# Patient Record
Sex: FEMALE | NOT HISPANIC OR LATINO | Employment: UNEMPLOYED | URBAN - METROPOLITAN AREA
[De-identification: names, ages, dates, MRNs, and addresses within clinical notes are randomized per-mention and may not be internally consistent; named-entity substitution may affect disease eponyms.]

---

## 2017-02-21 ENCOUNTER — OFFICE VISIT (OUTPATIENT)
Dept: ENDOCRINOLOGY | Facility: CLINIC | Age: 22
End: 2017-02-21
Payer: COMMERCIAL

## 2017-02-21 VITALS
WEIGHT: 293 LBS | BODY MASS INDEX: 57.52 KG/M2 | HEIGHT: 60 IN | DIASTOLIC BLOOD PRESSURE: 83 MMHG | SYSTOLIC BLOOD PRESSURE: 133 MMHG | HEART RATE: 89 BPM

## 2017-02-21 DIAGNOSIS — R73.03 PREDIABETES: ICD-10-CM

## 2017-02-21 DIAGNOSIS — E25.9 CAH 21-OH (CONGENITAL ADRENAL HYPERPLASIA), LATE ONSET (H): ICD-10-CM

## 2017-02-21 DIAGNOSIS — E55.9 VITAMIN D DEFICIENCY: ICD-10-CM

## 2017-02-21 DIAGNOSIS — E88.819 INSULIN RESISTANCE: Primary | ICD-10-CM

## 2017-02-21 DIAGNOSIS — E66.01 MORBID OBESITY DUE TO EXCESS CALORIES (H): ICD-10-CM

## 2017-02-21 PROCEDURE — 99214 OFFICE O/P EST MOD 30 MIN: CPT | Performed by: INTERNAL MEDICINE

## 2017-02-21 NOTE — MR AVS SNAPSHOT
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 It has been a pleasure servicing you today. Please contact us at [phone] with any questions. If you need to speak to an Endocrinologist and it is after 5:00 pm or on a weekend, please call the On-Call Endocrinologist at [phone].          Follow-ups after your visit        Your next 10 appointments already scheduled     Mar 10, 2017  7:45 AM CST   LAB with NL LAB [facility] ([facility])    [address] [facility] [address] [phone]           Patient must bring picture ID.  Patient should be prepared to give a urine specimen  Please do not eat 10-12 hours before your appointment if you are coming in fasting for labs on lipids, cholesterol, or glucose (sugar).  Pregnant women should follow their Care Team instructions. Water with medications is okay. Do not drink coffee or other fluids.   If you have concerns about taking  your medications, please ask at office or if scheduling via [employer], send a message by clicking on Secure Messaging, Message Your Care Team.            May 23, 2017 11:00 AM CDT   Return Visit with Alejandra Cronin MD   [facility] ([facility])    [id number] [place] [id number] [phone]              Future tests that were ordered for you today     Open Future Orders        Priority Expected  Expires Ordered    TSH Routine 2/23/2017 2/21/2018 2/21/2017    T4 free Routine 2/23/2017 2/21/2018 2/21/2017    17 OH progesterone Routine 2/23/2017 2/21/2018 2/21/2017    Androstenedione Routine 2/23/2017 2/21/2018 2/21/2017    Cortisol Routine 2/23/2017 2/21/2018 2/21/2017    Dehydroepiandrosterone Routine 2/23/2017 2/21/2018 2/21/2017    DHEA sulfate Routine 2/23/2017 2/21/2018 2/21/2017    Estradiol Routine 2/23/2017 2/21/2018 2/21/2017    Follicle stimulating hormone Routine 2/23/2017 2/21/2018 2/21/2017    Lipid Profile Routine 2/23/2017 2/21/2018 2/21/2017    Lutropin Routine 2/23/2017 2/21/2018 2/21/2017    Testosterone Free and Total Routine 2/23/2017 2/21/2018 2/21/2017    Basic metabolic panel Routine 2/23/2017 2/21/2018 2/21/2017    Hemoglobin A1c Routine 2/23/2017 2/21/2018 2/21/2017    Hematocrit Routine 2/23/2017 2/21/2018 2/21/2017    Adrenal corticotropin Routine 2/23/2017 2/21/2018 2/21/2017    Progesterone Routine 2/23/2017 2/21/2018 2/21/2017    Vitamin D Deficiency Routine 2/23/2017 2/21/2018 2/21/2017            Who to contact     If you have questions or need follow up information about today's clinic visit or your schedule please contact [facility] directly at [phone].  Normal or non-critical lab and imaging results will be communicated to you by [employer], letter or phone within 4 business days after the clinic has received the results. If you do not hear from us within 7 days, please contact the clinic through [employer] or phone. If you have a critical or abnormal lab result, we will notify you by phone as soon as possible.  Submit refill requests through [employer] or call your pharmacy and they will forward the refill request to us. Please allow 3 business days for your refill to be completed.          Additional Information About Your Visit        [employer] Information     MyChart is an electronic gateway that provides easy, online access to your medical records. With  "MyChart, you can request a clinic appointment, read your test results, renew a prescription or communicate with your care team.     To sign up for MyChart visit the website at [email or web address]   You will be asked to enter the access code listed below, as well as some personal information. Please follow the directions to create your username and password.     Your access code is: [email or web address]  Expires: 2017 12:09 PM     Your access code will  in 90 days. If you need help or a new code, please contact your [facility] or call [phone] for assistance.        Care EveryWhere ID     This is your Care EveryWhere ID. This could be used by other organizations to access your [facility] medical records  [id number]        Your Vitals Were     Pulse Height BMI (Body Mass Index)             89 1.53 m (5' 0.25\") 62.51 kg/m2          Blood Pressure from Last 3 Encounters:   17 133/83   14 111/75   10/17/14 126/64    Weight from Last 3 Encounters:   17 (!) 146.4 kg (322 lb 12.1 oz)   14 (!) 143.8 kg (317 lb)   10/17/14 (!) 142 kg (313 lb)               Primary Care Provider    [facility]       No address on file        Thank you!     Thank you for choosing [facility]  for your care. Our goal is always to provide you with excellent care. Hearing back from our patients is one way we can continue to improve our services. Please take a few minutes to complete the written survey that you may receive in the mail after your visit with us. Thank you!             Your Updated Medication List - Protect others around you: Learn how to safely use, store and throw away your medicines at www.disposemymeds.org.          This list is accurate as of: 17 12:09 PM.  Always use your most recent med list.                   Brand Name Dispense Instructions for use    metFORMIN 1000 MG tablet    GLUCOPHAGE    180 tablet    Take " 1 tablet (1,000 mg) by mouth 2 times daily (with meals)       spironolactone 100 MG tablet    ALDACTONE    180 tablet    Take 1 tablet (100 mg) by mouth 2 times daily

## 2017-02-21 NOTE — PATIENT INSTRUCTIONS
Thank you for choosing the [facility], Department of Endocrinology. It has been a pleasure servicing you today. Please contact us at [phone] with any questions. If you need to speak to an Endocrinologist and it is after 5:00 pm or on a weekend, please call the On-Call Endocrinologist at [phone].

## 2017-02-21 NOTE — NURSING NOTE
"Anhbabar Flores's goals for this visit include: Follow up Adrenal Hyperplasia  She requests these members of her care team be copied on today's visit information: NO    PCP: [facility]    Referring Provider:  No referring provider defined for this encounter.    Chief Complaint   Patient presents with     RECHECK     Adrenal Hyperplasia       Initial Ht 1.53 m (5' 0.25\")  Wt (!) 146.4 kg (322 lb 12.1 oz)  BMI 62.51 kg/m2 Estimated body mass index is 62.51 kg/(m^2) as calculated from the following:    Height as of this encounter: 1.53 m (5' 0.25\").    Weight as of this encounter: 146.4 kg (322 lb 12.1 oz).  Medication Reconciliation: complete    Do you need any medication refills at today's visit? YES    "

## 2017-02-21 NOTE — PROGRESS NOTES
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 She was seen by me only once, in November 2014, and she didn't show up for follow-up.    She is accompanied by her mother. Soon after her first appointment here, they moved and they think this is the reason why they didn't answer the phone or receives the letter we mailed.    Anh Flores is a 22 year old female, previously seen by Dr. 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 Over the last few years, the patient had sporadic medical care due to lack of insurance coverage.     Around age 6, while being evaluated for precocious puberty and darkenig of the skin around her neck, she was diagnosed with late onset CAH and started on 0.25 mg dexamethasone daily. Later on, metformin, spironolactone and Micaela were prescribed (she was taking them prior to 2010). Treatment with birth control pills did not induce a withdrawal bleeding. She 1st noticed the appearance of facial hair around age 13. She currently shaves it once or twice a week.    A couple of months following November 2014 appointment, she ran out of all the medications. She didn't seek care due to lack of medical insurance.  As a result, she hasn't been taking any medications or supplements for over 2 years.    Besides persistent headaches, present approximately once a week, she denies any other signs or symptoms. They are diffuse and, sometimes, they last up to one or 2 days. Ketoprofen was recommended by neurology in 2014. The patient prefers not to take it, and she develops dizziness and the headaches do not actually improve. She has to sleep for the headaches to actually go away.    Prior images:   Pelvic US 2007 Limited sonographic evaluation secondary to the patient's body habitus.  CT abd 2007 - no adrenal enlargement   CT abdomen and pelvis 2010 - normal uterus and  "ovaries  An MRI of the brain done on 10/15/14 was unremarkable  Sleep study was done in 2007 or 2008    Past Medical History   Diagnosis Date     CAH (congenital adrenal hyperplasia) 2/9/2010     Followed by Dr. Brewer; next follow up 1.2011.  Metformin increased to 850 mg twice a day.      Vitamin D deficiency 2/9/2010   Morbid obesity  Acanthosis nigricans  Hydradenitis suppurativa   Headaches     Past Surgical History   Procedure Laterality Date     Cholecystectomy       She was in 8th grade      Current Medications    Current Outpatient Prescriptions:      spironolactone (ALDACTONE) 100 MG tablet, Take 1 tablet (100 mg) by mouth 2 times daily (Patient not taking: Reported on 2/21/2017), Disp: 180 tablet, Rfl: 3     metFORMIN (GLUCOPHAGE) 1000 MG tablet, Take 1 tablet (1,000 mg) by mouth 2 times daily (with meals) (Patient not taking: Reported on 2/21/2017), Disp: 180 tablet, Rfl: 3    Family History   Problem Relation Age of Onset     Neurological Sister 16     migraines   Maternal uncle - colon cancer.  Both maternal grandparents and her mother have hypertension. There is a family history of obesity and hirsutism (both her sisters). There is a family history of obesity. Her mother and 2 great aunts have type 2 diabetes. Her mother is  American.      Social History  Single. She denies smoking, drinking alcohol or using illicit drugs. Occupation: knitting.     Review of Systems   Weight up 8 lbs in the last 2 years   No fatigue   Jaw pain - at the mandibular joint - B/L, intermittently, lasting for a couple of hours, for the last 3-4 months, anytime of the day, with no reason. She doesn't grind her teeth at night.   Denies nausea or vomiting, lightheadedness  No diarrhea or constipation   No increased thirst or urination   She has boils in her armpits   No tremor; fingers sometimes get numb when she knits   Shoulders, back, elbows and knee pain   She is \"hot all the time\"  Sleeps with a fan at night as " "she breathes better this way  No dry skin, no hair loss   No anxiety or depression   No increased sweating  No enlargement of the hands and feet   12 point review of systems negative unless specified above.               Vital Signs     Previous Weights:    Wt Readings from Last 10 Encounters:   02/21/17 (!) 146.4 kg (322 lb 12.1 oz)   11/03/14 (!) 143.8 kg (317 lb)   10/17/14 (!) 142 kg (313 lb)   10/01/14 (!) 142 kg (313 lb)   09/27/14 (!) 142.9 kg (315 lb)   09/23/14 (!) 144.3 kg (318 lb 3.2 oz)   09/17/14 (!) 142 kg (313 lb)   09/11/14 (!) 142 kg (313 lb)   02/21/11 127.4 kg (280 lb 13.9 oz) (>99 %)*   04/11/11 131.7 kg (290 lb 6 oz) (>99 %)*     * Growth percentiles are based on CDC 2-20 Years data.        Vital signs:   /83  Pulse 89  Ht 1.53 m (5' 0.25\")  Wt (!) 146.4 kg (322 lb 12.1 oz)  BMI 62.51 kg/m2    Physical Exam  General Appearance: morbid obesity, round face, no plethora    Eyes:  conjutivae and extra-ocular motions are normal.                                    pupils round and reactive to light, no lid lag, no stare   Visual fields intact to confrontation    HEENT:   short neck, oropharynx clear and moist, no JVD, no bruits      unable to palpate the thyroid, no palpable nodules; overbite    Cardiovascular:  regular rhythm, no murmurs, distal pulse palpable, no edema  Respiratory:        chest clear, no rales, no rhonchi   Gastrointestinal:  abdomen soft, obese, non-tender, non-distended, normal bowel sounds,    no organomegaly  Musculoskeletal:  normal tone and strength  Psychological:          affect and judgment normal  Skin:  severe, diffuse acanthosis nigricans - mainly at the flexural areas; severe hirsutism; no acne, benign striae   Neurological:  reflexes normal and symmetric, no resting tremor.     Lab Results  I reviewed prior lab results documented in Epic.  Lab Results   Component Value Date    A1C 6.1 (H) 09/17/2014    A1C 5.7 07/09/2010    A1C 5.5 01/15/2010    A1C 5.3 " 10/12/2007    A1C 5.3 03/12/2007     Component      Latest Ref Rng & Units 11/3/2014   Percent Testosterone Free      1.0 - 3.8 % 3.5   Testosterone Total      8 - 60 ng/dL 81 (H)   Testosterone Free      0.1 - 1.5 ng/dL 2.8 (H)   FSH      IU/L 4.7   Lutropin      IU/L 4.1   17-OH Progesterone      ng/dL 185   DHEA Sulfate      35 - 430 ug/dL 264   Androstenedione       1.320   Prolactin      3 - 27 ug/L 4   Estradiol      pg/mL Canceled, Test credited . . .   Cortisol Serum      4 - 22 ug/dL 6.1     Assessment     1. Nonclassical congenital adrenal hyperplasia   I am not sure she benefits from treatment with dexamethasone. F/up 17 OH progesterone, DHEAS, androstenedione and testosterone level.     2. Morbid obesity, associated with severe insulin resistance  Counseled on diet and exercise. We might refer her to weight management clinic.   F/up morning ACTH and cortisol level, lipid panel, BMP.     3. Primary amenorrhea   Might be due to #1 or #2, or a combination of both   Check FSH, LH and estradiol   Consider restarting her on BCP    4. Type 2 diabetes   Prior A1c was in a prediabetic range in the context of treatment with metformin which indicates that she had type 2 diabetes.   Check A1c and H. Restart metfomin at a maximum dose.    5. H/O vitamin D deficiency   Check vitamin D level and consider starting high dose vitamin D treatment indefinitely.     6. Headaches   Might be related to sleep apnea. Following the lab results, will schedule her for an apt with a sleep apnea specialist.     7. Hirsutism   Consider restarting spironolactone. Metformin might help, too.     Recommendations:   Have the following labwork done in the morning, fasting:   Orders Placed This Encounter   Procedures     TSH     T4 free     17 OH progesterone     Androstenedione     Cortisol     Dehydroepiandrosterone     DHEA sulfate     Estradiol     Follicle stimulating hormone     Lipid Profile     Lutropin     Testosterone Free and  Total     Basic metabolic panel     Hemoglobin A1c     Hematocrit     Adrenal corticotropin     Progesterone     Vitamin D Deficiency

## 2017-03-15 ENCOUNTER — ALLIED HEALTH/NURSE VISIT (OUTPATIENT)
Dept: PEDIATRICS | Facility: CLINIC | Age: 22
End: 2017-03-15
Payer: MEDICAID

## 2017-03-15 DIAGNOSIS — Z23 NEED FOR PROPHYLACTIC VACCINATION AND INOCULATION AGAINST INFLUENZA: Primary | ICD-10-CM

## 2017-03-15 DIAGNOSIS — E55.9 VITAMIN D DEFICIENCY: ICD-10-CM

## 2017-03-15 DIAGNOSIS — E25.9 CAH 21-OH (CONGENITAL ADRENAL HYPERPLASIA), LATE ONSET (H): ICD-10-CM

## 2017-03-15 DIAGNOSIS — E88.819 INSULIN RESISTANCE: ICD-10-CM

## 2017-03-15 DIAGNOSIS — E66.01 MORBID OBESITY DUE TO EXCESS CALORIES (H): ICD-10-CM

## 2017-03-15 LAB
ANION GAP SERPL CALCULATED.3IONS-SCNC: 6 MMOL/L (ref 3–14)
BUN SERPL-MCNC: 7 MG/DL (ref 7–30)
CALCIUM SERPL-MCNC: 9.2 MG/DL (ref 8.5–10.1)
CHLORIDE SERPL-SCNC: 105 MMOL/L (ref 94–109)
CHOLEST SERPL-MCNC: 165 MG/DL
CO2 SERPL-SCNC: 28 MMOL/L (ref 20–32)
CORTIS SERPL-MCNC: 8.7 UG/DL (ref 4–22)
CREAT SERPL-MCNC: 0.66 MG/DL (ref 0.52–1.04)
ESTRADIOL SERPL-MCNC: 60 PG/ML
FSH SERPL-ACNC: 3.8 IU/L
GFR SERPL CREATININE-BSD FRML MDRD: ABNORMAL ML/MIN/1.7M2
GLUCOSE SERPL-MCNC: 110 MG/DL (ref 70–99)
HBA1C MFR BLD: 6.4 % (ref 4.3–6)
HCT VFR BLD AUTO: 43.8 % (ref 35–47)
HDLC SERPL-MCNC: 55 MG/DL
LDLC SERPL CALC-MCNC: 83 MG/DL
LH SERPL-ACNC: 4.3 IU/L
NONHDLC SERPL-MCNC: 110 MG/DL
POTASSIUM SERPL-SCNC: 4.2 MMOL/L (ref 3.4–5.3)
PROGEST SERPL-MCNC: 0.4 NG/ML
SODIUM SERPL-SCNC: 139 MMOL/L (ref 133–144)
T4 FREE SERPL-MCNC: 0.95 NG/DL (ref 0.76–1.46)
TRIGL SERPL-MCNC: 133 MG/DL
TSH SERPL DL<=0.05 MIU/L-ACNC: 0.57 MU/L (ref 0.4–4)

## 2017-03-15 PROCEDURE — 99000 SPECIMEN HANDLING OFFICE-LAB: CPT | Performed by: INTERNAL MEDICINE

## 2017-03-15 PROCEDURE — 80061 LIPID PANEL: CPT | Performed by: INTERNAL MEDICINE

## 2017-03-15 PROCEDURE — 99207 ZZC NO CHARGE NURSE ONLY: CPT

## 2017-03-15 PROCEDURE — 82157 ASSAY OF ANDROSTENEDIONE: CPT | Mod: 90 | Performed by: INTERNAL MEDICINE

## 2017-03-15 PROCEDURE — 83036 HEMOGLOBIN GLYCOSYLATED A1C: CPT | Performed by: INTERNAL MEDICINE

## 2017-03-15 PROCEDURE — 84270 ASSAY OF SEX HORMONE GLOBUL: CPT | Performed by: INTERNAL MEDICINE

## 2017-03-15 PROCEDURE — 84443 ASSAY THYROID STIM HORMONE: CPT | Performed by: INTERNAL MEDICINE

## 2017-03-15 PROCEDURE — 84403 ASSAY OF TOTAL TESTOSTERONE: CPT | Performed by: INTERNAL MEDICINE

## 2017-03-15 PROCEDURE — 83498 ASY HYDROXYPROGESTERONE 17-D: CPT | Performed by: INTERNAL MEDICINE

## 2017-03-15 PROCEDURE — 90686 IIV4 VACC NO PRSV 0.5 ML IM: CPT

## 2017-03-15 PROCEDURE — 83001 ASSAY OF GONADOTROPIN (FSH): CPT | Performed by: INTERNAL MEDICINE

## 2017-03-15 PROCEDURE — 84144 ASSAY OF PROGESTERONE: CPT | Performed by: INTERNAL MEDICINE

## 2017-03-15 PROCEDURE — 82626 DEHYDROEPIANDROSTERONE: CPT | Mod: 90 | Performed by: INTERNAL MEDICINE

## 2017-03-15 PROCEDURE — 84439 ASSAY OF FREE THYROXINE: CPT | Performed by: INTERNAL MEDICINE

## 2017-03-15 PROCEDURE — 90471 IMMUNIZATION ADMIN: CPT

## 2017-03-15 PROCEDURE — 82627 DEHYDROEPIANDROSTERONE: CPT | Performed by: INTERNAL MEDICINE

## 2017-03-15 PROCEDURE — 80048 BASIC METABOLIC PNL TOTAL CA: CPT | Performed by: INTERNAL MEDICINE

## 2017-03-15 PROCEDURE — 82533 TOTAL CORTISOL: CPT | Performed by: INTERNAL MEDICINE

## 2017-03-15 PROCEDURE — 82306 VITAMIN D 25 HYDROXY: CPT | Performed by: INTERNAL MEDICINE

## 2017-03-15 PROCEDURE — 36415 COLL VENOUS BLD VENIPUNCTURE: CPT | Performed by: INTERNAL MEDICINE

## 2017-03-15 PROCEDURE — 85014 HEMATOCRIT: CPT | Performed by: INTERNAL MEDICINE

## 2017-03-15 PROCEDURE — 82024 ASSAY OF ACTH: CPT | Performed by: INTERNAL MEDICINE

## 2017-03-15 PROCEDURE — 83002 ASSAY OF GONADOTROPIN (LH): CPT | Performed by: INTERNAL MEDICINE

## 2017-03-15 PROCEDURE — 82670 ASSAY OF TOTAL ESTRADIOL: CPT | Performed by: INTERNAL MEDICINE

## 2017-03-15 NOTE — PROGRESS NOTES
Injectable Influenza Immunization Documentation    1.  Is the person to be vaccinated sick today?  No    2. Does the person to be vaccinated have an allergy to eggs or to a component of the vaccine?  No    3. Has the person to be vaccinated today ever had a serious reaction to influenza vaccine in the past?  No    4. Has the person to be vaccinated ever had Guillain-Elkader syndrome?  No     Form completed by Amaya Cox CMA

## 2017-03-15 NOTE — MR AVS SNAPSHOT
After Visit Summary   3/15/2017    Anh Flores    MRN: [id number]           Patient Information     Date Of Birth          1995        Visit Information        Provider Department      3/15/2017 11:55 AM MG ANCILLARY [facility]        Today's Diagnoses     Need for prophylactic vaccination and inoculation against influenza    -  1       Follow-ups after your visit        Your next 10 appointments already scheduled     Mar 15, 2017 11:55 AM CDT   Nurse Only with MG ANCILLARY   [facility] ([facility])    [address] [place] [address] [phone]            May 23, 2017 11:00 AM CDT   Return Visit with Alejandra Cronin MD   [facility] [place] 2[facility]    [address] [place] [address] [phone]              Who to contact     If you have questions or need follow up information about today's clinic visit or your schedule please contact [facility] directly at [phone].  Normal or non-critical lab and imaging results will be communicated to you by [employer], letter or phone within 4 business days after the clinic has received the results. If you do not hear from us within 7 days, please contact the clinic through [employer] or phone. If you have a critical or abnormal lab result, we will notify you by phone as soon as possible.  Submit refill requests through [employer] or call your pharmacy and they will forward the refill request to us. Please allow 3 business days for your refill to be completed.          Additional Information About Your Visit        [employer] Information     [employer] is an electronic gateway that provides easy, online access to your medical records. With [employer], you can request a clinic appointment, read your test results, renew a prescription or communicate with your care team.     To sign up for [employer] visit the website at  [email or web address]   You will be asked to enter the access code listed below, as well as some personal information. Please follow the directions to create your username and password.     Your access code is: [email or web address]  Expires: 2017  1:09 PM     Your access code will  in 90 days. If you need help or a new code, please contact your [facility] or call [phone] for assistance.        Care EveryWhere ID     This is your Care EveryWhere ID. This could be used by other organizations to access your [facility] medical records  [id number]         Blood Pressure from Last 3 Encounters:   17 133/83   14 111/75   10/17/14 126/64    Weight from Last 3 Encounters:   17 (!) 322 lb 12.1 oz (146.4 kg)   14 (!) 317 lb (143.8 kg)   10/17/14 (!) 313 lb (142 kg)              We Performed the Following     FLU VAC, SPLIT VIRUS IM > 3 YO (QUADRIVALENT) [13400]     Vaccine Administration, Initial [54830]        Primary Care Provider    [facility]       No address on file        Thank you!     Thank you for choosing [facility]  for your care. Our goal is always to provide you with excellent care. Hearing back from our patients is one way we can continue to improve our services. Please take a few minutes to complete the written survey that you may receive in the mail after your visit with us. Thank you!             Your Updated Medication List - Protect others around you: Learn how to safely use, store and throw away your medicines at www.disposemymeds.org.          This list is accurate as of: 3/15/17 11:54 AM.  Always use your most recent med list.                   Brand Name Dispense Instructions for use    metFORMIN 1000 MG tablet    GLUCOPHAGE    180 tablet    Take 1 tablet (1,000 mg) by mouth 2 times daily (with meals)       spironolactone 100 MG tablet    ALDACTONE    180 tablet    Take 1 tablet (100 mg) by  mouth 2 times daily

## 2017-03-16 LAB
ACTH PLAS-MCNC: 23 PG/ML
DEPRECATED CALCIDIOL+CALCIFEROL SERPL-MC: 12 UG/L (ref 20–75)
DHEA-S SERPL-MCNC: 289 UG/DL (ref 35–430)

## 2017-03-17 LAB
SHBG SERPL-SCNC: 8 NMOL/L (ref 30–135)
TESTOST FREE SERPL-MCNC: 1.84 NG/DL (ref 0.08–0.74)
TESTOST SERPL-MCNC: 57 NG/DL (ref 8–60)

## 2017-03-18 LAB
ANDROST SERPL-MCNC: 1.51 NG/ML
DHEA SERPL-MCNC: 7.56

## 2017-03-23 LAB — 17OHP SERPL-MCNC: 545 NG/DL

## 2017-03-24 ENCOUNTER — TELEPHONE (OUTPATIENT)
Dept: ENDOCRINOLOGY | Facility: CLINIC | Age: 22
End: 2017-03-24

## 2017-03-24 RX ORDER — METFORMIN HCL 500 MG
1000 TABLET, EXTENDED RELEASE 24 HR ORAL 2 TIMES DAILY WITH MEALS
Qty: 360 TABLET | Refills: 3 | Status: SHIPPED | OUTPATIENT
Start: 2017-03-24 | End: 2018-03-29

## 2017-03-24 RX ORDER — SPIRONOLACTONE 100 MG/1
100 TABLET, FILM COATED ORAL 2 TIMES DAILY
Qty: 180 TABLET | Refills: 3 | Status: SHIPPED | OUTPATIENT
Start: 2017-03-24 | End: 2018-03-29

## 2017-03-24 RX ORDER — NORGESTIMATE AND ETHINYL ESTRADIOL 0.25-0.035
1 KIT ORAL DAILY
Qty: 84 TABLET | Refills: 3 | Status: SHIPPED | OUTPATIENT
Start: 2017-03-24 | End: 2018-04-10

## 2017-03-24 NOTE — PROGRESS NOTES
Called patient and spoke with her mother. Recommended to start OrthoTricyclen, spironolactone and metformin and start treatment with high dose vitamin D at [address] U weekly. She should reschedule a clinic f/up apt in 6 months. Discussed about scheduling an apt with the dietician - has a hard time driving her to the clinic due to her work schedule. Might be able to schedule an apt in June.

## 2017-03-24 NOTE — TELEPHONE ENCOUNTER
Per Roseanne Message:  Please cancel May apt and schedule a RTC in 6 months.     Left message for patient to return call.    Angela Baugh LPN  Adult Endocrinology  [facility]

## 2018-01-10 ENCOUNTER — OFFICE VISIT (OUTPATIENT)
Dept: ENDOCRINOLOGY | Facility: CLINIC | Age: 23
End: 2018-01-10
Payer: COMMERCIAL

## 2018-01-10 VITALS
SYSTOLIC BLOOD PRESSURE: 123 MMHG | TEMPERATURE: 98.1 F | BODY MASS INDEX: 57.52 KG/M2 | WEIGHT: 293 LBS | HEART RATE: 79 BPM | HEIGHT: 60 IN | DIASTOLIC BLOOD PRESSURE: 82 MMHG | OXYGEN SATURATION: 96 %

## 2018-01-10 DIAGNOSIS — M62.838 MUSCLE SPASM: ICD-10-CM

## 2018-01-10 DIAGNOSIS — E25.9 CAH 21-OH (CONGENITAL ADRENAL HYPERPLASIA), LATE ONSET (H): Primary | ICD-10-CM

## 2018-01-10 PROCEDURE — 99214 OFFICE O/P EST MOD 30 MIN: CPT | Performed by: INTERNAL MEDICINE

## 2018-01-10 NOTE — MR AVS SNAPSHOT
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              Follow-ups after your visit        Follow-up notes from your care team     Return in about 1 year (around 1/10/2019) for Chad Newell.      Your next 10 appointments already scheduled     Buck 15, 2018  7:40 AM CST   LAB with LAB FIRST FLOOR [facility] ([facility])    [id number] [place] [id number]   [phone]           Please do not eat 10-12 hours before your appointment if you are coming in fasting for labs on lipids, cholesterol, or glucose (sugar). This does not apply to pregnant women. Water, hot tea and black coffee (with nothing added) are okay. Do not drink other fluids, diet soda or chew gum.            [place] 15, 2018  1:30 PM CST   New Visit with Lyn Mcgrath   [facility] ([facility]    [id number] [place] [id number]   [phone]            Jan 09, 2019 11:00 AM CST   Return Visit with Alejandra Cronin MD   [facility] ([facility])    [id number] [place] [id number]   [phone]              Future tests that were ordered for you today     Open Future Orders        Priority Expected Expires Ordered    Magnesium Routine 1/17/2018 1/10/2019 1/10/2018    Comprehensive metabolic panel Routine 1/17/2018 1/10/2019 1/10/2018    Hemoglobin A1c Routine 1/17/2018 1/10/2019 1/10/2018    Lipid panel reflex to direct LDL Fasting Routine 1/17/2018 1/10/2019 1/10/2018    TSH with free T4  reflex Routine 2018 1/10/2019 1/10/2018    Albumin Random Urine Quantitative with Creat Ratio Routine 2018 1/10/2019 1/10/2018    17 OH progesterone Routine 2018 1/10/2019 1/10/2018    25 Hydroxyvitamin D2 and D3 Routine 2018 1/10/2019 1/10/2018    DHEA sulfate Routine 2018 1/10/2019 1/10/2018    Dehydroepiandrosterone Routine 2018 1/10/2019 1/10/2018            Who to contact     If you have questions or need follow up information about today's clinic visit or your schedule please contact [facility] [[email or web address]] directly at [phone].  Normal or non-critical lab and imaging results will be communicated to you by Kueskihart, letter or phone within 4 business days after the clinic has received the results. If you do not hear from us within 7 days, please contact the clinic through Kueskihart or phone. If you have a critical or abnormal lab result, we will notify you by phone as soon as possible.  Submit refill requests through Nestio or call your pharmacy and they will forward the refill request to us. Please allow 3 business days for your refill to be completed.          Additional Information About Your Visit        Nestio Information     Nestio is an electronic gateway that provides easy, online access to your medical records. With Nestio, you can request a clinic appointment, read your test results, renew a prescription or communicate with your care team.     To sign up for Nestio visit the website at [email or web address]   You will be asked to enter the access code listed below, as well as some personal information. Please follow the directions to create your username and password.     Your access code is: [email or web address]  Expires: 4/10/2018 12:02 PM     Your access code will  in 90 days. If you need help or a new code, please contact your [facility] or call [phone] for assistance.        Care EveryWhere ID     This is your Care  "EveryWhere ID. This could be used by other organizations to access your [place] medical records  [id number]        Your Vitals Were     Pulse Temperature Height Pulse Oximetry BMI (Body Mass Index)       79 98.1  F (36.7  C) 1.53 m (5' 0.25\") 96% 63.37 kg/m2        Blood Pressure from Last 3 Encounters:   01/10/18 123/82   02/21/17 133/83   11/03/14 111/75    Weight from Last 3 Encounters:   01/10/18 (!) 148.4 kg (327 lb 2.6 oz)   02/21/17 (!) 146.4 kg (322 lb 12.1 oz)   11/03/14 (!) 143.8 kg (317 lb)               Primary Care Provider Office Phone # Fax #    [facility] [phone]       [address]  [facility] [address]        Equal Access to Services     GENEVIEVE KIMBROUGH : Hadii josué hayo Sosudheer, waaxda luqadaha, qaybta kaalmada adeegyada, jey bedoya . So [facility] [phone].    ATENCIÓN: Si john no, tiene a onofre disposición servicios gratuitos de asistencia lingüística. Llame al [phone].    We comply with applicable federal civil rights laws and [place] laws. We do not discriminate on the basis of race, color, national origin, age, disability, sex, sexual orientation, or gender identity.            Thank you!     Thank you for choosing [facility]  for your care. Our goal is always to provide you with excellent care. Hearing back from our patients is one way we can continue to improve our services. Please take a few minutes to complete the written survey that you may receive in the mail after your visit with us. Thank you!             Your Updated Medication List - Protect others around you: Learn how to safely use, store and throw away your medicines at www.disposemymeds.org.          This list is accurate as of: 1/10/18 12:02 PM.  Always use your most recent med list.                   Brand Name Dispense Instructions for use Diagnosis    cholecalciferol 69110 UNITS capsule    VITAMIN D3    12 capsule    " Take 1 capsule (50,000 Units) by mouth once a week    Vitamin D deficiency       metFORMIN 500 MG 24 hr tablet    GLUCOPHAGE-XR    360 tablet    Take 2 tablets (1,000 mg) by mouth 2 times daily (with meals)    CAH 21-OH (congenital adrenal hyperplasia), late onset (H), Insulin resistance, Prediabetes       norgestimate-ethinyl estradiol 0.25-35 MG-MCG per tablet    ORTHO-CYCLEN, SPRINTEC    84 tablet    Take 1 tablet by mouth daily    CAH 21-OH (congenital adrenal hyperplasia), late onset (H)       spironolactone 100 MG tablet    ALDACTONE    180 tablet    Take 1 tablet (100 mg) by mouth 2 times daily    CAH 21-OH (congenital adrenal hyperplasia), late onset (H)

## 2018-01-10 NOTE — PROGRESS NOTES
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 She was previously seen by me in November 2014 and February 2017.  She is accompanied by her mother.    She is accompanied by her mother. Soon after her first appointment here, they moved and they think this is the reason why they didn't answer the phone or received the letter we mailed.    Anh Flores is a 23 year old female, previously seen by Dr. 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   Around age 6, while being evaluated for precocious puberty and darkening of the skin around her neck, she was diagnosed with late onset CAH and started on 0.25 mg dexamethasone daily. Later on, metformin, spironolactone and Micaela were prescribed (she was taking them prior to 2010). Treatment with birth control pills did not induce a withdrawal bleeding. She 1st noticed the appearance of facial hair around age 13. Over the last few years, the patient had sporadic medical care.     A couple of months following November 2014 appointment, she ran out of all the medications. She didn't seek care due to lack of medical insurance.  As a result, she did not take any medications or supplements for over 2 years.  She has been off dexamethasone since 2014.  In February 2017,  she was restarted on metformin, spironolactone and birth control pills.  She reports being compliant in taking the medications as instructed.  Metformin causes some epigastric pain but she can tolerate it, if she takes it with yogurt.    Prior images:   Pelvic US 2007 Limited sonographic evaluation secondary to the patient's body habitus.  CT abd 2007 - no adrenal enlargement   CT abdomen and pelvis 2010 - normal uterus and ovaries  An MRI of the brain done on 10/15/14 was unremarkable  Sleep study was done in 2007 or 2008    She has not noticed a change in the facial hair since  dexamethasone was discontinued.  For the last 6 months or so, she has developed muscle twitching around her eyes.  The twitching is persistent throughout the day and it starts after she takes her morning medications.  Today, she did not take her medications prior to her clinic appointment and she has not been having them.  The headaches have been less frequent, now present twice a month.    At her last visit here, instructed her to establish care with the dietitian.  She has not been able to do this as she depends on her mother to drive her to the clinic and her mother has to go to work during daytime.  Wintertime, she spends most of the days inside.  She tries to eat less processed foods but she admits that this is hard to do since she lives with small kids in the house, and they are quite picky.  As per mother, her daily schedule is very irregular, and she frequently goes to sleep around 4 AM.  She does not exercise.  Dinner remains the main meal of the day and she admits that she frequently ends up snacking.  She enjoys taking care of her dogs and doing grooming.    She confirms he takes the vitamin D as instructed.  Daily intake of dairy products consist of 2 yogurts, cheese.    Past Medical History:   Diagnosis Date     CAH (congenital adrenal hyperplasia) 2/9/2010    Followed by Dr. Brewer; next follow up 1.2011.  Metformin increased to 850 mg twice a day.      Vitamin D deficiency 2/9/2010   Morbid obesity  Acanthosis nigricans  Hydradenitis suppurativa   Headaches     Past Surgical History:   Procedure Laterality Date     CHOLECYSTECTOMY      She was in 8th grade      Current Medications    Current Outpatient Prescriptions:      metFORMIN (GLUCOPHAGE-XR) 500 MG 24 hr tablet, Take 2 tablets (1,000 mg) by mouth 2 times daily (with meals), Disp: 360 tablet, Rfl: 3     spironolactone (ALDACTONE) 100 MG tablet, Take 1 tablet (100 mg) by mouth 2 times daily, Disp: 180 tablet, Rfl: 3     norgestimate-ethinyl  "estradiol (ORTHO-CYCLEN, SPRINTEC) 0.25-35 MG-MCG per tablet, Take 1 tablet by mouth daily, Disp: 84 tablet, Rfl: 3     cholecalciferol (VITAMIN D3) 18376 UNITS capsule, Take 1 capsule (50,000 Units) by mouth once a week, Disp: 12 capsule, Rfl: 3     [DISCONTINUED] spironolactone (ALDACTONE) 100 MG tablet, Take 1 tablet (100 mg) by mouth 2 times daily (Patient not taking: Reported on 2/21/2017), Disp: 180 tablet, Rfl: 3     [DISCONTINUED] metFORMIN (GLUCOPHAGE) 1000 MG tablet, Take 1 tablet (1,000 mg) by mouth 2 times daily (with meals) (Patient not taking: Reported on 2/21/2017), Disp: 180 tablet, Rfl: 3    Family History   Problem Relation Age of Onset     Neurological Sister 16     migraines   Maternal uncle - colon cancer.  Both maternal grandparents and her mother have hypertension. There is a family history of obesity and hirsutism (both her sisters). There is a family history of obesity. Her mother and 2 great aunts have type 2 diabetes. Her mother is Nepalese American.      Social History  Single. She denies smoking, drinking alcohol or using illicit drugs. Occupation: knitting.     Review of Systems   Weight up 10 lbs in the last 2 years   No fatigue   Denies nausea or vomiting, lightheadedness  No diarrhea or constipation   No increased thirst or urination   She has boils in her armpits   No tremor; fingers sometimes get numb when she knits   Shoulders, back, elbows and knee pain   She is \"hot all the time\"  Sleeps with a fan at night as she breathes better this way; wintertime she has the window open   No dry skin, no hair loss   No anxiety or depression   No increased sweating  12 point review of systems negative unless specified above.               Vital Signs     Previous Weights:    Wt Readings from Last 10 Encounters:   01/10/18 (!) 148.4 kg (327 lb 2.6 oz)   02/21/17 (!) 146.4 kg (322 lb 12.1 oz)   11/03/14 (!) 143.8 kg (317 lb)   10/17/14 (!) 142 kg (313 lb)   10/01/14 (!) 142 kg (313 lb)   09/27/14 " "(!) 142.9 kg (315 lb)   09/23/14 (!) 144.3 kg (318 lb 3.2 oz)   09/17/14 (!) 142 kg (313 lb)   09/11/14 (!) 142 kg (313 lb)   02/21/11 127.4 kg (280 lb 13.9 oz) (>99 %)*     * Growth percentiles are based on [facility] 2-20 Years data.        Vital signs:   /82  Pulse 79  Temp 98.1  F (36.7  C)  Ht 1.53 m (5' 0.25\")  Wt (!) 148.4 kg (327 lb 2.6 oz)  SpO2 96%  BMI 63.37 kg/m2    Physical Exam  General Appearance: morbid obesity, round face, no plethora    Eyes:  conjutivae and extra-ocular motions are normal.                                    pupils round and reactive to light, no lid lag, no stare   Visual fields intact to confrontation    HEENT:   short neck, oropharynx clear and moist, no JVD, no bruits      unable to palpate the thyroid, no palpable nodules; overbite    Cardiovascular:  regular rhythm, no murmurs, distal pulse palpable, no edema  Respiratory:        chest clear, no rales, no rhonchi   Gastrointestinal:  abdomen soft, obese, non-tender, non-distended, normal bowel sounds,    no organomegaly  Musculoskeletal:  normal tone and strength  Psychological:          affect and judgment normal  Skin:  severe, diffuse acanthosis nigricans - mainly at the flexural areas; severe hirsutism; no acne, benign striae   Neurological:  reflexes normal and symmetric, no resting tremor.     Lab Results  I reviewed prior lab results documented in Epic.  Lab Results   Component Value Date    A1C 6.4 (H) 03/15/2017    A1C 6.1 (H) 09/17/2014    A1C 5.7 07/09/2010    A1C 5.5 01/15/2010    A1C 5.3 10/12/2007     Assessment     1. Nonclassical congenital adrenal hyperplasia, associated with primary amenorrhea, treated with dexamethasone until 2014, when dexamethasone was discontinued.  Following the discontinuation of dexamethasone, the 17 hydroxyprogesterone went up, but the DHEAS, dehydroepiandrosterone and testosterone remained stable, in the normal range.  Currently, she is medicated with birth control pills and a " maximum dose of metformin and spironolactone.  Plan:   Check 17 hydroxyprogesterone, DHEAS, DHEA and testosterone.    2. Morbid obesity, associated with severe insulin resistance. Her weight is up 10 pounds in the last 2 years.  Counseled extensively on diet and exercise.  Discussed about the importance of trying to have meals at scheduled times, avoid snacking, improving sleep hygiene, establishing a exercise schedule like walking or doing aerobic exercises for 15 minutes, daily.  I strongly encouraged her to try to establish care with a dietitian and her mother is going to try to work this in her schedule.   Plan:   Dietician apt   F/up A1c, lipid panel, BMP.     3. Type 2 diabetes   Prior A1c was in a prediabetic range in the context of treatment with metformin which indicates that she had type 2 diabetes.   Check A1c.     4. H/O vitamin D deficiency   Check vitamin D level.    5. Periocular muscle spasms   Check Mg, calcium level.     Lab work to be done in the morning, fasting:   Orders Placed This Encounter   Procedures     Comprehensive metabolic panel     Hemoglobin A1c     Lipid panel reflex to direct LDL Fasting     TSH with free T4 reflex     Albumin Random Urine Quantitative with Creat Ratio     17 OH progesterone     25 Hydroxyvitamin D2 and D3     DHEA sulfate     Dehydroepiandrosterone     Magnesium     Testosterone Free and Total       Note: The patient prefers to be informed of her lab results via letter.

## 2018-01-10 NOTE — NURSING NOTE
"Anhbabar Flores's goals for this visit include: Follow up   She requests these members of her care team be copied on today's visit information: NO    PCP: [facility]    Referring Provider:  No referring provider defined for this encounter.    Chief Complaint   Patient presents with     RECHECK       Initial Ht 1.53 m (5' 0.25\")  Wt (!) 148.4 kg (327 lb 2.6 oz)  BMI 63.37 kg/m2 Estimated body mass index is 63.37 kg/(m^2) as calculated from the following:    Height as of this encounter: 1.53 m (5' 0.25\").    Weight as of this encounter: 148.4 kg (327 lb 2.6 oz).  Medication Reconciliation: complete    Do you need any medication refills at today's visit?  NO    "

## 2018-01-10 NOTE — LETTER
1/10/2018         RE: Anh Flores  [id number] [address] [place] [address]  [facility] [address]        Dear Colleague,    Thank you for referring your patient, Anh Flores, to the [facility]. Please see a copy of my visit note below.      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 She was previously seen by me in November 2014 and February 2017.  She is accompanied by her mother.    She is accompanied by her mother. Soon after her first appointment here, they moved and they think this is the reason why they didn't answer the phone or received the letter we mailed.    Anh Flores is a 23 year old female, previously seen by Dr. 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   Around age 6, while being evaluated for precocious puberty and darkening of the skin around her neck, she was diagnosed with late onset CAH and started on 0.25 mg dexamethasone daily. Later on, metformin, spironolactone and Micaela were prescribed (she was taking them prior to 2010). Treatment with birth control pills did not induce a withdrawal bleeding. She 1st noticed the appearance of facial hair around age 13. Over the last few years, the patient had sporadic medical care.     A couple of months following November 2014 appointment, she ran out of all the medications. She didn't seek care due to lack of medical insurance.  As a result, she did not take any medications or supplements for over 2 years.  She has been off dexamethasone since 2014.  In February 2017,  she was restarted on metformin, spironolactone and birth control pills.  She reports being compliant in taking the medications as instructed.  Metformin causes some epigastric pain but she can tolerate it, if she takes it with yogurt.    Prior images:   Pelvic US 2007 Limited sonographic evaluation secondary to the patient's body habitus.  CT abd  2007 - no adrenal enlargement   CT abdomen and pelvis 2010 - normal uterus and ovaries  An MRI of the brain done on 10/15/14 was unremarkable  Sleep study was done in 2007 or 2008    She has not noticed a change in the facial hair since dexamethasone was discontinued.  For the last 6 months or so, she has developed muscle twitching around her eyes.  The twitching is persistent throughout the day and it starts after she takes her morning medications.  Today, she did not take her medications prior to her clinic appointment and she has not been having them.  The headaches have been less frequent, now present twice a month.    At her last visit here, instructed her to establish care with the dietitian.  She has not been able to do this as she depends on her mother to drive her to the clinic and her mother has to go to work during daytime.  Wintertime, she spends most of the days inside.  She tries to eat less processed foods but she admits that this is hard to do since she lives with small kids in the house, and they are quite picky.  As per mother, her daily schedule is very irregular, and she frequently goes to sleep around 4 AM.  She does not exercise.  Dinner remains the main meal of the day and she admits that she frequently ends up snacking.  She enjoys taking care of her dogs and doing grooming.    She confirms he takes the vitamin D as instructed.  Daily intake of dairy products consist of 2 yogurts, cheese.    Past Medical History:   Diagnosis Date     CAH (congenital adrenal hyperplasia) 2/9/2010    Followed by Dr. Brewer; next follow up 1.2011.  Metformin increased to 850 mg twice a day.      Vitamin D deficiency 2/9/2010   Morbid obesity  Acanthosis nigricans  Hydradenitis suppurativa   Headaches     Past Surgical History:   Procedure Laterality Date     CHOLECYSTECTOMY      She was in 8th grade      Current Medications    Current Outpatient Prescriptions:      metFORMIN (GLUCOPHAGE-XR) 500 MG 24 hr  "tablet, Take 2 tablets (1,000 mg) by mouth 2 times daily (with meals), Disp: 360 tablet, Rfl: 3     spironolactone (ALDACTONE) 100 MG tablet, Take 1 tablet (100 mg) by mouth 2 times daily, Disp: 180 tablet, Rfl: 3     norgestimate-ethinyl estradiol (ORTHO-CYCLEN, SPRINTEC) 0.25-35 MG-MCG per tablet, Take 1 tablet by mouth daily, Disp: 84 tablet, Rfl: 3     cholecalciferol (VITAMIN D3) 90674 UNITS capsule, Take 1 capsule (50,000 Units) by mouth once a week, Disp: 12 capsule, Rfl: 3     [DISCONTINUED] spironolactone (ALDACTONE) 100 MG tablet, Take 1 tablet (100 mg) by mouth 2 times daily (Patient not taking: Reported on 2/21/2017), Disp: 180 tablet, Rfl: 3     [DISCONTINUED] metFORMIN (GLUCOPHAGE) 1000 MG tablet, Take 1 tablet (1,000 mg) by mouth 2 times daily (with meals) (Patient not taking: Reported on 2/21/2017), Disp: 180 tablet, Rfl: 3    Family History   Problem Relation Age of Onset     Neurological Sister 16     migraines   Maternal uncle - colon cancer.  Both maternal grandparents and her mother have hypertension. There is a family history of obesity and hirsutism (both her sisters). There is a family history of obesity. Her mother and 2 great aunts have type 2 diabetes. Her mother is  American.      Social History  Single. She denies smoking, drinking alcohol or using illicit drugs. Occupation: knitting.     Review of Systems   Weight up 10 lbs in the last 2 years   No fatigue   Denies nausea or vomiting, lightheadedness  No diarrhea or constipation   No increased thirst or urination   She has boils in her armpits   No tremor; fingers sometimes get numb when she knits   Shoulders, back, elbows and knee pain   She is \"hot all the time\"  Sleeps with a fan at night as she breathes better this way; wintertime she has the window open   No dry skin, no hair loss   No anxiety or depression   No increased sweating  12 point review of systems negative unless specified above.               Vital Signs   " "  Previous Weights:    Wt Readings from Last 10 Encounters:   01/10/18 (!) 148.4 kg (327 lb 2.6 oz)   02/21/17 (!) 146.4 kg (322 lb 12.1 oz)   11/03/14 (!) 143.8 kg (317 lb)   10/17/14 (!) 142 kg (313 lb)   10/01/14 (!) 142 kg (313 lb)   09/27/14 (!) 142.9 kg (315 lb)   09/23/14 (!) 144.3 kg (318 lb 3.2 oz)   09/17/14 (!) 142 kg (313 lb)   09/11/14 (!) 142 kg (313 lb)   02/21/11 127.4 kg (280 lb 13.9 oz) (>99 %)*     * Growth percentiles are based on [facility] 2-20 Years data.        Vital signs:   /82  Pulse 79  Temp 98.1  F (36.7  C)  Ht 1.53 m (5' 0.25\")  Wt (!) 148.4 kg (327 lb 2.6 oz)  SpO2 96%  BMI 63.37 kg/m2    Physical Exam  General Appearance: morbid obesity, round face, no plethora    Eyes:  conjutivae and extra-ocular motions are normal.                                    pupils round and reactive to light, no lid lag, no stare   Visual fields intact to confrontation    HEENT:   short neck, oropharynx clear and moist, no JVD, no bruits      unable to palpate the thyroid, no palpable nodules; overbite    Cardiovascular:  regular rhythm, no murmurs, distal pulse palpable, no edema  Respiratory:        chest clear, no rales, no rhonchi   Gastrointestinal:  abdomen soft, obese, non-tender, non-distended, normal bowel sounds,    no organomegaly  Musculoskeletal:  normal tone and strength  Psychological:          affect and judgment normal  Skin:  severe, diffuse acanthosis nigricans - mainly at the flexural areas; severe hirsutism; no acne, benign striae   Neurological:  reflexes normal and symmetric, no resting tremor.     Lab Results  I reviewed prior lab results documented in Epic.  Lab Results   Component Value Date    A1C 6.4 (H) 03/15/2017    A1C 6.1 (H) 09/17/2014    A1C 5.7 07/09/2010    A1C 5.5 01/15/2010    A1C 5.3 10/12/2007     Assessment     1. Nonclassical congenital adrenal hyperplasia, associated with primary amenorrhea, treated with dexamethasone until 2014, when dexamethasone was " discontinued.  Following the discontinuation of dexamethasone, the 17 hydroxyprogesterone went up, but the DHEAS, dehydroepiandrosterone and testosterone remained stable, in the normal range.  Currently, she is medicated with birth control pills and a maximum dose of metformin and spironolactone.  Plan:   Check 17 hydroxyprogesterone, DHEAS, DHEA and testosterone.    2. Morbid obesity, associated with severe insulin resistance. Her weight is up 10 pounds in the last 2 years.  Counseled extensively on diet and exercise.  Discussed about the importance of trying to have meals at scheduled times, avoid snacking, improving sleep hygiene, establishing a exercise schedule like walking or doing aerobic exercises for 15 minutes, daily.  I strongly encouraged her to try to establish care with a dietitian and her mother is going to try to work this in her schedule.   Plan:   Dietician apt   F/up A1c, lipid panel, BMP.     3. Type 2 diabetes   Prior A1c was in a prediabetic range in the context of treatment with metformin which indicates that she had type 2 diabetes.   Check A1c.     4. H/O vitamin D deficiency   Check vitamin D level.    5. Periocular muscle spasms   Check Mg, calcium level.     Lab work to be done in the morning, fasting:   Orders Placed This Encounter   Procedures     Comprehensive metabolic panel     Hemoglobin A1c     Lipid panel reflex to direct LDL Fasting     TSH with free T4 reflex     Albumin Random Urine Quantitative with Creat Ratio     17 OH progesterone     25 Hydroxyvitamin D2 and D3     DHEA sulfate     Dehydroepiandrosterone     Magnesium     Testosterone Free and Total       Note: The patient prefers to be informed of her lab results via letter.                        Again, thank you for allowing me to participate in the care of your patient.        Sincerely,        Alejandra Cronin MD

## 2018-01-15 ENCOUNTER — OFFICE VISIT (OUTPATIENT)
Dept: NUTRITION | Facility: CLINIC | Age: 23
End: 2018-01-15
Payer: COMMERCIAL

## 2018-01-15 DIAGNOSIS — M62.838 MUSCLE SPASM: ICD-10-CM

## 2018-01-15 DIAGNOSIS — E25.9 CAH 21-OH (CONGENITAL ADRENAL HYPERPLASIA), LATE ONSET (H): ICD-10-CM

## 2018-01-15 DIAGNOSIS — E66.01 MORBID OBESITY (H): Primary | ICD-10-CM

## 2018-01-15 LAB
ALBUMIN SERPL-MCNC: 3.7 G/DL (ref 3.4–5)
ALP SERPL-CCNC: 66 U/L (ref 40–150)
ALT SERPL W P-5'-P-CCNC: 53 U/L (ref 0–50)
ANION GAP SERPL CALCULATED.3IONS-SCNC: 9 MMOL/L (ref 3–14)
AST SERPL W P-5'-P-CCNC: 22 U/L (ref 0–45)
BILIRUB SERPL-MCNC: 0.3 MG/DL (ref 0.2–1.3)
BUN SERPL-MCNC: 11 MG/DL (ref 7–30)
CALCIUM SERPL-MCNC: 9.2 MG/DL (ref 8.5–10.1)
CHLORIDE SERPL-SCNC: 105 MMOL/L (ref 94–109)
CHOLEST SERPL-MCNC: 195 MG/DL
CO2 SERPL-SCNC: 24 MMOL/L (ref 20–32)
CREAT SERPL-MCNC: 0.68 MG/DL (ref 0.52–1.04)
DHEA-S SERPL-MCNC: 253 UG/DL (ref 35–430)
GFR SERPL CREATININE-BSD FRML MDRD: >90 ML/MIN/1.7M2
GLUCOSE SERPL-MCNC: 130 MG/DL (ref 70–99)
HBA1C MFR BLD: 6.9 % (ref 4.3–6)
HDLC SERPL-MCNC: 51 MG/DL
LDLC SERPL CALC-MCNC: 120 MG/DL
MAGNESIUM SERPL-MCNC: 2.1 MG/DL (ref 1.6–2.3)
NONHDLC SERPL-MCNC: 144 MG/DL
POTASSIUM SERPL-SCNC: 4 MMOL/L (ref 3.4–5.3)
PROT SERPL-MCNC: 8 G/DL (ref 6.8–8.8)
SODIUM SERPL-SCNC: 138 MMOL/L (ref 133–144)
TRIGL SERPL-MCNC: 122 MG/DL
TSH SERPL DL<=0.005 MIU/L-ACNC: 3.71 MU/L (ref 0.4–4)

## 2018-01-15 PROCEDURE — 84443 ASSAY THYROID STIM HORMONE: CPT | Performed by: INTERNAL MEDICINE

## 2018-01-15 PROCEDURE — 82627 DEHYDROEPIANDROSTERONE: CPT | Performed by: INTERNAL MEDICINE

## 2018-01-15 PROCEDURE — 84270 ASSAY OF SEX HORMONE GLOBUL: CPT | Performed by: INTERNAL MEDICINE

## 2018-01-15 PROCEDURE — 82626 DEHYDROEPIANDROSTERONE: CPT | Mod: 90 | Performed by: INTERNAL MEDICINE

## 2018-01-15 PROCEDURE — 99207 ZZC NO CHARGE LOS: CPT | Performed by: DIETITIAN, REGISTERED

## 2018-01-15 PROCEDURE — 99000 SPECIMEN HANDLING OFFICE-LAB: CPT | Performed by: INTERNAL MEDICINE

## 2018-01-15 PROCEDURE — 83036 HEMOGLOBIN GLYCOSYLATED A1C: CPT | Performed by: INTERNAL MEDICINE

## 2018-01-15 PROCEDURE — 97802 MEDICAL NUTRITION INDIV IN: CPT | Performed by: DIETITIAN, REGISTERED

## 2018-01-15 PROCEDURE — 83735 ASSAY OF MAGNESIUM: CPT | Performed by: INTERNAL MEDICINE

## 2018-01-15 PROCEDURE — 82306 VITAMIN D 25 HYDROXY: CPT | Performed by: INTERNAL MEDICINE

## 2018-01-15 PROCEDURE — 83498 ASY HYDROXYPROGESTERONE 17-D: CPT | Performed by: INTERNAL MEDICINE

## 2018-01-15 PROCEDURE — 36415 COLL VENOUS BLD VENIPUNCTURE: CPT | Performed by: INTERNAL MEDICINE

## 2018-01-15 PROCEDURE — 80053 COMPREHEN METABOLIC PANEL: CPT | Performed by: INTERNAL MEDICINE

## 2018-01-15 PROCEDURE — 84403 ASSAY OF TOTAL TESTOSTERONE: CPT | Performed by: INTERNAL MEDICINE

## 2018-01-15 PROCEDURE — 80061 LIPID PANEL: CPT | Performed by: INTERNAL MEDICINE

## 2018-01-16 LAB
17OHP SERPL-MCNC: 433 NG/DL
SHBG SERPL-SCNC: 7 NMOL/L (ref 30–135)
TESTOST FREE SERPL-MCNC: 1.32 NG/DL (ref 0.08–0.74)
TESTOST SERPL-MCNC: 40 NG/DL (ref 8–60)

## 2018-01-16 NOTE — PROGRESS NOTES
REPORT OF MEDICAL NUTRITION THERAPY    Patient Name: Anh Flores  MRN: [id number],  Age: 23 year old,  Gender: female    Encounter Date: 1/15/2018,  Encounter Time:60 minute Initial    Provider: Lyn Mcgrath, CADEN, LD, CDE    Referral received to provide Medical Nutrition Therapy for patient for treatment of morbid obesity.    NUTRITION HISTORY:  Anh is here with her mother Evelyn and a child who is her niece.  Initially, she requested her mother speak for her but shortly into the consultation she became more engaged.  At first she indicated she did not really want to have a nutrition visit and was anxious to leave, but eventually she became very interested in the information and plans for improved nutrition.      Anh states that her problems started when she was in elementary school.  In 8th grade she had her gallbladder removed and developed chronic pain for years.  She reports weight gain with prednisone treatment.    They describe their household which has included the patient and one sibling along with her mother and her 5 grandchildren.  Anh's mother has long work days, 12 hours driving a school bus.  Anh is home and responsible for cooking for the family.  She describes the challenges involved including preferences and energy needs of two teenaged boys who like junk food.  She states that when candy and chips are brought into the house she ends up eating this.    Her usual pattern involves mostly skipped meals during the day.  In addition, she has sleep difficulties related to back pain and she is up very late at night.  She has very little physical activity.  She reports she is not interested in going out in the cold, and doesn't really feel safe walking alone.  She cares for dogs and presently has two new puppies too small too walk outside.    She is interested in healthy cooking.  She does not used processed foods.  She shops for groceries with mother who pays for them.. Her mom has type  "2 diabetes.    Anh states she is happy with herself as she is and accepts that she is obese.  She did not express awareness of health risks but was open to discussion today.    Usual intake consists of:  Breakfast:Skips  Snack:  Lunch:skips  Snack:Coke Zero, Diet Snapple. Crystal Light  Dinner: Healthy prepared meal, may soups, or twice a week food away from home like [employer], [employer]  Snack:    DATA:  body mass index is 63.37 kg/(m^2) as calculated from the following:    Height as of this encounter: 1.53 m (5' 0.25\").    Weight as of this encounter: 148.4 kg (327 lb 2.6 oz).  Wt Readings from Last 5 Encounters:   01/10/18 (!) 148.4 kg (327 lb 2.6 oz)   02/21/17 (!) 146.4 kg (322 lb 12.1 oz)   11/03/14 (!) 143.8 kg (317 lb)   10/17/14 (!) 142 kg (313 lb)   10/01/14 (!) 142 kg (313 lb)     BP Readings from Last 5 Encounters:   01/10/18 123/82   02/21/17 133/83   11/03/14 111/75   10/17/14 126/64   10/01/14 128/86         LABS:    Glucose   Date Value Ref Range Status   01/15/2018 130 (H) 70 - 99 mg/dL Final     Comment:     Fasting specimen   ]  Lab Results   Component Value Date    A1C 6.9 01/15/2018    A1C 6.4 03/15/2017    A1C 6.1 09/17/2014    A1C 5.7 07/09/2010    A1C 5.5 01/15/2010     Recent Labs   Lab Test  01/15/18   0801  03/15/17   1121  01/07/11   1216  07/09/10   1325   CHOL  195  165  170  195   HDL  51  55  46*  50   LDL  120*  83  112  123   TRIG  122  133  60  115   CHOLHDLRATIO   --    --   3.7  3.9         MEDICATIONS:  Current Outpatient Prescriptions   Medication     metFORMIN (GLUCOPHAGE-XR) 500 MG 24 hr tablet     spironolactone (ALDACTONE) 100 MG tablet     norgestimate-ethinyl estradiol (ORTHO-CYCLEN, SPRINTEC) 0.25-35 MG-MCG per tablet     cholecalciferol (VITAMIN D3) 57495 UNITS capsule     No current facility-administered medications for this visit.            ASSESSMENT:    Anh has an inconsistent and less than adequate intake pattern due to a lack of knowledge regarding " nutrition recommendations for metabolic syndrome with morbid obesity.  She has good skills with food preparation, and she is motivated to make health changes but has been frustrated by her lack of progress with weight loss.    She is very open to suggestions discussed at our consultation.    INTERVENTION:    Metabolic syndrome of insulin resistance was discussed. Impact of this syndrome on weight gain was reviewed.  Strategy for fighting insulin resistance was discussed.  Importance of adequate intake of health promoting food in a balanced and consistent pattern was emphasized along with the very important role of movement throughout the day in fighting insulin resistance.  Role of nutrition in weight control was discussed.  Caloric intake as well as increasing volume of nutrient dense health promoting food  In addition to calories in food, importance of balance and consistency of meals with adequate intake of health promoting foods was discussed.  Consistent carbohydrate meal planning was reviewed.  Importance of reducing saturated fat by using low fat meat and dairy was discussed.  Carb choices are recommended to be from whole grains and vegetables, whole fruit and dairy.  Balanced plate food pattern was discussed as a model for food inclusion.  Recipes and menus were provided and discussed.  Energy balance was discussed, with focus on balance of foods consumed, amount of food eaten, timing of meals and activity level.  Nutrition to support activity was reviewed.  Importance of increasing activity throughout the day was discussed.  Written materials were provided and questions were answered      PLAN:    Anh will try new recipes.  She will improve balance in meals, for instance, when family has pizza, portion control plus add soup and salad.  She will try to walk around in their home more steps per day.  She will try to avoid skipping meals.  She will continue to make home made soups with lots of  vegetables.          FOLLOW-UP  Patient will keep a food record and have a virtual visit in 6 weeks.       [address] [address]  [phone]  [phone]    Date: 1/16/2018  Time: 1:50 PM

## 2018-01-17 LAB
DEPRECATED CALCIDIOL+CALCIFEROL SERPL-MC: <14 UG/L (ref 20–75)
DHEA SERPL-MCNC: 5.33 NG/ML (ref 1.33–7.78)
VITAMIN D2 SERPL-MCNC: <5 UG/L
VITAMIN D3 SERPL-MCNC: 9 UG/L

## 2018-03-29 DIAGNOSIS — E25.9 CAH 21-OH (CONGENITAL ADRENAL HYPERPLASIA), LATE ONSET (H): ICD-10-CM

## 2018-03-29 DIAGNOSIS — E88.819 INSULIN RESISTANCE: ICD-10-CM

## 2018-03-29 DIAGNOSIS — R73.03 PREDIABETES: ICD-10-CM

## 2018-03-29 RX ORDER — SPIRONOLACTONE 100 MG/1
TABLET, FILM COATED ORAL
Qty: 180 TABLET | Refills: 0 | Status: SHIPPED | OUTPATIENT
Start: 2018-03-29 | End: 2019-06-02

## 2018-03-29 RX ORDER — METFORMIN HCL 500 MG
TABLET, EXTENDED RELEASE 24 HR ORAL
Qty: 360 TABLET | Refills: 0 | Status: SHIPPED | OUTPATIENT
Start: 2018-03-29 | End: 2019-06-02

## 2018-04-10 DIAGNOSIS — E25.9 CAH 21-OH (CONGENITAL ADRENAL HYPERPLASIA), LATE ONSET (H): ICD-10-CM

## 2018-04-10 RX ORDER — NORGESTIMATE AND ETHINYL ESTRADIOL
KIT
Qty: 84 TABLET | Refills: 0 | Status: SHIPPED | OUTPATIENT
Start: 2018-04-10 | End: 2019-06-02

## 2018-04-10 NOTE — TELEPHONE ENCOUNTER
Last Office Visit: 1/10/18  Future Appt: 1/9/19    Medication not on Endocrine Refill Protocol. Will route to Dr. Cronin for review.    Marcella Wells LPN  Adult Endocrinology  [facility]

## 2018-07-28 DIAGNOSIS — E55.9 VITAMIN D DEFICIENCY: ICD-10-CM

## 2018-07-30 RX ORDER — CHOLECALCIFEROL (VITAMIN D3) 1250 MCG
CAPSULE ORAL
Qty: 12 CAPSULE | Refills: 1 | Status: SHIPPED | OUTPATIENT
Start: 2018-07-30 | End: 2020-10-19

## 2019-01-18 ENCOUNTER — OFFICE VISIT (OUTPATIENT)
Dept: PEDIATRICS | Facility: CLINIC | Age: 24
End: 2019-01-18
Payer: COMMERCIAL

## 2019-01-18 VITALS
HEART RATE: 99 BPM | SYSTOLIC BLOOD PRESSURE: 115 MMHG | BODY MASS INDEX: 57.52 KG/M2 | TEMPERATURE: 98.6 F | HEIGHT: 60 IN | WEIGHT: 293 LBS | OXYGEN SATURATION: 98 % | DIASTOLIC BLOOD PRESSURE: 70 MMHG

## 2019-01-18 DIAGNOSIS — L73.2 HIDRADENITIS SUPPURATIVA: Primary | ICD-10-CM

## 2019-01-18 DIAGNOSIS — E11.9 TYPE 2 DIABETES MELLITUS WITHOUT COMPLICATION, WITHOUT LONG-TERM CURRENT USE OF INSULIN (H): ICD-10-CM

## 2019-01-18 DIAGNOSIS — L02.92 RECURRENT BOILS: ICD-10-CM

## 2019-01-18 LAB
ANION GAP SERPL CALCULATED.3IONS-SCNC: 6 MMOL/L (ref 3–14)
BUN SERPL-MCNC: 10 MG/DL (ref 7–30)
CALCIUM SERPL-MCNC: 9.8 MG/DL (ref 8.5–10.1)
CHLORIDE SERPL-SCNC: 103 MMOL/L (ref 94–109)
CO2 SERPL-SCNC: 27 MMOL/L (ref 20–32)
CREAT SERPL-MCNC: 0.65 MG/DL (ref 0.52–1.04)
GFR SERPL CREATININE-BSD FRML MDRD: >90 ML/MIN/{1.73_M2}
GLUCOSE SERPL-MCNC: 99 MG/DL (ref 70–99)
HBA1C MFR BLD: 7.6 % (ref 0–5.6)
POTASSIUM SERPL-SCNC: 4.2 MMOL/L (ref 3.4–5.3)
SODIUM SERPL-SCNC: 136 MMOL/L (ref 133–144)

## 2019-01-18 PROCEDURE — 83036 HEMOGLOBIN GLYCOSYLATED A1C: CPT | Performed by: NURSE PRACTITIONER

## 2019-01-18 PROCEDURE — 36415 COLL VENOUS BLD VENIPUNCTURE: CPT | Performed by: NURSE PRACTITIONER

## 2019-01-18 PROCEDURE — 80048 BASIC METABOLIC PNL TOTAL CA: CPT | Performed by: NURSE PRACTITIONER

## 2019-01-18 PROCEDURE — 99214 OFFICE O/P EST MOD 30 MIN: CPT | Performed by: NURSE PRACTITIONER

## 2019-01-18 RX ORDER — DOXYCYCLINE HYCLATE 100 MG
100 TABLET ORAL 2 TIMES DAILY
Qty: 14 TABLET | Refills: 0 | Status: SHIPPED | OUTPATIENT
Start: 2019-01-18 | End: 2020-10-19

## 2019-01-18 RX ORDER — NAPROXEN 500 MG/1
500 TABLET ORAL 2 TIMES DAILY PRN
Qty: 30 TABLET | Refills: 0 | Status: SHIPPED | OUTPATIENT
Start: 2019-01-18 | End: 2019-01-23 | Stop reason: ALTCHOICE

## 2019-01-18 ASSESSMENT — MIFFLIN-ST. JEOR: SCORE: 2145.56

## 2019-01-18 NOTE — PROGRESS NOTES
SUBJECTIVE:   Anh Flores is a 24 year old female who presents to clinic today for the following health issues:    Concern - left side of arm, arm pit and under right breast   Onset: worst in the last few day    Description:   Golf ball size lump under the right breast with some numbness. Quarter size lumps on the left armpit with drainage and pain. Patient notes getting worst. Stabbing pain under the right breast, hard to bend over and achy feeling for the other two lumps. Hard to sleep at night due to not being able to sleep on the side. Notes some tingling in bilateral hands.    Intensity: moderate, severe    Progression of Symptoms:  worsening    Accompanying Signs & Symptoms:  Drainage, redness, tender    Previous history of similar problem:   yes    Precipitating factors:   Worsened by: touching, laying on the side    Alleviating factors:  Improved by: none    Therapies Tried and outcome: warm compresses, cold compresses, tylenol   Has lump on the left breast which is healing   Has several bumps which is draining   This has been a recurring issue which is worsening   They big and uncomfortable and drain and will get better   Starts tylenol does not really help   Has been seen for this before and was told was related to her obesity but patient is resistant to this being a factor     Problem list and histories reviewed & adjusted, as indicated.  Additional history: as documented    Patient Active Problem List   Diagnosis     CAH (congenital adrenal hyperplasia)     Chronic abdominal pain     Vitamin D deficiency     Left thigh pain     Chronic headaches     Morbid obesity (H)     Past Surgical History:   Procedure Laterality Date     CHOLECYSTECTOMY      She was in 8th grade        Social History     Tobacco Use     Smoking status: Never Smoker     Smokeless tobacco: Never Used   Substance Use Topics     Alcohol use: No     Family History   Problem Relation Age of Onset     Neurologic Disorder Sister 16      "   migraines     Cerebrovascular Disease No family hx of      Alzheimer Disease No family hx of          Current Outpatient Medications   Medication Sig Dispense Refill     DECARA 37257 units capsule TAKE 1 CAPSULE BY MOUTH EVERY WEEK. 12 capsule 1     metFORMIN (GLUCOPHAGE-XR) 500 MG 24 hr tablet TAKE 2 TABLETS(1,000MG) BY MOUTH TWICE DAILY(WITH MEALS). 360 tablet 0     MONONESSA 0.25-35 MG-MCG per tablet TAKE 1 TABLET BY MOUTH DAILY 84 tablet 0     spironolactone (ALDACTONE) 100 MG tablet TAKE 1 TABLET BY MOUTH TWICE DAILY 180 tablet 0     No Known Allergies  Labs reviewed in EPIC    Reviewed and updated as needed this visit by clinical staff  Tobacco  Allergies  Meds  Med Hx  Surg Hx  Fam Hx  Soc Hx      Reviewed and updated as needed this visit by Provider         ROS:  CONSTITUTIONAL:NEGATIVE for fever, chills, change in weight  ENT/MOUTH: NEGATIVE for ear, mouth and throat problems  RESP:NEGATIVE for significant cough or SOB  BREAST: POSITIVE for edema of skin bilateral axilla   CV: NEGATIVE for chest pain/chest pressure  GI: NEGATIVE for nausea, poor appetite, vomiting and weight loss  MUSCULOSKELETAL: NEGATIVE for significant arthralgias or myalgia  ENDOCRINE: POSITIVE  for glucose intolerance and has seen endocrinology and NEGATIVE for polydipsia, polyphagia and polyuria    OBJECTIVE:     /70 (BP Location: Right arm, Patient Position: Sitting, Cuff Size: Adult Large)   Pulse 99   Temp 98.6  F (37  C) (Temporal)   Ht 1.53 m (5' 0.25\")   Wt 148 kg (326 lb 4.8 oz)   SpO2 98%   Breastfeeding? No   BMI 63.20 kg/m    Body mass index is 63.2 kg/m .   Wt Readings from Last 4 Encounters:   01/18/19 148 kg (326 lb 4.8 oz)   01/10/18 (!) 148.4 kg (327 lb 2.6 oz)   02/21/17 (!) 146.4 kg (322 lb 12.1 oz)   11/03/14 (!) 143.8 kg (317 lb)       GENERAL APPEARANCE: alert, active, no distress and Nourishment morbidly obese   NECK: supple   RESP: lungs clear to auscultation - no rales, rhonchi or " wheezes  BREAST: normal without masses, tenderness or nipple discharge  CV: regular rates and rhyth gross deformities noted  SKIN: bilateral axilla and also under right breast are  Swollen and tender cutanious lumps in with more tenderness and inflammation under the right breast   Effected area of skin in red, swollen and tender.  No fluctuance.  No obvious involvement of underlying structures.  NEURO: Normal strength and tone, mentation intact and speech normal  PSYCH: mentation appears normal, patient appearance-- and anxious    Diagnostic Test Results:  Results for orders placed or performed in visit on 01/18/19   Basic metabolic panel   Result Value Ref Range    Sodium 136 133 - 144 mmol/L    Potassium 4.2 3.4 - 5.3 mmol/L    Chloride 103 94 - 109 mmol/L    Carbon Dioxide 27 20 - 32 mmol/L    Anion Gap 6 3 - 14 mmol/L    Glucose 99 70 - 99 mg/dL    Urea Nitrogen 10 7 - 30 mg/dL    Creatinine 0.65 0.52 - 1.04 mg/dL    GFR Estimate >90 >60 mL/min/[1.73_m2]    GFR Estimate If Black >90 >60 mL/min/[1.73_m2]    Calcium 9.8 8.5 - 10.1 mg/dL   Hemoglobin A1c   Result Value Ref Range    Hemoglobin A1C 7.6 (H) 0 - 5.6 %       ASSESSMENT/PLAN:     Anh was seen today for mass.    Diagnoses and all orders for this visit:    Hidradenitis suppurativa  -     DERMATOLOGY REFERRAL    Recurrent boils  -     doxycycline hycate (VIBRA-TABS) 100 MG tablet; Take 1 tablet (100 mg) by mouth 2 times daily  -     Discontinue: naproxen (NAPROSYN) 500 MG tablet; Take 1 tablet (500 mg) by mouth 2 times daily as needed for moderate pain    Type 2 diabetes mellitus without complication, without long-term current use of insulin (H)  -     Basic metabolic panel  -     Hemoglobin A1c  There have been some probable dietary and lifestyle compliance issues here. I have discussed with her the great importance of following the treatment plan exactly as directed in order to achieve a good medical outcome.    PLAN:   Patient needs to follow up in  if no improvement,or sooner if worsening of symptoms or other symptoms develop.  CONSULTATION/REFERRAL to Dermatology  FOLLOW UP WITH SPECIALIST :Endocrinology    See Patient Instructions    NARCISA Castillo CNP  [facility]

## 2019-01-18 NOTE — PATIENT INSTRUCTIONS
PLAN:   1.   Symptomatic therapy suggested:  prescription for NSAID given    2.  Orders Placed This Encounter   Medications     doxycycline hyclate (VIBRA-TABS) 100 MG tablet     Sig: Take 1 tablet (100 mg) by mouth 2 times daily     Dispense:  14 tablet     Refill:  0     naproxen (NAPROSYN) 500 MG tablet     Sig: Take 1 tablet (500 mg) by mouth 2 times daily as needed for moderate pain     Dispense:  30 tablet     Refill:  0     Orders Placed This Encounter   Procedures     Basic metabolic panel     Hemoglobin A1c     DERMATOLOGY REFERRAL     3. Patient needs to follow up in if no improvement,or sooner if worsening of symptoms or other symptoms develop.  CONSULTATION/REFERRAL to Dermatology  FOLLOW UP WITH SPECIALIST :Endocrinology

## 2019-01-18 NOTE — NURSING NOTE
"Chief Complaint   Patient presents with     Mass     lump under the right breast, left armpit ongoing for many years worst in the last few days       Initial /70 (BP Location: Right arm, Patient Position: Sitting, Cuff Size: Adult Large)   Pulse 99   Temp 98.6  F (37  C) (Temporal)   Ht 1.53 m (5' 0.25\")   Wt 148 kg (326 lb 4.8 oz)   SpO2 98%   Breastfeeding? No   BMI 63.20 kg/m   Estimated body mass index is 63.2 kg/m  as calculated from the following:    Height as of this encounter: 1.53 m (5' 0.25\").    Weight as of this encounter: 148 kg (326 lb 4.8 oz).  Medication Reconciliation: complete      CELIO Berumen      "

## 2019-01-21 ENCOUNTER — TELEPHONE (OUTPATIENT)
Dept: PEDIATRICS | Facility: CLINIC | Age: 24
End: 2019-01-21

## 2019-01-21 ENCOUNTER — OFFICE VISIT (OUTPATIENT)
Dept: URGENT CARE | Facility: URGENT CARE | Age: 24
End: 2019-01-21
Payer: COMMERCIAL

## 2019-01-21 VITALS
WEIGHT: 293 LBS | OXYGEN SATURATION: 99 % | HEART RATE: 106 BPM | TEMPERATURE: 103.7 F | BODY MASS INDEX: 63.14 KG/M2 | DIASTOLIC BLOOD PRESSURE: 77 MMHG | SYSTOLIC BLOOD PRESSURE: 139 MMHG

## 2019-01-21 DIAGNOSIS — L02.419 ABSCESS, AXILLA: Primary | ICD-10-CM

## 2019-01-21 NOTE — TELEPHONE ENCOUNTER
Called patient, left message with phone number to call back.      There are two messages for this patient.  One regarding symptoms and this new result note.     Christy Casiano RN, [facility]      Notes recorded by Rolanda Aguillon, NARCISA SILVA on 1/18/2019 at 11:18 PM CST  Please call   -Kidney function is normal (Cr, GFR), Sodium is normal, Potassium is normal, Calcium is normal, Glucose is normal.   A1C confirms diagnosis of diabetes   Would make referral to diabetes ed   Please also check how much metformin she is taking   Please call [phone] to make appointment  if you do not hear from referrals in the next few days.   Keep appointment with endocrinology as planned   Rolanda Aguillon, ETIENNE, APRN CNP

## 2019-01-21 NOTE — TELEPHONE ENCOUNTER
Next 5 appointments (look out 90 days)    Apr 10, 2019 11:00 AM CDT  Return Visit with Alejandra Cronin MD  [facility] [address] [place] [id number] [phone]      Continue the doxycycline as has only been a couple days   Need to make appointment with dermatology as we discussed at her appointment   Stop the Naproxen and can try Tylenol Extra strength OTC instead instead as will be easier on her stomach

## 2019-01-21 NOTE — PROGRESS NOTES
Patient decided to be seen in the ED.  She was not examined by me and should not be charged a visit.    Ld Diaz

## 2019-01-21 NOTE — TELEPHONE ENCOUNTER
Patient is returning clinics call. States her medication that she was prescribed is not working. She states that her stomach is hurting.  Please advise.

## 2019-01-21 NOTE — TELEPHONE ENCOUNTER
[facility] Call Center    Phone Message    May a detailed message be left on voicemail: yes    Reason for Call: Other: patient was in on Friday, January 18th for a rash.  She was prescribed Doxycycline and states that it has been ineffective.  She would like to know if there are other treatments or will she have to be seen by another doctor?  Please advise.     Action Taken: Message routed to:  Primary Care p [phone]

## 2019-01-21 NOTE — TELEPHONE ENCOUNTER
Spoke to patient, gave her the message from Alisha Aguillon NP regarding results.  She verbalized understanding and agreement to plan.    Regarding her rash, she states it has not gotten any better, and may have even worsened.  She states the Naproxyn works for about an hour then her stomach hurts.  Ibuprofen 800 mg does nothing for her pain.       Routing to Alisha Aguillon NP to please advise.    Christy Casiano RN, [facility]

## 2019-01-22 ENCOUNTER — TELEPHONE (OUTPATIENT)
Dept: DERMATOLOGY | Facility: CLINIC | Age: 24
End: 2019-01-22

## 2019-01-22 NOTE — TELEPHONE ENCOUNTER
Routing to Alisha Aguillon NP to review as patient went to [facility] yesterday.    Christy Casiano RN, [facility]

## 2019-01-22 NOTE — TELEPHONE ENCOUNTER
Health Call Center    Phone Message    May a detailed message be left on voicemail: yes    Reason for Call: Other: Evelyn is calling stating her Daugher has an incision below her breast in the gallbladder area needing the dressing removed and packed per Evelyn, ER told her,had infection, her daughter needed to be seen by dermatologist to look at it and repack it in 1 to 2 days per Evelyn. Please call Evelyn LE. Thank you     Action Taken: Message routed to:  [facility] ([facility] Dermatology

## 2019-01-23 ENCOUNTER — OFFICE VISIT (OUTPATIENT)
Dept: PEDIATRICS | Facility: CLINIC | Age: 24
End: 2019-01-23
Payer: COMMERCIAL

## 2019-01-23 VITALS
HEART RATE: 82 BPM | SYSTOLIC BLOOD PRESSURE: 142 MMHG | TEMPERATURE: 98.3 F | BODY MASS INDEX: 55.32 KG/M2 | HEIGHT: 61 IN | WEIGHT: 293 LBS | OXYGEN SATURATION: 97 % | DIASTOLIC BLOOD PRESSURE: 88 MMHG

## 2019-01-23 DIAGNOSIS — L73.2 HIDRADENITIS SUPPURATIVA: ICD-10-CM

## 2019-01-23 DIAGNOSIS — E66.01 MORBID OBESITY WITH BODY MASS INDEX OF 60.0-69.9 IN ADULT (H): ICD-10-CM

## 2019-01-23 DIAGNOSIS — Z51.89 WOUND CHECK, ABSCESS: Primary | ICD-10-CM

## 2019-01-23 DIAGNOSIS — R03.0 ELEVATED BP WITHOUT DIAGNOSIS OF HYPERTENSION: ICD-10-CM

## 2019-01-23 PROCEDURE — 99214 OFFICE O/P EST MOD 30 MIN: CPT | Performed by: FAMILY MEDICINE

## 2019-01-23 RX ORDER — OXYCODONE HYDROCHLORIDE 5 MG/1
TABLET ORAL
Refills: 0 | COMMUNITY
Start: 2019-01-22 | End: 2020-10-31

## 2019-01-23 RX ORDER — IBUPROFEN 600 MG/1
600 TABLET, FILM COATED ORAL EVERY 8 HOURS PRN
Qty: 30 TABLET | Refills: 0 | Status: SHIPPED | OUTPATIENT
Start: 2019-01-23 | End: 2020-10-19

## 2019-01-23 RX ORDER — CEPHALEXIN 500 MG/1
CAPSULE ORAL
Refills: 0 | COMMUNITY
Start: 2019-01-22 | End: 2020-10-19

## 2019-01-23 ASSESSMENT — PAIN SCALES - GENERAL: PAINLEVEL: EXTREME PAIN (8)

## 2019-01-23 ASSESSMENT — MIFFLIN-ST. JEOR: SCORE: 2131.38

## 2019-01-23 NOTE — PROGRESS NOTES
SUBJECTIVE:   Anh Flores is a 24 year old female who presents to clinic today for the following health issues:    ED/UC Followup:    Patient with past medical history significant for extreme morbiD obesity,, type 2 diabetes, chronic headaches is here for follow-up on ER visit.    Patient states she was seen at the [facility] ER 2 days ago for evaluation of fever and a right breast abscess.    Patient has a history of recurrent abscesses underneath her breast for the past 2 years that are usually associated with fevers.  Patient states once they had drained the usually resolve quickly.    Last week, patient developed abscess underneath both breast and was subsequently seen at the [facility] urgent care 3 days before the ER visit where she was put on a 7-day course of doxycycline.    At the time of your visit, patient's left breast abscess was resolved but the one on the right had worsened associated with a feeling of hot and cold, headache and being nauseated from the doxycycline.        Facility:  [facility]  Date of visit: 01/21/2019  Reason for visit: cyst drained under right breast  Current Status: Patient still having drainage, pain and fever. Patient was instructed not to shower. Patient have not been able to get an appointment with dermatology to follow up yet. She is needing a dressing change.              Problem list and histories reviewed & adjusted, as indicated.  Additional history: as documented    Patient Active Problem List   Diagnosis     CAH (congenital adrenal hyperplasia)     Chronic abdominal pain     Vitamin D deficiency     Left thigh pain     Chronic headaches     Morbid obesity (H)     Hidradenitis suppurativa     Morbid obesity with body mass index of 60.0-69.9 in adult (H)     Elevated BP without diagnosis of hypertension     Past Surgical History:   Procedure Laterality Date     CHOLECYSTECTOMY      She was in 8th grade        Social History     Tobacco Use      Smoking status: Never Smoker     Smokeless tobacco: Never Used   Substance Use Topics     Alcohol use: No     Family History   Problem Relation Age of Onset     Neurologic Disorder Sister 16        migraines     Cerebrovascular Disease No family hx of      Alzheimer Disease No family hx of          Current Outpatient Medications   Medication Sig Dispense Refill     DECARA 51725 units capsule TAKE 1 CAPSULE BY MOUTH EVERY WEEK. 12 capsule 1     doxycycline hyclate (VIBRA-TABS) 100 MG tablet Take 1 tablet (100 mg) by mouth 2 times daily 14 tablet 0     ibuprofen (ADVIL/MOTRIN) 600 MG tablet Take 1 tablet (600 mg) by mouth every 8 hours as needed for moderate pain 30 tablet 0     metFORMIN (GLUCOPHAGE-XR) 500 MG 24 hr tablet TAKE 2 TABLETS(1,000MG) BY MOUTH TWICE DAILY(WITH MEALS). 360 tablet 0     MONONESSA 0.25-35 MG-MCG per tablet TAKE 1 TABLET BY MOUTH DAILY 84 tablet 0     spironolactone (ALDACTONE) 100 MG tablet TAKE 1 TABLET BY MOUTH TWICE DAILY 180 tablet 0     cephALEXin (KEFLEX) 500 MG capsule TK 1 C PO QID FOR 10 DAYS  0     oxyCODONE (ROXICODONE) 5 MG tablet TK 1 TO 2 TS PO Q 4 H PRN P  0     No Known Allergies  Recent Labs   Lab Test 01/18/19  1613 01/15/18  0801 03/15/17  1121  09/17/14  1153  02/21/11  1605  01/07/11  1216   A1C 7.6* 6.9* 6.4*  --  6.1*   < >  --   --   --    LDL  --  120* 83  --   --   --   --   --  112   HDL  --  51 55  --   --   --   --   --  46*   TRIG  --  122 133  --   --   --   --   --  60   ALT  --  53*  --   --  71*  --  55*  --  38   CR 0.65 0.68 0.66   < > 0.76  --  0.79   < >  --    GFRESTIMATED >90 >90 >90  Non African American GFR Calc     < > >90  Non  GFR Calc    --  >90   < >  --    GFRESTBLACK >90 >90 >90  African American GFR Calc     < > >90   GFR Calc    --  >90   < >  --    POTASSIUM 4.2 4.0 4.2   < > 3.9  --  4.4   < >  --    TSH  --  3.71 0.57  --   --    < >  --   --   --     < > = values in this interval not displayed.      BP  "Readings from Last 3 Encounters:   01/23/19 142/88   01/21/19 139/77   01/18/19 115/70    Wt Readings from Last 3 Encounters:   01/23/19 146.2 kg (322 lb 4.8 oz)   01/21/19 147.9 kg (326 lb)   01/18/19 148 kg (326 lb 4.8 oz)                  Labs reviewed in EPIC    Reviewed and updated as needed this visit by clinical staff       Reviewed and updated as needed this visit by Provider         ROS:  CONSTITUTIONAL: NEGATIVE for fever, chills, change in weight  INTEGUMENTARY/SKIN: as above  RESP: NEGATIVE for significant cough or SOB  CV: NEGATIVE for chest pain, palpitations or peripheral edema  ENDOCRINE: Hx diabetes  PSYCHIATRIC: NEGATIVE for changes in mood or affect    OBJECTIVE:     /88 (BP Location: Right arm, Patient Position: Sitting, Cuff Size: Adult Large)   Pulse 82   Temp 98.3  F (36.8  C) (Temporal)   Ht 1.537 m (5' 0.5\")   Wt 146.2 kg (322 lb 4.8 oz)   SpO2 97%   BMI 61.91 kg/m    Body mass index is 61.91 kg/m .  GENERAL: healthy, alert, no distress and obese  SKIN: Slightly older dressing was removed  5-6 mm open wound in the right lower quadrant of the right breast with packing      Diagnostic Test Results:  none     ASSESSMENT/PLAN:             1. Wound check, abscess  Reviewed documents and reports from care everywhere  Wound packing was removed  Wound was cleaned with Betadine  Few drops of blood was drained but no more purulent material seen or drained  Small amount of packing done with iodoform gauze, and dressing done  Patient was given dressing materials to use at home if she needs to be changing the dressing  Recommended to stop naproxen, take ibuprofen 600 mg 3 times daily as needed for pain  Keep the wound clean and dry  - ibuprofen (ADVIL/MOTRIN) 600 MG tablet; Take 1 tablet (600 mg) by mouth every 8 hours as needed for moderate pain  Dispense: 30 tablet; Refill: 0    2. Hidradenitis suppurativa  Reviewed the etiology of the skin condition  Emphasized on weight loss  Reviewed " hand hygiene and skin care  Recommended to start using over-the-counter benzoyl peroxide wash for body wash  She has the dermatology appointment on February 19 which she can call to reschedule for a sooner appointment if available    3. Elevated BP without diagnosis of hypertension  BP Readings from Last 6 Encounters:   01/23/19 142/88   01/21/19 139/77   01/18/19 115/70   01/10/18 123/82   02/21/17 133/83   11/03/14 111/75     Reviewed slightly elevated systolic blood pressure likely from her acute condition at this time, monitor for now    4. Morbid obesity with body mass index of 60.0-69.9 in adult (H)  Wt Readings from Last 5 Encounters:   01/23/19 146.2 kg (322 lb 4.8 oz)   01/21/19 147.9 kg (326 lb)   01/18/19 148 kg (326 lb 4.8 oz)   01/10/18 (!) 148.4 kg (327 lb 2.6 oz)   02/21/17 (!) 146.4 kg (322 lb 12.1 oz)     Patient is currently under the care of  in endocrinology for her type 2 diabetes and morbid obesity      Work on weight loss  Regular exercise  Patient was reminded to schedule for her annual physical, pelvic exam and Pap smear with OB/GYN  See Patient Instructions  Chart documentation done in part with Dragon Voice recognition Software. Although reviewed after completion, some word and grammatical error may remain.      Leann Horne MD  [facility]

## 2019-01-24 NOTE — PATIENT INSTRUCTIONS
Stop NAPROSYN  Take motrin as needed for pain  Call to reschedule for dermatology visit sooner than 2/19/19

## 2019-01-28 ENCOUNTER — OFFICE VISIT (OUTPATIENT)
Dept: DERMATOLOGY | Facility: CLINIC | Age: 24
End: 2019-01-28
Payer: COMMERCIAL

## 2019-01-28 DIAGNOSIS — L83 ACANTHOSIS NIGRICANS: ICD-10-CM

## 2019-01-28 DIAGNOSIS — L73.2 HIDRADENITIS SUPPURATIVA: Primary | ICD-10-CM

## 2019-01-28 PROCEDURE — 99202 OFFICE O/P NEW SF 15 MIN: CPT | Performed by: DERMATOLOGY

## 2019-01-28 RX ORDER — CLINDAMYCIN PHOSPHATE 10 UG/ML
LOTION TOPICAL
Qty: 120 ML | Refills: 11 | Status: SHIPPED | OUTPATIENT
Start: 2019-01-28 | End: 2020-10-19

## 2019-01-28 ASSESSMENT — PAIN SCALES - GENERAL: PAINLEVEL: EXTREME PAIN (8)

## 2019-01-28 NOTE — NURSING NOTE
Anh Flores's goals for this visit include:   Chief Complaint   Patient presents with     Lesion     lesions under right breast and bilateral axilla       She requests these members of her care team be copied on today's visit information: NO    PCP: Leann Horne    Referring Provider:  Rolanda Aguillon, NARCISA CNP  [address]  [place] [address]    There were no vitals taken for this visit.    Do you need any medication refills at today's visit? NO    Maddie Canas, [facility]

## 2019-01-28 NOTE — PATIENT INSTRUCTIONS
Start over-the-counter benzoyl peroxide 10% wash as a body wash.  If 10% is too irritating you can use the 5%. ([employer] makes this product. It is available here at the pharmacy or at [employer]). This medication can bleach your towels and clothing.     After, over top in areas affected with boils, apply clindamycin 1 % lotion. Use a lotion over top as needed.     It is found in a purple tube in the acne aisle.

## 2019-01-28 NOTE — LETTER
1/28/2019         RE: Anh Flores  [address] [address] [place] [address]  [place] [address]        Dear Colleague,    Thank you for referring your patient, Anh Flores, to the [facility]. Please see a copy of my visit note below.    [facility] Dermatology Note      Dermatology Problem List:  1. Hidradenitis suppurativa, axillae and inframammary skin  - current tx: BPO wash, clindamycin 1% lotion, poral antibiotics or ILK for flares    Encounter Date: Jan 28, 2019    CC:  Chief Complaint   Patient presents with     Lesion     lesions under right breast and bilateral axilla       History of Present Illness:  Ms. Anh Flores is a 24 year old female who presents as a referral from Alisha Aguillon. She presents with mother today. She has a history of recurrent boils in underarms and under the breasts. She has had them going on and off for the last 2 years. She reports they all hurt and drain eventually. She has only had one that needed incision and drainage (current lesion on the right inframammary skin). She has been recommended BPO wash, but she has not purchased or started this. Patient was on a course of doxycyline for 7 days, and now she is on Keflex (10 day course prescribed). She reports the area got better after I&D but now seems to be worsening in terms of pain. She was also given pain medication. She was switched to ibuprofen by Dr. Horne and reports this only works for an hour or two for her. Mom reports that the pain is short stabbing pain to her. Mom wonders if this is normal. The pain does not travel into the arm or around her rib cage. She uses a bar Olay soap with shea butter. No other concerns addressed today.      Past Medical History:   Patient Active Problem List   Diagnosis     CAH (congenital adrenal hyperplasia)     Chronic abdominal pain     Vitamin D deficiency     Left thigh pain     Chronic headaches     Morbid obesity (H)     Hidradenitis suppurativa     Morbid  obesity with body mass index of 60.0-69.9 in adult (H)     Elevated BP without diagnosis of hypertension     Past Medical History:   Diagnosis Date     CAH (congenital adrenal hyperplasia) 2/9/2010    Followed by Dr. Brewer; next follow up 1.2011.  Metformin increased to 850 mg twice a day.      Vitamin D deficiency 2/9/2010     Past Surgical History:   Procedure Laterality Date     CHOLECYSTECTOMY      She was in 8th grade        Social History:  Patient reports that  has never smoked. she has never used smokeless tobacco. She reports that she does not drink alcohol or use drugs. Patient grooms dogs.     Family History:  Family History   Problem Relation Age of Onset     Neurologic Disorder Sister 16        migraines     Cerebrovascular Disease No family hx of      Alzheimer Disease No family hx of        Medications:  Current Outpatient Medications   Medication Sig Dispense Refill     cephALEXin (KEFLEX) 500 MG capsule TK 1 C PO QID FOR 10 DAYS  0     DECARA 98016 units capsule TAKE 1 CAPSULE BY MOUTH EVERY WEEK. 12 capsule 1     doxycycline hyclate (VIBRA-TABS) 100 MG tablet Take 1 tablet (100 mg) by mouth 2 times daily 14 tablet 0     ibuprofen (ADVIL/MOTRIN) 600 MG tablet Take 1 tablet (600 mg) by mouth every 8 hours as needed for moderate pain 30 tablet 0     metFORMIN (GLUCOPHAGE-XR) 500 MG 24 hr tablet TAKE 2 TABLETS(1,000MG) BY MOUTH TWICE DAILY(WITH MEALS). 360 tablet 0     MONONESSA 0.25-35 MG-MCG per tablet TAKE 1 TABLET BY MOUTH DAILY 84 tablet 0     oxyCODONE (ROXICODONE) 5 MG tablet TK 1 TO 2 TS PO Q 4 H PRN P  0     spironolactone (ALDACTONE) 100 MG tablet TAKE 1 TABLET BY MOUTH TWICE DAILY 180 tablet 0       No Known Allergies    Review of Systems:  -Constitutional: Patient is otherwise feeling well, in usual state of health.   -Skin: As above in HPI. No additional skin concerns.    Physical exam:  Vitals: There were no vitals taken for this visit.  GEN: This is a well developed, well-nourished  female in no acute distress, in a pleasant mood.    SKIN: Waist-up skin, which includes the head/face, neck, both arms, chest, back, abdomen, digits and/or nails was examined.  - Ceron type IV - V skin  - A few scarred sinus tracts subcutaneously in the bilateral axillae. Velvety brown hyperpigmented plaques in the axillae.  - Warm to the touch, edematous plaque on the right intermammary lateral crease. no fluctuation or abscess appreciated. Minimal serosang drainage.   - No other lesions of concern on areas examined.       Impression/Plan:  1. Acanthosis nigricans. Discussed that this is a sign of insulin resistance. No creams or treatments are particularly helpful. Weight reduction and diet management are most helpful.     2. Hidradenitis suppurativa. Moyer stage 2. Discussed what is known about the pathogenesis of this condition. Discussed differences between prevention and treatment options. Discussed the importance of prevention (will start with BPO wash and topicals, may need an oral antibiotic in the future if fails to improve). Discussed that for inflamed areas is steroid injection or oral antibiotics.     Recommended using BPO wash in the shower as a body wash.    Prescribed clindamycin 1% lotion to apply to areas patient typically gets boils daily after showering. Emphasized importance of using after using BPO wash to prevent resistance from developing.    Advised patient to finish her current course of antibiotics (Keflex). Advised to follow up for injections of steroid if there is still inflammation in the area.       Discussed that the pain she is experiencing is likely due to swelling and inflammation in the area. Advised patient to follow up with PCP regarding pain management - I do think NSAIDs are appropriate as they will decrease inflammation.     Patient already on metformin and spironolactone for other medical issues - these can be helpful for HS and should be continued if possible.     CC  NARCISA Castillo CNP on close of this encounter.  Follow-up prn.       Staff Involved:  Scribe/Staff    Scribe Disclosure  I, Carolina Fam, am serving as a scribe to document services personally performed by Dr. Alisha Belcher MD, based on data collection and the provider's statements to me.     Provider Disclosure:   The documentation recorded by the scribe accurately reflects the services I personally performed and the decisions made by me.    Alisha Belcher MD    Department of Dermatology  [facility]: Phone: [phone], Fax:[phone]  [facility]: Phone: [phone], Fax: [phone]                Again, thank you for allowing me to participate in the care of your patient.        Sincerely,        Alisha Belcher MD

## 2019-01-30 DIAGNOSIS — Z51.89 WOUND CHECK, ABSCESS: ICD-10-CM

## 2019-01-30 RX ORDER — IBUPROFEN 600 MG/1
600 TABLET, FILM COATED ORAL EVERY 8 HOURS PRN
Qty: 30 TABLET | Refills: 0 | OUTPATIENT
Start: 2019-01-30

## 2019-01-30 NOTE — TELEPHONE ENCOUNTER
ibuprofen (ADVIL/MOTRIN) 600 MG tablet      Last Written Prescription Date:  1/23/2019  Last Fill Quantity: 30,   # refills: 0  Last Office Visit: 1/23/2019  Future Office visit:    Next 5 appointments (look out 90 days)    Feb 19, 2019 10:15 AM CST  [facility] with Al[facility] [address] [place] [id number] [phone]   Apr 10, 2019 11:00 AM CDT  Return Visit with Alejandra Cronin MD  [facility] [address] [place] [id number]  [phone]

## 2019-01-30 NOTE — TELEPHONE ENCOUNTER
Per chart review, this was filled on 1/23/19. Pharmacy notified.     Emmanuelle Skinner RN   [facility]

## 2019-02-19 ENCOUNTER — OFFICE VISIT (OUTPATIENT)
Dept: DERMATOLOGY | Facility: CLINIC | Age: 24
End: 2019-02-19
Payer: COMMERCIAL

## 2019-02-19 DIAGNOSIS — L73.2 HIDRADENITIS SUPPURATIVA: Primary | ICD-10-CM

## 2019-02-19 PROCEDURE — 11901 INJECT SKIN LESIONS >7: CPT | Performed by: DERMATOLOGY

## 2019-02-19 PROCEDURE — 99213 OFFICE O/P EST LOW 20 MIN: CPT | Mod: 25 | Performed by: DERMATOLOGY

## 2019-02-19 ASSESSMENT — PAIN SCALES - GENERAL: PAINLEVEL: MODERATE PAIN (5)

## 2019-02-19 NOTE — LETTER
2/19/2019         RE: Anh Flores  [id number] [address] [place] [id number]        Dear Colleague,    Thank you for referring your patient, Anh Flores, to the [facility]. Please see a copy of my visit note below.    [facility] Dermatology Note      Dermatology Problem List:  1. Hidradenitis suppurativa, axillae and inframammary skin  - current tx: BPO wash, clindamycin 1% lotion  - s/p ILK 10mg/cc injection 2/19/19 to L axilla    Encounter Date: Feb 19, 2019    CC:  Chief Complaint   Patient presents with     RECHECK     of left axilla. Pt states it has gotten worse and is now draining.        History of Present Illness:  Ms. Anh Flores is a 24 year old female who presents as a follow up for hidradenitis suppurativa. She is with ehr mom again today. She notes that boil on the right side has resolved. No more pain, minimal drainage. A new boil in the  left axilla has burst open and has foul smelling drainage. She notes it has been there for 2 weeks, but only draining for a day or two. She is in less pain today. Mom inquires about refilling ibuprofen rx. She currently uses BPO wash, a lotion moisturizer, and clindamycin 1% lotion. She reports that her skin is dry from the BPO wash. It gets itchy sometimes. She does not like the thickness of cream based moisturizers. No other concerns addressed today.      Past Medical History:   Patient Active Problem List   Diagnosis     CAH (congenital adrenal hyperplasia)     Chronic abdominal pain     Vitamin D deficiency     Left thigh pain     Chronic headaches     Morbid obesity (H)     Hidradenitis suppurativa     Morbid obesity with body mass index of 60.0-69.9 in adult (H)     Elevated BP without diagnosis of hypertension     Past Medical History:   Diagnosis Date     CAH (congenital adrenal hyperplasia) 2/9/2010    Followed by Dr. Brewer; next follow up 1.2011.  Metformin increased to 850 mg twice a day.      Vitamin D  Glycolic Acid %: 7.5% Additional Vacuum Pressure (Won't Render If 0): 22 deficiency 2/9/2010     Past Surgical History:   Procedure Laterality Date     CHOLECYSTECTOMY      She was in 8th grade        Social History:  Patient reports that  has never smoked. she has never used smokeless tobacco. She reports that she does not drink alcohol or use drugs. Patient grooms dogs.     Family History:  Family History   Problem Relation Age of Onset     Neurologic Disorder Sister 16        migraines     Cerebrovascular Disease No family hx of      Alzheimer Disease No family hx of        Medications:  Current Outpatient Medications   Medication Sig Dispense Refill     clindamycin (CLEOCIN T) 1 % external lotion Apply to affected areas daily after showering. 120 mL 11     DECARA 37763 units capsule TAKE 1 CAPSULE BY MOUTH EVERY WEEK. 12 capsule 1     ibuprofen (ADVIL/MOTRIN) 600 MG tablet Take 1 tablet (600 mg) by mouth every 8 hours as needed for moderate pain 30 tablet 0     metFORMIN (GLUCOPHAGE-XR) 500 MG 24 hr tablet TAKE 2 TABLETS(1,000MG) BY MOUTH TWICE DAILY(WITH MEALS). 360 tablet 0     MONONESSA 0.25-35 MG-MCG per tablet TAKE 1 TABLET BY MOUTH DAILY 84 tablet 0     oxyCODONE (ROXICODONE) 5 MG tablet TK 1 TO 2 TS PO Q 4 H PRN P  0     spironolactone (ALDACTONE) 100 MG tablet TAKE 1 TABLET BY MOUTH TWICE DAILY 180 tablet 0     cephALEXin (KEFLEX) 500 MG capsule TK 1 C PO QID FOR 10 DAYS  0     doxycycline hyclate (VIBRA-TABS) 100 MG tablet Take 1 tablet (100 mg) by mouth 2 times daily (Patient not taking: Reported on 2/19/2019) 14 tablet 0       No Known Allergies    Review of Systems:  -Constitutional: Patient is otherwise feeling well, in usual state of health.   -Skin: As above in HPI. No additional skin concerns.    Physical exam:  Vitals: There were no vitals taken for this visit.  GEN: This is a well developed, well-nourished female in no acute distress, in a pleasant mood.    SKIN: Focused examination of the face, upper extremities, hands, and bilateral axilla was performed.  -  Ceron type IV - V skin  - A few scarred sinus tracts subcutaneously in the bilateral axillae. Velvety brown hyperpigmented plaques in the axillae bilaterally.  - tender subcutaneous nodule in the left axilla, open and draining clear fluid that is malodorous with palpation.   - No other lesions of concern on areas examined.       Impression/Plan:    1. Hidradenitis suppurativa. Moyer stage 2. Discussed what is known about the pathogenesis of this condition. Patient is on maintenance therapy, but has only been doing for about two weeks so too early to know if this suppresses her disease. Discussed that for inflamed areas the best treatment is steroid injection or course of oral antibiotics. She has recently been on two courses of oral antibiotics, so prefer ILK at this time. Patient agreeable to this plan. Patient's mom requested refill of prescription strength ibuprofen. There are no studies that show long term benefit of using ibuprofen for inflammation associated with HS, so will defer to Dr. Horne to consult if this is safe to use for short term pain.     Continue using BPO wash in the shower as a body wash.    Continue clindamycin 1% lotion to apply to areas patient typically gets boils daily after showering. Emphasized importance of using after using BPO wash to prevent resistance from developing.    Kenalog intralesional injection procedure note: After verbal consent and discussion of risks including but not limited to atrophy, pain, and bruising, time out was performed, the patient underwent positioning and the area was prepped with isopropyl alcohol, 1 total cc of Kenalog 10 mg/cc was injected into 10 site(s) on the left axilla. The patient tolerated the procedure well and left the Dermatology clinic in good condition.    Patient will follow up with Dr. Horne for consideration of refill of ibuprofen.       Follow-up 3 months for HS, sooner for lesions of concern.       Staff  Indication: anti-aging Consent: Written consent obtained, risks reviewed including but not limited to crusting, scabbing, blistering, scarring, darker or lighter pigmentary change, bruising, and/or incomplete response. Involved:  Scribe/Staff    Scribe Disclosure  I, Carolina Fam, am serving as a scribe to document services personally performed by Dr. Alisha Belcher MD, based on data collection and the provider's statements to me.     Provider Disclosure:   The documentation recorded by the scribe accurately reflects the services I personally performed and the decisions made by me.    Alisha Belcher MD    Department of Dermatology  [facility]: Phone: [phone], Fax:[phone]  [facility] [place]: Phone: [phone], Fax: [phone]                        Again, thank you for allowing me to participate in the care of your patient.        Sincerely,        Alisha Belcher MD     Detail Level: Zone Post-Care Instructions: I reviewed with the patient in detail post-care instructions. Patient should stay away from the sun and wear sun protection until treated areas are fully healed. Vacuum Pressure: 16 Number Of Passes: 1 Additional Vacuum Pressure (Won't Render If 0): 16

## 2019-02-19 NOTE — PROGRESS NOTES
[facility] Dermatology Note      Dermatology Problem List:  1. Hidradenitis suppurativa, axillae and inframammary skin  - current tx: BPO wash, clindamycin 1% lotion  - s/p ILK 10mg/cc injection 2/19/19 to L axilla    Encounter Date: Feb 19, 2019    CC:  Chief Complaint   Patient presents with     RECHECK     of left axilla. Pt states it has gotten worse and is now draining.        History of Present Illness:  Ms. Anh Flores is a 24 year old female who presents as a follow up for hidradenitis suppurativa. She is with ehr mom again today. She notes that boil on the right side has resolved. No more pain, minimal drainage. A new boil in the  left axilla has burst open and has foul smelling drainage. She notes it has been there for 2 weeks, but only draining for a day or two. She is in less pain today. Mom inquires about refilling ibuprofen rx. She currently uses BPO wash, a lotion moisturizer, and clindamycin 1% lotion. She reports that her skin is dry from the BPO wash. It gets itchy sometimes. She does not like the thickness of cream based moisturizers. No other concerns addressed today.      Past Medical History:   Patient Active Problem List   Diagnosis     CAH (congenital adrenal hyperplasia)     Chronic abdominal pain     Vitamin D deficiency     Left thigh pain     Chronic headaches     Morbid obesity (H)     Hidradenitis suppurativa     Morbid obesity with body mass index of 60.0-69.9 in adult (H)     Elevated BP without diagnosis of hypertension     Past Medical History:   Diagnosis Date     CAH (congenital adrenal hyperplasia) 2/9/2010    Followed by Dr. Brewer; next follow up 1.2011.  Metformin increased to 850 mg twice a day.      Vitamin D deficiency 2/9/2010     Past Surgical History:   Procedure Laterality Date     CHOLECYSTECTOMY      She was in 8th grade        Social History:  Patient reports that  has never smoked. she has never used smokeless tobacco. She reports that she  does not drink alcohol or use drugs. Patient grooms dogs.     Family History:  Family History   Problem Relation Age of Onset     Neurologic Disorder Sister 16        migraines     Cerebrovascular Disease No family hx of      Alzheimer Disease No family hx of        Medications:  Current Outpatient Medications   Medication Sig Dispense Refill     clindamycin (CLEOCIN T) 1 % external lotion Apply to affected areas daily after showering. 120 mL 11     DECARA 85942 units capsule TAKE 1 CAPSULE BY MOUTH EVERY WEEK. 12 capsule 1     ibuprofen (ADVIL/MOTRIN) 600 MG tablet Take 1 tablet (600 mg) by mouth every 8 hours as needed for moderate pain 30 tablet 0     metFORMIN (GLUCOPHAGE-XR) 500 MG 24 hr tablet TAKE 2 TABLETS(1,000MG) BY MOUTH TWICE DAILY(WITH MEALS). 360 tablet 0     MONONESSA 0.25-35 MG-MCG per tablet TAKE 1 TABLET BY MOUTH DAILY 84 tablet 0     oxyCODONE (ROXICODONE) 5 MG tablet TK 1 TO 2 TS PO Q 4 H PRN P  0     spironolactone (ALDACTONE) 100 MG tablet TAKE 1 TABLET BY MOUTH TWICE DAILY 180 tablet 0     cephALEXin (KEFLEX) 500 MG capsule TK 1 C PO QID FOR 10 DAYS  0     doxycycline hyclate (VIBRA-TABS) 100 MG tablet Take 1 tablet (100 mg) by mouth 2 times daily (Patient not taking: Reported on 2/19/2019) 14 tablet 0       No Known Allergies    Review of Systems:  -Constitutional: Patient is otherwise feeling well, in usual state of health.   -Skin: As above in HPI. No additional skin concerns.    Physical exam:  Vitals: There were no vitals taken for this visit.  GEN: This is a well developed, well-nourished female in no acute distress, in a pleasant mood.    SKIN: Focused examination of the face, upper extremities, hands, and bilateral axilla was performed.  - Ceron type IV - V skin  - A few scarred sinus tracts subcutaneously in the bilateral axillae. Velvety brown hyperpigmented plaques in the axillae bilaterally.  - tender subcutaneous nodule in the left axilla, open and draining clear fluid that  is malodorous with palpation.   - No other lesions of concern on areas examined.       Impression/Plan:    1. Hidradenitis suppurativa. Moyer stage 2. Discussed what is known about the pathogenesis of this condition. Patient is on maintenance therapy, but has only been doing for about two weeks so too early to know if this suppresses her disease. Discussed that for inflamed areas the best treatment is steroid injection or course of oral antibiotics. She has recently been on two courses of oral antibiotics, so prefer ILK at this time. Patient agreeable to this plan. Patient's mom requested refill of prescription strength ibuprofen. There are no studies that show long term benefit of using ibuprofen for inflammation associated with HS, so will defer to Dr. Horne to consult if this is safe to use for short term pain.     Continue using BPO wash in the shower as a body wash.    Continue clindamycin 1% lotion to apply to areas patient typically gets boils daily after showering. Emphasized importance of using after using BPO wash to prevent resistance from developing.    Kenalog intralesional injection procedure note: After verbal consent and discussion of risks including but not limited to atrophy, pain, and bruising, time out was performed, the patient underwent positioning and the area was prepped with isopropyl alcohol, 1 total cc of Kenalog 10 mg/cc was injected into 10 site(s) on the left axilla. The patient tolerated the procedure well and left the Dermatology clinic in good condition.    Patient will follow up with Dr. Horne for consideration of refill of ibuprofen.       Follow-up 3 months for HS, sooner for lesions of concern.       Staff Involved:  Scribe/Staff    Scribe Disclosure  I, Carolina Fam, am serving as a scribe to document services personally performed by Dr. Alisha Belcher MD, based on data collection and the provider's statements to me.     Provider Disclosure:   The documentation recorded by  the scribe accurately reflects the services I personally performed and the decisions made by me.    Alisha Belcher MD    Department of Dermatology  [facility]: Phone: [phone], Fax:[phone]  [facility]: Phone: [phone], Fax: [phone]

## 2019-02-19 NOTE — NURSING NOTE
Anh Flores's goals for this visit include:   Chief Complaint   Patient presents with     RECHECK     of left axilla. Pt states it has gotten worse and is now draining.      She requests these members of her care team be copied on today's visit information:     PCP: Leann Horne    Referring Provider:  No referring provider defined for this encounter.    There were no vitals taken for this visit.    Do you need any medication refills at today's visit? Gisele Izquierdo LPN

## 2019-02-19 NOTE — NURSING NOTE
Order and medication to be administered verified prior to injection.    Drug Administration Record    Drug Name: Kenalog  Dose: 10mg/mL  Route administered: intra-lesional  NDC #: Kenalog-10 (6157-6752-01)  Amount of waste(mL): 0  Reason for waste: NA    LOT #: [id number]  SITE: Left Axilla  : [employer]  EXPIRATION DATE: Jun/2020    Maddie Canas CMA

## 2019-06-02 DIAGNOSIS — E25.9 CAH 21-OH (CONGENITAL ADRENAL HYPERPLASIA), LATE ONSET (H): ICD-10-CM

## 2019-06-02 DIAGNOSIS — R73.03 PREDIABETES: ICD-10-CM

## 2019-06-02 DIAGNOSIS — E88.819 INSULIN RESISTANCE: ICD-10-CM

## 2019-06-03 RX ORDER — METFORMIN HCL 500 MG
TABLET, EXTENDED RELEASE 24 HR ORAL
Qty: 360 TABLET | Refills: 0 | Status: SHIPPED | OUTPATIENT
Start: 2019-06-03 | End: 2024-01-30

## 2019-06-03 RX ORDER — SPIRONOLACTONE 100 MG/1
TABLET, FILM COATED ORAL
Qty: 180 TABLET | Refills: 0 | Status: SHIPPED | OUTPATIENT
Start: 2019-06-03 | End: 2020-10-19

## 2019-06-03 RX ORDER — NORGESTIMATE AND ETHINYL ESTRADIOL
KIT
Qty: 84 TABLET | Refills: 0 | Status: SHIPPED | OUTPATIENT
Start: 2019-06-03 | End: 2020-10-19

## 2019-08-21 DIAGNOSIS — E55.9 VITAMIN D DEFICIENCY: ICD-10-CM

## 2019-08-21 DIAGNOSIS — E25.9 CAH 21-OH (CONGENITAL ADRENAL HYPERPLASIA), LATE ONSET (H): ICD-10-CM

## 2019-08-22 NOTE — TELEPHONE ENCOUNTER
Last Office Visit 1/10/18. No showed 7/3/19 follow up.    Will forward to clinic staff to contact patient regarding follow up plan.    Marcella Wells LPN  Diabetes Clinic Coordinator   Adult Endocrinology and Pediatric Specialty Clinics  [facility]

## 2019-08-23 RX ORDER — NORGESTIMATE AND ETHINYL ESTRADIOL 0.25-0.035
1 KIT ORAL DAILY
Qty: 84 TABLET | Refills: 0 | Status: CANCELLED | OUTPATIENT
Start: 2019-08-23

## 2019-08-23 NOTE — TELEPHONE ENCOUNTER
Called patient, no answer. Left message for patient to return call. Patient is due for follow up. Last seen 1/10/18, was due for follow up in 1 year and has no showed 2 visits.    Yanely Garner [facility]  Adult Endocrinology  [facility]

## 2019-08-23 NOTE — TELEPHONE ENCOUNTER
Another rx request came in for pts Jason.  Have pended mediation.  Amaya Saha CMA       Faxed refill request received from [facility].   Medication Requested: Mononessa  Directions: Take one tablet by mouth daily  Quantity: 84  Last Office Visit: 2/21/17  Next Appointment Scheduled for: ELTON

## 2019-08-27 NOTE — TELEPHONE ENCOUNTER
Attempted 2nd call to the patient, no answer. Left message for the patient to call the clinic. Called [facility] to also let them know the patient needs to be seen for refills, pharmacy will also try to contact the patient.     Yanely Garner [facility]  Adult Endocrinology  [facility]

## 2019-08-27 NOTE — TELEPHONE ENCOUNTER
Refill denied.    Marcella Wells LPN  Diabetes Clinic Coordinator   Adult Endocrinology and Pediatric Specialty Clinics  [facility]

## 2019-09-28 DIAGNOSIS — E55.9 VITAMIN D DEFICIENCY: ICD-10-CM

## 2019-09-30 RX ORDER — CHOLECALCIFEROL (VITAMIN D3) 1250 MCG
CAPSULE ORAL
Qty: 12 CAPSULE | Refills: 0 | OUTPATIENT
Start: 2019-09-30

## 2019-09-30 NOTE — TELEPHONE ENCOUNTER
Requested Prescriptions      Name from pharmacy: DECARA 24025 IU D3 (BONITA) CAPSULES         Will file in chart as: DECARA 81577 units capsule         Sig: TAKE 1 CAPSULE BY MOUTH EVERY WEEK.    Disp:  12 capsule    Refills:  0    Start: 9/28/2019    Class: E-Prescribe    For: Vitamin D deficiency    Last ordered: 1 year ago by Alejandra Cronin MD Last refill: 6/2/2019    Rx #: [id number]    Vitamin Supplements (Adult) Protocol Failed9/30 7:01 AM  x High dose Vitamin D not ordered   x Recent (12 mo) or future (30 days) visit within the authorizing provider's specialty    Medication is active on med list                       Please refer back to 8/21/19 refill encounter. Patient has not been seen since 1/1/18. Refill denied and patient needs to contact clinic.       Misty Easton, RN  Endocrine Care Coordinator  [facility]

## 2019-10-04 DIAGNOSIS — E25.9 CAH 21-OH (CONGENITAL ADRENAL HYPERPLASIA), LATE ONSET (H): ICD-10-CM

## 2019-10-04 DIAGNOSIS — E88.819 INSULIN RESISTANCE: ICD-10-CM

## 2019-10-04 DIAGNOSIS — R73.03 PREDIABETES: ICD-10-CM

## 2019-10-04 RX ORDER — METFORMIN HCL 500 MG
TABLET, EXTENDED RELEASE 24 HR ORAL
Qty: 360 TABLET | Refills: 0 | Status: CANCELLED | OUTPATIENT
Start: 2019-10-04

## 2019-10-04 NOTE — TELEPHONE ENCOUNTER
Faxed refill request received from [facility].   Medication Requested: Metformin  Directions: Take 2 tablets by mouth twice daily with meals  Quantity: 360  Last Office Visit: 7/3/19  Next Appointment Scheduled for: ELTON

## 2019-10-04 NOTE — TELEPHONE ENCOUNTER
Patient last seen 1/10/18.    Please refer to 8/21/19 Encounter.    Pharmacy notified patient has not returned clinics calls and to contact patient for updated information.    Marcella Wells LPN  Diabetes Clinic Coordinator   Adult Endocrinology and Pediatric Specialty Clinics  [facility]

## 2020-10-19 ENCOUNTER — OFFICE VISIT (OUTPATIENT)
Dept: PEDIATRICS | Facility: CLINIC | Age: 25
End: 2020-10-19
Payer: COMMERCIAL

## 2020-10-19 VITALS
HEART RATE: 87 BPM | DIASTOLIC BLOOD PRESSURE: 87 MMHG | TEMPERATURE: 98 F | SYSTOLIC BLOOD PRESSURE: 137 MMHG | BODY MASS INDEX: 57.52 KG/M2 | OXYGEN SATURATION: 98 % | HEIGHT: 60 IN | WEIGHT: 293 LBS

## 2020-10-19 DIAGNOSIS — G56.01 CARPAL TUNNEL SYNDROME OF RIGHT WRIST: Primary | ICD-10-CM

## 2020-10-19 DIAGNOSIS — Z23 NEED FOR PROPHYLACTIC VACCINATION AND INOCULATION AGAINST INFLUENZA: ICD-10-CM

## 2020-10-19 DIAGNOSIS — G56.21 CUBITAL TUNNEL SYNDROME ON RIGHT: ICD-10-CM

## 2020-10-19 PROBLEM — E11.9 DIABETES MELLITUS, TYPE 2 (H): Status: ACTIVE | Noted: 2020-10-19

## 2020-10-19 PROCEDURE — 90686 IIV4 VACC NO PRSV 0.5 ML IM: CPT | Performed by: FAMILY MEDICINE

## 2020-10-19 PROCEDURE — 90471 IMMUNIZATION ADMIN: CPT | Performed by: FAMILY MEDICINE

## 2020-10-19 PROCEDURE — 99213 OFFICE O/P EST LOW 20 MIN: CPT | Mod: 25 | Performed by: FAMILY MEDICINE

## 2020-10-19 RX ORDER — MELOXICAM 15 MG/1
15 TABLET ORAL DAILY
Qty: 30 TABLET | Refills: 0 | Status: SHIPPED | OUTPATIENT
Start: 2020-10-19 | End: 2020-12-16

## 2020-10-19 ASSESSMENT — MIFFLIN-ST. JEOR: SCORE: 2054.83

## 2020-10-19 NOTE — PROGRESS NOTES
"Subjective     Anh Flores is a 25 year old female who presents to clinic today for the following health issues:    HPI         Musculoskeletal problem/pain    Onset/Duration: 1 month ago intermittent and getting worse.  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      Objective    /87 (BP Location: Right arm, Patient Position: Standing, Cuff Size: Adult Large)   Pulse 87   Temp 98  F (36.7  C) (Temporal)   Ht 1.53 m (5' 0.25\")   Wt 139.4 kg (307 lb 6.4 oz)   SpO2 98%   BMI 59.54 kg/m    Body mass index is 59.54 kg/m .  Physical Exam  Musculoskeletal:      Right wrist: She exhibits tenderness and bony tenderness. She exhibits normal range of motion, no swelling, no effusion, no crepitus, no deformity and no laceration.      positive tinel  GENERAL: healthy, alert and no distress            Assessment & Plan     Carpal tunnel syndrome of right wrist  Dispensed cock up splint  - Miscellaneous Order for DME - ONLY FOR DME  - EMG; Future  - meloxicam (MOBIC) 15 MG tablet; Take 1 tablet (15 mg) by mouth daily  - " Orthopedic & Spine  Referral; Future    Cubital tunnel syndrome on right  - EMG; Future  - meloxicam (MOBIC) 15 MG tablet; Take 1 tablet (15 mg) by mouth daily  - Orthopedic & Spine  Referral; Future    Need for prophylactic vaccination and inoculation against influenza  - INFLUENZA VACCINE IM > 6 MONTHS VALENT IIV4 [40257]  - Vaccine Administration, Initial [62405]       Return if symptoms worsen or fail to improve.    Bita Veronica MD  [facility]

## 2020-10-19 NOTE — PATIENT INSTRUCTIONS
Patient Education     Carpal Tunnel Syndrome    Carpal tunnel syndrome is a painful condition of the wrist and arm. It is caused by pressure on the median nerve. The median nerve is one of the nerves that give feeling and movement to the hand. It passes through a tunnel in the wrist called the carpal tunnel. This tunnel is made up of bones and ligaments. Narrowing of this tunnel or swelling of the tissues inside the tunnel puts pressure on the median nerve. This causes numbness, pins and needles, or electric shooting pains in your hand and forearm. Often the pain is worse at night and may wake you when you are asleep.  Carpal tunnel syndrome may occur during pregnancy and with use of birth control pills. It is more common in workers who must often bend their wrists. It is also common in people who work with power tools that cause strong vibrations.  Home care    Rest the painful wrist. Avoid repeated bending of the wrist back and forth. This puts pressure on the median nerve. Avoid using power tools with strong vibrations.    If you were given a splint, wear it at night while you sleep. You may also wear it during the day for comfort.    Move your fingers and wrists often to prevent stiffness.    Elevate your arms on pillows when you lie down.    Try using the unaffected hand more.    Try not to hold your wrists in a bent, downward position.    Sometimes changes in the work place may ease symptoms. If you type most of the day, it may help to change the position of your keyboard or add a wrist support. Your wrist should be in a neutral position and not bent back when typing.    You may use over-the-counter pain medicine to treat pain and inflammation, unless another medicine was prescribed. Anti-inflammatory pain medicines, such as ibuprofen or naproxen may be more effective than acetaminophen, which treats pain, but not inflammation. If you have chronic liver or kidney disease or ever had a stomach ulcer or  gastrointestinal bleeding, talk with your healthcare provider before using these medicines.    Opioid pain medicine will only give temporary relief and does not treat the problem. If pain continues, you may need a shot of a steroid drug into your wrist.    If the above methods fail, you may need surgery. This will open the carpal tunnel and release the pressure on the trapped nerve.  Follow-up care  Follow up with your healthcare provider, or as advised. If X-rays were taken, you will be notified of any new findings that may affect your care.  When to seek medical advice  Call your healthcare provider right away if any of these occur:    Pain not improving with the above treatment    Fingers or hand become cold, blue, numb, or tingly    Your whole arm becomes swollen or weak  Date Last Reviewed: 5/1/2018 2000-2019 [employer]. [address] [place] [address], [place] [id number]. All rights reserved. This information is not intended as a substitute for professional medical care. Always follow your healthcare professional's instructions.         Patient Education     What is Cubital Tunnel Syndrome?  Cubital tunnel syndrome is a set of symptoms that may occur if the ulnar nerve in your elbow gets pinched. This may happen if you bend or lean on your elbows often.    Your cubital tunnel  The cubital tunnel is a groove in a bone near your elbow. This narrow groove provides a passage for the ulnar nerve, one of the main nerves in your arm. The ulnar nerve can cause  funny bone  pain if your elbow gets bumped. Your cubital tunnel helps protect this nerve as it passes through your elbow and down to your fingers.  Compressing the ulnar nerve  Bending your elbow compresses the ulnar nerve inside the cubital tunnel. The nerve can get inflamed (irritated) after constant bending and pinching or after getting hurt. Over time, this can lead to pain or numbness. The pain is often felt in your ring fingers and little  fingers.     Bending your elbow as you hold a phone can cause problems over time.      What are its symptoms?    Numbness or tingling in the ring fingers and little fingers    Loss of finger or hand strength    Inability to straighten fingers    Sharp, sudden pain when elbow is touched    Shrinking of hand muscles  The road to healing  You can keep cubital tunnel syndrome from flaring up. Keep your arm straight as much as you can, even while sleeping, to prevent pinching of the ulnar nerve. And use phone headsets and elbow pads. If you still have pain, tell your doctor.   Date Last Reviewed: 5/1/2018 2000-2019 [employer]. [address] [place] [address], [place] [address]. All rights reserved. This information is not intended as a substitute for professional medical care. Always follow your healthcare professional's instructions.

## 2020-12-14 DIAGNOSIS — G56.01 CARPAL TUNNEL SYNDROME OF RIGHT WRIST: ICD-10-CM

## 2020-12-14 DIAGNOSIS — G56.21 CUBITAL TUNNEL SYNDROME ON RIGHT: ICD-10-CM

## 2020-12-16 RX ORDER — MELOXICAM 15 MG/1
15 TABLET ORAL DAILY
Qty: 30 TABLET | Refills: 0 | Status: SHIPPED | OUTPATIENT
Start: 2020-12-16 | End: 2024-01-30

## 2021-05-14 ENCOUNTER — TELEPHONE (OUTPATIENT)
Dept: FAMILY MEDICINE | Facility: CLINIC | Age: 26
End: 2021-05-14

## 2021-05-14 NOTE — TELEPHONE ENCOUNTER
Patient Quality Outreach Summary      Summary:    Patient is due/failing the following:   Diabetic Follow-Up Visit and Physical with fasting labs    Type of outreach:    Phone, left message for patient/parent to call back.    Questions for provider review:    None                                                                                                                    Treva Albert CMA       Chart routed to Care Team.

## 2021-05-14 NOTE — LETTER
[facility]  [address], [address]  [place] [address]  Phone: [phone]  Fax: [phone]  June 9, 2021      Anh Flores  [address] [address] [place] [address]  [facility]The [facility] [place] [place] [address]      Dear Anh,    We care about your health and have reviewed your health plan including your medical conditions, medications, and lab results.  Based on this review, it is recommended that you follow up regarding the following health topic(s):  -Diabetes    We recommend you take the following action(s):  -schedule a FOLLOWUP OFFICE APPOINTMENT.  We will perform the following labs:  A1c, BMP (basic metabolic panel) and Microablumin.     Please call us at the [facility] - [phone] (or use [employer]) to address the above recommendations.     Thank you for trusting [facility] and we appreciate the opportunity to serve you.  We look forward to supporting your healthcare needs in the future.    Healthy Regards,    Your Health Care Team  [facility]

## 2021-07-20 ENCOUNTER — TELEPHONE (OUTPATIENT)
Dept: FAMILY MEDICINE | Facility: CLINIC | Age: 26
End: 2021-07-20

## 2021-08-06 NOTE — TELEPHONE ENCOUNTER
Patient Quality Outreach Summary      Summary:    Patient is due/failing the following:   Diabetic Follow-Up Visit and Physical     Type of outreach:    Phone, left message for patient/parent to call back.    Questions for provider review:    None                                                                                                                    Treva Albert CMA       Chart routed to Care Team.

## 2021-09-01 NOTE — TELEPHONE ENCOUNTER
2nd attempt to reach patient. Left message for patient to call clinic. Please assist with scheduling a diabetic follow up.    Treva Albert CMA

## 2022-06-15 NOTE — TELEPHONE ENCOUNTER
Hospitalist Admission History and Physical         NAME:            Shanti Franco    Age:                78 y.o.    :               1935    MRN:              [id number]    PCP: Alley Ochoa MD    Consulting MD:    Treatment Team: Attending Provider: Brenda West DO; Registered Nurse: Hayley Nelson, RN; Registered Nurse: Karla Guerra, RN; Orthopedic Surgeon: Mick Medina MD         Chief Complaint   Patient presents with   Hay Rudolph   HPI:    Patient is a 78 y.o. female who presented to the ED for cc left hip pain after a fall while walking at her PCP office. Denies dizziness or syncope. States she just tripped and fell without hitting head. Hx of breast cancer s/p chemo and mastectomy in , GERD, CAD s/p CABG, HTN, chronic anemia due to renal insufficiency requiring erythropoietin injections. Vitals- stable    Labs- Benign    Left hip x ray with obvious fracture at femoral neck    Past Medical History:   Diagnosis Date    Breast cancer ([address]     left s/p chemo and mastectomy     CAD (coronary artery disease)     GERD (gastroesophageal reflux disease)     Hiatal hernia     High triglycerides     History of heart artery stent     x 2    History of kidney problems     Hypertension             Past Surgical History:   Procedure Laterality Date    CORONARY ANGIOPLASTY WITH STENT PLACEMENT  2005    x 2    CORONARY ARTERY BYPASS GRAFT      HYSTERECTOMY      HYSTERECTOMY      MASTECTOMY Left     ROTATOR CUFF REPAIR Right     VEIN SURGERY              Family History   Problem Relation Age of Onset    Hypertension Mother     Stroke Mother     Coronary Art Dis Father        Family history reviewed and negative except as noted above.          Social History     Social History Narrative    Not on file            Social History     Tobacco Use    Smoking status: Former Smoker     Quit date: 1988     Years since quittin.4    Smokeless tobacco: Never Used Last Office Visit: 1/10/18  Future Appt: 7/3/19    Will forward to Dr. Mccord in the absence of Dr. Cronin for review per Endocrine Refill Protocol.    Marcella Wells LPN  Diabetes Clinic Coordinator   Adult Endocrinology [facility] Pediatric [facility] Clin[facility]  [facility]         Substance Use Topics    Alcohol use: No            Social History     Substance and Sexual Activity   Drug Use Never                 Allergies   Allergen Reactions    Penicillins Hives            Prior to Admission medications    Medication Sig Start Date End Date Taking? Authorizing Provider   CALCIUM PO Take by mouth    Ar Automatic Reconciliation   amLODIPine (NORVASC) 10 MG tablet Take by mouth daily    Ar Automatic Reconciliation   ascorbic acid (VITAMIN C) 500 MG tablet Take by mouth    Ar Automatic Reconciliation   aspirin 81 MG EC tablet Take by mouth daily    Ar Automatic Reconciliation   vitamin D 25 MCG (1000 UT) CAPS Take by mouth daily    Ar Automatic Reconciliation   clonazePAM (KLONOPIN) 1 MG tablet Take 0.5 mg by mouth.     Ar Automatic Reconciliation   esomeprazole (NEXIUM) 40 MG delayed release capsule TAKE 1 CAPSULE BY MOUTH DAILY 2/1/21   Ar Automatic Reconciliation   isosorbide mononitrate (IMDUR) 60 MG extended release tablet Take 60 mg by mouth 11/11/21   Ar Automatic Reconciliation   meclizine (ANTIVERT) 25 MG tablet Take by mouth 3 times daily as needed    Ar Automatic Reconciliation   metoprolol succinate (TOPROL XL) 25 MG extended release tablet Take 25 mg by mouth daily 11/6/20   Ar Automatic Reconciliation   nitroGLYCERIN (NITROSTAT) 0.4 MG SL tablet Place 0.4 mg under the tongue 11/11/21   Ar Automatic Reconciliation   simvastatin (ZOCOR) 40 MG tablet Take 40 mg by mouth 11/6/20   Ar Automatic Reconciliation                      Review of Systems         Constitutional: NAD    Eyes:  no change in visual acuity, no photophobia    Ears, nose, mouth, throat, and face: no  Odynphagia, dysphagia, no thrush or exudate, negative for chronic sinus congestion, recurrent headaches    Respiratory: negative for SOB, hemoptysis or cough    Cardiovascular: negative for CP, palpitations, or PND    Gastrointestinal: negative for abdominal pain, no hematemesis, hematochezia or BRBPR    Genitourinary: no urgency, frequency, or dysuria, no nocturia    Integument/breast: negative for skin rash or skin lesions    Hematologic/lymphatic: negative for known bleeding disorder    Musculoskeletal:left hip pain    Neurological: negative for lightheadedness, syncope or presyncopal events, no seizure or CVA history    Behavioral/Psych: negative for depression or chronic anxiety,    Endocrine: negative for polydyspia, polyuria or intolerance to heat or cold    Allergic/Immunologic: negative for chronic allergic rhinitis, or known connective tissue disorder              Objective:         Patient Vitals for the past 24 hrs:   Temp Pulse Resp BP SpO2   06/15/22 1227 -- -- 16 -- --   06/15/22 1220 -- -- -- 138/68 96 %   06/15/22 1149 -- -- 16 -- --   06/15/22 1108 98.2 °F (36.8 °C) 88 16 (!) 146/66 96 %            No intake/output data recorded. No intake/output data recorded.          Data Review:   Recent Results (from the past 24 hour(s))   EKG 12 Lead    Collection Time: 06/15/22 11:10 AM   Result Value Ref Range    Ventricular Rate 85 BPM    Atrial Rate 85 BPM    P-R Interval 205 ms    QRS Duration 100 ms    Q-T Interval 392 ms    QTc Calculation (Bazett) 467 ms    P Axis -26 degrees    R Axis -25 degrees    T Axis 59 degrees    Diagnosis Sinus rhythm    CBC with Auto Differential    Collection Time: 06/15/22 11:52 AM   Result Value Ref Range    WBC 10.1 4.3 - 11.1 K/uL    RBC 3.78 (L) 4.05 - 5.2 M/uL    Hemoglobin 11.5 (L) 11.7 - 15.4 g/dL    Hematocrit 34.9 (L) 35.8 - 46.3 %    MCV 92.3 79.6 - 97.8 FL    MCH 30.4 26.1 - 32.9 PG    MCHC 33.0 31.4 - 35.0 g/dL    RDW 12.8 11.9 - 14.6 %    Platelets 113 722 - 947 K/uL    MPV 10.1 9.4 - 12.3 FL    nRBC 0.00 0.0 - 0.2 K/uL    Differential Type AUTOMATED      Seg Neutrophils 77 43 - 78 %    Lymphocytes 16 13 - 44 %    Monocytes 6 4.0 - 12.0 %    Eosinophils % 0 (L) 0.5 - 7.8 %    Basophils 0 0.0 - 2.0 %    Immature Granulocytes 1 0.0 - 5.0 %    Segs Absolute 7.8 1.7 - 8.2 K/UL Absolute Lymph # 1.6 0.5 - 4.6 K/UL    Absolute Mono # 0.6 0.1 - 1.3 K/UL    Absolute Eos # 0.0 0.0 - 0.8 K/UL    Basophils Absolute 0.0 0.0 - 0.2 K/UL    Absolute Immature Granulocyte 0.1 0.0 - 0.5 K/UL   Protime-INR    Collection Time: 06/15/22 11:52 AM   Result Value Ref Range    Protime 13.1 12.6 - 14.5 sec    INR 1.0              Physical Exam:         General:    Alert, cooperative, no distress, appears stated age. Eyes:    Conjunctivae/corneas clear. PERRL    Ears:    Normal    Nose:    Nares normal. Septum midline. Mouth/Throat:    Lips, mucosa, and tongue normal. Teeth and gums normal.    Neck:     no JVD. Back:     deferred    Lungs:     Clear to auscultation bilaterally. Heart:    Regular rate and rhythm, S1, S2 normal, no murmur    Abdomen:     Soft, non-tender. Bowel sounds normal. No masses,  No organomegaly. Extremities:    Left leg externally rotated and shorter compared to right leg. Good sensation to left leg with intact pulses. Pulses:    2+ and symmetric all extremities. Skin:    Skin color, texture, turgor normal. No rashes or lesions    Neurologic:    CNII-XII intact. Limited ROM of left leg. Assessment and Plan         Principal Problem:    Closed displaced fracture of left femoral neck (HCC)  Active Problems:    Hypertension    CAD (coronary artery disease)  Resolved Problems:    * No resolved hospital problems. *    Left femoral neck fracture - NPO past midnight. Type and screen. Ortho consult. Due to age and co-morbidities, she is at high risk of operative and postoperative complications. HTN - amlodipine, BB, imdur    HLD - statin    Anxiety - Klonopin nightly    Chronic renal insufficiency - stable    DVT prophylaxis - SCDs to right leg.         Signed By:    Guerita Stephen DO    Kassandra 15, 2022

## 2023-12-08 ENCOUNTER — OFFICE VISIT (OUTPATIENT)
Dept: URGENT CARE | Facility: URGENT CARE | Age: 28
End: 2023-12-08
Payer: COMMERCIAL

## 2023-12-08 VITALS
HEART RATE: 76 BPM | BODY MASS INDEX: 56.34 KG/M2 | DIASTOLIC BLOOD PRESSURE: 92 MMHG | OXYGEN SATURATION: 98 % | SYSTOLIC BLOOD PRESSURE: 151 MMHG | WEIGHT: 290.9 LBS | TEMPERATURE: 97.6 F

## 2023-12-08 DIAGNOSIS — A08.4 VIRAL GASTROENTERITIS: Primary | ICD-10-CM

## 2023-12-08 DIAGNOSIS — E11.9 TYPE 2 DIABETES MELLITUS WITHOUT COMPLICATION, WITHOUT LONG-TERM CURRENT USE OF INSULIN (H): ICD-10-CM

## 2023-12-08 PROCEDURE — 99203 OFFICE O/P NEW LOW 30 MIN: CPT | Performed by: PHYSICIAN ASSISTANT

## 2023-12-08 ASSESSMENT — ENCOUNTER SYMPTOMS
SHORTNESS OF BREATH: 0
NAUSEA: 1
ENDOCRINE NEGATIVE: 1
WEAKNESS: 0
MUSCULOSKELETAL NEGATIVE: 1
SORE THROAT: 0
LIGHT-HEADEDNESS: 0
JOINT SWELLING: 0
RHINORRHEA: 0
CARDIOVASCULAR NEGATIVE: 1
CHILLS: 0
EYES NEGATIVE: 1
MYALGIAS: 0
HEADACHES: 0
WOUND: 0
FEVER: 0
ALLERGIC/IMMUNOLOGIC NEGATIVE: 1
DIARRHEA: 1
VOMITING: 1
BACK PAIN: 0
ARTHRALGIAS: 0
COUGH: 0
ABDOMINAL PAIN: 1
RESPIRATORY NEGATIVE: 1
NECK STIFFNESS: 0
NECK PAIN: 0
DIZZINESS: 0
PALPITATIONS: 0

## 2023-12-09 NOTE — PROGRESS NOTES
Chief Complaint:     Chief Complaint   Patient presents with    Abdominal Pain     Stomach pain started Sunday night pain when after eating, pt was sweating and hot never checked temp    Vomiting     Possible flu - vomited Monday morning        No results found for any visits on 12/08/23.    Medical Decision Making:    Vital signs reviewed by Ld Diaz PA-C  BP (!) 151/92   Pulse 76   Temp 97.6  F (36.4  C) (Tympanic)   Wt 132 kg (290 lb 14.4 oz)   SpO2 98%   BMI 56.34 kg/m      Differential Diagnosis:  Abdominal Pain: Viral Gastroenteritis, food poisoning        ASSESSMENT    1. Viral gastroenteritis    2. Type 2 diabetes mellitus without complication, without long-term current use of insulin (H)        PLAN    Patient is in no acute distress.  Abdominal exam was benign.  Temp is 97.6 in clinic today, lung sounds were clear, and O2 sats at 98% on RA.    Rest, Push fluids.  Ibuprofen and or Tylenol for any fever or body aches.  If symptoms worsen, recheck immediately otherwise follow up with your PCP in 1 week if symptoms are not improving.  Worrisome symptoms discussed with instructions to go to the ED.  Patient verbalized understanding and agreed with this plan.    Labs:    No results found for any visits on 12/08/23.     Vital signs reviewed by Ld Diaz PA-C  BP (!) 151/92   Pulse 76   Temp 97.6  F (36.4  C) (Tympanic)   Wt 132 kg (290 lb 14.4 oz)   SpO2 98%   BMI 56.34 kg/m      Current Meds      Current Outpatient Medications:     meloxicam (MOBIC) 15 MG tablet, TAKE 1 TABLET (15 MG) BY MOUTH DAILY, Disp: 30 tablet, Rfl: 0    metFORMIN (GLUCOPHAGE-XR) 500 MG 24 hr tablet, TAKE 2 TABLETS(1,000MG) BY MOUTH TWICE DAILY(WITH MEALS)., Disp: 360 tablet, Rfl: 0      Respiratory History    occasional episodes of bronchitis      SUBJECTIVE    HPI: Anh Flores is an 28 year old female who presents with abdominal cramping, diarrhea, nausea and vomiting.  Symptoms began 5  days ago and has  gradually improving.  Her vomiting, and diarrhea have resolved.  There is no shortness of breath, wheezing, and chest pain.  Patient is eating and drinking well.      Patient denies any recent travel or exposure to known COVID positive tested individual.      ROS:     Review of Systems   Constitutional:  Negative for chills and fever.   HENT:  Negative for congestion, ear pain, rhinorrhea and sore throat.    Eyes: Negative.    Respiratory: Negative.  Negative for cough and shortness of breath.    Cardiovascular: Negative.  Negative for chest pain and palpitations.   Gastrointestinal:  Positive for abdominal pain, diarrhea, nausea and vomiting.   Endocrine: Negative.    Genitourinary: Negative.    Musculoskeletal: Negative.  Negative for arthralgias, back pain, joint swelling, myalgias, neck pain and neck stiffness.   Skin: Negative.  Negative for rash and wound.   Allergic/Immunologic: Negative.  Negative for immunocompromised state.   Neurological:  Negative for dizziness, weakness, light-headedness and headaches.         Family History   Family History   Problem Relation Age of Onset    Neurologic Disorder Sister 16        migraines    Cerebrovascular Disease No family hx of     Alzheimer Disease No family hx of         Problem history  Patient Active Problem List   Diagnosis    CAH (congenital adrenal hyperplasia)    Chronic abdominal pain    Vitamin D deficiency    Left thigh pain    Chronic headaches    Morbid obesity (H)    Hidradenitis suppurativa    Morbid obesity with body mass index of 60.0-69.9 in adult (H)    Elevated BP without diagnosis of hypertension    Diabetes mellitus, type 2 (H)        Allergies  No Known Allergies     Social History  Social History     Socioeconomic History    Marital status: Single     Spouse name: Not on file    Number of children: 0    Years of education: 14+    Highest education level: Not on file   Occupational History     Employer: [employer]UDENT     Comment: Dog grooming    Tobacco Use    Smoking status: Never    Smokeless tobacco: Never   Substance and Sexual Activity    Alcohol use: No    Drug use: No    Sexual activity: Never   Other Topics Concern    Parent/sibling w/ CABG, MI or angioplasty before 65F 55M? Not Asked   Social History Narrative    Not on file     Social Determinants of Health     Financial Resource Strain: Not on file   Food Insecurity: Not on file   Transportation Needs: Not on file   Physical Activity: Not on file   Stress: Not on file   Social Connections: Not on file   Interpersonal Safety: Not on file   Housing Stability: Not on file        OBJECTIVE     Vital signs reviewed by Ld Diaz PA-C  BP (!) 151/92   Pulse 76   Temp 97.6  F (36.4  C) (Tympanic)   Wt 132 kg (290 lb 14.4 oz)   SpO2 98%   BMI 56.34 kg/m       Physical Exam  Vitals and nursing note reviewed.   Constitutional:       General: She is not in acute distress.     Appearance: She is well-developed. She is not ill-appearing, toxic-appearing or diaphoretic.   HENT:      Head: Normocephalic and atraumatic.      Right Ear: Tympanic membrane and external ear normal. No drainage, swelling or tenderness. Tympanic membrane is not perforated, erythematous, retracted or bulging.      Left Ear: Tympanic membrane and external ear normal. No drainage, swelling or tenderness. Tympanic membrane is not perforated, erythematous, retracted or bulging.      Nose: No mucosal edema, congestion or rhinorrhea.      Right Sinus: No maxillary sinus tenderness or frontal sinus tenderness.      Left Sinus: No maxillary sinus tenderness or frontal sinus tenderness.      Mouth/Throat:      Pharynx: No pharyngeal swelling, oropharyngeal exudate, posterior oropharyngeal erythema or uvula swelling.      Tonsils: No tonsillar abscesses.   Eyes:      Pupils: Pupils are equal, round, and reactive to light.   Neck:      Trachea: Trachea normal.   Cardiovascular:      Rate and Rhythm: Normal rate and regular rhythm.       Heart sounds: Normal heart sounds, S1 normal and S2 normal. No murmur heard.     No friction rub. No gallop.   Pulmonary:      Effort: Pulmonary effort is normal. No respiratory distress.      Breath sounds: Normal breath sounds. No decreased breath sounds, wheezing, rhonchi or rales.   Abdominal:      General: Bowel sounds are normal. There is no distension.      Palpations: Abdomen is soft. Abdomen is not rigid. There is no mass.      Tenderness: There is no abdominal tenderness. There is no right CVA tenderness, left CVA tenderness, guarding or rebound. Negative signs include Olsen's sign and McBurney's sign.   Musculoskeletal:      Cervical back: Full passive range of motion without pain, normal range of motion and neck supple.   Lymphadenopathy:      Cervical: No cervical adenopathy.   Skin:     General: Skin is warm and dry.   Neurological:      Mental Status: She is alert and oriented to person, place, and time.      Cranial Nerves: No cranial nerve deficit.      Deep Tendon Reflexes: Reflexes are normal and symmetric.   Psychiatric:         Behavior: Behavior normal. Behavior is cooperative.         Thought Content: Thought content normal.         Judgment: Judgment normal.           Ld Diaz PA-C  12/8/2023, 7:25 PM

## 2024-01-30 ENCOUNTER — ANCILLARY PROCEDURE (OUTPATIENT)
Dept: GENERAL RADIOLOGY | Facility: CLINIC | Age: 29
End: 2024-01-30
Attending: FAMILY MEDICINE
Payer: COMMERCIAL

## 2024-01-30 ENCOUNTER — OFFICE VISIT (OUTPATIENT)
Dept: FAMILY MEDICINE | Facility: CLINIC | Age: 29
End: 2024-01-30
Payer: COMMERCIAL

## 2024-01-30 VITALS
WEIGHT: 293 LBS | HEIGHT: 60 IN | OXYGEN SATURATION: 97 % | SYSTOLIC BLOOD PRESSURE: 122 MMHG | HEART RATE: 71 BPM | TEMPERATURE: 98.1 F | DIASTOLIC BLOOD PRESSURE: 80 MMHG | BODY MASS INDEX: 57.52 KG/M2 | RESPIRATION RATE: 20 BRPM

## 2024-01-30 DIAGNOSIS — E11.65 TYPE 2 DIABETES MELLITUS WITH HYPERGLYCEMIA, WITHOUT LONG-TERM CURRENT USE OF INSULIN (H): Primary | ICD-10-CM

## 2024-01-30 DIAGNOSIS — E78.5 HYPERLIPIDEMIA LDL GOAL <100: ICD-10-CM

## 2024-01-30 DIAGNOSIS — S62.502A CLOSED NONDISPLACED FRACTURE OF PHALANX OF LEFT THUMB, UNSPECIFIED PHALANX, INITIAL ENCOUNTER: ICD-10-CM

## 2024-01-30 DIAGNOSIS — Z11.4 SCREENING FOR HIV (HUMAN IMMUNODEFICIENCY VIRUS): ICD-10-CM

## 2024-01-30 DIAGNOSIS — K52.9 CHRONIC DIARRHEA: ICD-10-CM

## 2024-01-30 DIAGNOSIS — E25.0 CONGENITAL ADRENAL HYPERPLASIA (H): ICD-10-CM

## 2024-01-30 DIAGNOSIS — Z11.59 NEED FOR HEPATITIS C SCREENING TEST: ICD-10-CM

## 2024-01-30 DIAGNOSIS — Z90.49 S/P CHOLECYSTECTOMY: ICD-10-CM

## 2024-01-30 DIAGNOSIS — G56.03 BILATERAL CARPAL TUNNEL SYNDROME: ICD-10-CM

## 2024-01-30 LAB
HBA1C MFR BLD: 8.8 % (ref 0–5.6)
HIV 1+2 AB+HIV1 P24 AG SERPL QL IA: NONREACTIVE

## 2024-01-30 PROCEDURE — 82570 ASSAY OF URINE CREATININE: CPT | Performed by: FAMILY MEDICINE

## 2024-01-30 PROCEDURE — 82043 UR ALBUMIN QUANTITATIVE: CPT | Performed by: FAMILY MEDICINE

## 2024-01-30 PROCEDURE — 90471 IMMUNIZATION ADMIN: CPT | Performed by: FAMILY MEDICINE

## 2024-01-30 PROCEDURE — 83036 HEMOGLOBIN GLYCOSYLATED A1C: CPT | Performed by: FAMILY MEDICINE

## 2024-01-30 PROCEDURE — 99214 OFFICE O/P EST MOD 30 MIN: CPT | Mod: 25 | Performed by: FAMILY MEDICINE

## 2024-01-30 PROCEDURE — 80053 COMPREHEN METABOLIC PANEL: CPT | Performed by: FAMILY MEDICINE

## 2024-01-30 PROCEDURE — 90715 TDAP VACCINE 7 YRS/> IM: CPT | Performed by: FAMILY MEDICINE

## 2024-01-30 PROCEDURE — 84443 ASSAY THYROID STIM HORMONE: CPT | Performed by: FAMILY MEDICINE

## 2024-01-30 PROCEDURE — 80061 LIPID PANEL: CPT | Performed by: FAMILY MEDICINE

## 2024-01-30 PROCEDURE — 73140 X-RAY EXAM OF FINGER(S): CPT | Mod: TC | Performed by: RADIOLOGY

## 2024-01-30 PROCEDURE — 87389 HIV-1 AG W/HIV-1&-2 AB AG IA: CPT | Performed by: FAMILY MEDICINE

## 2024-01-30 PROCEDURE — 86364 TISS TRNSGLTMNASE EA IG CLAS: CPT | Performed by: FAMILY MEDICINE

## 2024-01-30 PROCEDURE — 86803 HEPATITIS C AB TEST: CPT | Performed by: FAMILY MEDICINE

## 2024-01-30 PROCEDURE — 36415 COLL VENOUS BLD VENIPUNCTURE: CPT | Performed by: FAMILY MEDICINE

## 2024-01-30 PROCEDURE — 99207 PR FOOT EXAM NO CHARGE: CPT | Performed by: FAMILY MEDICINE

## 2024-01-30 RX ORDER — CHOLESTYRAMINE LIGHT 4 G/5.7G
4 POWDER, FOR SUSPENSION ORAL
Qty: 270 PACKET | Refills: 0 | Status: SHIPPED | OUTPATIENT
Start: 2024-01-30 | End: 2024-04-30

## 2024-01-30 RX ORDER — ONDANSETRON 4 MG/1
4 TABLET, ORALLY DISINTEGRATING ORAL EVERY 8 HOURS PRN
Qty: 18 TABLET | Refills: 0 | Status: SHIPPED | OUTPATIENT
Start: 2024-01-30 | End: 2024-05-26

## 2024-01-30 RX ORDER — KETOROLAC TROMETHAMINE 30 MG/ML
1 INJECTION, SOLUTION INTRAMUSCULAR; INTRAVENOUS CONTINUOUS
Qty: 1 EACH | Refills: 3 | Status: SHIPPED | OUTPATIENT
Start: 2024-01-30 | End: 2024-04-30

## 2024-01-30 RX ORDER — BLOOD-GLUCOSE SENSOR
1 EACH MISCELLANEOUS DAILY
Qty: 1 EACH | Refills: 4 | Status: SHIPPED | OUTPATIENT
Start: 2024-01-30 | End: 2024-03-19

## 2024-01-30 ASSESSMENT — PAIN SCALES - GENERAL: PAINLEVEL: NO PAIN (0)

## 2024-01-30 NOTE — PROGRESS NOTES
Assessment & Plan     Type 2 diabetes mellitus with hyperglycemia, without long-term current use of insulin (H)  Needs improvement, didn't tolerate Metformin due to GI-side effects.trial of Januvia and Ozempic  if covered by insurance.  - Albumin Random Urine Quantitative with Creat Ratio; Future  - Lipid panel reflex to direct LDL Non-fasting; Future  - HEMOGLOBIN A1C; Future  - Adult Eye  Referral; Future  - FOOT EXAM  - Adult Diabetes Education  Referral; Future  - Adult Endocrinology  Referral; Future  - Comprehensive metabolic panel  - Lipid panel reflex to direct LDL Fasting; Future  - Continuous Blood Gluc  (FREESTYLE AYSE 3 READER) ROSHAN; 1 Application continuous  - Continuous Blood Gluc Sensor (FREESTYLE AYSE 3 SENSOR) MISC; 1 Application daily  - Albumin Random Urine Quantitative with Creat Ratio  - Lipid panel reflex to direct LDL Non-fasting  - HEMOGLOBIN A1C  - sitagliptin (JANUVIA) 25 MG tablet; Take 1 tablet (25 mg) by mouth daily  - Adult Diabetes Education  Referral; Future  - **Hemoglobin A1c FUTURE 3mo; Future  - **Basic metabolic panel FUTURE 2mo; Future  - Basic metabolic panel  (Ca, Cl, CO2, Creat, Gluc, K, Na, BUN); Future    Bilateral carpal tunnel syndrome  - EMG; Future  - Orthopedic  Referral; Future    Chronic diarrhea  - TSH with free T4 reflex; Future  - Colonoscopy Screening  Referral; Future  - Tissue transglutaminase jackelyn IgA and IgG; Future  - TSH with free T4 reflex  - Tissue transglutaminase jackelyn IgA and IgG    Screening for HIV (human immunodeficiency virus)  - HIV Antigen Antibody Combo; Future  - HIV Antigen Antibody Combo    Need for hepatitis C screening test  - Hepatitis C Screen Reflex to HCV RNA Quant and Genotype; Future  - Hepatitis C Screen Reflex to HCV RNA Quant and Genotype    S/P cholecystectomy  - cholestyramine light (PREVALITE) 4 GM powder; Take 1 packet (4 g) by mouth 3 times daily (with meals) for  "90 days    CAH (congenital adrenal hyperplasia)  - ondansetron (ZOFRAN ODT) 4 MG ODT tab; Take 1 tablet (4 mg) by mouth every 8 hours as needed for nausea    Closed nondisplaced fracture of phalanx of left thumb, unspecified phalanx, initial encounter  - XR Finger Left G/E 2 Views; Future    Hyperlipidemia LDL goal <100  - atorvastatin (LIPITOR) 10 MG tablet; Take 1 tablet (10 mg) by mouth daily  - Lipid panel reflex to direct LDL Fasting; Future      MED REC REQUIRED  Post Medication Reconciliation Status: discharge medications reconciled and changed, per note/orders  BMI  Estimated body mass index is 58.28 kg/m  as calculated from the following:    Height as of this encounter: 1.53 m (5' 0.25\").    Weight as of this encounter: 136.5 kg (300 lb 14.4 oz).   Weight management plan: Discussed healthy diet and exercise guidelines      Tanika Kamara is a 29 year old, presenting for the following health issues:  Hospital F/U      1/30/2024    11:08 AM   Additional Questions   Roomed by Estefanía HAMM CMA   Accompanied by self         1/30/2024    11:08 AM   Patient Reported Additional Medications   Patient reports taking the following new medications n/a     HPI       ED/UC Followup:    Facility:  [facility]   Date of visit: 12/14/2023  Reason for visit: Diarrhea   Current Status: improved          New to provider, hasn't seen any provider in years, history of congenital adrenal Hyperplasia hasn't seen endocrinology since 2017/2018, history of uncontrolled type 2 diabetes mellitus  ( hasn't been on her metformin and the patient states she is unaware she was diabetic) presenting with concerns for fatigue and an acute on chronic diarrhea symptoms which started after she had a cholecystectomy and worse since diagnosis of Giardia 12/2023. She denies fever, chills or recent travels. She is a .    -Concerns for bilateral wrist pain, paresthesia, decreased ROM and a left thumb injury after accidental fall " "last year.      Objective    /80   Pulse 71   Temp 98.1  F (36.7  C) (Temporal)   Resp 20   Ht 1.53 m (5' 0.25\")   Wt 136.5 kg (300 lb 14.4 oz)   LMP  (LMP Unknown)   SpO2 97%   BMI 58.28 kg/m    Body mass index is 58.28 kg/m .  Physical Exam   GENERAL: alert and no distress  NECK: no adenopathy, no asymmetry, masses, or scars  RESP: lungs clear to auscultation - no rales, rhonchi or wheezes  CV: regular rate and rhythm, normal S1 S2, no S3 or S4, no murmur, click or rub, no peripheral edema  ABDOMEN: soft, nontender, no hepatosplenomegaly, no masses and bowel sounds normal  MS: no gross musculoskeletal defects noted, no edema  SKIN: Acanthosis Nigricans  Diabetic foot exam: normal DP and PT pulses, no trophic changes or ulcerative lesions, and normal sensory exam  Mallet appearing left thumb, negative Tinel bilateral wrist.    Signed Electronically by: Bita Veronica MD    "

## 2024-01-30 NOTE — PROGRESS NOTES
Prior to immunization administration, verified patients identity using patient s name and date of birth. Please see Immunization Activity for additional information.     Screening Questionnaire for Adult Immunization    Are you sick today?   No   Do you have allergies to medications, food, a vaccine component or latex?   No   Have you ever had a serious reaction after receiving a vaccination?   No   Do you have a long-term health problem with heart, lung, kidney, or metabolic disease (e.g., diabetes), asthma, a blood disorder, no spleen, complement component deficiency, a cochlear implant, or a spinal fluid leak?  Are you on long-term aspirin therapy?   Yes   Do you have cancer, leukemia, HIV/AIDS, or any other immune system problem?   No   Do you have a parent, brother, or sister with an immune system problem?   No   In the past 3 months, have you taken medications that affect  your immune system, such as prednisone, other steroids, or anticancer drugs; drugs for the treatment of rheumatoid arthritis, Crohn s disease, or psoriasis; or have you had radiation treatments?   No   Have you had a seizure, or a brain or other nervous system problem?   No   During the past year, have you received a transfusion of blood or blood    products, or been given immune (gamma) globulin or antiviral drug?   No   For women: Are you pregnant or is there a chance you could become       pregnant during the next month?   No   Have you received any vaccinations in the past 4 weeks?   No     Immunization questionnaire was positive for at least one answer.  Notified [facility] .      Patient instructed to remain in clinic for 15 minutes afterwards, and to report any adverse reactions.     Screening performed by Estefanía Machuca MA on 1/30/2024 at 12:37 PM.

## 2024-01-30 NOTE — PATIENT INSTRUCTIONS
Overview  Diabetes mellitus refers to a group of diseases that affect how the body uses blood sugar (glucose). Glucose is an important source of energy for the cells that make up the muscles and tissues. It's also the brain's main source of fuel.    The main cause of diabetes varies by type. But no matter what type of diabetes you have, it can lead to excess sugar in the blood. Too much sugar in the blood can lead to serious health problems.    Advertisement    [facility] does not endorse companies or products. Advertising revenue supports our not-for-profit mission.    Advertising & Sponsorship  PolicyOpportunitiesAd Choices  Chronic diabetes conditions include type 1 diabetes and type 2 diabetes. Potentially reversible diabetes conditions include prediabetes and gestational diabetes. Prediabetes happens when blood sugar levels are higher than normal. But the blood sugar levels aren't high enough to be called diabetes. And prediabetes can lead to diabetes unless steps are taken to prevent it. Gestational diabetes happens during pregnancy. But it may go away after the baby is born.    Products & Services  A Book: The Essential Diabetes Book  Symptoms  Diabetes symptoms depend on how high your blood sugar is. Some people, especially if they have prediabetes, gestational diabetes or type 2 diabetes, may not have symptoms. In type 1 diabetes, symptoms tend to come on quickly and be more severe.    Some of the symptoms of type 1 diabetes and type 2 diabetes are:    Feeling more thirsty than usual.  Urinating often.  Losing weight without trying.  Presence of ketones in the urine. Ketones are a byproduct of the breakdown of muscle and fat that happens when there's not enough available insulin.  Feeling tired and weak.  Feeling irritable or having other mood changes.  Having blurry vision.  Having slow-healing sores.  Getting a lot of infections, such as gum, skin and vaginal infections.  Type 1 diabetes can start at any  age. But it often starts during childhood or teen years. Type 2 diabetes, the more common type, can develop at any age. Type 2 diabetes is more common in people older than 40. But type 2 diabetes in children is increasing.    When to see a doctor  If you think you or your child may have diabetes. If you notice any possible diabetes symptoms, contact your health care provider. The earlier the condition is diagnosed, the sooner treatment can begin.  If you've already been diagnosed with diabetes. After you receive your diagnosis, you'll need close medical follow-up until your blood sugar levels stabilize.  Request an appointment     From [facility] to your inbox  Sign up for free and stay up to date on research advancements, health tips, current health topics, and expertise on managing health. Click here for an email preview.    Enter your email  EmailLearn more about [facility] s use of data.  Subscribe!  Causes  To understand diabetes, it's important to understand how the body normally uses glucose.    How insulin works  Insulin is a hormone that comes from a gland behind and below the stomach (pancreas).    The pancreas releases insulin into the bloodstream.  The insulin circulates, letting sugar enter the cells.  Insulin lowers the amount of sugar in the bloodstream.  As the blood sugar level drops, so does the secretion of insulin from the pancreas.  The role of glucose  Glucose -- a sugar -- is a source of energy for the cells that make up muscles and other tissues.    Glucose comes from two major sources: food and the liver.  Sugar is absorbed into the bloodstream, where it enters cells with the help of insulin.  The liver stores and makes glucose.  When glucose levels are low, such as when you haven't eaten in a while, the liver breaks down stored glycogen into glucose. This keeps your glucose level within a typical range.  The exact cause of most types of diabetes is unknown. In all cases, sugar builds up  in the bloodstream. This is because the pancreas doesn't produce enough insulin. Both type 1 and type 2 diabetes may be caused by a combination of genetic or environmental factors. It is unclear what those factors may be.    Risk factors  Risk factors for diabetes depend on the type of diabetes. Family history may play a part in all types. Environmental factors and geography can add to the risk of type 1 diabetes.    Sometimes family members of people with type 1 diabetes are tested for the presence of diabetes immune system cells (autoantibodies). If you have these autoantibodies, you have an increased risk of developing type 1 diabetes. But not everyone who has these autoantibodies develops diabetes.    Race or ethnicity also may raise your risk of developing type 2 diabetes. Although it's unclear why, certain people -- including Black, ,  and  American people -- are at higher risk.    Prediabetes, type 2 diabetes and gestational diabetes are more common in people who are overweight or obese.    Complications  Long-term complications of diabetes develop gradually. The longer you have diabetes -- and the less controlled your blood sugar -- the higher the risk of complications. Eventually, diabetes complications may be disabling or even life-threatening. In fact, prediabetes can lead to type 2 diabetes. Possible complications include:    Heart and blood vessel (cardiovascular) disease. Diabetes majorly increases the risk of many heart problems. These can include coronary artery disease with chest pain (angina), heart attack, stroke and narrowing of arteries (atherosclerosis). If you have diabetes, you're more likely to have heart disease or stroke.  Nerve damage from diabetes (diabetic neuropathy). Too much sugar can injure the walls of the tiny blood vessels (capillaries) that nourish the nerves, especially in the legs. This can cause tingling, numbness, burning or pain that usually  begins at the tips of the toes or fingers and gradually spreads upward.    Damage to the nerves related to digestion can cause problems with nausea, vomiting, diarrhea or constipation. For men, it may lead to erectile dysfunction.    Kidney damage from diabetes (diabetic nephropathy). The kidneys hold millions of tiny blood vessel clusters (glomeruli) that filter waste from the blood. Diabetes can damage this delicate filtering system.  Eye damage from diabetes (diabetic retinopathy). Diabetes can damage the blood vessels of the eye. This could lead to blindness.  Foot damage. Nerve damage in the feet or poor blood flow to the feet increases the risk of many foot complications.  Skin and mouth conditions. Diabetes may leave you more prone to skin problems, including bacterial and fungal infections.  Hearing impairment. Hearing problems are more common in people with diabetes.  Alzheimer's disease. Type 2 diabetes may increase the risk of dementia, such as Alzheimer's disease.  Depression related to diabetes. Depression symptoms are common in people with type 1 and type 2 diabetes.  Complications of gestational diabetes  Most women who have gestational diabetes deliver healthy babies. However, untreated or uncontrolled blood sugar levels can cause problems for you and your baby.    Complications in your baby can be caused by gestational diabetes, including:    Excess growth. Extra glucose can cross the placenta. Extra glucose triggers the baby's pancreas to make extra insulin. This can cause your baby to grow too large. It can lead to a difficult birth and sometimes the need for a .  Low blood sugar. Sometimes babies of mothers with gestational diabetes develop low blood sugar (hypoglycemia) shortly after birth. This is because their own insulin production is high.  Type 2 diabetes later in life. Babies of mothers who have gestational diabetes have a higher risk of developing obesity and type 2 diabetes  later in life.  Death. Untreated gestational diabetes can lead to a baby's death either before or shortly after birth.  Complications in the mother also can be caused by gestational diabetes, including:    Preeclampsia. Symptoms of this condition include high blood pressure, too much protein in the urine, and swelling in the legs and feet.  Gestational diabetes. If you had gestational diabetes in one pregnancy, you're more likely to have it again with the next pregnancy.  Prevention  Type 1 diabetes can't be prevented. But the healthy lifestyle choices that help treat prediabetes, type 2 diabetes and gestational diabetes can also help prevent them:    Eat healthy foods. Choose foods lower in fat and calories and higher in fiber. Focus on fruits, vegetables and whole grains. Eat a variety to keep from feeling bored.  Get more physical activity. Try to get about 30 minutes of moderate aerobic activity on most days of the week. Or aim to get at least 150 minutes of moderate aerobic activity a week. For example, take a brisk daily walk. If you can't fit in a long workout, break it up into smaller sessions throughout the day.  Lose excess pounds. If you're overweight, losing even 7% of your body weight can lower the risk of diabetes. For example, if you weigh 200 pounds (90.7 kilograms), losing 14 pounds (6.4 kilograms) can lower the risk of diabetes.    But don't try to lose weight during pregnancy. Talk to your provider about how much weight is healthy for you to gain during pregnancy.    To keep your weight in a healthy range, work on long-term changes to your eating and exercise habits. Remember the benefits of losing weight, such as a healthier heart, more energy and higher self-esteem.    Sometimes drugs are an option. Oral diabetes drugs such as metformin (Glumetza, Fortamet, others) may lower the risk of type 2 diabetes. But healthy lifestyle choices are important. If you have prediabetes, have your blood sugar  checked at least once a year to make sure you haven't developed type 2 diabetes.

## 2024-01-31 LAB
ALBUMIN SERPL BCG-MCNC: 4.6 G/DL (ref 3.5–5.2)
ALP SERPL-CCNC: 67 U/L (ref 40–150)
ALT SERPL W P-5'-P-CCNC: 44 U/L (ref 0–50)
ANION GAP SERPL CALCULATED.3IONS-SCNC: 11 MMOL/L (ref 7–15)
AST SERPL W P-5'-P-CCNC: 23 U/L (ref 0–45)
BILIRUB SERPL-MCNC: 0.2 MG/DL
BUN SERPL-MCNC: 7.8 MG/DL (ref 6–20)
CALCIUM SERPL-MCNC: 9.7 MG/DL (ref 8.6–10)
CHLORIDE SERPL-SCNC: 103 MMOL/L (ref 98–107)
CHOLEST SERPL-MCNC: 222 MG/DL
CREAT SERPL-MCNC: 0.51 MG/DL (ref 0.51–0.95)
CREAT UR-MCNC: 133 MG/DL
DEPRECATED HCO3 PLAS-SCNC: 22 MMOL/L (ref 22–29)
EGFRCR SERPLBLD CKD-EPI 2021: >90 ML/MIN/1.73M2
FASTING STATUS PATIENT QL REPORTED: YES
GLUCOSE SERPL-MCNC: 178 MG/DL (ref 70–99)
HCV AB SERPL QL IA: NONREACTIVE
HDLC SERPL-MCNC: 51 MG/DL
LDLC SERPL CALC-MCNC: 145 MG/DL
MICROALBUMIN UR-MCNC: 24.9 MG/L
MICROALBUMIN/CREAT UR: 18.72 MG/G CR (ref 0–25)
NONHDLC SERPL-MCNC: 171 MG/DL
POTASSIUM SERPL-SCNC: 4.6 MMOL/L (ref 3.4–5.3)
PROT SERPL-MCNC: 8 G/DL (ref 6.4–8.3)
SODIUM SERPL-SCNC: 136 MMOL/L (ref 135–145)
TRIGL SERPL-MCNC: 132 MG/DL
TSH SERPL DL<=0.005 MIU/L-ACNC: 1.33 UIU/ML (ref 0.3–4.2)

## 2024-01-31 RX ORDER — ATORVASTATIN CALCIUM 10 MG/1
10 TABLET, FILM COATED ORAL DAILY
Qty: 90 TABLET | Refills: 0 | Status: SHIPPED | OUTPATIENT
Start: 2024-01-31 | End: 2024-04-30

## 2024-01-31 NOTE — RESULT ENCOUNTER NOTE
Please result letter.   -LDL(bad) cholesterol level is elevated which can increase your heart disease risk.  A diet high in fat and simple carbohydrates, genetics and being overweight can contribute to this. ADVISE: exercising 150 minutes of aerobic exercise per week (30 minutes for 5 days per week or 50 minutes for 3 days per week are options) and eating a low saturated fat/low carbohydrate diet are helpful to improve this. Current guidelines from the American Heart Association support starting a cholesterol lowering medication to lower your heart and stroke disease risk.  I am sending a prescription to your pharmacy: atorvastatin(Lipitor) 10 mg each evening.  You can call your pharmacy to let them know you would like your prescription filled and if you have concerns about this recommendation then please contact me.  -Liver and gallbladder tests (ALT,AST, Alk phos,bilirubin) are normal.  -Kidney function (GFR) is normal.  -Sodium is normal.  -Potassium is normal.  -Calcium is normal.  -Glucose is elevated due to your diabetes.  -A1C test (average blood sugar the last 2-3 months) is above your goal.   ADVISE:you should recheck your A1C in 3 months. Keep appointment with diabetic educator, connect back with endocrinology and start your new medications.   -TSH (thyroid stimulating hormone) level is normal which indicates normal thyroid function.  -Hepatitis C antibody screen test shows no signs of a previous hepatitis C infection.  -HIV test is normal.  -Microalbumin (urine protein) test is normal.  ADVISE: rechecking this annually.  For additional lab test information, labtestsonline.org is an excellent reference.

## 2024-02-01 ENCOUNTER — TELEPHONE (OUTPATIENT)
Dept: FAMILY MEDICINE | Facility: CLINIC | Age: 29
End: 2024-02-01
Payer: COMMERCIAL

## 2024-02-01 DIAGNOSIS — E11.65 TYPE 2 DIABETES MELLITUS WITH HYPERGLYCEMIA, WITHOUT LONG-TERM CURRENT USE OF INSULIN (H): Primary | ICD-10-CM

## 2024-02-01 LAB
TTG IGA SER-ACNC: 0.6 U/ML
TTG IGG SER-ACNC: 0.9 U/ML

## 2024-02-01 RX ORDER — PEN NEEDLE, DIABETIC 32GX 5/32"
NEEDLE, DISPOSABLE MISCELLANEOUS
Qty: 100 EACH | Refills: 3 | Status: SHIPPED | OUTPATIENT
Start: 2024-02-01 | End: 2024-02-12

## 2024-02-01 RX ORDER — LIRAGLUTIDE 6 MG/ML
INJECTION SUBCUTANEOUS
Qty: 3 ML | Refills: 3 | Status: SHIPPED | OUTPATIENT
Start: 2024-02-01 | End: 2024-02-12

## 2024-02-01 RX ORDER — GLUCOSAMINE HCL/CHONDROITIN SU 500-400 MG
CAPSULE ORAL
Qty: 100 EACH | Refills: 3 | Status: SHIPPED | OUTPATIENT
Start: 2024-02-01 | End: 2024-02-12

## 2024-02-01 NOTE — TELEPHONE ENCOUNTER
Retail Pharmacy Prior Authorization Team   Phone: [phone]        PRIOR AUTHORIZATION DENIED    Medication: OZEMPIC (0.25 OR 0.5 MG/DOSE) [facility]  Insurance Company: [employer] - Phone [phone] Fax [phone]  Denial Date: 1/31/2024  Denial Reason(s): TRIAL AND FAILURE FROM THE PREFERRED MEDICATIONS, PATIENT NEEDS TO TRY AT LEAST TWO PREFERRED FROM THE LIST BELOW:            Appeal Information:         Patient Notified: No.Please note: Providers/Clinics are to notify the patients of the denial outcomes and steps going forward.

## 2024-02-12 DIAGNOSIS — E11.65 TYPE 2 DIABETES MELLITUS WITH HYPERGLYCEMIA, WITHOUT LONG-TERM CURRENT USE OF INSULIN (H): ICD-10-CM

## 2024-02-12 NOTE — TELEPHONE ENCOUNTER
"  General Call      Reason for Call:  Pt has a few different needs today.     What are your questions or concerns:      Victoza - Medication has been approved by insurance but was sent along with the pen needles and alcohol swabs to [facility]. Pt states she can't get a ride there in time before they close and would like these moved to [facility]. (All pended)    Freestyle Jm - sensor fell off and pt needs to wait (per insurance) until the next one is due to be put on, they will not give her an extra.     Pt would like a gluose meter, lancets and test strips sent to [facility] for times like this as she does not have one and has no way to check her blood sugar right now.     AVS - Pt states she did not get her \"paperwork\" after her visit with the doctors thoughts and directions. I have when over it via the phone and sent a copy in the mail today for her.     Date of last appointment with provider: 1/30/24    Okay to leave a detailed message?: Yes at Cell number on file:    Telephone Information:   Mobile [phone]       "

## 2024-02-13 RX ORDER — PEN NEEDLE, DIABETIC 32GX 5/32"
NEEDLE, DISPOSABLE MISCELLANEOUS
Qty: 100 EACH | Refills: 3 | Status: SHIPPED | OUTPATIENT
Start: 2024-02-13

## 2024-02-13 RX ORDER — GLUCOSAMINE HCL/CHONDROITIN SU 500-400 MG
CAPSULE ORAL
Qty: 100 EACH | Refills: 3 | Status: SHIPPED | OUTPATIENT
Start: 2024-02-13

## 2024-02-13 RX ORDER — LIRAGLUTIDE 6 MG/ML
INJECTION SUBCUTANEOUS
Qty: 3 ML | Refills: 3 | Status: SHIPPED | OUTPATIENT
Start: 2024-02-13 | End: 2024-04-30

## 2024-02-14 ENCOUNTER — TELEPHONE (OUTPATIENT)
Dept: FAMILY MEDICINE | Facility: CLINIC | Age: 29
End: 2024-02-14
Payer: COMMERCIAL

## 2024-02-14 NOTE — TELEPHONE ENCOUNTER
Patient called to check to see which pharmacy her Victoza was sent too. RN reviewed chart and [facility] received RX 2/13/24 0684    Vane Sharma RN  [facility] - Registered Nurse  Clinic Triage Wei   February 14, 2024

## 2024-02-26 ENCOUNTER — VIRTUAL VISIT (OUTPATIENT)
Dept: FAMILY MEDICINE | Facility: CLINIC | Age: 29
End: 2024-02-26
Payer: COMMERCIAL

## 2024-02-26 DIAGNOSIS — G44.52 NEW PERSISTENT DAILY HEADACHE: Primary | ICD-10-CM

## 2024-02-26 DIAGNOSIS — E11.65 TYPE 2 DIABETES MELLITUS WITH HYPERGLYCEMIA, WITHOUT LONG-TERM CURRENT USE OF INSULIN (H): ICD-10-CM

## 2024-02-26 PROCEDURE — 99442 PR PHYSICIAN TELEPHONE EVALUATION 11-20 MIN: CPT | Mod: 93 | Performed by: PHYSICIAN ASSISTANT

## 2024-02-26 RX ORDER — DIAZEPAM 10 MG
10 TABLET ORAL AT BEDTIME
Qty: 7 TABLET | Refills: 0 | Status: SHIPPED | OUTPATIENT
Start: 2024-02-26 | End: 2024-03-04

## 2024-02-26 RX ORDER — DICLOFENAC SODIUM 75 MG/1
75 TABLET, DELAYED RELEASE ORAL 2 TIMES DAILY
Qty: 14 TABLET | Refills: 0 | Status: SHIPPED | OUTPATIENT
Start: 2024-02-26 | End: 2024-03-11 | Stop reason: SINTOL

## 2024-02-26 ASSESSMENT — ENCOUNTER SYMPTOMS: HEADACHES: 1

## 2024-02-26 NOTE — PROGRESS NOTES
Anh is a 29 year old who is being evaluated via a billable telephone visit.      What phone number would you like to be contacted at? [phone]  How would you like to obtain your AVS? [place]  Phone call duration: 15 minutes   Originating Location (pt. Location): Home    Distant Location (provider location):  On-site    Assessment & Plan       ICD-10-CM    1. New persistent daily headache  G44.52 diclofenac (VOLTAREN) 75 MG EC tablet     diazepam (VALIUM) 10 MG tablet      2. Type 2 diabetes mellitus with hyperglycemia, without long-term current use of insulin (H)  E11.65           1) Will try to break her headache cycle with diclofenac BID x7 days and Valium nightly x7 days. Follow up if symptoms fail to improve or worsen. Could obtain a brain MRI if symptoms don't improve.     2) She feels her headaches spike when her blood sugars spike. I let her know it can take 2-4 weeks for Victoza to become effective and even more time to see blood sugars come down.     Prescription drug management    Return in about 2 weeks (around 3/11/2024), or if symptoms worsen or fail to improve.        Subjective   Anh is a 29 year old, presenting for the following health issues:  Headache        2/26/2024     4:33 PM   Additional Questions   Roomed by Ximena over the phone   Accompanied by damaris         2/26/2024     4:33 PM   Patient Reported Additional Medications   Patient reports taking the following new medications none     History of Present Illness       She eats 2-3 servings of fruits and vegetables daily.She consumes 0 sweetened beverage(s) daily.She exercises with enough effort to increase her heart rate 9 or less minutes per day.  She exercises with enough effort to increase her heart rate 3 or less days per week.   She is taking medications regularly.     Has had a daily headache x1 month, every day  Doesn't feel like she has a sinus infection  Same stress level  No different in muscle tightness  Sleep: that's all  she's been doing  Some dizziness, eye pain, nausea    Using Advil and Tylenol for headache   Hasn't taken anything today - OTC meds not working    Sugars up to 250-300 when dizzy  Drinking 1 sugar free Powerade and 5 bottles of water per day              Objective           Vitals:  No vitals were obtained today due to virtual visit.    Physical Exam   General: Alert and no distress //Respiratory: No audible wheeze, cough, or shortness of breath // Psychiatric:  Appropriate affect, tone, and pace of words            Phone call duration: 15 minutes  Signed Electronically by: Christa Munoz PA-C

## 2024-03-05 ENCOUNTER — ANCILLARY PROCEDURE (OUTPATIENT)
Dept: GENERAL RADIOLOGY | Facility: CLINIC | Age: 29
End: 2024-03-05
Attending: FAMILY MEDICINE
Payer: MEDICAID

## 2024-03-05 ENCOUNTER — OFFICE VISIT (OUTPATIENT)
Dept: ORTHOPEDICS | Facility: CLINIC | Age: 29
End: 2024-03-05
Payer: MEDICAID

## 2024-03-05 ENCOUNTER — ALLIED HEALTH/NURSE VISIT (OUTPATIENT)
Dept: EDUCATION SERVICES | Facility: CLINIC | Age: 29
End: 2024-03-05
Attending: FAMILY MEDICINE
Payer: MEDICAID

## 2024-03-05 DIAGNOSIS — G56.03 BILATERAL CARPAL TUNNEL SYNDROME: Primary | ICD-10-CM

## 2024-03-05 DIAGNOSIS — G89.29: ICD-10-CM

## 2024-03-05 DIAGNOSIS — E11.65 TYPE 2 DIABETES MELLITUS WITH HYPERGLYCEMIA, WITHOUT LONG-TERM CURRENT USE OF INSULIN (H): ICD-10-CM

## 2024-03-05 DIAGNOSIS — M25.539: ICD-10-CM

## 2024-03-05 PROCEDURE — G0108 DIAB MANAGE TRN  PER INDIV: HCPCS

## 2024-03-05 PROCEDURE — 73130 X-RAY EXAM OF HAND: CPT | Mod: LT | Performed by: STUDENT IN AN ORGANIZED HEALTH CARE EDUCATION/TRAINING PROGRAM

## 2024-03-05 PROCEDURE — 99204 OFFICE O/P NEW MOD 45 MIN: CPT | Performed by: FAMILY MEDICINE

## 2024-03-05 RX ORDER — PREDNISONE 20 MG/1
40 TABLET ORAL DAILY
Qty: 10 TABLET | Refills: 0 | Status: SHIPPED | OUTPATIENT
Start: 2024-03-05 | End: 2024-03-11

## 2024-03-05 NOTE — PROGRESS NOTES
Diabetes Self-Management Education & Support  Anh Flores presents today for education related to Type 2 diabetes    Patient is being treated with:  Oral Agents and GLP-1 Agonist  She is accompanied by self    Year of diagnosis: 2019  Referring provider:  Glenys  Living Situation: Home with mom, sister, niece, nephew  Employment: [employer]    PATIENT CONCERNS/REASON FOR REFERRAL   Headaches    ASSESSMENT:    Taking Medication:     Current Diabetes Management per Patient:  Taking diabetes medications? yes:     Diabetes Medication(s)       Dipeptidyl Peptidase-4 (DPP-4) Inhibitors       sitagliptin (JANUVIA) 25 MG tablet Take 1 tablet (25 mg) by mouth daily       Incretin Mimetic Agents       liraglutide (VICTOZA) 18 MG/3ML solution Inject 0.6 mg Subcutaneous daily for 7 days, THEN 1.2 mg daily for 14 days, THEN 1.8 mg daily for 90 days.            Monitoring  Patient glucose self monitoring as follows: continuously using a continuous glucose monitor (CGM)  CGM: Freestyle Mj 3  BG results:          Patient's most recent   Lab Results   Component Value Date    A1C 8.8 01/30/2024    A1C 7.6 01/18/2019      Patient's A1C goal: <7.0    Activity: Walks dogs, almost every day 15-20 minutes    Healthy Eating:   Patient currently eats 1-2 meals 1 snacks per day   Lunch 2:30pm: Soup, leftovers  Dinner: [place], vegetable, rice, homemade chicken noodle/chicken tortilla soup  Snacks: Pretzels, carrots, celery, tortilla chips with salsa  Zero sugar powerade, crystal light throughout day    Problem Solving:    Patient is at risk of hypoglycemia?: Rare, shaky, headache, cold feeling.   Hospitalizations for hyper or hypoglycemia: No    Healthy Coping and Stress Management:   Sources of stress identified by patient: My health  Coping mechanisms identified by patient:  Other (please specify):  Dog shows      EDUCATION and INSTRUCTION PROVIDED AT THIS VISIT:    T2DM seen for comprehensive review at the request of   Glenys. She was officially diagnosed with T2 in 2019 with A1C of 7.6; reports no one ever informed her of diagnosis. Recent A1C=8.8. She was started on Victoza and Januvia. Reports she was previously on Metformin for excessive hair growth but had to discontinue due to diarrhea. She is currently taking Victoza 1.2mg; does endorse nausea and Januvia. Insurance denied Ozempic. Current symptoms include headache and fatigue. S/P Lap Tawnya at age 11; since then is unable to eat most foods without causing diarrhea. Has not seen GI since teenager. 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 Jm TIR 67%, TAR >180 22%, TAR >250 11%, with no lows. Blood sugars were in range up until a week ago. Pattern of late/evening highs likely contributed to food; typically only eats dinner. She will be starting Prednisone for carpal tunnel. Reminded patient numbers will likely run high while on steroid and to contact me if consistently >250. Continue current regimen. Consider changing from Victoza to Mounjaro. Follow up in April. Strongly encouraged to re-establish care with [facility] and/or complete testing ordered by PCP as chronic diarrhea has greatly impacted quality of life.     Topics Reviewed:  Pathophysiology and progression of diagnosis  Target blood sugar goals  Effect that carbohydrates has on blood sugar  Impact steroid, illness, stress has on blood sugar    Patient-stated goal written and given to Anh Flores.  Verbalized and demonstrated understanding of instructions.   See patient instructions  AVS printed and given to patient-via My Chart    PLAN:  *No changes to medications  *Please let me know if nausea worsens after increasing Victoza dose  *Please let me know if blood sugars are consistently >250 while on steroids  *Fasting blood sugar goal   *Please establish care with GI    FOLLOW-UP:    *4/5 with Marcella at 9am    Time spent  with patient at today's visit was 70 minutes.      Marcella Wells RN, Diabetes Educator  Diabetes Education Department  [facility], [place]  Phone: [phone]  [phone]    Any diabetes medication dose changes were made via the CDE Protocol and Collaborative Practice Agreement with [facility].  A copy of this encounter was provided to patient's referring provider.

## 2024-03-05 NOTE — PATIENT INSTRUCTIONS
PLAN:  *No changes to medications  *Please let me know if nausea worsens after increasing Victoza dose  *Please let me know if blood sugars are consistently >250 while on steroids  *Fasting blood sugar goal   *Please establish care with GI    FOLLOW-UP:    *4/5 with Marcella at 9am

## 2024-03-05 NOTE — PROGRESS NOTES
CHIEF COMPLAINT:  Consult (Bilateral hand pain. Pt is a  and has numbness and tingling into hands and fingers. Has an EMG scheduled for next month. Has a history of carpal tunnel. Has had injections in past with no relief. 2 weeks ago fell in middle of night and hit right hand. XR-03/05/2024)       HISTORY OF PRESENT ILLNESS  Ms. Flores is a pleasant 29 year old female who presents to clinic today with numbness and tingling in her hands and fingers.  Anh has had symptoms in her hands for the past number of years, she has tried braces and has had injections in the past.  Unfortunately the injections did not help her much at all.  Her relief has been intermittent at best.  A couple weeks ago she fell in the middle of the night and struck her right hand.        Additional history: as documented    MEDICAL HISTORY  Patient Active Problem List   Diagnosis    CAH (congenital adrenal hyperplasia)    Chronic abdominal pain    Vitamin D deficiency    Left thigh pain    Chronic headaches    Morbid obesity (H)    Hidradenitis suppurativa    Morbid obesity with body mass index of 60.0-69.9 in adult (H)    Elevated BP without diagnosis of hypertension    Diabetes mellitus, type 2 (H)       Current Outpatient Medications   Medication Sig Dispense Refill    alcohol swab prep pads Use to swab area of injection/anthony as directed. 100 each 3    atorvastatin (LIPITOR) 10 MG tablet Take 1 tablet (10 mg) by mouth daily 90 tablet 0    cholestyramine light (PREVALITE) 4 GM powder Take 1 packet (4 g) by mouth 3 times daily (with meals) for 90 days 270 packet 0    Continuous Blood Gluc  (FREESTYLE AYSE 3 READER) ROSHAN 1 Application continuous 1 each 3    Continuous Blood Gluc Sensor (FREESTYLE AYSE 3 SENSOR) MISC 1 Application daily 1 each 4    diclofenac (VOLTAREN) 75 MG EC tablet Take 1 tablet (75 mg) by mouth 2 times daily for 7 days 14 tablet 0    insulin pen needle (ULTICARE MICRO) 32G X 4 MM miscellaneous Use 1  pen needles daily or as directed. 100 each 3    liraglutide (VICTOZA) 18 MG/3ML solution Inject 0.6 mg Subcutaneous daily for 7 days, THEN 1.2 mg daily for 14 days, THEN 1.8 mg daily for 90 days. 3 mL 3    ondansetron (ZOFRAN ODT) 4 MG ODT tab Take 1 tablet (4 mg) by mouth every 8 hours as needed for nausea 18 tablet 0    sitagliptin (JANUVIA) 25 MG tablet Take 1 tablet (25 mg) by mouth daily 90 tablet 0       Allergies   Allergen Reactions    Other Environmental Allergy        Family History   Problem Relation Age of Onset    Neurologic Disorder Sister 16        migraines    Cerebrovascular Disease No family hx of     Alzheimer Disease No family hx of        Additional medical/Social/Surgical histories reviewed in EPIC and updated as appropriate.        PHYSICAL EXAM  General  - normal appearance, in no obvious distress    Musculoskeletal - right and left hand  - inspection: normal joint alignment, no swelling  - palpation: no bony or soft tissue tenderness  - ROM:  90 deg flexion   70 deg extension   25 deg abduction   65 deg adduction  - strength: 4/5  strength bilaterally  - special tests:  (+) Tinel's bilaterally  (+) Phalen bilaterally  Neuro  - some thenar numbness bilaterally, no motor deficit, grossly normal coordination, normal muscle tone  Skin  - no ecchymosis, erythema, warmth, or induration, no obvious rash             ASSESSMENT & PLAN  Ms. Flores is a 29 year old female who presents to clinic today with bilateral hand numbness and tingling.    I ordered and independently reviewed an xray of her wrists, this reveals no obvious acute or chronic issues.    We discussed her symptoms at length, these are consistent with carpal tunnel tunnel although it is difficult to discount a potential cervical radiculopathy.    We discussed the utility of a cervical spine workup, we could consider this if her EMG indicates - she is getting her EMG next month.    It was a pleasure seeing Anh today.    Dre  DO Pal, CHRISTINM  Primary Care Sports Medicine      This note was constructed using Dragon dictation software, please excuse any minor errors in spelling, grammar, or syntax.

## 2024-03-05 NOTE — LETTER
3/5/2024         RE: Anh Flores  [address] [facility] [address]        Dear Colleague,    Thank you for referring your patient, Anh Flores, to the [facility] [place]. Please see a copy of my visit note below.    CHIEF COMPLAINT:  Consult (Bilateral hand pain. Pt is a  and has numbness and tingling into hands and fingers. Has an EMG scheduled for next month. Has a history of carpal tunnel. Has had injections in past with no relief. 2 weeks ago fell in middle of night and hit right hand. XR-03/05/2024)       HISTORY OF PRESENT ILLNESS  Ms. Flores is a pleasant 29 year old female who presents to clinic today with numbness and tingling in her hands and fingers.  Anh has had symptoms in her hands for the past number of years, she has tried braces and has had injections in the past.  Unfortunately the injections did not help her much at all.  Her relief has been intermittent at best.  A couple weeks ago she fell in the middle of the night and struck her right hand.        Additional history: as documented    MEDICAL HISTORY  Patient Active Problem List   Diagnosis     CAH (congenital adrenal hyperplasia)     Chronic abdominal pain     Vitamin D deficiency     Left thigh pain     Chronic headaches     Morbid obesity (H)     Hidradenitis suppurativa     Morbid obesity with body mass index of 60.0-69.9 in adult (H)     Elevated BP without diagnosis of hypertension     Diabetes mellitus, type 2 (H)       Current Outpatient Medications   Medication Sig Dispense Refill     alcohol swab prep pads Use to swab area of injection/anthony as directed. 100 each 3     atorvastatin (LIPITOR) 10 MG tablet Take 1 tablet (10 mg) by mouth daily 90 tablet 0     cholestyramine light (PREVALITE) 4 GM powder Take 1 packet (4 g) by mouth 3 times daily (with meals) for 90 days 270 packet 0     Continuous Blood Gluc  (FREESTYLE AYSE 3 READER) ROSHAN 1 Application continuous 1  each 3     Continuous Blood Gluc Sensor (FREESTYLE AYSE 3 SENSOR) MISC 1 Application daily 1 each 4     diclofenac (VOLTAREN) 75 MG EC tablet Take 1 tablet (75 mg) by mouth 2 times daily for 7 days 14 tablet 0     insulin pen needle (ULTICARE MICRO) 32G X 4 MM miscellaneous Use 1 pen needles daily or as directed. 100 each 3     liraglutide (VICTOZA) 18 MG/3ML solution Inject 0.6 mg Subcutaneous daily for 7 days, THEN 1.2 mg daily for 14 days, THEN 1.8 mg daily for 90 days. 3 mL 3     ondansetron (ZOFRAN ODT) 4 MG ODT tab Take 1 tablet (4 mg) by mouth every 8 hours as needed for nausea 18 tablet 0     sitagliptin (JANUVIA) 25 MG tablet Take 1 tablet (25 mg) by mouth daily 90 tablet 0       Allergies   Allergen Reactions     Other Environmental Allergy        Family History   Problem Relation Age of Onset     Neurologic Disorder Sister 16        migraines     Cerebrovascular Disease No family hx of      Alzheimer Disease No family hx of        Additional medical/Social/Surgical histories reviewed in EPIC and updated as appropriate.        PHYSICAL EXAM  General  - normal appearance, in no obvious distress    Musculoskeletal - right and left hand  - inspection: normal joint alignment, no swelling  - palpation: no bony or soft tissue tenderness  - ROM:  90 deg flexion   70 deg extension   25 deg abduction   65 deg adduction  - strength: 4/5  strength bilaterally  - special tests:  (+) Tinel's bilaterally  (+) Phalen bilaterally  Neuro  - some thenar numbness bilaterally, no motor deficit, grossly normal coordination, normal muscle tone  Skin  - no ecchymosis, erythema, warmth, or induration, no obvious rash             ASSESSMENT & PLAN  Ms. Flores is a 29 year old female who presents to clinic today with bilateral hand numbness and tingling.    I ordered and independently reviewed an xray of her wrists, this reveals no obvious acute or chronic issues.    We discussed her symptoms at length, these are consistent  with carpal tunnel tunnel although it is difficult to discount a potential cervical radiculopathy.    We discussed the utility of a cervical spine workup, we could consider this if her EMG indicates - she is getting her EMG next month.    It was a pleasure seeing Anh today.    Dre Aguillon DO, [facility]  Primary Care Sports Medicine      This note was constructed using Dragon dictation software, please excuse any minor errors in spelling, grammar, or syntax.      Again, thank you for allowing me to participate in the care of your patient.        Sincerely,        Dre Aguillon DO

## 2024-03-07 ENCOUNTER — TELEPHONE (OUTPATIENT)
Dept: FAMILY MEDICINE | Facility: CLINIC | Age: 29
End: 2024-03-07
Payer: MEDICAID

## 2024-03-07 DIAGNOSIS — K52.9 CHRONIC DIARRHEA: Primary | ICD-10-CM

## 2024-03-07 DIAGNOSIS — Z90.49 S/P CHOLECYSTECTOMY: ICD-10-CM

## 2024-03-07 NOTE — TELEPHONE ENCOUNTER
Patient reports she saw Diabetic Educator on Tuesday.     Patient report she was advised by PCP to schedule a colonoscopy/ also received call to scheduled colonoscopy.    During diabetic education visit she was advised to meet with GI. Patient called to schedule with GI but was told she needed a GI consultation referral to be placed.     Patient requesting GI referral be placed and sent to: GI within [place] [phone]    Please advise patient if referral is able to be placed or if unable to place.     DOMINICK RonN, RN  [facility] ~ Registered Nurse  Clinic Triage ~ [place] [facility] [place]  March 7, 2024

## 2024-03-07 NOTE — TELEPHONE ENCOUNTER
Referral placed, the patient would also needs to reconnect with endocrinology. A referral was placed at her appointment in January.   Bita Veronica MD on 3/7/2024 at 1:33 PM

## 2024-03-11 ENCOUNTER — OFFICE VISIT (OUTPATIENT)
Dept: FAMILY MEDICINE | Facility: CLINIC | Age: 29
End: 2024-03-11
Payer: MEDICAID

## 2024-03-11 VITALS
HEART RATE: 72 BPM | WEIGHT: 293 LBS | RESPIRATION RATE: 22 BRPM | HEIGHT: 60 IN | TEMPERATURE: 97.6 F | SYSTOLIC BLOOD PRESSURE: 123 MMHG | BODY MASS INDEX: 57.52 KG/M2 | OXYGEN SATURATION: 97 % | DIASTOLIC BLOOD PRESSURE: 80 MMHG

## 2024-03-11 DIAGNOSIS — R51.9 CHRONIC INTRACTABLE HEADACHE, UNSPECIFIED HEADACHE TYPE: Primary | ICD-10-CM

## 2024-03-11 DIAGNOSIS — E11.65 TYPE 2 DIABETES MELLITUS WITH HYPERGLYCEMIA, WITHOUT LONG-TERM CURRENT USE OF INSULIN (H): ICD-10-CM

## 2024-03-11 DIAGNOSIS — G89.29 CHRONIC INTRACTABLE HEADACHE, UNSPECIFIED HEADACHE TYPE: Primary | ICD-10-CM

## 2024-03-11 DIAGNOSIS — G43.911 INTRACTABLE MIGRAINE WITH STATUS MIGRAINOSUS, UNSPECIFIED MIGRAINE TYPE: ICD-10-CM

## 2024-03-11 PROCEDURE — 99214 OFFICE O/P EST MOD 30 MIN: CPT | Mod: 25 | Performed by: FAMILY MEDICINE

## 2024-03-11 PROCEDURE — 96372 THER/PROPH/DIAG INJ SC/IM: CPT | Performed by: FAMILY MEDICINE

## 2024-03-11 RX ORDER — SUMATRIPTAN 50 MG/1
50 TABLET, FILM COATED ORAL
Qty: 8 TABLET | Refills: 0 | Status: SHIPPED | OUTPATIENT
Start: 2024-03-11 | End: 2024-05-26

## 2024-03-11 RX ORDER — TOPIRAMATE 25 MG/1
25 TABLET, FILM COATED ORAL AT BEDTIME
Qty: 60 TABLET | Refills: 1 | Status: SHIPPED | OUTPATIENT
Start: 2024-03-11 | End: 2024-04-30

## 2024-03-11 RX ORDER — KETOROLAC TROMETHAMINE 30 MG/ML
60 INJECTION, SOLUTION INTRAMUSCULAR; INTRAVENOUS ONCE
Status: COMPLETED | OUTPATIENT
Start: 2024-03-11 | End: 2024-03-11

## 2024-03-11 RX ADMIN — KETOROLAC TROMETHAMINE 60 MG: 30 INJECTION, SOLUTION INTRAMUSCULAR; INTRAVENOUS at 08:48

## 2024-03-11 ASSESSMENT — ENCOUNTER SYMPTOMS: HEADACHES: 1

## 2024-03-11 NOTE — PROGRESS NOTES
Patient was given 60mg Toradol. Prior to medication administration, verified patient's identity using patient s name and date of birth. Please see MAR and medication order for additional information. Patient instructed to report any adverse reaction to staff immediately.    Vial/Syringe: Single dose vial. Was entire vial of medication used? Yes    Breanne Fernández MA on 3/11/2024 at 8:49 AM     44

## 2024-03-11 NOTE — PATIENT INSTRUCTIONS
Shot today for headache symptoms    Begin Topamax at night 1 pill at night for 1 week then 2 pills at night.  Imitrex can be used in future if headache recurs.      Follow up with primary as scheduled in April.      MRI recommended -  You can call [phone] to schedule this at the [facility] building in [place]

## 2024-03-11 NOTE — PROGRESS NOTES
Assessment & Plan     Chronic intractable headache, unspecified headache type  -migraine-like  Intractable migraine with status migrainosus, unspecified migraine type  Due to significant headache she is noting, a Toradol injection was given today in the office.  Hopefully this will break the current cycle of her headache pain.  In addition due to the migraine-like quality of pain, I have recommended that we start a dose of Topamax 25 mg at that time with the increase to 50 mg after 1 week.  Hopefully that will prevent future headaches for her.  Trial sumatriptan is also given to take at the onset of headache.  Due to the intractable nature of her current symptoms, I recommended imaging and an MRI is ordered.  - ketorolac (TORADOL) injection 60 mg  - topiramate (TOPAMAX) 25 MG tablet; Take 1 tablet (25 mg) by mouth at bedtime For 1 week then 2 pills at night.  - MR Brain w/o Contrast; Future  - SUMAtriptan (IMITREX) 50 MG tablet; Take 1 tablet (50 mg) by mouth at onset of headache for migraine May repeat in 2 hours. Max 4 tablets/24 hours.      Type 2 diabetes with hyperglycemia  Reportedly did not tolerate metformin previously.  Is currently taking Victoza and Januvia.  Will be due for the repeat A1c later this month.  Has appointment with diabetic education in 3 weeks.            Patient Instructions   Shot today for headache symptoms    Begin Topamax at night 1 pill at night for 1 week then 2 pills at night.  Imitrex can be used in future if headache recurs.      Follow up with primary as scheduled in April.      MRI recommended -  You can call [phone] to schedule this at the [facility] building in [place]       Tanika Kamara is a 29 year old, presenting for the following health issues:  Headache        3/11/2024     8:10 AM   Additional Questions   Roomed by Aide   Accompanied by Mother     Headache     History of Present Illness       Headaches:   Since the patient's last clinic visit, headaches  "are: no change  The patient is getting headaches:  All day long  She is not able to do normal daily activities when she has a migraine.  The patient is taking the following rescue/relief medications:  No rescue/relief medications   Patient states \"I get only a small amount of relief\" from the rescue/relief medications.   The patient is taking the following medications to prevent migraines:  No medications to prevent migraines  In the past 4 weeks, the patient has gone to an Urgent Care or Emergency Room 0 times times due to headaches.    She eats 2-3 servings of fruits and vegetables daily.She consumes 0 sweetened beverage(s) daily.She exercises with enough effort to increase her heart rate 30 to 60 minutes per day.    She is taking medications regularly.       Headache off and on through December and January.  Got cryptosporidium from dog show around the time the headaches started.  (Bowel function back to normal now for her)   Headache has been present daily since 1/30/24.  Not using OTC Medications.  Left eye pain, +photophobia.  Poor sleep.  Unable to fall asleep at times.      Seen on 2/26/2024 for the headaches and was given voltaren which she took twice a day for a few days but it did not help symptoms and cause side effects of dizzy and nausea.  Because of this she stopped the medication.  Was also given Valium which caused sedation to allowed sleep but no headache relief.  Some carryover until morning with the sedation symptoms.  Current pain 8-9/10            Review of Systems  Constitutional, HEENT, cardiovascular, pulmonary, gi and gu systems are negative, except as otherwise noted.      Objective    /80 (BP Location: Right arm, Patient Position: Sitting, Cuff Size: Adult Large)   Pulse 72   Temp 97.6  F (36.4  C) (Oral)   Resp 22   Ht 1.53 m (5' 0.24\")   Wt 136.2 kg (300 lb 3.2 oz)   LMP  (LMP Unknown)   SpO2 97%   BMI 58.17 kg/m    Body mass index is 58.17 kg/m .  Physical Exam   GENERAL: " alert, no distress, and obese  EYES: Eyes grossly normal to inspection, PERRL and conjunctivae and sclerae normal  HENT: ear canals and TM's normal, nose and mouth without ulcers or lesions  NECK: no adenopathy, no asymmetry, masses, or scars  RESP: lungs clear to auscultation - no rales, rhonchi or wheezes  CV: regular rate and rhythm, normal S1 S2, no S3 or S4, no murmur, click or rub, no peripheral edema  ABDOMEN: soft, nontender, no hepatosplenomegaly, no masses and bowel sounds normal  MS: no gross musculoskeletal defects noted, no edema  NEURO: Normal strength and tone, sensory exam grossly normal, mentation intact, speech normal, cranial nerves 2-12 intact, DTR's normal and symmetric throughout, gait normal including heel/toe/tandem walking, and Romberg normal  BACK: no CVA tenderness, no paralumbar tenderness  PSYCH: mentation appears normal, affect normal/bright          Component      Latest Ref Rng 1/30/2024  12:51 PM   Hemoglobin A1C      0.0 - 5.6 % 8.8 (H)       Legend:  (H) High    Signed Electronically by: Lurdes Gunderson MD          This chart was documented by provider using a voice activated software called Dragon in addition to manual typing. There may be vocabulary errors or other grammatical errors due to this.

## 2024-03-13 ENCOUNTER — OFFICE VISIT (OUTPATIENT)
Dept: NEUROLOGY | Facility: CLINIC | Age: 29
End: 2024-03-13
Attending: FAMILY MEDICINE
Payer: MEDICAID

## 2024-03-13 DIAGNOSIS — G56.03 BILATERAL CARPAL TUNNEL SYNDROME: ICD-10-CM

## 2024-03-13 PROCEDURE — 95910 NRV CNDJ TEST 7-8 STUDIES: CPT | Performed by: PSYCHIATRY & NEUROLOGY

## 2024-03-13 PROCEDURE — 95886 MUSC TEST DONE W/N TEST COMP: CPT | Performed by: PSYCHIATRY & NEUROLOGY

## 2024-03-13 NOTE — PROGRESS NOTES
[facility]  Electrodiagnostic Laboratory                 Department of Neurology                                                                                                         Test Date:  3/13/2024    Patient: Anh Flores : 1995 Physician: Amaya Salcedo MD   Sex: Female AGE: 29 year Ref Phys: Dr. Veronica   ID#: [id number]   Technician: Aracely Lloyd     History and Examination:  Anh Flores is a 29 year old right handed woman who has bilateral hand numbness and pain. She has weakness in her .     Techniques:  Motor conduction studies were done with surface recording electrodes. Sensory conduction studies were performed with surface electrodes, unless indicated otherwise by (n), designating the use of subdermal recording electrodes. Temperature was monitored and recorded throughout the study. Upper extremities were maintained at a temperature of 32 degrees Centigrade or higher.  EMG was done with a concentric needle electrode.     Results:  Motor nerve studies:  Left median motor study reveals normal distal latency, amplitude and borderline conduction velocity  Right median motor study reveals prolonged distal latency, low amplitude at the elbow but normal at the wrist, conduction velocity is normal  Left ulnar motor study is within normal limits  Right ulnar study is within normal limits    Sensory nerve studies:  Antidromic left median sensory study reveals normal amplitude with slowed conduction velocity  Antidromic right median study reveals normal amplitude with slowed conduction velocity  Antidromic ulnar studies bilaterally are within normal limits  Left palmar study reveals a prolonged peak latency with median nerve stimulation compared to ulnar nerve stimulation  Right sided palmar studies also reveal a prolonged peak latency with median nerve stimulation compared to ulnar nerve stimulation      Needle examination:  Bilateral upper extremity  needle examination is within normal limits      Interpretation:    This is an abnormal EMG.  Findings are consistent with bilateral median neuropathies at the wrist.  The left side would be considered mild in severity and the right side would be considered moderate in severity.  Clinically this can correlate with carpal tunnel syndrome.    ___________________________  Amaya Salcedo MD        Nerve Conduction Studies  Motor Sites      Latency Amplitude Neg. Amp Diff Segment Distance Velocity Neg. Dur Neg Area Diff Temperature Comment   Site (ms) Norm (mV) Norm (%)  cm m/s Norm (ms) (%) ( C)    Left Median (APB) Motor   Wrist 4.1  < 4.4 9.8  > 5.0  Wrist-APB 7.8   4.9  31.5    Elbow 8.2 - 9.3  > 5.0 -5 Elbow-Wrist 19.5 48  > 48 5.1 -4 31.7    Right Median (APB) Motor   Wrist 4.5  < 4.4 5.4  > 5.0  Wrist-APB 8.3   6.1  33.4    Elbow 8.6 - 4.5  > 5.0 -17 Elbow-Wrist 20.5 50  > 48 6.3 -15 33.4    Left Ulnar (ADM) Motor   Wrist 3.0  < 3.5 10.3  > 5.0  Wrist-ADM 8   4.6  32.4    Bel Elbow 5.8 - 9.7 - -6 Bel Elbow-Wrist 17.5 63  > 48 4.7 -2 32.4    Abv Elbow 7.3 - 9.7 - 0 Abv Elbow-Bel Elbow 9 60  > 48 4.8 -1 32.5    Right Ulnar (ADM) Motor   Wrist 2.4  < 3.5 9.9  > 5.0  Wrist-ADM 8   5.1  33.3    Bel Elbow 5.6 - 9.3 - -6 Bel Elbow-Wrist 17 53  > 48 5.0 -5 33.2    Abv Elbow 7.1 - 9.2 - -1 Abv Elbow-Bel Elbow 9.6 64  > 48 5.3 0 33.2      Sensory Sites      Onset Lat Peak Lat Amp (O-P) Amp (P-P) Segment Distance Velocity Temperature Comment   Site ms (ms)  V Norm ( V)  cm m/s Norm ( C)    Left Median Sensory   Wrist-Dig II 3.0 3.6 22  > 10 33 Wrist-Dig II 14 47  > 48 33    Right Median Sensory   Wrist-Dig II 3.5 4.2 16  > 10 16 Wrist-Dig II 14 40  > 48 34    Left Median-Ulnar Palmar Sensory        Median   Palm-Wrist 1.98 2.6 16 - 20 Palm-Wrist 8 40 - 32.9         Ulnar   Palm-Wrist 1.08 1.48 8 - 9 Palm-Wrist 8 74 - 33.1    Right Median-Ulnar Palmar Sensory        Median   Palm-Wrist 2.6 3.2 4 - 9 Palm-Wrist 8 31 - 33.7          Ulnar   Palm-Wrist 1.05 1.55 18 - 20 Palm-Wrist 8 76 - 33.8    Left Ulnar Sensory   Wrist-Dig V 1.83 2.4 19  > 8 39 Wrist-Dig V 12.5 68  > 48 32.9    Right Ulnar Sensory   Wrist-Dig V 1.88 2.5 26  > 8 48 Wrist-Dig V 12.5 66  > 48 33.9      Inter-Nerve Comparisons     Nerve 1 Value 1 Nerve 2 Value 2 Parameter Result Normal   Sensory Sites   R Median Palm-Wrist 3.2 ms R Ulnar Palm-Wrist 1.55 ms Peak Lat Diff 1.65 ms <0.40   L Median Palm-Wrist 2.6 ms L Ulnar Palm-Wrist 1.48 ms Peak Lat Diff 1.12 ms <0.40       Electromyography     Side Muscle Ins Act Fibs/PSW Fasc HF Amp Dur Poly Recrt Int Pat   Right Deltoid Nml None Nml 0 Nml Nml 0 Nml Nml   Right Triceps Nml None Nml 0 Nml Nml 0 Nml Nml   Right Biceps Nml None Nml 0 Nml Nml 0 Nml Nml   Right Pronator Teres Nml None Nml 0 Nml Nml 0 Nml Nml   Right FDI Nml None Nml 0 Nml Nml 0 Nml Nml   Right APB Nml None Nml 0 Nml Nml 0 Nml Nml   Left Deltoid Nml None Nml 0 Nml Nml 0 Nml Nml   Left Triceps Nml None Nml 0 Nml Nml 0 Nml Nml   Left Biceps Nml None Nml 0 Nml Nml 0 Nml Nml   Left Pronator Teres Nml None Nml 0 Nml Nml 0 Nml Nml   Left FDI Nml None Nml 0 Nml Nml 0 Nml Nml         NCS Waveforms:    Motor                Sensory

## 2024-03-13 NOTE — LETTER
3/13/2024         RE: Anh Flores  [address] [facility] [address]        Dear Colleague,    Thank you for referring your patient, Anh Flores, to the [facility] NEUROLOGY CLINIC [facility]. Please see a copy of my visit note below.                            [facility]  Electrodiagnostic Laboratory                 Department of Neurology                                                                                                         Test Date:  3/13/2024    Patient: Anh Flores : 1995 Physician: Amaya Salcedo MD   Sex: Female AGE: 29 year Ref Phys: Dr. Veronica   ID#: [id number]   Technician: Aracely Lloyd     History and Examination:  Anh Flores is a 29 year old right handed woman who has bilateral hand numbness and pain. She has weakness in her .     Techniques:  Motor conduction studies were done with surface recording electrodes. Sensory conduction studies were performed with surface electrodes, unless indicated otherwise by (n), designating the use of subdermal recording electrodes. Temperature was monitored and recorded throughout the study. Upper extremities were maintained at a temperature of 32 degrees Centigrade or higher.  EMG was done with a concentric needle electrode.     Results:  Motor nerve studies:  Left median motor study reveals normal distal latency, amplitude and borderline conduction velocity  Right median motor study reveals prolonged distal latency, low amplitude at the elbow but normal at the wrist, conduction velocity is normal  Left ulnar motor study is within normal limits  Right ulnar study is within normal limits    Sensory nerve studies:  Antidromic left median sensory study reveals normal amplitude with slowed conduction velocity  Antidromic right median study reveals normal amplitude with slowed conduction velocity  Antidromic ulnar studies bilaterally are within normal limits  Left palmar study reveals a  prolonged peak latency with median nerve stimulation compared to ulnar nerve stimulation  Right sided palmar studies also reveal a prolonged peak latency with median nerve stimulation compared to ulnar nerve stimulation      Needle examination:  Bilateral upper extremity needle examination is within normal limits      Interpretation:    This is an abnormal EMG.  Findings are consistent with bilateral median neuropathies at the wrist.  The left side would be considered mild in severity and the right side would be considered moderate in severity.  Clinically this can correlate with carpal tunnel syndrome.    ___________________________  Amaya Salcedo MD        Nerve Conduction Studies  Motor Sites      Latency Amplitude Neg. Amp Diff Segment Distance Velocity Neg. Dur Neg Area Diff Temperature Comment   Site (ms) Norm (mV) Norm (%)  cm m/s Norm (ms) (%) ( C)    Left Median (APB) Motor   Wrist 4.1  < 4.4 9.8  > 5.0  Wrist-APB 7.8   4.9  31.5    Elbow 8.2 - 9.3  > 5.0 -5 Elbow-Wrist 19.5 48  > 48 5.1 -4 31.7    Right Median (APB) Motor   Wrist 4.5  < 4.4 5.4  > 5.0  Wrist-APB 8.3   6.1  33.4    Elbow 8.6 - 4.5  > 5.0 -17 Elbow-Wrist 20.5 50  > 48 6.3 -15 33.4    Left Ulnar (ADM) Motor   Wrist 3.0  < 3.5 10.3  > 5.0  Wrist-ADM 8   4.6  32.4    Bel Elbow 5.8 - 9.7 - -6 Bel Elbow-Wrist 17.5 63  > 48 4.7 -2 32.4    Abv Elbow 7.3 - 9.7 - 0 Abv Elbow-Bel Elbow 9 60  > 48 4.8 -1 32.5    Right Ulnar (ADM) Motor   Wrist 2.4  < 3.5 9.9  > 5.0  Wrist-ADM 8   5.1  33.3    Bel Elbow 5.6 - 9.3 - -6 Bel Elbow-Wrist 17 53  > 48 5.0 -5 33.2    Abv Elbow 7.1 - 9.2 - -1 Abv Elbow-Bel Elbow 9.6 64  > 48 5.3 0 33.2      Sensory Sites      Onset Lat Peak Lat Amp (O-P) Amp (P-P) Segment Distance Velocity Temperature Comment   Site ms (ms)  V Norm ( V)  cm m/s Norm ( C)    Left Median Sensory   Wrist-Dig II 3.0 3.6 22  > 10 33 Wrist-Dig II 14 47  > 48 33    Right Median Sensory   Wrist-Dig II 3.5 4.2 16  > 10 16 Wrist-Dig II 14 40  > 48 34     Left Median-Ulnar Palmar Sensory        Median   Palm-Wrist 1.98 2.6 16 - 20 Palm-Wrist 8 40 - 32.9         Ulnar   Palm-Wrist 1.08 1.48 8 - 9 Palm-Wrist 8 74 - 33.1    Right Median-Ulnar Palmar Sensory        Median   Palm-Wrist 2.6 3.2 4 - 9 Palm-Wrist 8 31 - 33.7         Ulnar   Palm-Wrist 1.05 1.55 18 - 20 Palm-Wrist 8 76 - 33.8    Left Ulnar Sensory   Wrist-Dig V 1.83 2.4 19  > 8 39 Wrist-Dig V 12.5 68  > 48 32.9    Right Ulnar Sensory   Wrist-Dig V 1.88 2.5 26  > 8 48 Wrist-Dig V 12.5 66  > 48 33.9      Inter-Nerve Comparisons     Nerve 1 Value 1 Nerve 2 Value 2 Parameter Result Normal   Sensory Sites   R Median Palm-Wrist 3.2 ms R Ulnar Palm-Wrist 1.55 ms Peak Lat Diff 1.65 ms <0.40   L Median Palm-Wrist 2.6 ms L Ulnar Palm-Wrist 1.48 ms Peak Lat Diff 1.12 ms <0.40       Electromyography     Side Muscle Ins Act Fibs/PSW Fasc HF Amp Dur Poly Recrt Int Pat   Right Deltoid Nml None Nml 0 Nml Nml 0 Nml Nml   Right Triceps Nml None Nml 0 Nml Nml 0 Nml Nml   Right Biceps Nml None Nml 0 Nml Nml 0 Nml Nml   Right Pronator Teres Nml None Nml 0 Nml Nml 0 Nml Nml   Right FDI Nml None Nml 0 Nml Nml 0 Nml Nml   Right APB Nml None Nml 0 Nml Nml 0 Nml Nml   Left Deltoid Nml None Nml 0 Nml Nml 0 Nml Nml   Left Triceps Nml None Nml 0 Nml Nml 0 Nml Nml   Left Biceps Nml None Nml 0 Nml Nml 0 Nml Nml   Left Pronator Teres Nml None Nml 0 Nml Nml 0 Nml Nml   Left FDI Nml None Nml 0 Nml Nml 0 Nml Nml         NCS Waveforms:    Motor                Sensory                                Again, thank you for allowing me to participate in the care of your patient.        Sincerely,        Amaya Salcedo MD

## 2024-03-14 ENCOUNTER — TELEPHONE (OUTPATIENT)
Dept: FAMILY MEDICINE | Facility: CLINIC | Age: 29
End: 2024-03-14
Payer: MEDICAID

## 2024-03-14 NOTE — PROGRESS NOTES
See telephone encounter 3/14/2024.    DOMINICK RonN, RN  [facility] ~ Registered Nurse  Clinic Triage ~ [place] [facility] [place]  March 14, 2024

## 2024-03-14 NOTE — PROGRESS NOTES
Please call the patient, findings correlate with Bilateral carpal tunnel syndrome. I placed an ortho referral.  Bita Veronica MD on 3/14/2024 at 8:15 AM

## 2024-03-14 NOTE — TELEPHONE ENCOUNTER
Bita Veronica MD  [facility]  Please call the patient, findings correlate with Bilateral carpal tunnel syndrome. I placed an ortho referral.  Bita Veronica MD on 3/14/2024 at 8:15 AM

## 2024-03-14 NOTE — TELEPHONE ENCOUNTER
"RN Triage    Patient Contact    Attempt # 1    Was call answered?  No.  Left message on voicemail with information to call me back.    Upon patient callback please advise of the below from provider regarding EMG:    \"Please call the patient, findings correlate with Bilateral carpal tunnel syndrome. I placed an ortho referral.  Bita Veronica MD on 3/14/2024 at 8:15 AM\"    ALICIA Ron, RN  [facility] ~ Registered Nurse  Clinic Triage ~ [place] [facility] [place]  March 14, 2024      "

## 2024-03-15 NOTE — TELEPHONE ENCOUNTER
"RN Triage     Patient Contact     Attempt # 2     Was call answered?  No.  Left message on voicemail with information to call me back.     Upon patient callback please advise of the below from provider regarding EMG:     \"Please call the patient, findings correlate with Bilateral carpal tunnel syndrome. I placed an ortho referral.  Bita Veronica MD on 3/14/2024 at 8:15 AM\"     ALICIA Ron, RN  [facility] ~ Registered Nurse  Clinic Triage ~ [place] [facility] [place]  March 15, 2024    "

## 2024-03-15 NOTE — TELEPHONE ENCOUNTER
Patient called back, information given and scheduling number given for orthopedics.    Tegan Taylor XRO/

## 2024-03-16 ENCOUNTER — OFFICE VISIT (OUTPATIENT)
Dept: ORTHOPEDICS | Facility: CLINIC | Age: 29
End: 2024-03-16
Attending: FAMILY MEDICINE
Payer: MEDICAID

## 2024-03-16 DIAGNOSIS — G56.03 BILATERAL CARPAL TUNNEL SYNDROME: Primary | ICD-10-CM

## 2024-03-16 PROCEDURE — 99213 OFFICE O/P EST LOW 20 MIN: CPT | Performed by: FAMILY MEDICINE

## 2024-03-16 RX ORDER — PREDNISONE 20 MG/1
40 TABLET ORAL DAILY
Qty: 14 TABLET | Refills: 0 | Status: SHIPPED | OUTPATIENT
Start: 2024-03-16 | End: 2024-04-30

## 2024-03-16 ASSESSMENT — PAIN SCALES - GENERAL: PAINLEVEL: SEVERE PAIN (6)

## 2024-03-16 NOTE — LETTER
3/16/2024         RE: Anh Flores  [address] [facility] [address]        Dear Colleague,    Thank you for referring your patient, Anh Flores, to the [facility] SPORTS MEDICINE CLINIC [place]. Please see a copy of my visit note below.    CHIEF COMPLAINT:  Consult (Carpal tunnel consult. )     HISTORY OF PRESENT ILLNESS  Ms. Flores is a pleasant 29 year old year old female who presents to clinic today with bilateral carpal tunnel.  Anh explains that she is a  and this activity has become very painful for her. Anytime she is now grooming her pain is 10/10 with numbness and tingling.     Onset: gradual  Location: bilateral wrist  Quality:  sharp, tingling, burning, and numb  Duration: 3 years   Severity: 10/10 at worst  Timing:constant  Modifying factors:  resting and non-use makes it better, movement and use makes it worse  Associated signs & symptoms: burning and pain  Previous similar pain: Yes  Treatments to date: Bracing, injections, medication (only Prednisone has helped)    Additional history: as documented    Review of Systems:  Have you recently had a a fever, chills, weight loss? No  Do you have any vision problems? Yes, migraines cause eye pain  Do you have any chest pain or edema? No  Do you have any shortness of breath or wheezing?  No  Do you have stomach problems? No  Do you have any numbness or focal weakness? Yes, CTS  Do you have diabetes? Yes, Type 2  Do you have problems with bleeding or clotting? No  Do you have an rashes or other skin lesions? No    MEDICAL HISTORY  Patient Active Problem List   Diagnosis     CAH (congenital adrenal hyperplasia)     Chronic abdominal pain     Vitamin D deficiency     Left thigh pain     Chronic headaches     Morbid obesity (H)     Hidradenitis suppurativa     Morbid obesity with body mass index of 60.0-69.9 in adult (H)     Elevated BP without diagnosis of hypertension     Diabetes mellitus, type 2 (H)       Current  Outpatient Medications   Medication Sig Dispense Refill     alcohol swab prep pads Use to swab area of injection/anthony as directed. 100 each 3     atorvastatin (LIPITOR) 10 MG tablet Take 1 tablet (10 mg) by mouth daily 90 tablet 0     cholestyramine light (PREVALITE) 4 GM powder Take 1 packet (4 g) by mouth 3 times daily (with meals) for 90 days 270 packet 0     Continuous Blood Gluc  (FREESTYLE AYSE 3 READER) ROSHAN 1 Application continuous 1 each 3     Continuous Blood Gluc Sensor (FREESTYLE AYSE 3 SENSOR) MISC 1 Application daily 1 each 4     insulin pen needle (ULTICARE MICRO) 32G X 4 MM miscellaneous Use 1 pen needles daily or as directed. 100 each 3     liraglutide (VICTOZA) 18 MG/3ML solution Inject 0.6 mg Subcutaneous daily for 7 days, THEN 1.2 mg daily for 14 days, THEN 1.8 mg daily for 90 days. 3 mL 3     ondansetron (ZOFRAN ODT) 4 MG ODT tab Take 1 tablet (4 mg) by mouth every 8 hours as needed for nausea 18 tablet 0     sitagliptin (JANUVIA) 25 MG tablet Take 1 tablet (25 mg) by mouth daily 90 tablet 0     SUMAtriptan (IMITREX) 50 MG tablet Take 1 tablet (50 mg) by mouth at onset of headache for migraine May repeat in 2 hours. Max 4 tablets/24 hours. 8 tablet 0     topiramate (TOPAMAX) 25 MG tablet Take 1 tablet (25 mg) by mouth at bedtime For 1 week then 2 pills at night. 60 tablet 1       Allergies   Allergen Reactions     Other Environmental Allergy        Family History   Problem Relation Age of Onset     Neurologic Disorder Sister 16        migraines     Cerebrovascular Disease No family hx of      Alzheimer Disease No family hx of        Additional medical/Social/[facility] and updated as appropriate.       PHYSICAL EXAM  LMP  (LMP Unknown)     General  - normal appearance, in no obvious distress  CV  - normal radial pulse  Pulm  - normal respiratory pattern, non-labored  Musculoskeletal - Bilateral wrist  - inspection: No thenar atrophy, normal joint alignment, no  swelling  - palpation: no bony or soft tissue tenderness, no tenderness at the anatomical snuffbox  - ROM:  90 deg flexion   70 deg extension   25 deg abduction   65 deg adduction  - strength: 5/5 A-OK sign, 5/5 Palmar abduction of thumb intact, 5/5 hand intrinsics,   - special tests:  (+) Tinel's  (-) Finkelstein  (+) Phalen  (+) Median nerve compression test  (-) Mueller click test  Neuro  -No sensory disturbance, no motor deficit, grossly normal coordination, normal muscle tone  Skin  - no ecchymosis, erythema, warmth, or induration, no obvious rash  Psych  - interactive, appropriate, normal mood and affect    IMAGING : XR wrist Bilateral 3 views. Final results and radiologist's interpretation, available in the [facility] health record. Images were reviewed with the patient/family members in the office today. My personal interpretation of the performed imaging is no acute osseous abnormality or significant degenerative changes of joint.    LABS:  Hemoglobin A1c 8.8 Eight weeks ago    EMG:   Test Date:  3/13/2024      Patient: Anh Flores : 1995 Physician: Amaya Salcedo MD   Sex: Female AGE: 29 year Ref Phys: Dr. Veronica   ID#: [id number] Technician: Aracely Lloyd      History and Examination:   Anh Flores is a 29 year old right handed woman who has bilateral hand numbness and pain. She has weakness in her .      Techniques:   Motor conduction studies were done with surface recording electrodes. Sensory conduction studies were performed with surface electrodes, unless indicated otherwise by (n), designating the use of subdermal recording electrodes. Temperature was monitored and recorded throughout the study. Upper extremities were maintained at a temperature of 32 degrees Centigrade or higher.  EMG was done with a concentric needle electrode.      Results:   Motor nerve studies:   Left median motor study reveals normal distal latency, amplitude and borderline conduction velocity   Right median  motor study reveals prolonged distal latency, low amplitude at the elbow but normal at the wrist, conduction velocity is normal   Left ulnar motor study is within normal limits   Right ulnar study is within normal limits      Sensory nerve studies:   Antidromic left median sensory study reveals normal amplitude with slowed conduction velocity   Antidromic right median study reveals normal amplitude with slowed conduction velocity   Antidromic ulnar studies bilaterally are within normal limits   Left palmar study reveals a prolonged peak latency with median nerve stimulation compared to ulnar nerve stimulation   Right sided palmar studies also reveal a prolonged peak latency with median nerve stimulation compared to ulnar nerve stimulation         Needle examination:   Bilateral upper extremity needle examination is within normal limits         Interpretation:      This is an abnormal EMG.  Findings are consistent with bilateral median neuropathies at the wrist.  The left side would be considered mild in severity and the right side would be considered moderate in severity.  Clinically this can correlate with carpal tunnel syndrome.      ASSESSMENT & PLAN  Ms. Flores is a 29 year old year old female who presents to clinic today with bilateral wrist pain, paresthesias into hands.    Diagnosis:   Carpal tunnel syndrome of bilateral wrists    Treatment options again were reiterated for her especially intermediate and long-term options.  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    I agree with this assessment I do recommend we have her see one of our hand surgeons.  We discussed that left wrist is only mild in severity however clinically it is quite severe.    I did opt to refill her prednisone, but we discussed its effects on her blood glucose and this will be short-term and not refilled.  If she needs any  additional medication we can consider gabapentin.  I am hoping that she will see her surgeon and move forward with release surgery soon anyhow.    She can continue using her wrist braces especially at night and with grooming.    She should anticipate a call on Monday by Cassy.    It was a pleasure seeing Anh today.    Dre Farmer DO, [facility]  Primary Care Sports Medicine      Again, thank you for allowing me to participate in the care of your patient.        Sincerely,        Dre Farmer DO

## 2024-03-16 NOTE — PROGRESS NOTES
CHIEF COMPLAINT:  Consult (Carpal tunnel consult. )     HISTORY OF PRESENT ILLNESS  Ms. Flores is a pleasant 29 year old year old female who presents to clinic today with bilateral carpal tunnel.  Anh explains that she is a  and this activity has become very painful for her. Anytime she is now grooming her pain is 10/10 with numbness and tingling.     Onset: gradual  Location: bilateral wrist  Quality:  sharp, tingling, burning, and numb  Duration: 3 years   Severity: 10/10 at worst  Timing:constant  Modifying factors:  resting and non-use makes it better, movement and use makes it worse  Associated signs & symptoms: burning and pain  Previous similar pain: Yes  Treatments to date: Bracing, injections, medication (only Prednisone has helped)    Additional history: as documented    Review of Systems:  Have you recently had a a fever, chills, weight loss? No  Do you have any vision problems? Yes, migraines cause eye pain  Do you have any chest pain or edema? No  Do you have any shortness of breath or wheezing?  No  Do you have stomach problems? No  Do you have any numbness or focal weakness? Yes, CTS  Do you have diabetes? Yes, Type 2  Do you have problems with bleeding or clotting? No  Do you have an rashes or other skin lesions? No    MEDICAL HISTORY  Patient Active Problem List   Diagnosis    CAH (congenital adrenal hyperplasia)    Chronic abdominal pain    Vitamin D deficiency    Left thigh pain    Chronic headaches    Morbid obesity (H)    Hidradenitis suppurativa    Morbid obesity with body mass index of 60.0-69.9 in adult (H)    Elevated BP without diagnosis of hypertension    Diabetes mellitus, type 2 (H)       Current Outpatient Medications   Medication Sig Dispense Refill    alcohol swab prep pads Use to swab area of injection/anthony as directed. 100 each 3    atorvastatin (LIPITOR) 10 MG tablet Take 1 tablet (10 mg) by mouth daily 90 tablet 0    cholestyramine light (PREVALITE) 4 GM powder  Take 1 packet (4 g) by mouth 3 times daily (with meals) for 90 days 270 packet 0    Continuous Blood Gluc  (FREESTYLE AYSE 3 READER) ROSHAN 1 Application continuous 1 each 3    Continuous Blood Gluc Sensor (FREESTYLE AYSE 3 SENSOR) MISC 1 Application daily 1 each 4    insulin pen needle (ULTICARE MICRO) 32G X 4 MM miscellaneous Use 1 pen needles daily or as directed. 100 each 3    liraglutide (VICTOZA) 18 MG/3ML solution Inject 0.6 mg Subcutaneous daily for 7 days, THEN 1.2 mg daily for 14 days, THEN 1.8 mg daily for 90 days. 3 mL 3    ondansetron (ZOFRAN ODT) 4 MG ODT tab Take 1 tablet (4 mg) by mouth every 8 hours as needed for nausea 18 tablet 0    sitagliptin (JANUVIA) 25 MG tablet Take 1 tablet (25 mg) by mouth daily 90 tablet 0    SUMAtriptan (IMITREX) 50 MG tablet Take 1 tablet (50 mg) by mouth at onset of headache for migraine May repeat in 2 hours. Max 4 tablets/24 hours. 8 tablet 0    topiramate (TOPAMAX) 25 MG tablet Take 1 tablet (25 mg) by mouth at bedtime For 1 week then 2 pills at night. 60 tablet 1       Allergies   Allergen Reactions    Other Environmental Allergy        Family History   Problem Relation Age of Onset    Neurologic Disorder Sister 16        migraines    Cerebrovascular Disease No family hx of     Alzheimer Disease No family hx of        Additional medical/Social/Surgical histories reviewed [facility] and updated as appropriate.       PHYSICAL EXAM  LMP  (LMP Unknown)     General  - normal appearance, in no obvious distress  CV  - normal radial pulse  Pulm  - normal respiratory pattern, non-labored  Musculoskeletal - Bilateral wrist  - inspection: No thenar atrophy, normal joint alignment, no swelling  - palpation: no bony or soft tissue tenderness, no tenderness at the anatomical snuffbox  - ROM:  90 deg flexion   70 deg extension   25 deg abduction   65 deg adduction  - strength: 5/5 A-OK sign, 5/5 Palmar abduction of thumb intact, 5/5 hand intrinsics,   - special tests:  (+)  Tinel's  (-) Finkelstein  (+) Phalen  (+) Median nerve compression test  (-) Mueller click test  Neuro  -No sensory disturbance, no motor deficit, grossly normal coordination, normal muscle tone  Skin  - no ecchymosis, erythema, warmth, or induration, no obvious rash  Psych  - interactive, appropriate, normal mood and affect    IMAGING : XR wrist Bilateral 3 views. Final results and radiologist's interpretation, available in the [facility] health record. Images were reviewed with the patient/family members in the office today. My personal interpretation of the performed imaging is no acute osseous abnormality or significant degenerative changes of joint.    LABS:  Hemoglobin A1c 8.8 Eight weeks ago    EMG:   Test Date:  3/13/2024      Patient: Anh Flores : 1995 Physician: Amaya Salcedo MD   Sex: Female AGE: 29 year Ref Phys: Dr. Veronica   ID#: [id number] Technician: Aracely Lloyd      History and Examination:   Anh Flores is a 29 year old right handed woman who has bilateral hand numbness and pain. She has weakness in her .      Techniques:   Motor conduction studies were done with surface recording electrodes. Sensory conduction studies were performed with surface electrodes, unless indicated otherwise by (n), designating the use of subdermal recording electrodes. Temperature was monitored and recorded throughout the study. Upper extremities were maintained at a temperature of 32 degrees Centigrade or higher.  EMG was done with a concentric needle electrode.      Results:   Motor nerve studies:   Left median motor study reveals normal distal latency, amplitude and borderline conduction velocity   Right median motor study reveals prolonged distal latency, low amplitude at the elbow but normal at the wrist, conduction velocity is normal   Left ulnar motor study is within normal limits   Right ulnar study is within normal limits      Sensory nerve studies:   Antidromic left median sensory study reveals  normal amplitude with slowed conduction velocity   Antidromic right median study reveals normal amplitude with slowed conduction velocity   Antidromic ulnar studies bilaterally are within normal limits   Left palmar study reveals a prolonged peak latency with median nerve stimulation compared to ulnar nerve stimulation   Right sided palmar studies also reveal a prolonged peak latency with median nerve stimulation compared to ulnar nerve stimulation         Needle examination:   Bilateral upper extremity needle examination is within normal limits         Interpretation:      This is an abnormal EMG.  Findings are consistent with bilateral median neuropathies at the wrist.  The left side would be considered mild in severity and the right side would be considered moderate in severity.  Clinically this can correlate with carpal tunnel syndrome.      ASSESSMENT & PLAN  Ms. Flores is a 29 year old year old female who presents to clinic today with bilateral wrist pain, paresthesias into hands.    Diagnosis:   Carpal tunnel syndrome of bilateral wrists    Treatment options again were reiterated for her especially intermediate and long-term options.  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    I agree with this assessment I do recommend we have her see one of our hand surgeons.  We discussed that left wrist is only mild in severity however clinically it is quite severe.    I did opt to refill her prednisone, but we discussed its effects on her blood glucose and this will be short-term and not refilled.  If she needs any additional medication we can consider gabapentin.  I am hoping that she will see her surgeon and move forward with release surgery soon anyhow.    She can continue using her wrist braces especially at night and with grooming.    She should anticipate a call on Monday by Cassy.    It was a  pleasure seeing Anh today.    Dre Farmer DO, CAQSM  Primary Care Sports Medicine

## 2024-03-17 DIAGNOSIS — E11.65 TYPE 2 DIABETES MELLITUS WITH HYPERGLYCEMIA, WITHOUT LONG-TERM CURRENT USE OF INSULIN (H): ICD-10-CM

## 2024-03-18 ENCOUNTER — TELEPHONE (OUTPATIENT)
Dept: ORTHOPEDICS | Facility: CLINIC | Age: 29
End: 2024-03-18
Payer: MEDICAID

## 2024-03-18 NOTE — TELEPHONE ENCOUNTER
Left Voicemail (1st Attempt) for the patient to call back and schedule the following:    Appointment type: new hand  Provider: dr. leon  Return date: next opening   Specialty phone number: [phone]  Additonal Notes: Schedule surgical consult with [facility]   Orthopedics, Podiatry, Sports Medicine, Ent ,Eye , Audiology, Adult Endocrine & Diabetes, Nutrition & Medication Therapy Management Specialties   [facility]

## 2024-03-19 RX ORDER — BLOOD-GLUCOSE SENSOR
EACH MISCELLANEOUS
Qty: 1 EACH | Refills: 2 | Status: SHIPPED | OUTPATIENT
Start: 2024-03-19 | End: 2024-04-10

## 2024-03-29 ENCOUNTER — OFFICE VISIT (OUTPATIENT)
Dept: FAMILY MEDICINE | Facility: CLINIC | Age: 29
End: 2024-03-29
Payer: MEDICAID

## 2024-03-29 ENCOUNTER — HOSPITAL ENCOUNTER (OUTPATIENT)
Dept: MRI IMAGING | Facility: CLINIC | Age: 29
Discharge: HOME | End: 2024-03-29
Attending: FAMILY MEDICINE | Admitting: FAMILY MEDICINE
Payer: MEDICAID

## 2024-03-29 VITALS
HEART RATE: 88 BPM | OXYGEN SATURATION: 98 % | RESPIRATION RATE: 16 BRPM | DIASTOLIC BLOOD PRESSURE: 86 MMHG | TEMPERATURE: 97.8 F | SYSTOLIC BLOOD PRESSURE: 135 MMHG | BODY MASS INDEX: 57.52 KG/M2 | HEIGHT: 60 IN | WEIGHT: 293 LBS

## 2024-03-29 DIAGNOSIS — E11.65 TYPE 2 DIABETES MELLITUS WITH HYPERGLYCEMIA, WITHOUT LONG-TERM CURRENT USE OF INSULIN (H): ICD-10-CM

## 2024-03-29 DIAGNOSIS — G89.29 CHRONIC INTRACTABLE HEADACHE, UNSPECIFIED HEADACHE TYPE: ICD-10-CM

## 2024-03-29 DIAGNOSIS — R09.89 RUNNY NOSE: ICD-10-CM

## 2024-03-29 DIAGNOSIS — R93.89 ABNORMAL MRI: Primary | ICD-10-CM

## 2024-03-29 DIAGNOSIS — E78.5 HYPERLIPIDEMIA LDL GOAL <100: ICD-10-CM

## 2024-03-29 DIAGNOSIS — R51.9 CHRONIC INTRACTABLE HEADACHE, UNSPECIFIED HEADACHE TYPE: ICD-10-CM

## 2024-03-29 LAB — HBA1C MFR BLD: 8.2 % (ref 0–5.6)

## 2024-03-29 PROCEDURE — 36415 COLL VENOUS BLD VENIPUNCTURE: CPT | Performed by: NURSE PRACTITIONER

## 2024-03-29 PROCEDURE — 82374 ASSAY BLOOD CARBON DIOXIDE: CPT | Performed by: NURSE PRACTITIONER

## 2024-03-29 PROCEDURE — 84132 ASSAY OF SERUM POTASSIUM: CPT | Performed by: NURSE PRACTITIONER

## 2024-03-29 PROCEDURE — 70551 MRI BRAIN STEM W/O DYE: CPT

## 2024-03-29 PROCEDURE — 99214 OFFICE O/P EST MOD 30 MIN: CPT | Performed by: NURSE PRACTITIONER

## 2024-03-29 ASSESSMENT — ENCOUNTER SYMPTOMS: HEADACHES: 1

## 2024-03-29 NOTE — PROGRESS NOTES
Assessment & Plan     Abnormal MRI  *  - amoxicillin-clavulanate (AUGMENTIN) 875-125 MG tablet  Dispense: 14 tablet; Refill: 0  - Adult ENT  Referral    Type 2 diabetes mellitus with hyperglycemia, without long-term current use of insulin (H)  *  - **Hemoglobin A1c FUTURE 3mo  - **Basic metabolic panel FUTURE 2mo    Hyperlipidemia LDL goal <100  *    -I reviewed MRI results of the head with patient today regarding her sinuses.  It appears that potential sinusitis And her symptoms are mostly on the left side.  She does endorse frontal area head pain.  History of sinusitis.  Since this is a chronic etiology in the past few months, and along with MRI results, will try dose of Augmentin.  I also referred her to ENT for further evaluation.   -History of diabetes.  Praised her for eating a low carbohydrate diet.   -She can take naproxen twice a day, always take it with food.  This will help abort headaches as well.  She is noticing side effects with Imitrex such as tiredness, she will try to stop this medication and use it only for severe headaches.   -She can use magnesium gluconate.  And Tylenol as needed.   -She has labs done per endocrinology today.                   Tanika Kamara is a 29 year old, presenting for the following health issues:  Headache (Off and on for 3x month) and Recheck Medication      3/29/2024    12:45 PM   Additional Questions   Roomed by Barrie     Decrease with Topamax at night, but still all day long she will have a headache. She will Wake up, and two hours later has a HA and gets worse if she moves around. Does not have neck pain but can have stiffness off and on. Works as a . . Intermiitent sleep difficulty. Takes prednisone PRN; last dose Wednesday. HA's started December. Now more constant. Get worse with activity. No loss of vision but sometimes will see color changes.   Seeing eye doctor next week.   - does not eat too sugary foods.   - snores at night but  "denied any other symptoms of sleep apnea.   -She does endorse a runny nose.  She does not have sinus pressure, however she has a history of bad sinuses.  Her headache is typically always on the left frontal side.   -When her headache is stronger midday she has to go lay down.     Headache   This is a recurrent problem. The current episode started more than 1 week ago. The problem occurs constantly. The problem has been gradually worsening. The pain is at a severity of 8/10. The pain is mild.   History of Present Illness       Headaches:   Since the patient's last clinic visit, headaches are: no change  The patient is getting headaches:  Everyday they do not go away  ,lessen but do not go away.  She is not able to do normal daily activities when she has a migraine.  The patient is taking the following rescue/relief medications:  Sumatriptan (Imitrex)   Patient states \"The relief is inconsistent\" from the rescue/relief medications.   The patient is taking the following medications to prevent migraines:  Topomax  In the past 4 weeks, the patient has gone to an Urgent Care or Emergency Room 0 times times due to headaches.    She eats 2-3 servings of fruits and vegetables daily.She consumes 0 sweetened beverage(s) daily.She exercises with enough effort to increase her heart rate 30 to 60 minutes per day.  She exercises with enough effort to increase her heart rate 3 or less days per week.   She is taking medications regularly.                 Review of Systems  Constitutional, HEENT, cardiovascular, pulmonary, gi and gu systems are negative, except as otherwise noted.      Objective    LMP  (LMP Unknown)   There is no height or weight on file to calculate BMI.  Physical Exam   GENERAL: alert and no distress  HENT: ear canals and TM's normal, nose and mouth without ulcers or lesions  NECK: no adenopathy, no asymmetry, masses, or scars  MS: no gross musculoskeletal defects noted, no edema  NEURO: Normal strength and tone, " mentation intact and speech normal  PSYCH: mentation appears normal, affect normal/bright    Office Visit on 01/30/2024   Component Date Value Ref Range Status    Sodium 01/30/2024 136  135 - 145 mmol/L Final    Reference intervals for this test were updated on 09/26/2023 to more accurately reflect our healthy population. There may be differences in the flagging of prior results with similar values performed with this method. Interpretation of those prior results can be made in the context of the updated reference intervals.     Potassium 01/30/2024 4.6  3.4 - 5.3 mmol/L Final    Carbon Dioxide (CO2) 01/30/2024 22  22 - 29 mmol/L Final    Anion Gap 01/30/2024 11  7 - 15 mmol/L Final    Urea Nitrogen 01/30/2024 7.8  6.0 - 20.0 mg/dL Final    Creatinine 01/30/2024 0.51  0.51 - 0.95 mg/dL Final    GFR Estimate 01/30/2024 >90  >60 mL/min/1.73m2 Final    Calcium 01/30/2024 9.7  8.6 - 10.0 mg/dL Final    Chloride 01/30/2024 103  98 - 107 mmol/L Final    Glucose 01/30/2024 178 (H)  70 - 99 mg/dL Final    Alkaline Phosphatase 01/30/2024 67  40 - 150 U/L Final    Reference intervals for this test were updated on 11/14/2023 to more accurately reflect our healthy population. There may be differences in the flagging of prior results with similar values performed with this method. Interpretation of those prior results can be made in the context of the updated reference intervals.    AST 01/30/2024 23  0 - 45 U/L Final    Reference intervals for this test were updated on 6/12/2023 to more accurately reflect our healthy population. There may be differences in the flagging of prior results with similar values performed with this method. Interpretation of those prior results can be made in the context of the updated reference intervals.    ALT 01/30/2024 44  0 - 50 U/L Final    Reference intervals for this test were updated on 6/12/2023 to more accurately reflect our healthy population. There may be differences in the flagging of  prior results with similar values performed with this method. Interpretation of those prior results can be made in the context of the updated reference intervals.      Protein Total 01/30/2024 8.0  6.4 - 8.3 g/dL Final    Albumin 01/30/2024 4.6  3.5 - 5.2 g/dL Final    Bilirubin Total 01/30/2024 0.2  <=1.2 mg/dL Final    Creatinine Urine mg/dL 01/30/2024 133.0  mg/dL Final    The reference ranges have not been established in urine creatinine. The results should be integrated into the clinical context for interpretation.    Albumin Urine mg/L 01/30/2024 24.9  mg/L Final    The reference ranges have not been established in urine albumin. The results should be integrated into the clinical context for interpretation.    Albumin Urine mg/g Cr 01/30/2024 18.72  0.00 - 25.00 mg/g Cr Final    Microalbuminuria is defined as an albumin:creatinine ratio of 17 to 299 for males and 25 to 299 for females. A ratio of albumin:creatinine of 300 or higher is indicative of overt proteinuria.  Due to biologic variability, positive results should be confirmed by a second, first-morning random or 24-hour timed urine specimen. If there is discrepancy, a third specimen is recommended. When 2 out of 3 results are in the microalbuminuria range, this is evidence for incipient nephropathy and warrants increased efforts at glucose control, blood pressure control, and institution of therapy with an angiotensin-converting-enzyme (ACE) inhibitor (if the patient can tolerate it).      HIV Antigen Antibody Combo 01/30/2024 Nonreactive  Nonreactive Final    Negative HIV-1/-2 antigen and antibody screening test results usually indicate the absence of HIV-1 and HIV-2 infection. However, such negative results do not rule-out acute HIV infection.  If acute HIV-1 or HIV-2 infection is suspected, detection of HIV-1 or HIV-2 RNA  is recommended.     Hepatitis C Antibody 01/30/2024 Nonreactive  Nonreactive Final    A nonreactive screening test result does  not exclude the possibility of exposure to or infection with HCV. Nonreactive screening test results in individuals with prior exposure to HCV may be due to antibody levels below the limit of detection of this assay or lack of reactivity to the HCV antigens used in this assay. Patients with recent HCV infections (<3 months from time of exposure) may have false-negative HCV antibody results due to the time needed for seroconversion (average of 8 to 9 weeks).    Cholesterol 01/30/2024 222 (H)  <200 mg/dL Final    Triglycerides 01/30/2024 132  <150 mg/dL Final    Direct Measure HDL 01/30/2024 51  >=50 mg/dL Final    LDL Cholesterol Calculated 01/30/2024 145 (H)  <=100 mg/dL Final    Non HDL Cholesterol 01/30/2024 171 (H)  <130 mg/dL Final    Patient Fasting > 8hrs? 01/30/2024 Yes   Final    Hemoglobin A1C 01/30/2024 8.8 (H)  0.0 - 5.6 % Final    Normal <5.7%   Prediabetes 5.7-6.4%    Diabetes 6.5% or higher     Note: Adopted from ADA consensus guidelines.    TSH 01/30/2024 1.33  0.30 - 4.20 uIU/mL Final    Tissue Transglutaminase Antibody I* 01/30/2024 0.6  <7.0 U/mL Final    Negative- The tTG-IgA assay has limited utility for patients with decreased levels of IgA. Screening for celiac disease should include IgA testing to rule out selective IgA deficiency and to guide selection and interpretation of serological testing. tTG-IgG testing may be positive in celiac disease patients with IgA deficiency.    Tissue Transglutaminase Antibody I* 01/30/2024 0.9  <7.0 U/mL Final    Negative     Results for orders placed or performed in visit on 03/29/24 (from the past 24 hour(s))   **Hemoglobin A1c FUTURE 3mo   Result Value Ref Range    Hemoglobin A1C 8.2 (H) 0.0 - 5.6 %    Narrative    Results confirmed by repeat test. DELMIS             Signed Electronically by: NARCISA Khan CNP

## 2024-03-30 LAB
ANION GAP SERPL CALCULATED.3IONS-SCNC: 11 MMOL/L (ref 7–15)
BUN SERPL-MCNC: 9.6 MG/DL (ref 6–20)
CALCIUM SERPL-MCNC: 9.8 MG/DL (ref 8.6–10)
CHLORIDE SERPL-SCNC: 105 MMOL/L (ref 98–107)
CREAT SERPL-MCNC: 0.61 MG/DL (ref 0.51–0.95)
DEPRECATED HCO3 PLAS-SCNC: 23 MMOL/L (ref 22–29)
EGFRCR SERPLBLD CKD-EPI 2021: >90 ML/MIN/1.73M2
GLUCOSE SERPL-MCNC: 208 MG/DL (ref 70–99)
POTASSIUM SERPL-SCNC: 4.3 MMOL/L (ref 3.4–5.3)
SODIUM SERPL-SCNC: 139 MMOL/L (ref 135–145)

## 2024-04-05 ENCOUNTER — LAB (OUTPATIENT)
Dept: LAB | Facility: CLINIC | Age: 29
End: 2024-04-05
Payer: COMMERCIAL

## 2024-04-05 ENCOUNTER — OFFICE VISIT (OUTPATIENT)
Dept: OPHTHALMOLOGY | Facility: CLINIC | Age: 29
End: 2024-04-05
Attending: FAMILY MEDICINE
Payer: COMMERCIAL

## 2024-04-05 ENCOUNTER — ALLIED HEALTH/NURSE VISIT (OUTPATIENT)
Dept: EDUCATION SERVICES | Facility: CLINIC | Age: 29
End: 2024-04-05
Payer: COMMERCIAL

## 2024-04-05 ENCOUNTER — TELEPHONE (OUTPATIENT)
Dept: FAMILY MEDICINE | Facility: CLINIC | Age: 29
End: 2024-04-05

## 2024-04-05 DIAGNOSIS — G43.E01 CHRONIC MIGRAINE WITH AURA AND WITH STATUS MIGRAINOSUS, NOT INTRACTABLE: ICD-10-CM

## 2024-04-05 DIAGNOSIS — E11.9 TYPE 2 DIABETES MELLITUS (H): Primary | ICD-10-CM

## 2024-04-05 DIAGNOSIS — H52.223 REGULAR ASTIGMATISM OF BOTH EYES: ICD-10-CM

## 2024-04-05 DIAGNOSIS — E11.65 TYPE 2 DIABETES MELLITUS WITH HYPERGLYCEMIA, WITHOUT LONG-TERM CURRENT USE OF INSULIN (H): Primary | ICD-10-CM

## 2024-04-05 DIAGNOSIS — E11.9 TYPE 2 DIABETES MELLITUS WITHOUT RETINOPATHY (H): Primary | ICD-10-CM

## 2024-04-05 LAB
ANION GAP SERPL CALCULATED.3IONS-SCNC: 9 MMOL/L (ref 7–15)
BUN SERPL-MCNC: 14.3 MG/DL (ref 6–20)
CALCIUM SERPL-MCNC: 9.4 MG/DL (ref 8.6–10)
CHLORIDE SERPL-SCNC: 108 MMOL/L (ref 98–107)
CREAT SERPL-MCNC: 0.76 MG/DL (ref 0.51–0.95)
DEPRECATED HCO3 PLAS-SCNC: 23 MMOL/L (ref 22–29)
EGFRCR SERPLBLD CKD-EPI 2021: >90 ML/MIN/1.73M2
GLUCOSE SERPL-MCNC: 192 MG/DL (ref 70–99)
HBA1C MFR BLD: 8.4 % (ref 0–5.6)
POTASSIUM SERPL-SCNC: 4.3 MMOL/L (ref 3.4–5.3)
SODIUM SERPL-SCNC: 140 MMOL/L (ref 135–145)

## 2024-04-05 PROCEDURE — 83036 HEMOGLOBIN GLYCOSYLATED A1C: CPT

## 2024-04-05 PROCEDURE — 80048 BASIC METABOLIC PNL TOTAL CA: CPT

## 2024-04-05 PROCEDURE — G0108 DIAB MANAGE TRN  PER INDIV: HCPCS

## 2024-04-05 PROCEDURE — 36415 COLL VENOUS BLD VENIPUNCTURE: CPT

## 2024-04-05 PROCEDURE — 92004 COMPRE OPH EXAM NEW PT 1/>: CPT | Performed by: OPTOMETRIST

## 2024-04-05 PROCEDURE — 92015 DETERMINE REFRACTIVE STATE: CPT | Performed by: OPTOMETRIST

## 2024-04-05 ASSESSMENT — VISUAL ACUITY
OD_PH_SC: 20/30
OS_SC: J2-1
OS_PH_SC: 20/40
OD_SC: 20/40
METHOD: SNELLEN - LINEAR
OS_SC: 20/50
OD_PH_SC+: -2
OD_SC: J2-1
OS_SC+: -1
OD_SC+: +3

## 2024-04-05 ASSESSMENT — SLIT LAMP EXAM - LIDS
COMMENTS: NORMAL
COMMENTS: NORMAL

## 2024-04-05 ASSESSMENT — REFRACTION_MANIFEST
OS_CYLINDER: +1.25
OS_AXIS: 080
OD_CYLINDER: +1.25
OD_AXIS: 090
OD_SPHERE: -1.50
OS_SPHERE: -2.00

## 2024-04-05 ASSESSMENT — CUP TO DISC RATIO
OS_RATIO: 0.45
OD_RATIO: 0.45

## 2024-04-05 ASSESSMENT — CONF VISUAL FIELD
OD_SUPERIOR_NASAL_RESTRICTION: 0
OD_INFERIOR_TEMPORAL_RESTRICTION: 0
OS_NORMAL: 1
OS_SUPERIOR_TEMPORAL_RESTRICTION: 0
OD_INFERIOR_NASAL_RESTRICTION: 0
OS_SUPERIOR_NASAL_RESTRICTION: 0
OD_SUPERIOR_TEMPORAL_RESTRICTION: 0
OS_INFERIOR_TEMPORAL_RESTRICTION: 0
METHOD: COUNTING FINGERS
OD_NORMAL: 1
OS_INFERIOR_NASAL_RESTRICTION: 0

## 2024-04-05 ASSESSMENT — EXTERNAL EXAM - LEFT EYE: OS_EXAM: NORMAL

## 2024-04-05 ASSESSMENT — TONOMETRY
OD_IOP_MMHG: 18
OS_IOP_MMHG: 15
IOP_METHOD: ICARE

## 2024-04-05 ASSESSMENT — EXTERNAL EXAM - RIGHT EYE: OD_EXAM: NORMAL

## 2024-04-05 NOTE — PROGRESS NOTES
Diabetes Self-Management Education & Support  Anh Flores presents today for education related to Type 2 diabetes    Patient is being treated with:  Oral Agents and GLP-1 Agonist  She is accompanied by self    Year of diagnosis: 2019  Referring provider:  Glenys  Living Situation: Home with mom, sister, niece, nephew  Employment: [employer]    PATIENT CONCERNS/REASON FOR REFERRAL   Reaction to Victoza    ASSESSMENT:    Taking Medication:     Current Diabetes Management per Patient:  Taking diabetes medications? yes:     Diabetes Medication(s)       Dipeptidyl Peptidase-4 (DPP-4) Inhibitors       sitagliptin (JANUVIA) 25 MG tablet Take 1 tablet (25 mg) by mouth daily       Incretin Mimetic Agents       liraglutide (VICTOZA) 18 MG/3ML solution Inject 0.6 mg Subcutaneous daily for 7 days, THEN 1.2 mg daily for 14 days, THEN 1.8 mg daily for 90 days.            Monitoring  Patient glucose self monitoring as follows: continuously using a continuous glucose monitor (CGM)  CGM: Freestyle Jm 3  BG results:              Patient's most recent   Lab Results   Component Value Date    A1C 8.8 01/30/2024    A1C 7.6 01/18/2019      Patient's A1C goal: <7.0    Activity: Walks dogs, almost every day 15-20 minutes    Healthy Eating:   Patient currently eats 1-2 meals 1 snacks per day   Lunch 2:30pm: Soup, leftovers  Dinner: [place], vegetable, rice, homemade chicken noodle/chicken tortilla soup, salads  Snacks: Pretzels, carrots, celery, tortilla chips with salsa  Zero sugar powerade, crystal light throughout day    Problem Solving:    Patient is at risk of hypoglycemia?: Rare, shaky, headache, cold feeling.   Hospitalizations for hyper or hypoglycemia: No    Healthy Coping and Stress Management:   Sources of stress identified by patient: My health  Coping mechanisms identified by patient:  Other (please specify):  Dog shows      EDUCATION and INSTRUCTION PROVIDED AT THIS VISIT:    T2DM seen to review glucose after  increasing Victoza to 1.8mg. Unfortunately, after increase she reports large hives, spread throughout abdomen, intense itching that is not relieved with Benadryl. She did skip a few days and then went back to 1.2mg with hives, however not as large and itching not as intense. 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 She has been trying to eat more salad with protein on days knows glucose will be elevated. TIR significantly declined, likely from steroid use, now at  32%. TAR >180 47%, TAR >250 21%, with no lows. Average glucose 208. Glucose this past week have been improving, unclear on pattern since she only eats one meal per day. We will try to transition to Ozempic since having reaction to Victoza. She has follow up with PCP in a few weeks and re-establishing care with Dr. Cronin at the end of the month. Follow up with me end of May.    Patient-stated goal written and given to Anh Flores.  Verbalized and demonstrated understanding of instructions.   See patient instructions  AVS printed and given to patient-via My Chart    PLAN:  *Stop Victoza or reduce dose to 0.6mg until we know if Ozempic is approved    FOLLOW-UP:    * with   * Video Visit at 10am with Marcella    Time spent with patient at today's visit was 30 minutes.      Marcella Wells RN, [facility]  Diabetes Education Department  [facility], [place]  Phone: [phone]  [phone]    Any diabetes medication dose changes were made via the CDE Protocol and Collaborative Practice Agreement with [place] and [facility].  A copy of this encounter was provided to patient's referring provider.

## 2024-04-05 NOTE — TELEPHONE ENCOUNTER
PA Initiation    Medication: OZEMPIC (0.25 OR 0.5 MG/DOSE) 2 MG/3ML SC SOPN  Insurance Company: [email or web address] - Phone [phone] Fax [phone]  Pharm[facility]illing the Rx[id number] [place] [place] [facility] [address]  Filling Pharmacy Phone:    Filling Pharmacy Fax:    Start Date: 4/5/2024

## 2024-04-05 NOTE — PROGRESS NOTES
Assessment/Plan  (E11.9) Type 2 diabetes mellitus without retinopathy (H)  (primary encounter diagnosis)  Plan: Discussed findings with patient. Encouraged continued blood glucose, pressure, and lipid control. Patient should continue following recommendations of primary care provider. Patient should plan on returning to clinic annually for a dilated eye exam but was encouraged to return to clinic sooner with new flashes/floaters or other vision changes.     (G43.E01) Chronic migraine with aura and with status migrainosus, not intractable  Comment: Patient takes Topamax nightly, has also tried Imitrex. Patient is currently being treated by primary care physician, neurology, and has an upcoming appointment with ENT.   Plan: Discussed findings with patient. Vision may be playing a role in her vision, so recommend that she fill glasses Rx to improve clarity. FL-41 tint recommended for light sensitivity.     (H52.223) Regular astigmatism of both eyes  Plan: REFRACTION [[id number]]        Discussed findings with patient. New spectacle prescription dispensed to patient. Patient is welcome to return to clinic with prolonged adaptation difficulties.     Complete documentation of historical and exam elements from today's encounter can  be found in the full encounter summary report (not reduplicated in this progress  note). I personally obtained the chief complaint(s) and history of present illness. I  confirmed and edited as necessary the review of systems, past medical/surgical  history, family history, social history, and examination findings as documented by  others; and I examined the patient myself. I personally reviewed the relevant tests,  images, and reports as documented above. I formulated and edited as necessary the  assessment and plan and discussed the findings and management plan with the  patient and family.    Erick Smith OD

## 2024-04-05 NOTE — PATIENT INSTRUCTIONS
PLAN:  *Stop Victoza or reduce dose to 0.6mg until we know if Ozempic is approved    FOLLOW-UP:    *4/30 with   *5/23 Video Visit at 10am with Marcella

## 2024-04-05 NOTE — NURSING NOTE
"Chief Complaints and History of Present Illnesses   Patient presents with    Diabetic Eye Exam     Chief Complaint(s) and History of Present Illness(es)       Diabetic Eye Exam               Comments    Pt comes to the clinic as a new patient for a diabetic eye exam and to establish care. Her last eye exam was at least 5 years ago. Newly diagnosed with diabetes in January 2024. Pt has worn glasses in the past, but has not worn these now for some time. She states that she is having increased eye discomfort and headaches \"in her eyes\".  She does have frontal headaches as well. She does take medication for the headaches, but these do little to eliminate the discomfort.  MRI last week revealed something related to her sinuses. She was prescribed an oral antibiotic, but the discomfort persists.   Lab Results       Component                Value               Date                       A1C                      8.2                 03/29/2024                 A1C                      8.8                 01/30/2024                 A1C                      7.6                 01/18/2019                 A1C                      6.9                 01/15/2018                 A1C                      6.4                 03/15/2017                 A1C                      6.1                 09/17/2014                 A1C                      5.7                 07/09/2010                             "

## 2024-04-05 NOTE — RESULT ENCOUNTER NOTE
Hi     It was a pleasure meeting you!    Your A1c is elevated, indicated your Diabetes is not under control for your age. Please follow up with your PCP and/or nurse DM educator about these labs and possibly titrating up your medication if possible.     NARCISA Khan CNP

## 2024-04-08 DIAGNOSIS — E11.65 TYPE 2 DIABETES MELLITUS WITH HYPERGLYCEMIA, WITHOUT LONG-TERM CURRENT USE OF INSULIN (H): ICD-10-CM

## 2024-04-08 NOTE — TELEPHONE ENCOUNTER
Prior Authorization Approval    Medication: OZEMPIC (0.25 OR 0.5 MG/DOSE) 2 MG/3ML SC SOPN  Authorization Effective Date: 4/1/2024  Authorization Expiration Date: 4/6/2025  Approved Dose/Quantity: 3  Reference #: [id number]   Insurance Company: [employer] - Phone [phone] Fax [phone]  Expected CoPay: $    CoPay Card Available:      Financial Assistance Needed:    Which [id number] [place] [facility] [address]  Pharmacy Notified: yes  Patient Notified: yes

## 2024-04-08 NOTE — TELEPHONE ENCOUNTER
PA Initiation    Medication: OZEMPIC (0.25 OR 0.5 MG/DOSE) 2 MG/3ML SC SOPN  Insurance Company: [employer] - Phone [phone] Fax [phone]  [facility] the Rx[id number] [place] - [address]  Filling Pharmacy Phone:    Filling Pharmacy Fax:    Start Date: 4/5/2024

## 2024-04-10 ENCOUNTER — TELEPHONE (OUTPATIENT)
Dept: FAMILY MEDICINE | Facility: CLINIC | Age: 29
End: 2024-04-10
Payer: COMMERCIAL

## 2024-04-10 RX ORDER — BLOOD-GLUCOSE SENSOR
EACH MISCELLANEOUS
Qty: 6 EACH | Refills: 0 | Status: SHIPPED | OUTPATIENT
Start: 2024-04-10 | End: 2024-04-30

## 2024-04-10 NOTE — TELEPHONE ENCOUNTER
----- Message from NARCISA Anglin CNP sent at 4/10/2024  8:15 AM CDT -----  Team - please call patient with results.    Hi     It was a pleasure meeting you!     Your A1c is elevated, indicated your Diabetes is not under control for your age. Please follow up with your PCP and/or nurse DM educator about these labs and possibly titrating up your medication if possible.     NARCISA Khan CNP

## 2024-04-10 NOTE — TELEPHONE ENCOUNTER
Attempted to call patient regarding lab results/provider message. LMTCB  Upon patient call back, OKAY to relay results/message per provider.    [facility], April 10, 2024, 10:57 AM

## 2024-04-12 ENCOUNTER — TELEPHONE (OUTPATIENT)
Dept: SCHEDULING | Facility: CLINIC | Age: 29
End: 2024-04-12
Payer: COMMERCIAL

## 2024-04-12 DIAGNOSIS — G89.29 CHRONIC NONINTRACTABLE HEADACHE, UNSPECIFIED HEADACHE TYPE: Primary | ICD-10-CM

## 2024-04-12 DIAGNOSIS — R51.9 CHRONIC NONINTRACTABLE HEADACHE, UNSPECIFIED HEADACHE TYPE: Primary | ICD-10-CM

## 2024-04-12 NOTE — TELEPHONE ENCOUNTER
Patient is seeing Dr Veronica on 4/30; routing to provider to review if ok with doing this before visit    Christa Caruso CMA (AAMA)

## 2024-04-12 NOTE — TELEPHONE ENCOUNTER
Order/Referral Request    Who is requesting: Pt     Orders being requested: Referral for neurology     Reason service is needed/diagnosis: Headaches for 3 months      When are orders needed by: ASAP    Has this been discussed with Provider: No has an appointment scheduled for 4/30, but pt does not want to wait that long for a referral. She has seen 3 other providers in the last 2m and now wants to be seen by neurologists. She said Dr Veronica is her primary she wants to be with.     Does patient have a preference on a Group/Provider/Facility? [facility] [place] location     Does patient have an appointment scheduled?: No    Where to send orders: Fax and also place in chart so pt can see in [place]     Could we send this information to you in [employer] or would you prefer to receive a phone call?:   Patient would prefer a phone call   Okay to leave a detailed message?: Yes at Cell number on file:  and Tipp[employer]   Telephone Information:   Mobile [phone]

## 2024-04-14 ENCOUNTER — HEALTH MAINTENANCE LETTER (OUTPATIENT)
Age: 29
End: 2024-04-14

## 2024-04-19 ENCOUNTER — OFFICE VISIT (OUTPATIENT)
Dept: ORTHOPEDICS | Facility: CLINIC | Age: 29
End: 2024-04-19
Attending: FAMILY MEDICINE
Payer: COMMERCIAL

## 2024-04-19 DIAGNOSIS — G56.03 BILATERAL CARPAL TUNNEL SYNDROME: ICD-10-CM

## 2024-04-19 PROCEDURE — 99204 OFFICE O/P NEW MOD 45 MIN: CPT | Performed by: STUDENT IN AN ORGANIZED HEALTH CARE EDUCATION/TRAINING PROGRAM

## 2024-04-19 RX ORDER — MELOXICAM 7.5 MG/1
7.5 TABLET ORAL DAILY
Qty: 30 TABLET | Refills: 1 | Status: SHIPPED | OUTPATIENT
Start: 2024-04-19 | End: 2024-05-22

## 2024-04-19 NOTE — PROGRESS NOTES
Ortho Hand    HPI: 29F RHD NS  p/w BL fingertip tingling and numbness. Only the bilateral thumbs, index and middle are affected. Progressive over a few years. Worse with taking on new hobbies, like dog showing. She states that her activities are consistent with repetitive brushing for hours at a time. She has splinted at nighttime since 2018. She still wakes up with numbness in the hands nightly. She has had steroid injections with no improvement. She has been prescribed oral steroid that helps but cannot be on oral steroids long-term.     ROS: Negative, see HPI  PMH: HTN, CAH, T2DM  PSH: No surgeries to the hands or wrists  Medications: Lipitor, Liraglutide, Prednisone, Ozempic, Januvia, Imitrex, Topamax. No blood thinners  Allergies: None  SH: Denies smoking or use of tobacco products  FH: No bleeding or clotting issues, or problems with anesthesia    HgA1C 8.4 (2 weeks ago)  SCr 0.61    Examination:  Not distressed  Nonlabored breathing  Bilateral palm Tinel's with positive Durkan's tests  Can make full active composite fists  Bilateral thumb opposition remains intact with no thenar atrophy  Bilateral thumb A1 pulley mild tenderness with active grade 3 triggering, worse on the left    NCS/EMG 3/2024: Bilateral median compressive mononeuropathies  Left median APB motor latency of 4.1 ms, right of 4.5 ms  Left median sensory latency of 3.0 ms, right of 3.5 ms, but decreased velocities  No EMG findings indicating denervation    A/P: 29F RHD p/w BL CTS, thumb triggering    -Discussed the options for management of carpal tunnel syndrome, including observation, wrist cock-up splinting, lumbrical stretch exercises and surgery.  In my opinion, especially since she has higher grade thumb triggering, patient may benefit from surgery.  However, patient is not medically optimized yet.  We need to improve her blood glucose control.  Part of this effort may involve stopping oral steroids.  Prescribed meloxicam as a  possible alternative nonsteroidal anti-inflammatory medication.  Continue nonoperative attempt at treatment with splinting and stretch exercises.  Hemoglobin A1c will be rechecked at 10 weeks, followed by a visit at 3 months.  If the hemoglobin A1c is less than 7.5, then we may decide to schedule surgery.  Patient is agreeable with the plan.  -A total of 45 minutes was devoted to review of chart, direct face-to-face patient counseling and documentation during this encounter, exclusive of any procedure performed.    Vic Roberts MD, PhD

## 2024-04-19 NOTE — LETTER
4/19/2024         RE: Anh Flores  [address] [facility] [address]        Dear Colleague,    Thank you for referring your patient, Anh Flores, to the [facility]. Please see a copy of my visit note below.    Ortho Hand    HPI: 29F RHD NS  p/w BL fingertip tingling and numbness. Only the bilateral thumbs, index and middle are affected. Progressive over a few years. Worse with taking on new hobbies, like dog showing. She states that her activities are consistent with repetitive brushing for hours at a time. She has splinted at nighttime since 2018. She still wakes up with numbness in the hands nightly. She has had steroid injections with no improvement. She has been prescribed oral steroid that helps but cannot be on oral steroids long-term.     ROS: Negative, see HPI  PMH: HTN, CAH, T2DM  PSH: No surgeries to the hands or wrists  Medications: Lipitor, Liraglutide, Prednisone, Ozempic, Januvia, Imitrex, Topamax. No blood thinners  Allergies: None  SH: Denies smoking or use of tobacco products  FH: No bleeding or clotting issues, or problems with anesthesia    HgA1C 8.4 (2 weeks ago)  SCr 0.61    Examination:  Not distressed  Nonlabored breathing  Bilateral palm Tinel's with positive Durkan's tests  Can make full active composite fists  Bilateral thumb opposition remains intact with no thenar atrophy  Bilateral thumb A1 pulley mild tenderness with active grade 3 triggering, worse on the left    NCS/EMG 3/2024: Bilateral median compressive mononeuropathies  Left median APB motor latency of 4.1 ms, right of 4.5 ms  Left median sensory latency of 3.0 ms, right of 3.5 ms, but decreased velocities  No EMG findings indicating denervation    A/P: 29F RHD p/w BL CTS, thumb triggering    -Discussed the options for management of carpal tunnel syndrome, including observation, wrist cock-up splinting, lumbrical stretch exercises and surgery.  In my opinion, especially since she  has higher grade thumb triggering, patient may benefit from surgery.  However, patient is not medically optimized yet.  We need to improve her blood glucose control.  Part of this effort may involve stopping oral steroids.  Prescribed meloxicam as a possible alternative nonsteroidal anti-inflammatory medication.  Continue nonoperative attempt at treatment with splinting and stretch exercises.  Hemoglobin A1c will be rechecked at 10 weeks, followed by a visit at 3 months.  If the hemoglobin A1c is less than 7.5, then we may decide to schedule surgery.  Patient is agreeable with the plan.  -A total of 45 minutes was devoted to review of chart, direct face-to-face patient counseling and documentation during this encounter, exclusive of any procedure performed.    Vic Roberts MD, PhD           Again, thank you for allowing me to participate in the care of your patient.        Sincerely,        Vic Roberts MD

## 2024-04-22 NOTE — PROGRESS NOTES
Outcome for 04/22/24 9:08 AM: Data uploaded on Jmbabar Lopez LPN   Outcome for 04/26/24 8:55 AM: Data obtained via [employer] website  Aracely Gandhi MA    Patient is showing 4/5 MNCM met. A1c not in range   Aracely Gandhi MA           4 = No assist / stand by assistance

## 2024-04-26 NOTE — PATIENT INSTRUCTIONS
New Diabetes Patient Info  Welcome to the Diabetes Optimization Program at the Endocrinology and Diabetes Clinic at [facility]!    Our Diabetes Optimization Program is here to provide you with a team-based, collaborative approach to optimize your diabetes management. The team is made up of Endocrinologists and Physician Assistants here at the Clinic, your Primary Care Physician, Certified Diabetes Educators, Registered Nurses, Medical Assistants, Emergency Medical Technicians and Visit Facilitators.     During your care with us, we promise to:   Work with you and your Primary Care Provider Dr. Veronica to optimize your diabetes management.   Provide you with the tools and support you need to achieve a healthier lifestyle  Help you to control your diabetes by offering new treatments, education, and support to improve your diabetes management  Help you graduate back to your primary care provider for ongoing care once your diabetes is under control      Endocrinology Clinics Phone Number: [phone]    [facility] Address:   [place] Address:     [address] [address]   [address] [address] [place] [address]

## 2024-04-27 DIAGNOSIS — Z90.49 S/P CHOLECYSTECTOMY: ICD-10-CM

## 2024-04-27 DIAGNOSIS — E78.5 HYPERLIPIDEMIA LDL GOAL <100: ICD-10-CM

## 2024-04-30 ENCOUNTER — VIRTUAL VISIT (OUTPATIENT)
Dept: ENDOCRINOLOGY | Facility: CLINIC | Age: 29
End: 2024-04-30
Attending: FAMILY MEDICINE
Payer: COMMERCIAL

## 2024-04-30 ENCOUNTER — OFFICE VISIT (OUTPATIENT)
Dept: FAMILY MEDICINE | Facility: CLINIC | Age: 29
End: 2024-04-30
Payer: COMMERCIAL

## 2024-04-30 VITALS
RESPIRATION RATE: 20 BRPM | DIASTOLIC BLOOD PRESSURE: 70 MMHG | SYSTOLIC BLOOD PRESSURE: 134 MMHG | HEART RATE: 72 BPM | OXYGEN SATURATION: 100 % | TEMPERATURE: 98.3 F | HEIGHT: 60 IN | BODY MASS INDEX: 57.52 KG/M2 | WEIGHT: 293 LBS

## 2024-04-30 DIAGNOSIS — G56.03 BILATERAL CARPAL TUNNEL SYNDROME: Primary | ICD-10-CM

## 2024-04-30 DIAGNOSIS — E25.0 CONGENITAL ADRENAL HYPERPLASIA (H): ICD-10-CM

## 2024-04-30 DIAGNOSIS — E25.9 CAH 21-OH (CONGENITAL ADRENAL HYPERPLASIA), LATE ONSET (H): ICD-10-CM

## 2024-04-30 DIAGNOSIS — E11.65 TYPE 2 DIABETES MELLITUS WITH HYPERGLYCEMIA, WITHOUT LONG-TERM CURRENT USE OF INSULIN (H): ICD-10-CM

## 2024-04-30 DIAGNOSIS — E66.01 MORBID OBESITY (H): ICD-10-CM

## 2024-04-30 DIAGNOSIS — E25.9 CAH 21OH (CONGENITAL ADRENAL HYPERPLASIA DUE TO 21-HYDROXYLASE DEFICIENCY), LATE ONSET (H): Primary | ICD-10-CM

## 2024-04-30 DIAGNOSIS — G47.00 INSOMNIA, UNSPECIFIED TYPE: ICD-10-CM

## 2024-04-30 DIAGNOSIS — G43.911 INTRACTABLE MIGRAINE WITH STATUS MIGRAINOSUS, UNSPECIFIED MIGRAINE TYPE: ICD-10-CM

## 2024-04-30 LAB — HBA1C MFR BLD: 8 % (ref 0–5.6)

## 2024-04-30 PROCEDURE — 84439 ASSAY OF FREE THYROXINE: CPT | Performed by: FAMILY MEDICINE

## 2024-04-30 PROCEDURE — 82533 TOTAL CORTISOL: CPT | Performed by: FAMILY MEDICINE

## 2024-04-30 PROCEDURE — 83036 HEMOGLOBIN GLYCOSYLATED A1C: CPT | Performed by: FAMILY MEDICINE

## 2024-04-30 PROCEDURE — 84270 ASSAY OF SEX HORMONE GLOBUL: CPT | Performed by: FAMILY MEDICINE

## 2024-04-30 PROCEDURE — 84403 ASSAY OF TOTAL TESTOSTERONE: CPT | Performed by: FAMILY MEDICINE

## 2024-04-30 PROCEDURE — 82157 ASSAY OF ANDROSTENEDIONE: CPT | Mod: 90 | Performed by: FAMILY MEDICINE

## 2024-04-30 PROCEDURE — 83001 ASSAY OF GONADOTROPIN (FSH): CPT | Performed by: FAMILY MEDICINE

## 2024-04-30 PROCEDURE — 83002 ASSAY OF GONADOTROPIN (LH): CPT | Performed by: FAMILY MEDICINE

## 2024-04-30 PROCEDURE — 84443 ASSAY THYROID STIM HORMONE: CPT | Performed by: FAMILY MEDICINE

## 2024-04-30 PROCEDURE — 83498 ASY HYDROXYPROGESTERONE 17-D: CPT | Performed by: FAMILY MEDICINE

## 2024-04-30 PROCEDURE — 80048 BASIC METABOLIC PNL TOTAL CA: CPT | Performed by: FAMILY MEDICINE

## 2024-04-30 PROCEDURE — 82024 ASSAY OF ACTH: CPT | Performed by: FAMILY MEDICINE

## 2024-04-30 PROCEDURE — 99000 SPECIMEN HANDLING OFFICE-LAB: CPT | Performed by: FAMILY MEDICINE

## 2024-04-30 PROCEDURE — 99214 OFFICE O/P EST MOD 30 MIN: CPT | Performed by: FAMILY MEDICINE

## 2024-04-30 PROCEDURE — 82627 DEHYDROEPIANDROSTERONE: CPT | Performed by: FAMILY MEDICINE

## 2024-04-30 PROCEDURE — 82670 ASSAY OF TOTAL ESTRADIOL: CPT | Performed by: FAMILY MEDICINE

## 2024-04-30 PROCEDURE — 36415 COLL VENOUS BLD VENIPUNCTURE: CPT | Performed by: FAMILY MEDICINE

## 2024-04-30 PROCEDURE — 99205 OFFICE O/P NEW HI 60 MIN: CPT | Mod: 95 | Performed by: INTERNAL MEDICINE

## 2024-04-30 RX ORDER — CHOLESTYRAMINE LIGHT 4 G/5.7G
4 POWDER, FOR SUSPENSION ORAL 2 TIMES DAILY WITH MEALS
Qty: 180 PACKET | Refills: 1 | Status: SHIPPED | OUTPATIENT
Start: 2024-04-30 | End: 2024-10-27

## 2024-04-30 RX ORDER — GABAPENTIN 300 MG/1
CAPSULE ORAL
Qty: 187 CAPSULE | Refills: 0 | Status: SHIPPED | OUTPATIENT
Start: 2024-04-30 | End: 2024-09-10

## 2024-04-30 RX ORDER — ACYCLOVIR 400 MG/1
1 TABLET ORAL ONCE
Qty: 1 EACH | Refills: 0 | Status: SHIPPED | OUTPATIENT
Start: 2024-04-30 | End: 2024-04-30

## 2024-04-30 RX ORDER — ACYCLOVIR 400 MG/1
1 TABLET ORAL
Qty: 9 EACH | Refills: 5 | Status: SHIPPED | OUTPATIENT
Start: 2024-04-30 | End: 2024-07-30

## 2024-04-30 RX ORDER — ATORVASTATIN CALCIUM 10 MG/1
10 TABLET, FILM COATED ORAL DAILY
Qty: 90 TABLET | Refills: 2 | Status: SHIPPED | OUTPATIENT
Start: 2024-04-30

## 2024-04-30 RX ORDER — PROCHLORPERAZINE 25 MG/1
SUPPOSITORY RECTAL
Qty: 1 EACH | Refills: 1 | Status: SHIPPED | OUTPATIENT
Start: 2024-04-30 | End: 2024-07-30

## 2024-04-30 ASSESSMENT — PAIN SCALES - GENERAL
PAINLEVEL: SEVERE PAIN (7)
PAINLEVEL: EXTREME PAIN (8)

## 2024-04-30 ASSESSMENT — ENCOUNTER SYMPTOMS: HEADACHES: 1

## 2024-04-30 NOTE — LETTER
4/30/2024         RE: Anh Flores  [address] [facility] [address]        Dear Colleague,    Thank you for referring your patient, Anh Flores, to the [facility]. Please see a copy of my visit note below.    Outcome for 04/22/24 9:08 AM: Data uploaded on [employer]  Adriana Lopez LPN   Outcome for 04/26/24 8:55 AM: Data obtained via [employer] website  Aracely Gandhi MA    Patient is showing 4/5 MNCM met. A1c not in range   Aracely Gandhi MA            Sleep medicine   Video-Visit Details    Type of service:  Video Visit    Joined the call at 4/30/2024, 2:18:01 pm.  Left the call at 4/30/2024, 2:48:51 pm.  Originating Location (pt. 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 She was previously seen by me, most recently in 2018.  She is accompanied by her mother.    Anh Flores is a 29 year old female, previously seen by Dr. 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   Around age 6, while being evaluated for precocious puberty and darkening of the skin around her neck, she was diagnosed with late onset CAH and treated with 0.25 mg dexamethasone daily for a short period of time. Treatment with birth control pills did not induce a withdrawal bleeding. She 1st noticed the appearance of facial hair around age 13. Over the years, the patient had sporadic medical care.   Treatment with metformin, spironolactone and birth control pills was restarted in 2017.  Metformin used to cause some epigastric abdominal pain.    She reports being in her regular state of health until December 2023, when she was admitted for an acute infectious process  associated with diarrhea.  Since then, the patient has noticed fatigue and worsening headaches.    Following the hospitalization she reestablish medical care.  She was diagnosed with carpal tunnel syndrome and she was told that her diabetes should be better controlled before considering surgery.  Treatment with Topamax was started for migraines on March 11, and 25 mg daily.  The patient has not noticed any improvement.    Prednisone was prescribed as needed for symptoms related to carpal tunnel syndrome.  The patient has been taking it intermittently, and only at 10 mg daily, as it significantly affects her blood sugar numbers.  She reports feeling significantly better when she takes it.    The facial hair has remained unchanged.  She denies experiencing acne, new stretch gibbons.    Sivan was diagnosed with prediabetes in 2014 and she was formally diagnosed with type 2 diabetes in 2018, when A1c was 6.9.  Most recent A1c was 8.4, on 4/5/2024.  Weight is down 25 lbs since 2018.  The patient attributes the weight loss to being more active with her new job as a .  She was initially started on Victoza in February and then transition to Ozempic as she developed an allergic reaction to Victoza.  The current dose of Ozempic is at 0.25 mg weekly and she has been taking this dose for almost a month.  She was also started on treatment with Januvia and she reports taking it simultaneously with Ozempic.  Denies experiencing gastrointestinal side effects, besides nausea, which has been a longstanding symptom, for years.  She describes the nausea as being constant, not associated with vomiting.  She also describes feeling lightheaded quite often, but mainly with exertion.  The headaches are localized to the left forehead area.  According to the patient, she had an MRI done and it was suspected that headaches may be related to sinusitis.  An ENT appointment is scheduled in July.    Diabetes complications:  Last eye  exam: last eye exam - 3/2024. No DR per patient.   No history of microalbuminuria.  Most recent urine microalbumin negative in January 2024.  Recent GFR above 90.  No history of numbness or tingling sensation in her feet  Most recent lipid panel from 1/30/2024: LDL cholesterol 145, HDL cholesterol 51, triglycerides 132.  On 10 mg atorvastatin daily.    Prior images:   Pelvic US 2007 Limited sonographic evaluation secondary to the patient's body habitus.  CT abd 2007 - no adrenal enlargement   CT abdomen and pelvis 2010 - normal uterus and ovaries  An MRI of the brain done on 10/15/14 was unremarkable  Sleep study was done in 2007 or 2008    ROS:  Systemic symptoms: fatigue present for the last 3-4 months   Eye symptoms: No eye symptoms.  Otolaryngeal symptoms: No otolaryngeal symptoms.  Breast symptoms: persistent dry cough - which she relates to allergies   Cardiovascular symptoms: No cardiovascular symptoms.    Pulmonary symptoms: No pulmonary symptoms.  Gastrointestinal symptoms: no diarrhea or constipation since taking ozempic   Genitourinary symptoms: No genitourinary symptoms.  Endocrine symptoms: cold intolerance noted for the last year    Hematologic symptoms: No hematologic symptoms.  Musculoskeletal symptoms: diffuse muscle pain with minimal exertion; has a harder time lifting heavy objects   Neurological symptoms: numbness and tingling sensation in her hands - she works as a  and has CTS; plans to have surgery  Psychological symptoms: No psychological symptoms.  She has been experiencing insomnia, she only sleeps 4 hours a night.  Skin symptoms: as above     Past Medical History:   Diagnosis Date     CAH (congenital adrenal hyperplasia) 2/9/2010    Followed by Dr. Brewer; next follow up 1.2011.  Metformin increased to 850 mg twice a day.      Diabetes mellitus, type 2 (H) 10/19/2020     Vitamin D deficiency 2/9/2010   Morbid obesity  Acanthosis nigricans  Hydradenitis suppurativa    Headaches   Screening for celiac disease negative in January 2024    Past Surgical History:   Procedure Laterality Date     CHOLECYSTECTOMY      She was in 8th grade      Current Medications    Current Outpatient Medications:      alcohol swab prep pads, Use to swab area of injection/anthony as directed., Disp: 100 each, Rfl: 3     atorvastatin (LIPITOR) 10 MG tablet, TAKE 1 TABLET(10 MG) BY MOUTH DAILY, Disp: 90 tablet, Rfl: 2     cholestyramine light (QUESTRAN) 4 GM packet, Take 1 packet (4 g) by mouth 2 times daily (with meals) for 180 days, Disp: 180 packet, Rfl: 1     Continuous Blood Gluc  (FREESTYLE AYSE 3 READER) ROSHAN, 1 Application continuous, Disp: 1 each, Rfl: 3     Continuous Blood Gluc Sensor (FREESTYLE AYSE 3 SENSOR) MISC, CHANGE EVERY 14 DAYS, Disp: 6 each, Rfl: 0     insulin pen needle (ULTICARE MICRO) 32G X 4 MM miscellaneous, Use 1 pen needles daily or as directed., Disp: 100 each, Rfl: 3     liraglutide (VICTOZA) 18 MG/3ML solution, Inject 0.6 mg Subcutaneous daily for 7 days, THEN 1.2 mg daily for 14 days, THEN 1.8 mg daily for 90 days., Disp: 3 mL, Rfl: 3     meloxicam (MOBIC) 7.5 MG tablet, Take 1 tablet (7.5 mg) by mouth daily, Disp: 30 tablet, Rfl: 1     ondansetron (ZOFRAN ODT) 4 MG ODT tab, Take 1 tablet (4 mg) by mouth every 8 hours as needed for nausea, Disp: 18 tablet, Rfl: 0     predniSONE (DELTASONE) 20 MG tablet, Take 2 tablets (40 mg) by mouth daily, Disp: 14 tablet, Rfl: 0     semaglutide (OZEMPIC) 2 MG/3ML pen, Inject 0.25 mg Subcutaneous every 7 days, Disp: 3 mL, Rfl: 1     sitagliptin (JANUVIA) 25 MG tablet, Take 1 tablet (25 mg) by mouth daily, Disp: 90 tablet, Rfl: 0     SUMAtriptan (IMITREX) 50 MG tablet, Take 1 tablet (50 mg) by mouth at onset of headache for migraine May repeat in 2 hours. Max 4 tablets/24 hours., Disp: 8 tablet, Rfl: 0     topiramate (TOPAMAX) 25 MG tablet, Take 1 tablet (25 mg) by mouth at bedtime For 1 week then 2 pills at night., Disp: 60  tablet, Rfl: 1    Family History   Problem Relation Age of Onset     Neurological Sister 16     migraines   Maternal uncle - colon cancer.  Both maternal grandparents and her mother have hypertension. There is a family history of obesity and hirsutism (both her sisters). There is a family history of obesity. Her mother and 2 great aunts have type 2 diabetes. Her mother is [place].      Social History  She denies smoking, drinking alcohol or using illicit drugs. Occupation: .              Vital Signs     Previous Weights:    Wt Readings from Last 10 Encounters:   03/29/24 135.6 kg (299 lb)   03/11/24 136.2 kg (300 lb 3.2 oz)   01/30/24 136.5 kg (300 lb 14.4 oz)   12/08/23 132 kg (290 lb 14.4 oz)   10/19/20 139.4 kg (307 lb 6.4 oz)   01/23/19 146.2 kg (322 lb 4.8 oz)   01/21/19 147.9 kg (326 lb)   01/18/19 148 kg (326 lb 4.8 oz)   01/10/18 (!) 148.4 kg (327 lb 2.6 oz)   02/21/17 (!) 146.4 kg (322 lb 12.1 oz)        Vital signs:   There were no vitals taken for this visit.    Physical Exam  General Appearance: morbid obesity, round face, no plethora    Eyes: grossly normal to inspection, conjunctivae and sclerae normal, no lid lag or stare   Respiratory: no audible wheeze, cough, or visible cyanosis.  No visible retractions or increased work of breathing.  Able to speak fully in complete sentences.  Neurological: Cranial nerves grossly intact, mentation intact and speech normal; no tremor of the hands   Skin: no lesions on exposed skin   Psychological: mentation appears normal, affect normal, judgement and insight intact, normal speech     Lab Results  I reviewed prior lab results documented in Epic.  Lab Results   Component Value Date    A1C 8.4 (H) 04/05/2024    A1C 8.2 (H) 03/29/2024    A1C 8.8 (H) 01/30/2024    A1C 7.6 (H) 01/18/2019    A1C 6.9 (H) 01/15/2018    A1C 6.4 (H) 03/15/2017    A1C 6.1 (H) 09/17/2014    A1C 5.7 07/09/2010     Assessment     1. Nonclassical congenital adrenal  hyperplasia, associated with primary amenorrhea, treated with dexamethasone until 2014, when dexamethasone was discontinued.  Following the discontinuation of dexamethasone, the 17 hydroxyprogesterone went up, but the DHEAS, dehydroepiandrosterone and testosterone remained stable, in the normal range.  She used to be medicated with metformin, spironolactone and birth control pills.  They were discontinued around 2019.  Recommendations:  Pursue a hormonal panel for nonclassical CAH before resuming treatment with the above medications.    2.  Class III obesity, associated with severe insulin resistance.   She has establish care with a dietitian.    3. Type 2 diabetes, uncontrolled, with no known microvascular disease  Plan:   Discontinue Januvia  Increase the dose of Ozempic to 0.5 mg weekly    4.  Fatigue, headaches, insomnia  Referral for sleep medicine clinic placed.    Lab work to be done in the morning  Orders Placed This Encounter   Procedures     Adrenal corticotropin     DHEA sulfate     Estradiol     Basic metabolic panel     Androstenedione     17 OH progesterone     Cortisol     Basic metabolic panel     TSH     T4 free     Follicle stimulating hormone     Luteinizing Hormone     Adult Sleep Eval & Management Referral     Testosterone Free and Total     65 minutes spent on the date of the encounter doing chart review, history and exam, documentation and further activities as noted above.                       Again, thank you for allowing me to participate in the care of your patient.        Sincerely,        Alejandra Cronin MD

## 2024-04-30 NOTE — PROGRESS NOTES
Assessment & Plan     Bilateral carpal tunnel syndrome  Still symptomatic, established with hand orthopedic, unfortunately prednisone was discontinued due to hypoglycemia.  Meloxicam not so effective with bracing.  She still works as a .  Consideration for possibly surgery down the line once her blood sugar is stabilized.  She is asking for temporary relief of her pain.  Topiramate started for migraines, I am worried this might worsen her neuropathy.  Trial of gabapentin and occupational therapy.  Continue meloxicam and recommendations by Ortho [I appreciate orthopedics input and expertise]  - gabapentin (NEURONTIN) 300 MG capsule; Take 1 capsule (300 mg) by mouth at bedtime for 7 days, THEN 1 capsule (300 mg) 2 times daily for 90 days.  - Occupational Therapy  Referral; Future  - Hemoglobin A1c    Intractable migraine with status migrainosus, unspecified migraine type  Some what improved, has appointment with neurology, trial of gabapentin  - gabapentin (NEURONTIN) 300 MG capsule; Take 1 capsule (300 mg) by mouth at bedtime for 7 days, THEN 1 capsule (300 mg) 2 times daily for 90 days.  - Occupational Therapy  Referral; Future    Type 2 diabetes mellitus with hyperglycemia, without long-term current use of insulin (H)  Established with endocrinology.  She is currently on Ozempic.  Unfortunately she has had issues with her glucose continuous monitor.  We will do a prior authorization for Dexcom and see if this will be helpful.  - Continuous Glucose  (DEXCOM G7 ) ROSHAN; 1 each once for 1 dose Use to read blood sugars as per manufacturers instructions  - Continuous Glucose Sensor (DEXCOM G7 SENSOR) MISC; 1 each every 10 days Change sensor every 10 days  - Continuous Glucose Transmitter (DEXCOM G6 TRANSMITTER) MISC; Change every 3 months.    CAH (congenital adrenal hyperplasia)  - Adrenal corticotropin  - Testosterone Free and Total  - DHEA sulfate  - Estradiol  - Basic  "metabolic panel  - Androstenedione  - 17 OH progesterone  - Cortisol  - TSH  - T4 free  - Follicle stimulating hormone  - Luteinizing Hormone        Tanika Kamara is a 29 year old, presenting for the following health issues:  Headache and Diabetes        4/30/2024     5:09 PM   Additional Questions   Roomed by Christa Caruso CMA   Accompanied by self         4/30/2024     5:09 PM   Patient Reported Additional Medications   Patient reports taking the following new medications none     Headache     History of Present Illness       Diabetes:   She presents for follow up of diabetes.   She is checking home blood glucose with a continuous glucose monitor.   She checks blood glucose before and after meals.  Blood glucose is sometimes over 200 and sometimes under 70. She is aware of hypoglycemia symptoms including dizziness and blurred vision.    She has no concerns regarding her diabetes at this time.   She is not experiencing numbness or burning in feet, excessive thirst, blurry vision, weight changes or redness, sores or blisters on feet.           Headaches:   Since the patient's last clinic visit, headaches are: no change  The patient is getting headaches:  Same have very little improvement  She is able to do normal daily activities when she has a migraine.  The patient is taking the following rescue/relief medications:  Sumatriptan (Imitrex)   Patient states \"The relief is inconsistent\" from the rescue/relief medications.   The patient is taking the following medications to prevent migraines:  Topomax  In the past 4 weeks, the patient has gone to an Urgent Care or Emergency Room 0 times times due to headaches.    She eats 2-3 servings of fruits and vegetables daily.She consumes 0 sweetened beverage(s) daily.She exercises with enough effort to increase her heart rate 20 to 29 minutes per day.  She exercises with enough effort to increase her heart rate 3 or less days per week.   She is taking medications " regularly.           Review of Systems  Constitutional, neuro, ENT, endocrine, pulmonary, cardiac, gastrointestinal, genitourinary, musculoskeletal, integument and psychiatric systems are negative, except as otherwise noted.      Objective    /70   Pulse 72   Temp 98.3  F (36.8  C) (Temporal)   Resp 20   Ht 1.524 m (5')   Wt 137 kg (302 lb)   LMP  (LMP Unknown)   SpO2 100%   Breastfeeding No   BMI 58.98 kg/m    Body mass index is 58.98 kg/m .  Physical Exam   GENERAL: alert and no distress  RESP: lungs clear to auscultation - no rales, rhonchi or wheezes  CV: regular rate and rhythm, normal S1 S2, no S3 or S4, no murmur, click or rub, no peripheral edema  ABDOMEN: soft, nontender, no hepatosplenomegaly, no masses and bowel sounds normal  NEURO: Normal strength and tone, mentation intact and speech normal  PSYCH: mentation appears normal, affect normal/bright            Signed Electronically by: Bita Veronica MD

## 2024-04-30 NOTE — PROGRESS NOTES
Sleep medicine   Video-Visit Details    Type of service:  Video Visit    Joined the call at 4/30/2024, 2:18:01 pm.  Left the call at 4/30/2024, 2:48:51 pm.  Originating Location (pt. Location): Home  Distant Location (provider location): Off-site    Mode of Communication:  Video Conference via [employer]    =========================================================================================    The patient is seen for evaluation of nonclassical congenital adrenal hyperplasia. She was previously seen by me, most recently in 2018.      Anh Flores is a 29 year old female, previously seen by Dr. 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   Around age 6, while being evaluated for precocious puberty and darkening of the skin around her neck, she was diagnosed with late onset CAH and treated with 0.25 mg dexamethasone daily for a short period of time. Treatment with birth control pills did not induce a withdrawal bleeding. She 1st noticed the appearance of facial hair around age 13. Over the years, the patient had sporadic medical care.   Treatment with metformin, spironolactone and birth control pills was restarted in 2017.  Metformin used to cause some epigastric abdominal pain.    She reports being in her regular state of health until December 2023, when she was admitted for an acute infectious process associated with diarrhea.  Since then, the patient has noticed fatigue and worsening headaches.    Following the hospitalization she reestablish medical care.  She was diagnosed with carpal tunnel syndrome and she was told that her diabetes should be better controlled before considering surgery.  Treatment with Topamax was started for migraines on March 11, and 25 mg daily.  The patient has not noticed any improvement.    Prednisone was prescribed as needed for symptoms related to carpal tunnel syndrome.  The patient has been  taking it intermittently, and only at 10 mg daily, as it significantly affects her blood sugar numbers.  She reports feeling significantly better when she takes it.    The facial hair has remained unchanged.  She denies experiencing acne, new stretch gibbons.    Sivan was diagnosed with prediabetes in 2014 and she was formally diagnosed with type 2 diabetes in 2018, when A1c was 6.9.  Most recent A1c was 8.4, on 4/5/2024.  Weight is down 25 lbs since 2018.  The patient attributes the weight loss to being more active with her new job as a .  She was initially started on Victoza in February and then transition to Ozempic as she developed an allergic reaction to Victoza.  The current dose of Ozempic is at 0.25 mg weekly and she has been taking this dose for almost a month.  She was also started on treatment with Januvia and she reports taking it simultaneously with Ozempic.  Denies experiencing gastrointestinal side effects, besides nausea, which has been a longstanding symptom, for years.  She describes the nausea as being constant, not associated with vomiting.  She also describes feeling lightheaded quite often, but mainly with exertion.  The headaches are localized to the left forehead area.  According to the patient, she had an MRI done and it was suspected that headaches may be related to sinusitis.  An ENT appointment is scheduled in July.    Diabetes complications:  Last eye exam: last eye exam - 3/2024. No DR per patient.   No history of microalbuminuria.  Most recent urine microalbumin negative in January 2024.  Recent GFR above 90.  No history of numbness or tingling sensation in her feet  Most recent lipid panel from 1/30/2024: LDL cholesterol 145, HDL cholesterol 51, triglycerides 132.  On 10 mg atorvastatin daily.    Prior images:   Pelvic US 2007 Limited sonographic evaluation secondary to the patient's body habitus.  CT abd 2007 - no adrenal enlargement   CT abdomen and pelvis 2010 - normal  uterus and ovaries  An MRI of the brain done on 10/15/14 was unremarkable  Sleep study was done in 2007 or 2008    ROS:  Systemic symptoms: fatigue present for the last 3-4 months   Eye symptoms: No eye symptoms.  Otolaryngeal symptoms: No otolaryngeal symptoms.  Breast symptoms: persistent dry cough - which she relates to allergies   Cardiovascular symptoms: No cardiovascular symptoms.    Pulmonary symptoms: No pulmonary symptoms.  Gastrointestinal symptoms: no diarrhea or constipation since taking ozempic   Genitourinary symptoms: No genitourinary symptoms.  Endocrine symptoms: cold intolerance noted for the last year    Hematologic symptoms: No hematologic symptoms.  Musculoskeletal symptoms: diffuse muscle pain with minimal exertion; has a harder time lifting heavy objects   Neurological symptoms: numbness and tingling sensation in her hands - she works as a  and has CTS; plans to have surgery  Psychological symptoms: No psychological symptoms.  She has been experiencing insomnia, she only sleeps 4 hours a night.  Skin symptoms: as above     Past Medical History:   Diagnosis Date    CAH (congenital adrenal hyperplasia) 2/9/2010    Followed by Dr. Brewer; next follow up 1.2011.  Metformin increased to 850 mg twice a day.     Diabetes mellitus, type 2 (H) 10/19/2020    Vitamin D deficiency 2/9/2010   Morbid obesity  Acanthosis nigricans  Hydradenitis suppurativa   Headaches   Screening for celiac disease negative in January 2024    Past Surgical History:   Procedure Laterality Date    CHOLECYSTECTOMY      She was in 8th grade      Current Medications    Current Outpatient Medications:     alcohol swab prep pads, Use to swab area of injection/anthony as directed., Disp: 100 each, Rfl: 3    atorvastatin (LIPITOR) 10 MG tablet, TAKE 1 TABLET(10 MG) BY MOUTH DAILY, Disp: 90 tablet, Rfl: 2    cholestyramine light (QUESTRAN) 4 GM packet, Take 1 packet (4 g) by mouth 2 times daily (with meals) for 180 days,  Disp: 180 packet, Rfl: 1    Continuous Blood Gluc  (FREESTYLE AYSE 3 READER) ROSHAN, 1 Application continuous, Disp: 1 each, Rfl: 3    Continuous Blood Gluc Sensor (FREESTYLE AYSE 3 SENSOR) MISC, CHANGE EVERY 14 DAYS, Disp: 6 each, Rfl: 0    insulin pen needle (ULTICARE MICRO) 32G X 4 MM miscellaneous, Use 1 pen needles daily or as directed., Disp: 100 each, Rfl: 3    liraglutide (VICTOZA) 18 MG/3ML solution, Inject 0.6 mg Subcutaneous daily for 7 days, THEN 1.2 mg daily for 14 days, THEN 1.8 mg daily for 90 days., Disp: 3 mL, Rfl: 3    meloxicam (MOBIC) 7.5 MG tablet, Take 1 tablet (7.5 mg) by mouth daily, Disp: 30 tablet, Rfl: 1    ondansetron (ZOFRAN ODT) 4 MG ODT tab, Take 1 tablet (4 mg) by mouth every 8 hours as needed for nausea, Disp: 18 tablet, Rfl: 0    predniSONE (DELTASONE) 20 MG tablet, Take 2 tablets (40 mg) by mouth daily, Disp: 14 tablet, Rfl: 0    semaglutide (OZEMPIC) 2 MG/3ML pen, Inject 0.25 mg Subcutaneous every 7 days, Disp: 3 mL, Rfl: 1    sitagliptin (JANUVIA) 25 MG tablet, Take 1 tablet (25 mg) by mouth daily, Disp: 90 tablet, Rfl: 0    SUMAtriptan (IMITREX) 50 MG tablet, Take 1 tablet (50 mg) by mouth at onset of headache for migraine May repeat in 2 hours. Max 4 tablets/24 hours., Disp: 8 tablet, Rfl: 0    topiramate (TOPAMAX) 25 MG tablet, Take 1 tablet (25 mg) by mouth at bedtime For 1 week then 2 pills at night., Disp: 60 tablet, Rfl: 1    Family History   Problem Relation Age of Onset    Neurological Sister 16     migraines   Maternal uncle - colon cancer.  Both maternal grandparents and her mother have hypertension. There is a family history of obesity and hirsutism (both her sisters). There is a family history of obesity. Her mother and 2 great aunts have type 2 diabetes. Her mother is [place] American.      Social History  She denies smoking, drinking alcohol or using illicit drugs. Occupation: .              Vital Signs     Previous Weights:    Wt Readings from  Last 10 Encounters:   03/29/24 135.6 kg (299 lb)   03/11/24 136.2 kg (300 lb 3.2 oz)   01/30/24 136.5 kg (300 lb 14.4 oz)   12/08/23 132 kg (290 lb 14.4 oz)   10/19/20 139.4 kg (307 lb 6.4 oz)   01/23/19 146.2 kg (322 lb 4.8 oz)   01/21/19 147.9 kg (326 lb)   01/18/19 148 kg (326 lb 4.8 oz)   01/10/18 (!) 148.4 kg (327 lb 2.6 oz)   02/21/17 (!) 146.4 kg (322 lb 12.1 oz)        Vital signs:   There were no vitals taken for this visit.    Physical Exam  General Appearance: morbid obesity, round face, no plethora    Eyes: grossly normal to inspection, conjunctivae and sclerae normal, no lid lag or stare   Respiratory: no audible wheeze, cough, or visible cyanosis.  No visible retractions or increased work of breathing.  Able to speak fully in complete sentences.  Neurological: Cranial nerves grossly intact, mentation intact and speech normal; no tremor of the hands   Skin: no lesions on exposed skin   Psychological: mentation appears normal, affect normal, judgement and insight intact, normal speech     Lab Results  I reviewed prior lab results documented in Epic.  Lab Results   Component Value Date    A1C 8.4 (H) 04/05/2024    A1C 8.2 (H) 03/29/2024    A1C 8.8 (H) 01/30/2024    A1C 7.6 (H) 01/18/2019    A1C 6.9 (H) 01/15/2018    A1C 6.4 (H) 03/15/2017    A1C 6.1 (H) 09/17/2014    A1C 5.7 07/09/2010     Assessment     1. Nonclassical congenital adrenal hyperplasia, associated with primary amenorrhea, treated with dexamethasone until 2014, when dexamethasone was discontinued.  Following the discontinuation of dexamethasone, the 17 hydroxyprogesterone went up, but the DHEAS, dehydroepiandrosterone and testosterone remained stable, in the normal range.  She used to be medicated with metformin, spironolactone and birth control pills.  They were discontinued around 2019.  Recommendations:  Pursue a hormonal panel for nonclassical CAH before resuming treatment with the above medications.    2.  Class III obesity, associated  with severe insulin resistance.   She has establish care with a dietitian.    3. Type 2 diabetes, uncontrolled, with no known microvascular disease  Plan:   Discontinue Januvia  Increase the dose of Ozempic to 0.5 mg weekly    4.  Fatigue, headaches, insomnia  Referral for sleep medicine clinic placed.    Lab work to be done in the morning  Orders Placed This Encounter   Procedures    Adrenal corticotropin    DHEA sulfate    Estradiol    Basic metabolic panel    Androstenedione    17 OH progesterone    Cortisol    Basic metabolic panel    TSH    T4 free    Follicle stimulating hormone    Luteinizing Hormone    Adult Sleep Eval & Management Referral    Testosterone Free and Total     65 minutes spent on the date of the encounter doing chart review, history and exam, documentation and further activities as noted above.

## 2024-05-01 LAB
ANION GAP SERPL CALCULATED.3IONS-SCNC: 12 MMOL/L (ref 7–15)
BUN SERPL-MCNC: 12 MG/DL (ref 6–20)
CALCIUM SERPL-MCNC: 9.8 MG/DL (ref 8.6–10)
CHLORIDE SERPL-SCNC: 106 MMOL/L (ref 98–107)
CORTIS SERPL-MCNC: 7.3 UG/DL
CREAT SERPL-MCNC: 0.62 MG/DL (ref 0.51–0.95)
DEPRECATED HCO3 PLAS-SCNC: 21 MMOL/L (ref 22–29)
EGFRCR SERPLBLD CKD-EPI 2021: >90 ML/MIN/1.73M2
ESTRADIOL SERPL-MCNC: 39 PG/ML
FSH SERPL IRP2-ACNC: 6.6 MIU/ML
GLUCOSE SERPL-MCNC: 106 MG/DL (ref 70–99)
LH SERPL-ACNC: 10.8 MIU/ML
POTASSIUM SERPL-SCNC: 4.5 MMOL/L (ref 3.4–5.3)
SHBG SERPL-SCNC: 15 NMOL/L (ref 30–135)
SODIUM SERPL-SCNC: 139 MMOL/L (ref 135–145)
T4 FREE SERPL-MCNC: 1.09 NG/DL (ref 0.9–1.7)
TSH SERPL DL<=0.005 MIU/L-ACNC: 0.93 UIU/ML (ref 0.3–4.2)

## 2024-05-02 ENCOUNTER — TELEPHONE (OUTPATIENT)
Dept: FAMILY MEDICINE | Facility: CLINIC | Age: 29
End: 2024-05-02
Payer: COMMERCIAL

## 2024-05-02 LAB
ACTH PLAS-MCNC: 19 PG/ML
DHEA-S SERPL-MCNC: 183 UG/DL (ref 35–430)

## 2024-05-02 NOTE — TELEPHONE ENCOUNTER
RN TRIAGE CALL:    Patient Contact    Attempt # 1    Was call answered?  No.  Left message on voicemail with information to call me back.    DOMINICK PatelN, RN

## 2024-05-02 NOTE — TELEPHONE ENCOUNTER
[facility] Call Center    Phone Message    May a detailed message be left on voicemail: yes     Reason for Call:  PER PATIENT: I HAD A LAB DONE BACK ON 04/05/2024 PER PCP IM WAITING TO HEAR BACK IF I NEED TO TAKE METFORMIN AND HOW MUCH FOR DOSAGE? I HAVE NOT HEARD BACK YET . PLEASE CALL [phone] OK LVM OK ANYTIME.    Action Taken: Other: [facility] PRIMARY CARE CL[facility] POOL    Travel Screening: Not Applicable

## 2024-05-02 NOTE — TELEPHONE ENCOUNTER
4/5 lab note from other provider says and refers pack to PCP. Was this discussed at 4/30 visit, if so the patient is unclear.                NARCISA Anglin CNP  4/5/2024  9:47 AM CDT       Hi     It was a pleasure meeting you!     Your A1c is elevated, indicated your Diabetes is not under control for your age. Please follow up with your PCP and/or nurse DM educator about these labs and possibly titrating up your medication if possible.     NARCISA Khan CNP

## 2024-05-02 NOTE — TELEPHONE ENCOUNTER
We discussed, endocrinology has the patient on a higher dose of ozempic.  Thank you.  Bita Veronica MD on 5/2/2024 at 1:21 PM

## 2024-05-03 LAB
TESTOST FREE SERPL-MCNC: 1.49 NG/DL
TESTOST SERPL-MCNC: 56 NG/DL (ref 8–60)

## 2024-05-03 NOTE — TELEPHONE ENCOUNTER
RN TRIAGE CALL:    Patient Contact    Attempt # 2    Was call answered?  No.  Left message on voicemail with information to call me back.    DOIMNICK PatelN, RN

## 2024-05-05 LAB — ANDROST SERPL-MCNC: 1.57 NG/ML

## 2024-05-05 RX ORDER — NORGESTIMATE AND ETHINYL ESTRADIOL 0.25-0.035
1 KIT ORAL DAILY
Qty: 84 TABLET | Refills: 3 | Status: SHIPPED | OUTPATIENT
Start: 2024-05-05

## 2024-05-05 RX ORDER — METFORMIN HCL 500 MG
2000 TABLET, EXTENDED RELEASE 24 HR ORAL
Qty: 360 TABLET | Refills: 3 | Status: SHIPPED | OUTPATIENT
Start: 2024-05-05 | End: 2024-08-21

## 2024-05-06 ENCOUNTER — MYC MEDICAL ADVICE (OUTPATIENT)
Dept: FAMILY MEDICINE | Facility: OTHER | Age: 29
End: 2024-05-06
Payer: COMMERCIAL

## 2024-05-06 ENCOUNTER — TELEPHONE (OUTPATIENT)
Dept: OBGYN | Facility: CLINIC | Age: 29
End: 2024-05-06
Payer: COMMERCIAL

## 2024-05-06 NOTE — TELEPHONE ENCOUNTER
RN Triage    Patient Contact    Attempt # 3    RN did attempt to reach patient. No answer. Message left for patient to call the clinic back and ask to speak to a triage nurse. RN also sent [employer] message to patient. Closing encounter due to multiple attempts to reach patient.     Mary Wilburn RN on 5/6/2024 at 9:17 AM

## 2024-05-06 NOTE — RESULT ENCOUNTER NOTE
Fausto Kamara!     The lab results showed a mildly elevated testosterone. Otherwise, they were unremarkable.   Below are my recommendations:   Start metformin at a dose of 500 mg daily with dinner. If you tolerate it well in terms of gastrointestinal side effects, you can increase the dose by 500 mg (one tablet) every 1-2 weeks, up to a maximum dose of 2000 mg (4 tablets) daily taken with dinner.  Begin treatment with ortho tri-cyclen (birth control pill).  Schedule an appointment with gynecology as it's been some time since you've received treatment for the lack of menstrual periods. Without treatment, there's a risk of thickening of the uterine lining, which could potentially increase the risk of uterine cancer.  Keep your scheduled f/up apt with me in August

## 2024-05-06 NOTE — CONFIDENTIAL NOTE
BEBOM informing patient that we want to schedule her with Dr. Gutierrez, but do not have any openings with her at time of writing.   Placed patient on Dr. Gutierrez's waitlist, and will call back in June when Dr. Gutierrez's September schedule is open.

## 2024-05-09 LAB — 17OHP SERPL-MCNC: 278 NG/DL

## 2024-05-17 DIAGNOSIS — G56.03 BILATERAL CARPAL TUNNEL SYNDROME: ICD-10-CM

## 2024-05-20 ENCOUNTER — TELEPHONE (OUTPATIENT)
Dept: ENDOCRINOLOGY | Facility: CLINIC | Age: 29
End: 2024-05-20

## 2024-05-20 NOTE — TELEPHONE ENCOUNTER
Fausto Kamara!     The lab results showed a mildly elevated testosterone. Otherwise, they were unremarkable.  Below are my recommendations:  Start metformin at a dose of 500 mg daily with dinner. If you tolerate it well in terms of gastrointestinal side effects, you can increase the dose by 500 mg (one tablet) every 1-2 weeks, up to a maximum dose of 2000 mg (4 tablets) daily taken with dinner.  Begin treatment with ortho tri-cyclen (birth control pill).  Schedule an appointment with gynecology as it's been some time since you've received treatment for the lack of menstrual periods. Without treatment, there's a risk of thickening of the uterine lining, which could potentially increase the risk of uterine cancer.  Keep your scheduled f/up apt with me in August    Called the patient, no answer. Left a message for the patient to call back.    Yanely Garner, BRAXTON  Adult Endocrinology  [facility], [place]

## 2024-05-21 NOTE — TELEPHONE ENCOUNTER
Per 4/20/24 results message patient viewed recommendations from Dr. Cronin.     Seen by patient Anh Flores on 5/13/2024 10:47 AM        Misty Easton, RN  Endocrine Care Coordinator  [facility]

## 2024-05-21 NOTE — TELEPHONE ENCOUNTER
Sent MyChart to Pt to see if she has noticed a difference in taking this medication.    Ruben Phillips RNCC

## 2024-05-22 RX ORDER — MELOXICAM 7.5 MG/1
7.5 TABLET ORAL DAILY
Qty: 30 TABLET | Refills: 2 | Status: SHIPPED | OUTPATIENT
Start: 2024-05-22 | End: 2024-07-30

## 2024-05-25 ENCOUNTER — OFFICE VISIT (OUTPATIENT)
Dept: URGENT CARE | Facility: URGENT CARE | Age: 29
End: 2024-05-25
Payer: COMMERCIAL

## 2024-05-25 VITALS
HEART RATE: 85 BPM | WEIGHT: 287.5 LBS | TEMPERATURE: 97.8 F | RESPIRATION RATE: 16 BRPM | DIASTOLIC BLOOD PRESSURE: 86 MMHG | SYSTOLIC BLOOD PRESSURE: 140 MMHG | BODY MASS INDEX: 56.15 KG/M2 | OXYGEN SATURATION: 95 %

## 2024-05-25 DIAGNOSIS — E11.65 TYPE 2 DIABETES MELLITUS WITH HYPERGLYCEMIA, UNSPECIFIED WHETHER LONG TERM INSULIN USE (H): ICD-10-CM

## 2024-05-25 DIAGNOSIS — J01.90 ACUTE SINUSITIS WITH SYMPTOMS > 10 DAYS: Primary | ICD-10-CM

## 2024-05-25 PROCEDURE — 99214 OFFICE O/P EST MOD 30 MIN: CPT | Performed by: PHYSICIAN ASSISTANT

## 2024-05-25 RX ORDER — AMOXICILLIN 400 MG/5ML
12 POWDER, FOR SUSPENSION ORAL 2 TIMES DAILY
Qty: 200 ML | Refills: 0 | Status: SHIPPED | OUTPATIENT
Start: 2024-05-25 | End: 2024-06-04

## 2024-05-25 RX ORDER — BENZONATATE 100 MG/1
100 CAPSULE ORAL 3 TIMES DAILY PRN
Qty: 30 CAPSULE | Refills: 0 | Status: SHIPPED | OUTPATIENT
Start: 2024-05-25 | End: 2024-06-04

## 2024-05-25 ASSESSMENT — PAIN SCALES - GENERAL: PAINLEVEL: SEVERE PAIN (7)

## 2024-05-25 NOTE — PROGRESS NOTES
Chief Complaint   Patient presents with    Cold Symptoms     Sore throat, loss of voice, nasal congestion, cough, headache and bilateral ear pain beginning one week ago. Patient was seen in ED on Monday and dx with URI. Patient had a negative Strep test.     Pharyngitis    Otalgia             ASSESSMENT:     ICD-10-CM    1. Acute sinusitis with symptoms > 10 days  J01.90 amoxicillin (AMOXIL) 400 MG/5ML suspension     benzonatate (TESSALON) 100 MG capsule      2. Type 2 diabetes mellitus with hyperglycemia, unspecified whether long term insulin use (H)  E11.65 amoxicillin (AMOXIL) 400 MG/5ML suspension     benzonatate (TESSALON) 100 MG capsule            PLAN: Acute sinusitis.  Requests liquid antibiotic.  Amoxicillin.  Tessalon Perles.  I have discussed clinical findings with patient.  Side effects of medications discussed.  Symptomatic care is discussed.  I have discussed the possibility of  worsening symptoms and indication to RTC or go to the ER if they occur.  All questions are answered, patient indicates understanding of these issues and is in agreement with plan.   Patient care instructions are discussed/given at the end of visit.   Lots of rest and fluids.  Vaseline in nose.    Tegan Villalba PA-C      SUBJECTIVE:  29-year-old type II diabetic presents for nasal congestion, cough and sore throat now present for 10 days.  Yesterday when she blew her nose did notice some blood tinge on the Kleenex.  Seen in the ER 5 days ago, negative strep test.  Diagnosed with viral illness.  Now complains of sinus pressure and bilateral ear pain.  Symptoms started out with eye symptoms, given eyedrops.  Believes she may have some seasonal allergies but has never officially been diagnosed.  Using diabetic Robitussin without relief.  Cough is worse at night.  No wheezing.    Allergies   Allergen Reactions    Other Environmental Allergy     Victoza [Liraglutide] Headache, Hives and Itching       Past Medical History:    Diagnosis Date    CAH (congenital adrenal hyperplasia) 2/9/2010    Followed by Dr. Brewer; next follow up 1.2011.  Metformin increased to 850 mg twice a day.     Diabetes mellitus, type 2 (H) 10/19/2020    Vitamin D deficiency 2/9/2010       Current Outpatient Medications   Medication Sig Dispense Refill    alcohol swab prep pads Use to swab area of injection/anthony as directed. 100 each 3    atorvastatin (LIPITOR) 10 MG tablet TAKE 1 TABLET(10 MG) BY MOUTH DAILY 90 tablet 2    cholestyramine light (QUESTRAN) 4 GM packet Take 1 packet (4 g) by mouth 2 times daily (with meals) for 180 days 180 packet 1    Continuous Glucose Sensor (DEXCOM G7 SENSOR) MISC 1 each every 10 days Change sensor every 10 days 9 each 5    Continuous Glucose Transmitter (DEXCOM G6 TRANSMITTER) MISC Change every 3 months. 1 each 1    gabapentin (NEURONTIN) 300 MG capsule Take 1 capsule (300 mg) by mouth at bedtime for 7 days, THEN 1 capsule (300 mg) 2 times daily for 90 days. 187 capsule 0    insulin pen needle (ULTICARE MICRO) 32G X 4 MM miscellaneous Use 1 pen needles daily or as directed. 100 each 3    meloxicam (MOBIC) 7.5 MG tablet TAKE 1 TABLET(7.5 MG) BY MOUTH DAILY 30 tablet 2    metFORMIN (GLUCOPHAGE XR) 500 MG 24 hr tablet Take 4 tablets (2,000 mg) by mouth daily (with dinner) 360 tablet 3    norgestimate-ethinyl estradiol (ORTHO-CYCLEN) 0.25-35 MG-MCG tablet Take 1 tablet by mouth daily 84 tablet 3    ondansetron (ZOFRAN ODT) 4 MG ODT tab Take 1 tablet (4 mg) by mouth every 8 hours as needed for nausea 18 tablet 0    semaglutide (OZEMPIC) 2 MG/3ML pen Inject 0.5 mg Subcutaneous every 7 days 9 mL 3    SUMAtriptan (IMITREX) 50 MG tablet Take 1 tablet (50 mg) by mouth at onset of headache for migraine May repeat in 2 hours. Max 4 tablets/24 hours. 8 tablet 0     No current facility-administered medications for this visit.       Social History     Tobacco Use    Smoking status: Never     Passive exposure: Never    Smokeless  tobacco: Never   Substance Use Topics    Alcohol use: No       ROS:  CONSTITUTIONAL: Negative for fatigue or fever.  EYES: Negative for eye problems.  ENT: As above.  RESP: As above.  CV: Negative for chest pains.  GI: Negative for vomiting.  MUSCULOSKELETAL:  Negative for significant muscle or joint pains.  NEUROLOGIC: Negative for headaches.  SKIN: Negative for rash.  PSYCH: Normal mentation for age.    OBJECTIVE:  BP (!) 140/86 (BP Location: Right arm, Patient Position: Sitting, Cuff Size: Adult Large)   Pulse 85   Temp 97.8  F (36.6  C) (Tympanic)   Resp 16   Wt 130.4 kg (287 lb 8 oz)   LMP  (LMP Unknown)   SpO2 95%   BMI 56.15 kg/m    GENERAL APPEARANCE: Healthy, alert and no distress.  EYES:Conjunctiva/sclera clear.  EARS: No cerumen.   Ear canals w/o erythema.  TM's intact w/o erythema.   Moderate bilateral maxillary sinus tenderness.  Nasal turbinates are erythematous and inflamed, friable.  THROAT: Mild  erythema w/o tonsillar enlargement . No exudates.  NECK: Supple, nontender, no lymphadenopathy.  RESP: Lungs clear to auscultation - no rales, rhonchi or wheezes  CV: Regular rate and rhythm, normal S1 S2, no murmur noted.  NEURO: Awake, alert    SKIN: No rashes      Tegan Villalba PA-C

## 2024-05-26 ENCOUNTER — MYC REFILL (OUTPATIENT)
Dept: FAMILY MEDICINE | Facility: CLINIC | Age: 29
End: 2024-05-26
Payer: COMMERCIAL

## 2024-05-26 DIAGNOSIS — G43.911 INTRACTABLE MIGRAINE WITH STATUS MIGRAINOSUS, UNSPECIFIED MIGRAINE TYPE: ICD-10-CM

## 2024-05-26 DIAGNOSIS — E25.0 CONGENITAL ADRENAL HYPERPLASIA (H): ICD-10-CM

## 2024-05-28 RX ORDER — SUMATRIPTAN 50 MG/1
50 TABLET, FILM COATED ORAL
Qty: 8 TABLET | Refills: 0 | Status: SHIPPED | OUTPATIENT
Start: 2024-05-28 | End: 2024-06-04

## 2024-05-28 RX ORDER — ONDANSETRON 4 MG/1
4 TABLET, ORALLY DISINTEGRATING ORAL EVERY 8 HOURS PRN
Qty: 18 TABLET | Refills: 0 | Status: SHIPPED | OUTPATIENT
Start: 2024-05-28 | End: 2024-07-16

## 2024-05-30 ENCOUNTER — TELEPHONE (OUTPATIENT)
Dept: FAMILY MEDICINE | Facility: CLINIC | Age: 29
End: 2024-05-30
Payer: COMMERCIAL

## 2024-05-30 DIAGNOSIS — E11.65 TYPE 2 DIABETES MELLITUS WITH HYPERGLYCEMIA, WITHOUT LONG-TERM CURRENT USE OF INSULIN (H): ICD-10-CM

## 2024-05-30 RX ORDER — BLOOD-GLUCOSE SENSOR
1 EACH MISCELLANEOUS
Qty: 1 EACH | Refills: 2 | Status: SHIPPED | OUTPATIENT
Start: 2024-05-30 | End: 2024-05-31

## 2024-05-30 NOTE — TELEPHONE ENCOUNTER
Patient's continuous monitor is not working.    She needs another way to check her blood glucose.    She is leaving town tomorrow afternoon.    Please advise on number of times she should check her blood sugar as the pharmacy will not refill her continuous monitor.    Vandana Harden RN on 5/30/2024 at 4:12 PM

## 2024-05-31 RX ORDER — BLOOD-GLUCOSE SENSOR
1 EACH MISCELLANEOUS
Qty: 2 EACH | Refills: 2 | Status: SHIPPED | OUTPATIENT
Start: 2024-05-31 | End: 2024-09-03

## 2024-05-31 RX ORDER — LANCETS
EACH MISCELLANEOUS
Qty: 100 EACH | Refills: 6 | Status: SHIPPED | OUTPATIENT
Start: 2024-05-31

## 2024-05-31 RX ORDER — BLOOD-GLUCOSE SENSOR
1 EACH MISCELLANEOUS
Qty: 2 EACH | Refills: 2 | Status: SHIPPED | OUTPATIENT
Start: 2024-05-31 | End: 2024-05-31

## 2024-05-31 NOTE — TELEPHONE ENCOUNTER
Please feel free to kindly let patient's know I am seeing patients at the clinic but will be sure to honor ASAP request as able but I do have 72 hours.  Bita Veronica MD on 5/31/2024 at 6:06 PM

## 2024-05-31 NOTE — TELEPHONE ENCOUNTER
Patient calling again wondering if PCP signed orders. Patient is also requesting for provider to change the Freestyle Jm 3 sensors to a quantity of 2 instead of 1. RN edited order and is pending. Patient is trying to leave today to go out of town and would like this filled sooner rather than later.

## 2024-06-04 ENCOUNTER — MYC REFILL (OUTPATIENT)
Dept: FAMILY MEDICINE | Facility: CLINIC | Age: 29
End: 2024-06-04
Payer: COMMERCIAL

## 2024-06-04 DIAGNOSIS — G56.03 BILATERAL CARPAL TUNNEL SYNDROME: ICD-10-CM

## 2024-06-04 DIAGNOSIS — G43.911 INTRACTABLE MIGRAINE WITH STATUS MIGRAINOSUS, UNSPECIFIED MIGRAINE TYPE: ICD-10-CM

## 2024-06-05 RX ORDER — SUMATRIPTAN 50 MG/1
50 TABLET, FILM COATED ORAL
Qty: 18 TABLET | Refills: 1 | Status: SHIPPED | OUTPATIENT
Start: 2024-06-05 | End: 2024-06-29

## 2024-06-05 RX ORDER — GABAPENTIN 300 MG/1
CAPSULE ORAL
Qty: 187 CAPSULE | Refills: 0 | OUTPATIENT
Start: 2024-06-05 | End: 2024-09-09

## 2024-06-11 ENCOUNTER — THERAPY VISIT (OUTPATIENT)
Dept: OCCUPATIONAL THERAPY | Facility: CLINIC | Age: 29
End: 2024-06-11
Attending: FAMILY MEDICINE
Payer: COMMERCIAL

## 2024-06-11 ENCOUNTER — TRANSFERRED RECORDS (OUTPATIENT)
Dept: HEALTH INFORMATION MANAGEMENT | Facility: CLINIC | Age: 29
End: 2024-06-11

## 2024-06-11 DIAGNOSIS — G43.911 INTRACTABLE MIGRAINE WITH STATUS MIGRAINOSUS, UNSPECIFIED MIGRAINE TYPE: ICD-10-CM

## 2024-06-11 DIAGNOSIS — G56.03 BILATERAL CARPAL TUNNEL SYNDROME: ICD-10-CM

## 2024-06-11 PROCEDURE — 97112 NEUROMUSCULAR REEDUCATION: CPT | Mod: GO

## 2024-06-11 PROCEDURE — 97530 THERAPEUTIC ACTIVITIES: CPT | Mod: GO

## 2024-06-11 PROCEDURE — 97165 OT EVAL LOW COMPLEX 30 MIN: CPT | Mod: GO

## 2024-06-11 PROCEDURE — 97110 THERAPEUTIC EXERCISES: CPT | Mod: GO

## 2024-06-11 NOTE — PROGRESS NOTES
"OCCUPATIONAL THERAPY EVALUATION  Type of Visit: Evaluation    See electronic medical record for Abuse and Falls Screening details.    Subjective      Presenting condition or subjective complaint: carpal tunnel  Date of onset: 04/30/24 (MD Order Date)    Past Medical History:   Diagnosis Date    CAH (congenital adrenal hyperplasia) 2/9/2010    Followed by Dr. Brewer; next follow up 1.2011.  Metformin increased to 850 mg twice a day.     Diabetes mellitus, type 2 (H) 10/19/2020    Vitamin D deficiency 2/9/2010   Dates & types of surgery: gallbladder removal -2006    Prior diagnostic imaging/testing results: EMG     Prior therapy history for the same diagnosis, illness or injury: No      Prior Level of Function  Transfers: Independent  Ambulation: Independent  ADL: Independent  IADL:  Independent    Living Environment  Social support: With family members   Type of home: House   Stairs to enter the home: Yes       Ramp: No   Stairs inside the home: No       Help at home: None  Equipment owned:   None listed    Employment: Not Applicable  Pet 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  Hobbies/Interests:  None listed    Patient goals for therapy: effectively use my hands    Pain assessment: Pain present  See objective evaluation for additional pain details     Objective   ADDITIONAL HISTORY:  Right hand dominant  Patient reports symptoms of pain, stiffness/loss of motion, weakness/loss of strength, numbness, and tingling   Transportation: relies on family for transportation  Currently working in normal job without restrictions    Functional Outcome Measure:   Upper Extremity Functional Index Score:  SCORE:   Column Totals: /80: 48   (A lower score indicates greater disability.)    PAIN:  Pain Level at Rest: 0/10  Pain Level with Use: 10/10  Pain Location: B thumbs, Ifs, Lfs, Rfs - right is worse than left  Pain Quality: Numb and Tingling  Pain Frequency: constant  Pain is Worst: daytime or nighttime  Pain is Exacerbated By: \"overuse\" " with pet grooming tasks, mornings are worse  Pain is Relieved By: NSAIDs  Pain Progression: Worsened    POSTURE: Forward Neck Posture and Rounded Forward Shoulders     SENSATION: Decreased Median Nerve distribution per pt report     ROM:   Wrist ROM  Left AROM Right AROM    Extension 62 70   Flexion 65 68   Radial Deviation (RD) 14 17   Ulnar Deviation (UD) 34 38   Supination 82 75   Pronation 90 91     SPECIAL TESTS:   CTS Special Tests  Pain Report Left Right   Median Nerve Compression at Pronator + @ 15 secs + @ 7 secs   Carpal Compression Test-Durkan Test (30 sec) + @ 5 secs + @ 7 secs   [place] Test for Lumbrical Incursion (fist x30 sec) + @ 16 secs + @ 7 secs   Tinel's at Carpal Tunnel - -   Phalen's Sign + @ 20 secs + @ 5 secs     NEURAL TENSION TESTING: MNT: Median Neurodynamic Test (based on DS Monty's ULNT)   6/11/2024   0-5 Scale L: 1/5 R: 1/5    Position:   0/5: Arm across abdomen in coronal plane  1/5: Depress shoulder, ER to neutral ABD shoulder to 45 degrees  2/5: ER shoulder to end range, keep elbow at 90 degrees  3/5: Extend elbow to 0 degrees  4/5: Fully supinate forearm  5/5: Extend wrist, fingers and thumb  Notes:  (+) indicates beyond grade level but less than correction to next level  (-) indicates over correction to level  S1 onset/change of patient's symptoms  S2 definite stop point based on patient's discomfort level    STRENGTH:     Measured in pounds 6/11/2024 6/11/2024    Left Right   Trial 1 42 30     Lateral Pinch  Measured in pounds 6/11/2024 6/11/2024    Left Right   Trial 1 13 10     3 Point Pinch  Measured in pounds 6/11/2024 6/11/2024    Left Right   Trial 1 11 11     Assessment & Plan   CLINICAL IMPRESSIONS  Medical Diagnosis: B CTS    Treatment Diagnosis: B CTS    Impression/Assessment: Pt is a 29 year old female presenting to Occupational Therapy due to B CTS.  The following significant findings have been identified: Impaired ROM, Impaired sensation, Impaired strength, and Pain.   These identified deficits interfere with their ability to perform self care tasks, work tasks, recreational activities, household chores,  yard work, and meal planning and preparation as compared to previous level of function.   Patient's limitations or Problem List includes: Pain, Decreased ROM/motion, Increased edema, Weakness, Sensory disturbance, Decreased , Decreased pinch, Tightness in musculature, and Adherence in connective tissue of the bilateral wrist which interferes with the patient's ability to perform Self Care Tasks (hygiene/toileting), Work Tasks, Sleep Patterns, Recreational Activities, and Household Chores as compared to previous level of function.    Clinical Decision Making (Complexity):  Assessment of Occupational Performance: 5 or more Performance Deficits  Occupational Performance Limitations: hygiene and grooming, health management and maintenance, home establishment and management, meal preparation and cleanup, shopping, sleep, volunteer activities, and leisure activities  Clinical Decision Making (Complexity): Low complexity    PLAN OF CARE  Treatment Interventions:  Modalities:  US and Paraffin  Therapeutic Exercise:  AROM, AAROM, PROM, Tendon Gliding, Blocking, Reverse Blocking, Place and Hold, Contract Relax, Extensor Tracking, Isotonics, Isometrics, and Stabilization  Neuromuscular re-education:  Nerve Gliding and Kinesiotaping  Manual Techniques:  Friction massage, Myofascial release, and Manual edema mobilization  Orthotic Fabrication:  Static and Forearm based  Self Care:  Self Care Tasks, Ergonomic Considerations, and Work Tasks    Long Term Goals   OT Goal 1  Goal Identifier: Sleeping  Goal Description: Pt will report they can sleep through the night with no symptoms 7/7 nights during the week.  Goal Progress: 0/7 nights with no symptoms  Target Date: 07/23/24      Frequency of Treatment: 1x weekly  Duration of Treatment: 6 weeks     Education Assessment: Learner/Method:  Patient;Demonstration;Pictures/Video  Education Comments: PTRX via printout     Risks and benefits of evaluation/treatment have been explained.   Patient/Family/caregiver agrees with Plan of Care.     Evaluation Time:    OT Eval, Low Complexity Minutes (59900): 30  Signing Clinician: KATIANA Castro [facility]                                                                                   OUTPATIENT OCCUPATIONAL THERAPY      PLAN OF TREATMENT FOR OUTPATIENT REHABILITATION   Patient's Last Name, First Name, Anh Lutz YOB: 1995   Provider's Name   [facility]   Medical Record No.  [id number]     Onset Date: 04/30/24 (MD Order Date) Start of Care Date: 06/11/24     Medical Diagnosis:  B CTS      OT Treatment Diagnosis:  B CTS Plan of Treatment  Frequency/Duration:1x weekly/6 weeks    Certification date from 06/11/24   To 07/23/24        See note for plan of treatment details and functional goals     Christy Rodriguez OT                         I CERTIFY THE NEED FOR THESE SERVICES FURNISHED UNDER        THIS PLAN OF TREATMENT AND WHILE UNDER MY CARE     (Physician attestation of this document indicates review and certification of the therapy plan).              Referring Provider:  Bita Veronica    Initial Assessment  See Epic Evaluation- 06/11/24

## 2024-06-18 ENCOUNTER — TELEPHONE (OUTPATIENT)
Dept: OBGYN | Facility: CLINIC | Age: 29
End: 2024-06-18
Payer: COMMERCIAL

## 2024-06-26 ENCOUNTER — THERAPY VISIT (OUTPATIENT)
Dept: OCCUPATIONAL THERAPY | Facility: CLINIC | Age: 29
End: 2024-06-26
Payer: COMMERCIAL

## 2024-06-26 DIAGNOSIS — G56.03 BILATERAL CARPAL TUNNEL SYNDROME: Primary | ICD-10-CM

## 2024-06-26 PROCEDURE — 97112 NEUROMUSCULAR REEDUCATION: CPT | Mod: GO

## 2024-06-29 ENCOUNTER — OFFICE VISIT (OUTPATIENT)
Dept: URGENT CARE | Facility: URGENT CARE | Age: 29
End: 2024-06-29
Payer: COMMERCIAL

## 2024-06-29 VITALS
SYSTOLIC BLOOD PRESSURE: 132 MMHG | TEMPERATURE: 97.9 F | HEART RATE: 68 BPM | RESPIRATION RATE: 20 BRPM | OXYGEN SATURATION: 97 % | DIASTOLIC BLOOD PRESSURE: 78 MMHG | WEIGHT: 279.4 LBS | BODY MASS INDEX: 54.57 KG/M2

## 2024-06-29 DIAGNOSIS — G89.29 CHRONIC INTRACTABLE HEADACHE, UNSPECIFIED HEADACHE TYPE: Primary | ICD-10-CM

## 2024-06-29 DIAGNOSIS — G43.911 INTRACTABLE MIGRAINE WITH STATUS MIGRAINOSUS, UNSPECIFIED MIGRAINE TYPE: ICD-10-CM

## 2024-06-29 DIAGNOSIS — R51.9 CHRONIC INTRACTABLE HEADACHE, UNSPECIFIED HEADACHE TYPE: Primary | ICD-10-CM

## 2024-06-29 PROCEDURE — 99213 OFFICE O/P EST LOW 20 MIN: CPT | Mod: 25 | Performed by: PHYSICIAN ASSISTANT

## 2024-06-29 PROCEDURE — 96372 THER/PROPH/DIAG INJ SC/IM: CPT | Performed by: PHYSICIAN ASSISTANT

## 2024-06-29 RX ORDER — KETOROLAC TROMETHAMINE 30 MG/ML
60 INJECTION, SOLUTION INTRAMUSCULAR; INTRAVENOUS ONCE
Status: COMPLETED | OUTPATIENT
Start: 2024-06-29 | End: 2024-06-29

## 2024-06-29 RX ORDER — SUMATRIPTAN 50 MG/1
50 TABLET, FILM COATED ORAL
Qty: 18 TABLET | Refills: 1 | Status: SHIPPED | OUTPATIENT
Start: 2024-06-29 | End: 2024-09-10

## 2024-06-29 RX ORDER — RIZATRIPTAN BENZOATE 10 MG/1
TABLET ORAL
COMMUNITY

## 2024-06-29 RX ORDER — TOPIRAMATE 25 MG/1
TABLET, FILM COATED ORAL
COMMUNITY
Start: 2024-06-11

## 2024-06-29 RX ADMIN — KETOROLAC TROMETHAMINE 60 MG: 30 INJECTION, SOLUTION INTRAMUSCULAR; INTRAVENOUS at 10:55

## 2024-06-29 ASSESSMENT — ENCOUNTER SYMPTOMS
TREMORS: 0
VOMITING: 0
NUMBNESS: 0
NAUSEA: 0
PALPITATIONS: 0
CHILLS: 0
DIZZINESS: 1
COUGH: 0
LIGHT-HEADEDNESS: 1
SINUS PRESSURE: 0
SORE THROAT: 0
CARDIOVASCULAR NEGATIVE: 1
WHEEZING: 0
SEIZURES: 0
SPEECH DIFFICULTY: 0
HEADACHES: 1
FACIAL ASYMMETRY: 0
SHORTNESS OF BREATH: 0
FEVER: 0
RHINORRHEA: 0
WEAKNESS: 0
SINUS PAIN: 0
FATIGUE: 0

## 2024-06-29 ASSESSMENT — PAIN SCALES - GENERAL: PAINLEVEL: WORST PAIN (10)

## 2024-06-29 NOTE — PROGRESS NOTES
Clinic Administered Medication Documentation        Patient was given Toradol. Prior to medication administration, verified patient's identity using patient s name and date of birth. Please see MAR and medication order for additional information. Patient instructed to remain in clinic for 15 minutes and report any adverse reaction to staff immediately.    Vial/Syringe: Single dose vial. Was entire vial of medication used? Yes    Olena Sawyer RN  [facility]

## 2024-06-29 NOTE — PROGRESS NOTES
Tanika Kamara is a 29 year old, presenting for the following health issues:  Migraine (X1 WK; )    HPI   Headache  Onset/Duration: >1week.  Was recently at a dog show last week where the weather was hot and humid which may have triggered her HA.  Was previously on imitrex which seemed to help better than the maxalt.  Description  Location: frontal  Character: throbbing pain  Frequency:  daily  Duration:  hours  Wake with headaches: no  Able to do daily activities when headache present: YES  Intensity:  moderate  Progression of Symptoms: intermittent  Accompanying signs and symptoms:  Stiff neck: no  Neck or upper back pain: no  Sinus or URI symptoms: no  Fever: no  Nausea or vomiting: Yes  Dizziness: Yes  Numbness/tingling: no  Weakness: no  Visual changes: none  History  Head trauma: no  Family history of migraines: no  Daily pain medication use: Yes  Previous tests for headaches: Yes  Neurologist evaluation: Yes  Precipitating or Alleviating factors (light/sound/sleep/caffeine): lights, sounds, color  Therapies tried and outcome: maxalt, topiramate with minimal relief                 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 100 each 3    atorvastatin (LIPITOR) 10 MG tablet TAKE 1 TABLET(10 MG) BY MOUTH DAILY 90 tablet 2    blood glucose (NO BRAND SPECIFIED) test strip Use to test blood sugar three times daily or as directed. Preferred blood glucose meter OR supplies to  accompany: Blood Glucose Monitor Brands: per insurance. 100 strip 6    blood glucose monitoring (NO BRAND SPECIFIED) meter device kit Use to test blood sugar three times daily or as directed. Preferred blood glucose meter OR supplies to accompany: Blood Glucose Monitor Brands: per insurance. 1 kit 0    cholestyramine light (QUESTRAN) 4 GM packet Take 1 packet (4 g) by mouth 2 times daily (with meals) for 180 days 180 packet 1    Continuous Glucose Sensor (DEXCOM G7 SENSOR) MISC 1 each every 10 days Change sensor every 10 days 9 each 5    Continuous Glucose Sensor (FREESTYLE AYSE 3 SENSOR) MISC Externally apply 1 Application topically every 14 days 2 each 2    Continuous Glucose Transmitter (DEXCOM G6 TRANSMITTER) MISC Change every 3 months. 1 each 1    gabapentin (NEURONTIN) 300 MG capsule Take 1 capsule (300 mg) by mouth at bedtime for 7 days, THEN 1 capsule (300 mg) 2 times daily for 90 days. 187 capsule 0    insulin pen needle (ULTICARE MICRO) 32G X 4 MM miscellaneous Use 1 pen needles daily or as directed. 100 each 3    meloxicam (MOBIC) 7.5 MG tablet TAKE 1 TABLET(7.5 MG) BY MOUTH DAILY 30 tablet 2    metFORMIN (GLUCOPHAGE XR) 500 MG 24 hr tablet Take 4 tablets (2,000 mg) by mouth daily (with dinner) 360 tablet 3    norgestimate-ethinyl estradiol (ORTHO-CYCLEN) 0.25-35 MG-MCG tablet Take 1 tablet by mouth daily 84 tablet 3    ondansetron (ZOFRAN ODT) 4 MG ODT tab Take 1 tablet (4 mg) by mouth every 8 hours as needed for nausea 18 tablet 0    rizatriptan (MAXALT) 10 MG tablet TAKE ONE TABLET BY MOUTH AS DIRECTED. MAY REPEAT AFTER 2 HOURS. MAX DOSE 30MG/24 HOURS      semaglutide (OZEMPIC) 2 MG/3ML pen Inject 0.5 mg Subcutaneous every 7 days 9 mL 3    thin (NO BRAND SPECIFIED) lancets Use with lanceting device. To accompany: Blood Glucose Monitor Brands: per insurance. 100 each 6    topiramate (TOPAMAX) 25 MG tablet TAKE TWO TABLETS BY MOUTH EVERY NIGHT AT BEDTIME X 7 DAYS, THEN ONE IN THE AM AND TWO EVERY NIGHT  AT BEDTIME X 7 DYAS, THEN TWO TWICE DAILY      SUMAtriptan (IMITREX) 50 MG tablet Take 1 tablet (50 mg) by mouth at onset of headache for migraine May repeat in 2 hours. Max 4 tablets/24 hours. (Patient not taking: Reported on 6/29/2024) 18 tablet 1     No current facility-administered medications for this visit.        Allergies   Allergen Reactions    Other Environmental Allergy     Victoza [Liraglutide] Headache, Hives and Itching       Review of Systems   Constitutional:  Negative for chills, fatigue and fever.   HENT:  Negative for congestion, rhinorrhea, sinus pressure, sinus pain and sore throat.    Eyes:  Negative for visual disturbance.   Respiratory:  Negative for cough, shortness of breath and wheezing.    Cardiovascular: Negative.  Negative for chest pain, palpitations and leg swelling.   Gastrointestinal:  Negative for nausea and vomiting.   Neurological:  Positive for dizziness, light-headedness and headaches. Negative for tremors, seizures, syncope, facial asymmetry, speech difficulty, weakness and numbness.   All other systems reviewed and are negative.          Objective    /78   Pulse 68   Temp 97.9  F (36.6  C) (Tympanic)   Resp 20   Wt 126.7 kg (279 lb 6.4 oz)   LMP  (LMP Unknown)   SpO2 97%   BMI 54.57 kg/m    Body mass index is 54.57 kg/m .  Physical Exam  Vitals and nursing note reviewed.   Constitutional:       General: She is not in acute distress.     Appearance: Normal appearance. She is well-developed. She is obese. She is not ill-appearing.   HENT:      Head: Normocephalic and atraumatic.      Ears:      Comments: TMs are intact without any erythema or bulging bilaterally.  Airway is patent.     Nose: Nose normal.      Mouth/Throat:      Lips: Pink.      Mouth: Mucous membranes are moist.      Pharynx: Oropharynx is clear. Uvula midline. No pharyngeal swelling, oropharyngeal exudate or posterior oropharyngeal erythema.      Tonsils: No tonsillar exudate.   Eyes:       General: No scleral icterus.     Extraocular Movements: Extraocular movements intact.      Conjunctiva/sclera: Conjunctivae normal.      Pupils: Pupils are equal, round, and reactive to light.   Neck:      Thyroid: No thyromegaly.      Meningeal: Brudzinski's sign and Kernig's sign absent.   Cardiovascular:      Rate and Rhythm: Normal rate and regular rhythm.      Pulses: Normal pulses.      Heart sounds: Normal heart sounds, S1 normal and S2 normal. No murmur heard.     No friction rub. No gallop.   Pulmonary:      Effort: Pulmonary effort is normal. No accessory muscle usage, respiratory distress or retractions.      Breath sounds: Normal breath sounds and air entry. No decreased breath sounds, wheezing, rhonchi or rales.   Musculoskeletal:      Cervical back: Normal range of motion and neck supple.   Lymphadenopathy:      Cervical: No cervical adenopathy.   Skin:     General: Skin is warm and dry.      Capillary Refill: Capillary refill takes less than 2 seconds.      Findings: No rash.   Neurological:      General: No focal deficit present.      Mental Status: She is alert and oriented to person, place, and time.      GCS: GCS eye subscore is 4. GCS verbal subscore is 5. GCS motor subscore is 6.      Cranial Nerves: Cranial nerves 2-12 are intact. No cranial nerve deficit, dysarthria or facial asymmetry.      Sensory: Sensation is intact. No sensory deficit.      Motor: Motor function is intact.      Coordination: Coordination is intact.      Gait: Gait is intact. Gait normal.      Deep Tendon Reflexes: Reflexes are normal and symmetric.   Psychiatric:         Mood and Affect: Mood normal.         Behavior: Behavior normal.         Thought Content: Thought content normal.         Judgment: Judgment normal.              Assessment/Plan:  Chronic intractable headache, unspecified headache type: No focal neurologic deficits on exam.  This seems stable compared to her previous HAs. Toradol injection was given in  clinic and will refill her imitrex as this seems to work better than the maxalt. Avoid triggers.  Keep HA diary.  Avoid caffeine, get regular sleep, adequate nutrition, stress reduction.  To the ER if she develops worsening HAs, visual changes, one sided weakness, slurred speech or fevers.  Recheck in clinic with her neurologist if symptoms worsen or if symptoms do not improve.    -     ketorolac (TORADOL) injection 60 mg    Intractable migraine with status migrainosus, unspecified migraine type  -     SUMAtriptan (IMITREX) 50 MG tablet; Take 1 tablet (50 mg) by mouth at onset of headache for migraine May repeat in 2 hours. Max 4 tablets/24 hours.        Nguyen See DEVON [place]

## 2024-07-02 ENCOUNTER — TRANSFERRED RECORDS (OUTPATIENT)
Dept: HEALTH INFORMATION MANAGEMENT | Facility: CLINIC | Age: 29
End: 2024-07-02
Payer: COMMERCIAL

## 2024-07-05 ENCOUNTER — OFFICE VISIT (OUTPATIENT)
Dept: SLEEP MEDICINE | Facility: CLINIC | Age: 29
End: 2024-07-05
Attending: INTERNAL MEDICINE
Payer: COMMERCIAL

## 2024-07-05 VITALS
BODY MASS INDEX: 53.99 KG/M2 | DIASTOLIC BLOOD PRESSURE: 84 MMHG | HEIGHT: 60 IN | WEIGHT: 275 LBS | HEART RATE: 74 BPM | SYSTOLIC BLOOD PRESSURE: 120 MMHG | OXYGEN SATURATION: 97 %

## 2024-07-05 DIAGNOSIS — G47.33 OBSTRUCTIVE SLEEP APNEA: Primary | ICD-10-CM

## 2024-07-05 DIAGNOSIS — G47.00 INSOMNIA, UNSPECIFIED TYPE: ICD-10-CM

## 2024-07-05 PROCEDURE — 99203 OFFICE O/P NEW LOW 30 MIN: CPT | Performed by: STUDENT IN AN ORGANIZED HEALTH CARE EDUCATION/TRAINING PROGRAM

## 2024-07-05 ASSESSMENT — SLEEP AND FATIGUE QUESTIONNAIRES
HOW LIKELY ARE YOU TO NOD OFF OR FALL ASLEEP WHILE SITTING QUIETLY AFTER LUNCH WITHOUT ALCOHOL: MODERATE CHANCE OF DOZING
HOW LIKELY ARE YOU TO NOD OFF OR FALL ASLEEP IN A CAR, WHILE STOPPED FOR A FEW MINUTES IN TRAFFIC: SLIGHT CHANCE OF DOZING
HOW LIKELY ARE YOU TO NOD OFF OR FALL ASLEEP WHILE SITTING AND TALKING TO SOMEONE: SLIGHT CHANCE OF DOZING
HOW LIKELY ARE YOU TO NOD OFF OR FALL ASLEEP WHILE LYING DOWN TO REST IN THE AFTERNOON WHEN CIRCUMSTANCES PERMIT: HIGH CHANCE OF DOZING
HOW LIKELY ARE YOU TO NOD OFF OR FALL ASLEEP WHEN YOU ARE A PASSENGER IN A CAR FOR AN HOUR WITHOUT A BREAK: HIGH CHANCE OF DOZING
HOW LIKELY ARE YOU TO NOD OFF OR FALL ASLEEP WHILE WATCHING TV: MODERATE CHANCE OF DOZING
HOW LIKELY ARE YOU TO NOD OFF OR FALL ASLEEP WHILE SITTING INACTIVE IN A PUBLIC PLACE: WOULD NEVER DOZE
HOW LIKELY ARE YOU TO NOD OFF OR FALL ASLEEP WHILE SITTING AND READING: MODERATE CHANCE OF DOZING

## 2024-07-05 NOTE — PROGRESS NOTES
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    Anh Flores main reason for visit: My doctor wanted me to come , she feels i have poor sleep,  Patient states problem(s) started: Poor sleep 8-10 years +  Anh Flores's goals for this visit: Decent sleep    She has had a previous sleep study about 17 years ago. Important findings: Results unavailable.    CPAP: No    Assessment and Plan:     Problem List Items Addressed This Visit       Obstructive sleep apnea - Primary     STOP BANG score is 4/8. Patient likely has sleep apnea based on clinical history of snoring, EDS(ESS 14/24), BMI 53.71, Neck Circumference 46, insomnia(BARBIE 16/28), nocturia, GERD, morning headache, morning dry mouth, unrefreshing sleep, and sleep inertia.   Obstructive sleep apnea reviewed. Complications of Untreated Sleep Apnea Reviewed.  HST/ Polysomnography reviewed. Information provided regarding treatment options for TOMMY.  Recommend in-lab sleep study to assess for sleep disordered breathing due to high pretest probability for TOMMY and increased clinical concern for hypoventilation due to BMI            Relevant Orders    Comprehensive Sleep Study    Insomnia, unspecified type     Insomnia is likely multifactorial and possibly due to psychophysiological, circadian rhythm sleep wake disorder, paradoxical, anxiety/depression, undiagnosed TOMMY, inadequate sleep hygiene, and/or lack of appropriate stimulus control  Will begin with evaluation for  sleep disordered breathing  Patient has circadian sleep delay and would likely benefit from advancing sleep/wake cycle               SLEEP-WAKE SCHEDULE:   Work/School Days: Patient goes to school/work: Yes   Usually gets into bed at 10:30PM  Takes patient about 1:30 - 3AM sometimes longer to fall asleep  Has trouble falling asleep Almost everynight nights per week  Wakes up in the middle of the night Once asleep never times.  Wakes up due to Pain;External stimuli (bed partner, pets, noise, etc);Use the bathroom  She has trouble falling back asleep   times a week.   It usually takes   to get back to sleep  Patient is usually up at 7:00-9:30AM  Uses alarm: No  Sleep Inertia: Yes    Weekends/Non-work Days/All Other Days:  Usually gets into bed at 10:30PM   Takes patient about It varies 1-4 hours to fall asleep  Patient is usually up at 7:00-9:30AM  Uses alarm: No    Sleep Need  Patient gets  4-6 hours sleep on average   Patient thinks She needs about 8 - 9 hours sleep    Anh Flores prefers to sleep in this position(s): Back;Side;Stomach;Head Elevated   Patient states they do the following activities in bed: Read;Use phone, computer, or tablet    Naps  Patient takes a purposeful nap Never  - I have however been having migranes and often take medication during the day that makes me drowsy or dizzy,and in those instances I will take a nap / go to sleep. times a week and naps are usually 1 hour to several hours ... sometimes I feel better sometimes i do not to answer the question below. in duration  She feels better after a nap: No  She dozes off unintentionally 1-3 maybe days per week  Patient has had a driving accident or near-miss due to sleepiness/drowsiness: No      SLEEP DISRUPTIONS:  Breathing/Snoring  Patient snores:No  Other people complain about Her snoring: Yes  Patient has been told She stops breathing in Her sleep:No  She has issues with the following: Morning headaches;Morning mouth dryness;Stuffy nose  when you wake up;Heartburn or reflux at night    Movement:  Patient gets pain, discomfort, with an urge to move:  Yes  It happens when She is resting:  Yes  It happens more at night:  Yes  Patient has been told Anh Flores kicks Her legs at night:  No     Behaviors in Sleep:  Anh Flores has experienced the following behaviors while sleeping:    She has experienced sudden muscle weakness during the day:      Is there anything else you would like your sleep provider to know:        CAFFEINE AND OTHER SUBSTANCES:  Patient consumes caffeinated beverages per day:  None  Last caffeine use is usually:    List of any prescribed or over the counter stimulants that patient takes:    List of any prescribed or over the counter sleep medication patient takes:    List of previous sleep medications that patient has tried:    Patient drinks alcohol to help them sleep: No  Patient drinks alcohol near bedtime: No    Family History:  Patient has a family member been diagnosed with a sleep disorder: No            SCALES:  EPWORTH SLEEPINESS SCALE       7/5/2024     8:42 AM    Brashear Sleepiness Scale ( LEANDRO Martinez  [id number]<br>ESS - [place]/English - Final version - 21 Nov 07 - [facility] [place].)   Sitting and reading Moderate chance of dozing   Watching TV Moderate chance of dozing   Sitting, inactive in a public place (e.g. a theatre or a meeting) Would never doze   As a passenger in a car for an hour without a break High chance of dozing   Lying down to rest in the afternoon when circumstances permit High chance of dozing   Sitting and talking to someone Slight chance of dozing   Sitting quietly after a lunch without alcohol Moderate chance of dozing   In a car, while stopped for a few minutes in traffic Slight chance of dozing   Brashear Score (MC) 14   Brashear Score (Sleep) 14       INSOMNIA SEVERITY INDEX (BARBIE)        7/5/2024     8:11 AM   Insomnia Severity Index (BARBIE)   Difficulty falling asleep 3   Difficulty  "staying asleep 2   Problems waking up too early 2   How SATISFIED/DISSATISFIED are you with your CURRENT sleep pattern? 2   How NOTICEABLE to others do you think your sleep problem is in terms of impairing the quality of your life? 2   How WORRIED/DISTRESSED are you about your current sleep problem? 2   To what extent do you consider your sleep problem to INTERFERE with your daily functioning (e.g. daytime fatigue, mood, ability to function at work/daily chores, concentration, memory, mood, etc.) CURRENTLY? 3   BARBIE Total Score 16    Guidelines for Scoring/Interpretation:  Total score categories:  0-7 = No clinically significant insomnia   8-14 = Subthreshold insomnia   15-21 = Clinical insomnia (moderate severity)  22-28 = Clinical insomnia (severe)  Used via courtesy of www.Opbeatth.va.gov with permission from Gonsalo Smith PhD., [facility]      STOP BANG   TOMMY Medium Risk             Total Score: 3    TOMMY: Often tired    TOMMY: BMI over 35 kg/m2    TOMMY: Neck Circum >16 in          GAD7       No data to display                  CAGE-AID       No data to display              CAGE-AID reprinted with permission from the [place] Medical Journal, ASHER Pacheco and ANTONIO Pedraza, \"Conjoint screening questionnaires for alcohol and drug abuse\" [place] Medical Journal 94: 135-140, 1995.      PATIENT HEALTH QUESTIONNAIRE-9 (PHQ - 9)       No data to display              Developed by Fortino Lyle, Brenda Singh, Drew Gomez and colleagues, with an educational sandy from [employer] No permission required to reproduce, translate, display or distribute.      Allergies:    Allergies   Allergen Reactions    Other Environmental Allergy     Victoza [Liraglutide] Headache, Hives and Itching       Medications:    Current Outpatient Medications   Medication Sig Dispense Refill    alcohol swab prep pads Use to swab area of injection/anthony as directed. 100 each 3    atorvastatin (LIPITOR) 10 MG tablet TAKE 1 " TABLET(10 MG) BY MOUTH DAILY 90 tablet 2    blood glucose (NO BRAND SPECIFIED) test strip Use to test blood sugar three times daily or as directed. Preferred blood glucose meter OR supplies to accompany: Blood Glucose Monitor Brands: per insurance. 100 strip 6    blood glucose monitoring (NO BRAND SPECIFIED) meter device kit Use to test blood sugar three times daily or as directed. Preferred blood glucose meter OR supplies to accompany: Blood Glucose Monitor Brands: per insurance. 1 kit 0    cholestyramine light (QUESTRAN) 4 GM packet Take 1 packet (4 g) by mouth 2 times daily (with meals) for 180 days 180 packet 1    Continuous Glucose Sensor (DEXCOM G7 SENSOR) MISC 1 each every 10 days Change sensor every 10 days 9 each 5    Continuous Glucose Sensor (FREESTYLE AYSE 3 SENSOR) MISC Externally apply 1 Application topically every 14 days 2 each 2    Continuous Glucose Transmitter (DEXCOM G6 TRANSMITTER) MISC Change every 3 months. 1 each 1    gabapentin (NEURONTIN) 300 MG capsule Take 1 capsule (300 mg) by mouth at bedtime for 7 days, THEN 1 capsule (300 mg) 2 times daily for 90 days. 187 capsule 0    insulin pen needle (ULTICARE MICRO) 32G X 4 MM miscellaneous Use 1 pen needles daily or as directed. 100 each 3    meloxicam (MOBIC) 7.5 MG tablet TAKE 1 TABLET(7.5 MG) BY MOUTH DAILY 30 tablet 2    metFORMIN (GLUCOPHAGE XR) 500 MG 24 hr tablet Take 4 tablets (2,000 mg) by mouth daily (with dinner) 360 tablet 3    norgestimate-ethinyl estradiol (ORTHO-CYCLEN) 0.25-35 MG-MCG tablet Take 1 tablet by mouth daily 84 tablet 3    ondansetron (ZOFRAN ODT) 4 MG ODT tab Take 1 tablet (4 mg) by mouth every 8 hours as needed for nausea 18 tablet 0    rizatriptan (MAXALT) 10 MG tablet TAKE ONE TABLET BY MOUTH AS DIRECTED. MAY REPEAT AFTER 2 HOURS. MAX DOSE 30MG/24 HOURS      semaglutide (OZEMPIC) 2 MG/3ML pen Inject 0.5 mg Subcutaneous every 7 days 9 mL 3    SUMAtriptan (IMITREX) 50 MG tablet Take 1 tablet (50 mg) by mouth at  onset of headache for migraine May repeat in 2 hours. Max 4 tablets/24 hours. 18 tablet 1    thin (NO BRAND SPECIFIED) lancets Use with lanceting device. To accompany: Blood Glucose Monitor Brands: per insurance. 100 each 6    topiramate (TOPAMAX) 25 MG tablet TAKE TWO TABLETS BY MOUTH EVERY NIGHT AT BEDTIME X 7 DAYS, THEN ONE IN THE AM AND TWO EVERY NIGHT AT BEDTIME X 7 DYAS, THEN TWO TWICE DAILY         Problem List:  Patient Active Problem List    Diagnosis Date Noted    Obstructive sleep apnea 07/05/2024     Priority: Medium    Insomnia, unspecified type 07/05/2024     Priority: Medium    Bilateral carpal tunnel syndrome 06/11/2024     Priority: Medium    Diabetes mellitus, type 2 (H) 10/19/2020     Priority: Medium    Hidradenitis suppurativa 01/23/2019     Priority: Medium    Morbid obesity with body mass index of 60.0-69.9 in adult (H) 01/23/2019     Priority: Medium    Elevated BP without diagnosis of hypertension 01/23/2019     Priority: Medium    Morbid obesity (H) 10/17/2014     Priority: Medium    Chronic headaches 10/13/2014     Priority: Medium    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 Brewer; next follow up 1.2011.    Metformin increased to 850 mg twice a day.      Chronic abdominal pain 02/09/2010     Priority: Medium    Vitamin D deficiency 02/09/2010     Priority: Medium        Past Medical/Surgical History:  Past Medical History:   Diagnosis Date    CAH (congenital adrenal hyperplasia) 2/9/2010    Followed by Dr. Brewer; next follow up 1.2011.  Metformin increased to 850 mg twice a day.     Diabetes mellitus, type 2 (H) 10/19/2020    Vitamin D deficiency 2/9/2010     Past Surgical History:   Procedure Laterality Date    CHOLECYSTECTOMY      She was in 8th grade        Social History:  Social History     Socioeconomic History    Marital status: Single     Spouse name: Not on file     Number of children: 0    Years of education: 14+    Highest education level: Not on file   Occupational History     Employer: [employer]UDENT     Comment: Dog grooming   Tobacco Use    Smoking status: Never     Passive exposure: Never    Smokeless tobacco: Never   Vaping Use    Vaping status: Never Used   Substance and Sexual Activity    Alcohol use: No    Drug use: No    Sexual activity: Never   Other Topics Concern    Parent/sibling w/ CABG, MI or angioplasty before 65F 55M? Not Asked   Social History Narrative    Not on file     Social Determinants of Health     Financial Resource Strain: Low Risk  (1/30/2024)    Financial Resource Strain     Within the past 12 months, have you or your family members you live with been unable to get utilities (heat, electricity) when it was really needed?: No   Food Insecurity: Low Risk  (1/30/2024)    Food Insecurity     Within the past 12 months, did you worry that your food would run out before you got money to buy more?: No     Within the past 12 months, did the food you bought just not last and you didn t have money to get more?: No   Transportation Needs: Low Risk  (1/30/2024)    Transportation Needs     Within the past 12 months, has lack of transportation kept you from medical appointments, getting your medicines, non-medical meetings or appointments, work, or from getting things that you need?: No   Physical Activity: Not on file   Stress: Not on file   Social Connections: Not on file   Interpersonal Safety: Not At Risk (5/20/2024)    Received from [facility]     Humiliation, Afraid, Rape, and Kick questionnaire     Fear of Current or Ex-Partner: No     Emotionally Abused: No     Physically Abused: No     Sexually Abused: No   Housing Stability: Low Risk  (1/30/2024)    Housing Stability     Do you have housing? : Yes     Are you worried about losing your housing?: No       Family History:  Family History   Problem Relation Age of Onset    Neurologic Disorder Sister  16        migraines    Cerebrovascular Disease No family hx of     Alzheimer Disease No family hx of     Glaucoma No family hx of     Macular Degeneration No family hx of        Review of Systems:   A complete review of systems reviewed by me is negative with the exeption of what has been mentioned in the history of present illness.  In the last TWO WEEKS have you experienced any of the following symptoms?  Fevers: No  Night Sweats: Yes  Weight Gain: No  Pain at Night: Yes  Double Vision: Yes  Changes in Vision: Yes  Difficulty Breathing through Nose: Yes  Sore Throat in Morning: Yes  Dry Mouth in the Morning: Yes  Shortness of Breath Lying Flat: No  Shortness of Breath With Activity: Yes  Awakening with Shortness of Breath: No  Increased Cough: No  Heart Racing at Night: No  Swelling in Feet or Legs: No  Diarrhea at Night: Yes  Heartburn at Night: Yes  Urinating More than Once at Night: Yes  Losing Control of Urine at Night: No  Joint Pains at Night: Yes  Headaches in Morning: Yes  Weakness in Arms or Legs: Yes  Depressed Mood: No  Anxiety: No     Physical Exam:   Vitals: /84   Pulse 74   Ht 1.524 m (5')   Wt 124.7 kg (275 lb)   SpO2 97%   BMI 53.71 kg/m    BMI= Body mass index is 53.71 kg/m .  Neck Cir (cm): 46 cm    GENERAL: alert, no distress, and obese  EYES: Eyes grossly normal to inspection.  No discharge or erythema, or obvious scleral/conjunctival abnormalities.  RESP: No audible wheeze, cough, or visible cyanosis.    SKIN: Visible skin clear. No significant rash, abnormal pigmentation or lesions.  NEURO: Cranial nerves grossly intact.  Mentation and speech appropriate for age.  PSYCH: Appropriate affect, tone, and pace of words         Data: All pertinent previous laboratory data reviewed     Recent Labs   Lab Test 04/30/24  1810 04/05/24  0850    140   POTASSIUM 4.5 4.3   CHLORIDE 106 108*   CO2 21* 23   ANIONGAP 12 9   * 192*   BUN 12.0 14.3   CR 0.62 0.76   DANIEL 9.8 9.4  "      Recent Labs   Lab Test 03/15/17  1121   HCT 43.8       Recent Labs   Lab Test 01/30/24  1251   PROTTOTAL 8.0   ALBUMIN 4.6   BILITOTAL 0.2   ALKPHOS 67   AST 23   ALT 44       TSH   Date Value   04/30/2024 0.93 uIU/mL   01/30/2024 1.33 uIU/mL   01/15/2018 3.71 mU/L   03/15/2017 0.57 mU/L       No results found for: \"UAMP\", \"UBARB\", \"BENZODIAZEUR\", \"UCANN\", \"UCOC\", \"OPIT\", \"UPCP\"    No results found for: \"IRONSAT\", \"TS47177\", \"MAGED\"    No results found for: \"PH\", \"PHARTERIAL\", \"PO2\", \"KF8DWNLOYEV\", \"SAT\", \"PCO2\", \"HCO3\", \"BASEEXCESS\", \"OANH\", \"BEB\"    @LABRCNTIPR(phv:4,pco2v:4,po2v:4,hco3v:4,moises:4,o2per:4)@    Echocardiology: No results found for this or any previous visit (from the past 4320 hour(s)).    Chest x-ray:   No results found for this or any previous visit from the past 365 days.      Chest CT:   No results found for this or any previous visit from the past 365 days.      PFT: Most Recent Breeze Pulmonary Function Testing  No results found     Patient was strongly advised to avoid driving, operating any heavy machinery or other hazardous situations while drowsy or sleepy.  Patient was counseled on the importance of driving while alert, to pull over if drowsy, or nap before getting into the vehicle if sleepy.      Plan is for Anh Flores to follow up in about 2 week(s) after PSG.     The above note was dictated using voice recognition software. Although reviewed after completion, some word and grammatical error may remain . Please contact the author for any clarifications.    Total time spent reviewing medical records, history and physical examination, review of previous testing and interpretation as well as documentation on this date, 07/05/24: 40 minutes    Annie Chaidez MD on 10/20/2022   [facility]   [address], [address]   [address] [place] [address]. [place] [address]   Appointments: [phone]     CC: Alejandra Streeter" Leecu

## 2024-07-05 NOTE — ASSESSMENT & PLAN NOTE
STOP BANG score is 4/8. Patient likely has sleep apnea based on clinical history of snoring, EDS(ESS 14/24), BMI 53.71, Neck Circumference 46, insomnia(BARBIE 16/28), nocturia, GERD, morning headache, morning dry mouth, unrefreshing sleep, and sleep inertia.   Obstructive sleep apnea reviewed. Complications of Untreated Sleep Apnea Reviewed.  HST/ Polysomnography reviewed. Information provided regarding treatment options for TOMMY.  Recommend in-lab sleep study to assess for sleep disordered breathing due to high pretest probability for TOMMY and increased clinical concern for hypoventilation due to BMI

## 2024-07-05 NOTE — ASSESSMENT & PLAN NOTE
Insomnia is likely multifactorial and possibly due to psychophysiological, circadian rhythm sleep wake disorder, paradoxical, anxiety/depression, undiagnosed TOMMY, inadequate sleep hygiene, and/or lack of appropriate stimulus control  Will begin with evaluation for sleep disordered breathing  Patient has circadian sleep delay and would likely benefit from advancing sleep/wake cycle

## 2024-07-05 NOTE — PATIENT INSTRUCTIONS
"Today is day 1 of 7 of taking 1 topiramate pill in the morning and 2 topiramate pills at night   After 7 days(on 07/12/2024) start taking 2 pills in the morning and 2 at night       MY INFORMATION ON SLEEP APNEA-  Anh Flores    DOCTOR : Annie Chaidez MD  SLEEP CENTER :      MY CONTACT NUMBER:    [facility]   [phone]  [facility]    Am I having a sleep study at a sleep center?  Make sure you have an appointment for the study before you leave!  [email or web address]    Am I having a home sleep study?  Watch the video for the device you are using:  /drop off device, Noxturnal T-3: [email or web address]  Disposable device sent out require phone/computer application, WatchPAT1: [email or web address]  The sleep lab will call you for this appointment   If you wish to reschedule the sleep study or contact the sleep lab scheduling call [phone]        Frequently asked questions:  1. What is Obstructive Sleep Apnea (TOMMY)? TOMMY is the most common type of sleep apnea. Apnea means, \"without breath.\"  Apnea is most often caused by narrowing or collapse of the upper airway as muscles relax during sleep.   Almost everyone has occasional apneas. Most people with sleep apnea have had brief interruptions at night frequently for many years.  The severity of sleep apnea is related to how frequent and severe the events are.   Apneas are any events where there is no breathing.  Hypopneas are any events where there is shallow breathing. Sleep studies will track and then add all of the apneas and hypopneas into a total and then divided this number by the total time asleep to obtain the apnea hypopnea index(AHI). 0-5 events/per hour = No Sleep Apnea; 5-15 events/per hour = Mild Sleep Apnea; 15-30 events/per hour = Moderate Sleep Apnea; Greater than 30 events/per " hour = Severe Sleep Apnea.  Sleep apnea is typically worse during REM sleep due to complete muscle relaxation, including the muscles of the airway. Sleep apnea is also typically worse during supine(sleeping on your back) sleep due to internal structures more easily falling on the top of the airway and external pressures more easily crushing the airway.  The respiratory disturbance index(RDI) includes apneas, hypopneas, and other respiratory events such as snoring that may disturb your sleep.  2. What are the consequences of TOMMY? Symptoms include: feeling sleepy during the day, snoring loudly, gasping or stopping of breathing, trouble sleeping, and occasionally morning headaches or heartburn at night.  Sleepiness can be serious and even increase the risk of falling asleep while driving. Other health consequences may include development of high blood pressure and other cardiovascular disease in persons who are susceptible. Untreated TOMMY  can contribute to heart disease, stroke and diabetes.   3. What are the treatment options? In most situations, sleep apnea is a lifelong disease that must be managed with daily therapy. Medications are not effective for sleep apnea and surgery is generally not considered until other therapies have been tried. Your treatment is your choice . Continuous Positive Airway (CPAP) works right away and is the therapy that is effective in nearly everyone. An oral device to hold your jaw forward is usually the next most reliable option. Other options include postioning devices (to keep you off your back), weight loss, and surgery including a tongue pacing device. There is more detail about some of these options below.    Important tips for using CPAP and similar devices   Know your equipment:  CPAP is continuous positive airway pressure that prevents obstructive sleep apnea by keeping the throat from collapsing while you are sleeping. In most cases, the device is  smart  and can slowly  self-adjusts if your throat collapses and keeps a record every day of how well you are treated-this information is available to you and your care team.  BPAP is bilevel positive airway pressure that keeps your throat open and also assists each breath with a pressure boost to maintain adequate breathing.  Special kinds of BPAP are used in patients who have inadequate breathing from lung or heart disease. In most cases, the device is  smart  and can slowly self-adjusts to assist breathing. Like CPAP, the device keeps a record of how well you are treated.  Your mask is your connection to the device. You get to choose what feels most comfortable and the staff will help to make sure if fits. Here: are some examples of the different masks that are available:       Key points to remember on your journey with sleep apnea:  Sleep study.  PAP devices often need to be adjusted during a sleep study to show that they are effective and adjusted right.  Good tips to remember: Try wearing just the mask during a quiet time during the day so your body adapts to wearing it. A humidifier is recommended for comfort in most cases to prevent drying of your nose and throat. Allergy medication from your provider may help you if you are having nasal congestion.  Getting settled-in. It takes more than one night for most of us to get used to wearing a mask. Try wearing just the mask during a quiet time during the day so your body adapts to wearing it. A humidifier is recommended for comfort in most cases. Our team will work with you carefully on the first day and will be in contact within 4 days and again at 2 and 4 weeks for advice and remote device adjustments. Your therapy is evaluated by the device each day.   Use it every night. The more you are able to sleep naturally for 7-8 hours, the more likely you will have good sleep and to prevent health risks or symptoms from sleep apnea. Even if you use it 4 hours it helps. Occasionally all of  us are unable to use a medical therapy, in sleep apnea, it is not dangerous to miss one night.   Communicate. Call our skilled team on the number provided on the first day if your visit for problems that make it difficult to wear the device. Over 2 out of 3 patients can learn to wear the device long-term with help from our team. Remember to call our team or your sleep providers if you are unable to wear the device as we may have other solutions for those who cannot adapt to mask CPAP therapy. It is recommended that you sleep your sleep provider within the first 3 months and yearly after that if you are not having problems.   Take care of your equipment. Make sure you clean your mask and tubing using directions every day and that your filter and mask are replaced as recommended or if they are not working.     BESIDES CPAP, WHAT OTHER THERAPIES ARE THERE?    Positioning Device  Positioning devices are generally used when sleep apnea is mild and only occurs on your back.This example shows a pillow that straps around the waist. It may be appropriate for those whose sleep study shows milder sleep apnea that occurs primarily when lying flat on one's back. Preliminary studies have shown benefit but effectiveness at home may need to be verified by a home sleep test. These devices are generally not covered by medical insurance.    Oral Appliance  What is oral appliance therapy?  An oral appliance device fits on your teeth at night like a retainer used after having braces. The device is made by a specialized dentist and requires several visits over 1-2 months before a manufactured device is made to fit your teeth and is adjusted to prevent your sleep apnea. Once an oral device is working properly, snoring should be improved. A home sleep test may be recommended at that time if to determine whether the sleep apnea is adequately treated.       Some things to remember:  -Oral devices are often, but not always, covered by your  medical insurance. Be sure to check with your insurance provider.   -If you are referred for oral therapy, you will be given a list of specialized dentists to consider or you may choose to visit the Web site of the American Academy of Dental Sleep Medicine  -Oral devices are less likely to work if you have severe sleep apnea or are extremely overweight.     More detailed information  An oral appliance is a small acrylic device that fits over the upper and lower teeth  (similar to a retainer or a mouth guard). This device slightly moves jaw forward, which moves the base of the tongue forward, opens the airway, improves breathing for effective treat snoring and obstructive sleep apnea in perhaps 7 out of 10 people .  The best working devices are custom-made by a dental device  after a mold is made of the teeth 1, 2, 3.  When is an oral appliance indicated?  Oral appliance therapy is recommended as a first-line treatment for patients with primary snoring, mild sleep apnea, and for patients with moderate sleep apnea who prefer appliance therapy to use of CPAP4, 5. Severity of sleep apnea is determined by sleep testing and is based on the number of respiratory events per hour of sleep.   How successful is oral appliance therapy?  The success rate of oral appliance therapy in patients with mild sleep apnea is 75-80% while in patients with moderate sleep apnea it is 50-70%. The chance of success in patients with severe sleep apnea is 40-50%. The research also shows that oral appliances have a beneficial effect on the cardiovascular health of TOMMY patients at the same magnitude as CPAP therapy7.  Oral appliances should be a second-line treatment in cases of severe sleep apnea, but if not completely successful then a combination therapy utilizing CPAP plus oral appliance therapy may be effective. Oral appliances tend to be effective in a broad range of patients although studies show that the patients who have the  highest success are females, younger patients, those with milder disease, and less severe obesity. 3, 6.   Finding a dentist that practices dental sleep medicine  Specific training is available through the American Academy of Dental Sleep Medicine for dentists interested in working in the field of sleep. To find a dentist who is educated in the field of sleep and the use of oral appliances, near you, visit the Web site of the American Academy of Dental Sleep Medicine.    References  1. Brandi, et al. Objectively measured vs self-reported compliance during oral appliance therapy for sleep-disordered breathing. Chest 2013; 144(5): 5152-0777.  2. Byron, et al. Objective measurement of compliance during oral appliance therapy for sleep-disordered breathing. Thorax 2013; 68(1): 91-96.  3. Shelton et al. Mandibular advancement devices in 620 men and women with TOMMY and snoring: tolerability and predictors of treatment success. Chest 2004; 125: 7084-2689.  4. Duong, et al. Oral appliances for snoring and TOMMY: a review. Sleep 2006; 29: 244-262.  5. Mey et al. Oral appliance treatment for TOMMY: an update. J Clin Sleep Med 2014; 10(2): 215-227.  6. Chris et al. Predictors of OSAH treatment outcome. J Dent Res 2007; 86: 6598-1308.      Weight Loss:    Weight loss is a long-term strategy that may improve sleep apnea in some patients.    Weight management is a personal decision.  If you are interested in exploring weight loss strategies, the following discussion covers the impact on weight loss on sleep apnea and the approaches that may be successful.    Weight loss decreases severity of sleep apnea in most people with obesity. For those with mild obesity who have developed snoring with weight gain, even 15-30 pound weight loss can improve and occasionally eliminate sleep apnea.  Structured and life-long dietary and health habits are necessary to lose weight and keep healthier weight levels.     Though  there may be significant health benefits from weight loss, long-term weight loss is very difficult to achieve- studies show success with dietary management in less than 10% of people. In addition, substantial weight loss may require years of dietary control and may be difficult if patients have severe obesity. In these cases, surgical management may be considered.  Finally, older individuals who have tolerated obesity without health complications may be less likely to benefit from weight loss strategies.    Your BMI is Body mass index is 53.71 kg/m .    Weight management plan: Discussed healthy diet and exercise guidelines    Surgery:    Surgery for obstructive sleep apnea is considered generally only when other therapies fail to work. Surgery may be discussed with you if you are having a difficult time tolerating CPAP and or when there is an abnormal structure that requires surgical correction.  Nose and throat surgeries often enlarge the airway to prevent collapse.  Most of these surgeries create pain for 1-2 weeks and up to half of the most common surgeries are not effective throughout life.  You should carefully discuss the benefits and drawbacks to surgery with your sleep provider and surgeon to determine if it is the best solution for you.   More information  Surgery for TOMMY is directed at areas that are responsible for narrowing or complete obstruction of the airway during sleep.  There are a wide range of procedures available to enlarge and/or stabilize the airway to prevent blockage of breathing in the three major areas where it can occur: the palate, tongue, and nasal regions.  Successful surgical treatment depends on the accurate identification of the factors responsible for obstructive sleep apnea in each person.  A personalized approach is required because there is no single treatment that works well for everyone.  Because of anatomic variation, consultation with an examination by a sleep surgeon is a  critical first step in determining what surgical options are best for each patient.  In some cases, examination during sedation may be recommended in order to guide the selection of procedures.  Patients will be counseled about risks and benefits as well as the typical recovery course after surgery. Surgery is typically not a cure for a person s TOMMY.  However, surgery will often significantly improve one s TOMMY severity (termed  success rate ).  Even in the absence of a cure, surgery will decrease the cardiovascular risk associated with OSA7; improve overall quality of life8 (sleepiness, functionality, sleep quality, etc).      Palate Procedures:  Patients with TOMMY often have narrowing of their airway in the region of their tonsils and uvula.  The goals of palate procedures are to widen the airway in this region as well as to help the tissues resist collapse.  Modern palate procedure techniques focus on tissue conservation and soft tissue rearrangement, rather than tissue removal.  Often the uvula is preserved in this procedure. Residual sleep apnea is common in patient after pharyngoplasty with an average reduction in sleep apnea events of 33%2.      Tongue Procedures:  ExamWhile patients are awake, the muscles that surround the throat are active and keep this region open for breathing. These muscles relax during sleep, allowing the tongue and other structures to collapse and block breathing.  There are several different tongue procedures available.  Selection of a tongue base procedure depends on characteristics seen on physical exam.  Generally, procedures are aimed at removing bulky tissues in this area or preventing the back of the tongue from falling back during sleep.  Success rates for tongue surgery range from 50-62%3.    Hypoglossal Nerve Stimulation:  Hypoglossal nerve stimulation has recently received approval from the [place] Food and Drug Administration for the treatment of obstructive sleep  apnea.  This is based on research showing that the system was safe and effective in treating sleep apnea6.  Results showed that the median AHI score decreased 68%, from 29.3 to 9.0. This therapy uses an implant system that senses breathing patterns and delivers mild stimulation to airway muscles, which keeps the airway open during sleep.  The system consists of three fully implanted components: a small generator (similar in size to a pacemaker), a breathing sensor, and a stimulation lead.  Using a small handheld remote, a patient turns the therapy on before bed and off upon awakening.    Candidates for this device must be greater than 22 years of age, have moderate to severe TOMMY (AHI between 20-65), BMI less than 32, have tried CPAP/oral appliance without success, and have appropriate upper airway anatomy (determined by a sleep endoscopy performed by Dr. Keith).    Hypoglossal Nerve Stimulation Pathway:    The sleep surgeon s office will work with the patient through the insurance prior-authorization process (including communications and appeals).    Nasal Procedures:  Nasal obstruction can interfere with nasal breathing during the day and night.  Studies have shown that relief of nasal obstruction can improve the ability of some patients to tolerate positive airway pressure therapy for obstructive sleep apnea1.  Treatment options include medications such as nasal saline, topical corticosteroid and antihistamine sprays, and oral medications such as antihistamines or decongestants. Non-surgical treatments can include external nasal dilators for selected patients. If these are not successful by themselves, surgery can improve the nasal airway either alone or in combination with these other options.      Combination Procedures:  Combination of surgical procedures and other treatments may be recommended, particularly if patients have more than one area of narrowing or persistent positional disease.  The success rate of  combination surgery ranges from 66-80%2,3.    References  Guido ZEPEDA. The Role of the Nose in Snoring and Obstructive Sleep Apnoea: An Update.  Eur Arch Otorhinolaryngol. 2011; 268: 1365-73.   Yusef SM; Felice JA; Alvaro JR; Pallanch JF; Germaine MB; Alex SG; Jason PETER. Surgical modifications of the upper airway for obstructive sleep apnea in adults: a systematic review and meta-analysis. SLEEP 2010;33(10):5880-6307. Ashley PATEL. Hypopharyngeal surgery in obstructive sleep apnea: an evidence-based medicine review.  Arch Otolaryngol Head Neck Surg. 2006 Feb;132(2):206-13.  Behzad YH1, Cornelia Y, Brian MARCELINO. The efficacy of anatomically based multilevel surgery for obstructive sleep apnea. Otolaryngol Head Neck Surg. 2003 Oct;129(4):327-35.  Ashley PATEL, Goldberg A. Hypopharyngeal Surgery in Obstructive Sleep Apnea: An Evidence-Based Medicine Review. Arch Otolaryngol Head Neck Surg. 2006 Feb;132(2):206-13.  Strollo PJ et al. Upper-Airway Stimulation for Obstructive Sleep Apnea.  N Engl J Med. 2014 Jan 9;370(2):139-49.  Jessica Y et al. Increased Incidence of Cardiovascular Disease in Middle-aged Men with Obstructive Sleep Apnea. Am J Respir Crit Care Med; 2002 166: 159-165  Marshall EM et al. Studying Life Effects and Effectiveness of Palatopharyngoplasty (SLEEP) study: Subjective Outcomes of Isolated Uvulopalatopharyngoplasty. Otolaryngol Head Neck Surg. 2011; 144: 623-631.

## 2024-07-05 NOTE — NURSING NOTE
Chief Complaint   Patient presents with    Sleep Problem     Snoring, dry mouth       Initial /84   Pulse 74   Ht 1.524 m (5')   Wt 124.7 kg (275 lb)   SpO2 97%   BMI 53.71 kg/m   Estimated body mass index is 53.71 kg/m  as calculated from the following:    Height as of this encounter: 1.524 m (5').    Weight as of this encounter: 124.7 kg (275 lb).    Medication Reconciliation: complete  ESS   Neck circumference:  46 centimeters.  Jorge Singh MA

## 2024-07-10 ENCOUNTER — THERAPY VISIT (OUTPATIENT)
Dept: OCCUPATIONAL THERAPY | Facility: CLINIC | Age: 29
End: 2024-07-10
Payer: COMMERCIAL

## 2024-07-10 DIAGNOSIS — G56.03 BILATERAL CARPAL TUNNEL SYNDROME: Primary | ICD-10-CM

## 2024-07-10 PROCEDURE — 97112 NEUROMUSCULAR REEDUCATION: CPT | Mod: GO

## 2024-07-10 PROCEDURE — 97140 MANUAL THERAPY 1/> REGIONS: CPT | Mod: GO

## 2024-07-11 ENCOUNTER — TELEPHONE (OUTPATIENT)
Dept: OTOLARYNGOLOGY | Facility: CLINIC | Age: 29
End: 2024-07-11

## 2024-07-11 ENCOUNTER — OFFICE VISIT (OUTPATIENT)
Dept: OTOLARYNGOLOGY | Facility: OTHER | Age: 29
End: 2024-07-11
Attending: NURSE PRACTITIONER
Payer: COMMERCIAL

## 2024-07-11 VITALS
WEIGHT: 275 LBS | BODY MASS INDEX: 53.99 KG/M2 | SYSTOLIC BLOOD PRESSURE: 110 MMHG | DIASTOLIC BLOOD PRESSURE: 60 MMHG | TEMPERATURE: 97.2 F | HEIGHT: 60 IN

## 2024-07-11 DIAGNOSIS — J34.2 DEVIATED NASAL SEPTUM: ICD-10-CM

## 2024-07-11 DIAGNOSIS — R93.89 ABNORMAL MRI: ICD-10-CM

## 2024-07-11 DIAGNOSIS — J32.1 CHRONIC FRONTAL SINUSITIS: Primary | ICD-10-CM

## 2024-07-11 DIAGNOSIS — J34.3 NASAL TURBINATE HYPERTROPHY: ICD-10-CM

## 2024-07-11 PROCEDURE — 99203 OFFICE O/P NEW LOW 30 MIN: CPT | Performed by: OTOLARYNGOLOGY

## 2024-07-11 ASSESSMENT — ENCOUNTER SYMPTOMS
SINUS PAIN: 1
CONSTITUTIONAL NEGATIVE: 1
NAUSEA: 1
EYE DISCHARGE: 1
DOUBLE VISION: 1
BLURRED VISION: 1
EYE PAIN: 1
RESPIRATORY NEGATIVE: 1
HEADACHES: 1

## 2024-07-11 NOTE — PROGRESS NOTES
Chief Complaint   Patient presents with    Consult     Sinus pressure in forehead, face. 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 100 each 3    atorvastatin (LIPITOR) 10 MG tablet TAKE 1 TABLET(10 MG) BY MOUTH DAILY 90 tablet 2    blood glucose (NO BRAND SPECIFIED) test strip Use to test blood sugar three times daily or as directed. Preferred blood glucose meter OR supplies to accompany: Blood Glucose Monitor Brands: per insurance. 100 strip 6    blood glucose monitoring (NO BRAND SPECIFIED) meter device kit Use to test blood sugar three times daily or as directed. Preferred blood glucose meter OR supplies to accompany: Blood Glucose Monitor Brands: per insurance. 1 kit 0    cholestyramine light (QUESTRAN) 4 GM packet Take 1 packet (4 g) by mouth 2 times daily (with meals) for 180 days 180 packet 1    Continuous Glucose Sensor (FREESTYLE AYSE 3 SENSOR) [facility] Externally apply 1 Application topically every 14 days 2 each 2    gabapentin (NEURONTIN) 300 MG capsule Take 1 capsule (300 mg) by mouth at bedtime for 7 days, THEN 1 capsule (300 mg) 2 times daily for 90 days. 187 capsule 0    insulin  pen needle (ULTICARE MICRO) 32G X 4 MM miscellaneous Use 1 pen needles daily or as directed. 100 each 3    meloxicam (MOBIC) 7.5 MG tablet TAKE 1 TABLET(7.5 MG) BY MOUTH DAILY 30 tablet 2    metFORMIN (GLUCOPHAGE XR) 500 MG 24 hr tablet Take 4 tablets (2,000 mg) by mouth daily (with dinner) 360 tablet 3    norgestimate-ethinyl estradiol (ORTHO-CYCLEN) 0.25-35 MG-MCG tablet Take 1 tablet by mouth daily 84 tablet 3    ondansetron (ZOFRAN ODT) 4 MG ODT tab Take 1 tablet (4 mg) by mouth every 8 hours as needed for nausea 18 tablet 0    rimegepant (NURTEC) 75 MG ODT tablet Place 75 mg under the tongue every 48 hours      rizatriptan (MAXALT) 10 MG tablet TAKE ONE TABLET BY MOUTH AS DIRECTED. MAY REPEAT AFTER 2 HOURS. MAX DOSE 30MG/24 HOURS      semaglutide (OZEMPIC) 2 MG/3ML pen Inject 0.5 mg Subcutaneous every 7 days 9 mL 3    SUMAtriptan (IMITREX) 50 MG tablet Take 1 tablet (50 mg) by mouth at onset of headache for migraine May repeat in 2 hours. Max 4 tablets/24 hours. 18 tablet 1    thin (NO BRAND SPECIFIED) lancets Use with lanceting device. To accompany: Blood Glucose Monitor Brands: per insurance. 100 each 6    topiramate (TOPAMAX) 25 MG tablet TAKE TWO TABLETS BY MOUTH EVERY NIGHT AT BEDTIME X 7 DAYS, THEN ONE IN THE AM AND TWO EVERY NIGHT AT BEDTIME X 7 DYAS, THEN TWO TWICE DAILY      Continuous Glucose Sensor (DEXCOM G7 SENSOR) MISC 1 each every 10 days Change sensor every 10 days (Patient not taking: Reported on 7/11/2024) 9 each 5    Continuous Glucose Transmitter (DEXCOM G6 TRANSMITTER) MISC Change every 3 months. (Patient not taking: Reported on 7/11/2024) 1 each 1     No current facility-administered medications for this visit.     SINUSES LESS THAN THREE VIEWS/ROA  Apr 11, 2011    HISTORY: Epistaxis, chronic pharyngitis.      COMPARISON: None.      IMPRESSION:  Undeveloped right frontal sinus. No convincing sinus opacification.     MR BRAIN W/O CONTRAST 3/29/2024     INDICATION: Headache; Existing HA  disorder with clinical progression; No known automatically detected potential contraindications to MRI.  COMPARISON: Limited images from a brain MRI exam dated 10/15/2014.  TECHNIQUE: Routine multiplanar multisequence head MRI without intravenous contrast.     FINDINGS:  INTRACRANIAL CONTENTS: No abnormal intracranial restricted diffusion is identified to suggest recent infarct. The ventricles appear normal in size and configuration. Normal morphology, volume, and signal intensity of the brain parenchyma for age.   Probable small cortical fissures is on the right adjacent to the posterior body/tail of the right hippocampus (series 7 image 17, series 4 image 14). No evidence for intracranial hemorrhage, acute extra-axial fluid collection, or mass effect/herniation.     SELLA: No abnormality accounting for technique.     OSSEOUS STRUCTURES/SOFT TISSUES: Normal marrow signal. The major intracranial vascular flow voids are maintained.      ORBITS: No abnormality accounting for technique.      SINUSES/MASTOIDS: There is near complete opacification of the left frontal sinus with T2 hyperintense tissue, which may represent mucosal thickening or a prominent retention cyst or polyp. Moderate mucosal thickening in the left anterior ethmoid air   cells. Mild scattered paranasal sinus mucosal thickening elsewhere. No middle ear or mastoid effusion.                                                                     IMPRESSION:  1.  No acute intracranial process.  2.  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 This is most likely incidental.  3.  Scattered polypoid soft tissue in the paranasal sinuses, as described. This includes essentially complete opacification of the left frontal sinus. Correlate clinically for possible symptoms/signs of sinusitis.    HPI  Pleasant 29 year old female presents today as a(n) new  patient for sinus pressure in the forehead and face and review of MRI results. She states that her eye on her left side has intermittent pressure and pain. She states that the facial pain is constant and is always on the left side. She describes the pain as pressure, pulsating, and sharp. She scores the pain as a 6 out of 10 in her left eye and states that it sometimes causes her eyes to water. She has pain medication for carpal tunnel that sometimes helps the facial pain.  She reports intermittent post nasal drip and nasal congestion. She states that she does not feel as if she can breathe through her nose. She states that she gets colds out of nowhere.  She reports allergies.  She states that she has tried to use nasal sprays but she does not like the taste of them.  She states that she thinks she broke her nose as a child and had bad epistaxis with headaches that lasted hours back then.  She reports bilateral otalgia and itching.  She reports that she has migraines daily with nausea, blurry vision, and double vision. She takes medication for her migraines and sees a neurologist. She states that she was put on a strong antibiotic at one point for her migraines, but it did not help and made her stomach upset.  She states that she has been told that she snores. She went to a sleep physician last Friday and has a sleep study scheduled for sometime in November.    She denies hx of head and neck surgeries.    Review of Systems   Constitutional: Negative.    HENT:  Positive for congestion, ear pain and sinus pain.    Eyes:  Positive for blurred vision, double vision, pain and discharge.   Respiratory: Negative.     Gastrointestinal:  Positive for nausea.   Skin:  Positive for itching.   Neurological:  Positive for headaches.   Endo/Heme/Allergies:  Positive for environmental allergies.       Physical Exam  Vitals and nursing note reviewed.   Constitutional:       Appearance: Normal appearance.   HENT:      Head:  Normocephalic and atraumatic.      Jaw: There is normal jaw occlusion.      Right Ear: Hearing, tympanic membrane, ear canal and external ear normal. No middle ear effusion.      Left Ear: Hearing, tympanic membrane, ear canal and external ear normal.  No middle ear effusion.      Nose: Septal deviation (left) and congestion present. No mucosal edema or rhinorrhea.      Right Nostril: No occlusion.      Left Nostril: No occlusion.      Right Turbinates: Enlarged and swollen.      Left Turbinates: Swollen. Not enlarged.      Right Sinus: No maxillary sinus tenderness or frontal sinus tenderness.      Left Sinus: No maxillary sinus tenderness or frontal sinus tenderness.      Mouth/Throat:      Mouth: Mucous membranes are moist.      Pharynx: Oropharynx is clear. Uvula midline.   Eyes:      Extraocular Movements: Extraocular movements intact.      Conjunctiva/sclera: Conjunctivae normal.      Pupils: Pupils are equal, round, and reactive to light.   Neurological:      Mental Status: She is alert.       A/P  Imaging was independently reviewed and discussed in detail with the patient. Her MRI showed scattered polypoid soft tissue in the paranasal sinuses and essentially complete opacification of the left frontal sinus. This pleasant patient has eczematoid otitis externa, and bilateral turbinate hypertrophy with left septal deviation causing nasal congestion and snoring, possible apnea. A CT sinus w/o contrast is ordered for further investigation. She will receive a phone call to review the results of this test and future potential treatment options. The potential of a septoplasty with turbinoplasty for the future is discussed. Questions and concerns were addressed.    Follow up in clinic prn pending imaging results.    Scribe/Staff:    Scribe Disclosure:   BONNIE, Rhonda Jackson, am serving as a scribe; to document services personally performed by Bob Reyes MD based on data collection and the provider's statements  to me.     Provider Disclosure:  I agree with above History, Review of Systems, Physical exam and Plan.  I have reviewed the content of the documentation and have edited it as needed. I have personally performed the services documented here and the documentation accurately represents those services and the decisions I have made.      Electronically signed by:  Bob Reyes MD

## 2024-07-11 NOTE — LETTER
7/11/2024      Anh Flores  [address] [facility] [address]      Dear Colleague,    Thank you for referring your patient, Anh Flores, to the [facility]. Please see a copy of my visit note below.    Chief Complaint   Patient presents with     Consult     Sinus pressure in forehead, face. 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 100 each 3     atorvastatin (LIPITOR) 10 MG tablet TAKE 1 TABLET(10 MG) BY MOUTH DAILY 90 tablet 2     blood glucose (NO BRAND SPECIFIED) test strip Use to test blood sugar three times daily or as directed. Preferred blood glucose meter OR supplies to accompany: Blood Glucose Monitor Brands: per insurance. 100 strip 6     blood glucose monitoring (NO BRAND SPECIFIED) meter device kit Use to test blood sugar three times daily or as directed. Preferred blood glucose meter OR supplies to accompany: Blood Glucose Monitor Brands: per insurance. 1 kit 0     cholestyramine light (QUESTRAN) 4 GM packet Take 1 packet (4 g) by mouth 2 times daily (with meals) for 180 days 180 packet 1     Continuous Glucose Sensor (FREESTYLE AYSE  3 SENSOR) MISC Externally apply 1 Application topically every 14 days 2 each 2     gabapentin (NEURONTIN) 300 MG capsule Take 1 capsule (300 mg) by mouth at bedtime for 7 days, THEN 1 capsule (300 mg) 2 times daily for 90 days. 187 capsule 0     insulin pen needle (ULTICARE MICRO) 32G X 4 MM miscellaneous Use 1 pen needles daily or as directed. 100 each 3     meloxicam (MOBIC) 7.5 MG tablet TAKE 1 TABLET(7.5 MG) BY MOUTH DAILY 30 tablet 2     metFORMIN (GLUCOPHAGE XR) 500 MG 24 hr tablet Take 4 tablets (2,000 mg) by mouth daily (with dinner) 360 tablet 3     norgestimate-ethinyl estradiol (ORTHO-CYCLEN) 0.25-35 MG-MCG tablet Take 1 tablet by mouth daily 84 tablet 3     ondansetron (ZOFRAN ODT) 4 MG ODT tab Take 1 tablet (4 mg) by mouth every 8 hours as needed for nausea 18 tablet 0     rimegepant (NURTEC) 75 MG ODT tablet Place 75 mg under the tongue every 48 hours       rizatriptan (MAXALT) 10 MG tablet TAKE ONE TABLET BY MOUTH AS DIRECTED. MAY REPEAT AFTER 2 HOURS. MAX DOSE 30MG/24 HOURS       semaglutide (OZEMPIC) 2 MG/3ML pen Inject 0.5 mg Subcutaneous every 7 days 9 mL 3     SUMAtriptan (IMITREX) 50 MG tablet Take 1 tablet (50 mg) by mouth at onset of headache for migraine May repeat in 2 hours. Max 4 tablets/24 hours. 18 tablet 1     thin (NO BRAND SPECIFIED) lancets Use with lanceting device. To accompany: Blood Glucose Monitor Brands: per insurance. 100 each 6     topiramate (TOPAMAX) 25 MG tablet TAKE TWO TABLETS BY MOUTH EVERY NIGHT AT BEDTIME X 7 DAYS, THEN ONE IN THE AM AND TWO EVERY NIGHT AT BEDTIME X 7 DYAS, THEN TWO TWICE DAILY       Continuous Glucose Sensor (DEXCOM G7 SENSOR) MISC 1 each every 10 days Change sensor every 10 days (Patient not taking: Reported on 7/11/2024) 9 each 5     Continuous Glucose Transmitter (DEXCOM G6 TRANSMITTER) MISC Change every 3 months. (Patient not taking: Reported on 7/11/2024) 1 each 1     No current facility-administered medications for this visit.     SINUSES LESS  THAN THREE VIEWS/ROA  Apr 11, 2011    HISTORY: Epistaxis, chronic pharyngitis.      COMPARISON: None.      IMPRESSION:  Undeveloped right frontal sinus. No convincing sinus opacification.     MR BRAIN W/O CONTRAST 3/29/2024     INDICATION: Headache; Existing HA disorder with clinical progression; No known automatically detected potential contraindications to MRI.  COMPARISON: Limited images from a brain MRI exam dated 10/15/2014.  TECHNIQUE: Routine multiplanar multisequence head MRI without intravenous contrast.     FINDINGS:  INTRACRANIAL CONTENTS: No abnormal intracranial restricted diffusion is identified to suggest recent infarct. The ventricles appear normal in size and configuration. Normal morphology, volume, and signal intensity of the brain parenchyma for age.   Probable small cortical fissures is on the right adjacent to the posterior body/tail of the right hippocampus (series 7 image 17, series 4 image 14). No evidence for intracranial hemorrhage, acute extra-axial fluid collection, or mass effect/herniation.     SELLA: No abnormality accounting for technique.     OSSEOUS STRUCTURES/SOFT TISSUES: Normal marrow signal. The major intracranial vascular flow voids are maintained.      ORBITS: No abnormality accounting for technique.      SINUSES/MASTOIDS: There is near complete opacification of the left frontal sinus with T2 hyperintense tissue, which may represent mucosal thickening or a prominent retention cyst or polyp. Moderate mucosal thickening in the left anterior ethmoid air   cells. Mild scattered paranasal sinus mucosal thickening elsewhere. No middle ear or mastoid effusion.                                                                     IMPRESSION:  1.  No acute intracranial process.  2.  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 This is most likely incidental.  3.  Scattered  polypoid soft tissue in the paranasal sinuses, as described. This includes essentially complete opacification of the left frontal sinus. Correlate clinically for possible symptoms/signs of sinusitis.    HPI  Pleasant 29 year old female presents today as a(n) new patient for sinus pressure in the forehead and face and review of MRI results. She states that her eye on her left side has intermittent pressure and pain. She states that the facial pain is constant and is always on the left side. She describes the pain as pressure, pulsating, and sharp. She scores the pain as a 6 out of 10 in her left eye and states that it sometimes causes her eyes to water. She has pain medication for carpal tunnel that sometimes helps the facial pain.  She reports intermittent post nasal drip and nasal congestion. She states that she does not feel as if she can breathe through her nose. She states that she gets colds out of nowhere.  She reports allergies.  She states that she has tried to use nasal sprays but she does not like the taste of them.  She states that she thinks she broke her nose as a child and had bad epistaxis with headaches that lasted hours back then.  She reports bilateral otalgia and itching.  She reports that she has migraines daily with nausea, blurry vision, and double vision. She takes medication for her migraines and sees a neurologist. She states that she was put on a strong antibiotic at one point for her migraines, but it did not help and made her stomach upset.  She states that she has been told that she snores. She went to a sleep physician last Friday and has a sleep study scheduled for sometime in November.    She denies hx of head and neck surgeries.    Review of Systems   Constitutional: Negative.    HENT:  Positive for congestion, ear pain and sinus pain.    Eyes:  Positive for blurred vision, double vision, pain and discharge.   Respiratory: Negative.     Gastrointestinal:  Positive for nausea.   Skin:   Positive for itching.   Neurological:  Positive for headaches.   Endo/Heme/Allergies:  Positive for environmental allergies.       Physical Exam  Vitals and nursing note reviewed.   Constitutional:       Appearance: Normal appearance.   HENT:      Head: Normocephalic and atraumatic.      Jaw: There is normal jaw occlusion.      Right Ear: Hearing, tympanic membrane, ear canal and external ear normal. No middle ear effusion.      Left Ear: Hearing, tympanic membrane, ear canal and external ear normal.  No middle ear effusion.      Nose: Septal deviation (left) and congestion present. No mucosal edema or rhinorrhea.      Right Nostril: No occlusion.      Left Nostril: No occlusion.      Right Turbinates: Enlarged and swollen.      Left Turbinates: Swollen. Not enlarged.      Right Sinus: No maxillary sinus tenderness or frontal sinus tenderness.      Left Sinus: No maxillary sinus tenderness or frontal sinus tenderness.      Mouth/Throat:      Mouth: Mucous membranes are moist.      Pharynx: Oropharynx is clear. Uvula midline.   Eyes:      Extraocular Movements: Extraocular movements intact.      Conjunctiva/sclera: Conjunctivae normal.      Pupils: Pupils are equal, round, and reactive to light.   Neurological:      Mental Status: She is alert.       A/P  Imaging was independently reviewed and discussed in detail with the patient. Her MRI showed scattered polypoid soft tissue in the paranasal sinuses and essentially complete opacification of the left frontal sinus. This pleasant patient has eczematoid otitis externa, and bilateral turbinate hypertrophy with left septal deviation causing nasal congestion and snoring, possible apnea. A CT sinus w/o contrast is ordered for further investigation. She will receive a phone call to review the results of this test and future potential treatment options. The potential of a septoplasty with turbinoplasty for the future is discussed. Questions and concerns were  addressed.    Follow up in clinic prn pending imaging results.    Scribe/Staff:    Scribe Disclosure:   I, Rhonda Manuel, am serving as a scribe; to document services personally performed by Bob Reyes MD based on data collection and the provider's statements to me.     Provider Disclosure:  I agree with above History, Review of Systems, Physical exam and Plan.  I have reviewed the content of the documentation and have edited it as needed. I have personally performed the services documented here and the documentation accurately represents those services and the decisions I have made.      Electronically signed by:  Bob Reyes MD         Again, thank you for allowing me to participate in the care of your patient.        Sincerely,        Bob Reyes MD

## 2024-07-11 NOTE — TELEPHONE ENCOUNTER
[facility] Call Center    Phone Message    May a detailed message be left on voicemail: yes     Reason for Call: Other: Pt just change her time to earlier in the morning. Thank you     Action Taken: Message routed to:  [facility] ([facility] ENT    Travel Screening: Not Applicable     Date of Service:

## 2024-07-15 ENCOUNTER — HOSPITAL ENCOUNTER (OUTPATIENT)
Facility: AMBULATORY SURGERY CENTER | Age: 29
End: 2024-07-15
Attending: STUDENT IN AN ORGANIZED HEALTH CARE EDUCATION/TRAINING PROGRAM
Payer: COMMERCIAL

## 2024-07-15 ENCOUNTER — TELEPHONE (OUTPATIENT)
Dept: GASTROENTEROLOGY | Facility: CLINIC | Age: 29
End: 2024-07-15
Payer: COMMERCIAL

## 2024-07-15 DIAGNOSIS — Z12.11 SPECIAL SCREENING FOR MALIGNANT NEOPLASMS, COLON: Primary | ICD-10-CM

## 2024-07-15 NOTE — TELEPHONE ENCOUNTER
"Endoscopy Scheduling Screen    Have you had a positive Covid test in the last 14 days?  No    What is your communication preference for Instructions and/or Bowel Prep?   MyChart    What insurance is in the chart?  Other:  [employer]    Ordering/Referring Provider: Bita Veronica MD   (If ordering provider performs procedure, schedule with ordering provider unless otherwise instructed. )    BMI: Estimated body mass index is 53.71 kg/m  as calculated from the following:    Height as of 7/11/24: 1.524 m (5').    Weight as of 7/11/24: 124.7 kg (275 lb).     Sedation Ordered  moderate sedation.   If patient BMI > 50 do not schedule in [facility].    If patient BMI > 45 do not schedule at [facility].    Are you taking methadone or Suboxone?  No    Have you had difficulties, pain, or discomfort during past endoscopy procedures?  No    Are you taking any prescription medications for pain 3 or more times per week?   NO, No RN review required.    Do you have a history of malignant hyperthermia?  No    (Females) Are you currently pregnant?   No     Have you been diagnosed or told you have pulmonary hypertension?   No    Do you have an LVAD?  No    Have you been told you have moderate to severe sleep apnea?  No    Have you been told you have COPD, asthma, or any other lung disease?  No    Do you have any heart conditions?  No     Have you ever had or are you waiting for an organ transplant?  No. Continue scheduling, no site restrictions.    Have you had a stroke or transient ischemic attack (TIA aka \"mini stroke\" in the last 6 months?   No    Have you been diagnosed with or been told you have cirrhosis of the liver?   No    Are you currently on dialysis?   No    Do you need assistance transferring?   No    BMI: Estimated body mass index is 53.71 kg/m  as calculated from the following:    Height as of 7/11/24: 1.524 m (5').    Weight as of 7/11/24: 124.7 kg (275 lb).     Is patients BMI > 40 and scheduling location [facility]?  No    Do you " take an injectable medication for weight loss or diabetes (excluding insulin)?  Yes, hold time can be up to 7 days. Please consult with you prescribing provider to discuss endoscopy recommendations.     Do you take the medication Naltrexone?  No    Do you take blood thinners?  No       Prep   Are you currently on dialysis or do you have chronic kidney disease?  No    Do you have a diagnosis of diabetes?  Yes (Golytely Prep)    Do you have a diagnosis of cystic fibrosis (CF)?  No    On a regular basis do you go 3 -5 days between bowel movements?  No    BMI > 40?  Yes (Extended Prep)    Preferred Pharmacy:    [id number] - [place] - [address]  [address]  [place] [address]  Phone: [phone] Fax: [phone]      Final Scheduling Details     Procedure scheduled  Colonoscopy    Surgeon:  Elmo Villegas     Date of procedure:  8.28.24     Pre-OP / PAC:   No - Not required for this site.    Location  [facility] - Patient preference.    Sedation   Moderate Sedation - Per order.      Patient Reminders:   You will receive a call from a Nurse to review instructions and health history.  This assessment must be completed prior to your procedure.  Failure to complete the Nurse assessment may result in the procedure being cancelled.      On the day of your procedure, please designate an adult(s) who can drive you home stay with you for the next 24 hours. The medicines used in the exam will make you sleepy. You will not be able to drive.      You cannot take public transportation, ride share services, or non-medical taxi service without a responsible caregiver.  Medical transport services are allowed with the requirement that a responsible caregiver will receive you at your destination.  We require that drivers and caregivers are confirmed prior to your procedure.

## 2024-07-16 ENCOUNTER — MYC REFILL (OUTPATIENT)
Dept: FAMILY MEDICINE | Facility: CLINIC | Age: 29
End: 2024-07-16
Payer: COMMERCIAL

## 2024-07-16 DIAGNOSIS — E25.0 CONGENITAL ADRENAL HYPERPLASIA (H): ICD-10-CM

## 2024-07-16 RX ORDER — ONDANSETRON 4 MG/1
4 TABLET, ORALLY DISINTEGRATING ORAL EVERY 8 HOURS PRN
Qty: 30 TABLET | Refills: 2 | Status: SHIPPED | OUTPATIENT
Start: 2024-07-16

## 2024-07-17 ENCOUNTER — THERAPY VISIT (OUTPATIENT)
Dept: OCCUPATIONAL THERAPY | Facility: CLINIC | Age: 29
End: 2024-07-17
Payer: COMMERCIAL

## 2024-07-17 DIAGNOSIS — G56.03 BILATERAL CARPAL TUNNEL SYNDROME: Primary | ICD-10-CM

## 2024-07-17 PROCEDURE — 97112 NEUROMUSCULAR REEDUCATION: CPT | Mod: GO

## 2024-07-17 PROCEDURE — 97110 THERAPEUTIC EXERCISES: CPT | Mod: GO

## 2024-07-17 PROCEDURE — 97140 MANUAL THERAPY 1/> REGIONS: CPT | Mod: GO

## 2024-07-22 ENCOUNTER — ANCILLARY PROCEDURE (OUTPATIENT)
Dept: CT IMAGING | Facility: CLINIC | Age: 29
End: 2024-07-22
Attending: OTOLARYNGOLOGY
Payer: COMMERCIAL

## 2024-07-22 DIAGNOSIS — J34.2 DEVIATED NASAL SEPTUM: ICD-10-CM

## 2024-07-22 DIAGNOSIS — J32.1 CHRONIC FRONTAL SINUSITIS: ICD-10-CM

## 2024-07-22 PROCEDURE — 70486 CT MAXILLOFACIAL W/O DYE: CPT | Performed by: RADIOLOGY

## 2024-07-23 ENCOUNTER — THERAPY VISIT (OUTPATIENT)
Dept: OCCUPATIONAL THERAPY | Facility: CLINIC | Age: 29
End: 2024-07-23
Payer: COMMERCIAL

## 2024-07-23 DIAGNOSIS — G56.03 BILATERAL CARPAL TUNNEL SYNDROME: Primary | ICD-10-CM

## 2024-07-23 PROCEDURE — 97110 THERAPEUTIC EXERCISES: CPT | Mod: GO

## 2024-07-23 NOTE — PROGRESS NOTES
"   07/23/24 0500   Appointment Info   Treating Provider Christy Rodriguez, OTR/L   Total/Authorized Visits 6 (POC)   Visits Used 5   Medical Diagnosis B CTS   OT Tx Diagnosis B CTS   Quick Add  Certification   Progress Note/Certification   Start Of Care Date 06/11/24   Onset of Illness/Injury or Date of Surgery 04/30/24  (MD Order Date)   Therapy Frequency 1x weekly   Predicted Duration 6 weeks   Certification date from 06/11/24   Certification date to 07/23/24   Progress Note Due Date 07/23/24   Progress Note Completed Date 07/23/24   Goals   OT Goals 1   OT Goal 1   Goal Identifier Sleeping   Goal Description Pt will report they can sleep through the night with no symptoms 7/7 nights during the week.   Goal Progress 2-3/7 nights with no symptoms   Target Date 07/23/24   Subjective Report   Subjective Report \"It's better. The left is still more sore than the right. But the left feels better. It's still constant. I see the doctor next week.\"   Objective Measures   Objective Measures Hand Obj Measures   Hand Objective Measures Special Tests;Neural Tension Testing;Strength   Neural Tension Testing MNT   Special Tests CTS Special Tests   Strength ;Lateral Pinch;3 Point Pinch   CTS Special Tests   Median Nerve Compression at Pronator R: + @ 14 secs L: + @ 13 secs   Carpal Compression Test-Durkan Test (30 sec) R: + @ 10 secs L: + @ 13 secs   [place] Test for Lumbrical Incursion (fist x30 sec) R: + @ 12 secs L: + @ 15 secs   Tinel's at Carpal Tunnel R: - L: -   Phalen's Sign R: + @ 15 secs L: + @ 15 secs   MNT: Median Neurodynamic Test (based on DS Millan's ULNT)   0-5 Scale R: 2/5 L: 2/5    (measured in pounds)    Average Strength R: 40lbs L: 37lbs   Lateral Pinch (measured in pounds)   Lateral Pinch Average Strength R: 18lbs L: 13lbs   3 Point Pinch (measured in pounds)   3 Point Pinch Average Strength R: 12lbs L: 10lbs   Treatment Interventions (OT)   Interventions Therapeutic Procedure/Exercise "   Neuromuscular Re-education   PTRx Neuro Re-ed 1 Median Nerve Mobility   PTRx Neuro Re-ed 1 - Details HEP   Therapeutic Procedure/Exercise   Therapeutic Procedure: strength, endurance, ROM, flexibillity minutes (63813) 23   PTRx Ther Proc 1 Finger Active Range of Motion Tendon Glides Fist Series   PTRx Ther Proc 1 - Details HEP   PTRx Ther Proc 2 Finger Active Range of Motion FDS Flexor Tendon Gliding   PTRx Ther Proc 2 - Details HEP   Skilled Intervention To track therapy progress.   Patient Response/Progress Pt is progressing with right hand and is recommended to continue with home program for the next several months to maximize results with therapy. The left hand has had limited progress with therapy. They are recommended to follow-up with their MD to discuss alternative treatment options.   Ther Proc 1 Discharge Note   Ther Proc 1 - Details Please refer to the objective data gathered for today's discharge note.   Therapeutic Activity   PTRx Ther Act 1 Carpal Tunnel Syndrome Prevention   PTRx Ther Act 1 - Details HEP   PTRx Ther Act 2 Orthosis Wear and Care   PTRx Ther Act 2 - Details HEP   PTRx Ther Act 3 Warmth   PTRx Ther Act 3 - Details HEP   PTRx Ther Act 4 Ball Massage to Flexors   PTRx Ther Act 4 - Details HEP   Education   Learner/Method Patient;Demonstration;Pictures/Video   Education Comments PTRX via printout   Plan   Home program See PTRX: CTS protocol   Total Session Time   Timed Code Treatment Minutes 23   Total Treatment Time (sum of timed and untimed services) 23     Upper Extremity Functional Index Score:  SCORE:   Column Totals: /80: 54   (A lower score indicates greater disability.)      DISCHARGE  Reason for Discharge: No further expectation of progress.    Discharge Plan: Patient to continue home program.  Other services: Pt recommended for re-evaluation with MD.    Referring Provider:  Referred Self

## 2024-07-30 ENCOUNTER — TELEPHONE (OUTPATIENT)
Dept: GASTROENTEROLOGY | Facility: CLINIC | Age: 29
End: 2024-07-30

## 2024-07-30 ENCOUNTER — OFFICE VISIT (OUTPATIENT)
Dept: FAMILY MEDICINE | Facility: CLINIC | Age: 29
End: 2024-07-30
Payer: COMMERCIAL

## 2024-07-30 VITALS
RESPIRATION RATE: 16 BRPM | DIASTOLIC BLOOD PRESSURE: 70 MMHG | BODY MASS INDEX: 53.26 KG/M2 | HEART RATE: 80 BPM | TEMPERATURE: 97.2 F | SYSTOLIC BLOOD PRESSURE: 120 MMHG | HEIGHT: 60 IN | WEIGHT: 271.3 LBS | OXYGEN SATURATION: 100 %

## 2024-07-30 DIAGNOSIS — K52.9 CHRONIC DIARRHEA: ICD-10-CM

## 2024-07-30 DIAGNOSIS — R10.9 CHRONIC ABDOMINAL PAIN: ICD-10-CM

## 2024-07-30 DIAGNOSIS — Z13.220 LIPID SCREENING: ICD-10-CM

## 2024-07-30 DIAGNOSIS — E11.65 TYPE 2 DIABETES MELLITUS WITH HYPERGLYCEMIA, WITHOUT LONG-TERM CURRENT USE OF INSULIN (H): ICD-10-CM

## 2024-07-30 DIAGNOSIS — G89.29 CHRONIC ABDOMINAL PAIN: ICD-10-CM

## 2024-07-30 DIAGNOSIS — R11.15 CYCLICAL VOMITING: ICD-10-CM

## 2024-07-30 DIAGNOSIS — G56.03 BILATERAL CARPAL TUNNEL SYNDROME: Primary | ICD-10-CM

## 2024-07-30 LAB
ALBUMIN SERPL BCG-MCNC: 4.6 G/DL (ref 3.5–5.2)
ALP SERPL-CCNC: 56 U/L (ref 40–150)
ALT SERPL W P-5'-P-CCNC: 30 U/L (ref 0–50)
ANION GAP SERPL CALCULATED.3IONS-SCNC: 13 MMOL/L (ref 7–15)
AST SERPL W P-5'-P-CCNC: 23 U/L (ref 0–45)
BILIRUB SERPL-MCNC: 0.3 MG/DL
BUN SERPL-MCNC: 9.7 MG/DL (ref 6–20)
CALCIUM SERPL-MCNC: 9.5 MG/DL (ref 8.8–10.4)
CHLORIDE SERPL-SCNC: 111 MMOL/L (ref 98–107)
CHOLEST SERPL-MCNC: 128 MG/DL
CREAT SERPL-MCNC: 0.69 MG/DL (ref 0.51–0.95)
EGFRCR SERPLBLD CKD-EPI 2021: >90 ML/MIN/1.73M2
FASTING STATUS PATIENT QL REPORTED: YES
FASTING STATUS PATIENT QL REPORTED: YES
GLUCOSE SERPL-MCNC: 86 MG/DL (ref 70–99)
HCO3 SERPL-SCNC: 18 MMOL/L (ref 22–29)
HDLC SERPL-MCNC: 50 MG/DL
LDLC SERPL CALC-MCNC: 61 MG/DL
NONHDLC SERPL-MCNC: 78 MG/DL
POTASSIUM SERPL-SCNC: 4.4 MMOL/L (ref 3.4–5.3)
PROT SERPL-MCNC: 8.2 G/DL (ref 6.4–8.3)
SODIUM SERPL-SCNC: 142 MMOL/L (ref 135–145)
TRIGL SERPL-MCNC: 85 MG/DL

## 2024-07-30 PROCEDURE — 80053 COMPREHEN METABOLIC PANEL: CPT | Performed by: FAMILY MEDICINE

## 2024-07-30 PROCEDURE — 80061 LIPID PANEL: CPT | Performed by: FAMILY MEDICINE

## 2024-07-30 PROCEDURE — 36415 COLL VENOUS BLD VENIPUNCTURE: CPT | Performed by: FAMILY MEDICINE

## 2024-07-30 PROCEDURE — 99214 OFFICE O/P EST MOD 30 MIN: CPT | Performed by: FAMILY MEDICINE

## 2024-07-30 PROCEDURE — 83036 HEMOGLOBIN GLYCOSYLATED A1C: CPT | Performed by: FAMILY MEDICINE

## 2024-07-30 RX ORDER — MELOXICAM 7.5 MG/1
7.5 TABLET ORAL DAILY
Qty: 30 TABLET | Refills: 2 | Status: SHIPPED | OUTPATIENT
Start: 2024-07-30 | End: 2024-09-19

## 2024-07-30 ASSESSMENT — ENCOUNTER SYMPTOMS: HEADACHES: 1

## 2024-07-30 NOTE — PROGRESS NOTES
Assessment & Plan     Bilateral carpal tunnel syndrome  Not improved  See referral to Hand ortho  - meloxicam (MOBIC) 7.5 MG tablet; Take 1 tablet (7.5 mg) by mouth daily  - Orthopedic  Referral; Future    Chronic abdominal pain  Established with [facility]  - NM Gastric Emptying; Future  - Adult GI  Referral - Procedure Only; Future    Chronic diarrhea  Improving  - NM Gastric Emptying; Future  - Adult GI  Referral - Procedure Only; Future    Cyclical vomiting  - NM Gastric Emptying; Future  - Adult GI  Referral - Procedure Only; Future    Type 2 diabetes mellitus with hyperglycemia, without long-term current use of insulin (H)  She is established with endocrinology  - Hemoglobin A1c; Future  - Comprehensive metabolic panel  - Hemoglobin A1c    Lipid screening  - Lipid panel reflex to direct LDL Fasting; Future  - Lipid panel reflex to direct LDL Fasting          Tanika Kamara is a 29 year old, presenting for the following health issues:  Headache and Diabetes        7/30/2024     1:27 PM   Additional Questions   Roomed by BT   Accompanied by self     Headache     History of Present Illness       Diabetes:   She presents for follow up of diabetes.   She is checking home blood glucose with a continuous glucose monitor.   She checks blood glucose before and after meals and at bedtime.  Blood glucose is never over 200 and sometimes under 70. She is aware of hypoglycemia symptoms including dizziness, weakness, blurred vision and confusion.    She has no concerns regarding her diabetes at this time.   She is not experiencing numbness or burning in feet, excessive thirst, blurry vision, weight changes or redness, sores or blisters on feet.           Headaches:   Since the patient's last clinic visit, headaches are: worsened  The patient is getting headaches:  Daily  She is not able to do normal daily activities when she has a migraine.  The patient is taking the following  "rescue/relief medications:  Sumatriptan (Imitrex)   Patient states \"The relief is inconsistent\" from the rescue/relief medications.   The patient is taking the following medications to prevent migraines:  Topomax  In the past 4 weeks, the patient has gone to an Urgent Care or Emergency Room 2 times times due to headaches.    She eats 2-3 servings of fruits and vegetables daily.She consumes 0 sweetened beverage(s) daily.She exercises with enough effort to increase her heart rate 30 to 60 minutes per day.  She exercises with enough effort to increase her heart rate 3 or less days per week.   She is taking medications regularly.             Review of Systems  Constitutional, neuro, ENT, endocrine, pulmonary, cardiac, gastrointestinal, genitourinary, musculoskeletal, integument and psychiatric systems are negative, except as otherwise noted.      Objective    /70   Pulse 80   Temp 97.2  F (36.2  C) (Temporal)   Resp 16   Ht 1.524 m (5')   Wt 123.1 kg (271 lb 4.8 oz)   LMP 07/30/2024 (Exact Date)   SpO2 100%   BMI 52.98 kg/m    Body mass index is 52.98 kg/m .  Physical Exam   GENERAL: alert and no distress  EYES: Eyes grossly normal to inspection, PERRL and conjunctivae and sclerae normal  HENT: ear canals and TM's normal, nose and mouth without ulcers or lesions  NECK: no adenopathy, no asymmetry, masses, or scars  RESP: lungs clear to auscultation - no rales, rhonchi or wheezes  CV: regular rate and rhythm, normal S1 S2, no S3 or S4, no murmur, click or rub, no peripheral edema  ABDOMEN: soft, nontender, no hepatosplenomegaly, no masses and bowel sounds normal  MS: no gross musculoskeletal defects noted, no edema            Signed Electronically by: Bita Veronica MD    "

## 2024-07-30 NOTE — TELEPHONE ENCOUNTER
Caller: Call to Pt    Reason for Reschedule/Cancellation   (please be detailed, any staff messages or encounters to note?): Scheduling Error - Needs Hospital due to BMI.  Now also has an order for EGD, will schedule together    Rescheduled: Yes,   Procedure: Upper and Lower Endoscopy [EGD and Colonoscopy]    Date: 9.16.24   Location: [facility]; [address] [place] [address], [address] Floor, [place] [address]   Surgeon: Naye  Sedation Level Scheduled  Moderate ,  Reason for Sedation Level Order   Instructions updated and sent: Yes - [facility]     Does patient need PAC or Pre -Op Rescheduled? : No       Did you cancel or rescheduled an EUS procedure? No.

## 2024-07-30 NOTE — TELEPHONE ENCOUNTER
Caller: Call to Pt      Reason for Reschedule/Cancellation   (please be detailed, any staff messages or encounters to note?): Scheduling error - BMI too high - needs hospital    Prior to reschedule please review:  Ordering Provider: Bita Veronica MD   Sedation Determined: Moderate  Does patient have any ASC Exclusions, please identify?: Yes - BMI      Notes on Cancelled Procedure:  Procedure: Lower Endoscopy [Colonoscopy]   Date: 8.28.24  Location: [facility] [place]; [address] [place] [address] [address], [place] [address]   Surgeon: Nelly      Rescheduled: Yes,   See note on 7.30.24

## 2024-07-31 LAB — HBA1C MFR BLD: 5.6 % (ref 0–5.6)

## 2024-08-01 ENCOUNTER — TRANSFERRED RECORDS (OUTPATIENT)
Dept: HEALTH INFORMATION MANAGEMENT | Facility: CLINIC | Age: 29
End: 2024-08-01
Payer: COMMERCIAL

## 2024-08-21 ENCOUNTER — OFFICE VISIT (OUTPATIENT)
Dept: ENDOCRINOLOGY | Facility: CLINIC | Age: 29
End: 2024-08-21
Payer: COMMERCIAL

## 2024-08-21 VITALS
OXYGEN SATURATION: 98 % | BODY MASS INDEX: 52.24 KG/M2 | DIASTOLIC BLOOD PRESSURE: 85 MMHG | RESPIRATION RATE: 16 BRPM | HEART RATE: 87 BPM | SYSTOLIC BLOOD PRESSURE: 137 MMHG | WEIGHT: 267.5 LBS

## 2024-08-21 DIAGNOSIS — E66.01 MORBID OBESITY (H): ICD-10-CM

## 2024-08-21 DIAGNOSIS — R53.83 OTHER FATIGUE: ICD-10-CM

## 2024-08-21 DIAGNOSIS — R68.89 COLD INTOLERANCE: ICD-10-CM

## 2024-08-21 DIAGNOSIS — E25.9 CAH 21-OH (CONGENITAL ADRENAL HYPERPLASIA), LATE ONSET (H): ICD-10-CM

## 2024-08-21 DIAGNOSIS — R19.7 DIARRHEA, UNSPECIFIED TYPE: ICD-10-CM

## 2024-08-21 DIAGNOSIS — E11.65 TYPE 2 DIABETES MELLITUS WITH HYPERGLYCEMIA, WITHOUT LONG-TERM CURRENT USE OF INSULIN (H): Primary | ICD-10-CM

## 2024-08-21 DIAGNOSIS — R10.84 ABDOMINAL PAIN, GENERALIZED: ICD-10-CM

## 2024-08-21 PROCEDURE — G2211 COMPLEX E/M VISIT ADD ON: HCPCS | Performed by: INTERNAL MEDICINE

## 2024-08-21 PROCEDURE — 99215 OFFICE O/P EST HI 40 MIN: CPT | Performed by: INTERNAL MEDICINE

## 2024-08-21 RX ORDER — SERTRALINE HYDROCHLORIDE 100 MG/1
100 TABLET, FILM COATED ORAL DAILY
COMMUNITY
End: 2024-10-06

## 2024-08-21 NOTE — PROGRESS NOTES
Assessment     1. Nonclassical congenital adrenal hyperplasia, associated with primary amenorrhea, treated with dexamethasone until 2014, when dexamethasone was discontinued. She used to be medicated with metformin, spironolactone and birth control pills.  They were discontinued around 2019.  The patient reestablish care in April 2023, when she was started on treatment with birth control pills and metformin as the evaluation for nonclassical CAH revealed normal gonadotropins and normal androgens.  Since being on treatment with birth control pills, she has regular menstrual periods  Recommendations:  Continue treatment with birth control pills    2.  Class III obesity, associated with severe insulin resistance.   She has lost a significant amount of weight and it is not clear how much of the weight loss is due to semaglutide and how much is due to her current GI issues.    3. Type 2 diabetes, controlled, with no known microvascular disease  With weight loss, the glycemic control significantly improved.  Given the severity of diarrhea, I recommended to discontinue metformin for now and just continue Ozempic, at the same dose of 0.5 mg weekly.    4.  Fatigue, headaches, diarrhea, nausea, confusion, episodes of lightheadedness  All these symptoms started following an episode of acute GI symptoms in November 2023.  Unlikely for the symptoms to be hormonal in etiology.  Plan to check morning ACTH and cortisol level.  Low threshold to consider an abdominal CT.     5.  Prognathism with overbite which has changed, according to the patient  Follow-up IGF-I level    6.  Cold intolerance  Follow-up TFTs    7.  Insulin resistance, diarrhea  Follow-up fasting glucose and glucagon    Orders Placed This Encounter   Procedures    Insulin Growth Factor 1 by Immunoassay    Adrenal corticotropin    Cortisol    TSH    T4 free    Glucagon    Glucose      =========================================================================================    The patient is seen for evaluation of nonclassical congenital adrenal hyperplasia. She was previously seen by me, most recently in 2018.      Anh Flores is a 29 year old female, previously seen by Dr. 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   Around age 6, while being evaluated for precocious puberty and darkening of the skin around her neck, she was diagnosed with late onset CAH and treated with 0.25 mg dexamethasone daily for a short period of time. Treatment with birth control pills did not induce a withdrawal bleeding. She 1st noticed the appearance of facial hair around age 13. Over the years, the patient had sporadic medical care.   Treatment with metformin, spironolactone and birth control pills was restarted in 2017.  Metformin used to cause some epigastric abdominal pain.    Since December 2023, after experiencing an episode of acute abdominal pain and diarrhea, Anh reports feeling miserable.  She has been dealing with extreme fatigue.  Last week, she slept almost constantly.  She continues to have persistent diarrhea and abdominal pain which is mainly triggered by food intake.  She has definitely identified meat (not only red meat) as a trigger but even if she eliminates meat from her diet, she still gets diarrhea, with loose or watery bowel movements.  Also, since December, she has been feeling nauseated, constantly.  The nausea did not change after she started treatment with Ozempic.  Metformin was prescribed in May 2024, at 4 tablets daily, but the patient sometimes takes 2 tablets, when the diarrhea gets worse.  She also endorses daily headaches for which she continues to follow-up with neurology.  She wakes up with a headache in the morning.  She feels confused most of the time and she has a hard time  focusing.  The neurologist agreed with trying to taper and discontinue Topamax which she currently takes at 100 mg daily.  The Topamax was initially started for migraines in March 2024.  An antidepressant was started recently but the patient does not think it is helpful, especially since she has never experienced depression.  She was evaluated by sleep medicine and a sleep study is scheduled in November.    Treatment with birth control pills were started in April and the patient reports having monthly menstrual periods.  Since being on semaglutide, she lost 33 pounds.  Her diet has significantly improved and she has decreased the amount of sweets and sweetened beverages in her diet.      Occasionally, once a month or so, she describes experiencing episodes of low blood glucose numbers on the sensor which occur during the night, following a more severe episode of diarrhea.  During these episodes, she reports feeling very tired and confused and the symptoms resolved with food intake.    Her hands and feet are always cold.  She mostly feels cold but she also describes experience episodes when she feels warmer, with no obvious reason.  Denies increased sweating, tremor or palpitations.  She thinks her bite has changed over the years.  Her teeth have always been somehow spread apart and this has not changed.  Recently, she has noticed some jaw pain at the temporomandibular joint.    She is scheduled to undergo an upper and lower endoscopy and she was also evaluated by ENT for sinusitis.    Sivan was diagnosed with prediabetes in 2014 and she was formally diagnosed with type 2 diabetes in 2018, when A1c was 6.9.   Victoza was started in February 2024 and then she was transition to semaglutide in May 2024, as she developed an allergic reaction at the injection site from Victoza.  On 4/5/2024, hemoglobin A1c was elevated at 8.4.  Subsequently, she was started on metformin and semaglutide, which she continues to take at  0.5 mg weekly.  Hemoglobin A1c today was 5.6.    Diabetes complications:  Last eye exam: last eye exam - 3/2024. No DR per patient.   No history of microalbuminuria.  Most recent urine microalbumin negative in January 2024.  Recent GFR above 90.  No history of numbness or tingling sensation in her feet  Most recent lipid panel from 1/30/2024: LDL cholesterol 145, HDL cholesterol 51, triglycerides 132.  On 10 mg atorvastatin daily.    She has been using the jada sensor.  On the sensor, average glucose is 91, with a glucose variability of 12.5, corresponding to a GMI of 5.5%.  99% of the glucose numbers are within target and, in the last month, the patient experienced only 1 significant hypoglycemic episodes on the sensor which occurred during the night.    ROS:  Numbness and tingling sensation related to the diagnosis of carpal tunnel syndrome.  The facial and body hair has improved  She denies experiencing acne, new stretch marks.  Episodes of lightheadedness present mainly when she wakes up in the morning   Patches of velvety and hyperpigmented skin developed on her abdomen at the site of prior Victoza injections    Prior images:   I reviewed the brain MRI images from 3/29/2024.  On the provided sections, the pituitary gland appears unremarkable.  Pelvic US 2007 Limited sonographic evaluation secondary to the patient's body habitus.  CT abd 2007 - no adrenal enlargement   CT abdomen and pelvis 2010 - normal uterus and ovaries  An MRI of the brain done on 10/15/14 was unremarkable  Sleep study was done in 2007 or 2008     I reviewed the lab work from April 2024, done in the absence of treatment with steroids, which revealed an estradiol level of 39, LH 10.8, FSH 6.6, low sex hormone binding globulin, estradiol 39, total testosterone 56, free testosterone normal, 17 hydroxyprogesterone 278, cortisol 7.3 at 6:10 PM, ACTH 19, normal TFTs.    Past Medical History:   Diagnosis Date    CAH (congenital adrenal  hyperplasia) 2/9/2010    Followed by Dr. Brewer; next follow up 1.2011.  Metformin increased to 850 mg twice a day.     Diabetes mellitus, type 2 (H) 10/19/2020    Vitamin D deficiency 2/9/2010   Morbid obesity  Acanthosis nigricans  Hydradenitis suppurativa   Headaches   Screening for celiac disease negative in January 2024    Past Surgical History:   Procedure Laterality Date    CHOLECYSTECTOMY      She was in 8th grade      Current Medications    Current Outpatient Medications:     alcohol swab prep pads, Use to swab area of injection/anthony as directed., Disp: 100 each, Rfl: 3    atorvastatin (LIPITOR) 10 MG tablet, TAKE 1 TABLET(10 MG) BY MOUTH DAILY, Disp: 90 tablet, Rfl: 2    blood glucose (NO BRAND SPECIFIED) test strip, Use to test blood sugar three times daily or as directed. Preferred blood glucose meter OR supplies to accompany: Blood Glucose Monitor Brands: per insurance., Disp: 100 strip, Rfl: 6    blood glucose monitoring (NO BRAND SPECIFIED) meter device kit, Use to test blood sugar three times daily or as directed. Preferred blood glucose meter OR supplies to accompany: Blood Glucose Monitor Brands: per insurance., Disp: 1 kit, Rfl: 0    cholestyramine light (QUESTRAN) 4 GM packet, Take 1 packet (4 g) by mouth 2 times daily (with meals) for 180 days, Disp: 180 packet, Rfl: 1    Continuous Glucose Sensor (FREESTYLE AYSE 3 SENSOR) [facility] Externally apply 1 Application topically every 14 days, Disp: 2 each, Rfl: 2    fluticasone (FLONASE) 50 MCG/ACT nasal spray, Spray 1 spray into both nostrils daily, Disp: 16 g, Rfl: 3    insulin pen needle (ULTICARE MICRO) 32G X 4 MM miscellaneous, Use 1 pen needles daily or as directed., Disp: 100 each, Rfl: 3    meloxicam (MOBIC) 7.5 MG tablet, Take 1 tablet (7.5 mg) by mouth daily, Disp: 30 tablet, Rfl: 2    metFORMIN (GLUCOPHAGE XR) 500 MG 24 hr tablet, Take 4 tablets (2,000 mg) by mouth daily (with dinner), Disp: 360 tablet, Rfl: 3    norgestimate-ethinyl  estradiol (ORTHO-CYCLEN) 0.25-35 MG-MCG tablet, Take 1 tablet by mouth daily, Disp: 84 tablet, Rfl: 3    ondansetron (ZOFRAN ODT) 4 MG ODT tab, Take 1 tablet (4 mg) by mouth every 8 hours as needed for nausea, Disp: 30 tablet, Rfl: 2    rimegepant (NURTEC) 75 MG ODT tablet, Place 75 mg under the tongue every 48 hours, Disp: , Rfl:     rizatriptan (MAXALT) 10 MG tablet, TAKE ONE TABLET BY MOUTH AS DIRECTED. MAY REPEAT AFTER 2 HOURS. MAX DOSE 30MG/24 HOURS, Disp: , Rfl:     semaglutide (OZEMPIC) 2 MG/3ML pen, Inject 0.5 mg Subcutaneous every 7 days, Disp: 9 mL, Rfl: 3    sertraline (ZOLOFT) 100 MG tablet, Take 100 mg by mouth daily., Disp: , Rfl:     SUMAtriptan (IMITREX) 50 MG tablet, Take 1 tablet (50 mg) by mouth at onset of headache for migraine May repeat in 2 hours. Max 4 tablets/24 hours. (Patient taking differently: Take 100 mg by mouth at onset of headache for migraine. May repeat in 2 hours. Max 4 tablets/24 hours.), Disp: 18 tablet, Rfl: 1    thin (NO BRAND SPECIFIED) lancets, Use with lanceting device. To accompany: Blood Glucose Monitor Brands: per insurance., Disp: 100 each, Rfl: 6    topiramate (TOPAMAX) 25 MG tablet, TAKE TWO TABLETS BY MOUTH EVERY NIGHT AT BEDTIME X 7 DAYS, THEN ONE IN THE AM AND TWO EVERY NIGHT AT BEDTIME X 7 DYAS, THEN TWO TWICE DAILY, Disp: , Rfl:     gabapentin (NEURONTIN) 300 MG capsule, Take 1 capsule (300 mg) by mouth at bedtime for 7 days, THEN 1 capsule (300 mg) 2 times daily for 90 days., Disp: 187 capsule, Rfl: 0    Family History   Problem Relation Age of Onset    Neurological Sister 16     migraines   Maternal uncle - colon cancer.  Both maternal grandparents and her mother have hypertension. There is a family history of obesity and hirsutism (both her sisters). There is a family history of obesity. Her mother and 2 great aunts have type 2 diabetes. Her mother is [place].      Social History  She denies smoking, drinking alcohol or using illicit drugs. Occupation:  Used to work as a  but not recently given the medical issues.  She enjoys participating with her dog in dog shows.                Vital Signs     Previous Weights:    Wt Readings from Last 10 Encounters:   08/21/24 121.3 kg (267 lb 8 oz)   07/30/24 123.1 kg (271 lb 4.8 oz)   07/11/24 124.7 kg (275 lb)   07/05/24 124.7 kg (275 lb)   06/29/24 126.7 kg (279 lb 6.4 oz)   05/25/24 130.4 kg (287 lb 8 oz)   04/30/24 137 kg (302 lb)   03/29/24 135.6 kg (299 lb)   03/11/24 136.2 kg (300 lb 3.2 oz)   01/30/24 136.5 kg (300 lb 14.4 oz)        BP Readings from Last 6 Encounters:   08/21/24 137/85   07/30/24 120/70   07/11/24 110/60   07/05/24 120/84   06/29/24 132/78   05/25/24 (!) 140/86     Vital signs:   /85 (BP Location: Left arm, Patient Position: Sitting, Cuff Size: Adult Large)   Pulse 87   Resp 16   Wt 121.3 kg (267 lb 8 oz)   LMP 07/30/2024 (Exact Date)   SpO2 98%   BMI 52.24 kg/m      Physical Exam  General Appearance: General Obesity, round face, no plethora    Eyes:  conjutivae and extra-ocular motions are normal.                                    pupils round and reactive to light, no lid lag, no stare   Visual fields intact to confrontation    HEENT:   short neck, oropharynx clear and moist, no JVD, no bruits      unable to palpate the thyroid, no palpable nodules; mild overbite noticed with some spreading of the teeth  Mild prognathism   Cardiovascular:  regular rhythm, no murmurs, distal pulse palpable, no edema  Respiratory:        chest clear, no rales, no rhonchi   Gastrointestinal:  abdomen soft, obese, non-tender, non-distended, normal bowel sounds  Musculoskeletal:  normal tone and strength  Psychological:          affect and judgment normal  Skin:  acanthosis nigricans - mainly at the flexural areas; severe facial hirsutism; no acne, benign striae   She has patches of acanthosis nigricans on the abdomen, which are new.  Neurological: no resting tremor.     Lab Results  I reviewed  prior lab results documented in Epic.  Lab Results   Component Value Date    A1C 5.6 07/30/2024    A1C 8.0 (H) 04/30/2024    A1C 8.4 (H) 04/05/2024    A1C 8.2 (H) 03/29/2024    A1C 8.8 (H) 01/30/2024    A1C 7.6 (H) 01/18/2019    A1C 6.9 (H) 01/15/2018    A1C 6.4 (H) 03/15/2017    A1C 6.1 (H) 09/17/2014    A1C 5.7 07/09/2010     45 minutes spent on the date of the encounter doing chart review, history and exam, documentation and further activities as noted above.  The longitudinal plan of care for the diagnosis(es)/condition(s) as documented were addressed during this visit. Due to the added complexity in care, I will continue to support Anh in the subsequent management and with ongoing continuity of care.

## 2024-08-21 NOTE — NURSING NOTE
Anh Flores's goals for this visit include:   Chief Complaint   Patient presents with    Follow Up    Diabetes       She requests these members of her care team be copied on today's visit information: yes    PCP: Bita Veronica    Referring Provider:  Referred Self, MD  No address on file    /85 (BP Location: Left arm, Patient Position: Sitting, Cuff Size: Adult Large)   Pulse 87   Resp 16   Wt 121.3 kg (267 lb 8 oz)   LMP 07/30/2024 (Exact Date)   SpO2 98%   BMI 52.24 kg/m      Do you need any medication refills at today's visit? None      Omar Martinez, EMT       no

## 2024-08-21 NOTE — PATIENT INSTRUCTIONS
Welcome to the [facility] Endocrinology and Diabetes Clinics     Our Endocrinology Clinics are here to provide you with a team-based, collaborative approach in the diagnosis and treatment of patients with diabetes and endocrine disorders. The team is made up of Physicians, Physician Assistants, Certified Diabetes Educators, Registered Nurses, Medical Assistants, Emergency Medical Technicians, and many others, all of whom have the unified goal of providing our patients with high quality care.     Please see below for some helpful tips to best navigate and use the [facility] Endocrinology clinic:     [place] Respect: At [facility], we are committed to a respectful and safe space for all patients, visitors, and staff.  We believe that mutual respect between patients and their care team is the foundation of quality care.  It is our expectation that you will be treated with respect by your care team.  In turn, we ask that all communication with the care team (written and verbal) be respectful and free from profanity, threatening, or abusive language.  Disrespectful communication undermines our therapeutic relationship with you and may result in us being unable to continue to provide your care.    Refills: A provider must see you at least annually to prescribe and refill medications. This is to ensure your safety as well as meet insurance and compliance regulations.    Scheduling: Many of our Providers offer both in-person or video visits. Please call to schedule any needed follow ups as soon as possible because our provider schedules fill up very quickly. Our care team has the right to require an in-person visit when they believe that it is medically necessary. Please remember that for any virtual visits, you must be in the [facility] at the time of the visit, otherwise we are unable to see you and you will need to be rescheduled.    Missed Appointments: If you need to cancel or miss your  scheduled appointment, please call the clinic at [phone] to reschedule.  Please note if you repeatedly miss appointments or repeatedly miss appointments without calling to inform us ahead of time (no-show), the clinic may elect to not allow you to reschedule without speaking to a manager, may require a Partnership In Care Agreement prior to rescheduling, or could result in you no longer being able to receive care from the clinic. Providing the clinic with timely notification if you have to miss an appointment, allows us to better serve the needs of all of our patients.    Primary Care Provider: Our Endocrinologists are Specialists in their field. We expect you to have a Primary Care Provider established to handle any needs outside of your diabetes and endocrine care.  We would be happy to assist you find a Primary Care Provider, if you do not have one.    [employer]: [employer] is a wonderful resource that allows you access to your Care Team via online or the fanta. Please ask a member of the team if you would like help creating an account. Please note that it may take up to 2 business days for a response. [employer] messages are not reviewed on weekends or after business hours.  Emergent or urgent care needs should never be communicated via [employer].  If you experience a medical emergency call 911 or go to the nearest emergency room.    Labs: It is recommended that you stay within the [facility] for labs but you are welcome to obtain ordered labs (with some exceptions) from any location of your choice as long as they are able to complete and process the needed labs. If you need us to fax orders to your preferred lab, please provide us the name and fax number of the lab you would like to go to so we can fax the orders. If your labs are drawn outside of the [facility], please have them fax the results to [phone] ([place]) or [phone] ([place]) or via [facility]. It is your  responsibility to ensure that outside lab results are sent to us.    We look forward to working with you. Please do not hesitate to reach out with any questions.    Thank you,    The Endocrine Team    [facility] Address:   [place] Address:     [address] [place] [address]    Phone: [phone]  Fax: [phone] [address] [place] [address]    Phone: [phone]  Fax: [phone]     [facility] Cost Estimate Phone Number: [phone]    General Lab and Imaging Scheduling Phone Number: [phone]

## 2024-08-21 NOTE — LETTER
8/21/2024      Anh Flores  [address] [facility] [address]      Dear Colleague,    Thank you for referring your patient, Anh Flores, to the [facility]. Please see a copy of my visit note below.    Assessment     1. Nonclassical congenital adrenal hyperplasia, associated with primary amenorrhea, treated with dexamethasone until 2014, when dexamethasone was discontinued. She used to be medicated with metformin, spironolactone and birth control pills.  They were discontinued around 2019.  The patient reestablish care in April 2023, when she was started on treatment with birth control pills and metformin as the evaluation for nonclassical CAH revealed normal gonadotropins and normal androgens.  Since being on treatment with birth control pills, she has regular menstrual periods  Recommendations:  Continue treatment with birth control pills    2.  Class III obesity, associated with severe insulin resistance.   She has lost a significant amount of weight and it is not clear how much of the weight loss is due to semaglutide and how much is due to her current GI issues.    3. Type 2 diabetes, controlled, with no known microvascular disease  With weight loss, the glycemic control significantly improved.  Given the severity of diarrhea, I recommended to discontinue metformin for now and just continue Ozempic, at the same dose of 0.5 mg weekly.    4.  Fatigue, headaches, diarrhea, nausea, confusion, episodes of lightheadedness  All these symptoms started following an episode of acute GI symptoms in November 2023.  Unlikely for the symptoms to be hormonal in etiology.  Plan to check morning ACTH and cortisol level.  Low threshold to consider an abdominal CT.     5.  Prognathism with overbite which has changed, according to the patient  Follow-up IGF-I level    6.  Cold intolerance  Follow-up TFTs    7.  Insulin resistance, diarrhea  Follow-up fasting glucose and glucagon    Orders Placed  This Encounter   Procedures     Insulin Growth Factor 1 by Immunoassay     Adrenal corticotropin     Cortisol     TSH     T4 free     Glucagon     Glucose     =========================================================================================    The patient is seen for evaluation of nonclassical congenital adrenal hyperplasia. She was previously seen by me, most recently in 2018.      Anh Flores is a 29 year old female, previously seen by Dr. 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   Around age 6, while being evaluated for precocious puberty and darkening of the skin around her neck, she was diagnosed with late onset CAH and treated with 0.25 mg dexamethasone daily for a short period of time. Treatment with birth control pills did not induce a withdrawal bleeding. She 1st noticed the appearance of facial hair around age 13. Over the years, the patient had sporadic medical care.   Treatment with metformin, spironolactone and birth control pills was restarted in 2017.  Metformin used to cause some epigastric abdominal pain.    Since December 2023, after experiencing an episode of acute abdominal pain and diarrhea, Anh reports feeling miserable.  She has been dealing with extreme fatigue.  Last week, she slept almost constantly.  She continues to have persistent diarrhea and abdominal pain which is mainly triggered by food intake.  She has definitely identified meat (not only red meat) as a trigger but even if she eliminates meat from her diet, she still gets diarrhea, with loose or watery bowel movements.  Also, since December, she has been feeling nauseated, constantly.  The nausea did not change after she started treatment with Ozempic.  Metformin was prescribed in May 2024, at 4 tablets daily, but the patient sometimes takes 2 tablets, when the diarrhea gets worse.  She also endorses daily headaches for which she  continues to follow-up with neurology.  She wakes up with a headache in the morning.  She feels confused most of the time and she has a hard time focusing.  The neurologist agreed with trying to taper and discontinue Topamax which she currently takes at 100 mg daily.  The Topamax was initially started for migraines in March 2024.  An antidepressant was started recently but the patient does not think it is helpful, especially since she has never experienced depression.  She was evaluated by sleep medicine and a sleep study is scheduled in November.    Treatment with birth control pills were started in April and the patient reports having monthly menstrual periods.  Since being on semaglutide, she lost 33 pounds.  Her diet has significantly improved and she has decreased the amount of sweets and sweetened beverages in her diet.      Occasionally, once a month or so, she describes experiencing episodes of low blood glucose numbers on the sensor which occur during the night, following a more severe episode of diarrhea.  During these episodes, she reports feeling very tired and confused and the symptoms resolved with food intake.    Her hands and feet are always cold.  She mostly feels cold but she also describes experience episodes when she feels warmer, with no obvious reason.  Denies increased sweating, tremor or palpitations.  She thinks her bite has changed over the years.  Her teeth have always been somehow spread apart and this has not changed.  Recently, she has noticed some jaw pain at the temporomandibular joint.    She is scheduled to undergo an upper and lower endoscopy and she was also evaluated by ENT for sinusitis.    Sivan was diagnosed with prediabetes in 2014 and she was formally diagnosed with type 2 diabetes in 2018, when A1c was 6.9.   Victoza was started in February 2024 and then she was transition to semaglutide in May 2024, as she developed an allergic reaction at the injection site from  Victoza.  On 4/5/2024, hemoglobin A1c was elevated at 8.4.  Subsequently, she was started on metformin and semaglutide, which she continues to take at 0.5 mg weekly.  Hemoglobin A1c today was 5.6.    Diabetes complications:  Last eye exam: last eye exam - 3/2024. No DR per patient.   No history of microalbuminuria.  Most recent urine microalbumin negative in January 2024.  Recent GFR above 90.  No history of numbness or tingling sensation in her feet  Most recent lipid panel from 1/30/2024: LDL cholesterol 145, HDL cholesterol 51, triglycerides 132.  On 10 mg atorvastatin daily.    She has been using the jada sensor.  On the sensor, average glucose is 91, with a glucose variability of 12.5, corresponding to a GMI of 5.5%.  99% of the glucose numbers are within target and, in the last month, the patient experienced only 1 significant hypoglycemic episodes on the sensor which occurred during the night.    ROS:  Numbness and tingling sensation related to the diagnosis of carpal tunnel syndrome.  The facial and body hair has improved  She denies experiencing acne, new stretch marks.  Episodes of lightheadedness present mainly when she wakes up in the morning   Patches of velvety and hyperpigmented skin developed on her abdomen at the site of prior Victoza injections    Prior images:   I reviewed the brain MRI images from 3/29/2024.  On the provided sections, the pituitary gland appears unremarkable.  Pelvic US 2007 Limited sonographic evaluation secondary to the patient's body habitus.  CT abd 2007 - no adrenal enlargement   CT abdomen and pelvis 2010 - normal uterus and ovaries  An MRI of the brain done on 10/15/14 was unremarkable  Sleep study was done in 2007 or 2008     I reviewed the lab work from April 2024, done in the absence of treatment with steroids, which revealed an estradiol level of 39, LH 10.8, FSH 6.6, low sex hormone binding globulin, estradiol 39, total testosterone 56, free testosterone normal, 17  hydroxyprogesterone 278, cortisol 7.3 at 6:10 PM, ACTH 19, normal TFTs.    Past Medical History:   Diagnosis Date     CAH (congenital adrenal hyperplasia) 2/9/2010    Followed by Dr. Brewer; next follow up 1.2011.  Metformin increased to 850 mg twice a day.      Diabetes mellitus, type 2 (H) 10/19/2020     Vitamin D deficiency 2/9/2010   Morbid obesity  Acanthosis nigricans  Hydradenitis suppurativa   Headaches   Screening for celiac disease negative in January 2024    Past Surgical History:   Procedure Laterality Date     CHOLECYSTECTOMY      She was in 8th grade      Current Medications    Current Outpatient Medications:      alcohol swab prep pads, Use to swab area of injection/anthony as directed., Disp: 100 each, Rfl: 3     atorvastatin (LIPITOR) 10 MG tablet, TAKE 1 TABLET(10 MG) BY MOUTH DAILY, Disp: 90 tablet, Rfl: 2     blood glucose (NO BRAND SPECIFIED) test strip, Use to test blood sugar three times daily or as directed. Preferred blood glucose meter OR supplies to accompany: Blood Glucose Monitor Brands: per insurance., Disp: 100 strip, Rfl: 6     blood glucose monitoring (NO BRAND SPECIFIED) meter device kit, Use to test blood sugar three times daily or as directed. Preferred blood glucose meter OR supplies to accompany: Blood Glucose Monitor Brands: per insurance., Disp: 1 kit, Rfl: 0     cholestyramine light (QUESTRAN) 4 GM packet, Take 1 packet (4 g) by mouth 2 times daily (with meals) for 180 days, Disp: 180 packet, Rfl: 1     Continuous Glucose Sensor (FREESTYLE AYSE 3 SENSOR) [facility] Externally apply 1 Application topically every 14 days, Disp: 2 each, Rfl: 2     fluticasone (FLONASE) 50 MCG/ACT nasal spray, Spray 1 spray into both nostrils daily, Disp: 16 g, Rfl: 3     insulin pen needle (ULTICARE MICRO) 32G X 4 MM miscellaneous, Use 1 pen needles daily or as directed., Disp: 100 each, Rfl: 3     meloxicam (MOBIC) 7.5 MG tablet, Take 1 tablet (7.5 mg) by mouth daily, Disp: 30 tablet, Rfl: 2      metFORMIN (GLUCOPHAGE XR) 500 MG 24 hr tablet, Take 4 tablets (2,000 mg) by mouth daily (with dinner), Disp: 360 tablet, Rfl: 3     norgestimate-ethinyl estradiol (ORTHO-CYCLEN) 0.25-35 MG-MCG tablet, Take 1 tablet by mouth daily, Disp: 84 tablet, Rfl: 3     ondansetron (ZOFRAN ODT) 4 MG ODT tab, Take 1 tablet (4 mg) by mouth every 8 hours as needed for nausea, Disp: 30 tablet, Rfl: 2     rimegepant (NURTEC) 75 MG ODT tablet, Place 75 mg under the tongue every 48 hours, Disp: , Rfl:      rizatriptan (MAXALT) 10 MG tablet, TAKE ONE TABLET BY MOUTH AS DIRECTED. MAY REPEAT AFTER 2 HOURS. MAX DOSE 30MG/24 HOURS, Disp: , Rfl:      semaglutide (OZEMPIC) 2 MG/3ML pen, Inject 0.5 mg Subcutaneous every 7 days, Disp: 9 mL, Rfl: 3     sertraline (ZOLOFT) 100 MG tablet, Take 100 mg by mouth daily., Disp: , Rfl:      SUMAtriptan (IMITREX) 50 MG tablet, Take 1 tablet (50 mg) by mouth at onset of headache for migraine May repeat in 2 hours. Max 4 tablets/24 hours. (Patient taking differently: Take 100 mg by mouth at onset of headache for migraine. May repeat in 2 hours. Max 4 tablets/24 hours.), Disp: 18 tablet, Rfl: 1     thin (NO BRAND SPECIFIED) lancets, Use with lanceting device. To accompany: Blood Glucose Monitor Brands: per insurance., Disp: 100 each, Rfl: 6     topiramate (TOPAMAX) 25 MG tablet, TAKE TWO TABLETS BY MOUTH EVERY NIGHT AT BEDTIME X 7 DAYS, THEN ONE IN THE AM AND TWO EVERY NIGHT AT BEDTIME X 7 DYAS, THEN TWO TWICE DAILY, Disp: , Rfl:      gabapentin (NEURONTIN) 300 MG capsule, Take 1 capsule (300 mg) by mouth at bedtime for 7 days, THEN 1 capsule (300 mg) 2 times daily for 90 days., Disp: 187 capsule, Rfl: 0    Family History   Problem Relation Age of Onset     Neurological Sister 16     migraines   Maternal uncle - colon cancer.  Both maternal grandparents and her mother have hypertension. There is a family history of obesity and hirsutism (both her sisters). There is a family history of obesity. Her mother  and 2 great aunts have type 2 diabetes. Her mother is  American.      Social History  She denies smoking, drinking alcohol or using illicit drugs. Occupation: Used to work as a  but not recently given the medical issues.  She enjoys participating with her dog in dog shows.                Vital Signs     Previous Weights:    Wt Readings from Last 10 Encounters:   08/21/24 121.3 kg (267 lb 8 oz)   07/30/24 123.1 kg (271 lb 4.8 oz)   07/11/24 124.7 kg (275 lb)   07/05/24 124.7 kg (275 lb)   06/29/24 126.7 kg (279 lb 6.4 oz)   05/25/24 130.4 kg (287 lb 8 oz)   04/30/24 137 kg (302 lb)   03/29/24 135.6 kg (299 lb)   03/11/24 136.2 kg (300 lb 3.2 oz)   01/30/24 136.5 kg (300 lb 14.4 oz)        BP Readings from Last 6 Encounters:   08/21/24 137/85   07/30/24 120/70   07/11/24 110/60   07/05/24 120/84   06/29/24 132/78   05/25/24 (!) 140/86     Vital signs:   /85 (BP Location: Left arm, Patient Position: Sitting, Cuff Size: Adult Large)   Pulse 87   Resp 16   Wt 121.3 kg (267 lb 8 oz)   LMP 07/30/2024 (Exact Date)   SpO2 98%   BMI 52.24 kg/m      Physical Exam  General Appearance: General Obesity, round face, no plethora    Eyes:  conjutivae and extra-ocular motions are normal.                                    pupils round and reactive to light, no lid lag, no stare   Visual fields intact to confrontation    HEENT:   short neck, oropharynx clear and moist, no JVD, no bruits      unable to palpate the thyroid, no palpable nodules; mild overbite noticed with some spreading of the teeth  Mild prognathism   Cardiovascular:  regular rhythm, no murmurs, distal pulse palpable, no edema  Respiratory:        chest clear, no rales, no rhonchi   Gastrointestinal:  abdomen soft, obese, non-tender, non-distended, normal bowel sounds  Musculoskeletal:  normal tone and strength  Psychological:          affect and judgment normal  Skin:  acanthosis nigricans - mainly at the flexural areas; severe facial  hirsutism; no acne, benign striae   She has patches of acanthosis nigricans on the abdomen, which are new.  Neurological: no resting tremor.     Lab Results  I reviewed prior lab results documented in Epic.  Lab Results   Component Value Date    A1C 5.6 07/30/2024    A1C 8.0 (H) 04/30/2024    A1C 8.4 (H) 04/05/2024    A1C 8.2 (H) 03/29/2024    A1C 8.8 (H) 01/30/2024    A1C 7.6 (H) 01/18/2019    A1C 6.9 (H) 01/15/2018    A1C 6.4 (H) 03/15/2017    A1C 6.1 (H) 09/17/2014    A1C 5.7 07/09/2010     45 minutes spent on the date of the encounter doing chart review, history and exam, documentation and further activities as noted above.  The longitudinal plan of care for the diagnosis(es)/condition(s) as documented were addressed during this visit. Due to the added complexity in care, I will continue to support Anh in the subsequent management and with ongoing continuity of care.                                   Again, thank you for allowing me to participate in the care of your patient.        Sincerely,        Alejandra Cronin MD

## 2024-08-22 ENCOUNTER — TELEPHONE (OUTPATIENT)
Dept: ENDOCRINOLOGY | Facility: CLINIC | Age: 29
End: 2024-08-22
Payer: COMMERCIAL

## 2024-08-22 NOTE — TELEPHONE ENCOUNTER
Left Voicemail (1st Attempt) for the patient to call back and schedule the following:    Appointment type: return  Provider: dr. hernandez  Return date: 2/21/2025  Specialty phone number: [phone]  Additonal Notes:  Return in about 6 months (around 2/21/2025)     D[facility]rell Complex   Orthopedics, Podiatry, Sports Medicine, Ent ,Eye , Audiology, Adult Endocrine & Diabetes, Nutrition & Medication Therapy Management Specialties   [facility]

## 2024-08-23 ENCOUNTER — LAB (OUTPATIENT)
Dept: LAB | Facility: CLINIC | Age: 29
End: 2024-08-23
Payer: COMMERCIAL

## 2024-08-23 ENCOUNTER — TRANSFERRED RECORDS (OUTPATIENT)
Dept: HEALTH INFORMATION MANAGEMENT | Facility: CLINIC | Age: 29
End: 2024-08-23

## 2024-08-23 DIAGNOSIS — R53.83 OTHER FATIGUE: ICD-10-CM

## 2024-08-23 DIAGNOSIS — R68.89 COLD INTOLERANCE: ICD-10-CM

## 2024-08-23 DIAGNOSIS — R19.7 DIARRHEA, UNSPECIFIED TYPE: ICD-10-CM

## 2024-08-23 LAB
CORTIS SERPL-MCNC: 25.6 UG/DL
FASTING STATUS PATIENT QL REPORTED: YES
GLUCOSE SERPL-MCNC: 74 MG/DL (ref 70–99)
T4 FREE SERPL-MCNC: 1.1 NG/DL (ref 0.9–1.7)
TSH SERPL DL<=0.005 MIU/L-ACNC: 1.08 UIU/ML (ref 0.3–4.2)

## 2024-08-23 PROCEDURE — 82533 TOTAL CORTISOL: CPT

## 2024-08-23 PROCEDURE — 84443 ASSAY THYROID STIM HORMONE: CPT

## 2024-08-23 PROCEDURE — 36415 COLL VENOUS BLD VENIPUNCTURE: CPT

## 2024-08-23 PROCEDURE — 99000 SPECIMEN HANDLING OFFICE-LAB: CPT

## 2024-08-23 PROCEDURE — 82947 ASSAY GLUCOSE BLOOD QUANT: CPT

## 2024-08-23 PROCEDURE — 84305 ASSAY OF SOMATOMEDIN: CPT

## 2024-08-23 PROCEDURE — 82943 ASSAY OF GLUCAGON: CPT | Mod: 90

## 2024-08-23 PROCEDURE — 84439 ASSAY OF FREE THYROXINE: CPT

## 2024-08-23 PROCEDURE — 82024 ASSAY OF ACTH: CPT

## 2024-08-26 LAB — ACTH PLAS-MCNC: 37 PG/ML

## 2024-08-26 RX ORDER — BISACODYL 5 MG/1
TABLET, DELAYED RELEASE ORAL
Qty: 4 TABLET | Refills: 0 | Status: SHIPPED | OUTPATIENT
Start: 2024-08-26 | End: 2024-10-06

## 2024-08-27 LAB — IGF-I BLD-MCNC: 216 NG/ML (ref 87–286)

## 2024-08-27 NOTE — PROGRESS NOTES
8/27 Called and was unable to leave a voicemail to schedule ct scan.     Arithmatica message sent.     Dasha cantrell Complex   Orthopedics, Podiatry, Sports Medicine, Ent ,Eye , Audiology, Adult Endocrine & Diabetes, Nutrition & Medication Therapy Management Specialties   [facility]

## 2024-08-28 ENCOUNTER — TRANSFERRED RECORDS (OUTPATIENT)
Dept: HEALTH INFORMATION MANAGEMENT | Facility: CLINIC | Age: 29
End: 2024-08-28
Payer: COMMERCIAL

## 2024-08-29 LAB — GLUCAGON SERPL-MCNC: 143 NG/L

## 2024-09-01 DIAGNOSIS — E11.65 TYPE 2 DIABETES MELLITUS WITH HYPERGLYCEMIA, WITHOUT LONG-TERM CURRENT USE OF INSULIN (H): ICD-10-CM

## 2024-09-03 ENCOUNTER — HOSPITAL ENCOUNTER (OUTPATIENT)
Dept: CT IMAGING | Facility: HOSPITAL | Age: 29
Discharge: HOME | End: 2024-09-03
Attending: INTERNAL MEDICINE | Admitting: INTERNAL MEDICINE
Payer: COMMERCIAL

## 2024-09-03 DIAGNOSIS — R10.84 ABDOMINAL PAIN, GENERALIZED: ICD-10-CM

## 2024-09-03 DIAGNOSIS — R19.7 DIARRHEA, UNSPECIFIED TYPE: ICD-10-CM

## 2024-09-03 PROCEDURE — 74176 CT ABD & PELVIS W/O CONTRAST: CPT

## 2024-09-03 RX ORDER — BLOOD-GLUCOSE SENSOR
EACH MISCELLANEOUS
Qty: 3 EACH | Refills: 1 | Status: SHIPPED | OUTPATIENT
Start: 2024-09-03

## 2024-09-04 ENCOUNTER — TELEPHONE (OUTPATIENT)
Dept: ENDOCRINOLOGY | Facility: CLINIC | Age: 29
End: 2024-09-04
Payer: COMMERCIAL

## 2024-09-04 RX ORDER — UBROGEPANT 100 MG/1
100 TABLET ORAL
COMMUNITY
Start: 2024-08-30

## 2024-09-04 NOTE — TELEPHONE ENCOUNTER
Still has the abd pain but the diarrhea resolved following the discontinuation of metformin.  She continues to take Ozempic at 0.5 mg weekly.  Next dose is next Monday.  The abdominal CT was unremarkable with the exception of liver fibrosis and I encouraged her to discuss this with her primary care provider.  She is scheduled for colonoscopy next week.    Discussed about the fact that Ozempic might contribute to the abdominal pain and recommended to consider discontinuing it for 3 weeks, to see if the pain resolves.  She does not endorse hypoglycemic symptoms at the time the blood sugar is low on the sensor (tremor, palpitations, sweating), so I suspect the sensor blood glucose numbers do not indicate real hypoglycemia.  Of note that the sensor was prescribed by her primary care provider.

## 2024-09-04 NOTE — TELEPHONE ENCOUNTER
[facility] Call Center    Phone Message    May a detailed message be left on voicemail: yes     Reason for Call: Symptoms or Concerns     If patient has red-flag symptoms, warm transfer to triage line    Current symptom or concern: low blood sugars    Symptoms have been present for: ongoing    Has patient previously been seen for this? Yes    By : Dr. Cronin    Date: 8/21/2024    Are there any new or worsening symptoms? Yes: Patient is having trouble keeping her blood sugars within normal range, patient states she has to continue to eat in order for them to keep good, when she stops eating they instantly drop. Patient states bad migraines with low sugar and overall just not feeling well.  Please call thank you    Action Taken: Message routed to:  [facility] endo    Travel Screening: Not Applicable     Date of Service:

## 2024-09-04 NOTE — TELEPHONE ENCOUNTER
Patient states she is only taking Ozempic for diabetic medications.    On 8/23/24 the patient saw Neurology and started Emgality. Patient saw neurology again on 8/28/24 and was started on Ubrelvy as a rescue. Patient has taken Ubrelvy 3 times since 8/28/24.    Patient states she feels horrible. She does not have a meter at home to confirm her lows.    Patient states she will eat to correct the low and blood sugar will go to around 150 but it rapidly drops again to the 50's.                Yanely Garner, [facility]  Adult Endocrinology  [facility], [place]

## 2024-09-05 ENCOUNTER — TELEPHONE (OUTPATIENT)
Dept: GASTROENTEROLOGY | Facility: CLINIC | Age: 29
End: 2024-09-05
Payer: COMMERCIAL

## 2024-09-05 ENCOUNTER — APPOINTMENT (OUTPATIENT)
Dept: OPTOMETRY | Facility: CLINIC | Age: 29
End: 2024-09-05
Payer: COMMERCIAL

## 2024-09-05 PROCEDURE — V2020 VISION SVCS FRAMES PURCHASES: HCPCS | Performed by: OPTOMETRIST

## 2024-09-05 PROCEDURE — V2100 LENS SPHER SINGLE PLANO 4.00: HCPCS | Mod: RT | Performed by: OPTOMETRIST

## 2024-09-05 NOTE — TELEPHONE ENCOUNTER
Pre assessment completed for upcoming procedure.   (Please see previous telephone encounter notes for complete details)    Patient  returned call.       Procedure details:    Arrival time and facility location reviewed.    Pre op exam needed? No.    Designated  policy reviewed. Instructed to have someone stay 6  hours post procedure.       Medication review:    Medications reviewed. Please see supporting documentation below. Holding recommendations discussed (if applicable).   Injectable diabetic medication(s): Ozempic (Semaglutide). Weekly dosing of medication.  Hold 7 days before procedure.  Follow up with managing provider.   NSAID medication(s): Meloxicam (Mobic): HOLD 10 days before procedure.      Prep for procedure:     Procedure prep instructions reviewed.  Patient stated they have already reviewed the bowel prep instructions and writer answered all patient questions (if applicable). NPO instructions reviewed.    Patient was instructed to call if any questions or concerns arise.        Any additional information needed:  Pt asked if a Liver Bx could be done at the same time as scheduled procedures. Pt was advised that that is not done at with these procedures, and that she would need to have her PCP send an order and schedule a different procedure.       Patient  verbalized understanding and had no questions or concerns at this time.      Evelia Duong RN  Endoscopy Procedure Pre Assessment   [phone] option 4

## 2024-09-05 NOTE — TELEPHONE ENCOUNTER
Attempted to contact patient in order to complete pre assessment questions.     No answer. Left message to return call to [phone] option 4    Callback required communication sent via [employer].      Procedure details:    Patient scheduled for Colonoscopy/Upper endoscopy (EGD) on 9.16.24.     Arrival time: 0800. Procedure time 0900    Facility location: [facility]; [address], [address], [place] [address]. Check in location: Main entrance at registration desk.    Sedation type: Conscious sedation     Pre op exam needed? No.    Indication for procedure:   Chronic abdominal pain [R10.9, G89.29]      Chronic diarrhea [K52.9]      Cyclical vomiting [R11.15]            Chart review:     Electronic implanted devices? No    Recent diagnosis of diverticulitis within the last 6 weeks? No      Medication review:    Diabetic? Yes. Diabetic medication HOLDING recommendations: Diabetic injectables: Ozempic (Semaglutide). Weekly dosing of medication.  Hold 7 days before procedure.  Follow up with managing provider.     Anticoagulants? No    Weight loss medication/injectable? No. Patient is on GLP-1 medication but for DM (see above).    NSAIDS? Yes.  Meloxicam (Mobic).  Holding interval of 10 days.    Other medication HOLDING recommendations:  N/A      Prep for procedure:     Bowel prep recommendation: Extended Golytely. Bowel prep prescription sent to [id number] - [place], [place] - [address]   Due to: BMI > 40.  and GLP-1 agonist medication noted in chart.     Prep instructions sent via [employer].       Clementina Ko RN  Endoscopy Procedure Pre Assessment

## 2024-09-06 ENCOUNTER — NURSE TRIAGE (OUTPATIENT)
Dept: FAMILY MEDICINE | Facility: CLINIC | Age: 29
End: 2024-09-06
Payer: COMMERCIAL

## 2024-09-06 NOTE — TELEPHONE ENCOUNTER
Order/Referral Request    Who is requesting: PT and her Rufino Gorman     Orders being requested: Herself and her endocrinology DR  /// Her endo said     Reason service is needed/diagnosis: abnormal liver test from CT scan.  Her endo dr said gastro /    When are orders needed by: ASAP has been  ongoing issue      Has this been discussed with Provider: No    Does patient have a preference on a Group/Provider/Facility? [place]     Does patient have an appointment scheduled?: Yes: No     Where to send orders: Place orders within Epic    Could we send this information to you in [facility] or would you prefer to receive a phone call?:   Patient would prefer a phone call   Okay to leave a detailed message?: Yes at Cell number on file:    Telephone Information:   Mobile [phone]

## 2024-09-06 NOTE — TELEPHONE ENCOUNTER
"ASADI sent to ED    Nurse Triage SBAR    Is this a 2nd Level Triage? NO    Situation: Patient recently had abdominal CT liver noted fibrous to F/U with PCP called to set up appt. And triaged     Background: Saw Neuro 8/28, CT 9/3,     Assessment: Patient states she is having dizziness, spinning, HA, vision, with abdominal pain shooting to back, moderate, numb tingling bilateral fingertips and left arm numbness x1 week. Urine is \"coca cola\" colored, dry lips, dry mouth, feels HR is going faster and hot. Feels SOB with going up stairs, currently no breathing issues. Denies CP, SOB, N&V, fever, chills.    Protocol Recommended Disposition:   GO to ED now  Declined ED    Called ADS and Dr. Feliciano declined and recommended to be seen in ED.    RN called patient and relayed message to go to ED, she is calling Mother to take her now to [facility].    Recommendation: Routed to provider    Does the patient meet one of the following criteria for ADS visit consideration? 16+ years old, with an [facility] PCP     Vane Sharma RN  [facility] - Registered Nurse  Clinic Triage [place]   September 6, 2024      Reason for Disposition   [1] Numbness (i.e., loss of sensation) of the face, arm or leg on one side of the body AND [2] sudden onset AND [3] present now    Additional Information   Negative: SEVERE difficulty breathing (e.g., struggling for each breath, speaks in single words)   Negative: [1] Difficulty breathing or swallowing AND [2] started suddenly after medicine, an allergic food or bee sting   Negative: Shock suspected (e.g., cold/pale/clammy skin, too weak to stand, low BP, rapid pulse)   Negative: Difficult to awaken or acting confused (e.g., disoriented, slurred speech)   Negative: [1] Weakness (i.e., paralysis, loss of muscle strength) of the face, arm or leg on one side of the body AND [2] sudden onset AND [3] present now    Answer Assessment - Initial Assessment Questions  1. DESCRIPTION: \"Describe your dizziness.\"   " "   spinning  2. LIGHTHEADED: \"Do you feel lightheaded?\" (e.g., somewhat faint, woozy, weak upon standing)  Weak   3. VERTIGO: \"Do you feel like either you or the room is spinning or tilting?\" (i.e. vertigo)      tilting  4. SEVERITY: \"How bad is it?\"  \"Do you feel like you are going to faint?\" \"Can you stand and walk?\"    - MILD: Feels slightly dizzy, but walking normally.    - MODERATE: Feels unsteady when walking, but not falling; interferes with normal activities (e.g., school, work).    - SEVERE: Unable to walk without falling, or requires assistance to walk without falling; feels like passing out now.   moderate  5. ONSET:  \"When did the dizziness begin?\"   Weeks ago  6. AGGRAVATING FACTORS: \"Does anything make it worse?\" (e.g., standing, change in head position)      Change position  7. HEART RATE: \"Can you tell me your heart rate?\" \"How many beats in 15 seconds?\"  (Note: not all patients can do this)        Feels like faster  8. CAUSE: \"What do you think is causing the dizziness?\"    unsure  9. RECURRENT SYMPTOM: \"Have you had dizziness before?\" If Yes, ask: \"When was the last time?\" \"What happened that time?\"  no  10. OTHER SYMPTOMS: \"Do you have any other symptoms?\" (e.g., fever, chest pain, vomiting, diarrhea, bleeding)    Feels hot  11. PREGNANCY: \"Is there any chance you are pregnant?\" \"When was your last menstrual period?\"        unknown    Protocols used: Dizziness - [id number]    "

## 2024-09-07 ENCOUNTER — HOSPITAL ENCOUNTER (EMERGENCY)
Facility: CLINIC | Age: 29
Discharge: HOME | End: 2024-09-07
Attending: EMERGENCY MEDICINE | Admitting: EMERGENCY MEDICINE
Payer: COMMERCIAL

## 2024-09-07 VITALS
RESPIRATION RATE: 16 BRPM | DIASTOLIC BLOOD PRESSURE: 75 MMHG | HEIGHT: 60 IN | BODY MASS INDEX: 50.06 KG/M2 | OXYGEN SATURATION: 99 % | HEART RATE: 59 BPM | SYSTOLIC BLOOD PRESSURE: 116 MMHG | TEMPERATURE: 98.1 F | WEIGHT: 255 LBS

## 2024-09-07 DIAGNOSIS — R20.2 PARESTHESIA OF LEFT ARM: ICD-10-CM

## 2024-09-07 DIAGNOSIS — M54.6 ACUTE BILATERAL THORACIC BACK PAIN: ICD-10-CM

## 2024-09-07 DIAGNOSIS — R10.84 ABDOMINAL PAIN, GENERALIZED: ICD-10-CM

## 2024-09-07 DIAGNOSIS — R53.83 OTHER FATIGUE: ICD-10-CM

## 2024-09-07 LAB
ALBUMIN SERPL BCG-MCNC: 3.9 G/DL (ref 3.5–5.2)
ALP SERPL-CCNC: 72 U/L (ref 40–150)
ALT SERPL W P-5'-P-CCNC: 40 U/L (ref 0–50)
ANION GAP SERPL CALCULATED.3IONS-SCNC: 12 MMOL/L (ref 7–15)
AST SERPL W P-5'-P-CCNC: 38 U/L (ref 0–45)
ATRIAL RATE - MUSE: 69 BPM
BASOPHILS # BLD AUTO: 0.1 10E3/UL (ref 0–0.2)
BASOPHILS NFR BLD AUTO: 1 %
BILIRUB SERPL-MCNC: 0.2 MG/DL
BUN SERPL-MCNC: 10.8 MG/DL (ref 6–20)
CALCIUM SERPL-MCNC: 9.4 MG/DL (ref 8.8–10.4)
CHLORIDE SERPL-SCNC: 110 MMOL/L (ref 98–107)
CREAT SERPL-MCNC: 0.77 MG/DL (ref 0.51–0.95)
DIASTOLIC BLOOD PRESSURE - MUSE: NORMAL MMHG
EGFRCR SERPLBLD CKD-EPI 2021: >90 ML/MIN/1.73M2
EOSINOPHIL # BLD AUTO: 0.3 10E3/UL (ref 0–0.7)
EOSINOPHIL NFR BLD AUTO: 5 %
ERYTHROCYTE [DISTWIDTH] IN BLOOD BY AUTOMATED COUNT: 13.8 % (ref 10–15)
GLUCOSE SERPL-MCNC: 88 MG/DL (ref 70–99)
HCG SERPL QL: NEGATIVE
HCO3 SERPL-SCNC: 19 MMOL/L (ref 22–29)
HCT VFR BLD AUTO: 40.9 % (ref 35–47)
HGB BLD-MCNC: 13.9 G/DL (ref 11.7–15.7)
IMM GRANULOCYTES # BLD: 0 10E3/UL
IMM GRANULOCYTES NFR BLD: 0 %
INTERPRETATION ECG - MUSE: NORMAL
LIPASE SERPL-CCNC: 20 U/L (ref 13–60)
LYMPHOCYTES # BLD AUTO: 1.7 10E3/UL (ref 0.8–5.3)
LYMPHOCYTES NFR BLD AUTO: 24 %
MCH RBC QN AUTO: 31.2 PG (ref 26.5–33)
MCHC RBC AUTO-ENTMCNC: 34 G/DL (ref 31.5–36.5)
MCV RBC AUTO: 92 FL (ref 78–100)
MONOCYTES # BLD AUTO: 0.5 10E3/UL (ref 0–1.3)
MONOCYTES NFR BLD AUTO: 7 %
NEUTROPHILS # BLD AUTO: 4.5 10E3/UL (ref 1.6–8.3)
NEUTROPHILS NFR BLD AUTO: 64 %
NRBC # BLD AUTO: 0 10E3/UL
NRBC BLD AUTO-RTO: 0 /100
P AXIS - MUSE: 37 DEGREES
PLATELET # BLD AUTO: 262 10E3/UL (ref 150–450)
POTASSIUM SERPL-SCNC: 4 MMOL/L (ref 3.4–5.3)
PR INTERVAL - MUSE: 130 MS
PROT SERPL-MCNC: 7.3 G/DL (ref 6.4–8.3)
QRS DURATION - MUSE: 90 MS
QT - MUSE: 406 MS
QTC - MUSE: 435 MS
R AXIS - MUSE: 49 DEGREES
RBC # BLD AUTO: 4.46 10E6/UL (ref 3.8–5.2)
SODIUM SERPL-SCNC: 141 MMOL/L (ref 135–145)
SYSTOLIC BLOOD PRESSURE - MUSE: NORMAL MMHG
T AXIS - MUSE: 21 DEGREES
TROPONIN T SERPL HS-MCNC: <6 NG/L
VENTRICULAR RATE- MUSE: 69 BPM
WBC # BLD AUTO: 7 10E3/UL (ref 4–11)

## 2024-09-07 PROCEDURE — 96360 HYDRATION IV INFUSION INIT: CPT

## 2024-09-07 PROCEDURE — 36415 COLL VENOUS BLD VENIPUNCTURE: CPT | Performed by: BEHAVIOR TECHNICIAN

## 2024-09-07 PROCEDURE — 82550 ASSAY OF CK (CPK): CPT

## 2024-09-07 PROCEDURE — 85025 COMPLETE CBC W/AUTO DIFF WBC: CPT | Performed by: BEHAVIOR TECHNICIAN

## 2024-09-07 PROCEDURE — 258N000003 HC RX IP 258 OP 636: Performed by: BEHAVIOR TECHNICIAN

## 2024-09-07 PROCEDURE — 80053 COMPREHEN METABOLIC PANEL: CPT | Performed by: BEHAVIOR TECHNICIAN

## 2024-09-07 PROCEDURE — 82728 ASSAY OF FERRITIN: CPT

## 2024-09-07 PROCEDURE — 84484 ASSAY OF TROPONIN QUANT: CPT | Performed by: BEHAVIOR TECHNICIAN

## 2024-09-07 PROCEDURE — 93005 ELECTROCARDIOGRAM TRACING: CPT

## 2024-09-07 PROCEDURE — 83690 ASSAY OF LIPASE: CPT | Performed by: BEHAVIOR TECHNICIAN

## 2024-09-07 PROCEDURE — 86431 RHEUMATOID FACTOR QUANT: CPT

## 2024-09-07 PROCEDURE — 84703 CHORIONIC GONADOTROPIN ASSAY: CPT | Performed by: BEHAVIOR TECHNICIAN

## 2024-09-07 PROCEDURE — 99284 EMERGENCY DEPT VISIT MOD MDM: CPT

## 2024-09-07 RX ORDER — ONDANSETRON 2 MG/ML
4 INJECTION INTRAMUSCULAR; INTRAVENOUS EVERY 30 MIN PRN
Status: DISCONTINUED | OUTPATIENT
Start: 2024-09-07 | End: 2024-09-07 | Stop reason: HOSPADM

## 2024-09-07 RX ADMIN — SODIUM CHLORIDE 1000 ML: 9 INJECTION, SOLUTION INTRAVENOUS at 19:30

## 2024-09-07 ASSESSMENT — COLUMBIA-SUICIDE SEVERITY RATING SCALE - C-SSRS
6. HAVE YOU EVER DONE ANYTHING, STARTED TO DO ANYTHING, OR PREPARED TO DO ANYTHING TO END YOUR LIFE?: NO
1. IN THE PAST MONTH, HAVE YOU WISHED YOU WERE DEAD OR WISHED YOU COULD GO TO SLEEP AND NOT WAKE UP?: NO
2. HAVE YOU ACTUALLY HAD ANY THOUGHTS OF KILLING YOURSELF IN THE PAST MONTH?: NO

## 2024-09-07 ASSESSMENT — ACTIVITIES OF DAILY LIVING (ADL)
ADLS_ACUITY_SCORE: 35

## 2024-09-07 NOTE — ED PROVIDER NOTES
ED APC SUPERVISION NOTE:   I evaluated this patient in conjunction with Brody Griffin PA-C  I have participated in the care of the patient and personally performed key elements of the history, exam, and medical decision making.      HPI:   Anh Flores is a 29 year old female who presents to the ED with abdominal pain and generalized body aches. Endorses a decrease in appetite and fatigue. No nausea, vomiting, diarrhea, or fever.  Patient states his symptoms have been present for approximately 8 to 9 months    Independent Historian:   None    Review of External Notes: Neurology visit from August 28 for migraine headaches was reviewed    Recent CT scan from 9/3/2024 was reviewed demonstrating no acute findings     EXAM:   /75   Pulse 59   Temp 98.1  F (36.7  C) (Oral)   Resp 16   Ht 1.524 m (5')   Wt 115.7 kg (255 lb)   LMP 07/30/2024 (Approximate)   SpO2 99%   BMI 49.80 kg/m     General: Alert interactive  HEENT: External ears and nose are normal, mucous membranes are moist  Cardiovascular: Regular rate and rhythm  Lungs: Clear to auscultation  Abdomen: Soft mild diffuse tenderness  Psych: Normal affect    Labs Ordered and Resulted from Time of ED Arrival to Time of ED Departure   COMPREHENSIVE METABOLIC PANEL - Abnormal       Result Value    Sodium 141      Potassium 4.0      Carbon Dioxide (CO2) 19 (*)     Anion Gap 12      Urea Nitrogen 10.8      Creatinine 0.77      GFR Estimate >90      Calcium 9.4      Chloride 110 (*)     Glucose 88      Alkaline Phosphatase 72      AST 38      ALT 40      Protein Total 7.3      Albumin 3.9      Bilirubin Total 0.2     LIPASE - Normal    Lipase 20     HCG QUALITATIVE PREGNANCY - Normal    hCG Serum Qualitative Negative     TROPONIN T, HIGH SENSITIVITY - Normal    Troponin T, High Sensitivity <6     CBC WITH PLATELETS AND DIFFERENTIAL    WBC Count 7.0      RBC Count 4.46      Hemoglobin 13.9      Hematocrit 40.9      MCV 92      MCH 31.2      MCHC 34.0      RDW  13.8      Platelet Count 262      % Neutrophils 64      % Lymphocytes 24      % Monocytes 7      % Eosinophils 5      % Basophils 1      % Immature Granulocytes 0      NRBCs per 100 WBC 0      Absolute Neutrophils 4.5      Absolute Lymphocytes 1.7      Absolute Monocytes 0.5      Absolute Eosinophils 0.3      Absolute Basophils 0.1      Absolute Immature Granulocytes 0.0      Absolute NRBCs 0.0          Independent Interpretation (X-rays, CTs, rhythm strip):  None    Consultations/Discussion of Management or Tests:  None    Social Determinants of Health affecting care:   None     MEDICAL DECISION MAKING/ASSESSMENT AND PLAN:   Follow presentation history and physical examination were performed, the above workup was undertaken.  Laboratory workup is reassuring, the patient has no peritoneal signs and her abdominal exam is quite benign.  She has had a recent CT scan and I think the risk of radiation outweighs the benefit and the patient agrees.  I do not have a clear-cut etiology for her symptoms.  She is on Ozempic and this may be playing a role.  She has an EGD scheduled for 9/16.  I recommended close follow-up with her gastroenterologist and return to the emergency department if new symptoms develop.     DIAGNOSIS:     ICD-10-CM    1. Abdominal pain, generalized  R10.84       2. Paresthesia of left arm  R20.2                DISPOSITION:   Patient was discharged home     Scribe Disclosure:  I, Yennifer Jack, am serving as a scribe at 5:33 PM on 9/7/2024 to document services personally performed by Will Flores MD based on my observations and the provider's statements to me.     9/7/2024  [facility] EMERGENCY DEPT       Will Flores MD  09/07/24 2031

## 2024-09-07 NOTE — ED TRIAGE NOTES
"\"I just dont feel good. Abdominal pain and pain everywhere.\" Pt states poor appetite. Denies N/V. \"I just feel horrible.\" Denies fevers. Fatigued.     Triage Assessment (Adult)       Row Name 09/07/24 3932          Triage Assessment    Airway WDL WDL        Respiratory WDL    Respiratory WDL WDL        Skin Circulation/Temperature WDL    Skin Circulation/Temperature WDL WDL        Cardiac WDL    Cardiac WDL WDL        Peripheral/Neurovascular WDL    Peripheral Neurovascular WDL WDL        Cognitive/Neuro/Behavioral WDL    Cognitive/Neuro/Behavioral WDL WDL                     "

## 2024-09-07 NOTE — ED PROVIDER NOTES
Emergency Department Note      History of Present Illness     Chief Complaint   Abdominal Pain and Generalized Body Aches      VLADIMIR Flores is a 29 year old female with history of type 2 diabetes, migraines, carpal tunnel, chronic abdominal pain who presents to the ED for evaluation for abdominal pain and generalized body aches. Patient reports ongoing generalized abdominal pain for over 9 months. She states that the pain is aggravated from eating certain foods. She also endorses nausea, vomiting, and diarrhea after eating certain foods. She reports that she has been seen multiple times for the same issue but has never found a cause. She denies fever. Denies history of IBD. She's had a cholecystectomy in the past. She also states that she has had some neck pain and numbness and tingling down her left arm and fingers. She states that this has been going for awhile. She has been doing physical therapy without any relief.     Independent Historian   Patient only.     Review of External Notes   Reviewed nurse triage call from today. Patient called with abdominal pain along with other complaints.     Reviewed PT and OT notes from 7/17/2024, and 7/22/2024, Bilateral carpal tunnel and neck tightness.     Past Medical History     Medical History and Problem List   Past Medical History:   Diagnosis Date    CAH (congenital adrenal hyperplasia) 2/9/2010    Diabetes mellitus, type 2 (H) 10/19/2020    Vitamin D deficiency 2/9/2010       Medications   alcohol swab prep pads  atorvastatin (LIPITOR) 10 MG tablet  bisacodyl (DULCOLAX) 5 MG EC tablet  blood glucose (NO BRAND SPECIFIED) test strip  blood glucose monitoring (NO BRAND SPECIFIED) meter device kit  cholestyramine light (QUESTRAN) 4 GM packet  Continuous Glucose Sensor (FREESTYLE AYSE 3 SENSOR) MISC  fluticasone (FLONASE) 50 MCG/ACT nasal spray  gabapentin (NEURONTIN) 300 MG capsule  galcanezumab-gnlm (EMGALITY) 120 MG/ML injection  insulin pen needle  (ULTICARE MICRO) 32G X 4 MM miscellaneous  meloxicam (MOBIC) 7.5 MG tablet  norgestimate-ethinyl estradiol (ORTHO-CYCLEN) 0.25-35 MG-MCG tablet  ondansetron (ZOFRAN ODT) 4 MG ODT tab  polyethylene glycol (GOLYTELY) 236 g suspension  rimegepant (NURTEC) 75 MG ODT tablet  rizatriptan (MAXALT) 10 MG tablet  semaglutide (OZEMPIC) 2 MG/3ML pen  sertraline (ZOLOFT) 100 MG tablet  SUMAtriptan (IMITREX) 50 MG tablet  thin (NO BRAND SPECIFIED) lancets  topiramate (TOPAMAX) 25 MG tablet  UBRELVY 100 MG tablet        Surgical History   Past Surgical History:   Procedure Laterality Date    CHOLECYSTECTOMY      She was in 8th grade        Physical Exam     Patient Vitals for the past 24 hrs:   BP Temp Temp src Pulse Resp SpO2 Height Weight   09/07/24 2018 116/75 -- -- 59 -- 99 % -- --   09/07/24 1646 -- -- -- 81 -- -- -- --   09/07/24 1644 139/83 98.1  F (36.7  C) Oral -- 16 98 % 1.524 m (5') 115.7 kg (255 lb)     Physical Exam  Physical Exam:  General: lying comfortably on hospital bed  Head: normocephalic, atraumatic  Eyes: PERRLA, EOMI  Ears: External ears appear normal.   Nose: no signs of bleeding   Throat: moist mucous membranes  Neck: No JVD  CV: regular rate and rhythm  Pulm: lungs clear to ausculation bilaterally, normal respiratory effort, normal chest expansion with breathing   Abdomen: soft, diffuse tenderness throughout the abdomen.  No rigidity or guarding  MSK: No midline tenderness.   Ext: Tenderness to the left trapezius. Pain with overhead motion of the left arm. Normal range of motion of all extremities. No gross deformities  Skin: warm, dry, no rashes  Neuro: alert and oriented  Psych: Appropriate mood. Cooperative      Diagnostics     Lab Results   Labs Ordered and Resulted from Time of ED Arrival to Time of ED Departure   COMPREHENSIVE METABOLIC PANEL - Abnormal       Result Value    Sodium 141      Potassium 4.0      Carbon Dioxide (CO2) 19 (*)     Anion Gap 12      Urea Nitrogen 10.8      Creatinine 0.77       GFR Estimate >90      Calcium 9.4      Chloride 110 (*)     Glucose 88      Alkaline Phosphatase 72      AST 38      ALT 40      Protein Total 7.3      Albumin 3.9      Bilirubin Total 0.2     LIPASE - Normal    Lipase 20     HCG QUALITATIVE PREGNANCY - Normal    hCG Serum Qualitative Negative     TROPONIN T, HIGH SENSITIVITY - Normal    Troponin T, High Sensitivity <6     CBC WITH PLATELETS AND DIFFERENTIAL    WBC Count 7.0      RBC Count 4.46      Hemoglobin 13.9      Hematocrit 40.9      MCV 92      MCH 31.2      MCHC 34.0      RDW 13.8      Platelet Count 262      % Neutrophils 64      % Lymphocytes 24      % Monocytes 7      % Eosinophils 5      % Basophils 1      % Immature Granulocytes 0      NRBCs per 100 WBC 0      Absolute Neutrophils 4.5      Absolute Lymphocytes 1.7      Absolute Monocytes 0.5      Absolute Eosinophils 0.3      Absolute Basophils 0.1      Absolute Immature Granulocytes 0.0      Absolute NRBCs 0.0         Imaging   No orders to display       EKG   ECG taken at 18:24, ECG read at 18:29  Normal sinus rhythm  Rate 69 bpm. CT interval 130 ms. QRS duration 90 ms. QT/QTc 406/435 ms. P-R-T axes 37 49 21.    Independent Interpretation   None    ED Course      Medications Administered   Medications   sodium chloride 0.9% BOLUS 1,000 mL (0 mLs Intravenous Stopped 9/7/24 2013)       Procedures   Procedures     Discussion of Management   None    ED Course   ED Course as of 09/08/24 1206   Sat Sep 07, 2024   1800 I evaluated patient and obtained history.   2009 Reassessed patient. Discussed discharge plans.        Additional Documentation  None    Medical Decision Making / Diagnosis     CMS Diagnoses: None    MIPS       None    MDM   Anh Flores is a 29 year old female who presents to the ED for evaluation of abdominal pain and generalized body aches. She reports ongoing abdominal pain for 9 months that is worse after eating certain foods. She also reports neck pain and paresthesia of the  left arm that has been going on for awhile.  See further HPI details above.  The above workup was undertaken.  Broad differential was considered including SBO, pancreatitis, appendicitis, choledocholithiasis, IBD, IBS, gastroenteritis.  On exam, patient is well-appearing.  Her vitals are reassuring.  No fever, tachycardia, or hypoxia.  Abdominal exam is benign.  No peritoneal signs.  Labs are otherwise reassuring.  No leukocytosis.  Normal LFTs.  Low suspicion for biliary etiology.  Normal lipase.  Low suspicion for pancreatitis.  hCG is negative.  No YAZMIN.  Normal electrolytes.  No evidence of severe dehydration.  Patient had a recent CT scan without any acute findings.  Given benign abdomen and reassuring lab work, I do not think repeat imaging is indicated today.  I had a shared decision-making conversation with the patient who also agrees on deferring imaging.  She was given IV fluids.  She has an endoscopy and colonoscopy scheduled for 9/16 which I think is the appropriate next step giving chronicity of symptoms.     Regarding the left arm paresthesias, broad differential was considered including ACS, PE, aortic dissection, radiculopathy.  On exam, patient is well-appearing.  EKG shows normal sinus rhythm.  No ischemic changes.  Troponin is undetectable.  Low suspicion for ACS given reassuring vital signs, normal EKG, and undetectable troponin.  She is PERC negative therefore I doubt PE.  I doubt aortic dissection given no overt chest pain and normal blood pressure.  She does have tenderness to the left trapezius which could be contributing to the left arm radiculopathy.  She also has a history of bilateral carpal tunnel syndrome.  Discussed that she should follow-up with orthopedics for an outpatient MRI.      There are no acutely concerning findings in the workup today.  Patient states that she feels okay to go home.  Will plan to discharge with PCP follow-up.  Return precautions were given.      Disposition    The patient was discharged.     Diagnosis     ICD-10-CM    1. Abdominal pain, generalized  R10.84       2. Paresthesia of left arm  R20.2            Discharge Medications   Discharge Medication List as of 9/7/2024  8:13 PM            DEVON Knox Kausar, PA-C  09/09/24 1251

## 2024-09-08 NOTE — DISCHARGE INSTRUCTIONS
Please follow-up with your primary care provider for reassessment.  You can take Tylenol or ibuprofen as needed for pain.  Please continue taking your Zofran as needed for nausea.  Recommend food allergy testing given history of multiple food intolerances.  Please return to the ER with worsening pain, persistent nausea and vomiting, fever, or any other concerns.

## 2024-09-10 ENCOUNTER — OFFICE VISIT (OUTPATIENT)
Dept: FAMILY MEDICINE | Facility: CLINIC | Age: 29
End: 2024-09-10
Payer: COMMERCIAL

## 2024-09-10 VITALS
WEIGHT: 257.4 LBS | SYSTOLIC BLOOD PRESSURE: 110 MMHG | BODY MASS INDEX: 50.53 KG/M2 | HEART RATE: 72 BPM | DIASTOLIC BLOOD PRESSURE: 74 MMHG | TEMPERATURE: 97.4 F | RESPIRATION RATE: 21 BRPM | OXYGEN SATURATION: 100 % | HEIGHT: 60 IN

## 2024-09-10 DIAGNOSIS — G43.911 INTRACTABLE MIGRAINE WITH STATUS MIGRAINOSUS, UNSPECIFIED MIGRAINE TYPE: ICD-10-CM

## 2024-09-10 DIAGNOSIS — M54.6 ACUTE BILATERAL THORACIC BACK PAIN: ICD-10-CM

## 2024-09-10 DIAGNOSIS — K52.9 CHRONIC DIARRHEA: ICD-10-CM

## 2024-09-10 DIAGNOSIS — K76.0 FATTY LIVER: Primary | ICD-10-CM

## 2024-09-10 DIAGNOSIS — M79.10 MYALGIA: ICD-10-CM

## 2024-09-10 DIAGNOSIS — K74.00 FIBROSIS OF LIVER: ICD-10-CM

## 2024-09-10 DIAGNOSIS — R53.83 OTHER FATIGUE: ICD-10-CM

## 2024-09-10 PROBLEM — Z91.011 DAIRY ALLERGY: Status: ACTIVE | Noted: 2024-09-10

## 2024-09-10 PROBLEM — E73.9 LACTOSE INTOLERANCE: Status: ACTIVE | Noted: 2024-09-10

## 2024-09-10 LAB
CK SERPL-CCNC: 77 U/L (ref 26–192)
FERRITIN SERPL-MCNC: 431 NG/ML (ref 6–175)
RHEUMATOID FACT SERPL-ACNC: <10 IU/ML

## 2024-09-10 PROCEDURE — 99214 OFFICE O/P EST MOD 30 MIN: CPT | Performed by: FAMILY MEDICINE

## 2024-09-10 PROCEDURE — 36415 COLL VENOUS BLD VENIPUNCTURE: CPT | Performed by: FAMILY MEDICINE

## 2024-09-10 PROCEDURE — 86038 ANTINUCLEAR ANTIBODIES: CPT | Performed by: FAMILY MEDICINE

## 2024-09-10 RX ORDER — SUMATRIPTAN 50 MG/1
100 TABLET, FILM COATED ORAL
COMMUNITY
Start: 2024-09-10

## 2024-09-10 RX ORDER — LIDOCAINE 50 MG/G
1 PATCH TOPICAL EVERY 24 HOURS
Qty: 30 PATCH | Refills: 1 | Status: SHIPPED | OUTPATIENT
Start: 2024-09-10 | End: 2024-10-06

## 2024-09-10 ASSESSMENT — PAIN SCALES - GENERAL: PAINLEVEL: NO PAIN (0)

## 2024-09-10 NOTE — PROGRESS NOTES
"  Assessment & Plan     Fatty liver  - Adult GI  Referral - Consult Only; Future    Fibrosis of liver  - Adult GI  Referral - Consult Only; Future    Intractable migraine with status migrainosus, unspecified migraine type  Established with [facility]  - SUMAtriptan (IMITREX) 50 MG tablet; Take 2 tablets (100 mg) by mouth at onset of headache for migraine. May repeat in 2 hours. Max 4 tablets/24 hours.    Chronic diarrhea  EGD/colonoscopy scheduled for 09/16/2024  - Adult Nutrition  Referral; Future    Acute bilateral thoracic back pain  - lidocaine (LIDODERM) 5 % patch; Place 1 patch over 12 hours onto the skin every 24 hours. To prevent lidocaine toxicity, patient should be patch free for 12 hrs daily.  - CK total; Future    Other fatigue  History of TOMMY, sedentary lifestyle  - Anti Nuclear Elsy IgG by IFA with Reflex; Future  - Rheumatoid factor; Future  - Ferritin; Future  - Anti Nuclear Elsy IgG by IFA with Reflex    Myalgia  - Physical Therapy  Referral; Future        MED REC REQUIRED  Post Medication Reconciliation Status: discharge medications reconciled, continue medications without change        Tanika Kamara is a 29 year old, presenting for the following health issues:  ER F/U        9/10/2024     9:47 AM   Additional Questions   Roomed by Marivel SORIANO   Accompanied by None         9/10/2024     9:47 AM   Patient Reported Additional Medications   Patient reports taking the following new medications NA     HPI         ED/UC Followup:    Facility:  [facility] Emergency Dept   Date of visit: 9/7/24  Reason for visit: Abdominal pain   Current Status: stable           Review of Systems  Constitutional, HEENT, cardiovascular, pulmonary, GI, , musculoskeletal, neuro, skin, endocrine and psych systems are negative, except as otherwise noted.      Objective    /74   Pulse 72   Temp 97.4  F (36.3  C) (Temporal)   Resp 21   Ht 1.53 m (5' 0.24\")   Wt " 116.8 kg (257 lb 6.4 oz)   LMP 08/20/2024 (Approximate)   SpO2 100%   Breastfeeding No   BMI 49.88 kg/m    Body mass index is 49.88 kg/m .  Physical Exam   GENERAL: alert and no distress  EYES: Eyes grossly normal to inspection, PERRL and conjunctivae and sclerae normal  HENT: ear canals and TM's normal, nose and mouth without ulcers or lesions  NECK: no adenopathy, no asymmetry, masses, or scars  RESP: lungs clear to auscultation - no rales, rhonchi or wheezes  CV: regular rate and rhythm, normal S1 S2, no S3 or S4, no murmur, click or rub, no peripheral edema  ABDOMEN: soft, nontender, no hepatosplenomegaly, no masses and bowel sounds normal  MS: no gross musculoskeletal defects noted, no edema  PSYCH: mentation appears normal and judgement and insight impaired            Signed Electronically by: Bita Veronica MD

## 2024-09-11 ENCOUNTER — TELEPHONE (OUTPATIENT)
Dept: FAMILY MEDICINE | Facility: CLINIC | Age: 29
End: 2024-09-11

## 2024-09-11 LAB — ANA SER QL IF: NEGATIVE

## 2024-09-11 NOTE — TELEPHONE ENCOUNTER
"[facility] Prior Authorization Team   Phone: [phone]        PRIOR AUTHORIZATION DENIED    Medication: LIDOCAINE 5 % EX PTCH  Insurance Company: [employer] - Phone [phone] Fax [phone]  Denial Date: 9/10/2024  Denial Reason(s):             Appeal Information: To send an appeal to the patient's insurance, please provide a letter of medical necessity for the requested medication that was denied.  Please use the denial rationale from the patient's insurance to write the letter.  Once it is completed, please have it available under the \"letters tab\" in the patient's chart and route directly back to me: MAEGAN HALL and I can send the appeal to the patient's insurance.     Patient Notified: No. The [facility] PA Team does not notify of the denial outcomes as the patient often will ask what their provider will prescribe in place of the denied medication, or additional information in regards to other therapies they can take in place of the denied medication.  This is not something we can advise on in our department.    "

## 2024-09-16 ENCOUNTER — HOSPITAL ENCOUNTER (OUTPATIENT)
Facility: CLINIC | Age: 29
Discharge: HOME | End: 2024-09-16
Attending: INTERNAL MEDICINE | Admitting: INTERNAL MEDICINE
Payer: COMMERCIAL

## 2024-09-16 ENCOUNTER — APPOINTMENT (OUTPATIENT)
Dept: OPTOMETRY | Facility: CLINIC | Age: 29
End: 2024-09-16
Payer: COMMERCIAL

## 2024-09-16 VITALS
HEART RATE: 57 BPM | OXYGEN SATURATION: 99 % | SYSTOLIC BLOOD PRESSURE: 108 MMHG | RESPIRATION RATE: 25 BRPM | DIASTOLIC BLOOD PRESSURE: 64 MMHG

## 2024-09-16 LAB
COLONOSCOPY: NORMAL
GLUCOSE BLDC GLUCOMTR-MCNC: 91 MG/DL (ref 70–99)
UPPER GI ENDOSCOPY: NORMAL

## 2024-09-16 PROCEDURE — 250N000009 HC RX 250: Performed by: INTERNAL MEDICINE

## 2024-09-16 PROCEDURE — G0500 MOD SEDAT ENDO SERVICE >5YRS: HCPCS | Performed by: INTERNAL MEDICINE

## 2024-09-16 PROCEDURE — 99153 MOD SED SAME PHYS/QHP EA: CPT | Performed by: INTERNAL MEDICINE

## 2024-09-16 PROCEDURE — 43239 EGD BIOPSY SINGLE/MULTIPLE: CPT | Performed by: INTERNAL MEDICINE

## 2024-09-16 PROCEDURE — 45380 COLONOSCOPY AND BIOPSY: CPT | Performed by: INTERNAL MEDICINE

## 2024-09-16 PROCEDURE — 88342 IMHCHEM/IMCYTCHM 1ST ANTB: CPT | Mod: 26 | Performed by: PATHOLOGY

## 2024-09-16 PROCEDURE — 92340 FIT SPECTACLES MONOFOCAL: CPT | Performed by: OPTOMETRIST

## 2024-09-16 PROCEDURE — 88342 IMHCHEM/IMCYTCHM 1ST ANTB: CPT | Mod: TC,59 | Performed by: INTERNAL MEDICINE

## 2024-09-16 PROCEDURE — 250N000011 HC RX IP 250 OP 636: Performed by: INTERNAL MEDICINE

## 2024-09-16 PROCEDURE — 88305 TISSUE EXAM BY PATHOLOGIST: CPT | Mod: 26 | Performed by: PATHOLOGY

## 2024-09-16 PROCEDURE — 82962 GLUCOSE BLOOD TEST: CPT

## 2024-09-16 RX ORDER — ONDANSETRON 2 MG/ML
4 INJECTION INTRAMUSCULAR; INTRAVENOUS
Status: DISCONTINUED | OUTPATIENT
Start: 2024-09-16 | End: 2024-09-16 | Stop reason: HOSPADM

## 2024-09-16 RX ORDER — NALOXONE HYDROCHLORIDE 0.4 MG/ML
0.4 INJECTION, SOLUTION INTRAMUSCULAR; INTRAVENOUS; SUBCUTANEOUS
Status: DISCONTINUED | OUTPATIENT
Start: 2024-09-16 | End: 2024-09-16 | Stop reason: HOSPADM

## 2024-09-16 RX ORDER — PROCHLORPERAZINE MALEATE 5 MG
10 TABLET ORAL EVERY 6 HOURS PRN
Status: DISCONTINUED | OUTPATIENT
Start: 2024-09-16 | End: 2024-09-16 | Stop reason: HOSPADM

## 2024-09-16 RX ORDER — NALOXONE HYDROCHLORIDE 0.4 MG/ML
0.2 INJECTION, SOLUTION INTRAMUSCULAR; INTRAVENOUS; SUBCUTANEOUS
Status: DISCONTINUED | OUTPATIENT
Start: 2024-09-16 | End: 2024-09-16 | Stop reason: HOSPADM

## 2024-09-16 RX ORDER — LIDOCAINE 40 MG/G
CREAM TOPICAL
Status: DISCONTINUED | OUTPATIENT
Start: 2024-09-16 | End: 2024-09-16 | Stop reason: HOSPADM

## 2024-09-16 RX ORDER — FENTANYL CITRATE 50 UG/ML
INJECTION, SOLUTION INTRAMUSCULAR; INTRAVENOUS PRN
Status: DISCONTINUED | OUTPATIENT
Start: 2024-09-16 | End: 2024-09-16 | Stop reason: HOSPADM

## 2024-09-16 RX ORDER — ONDANSETRON 2 MG/ML
4 INJECTION INTRAMUSCULAR; INTRAVENOUS EVERY 6 HOURS PRN
Status: DISCONTINUED | OUTPATIENT
Start: 2024-09-16 | End: 2024-09-16 | Stop reason: HOSPADM

## 2024-09-16 RX ORDER — ONDANSETRON 4 MG/1
4 TABLET, ORALLY DISINTEGRATING ORAL EVERY 6 HOURS PRN
Status: DISCONTINUED | OUTPATIENT
Start: 2024-09-16 | End: 2024-09-16 | Stop reason: HOSPADM

## 2024-09-16 RX ORDER — FLUMAZENIL 0.1 MG/ML
0.2 INJECTION, SOLUTION INTRAVENOUS
Status: DISCONTINUED | OUTPATIENT
Start: 2024-09-16 | End: 2024-09-16 | Stop reason: HOSPADM

## 2024-09-16 ASSESSMENT — ACTIVITIES OF DAILY LIVING (ADL)
ADLS_ACUITY_SCORE: 35

## 2024-09-16 NOTE — H&P
ENDOSCOPY PRE-SEDATION H&P FOR OUTPATIENT PROCEDURES    Anh Flores  [id number]  1995    Procedure: EGD/COL    Pre-procedure diagnosis: vomiting, diarrhea    Past medical history:   Past Medical History:   Diagnosis Date    CAH (congenital adrenal hyperplasia) 2/9/2010    Followed by Dr. Brewer; next follow up 1.2011.  Metformin increased to 850 mg twice a day.     Diabetes mellitus, type 2 (H) 10/19/2020    Vitamin D deficiency 2/9/2010       Past surgical history:   Past Surgical History:   Procedure Laterality Date    CHOLECYSTECTOMY      She was in 8th grade        No current facility-administered medications for this encounter.       Allergies   Allergen Reactions    Other Environmental Allergy     Victoza [Liraglutide] Headache, Hives and Itching       History of Anesthesia/Sedation Problems: no    PHYSICAL EXAMINATION:  Constitutional: aaox3, cooperative, pleasant  Vitals reviewed: /73   LMP 07/30/2024 (Exact Date)   SpO2 100%   Wt:   Wt Readings from Last 2 Encounters:   09/10/24 116.8 kg (257 lb 6.4 oz)   09/07/24 115.7 kg (255 lb)      Eyes: Sclera anicteric/injected  Ears/nose/mouth/throat: Normal oropharynx without ulcers or exudate, mucus membranes moist, hearing intact  Neck: supple, thyroid normal size  CV: No edema  Respiratory: Unlabored breathing  Lymph: No submandibular, supraclavicular or inguinal lymphadenopathy  Abd: Nondistended, no masses, nontender  Skin: warm, perfused, no jaundice  Psych: Normal affect  MSK: normal movement on limited exam.    ASA Score: See Provation note    Assessment/Plan:     The patient is an appropriate candidate to receive sedation.    Informed consent was discussed with the patient/family, including the risks, benefits, potential complications and any alternative options associated with sedation.    Patient assessment completed just prior to sedation and while under constant observation by the provider. Condition determined to be adequate for  proceeding with sedation.    The specific risks for the procedure were discussed with the patient at the time of informed consent and include but are not limited to perforation which could require surgery, missing significant neoplasm or lesion, hemorrhage and adverse sedative complication.      Bacilio Yancey MD

## 2024-09-16 NOTE — OR NURSING
EGD with biopsies and colonoscopy with biopsies completed and pt tolerated well with moderate sedation and on 2L nc oxygen.

## 2024-09-19 ENCOUNTER — TELEPHONE (OUTPATIENT)
Dept: FAMILY MEDICINE | Facility: CLINIC | Age: 29
End: 2024-09-19
Payer: COMMERCIAL

## 2024-09-19 DIAGNOSIS — K29.50 CHRONIC GASTRITIS WITHOUT BLEEDING, UNSPECIFIED GASTRITIS TYPE: Primary | ICD-10-CM

## 2024-09-19 DIAGNOSIS — K29.80 PEPTIC DUODENITIS: ICD-10-CM

## 2024-09-19 LAB
PATH REPORT.ADDENDUM SPEC: NORMAL
PATH REPORT.COMMENTS IMP SPEC: NORMAL
PATH REPORT.COMMENTS IMP SPEC: NORMAL
PATH REPORT.FINAL DX SPEC: NORMAL
PATH REPORT.GROSS SPEC: NORMAL
PATH REPORT.MICROSCOPIC SPEC OTHER STN: NORMAL
PATH REPORT.RELEVANT HX SPEC: NORMAL
PHOTO IMAGE: NORMAL

## 2024-09-19 RX ORDER — FAMOTIDINE 20 MG/1
20 TABLET, FILM COATED ORAL 2 TIMES DAILY
Qty: 180 TABLET | Refills: 0 | Status: SHIPPED | OUTPATIENT
Start: 2024-09-19

## 2024-09-19 RX ORDER — FAMOTIDINE 20 MG/1
20 TABLET, FILM COATED ORAL 2 TIMES DAILY
Qty: 180 TABLET | Refills: 0 | Status: SHIPPED | OUTPATIENT
Start: 2024-09-19 | End: 2024-09-19

## 2024-09-20 NOTE — TELEPHONE ENCOUNTER
Mild chronic inactive gastritis and peptic duodenopathy are noted.  Colon biopsies are unremarkable.  No changes in management plans based on pathology results.   Bacilio Yancey MD     Please call the patient, discontinue meloxicam, let's do a trial of Famotidine (Pepcid, on insurance formulary, PPI needs prior authorization). We can discuss symptoms management/follow up 10/18/2024.    Bita Veronica MD on 9/19/2024 at 9:09 PM

## 2024-09-23 ENCOUNTER — TRANSFERRED RECORDS (OUTPATIENT)
Dept: HEALTH INFORMATION MANAGEMENT | Facility: CLINIC | Age: 29
End: 2024-09-23
Payer: COMMERCIAL

## 2024-09-23 LAB — TSH SERPL-ACNC: 0.59 UIU/ML (ref 0.45–4.5)

## 2024-09-25 ENCOUNTER — TELEPHONE (OUTPATIENT)
Dept: ENDOCRINOLOGY | Facility: CLINIC | Age: 29
End: 2024-09-25
Payer: COMMERCIAL

## 2024-09-25 NOTE — CONFIDENTIAL NOTE
"Per 9/4/24 telephone encounter from Dr. Cronin:  \"Still has the abd pain but the diarrhea resolved following the discontinuation of metformin.  She continues to take Ozempic at 0.5 mg weekly.  Next dose is next Monday.  The abdominal CT was unremarkable with the exception of liver fibrosis and I encouraged her to discuss this with her primary care provider.  She is scheduled for colonoscopy next week.     Discussed about the fact that Ozempic might contribute to the abdominal pain and recommended to consider discontinuing it for 3 weeks, to see if the pain resolves.  She does not endorse hypoglycemic symptoms at the time the blood sugar is low on the sensor (tremor, palpitations, sweating), so I suspect the sensor blood glucose numbers do not indicate real hypoglycemia.  Of note that the sensor was prescribed by her primary care provider.\"         Writer called patient to review. Patient states that her abdominal pain has not really changed at all after being off the Ozempic. Patient states that patient had a colonoscopy and the GI provider feels it is diet related. Patient is seeing a Nutritionist in October. Patient reports that her GI provider said she would be ok to go back on Ozempic. Patient is concerned about her blood sugars increasing since being off the Ozempic (140-150 in AM fasting).     Writer will send to Dr. Cronin to review.      Misty aEston RN  Endocrine Care Coordinator  [facility]    "

## 2024-09-25 NOTE — TELEPHONE ENCOUNTER
[facility] Call Center    Phone Message    May a detailed message be left on voicemail: yes     Reason for Call: Other: Pt is reporting back as she has been off of her meds for 2.5 weeks and is reporting not much of a change but high blood sugar. She is wondering when to start up her Ozempic again? Please give Pt a call back.      Action Taken: Other: Endo     Travel Screening: Not Applicable     Date of Service: 9/25/24

## 2024-09-27 ENCOUNTER — TELEPHONE (OUTPATIENT)
Dept: OBGYN | Facility: CLINIC | Age: 29
End: 2024-09-27
Payer: COMMERCIAL

## 2024-09-27 NOTE — TELEPHONE ENCOUNTER
RN called patient and relayed message below from MD.    Over the counter voltaren 1% gel would be very helpful  Bita Veronica MD on 9/27/2024 at 12:36 PM   Patient  verbalized understanding and all questions answered.     Vane Sharma RN  [facility] - Registered Nurse  Clinic Triage [place]   September 27, 2024

## 2024-09-27 NOTE — TELEPHONE ENCOUNTER
Over the counter voltaren 1% gel would be very helpful  Bita Veronica MD on 9/27/2024 at 12:36 PM

## 2024-09-27 NOTE — TELEPHONE ENCOUNTER
[facility] Call Center    Phone Message    May a detailed message be left on voicemail: yes     Reason for Call: Other: Patient called to schedule off a referral from her provider with Dr. Gutierrez for CAH. Per protocols sending TE to clinic to get patient scheduled. Please contact patient in regards to this message. Thank you     Action Taken: Other: Whs    Travel Screening: Not Applicable     Date of Service:

## 2024-09-27 NOTE — TELEPHONE ENCOUNTER
Patient did call in to clinic, RN did review message with patient from provider. Patient verbalized understanding. However she is now questioning what she should be doing for pain if she stops the Meloxicam.  She is no longer taking the Gabapentin. If she stops the Meloxicam her hand pain will be excruciating. She verbalizes understanding and agreement to stop Meloxicam, and start pepcid. Will await a call back on what to do with pain.

## 2024-09-27 NOTE — CONFIDENTIAL NOTE
Per Dr. Cronin:  Please let her know it's OK to resume ozempic.       Patient advised of recommendations from Dr. Cronin. Patient will start back up on her Ozempic 0.25 mg weekly due to being off the Ozempic for nearly 3 weeks.     Patient will call back with any concerns.       Misty Easton RN  Endocrine Care Coordinator  [facility]

## 2024-10-06 ENCOUNTER — OFFICE VISIT (OUTPATIENT)
Dept: URGENT CARE | Facility: URGENT CARE | Age: 29
End: 2024-10-06
Payer: COMMERCIAL

## 2024-10-06 VITALS
OXYGEN SATURATION: 98 % | HEART RATE: 78 BPM | TEMPERATURE: 97.5 F | RESPIRATION RATE: 16 BRPM | SYSTOLIC BLOOD PRESSURE: 118 MMHG | DIASTOLIC BLOOD PRESSURE: 78 MMHG | BODY MASS INDEX: 50.17 KG/M2 | WEIGHT: 258.9 LBS

## 2024-10-06 DIAGNOSIS — J01.90 ACUTE SINUSITIS WITH COEXISTING CONDITION REQUIRING PROPHYLACTIC TREATMENT: Primary | ICD-10-CM

## 2024-10-06 DIAGNOSIS — G89.29 CHRONIC INTRACTABLE HEADACHE, UNSPECIFIED HEADACHE TYPE: ICD-10-CM

## 2024-10-06 DIAGNOSIS — R51.9 CHRONIC INTRACTABLE HEADACHE, UNSPECIFIED HEADACHE TYPE: ICD-10-CM

## 2024-10-06 DIAGNOSIS — E11.9 TYPE 2 DIABETES MELLITUS WITHOUT COMPLICATION, WITHOUT LONG-TERM CURRENT USE OF INSULIN (H): ICD-10-CM

## 2024-10-06 PROCEDURE — 96372 THER/PROPH/DIAG INJ SC/IM: CPT | Performed by: PHYSICIAN ASSISTANT

## 2024-10-06 PROCEDURE — 99213 OFFICE O/P EST LOW 20 MIN: CPT | Mod: 25 | Performed by: PHYSICIAN ASSISTANT

## 2024-10-06 RX ORDER — KETOROLAC TROMETHAMINE 30 MG/ML
30 INJECTION, SOLUTION INTRAMUSCULAR; INTRAVENOUS ONCE
Status: COMPLETED | OUTPATIENT
Start: 2024-10-06 | End: 2024-10-06

## 2024-10-06 RX ADMIN — KETOROLAC TROMETHAMINE 30 MG: 30 INJECTION, SOLUTION INTRAMUSCULAR; INTRAVENOUS at 09:31

## 2024-10-06 ASSESSMENT — ENCOUNTER SYMPTOMS
ARTHRALGIAS: 0
SINUS PRESSURE: 1
EYES NEGATIVE: 1
SHORTNESS OF BREATH: 0
RESPIRATORY NEGATIVE: 1
JOINT SWELLING: 0
VOMITING: 0
NECK PAIN: 0
NAUSEA: 0
MUSCULOSKELETAL NEGATIVE: 1
ALLERGIC/IMMUNOLOGIC NEGATIVE: 1
ENDOCRINE NEGATIVE: 1
HEADACHES: 1
WEAKNESS: 0
SORE THROAT: 0
CARDIOVASCULAR NEGATIVE: 1
FEVER: 0
SINUS PAIN: 1
LIGHT-HEADEDNESS: 0
MYALGIAS: 0
PALPITATIONS: 0
CHILLS: 0
BACK PAIN: 0
WOUND: 0
RHINORRHEA: 0
COUGH: 0
NECK STIFFNESS: 0
DIZZINESS: 0
DIARRHEA: 0

## 2024-10-06 NOTE — PROGRESS NOTES
Chief Complaint:     Chief Complaint   Patient presents with    Migraine    Sinus Problem     No results found for any visits on 10/06/24.    Medical Decision Making:    Vital signs reviewed by Ld Diaz PA-C  /78   Pulse 78   Temp 97.5  F (36.4  C) (Tympanic)   Resp 16   Wt 117.4 kg (258 lb 14.4 oz)   LMP 07/30/2024 (Approximate)   SpO2 98%   BMI 50.17 kg/m      Differential Diagnosis:  Sinusitis, migraine        ASSESSMENT    1. Acute sinusitis with coexisting condition requiring prophylactic treatment    2. Type 2 diabetes mellitus without complication, without long-term current use of insulin (H)    3. Chronic intractable headache, unspecified headache type        PLAN    Patient is in no acute distress.    Temp is 97.5 in clinic today, lung sounds were clear, and O2 sats at 98% on RA.    With Hx and symptoms, Rx for Augmentin sent in.  Patient given 30 Mg Toradol IM in clinic today.  Rest, Push fluids, vaporizer, elevation of head of bed.  Ibuprofen and or Tylenol for any fever or body aches.  Over the counter cough suppressant- PRN- as discussed.   Patient at higher risk for severe infection with DM.  Patient instructed to monitor blood sugars closely with illness.  Continue taking your Ozempic and follow up with your primary provider for any DM medication changes if needed.  If symptoms worsen, recheck immediately otherwise follow up with your PCP in 1 week if symptoms are not improving.  Worrisome symptoms discussed with instructions to go to the ED.  Patient verbalized understanding and agreed with this plan.    Labs:    No results found for any visits on 10/06/24.     Vital signs reviewed by Ld Diaz PA-C  /78   Pulse 78   Temp 97.5  F (36.4  C) (Tympanic)   Resp 16   Wt 117.4 kg (258 lb 14.4 oz)   LMP 07/30/2024 (Approximate)   SpO2 98%   BMI 50.17 kg/m      Current Meds      Current Outpatient Medications:     alcohol swab prep pads, Use to swab area of  injection/anthony as directed., Disp: 100 each, Rfl: 3    amoxicillin-clavulanate (AUGMENTIN) 875-125 MG tablet, Take 1 tablet by mouth 2 times daily for 10 days., Disp: 20 tablet, Rfl: 0    atorvastatin (LIPITOR) 10 MG tablet, TAKE 1 TABLET(10 MG) BY MOUTH DAILY, Disp: 90 tablet, Rfl: 2    blood glucose (NO BRAND SPECIFIED) test strip, Use to test blood sugar three times daily or as directed. Preferred blood glucose meter OR supplies to accompany: Blood Glucose Monitor Brands: per insurance., Disp: 100 strip, Rfl: 6    blood glucose monitoring (NO BRAND SPECIFIED) meter device kit, Use to test blood sugar three times daily or as directed. Preferred blood glucose meter OR supplies to accompany: Blood Glucose Monitor Brands: per insurance., Disp: 1 kit, Rfl: 0    cholestyramine light (QUESTRAN) 4 GM packet, Take 1 packet (4 g) by mouth 2 times daily (with meals) for 180 days, Disp: 180 packet, Rfl: 1    Continuous Glucose Sensor (FREESTYLE AYSE 3 SENSOR) MISC, CHANGE EVERY 14 DAYS, Disp: 3 each, Rfl: 1    famotidine (PEPCID) 20 MG tablet, Take 1 tablet (20 mg) by mouth 2 times daily., Disp: 180 tablet, Rfl: 0    fluticasone (FLONASE) 50 MCG/ACT nasal spray, Spray 1 spray into both nostrils daily, Disp: 16 g, Rfl: 3    galcanezumab-gnlm (EMGALITY) 120 MG/ML injection, Inject 120 mg subcutaneously every 30 days., Disp: , Rfl:     insulin pen needle (ULTICARE MICRO) 32G X 4 MM miscellaneous, Use 1 pen needles daily or as directed., Disp: 100 each, Rfl: 3    norgestimate-ethinyl estradiol (ORTHO-CYCLEN) 0.25-35 MG-MCG tablet, Take 1 tablet by mouth daily, Disp: 84 tablet, Rfl: 3    ondansetron (ZOFRAN ODT) 4 MG ODT tab, Take 1 tablet (4 mg) by mouth every 8 hours as needed for nausea, Disp: 30 tablet, Rfl: 2    rimegepant (NURTEC) 75 MG ODT tablet, Place 75 mg under the tongue every 48 hours, Disp: , Rfl:     rizatriptan (MAXALT) 10 MG tablet, TAKE ONE TABLET BY MOUTH AS DIRECTED. MAY REPEAT AFTER 2 HOURS. MAX DOSE  30MG/24 HOURS, Disp: , Rfl:     semaglutide (OZEMPIC) 2 MG/3ML pen, Inject 0.5 mg Subcutaneous every 7 days, Disp: 9 mL, Rfl: 3    SUMAtriptan (IMITREX) 50 MG tablet, Take 2 tablets (100 mg) by mouth at onset of headache for migraine. May repeat in 2 hours. Max 4 tablets/24 hours., Disp: , Rfl:     thin (NO BRAND SPECIFIED) lancets, Use with lanceting device. To accompany: Blood Glucose Monitor Brands: per insurance., Disp: 100 each, Rfl: 6    topiramate (TOPAMAX) 25 MG tablet, TAKE TWO TABLETS BY MOUTH EVERY NIGHT AT BEDTIME X 7 DAYS, THEN ONE IN THE AM AND TWO EVERY NIGHT AT BEDTIME X 7 DYAS, THEN TWO TWICE DAILY, Disp: , Rfl:     UBRELVY 100 MG tablet, Take 100 mg by mouth at onset of headache., Disp: , Rfl:   No current facility-administered medications for this visit.      Respiratory History    occasional episodes of bronchitis      SUBJECTIVE    HPI: Anh Flores is an 29 year old female who presents with facial pain/pressure and headache.  Symptoms began 4  days ago and has gradually worsening.  Patient has a Hx of migraine headache and sinus problems that typically require antibiotic to resolve.   There is no shortness of breath, wheezing, and chest pain.  Patient is eating and drinking well.  No fever, nausea, vomiting, or diarrhea.    Patient denies any recent travel or exposure to known COVID positive tested individual.      ROS:     Review of Systems   Constitutional:  Negative for chills and fever.   HENT:  Positive for sinus pressure and sinus pain. Negative for congestion, ear pain, rhinorrhea and sore throat.    Eyes: Negative.    Respiratory: Negative.  Negative for cough and shortness of breath.    Cardiovascular: Negative.  Negative for chest pain and palpitations.   Gastrointestinal:  Negative for diarrhea, nausea and vomiting.   Endocrine: Negative.    Genitourinary: Negative.    Musculoskeletal: Negative.  Negative for arthralgias, back pain, joint swelling, myalgias, neck pain and neck  stiffness.   Skin: Negative.  Negative for rash and wound.   Allergic/Immunologic: Negative.  Negative for immunocompromised state.   Neurological:  Positive for headaches. Negative for dizziness, weakness and light-headedness.         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), late onset (H)    Chronic abdominal pain    Vitamin D deficiency    Chronic headaches    Morbid obesity (H)    Hidradenitis suppurativa    Morbid obesity with body mass index of 60.0-69.9 in adult (H)    Elevated BP without diagnosis of hypertension    Diabetes mellitus, type 2 (H)    Bilateral carpal tunnel syndrome    Obstructive sleep apnea    Insomnia, unspecified type    Lactose intolerance        Allergies  Allergies   Allergen Reactions    Other Environmental Allergy     Victoza [Liraglutide] Headache, Hives and Itching        Social History  Social History     Socioeconomic History    Marital status: Single     Spouse name: Not on file    Number of children: 0    Years of education: 14+    Highest education level: Not on file   Occupational History     Employer: [employer]UDENT     Comment: Dog grooming   Tobacco Use    Smoking status: Never     Passive exposure: Never    Smokeless tobacco: Never   Vaping Use    Vaping status: Never Used   Substance and Sexual Activity    Alcohol use: No    Drug use: No    Sexual activity: Never   Other Topics Concern    Parent/sibling w/ CABG, MI or angioplasty before 65F 55M? Not Asked   Social History Narrative    Not on file     Social Determinants of Health     Financial Resource Strain: Low Risk  (1/30/2024)    Financial Resource Strain      Within the past 12 months, have you or your family members you live with been unable to get utilities (heat, electricity) when it was really needed?: No   Food Insecurity: Low Risk  (1/30/2024)    Food Insecurity     Within the past 12 months, did you worry that your food would run out before you got money to buy more?: No     Within the past 12 months, did the food you bought just not last and you didn t have money to get more?: No   Transportation Needs: Low Risk  (1/30/2024)    Transportation Needs     Within the past 12 months, has lack of transportation kept you from medical appointments, getting your medicines, non-medical meetings or appointments, work, or from getting things that you need?: No   Physical Activity: Not on file   Stress: Not on file   Social Connections: Not on file   Interpersonal Safety: Not At Risk (5/20/2024)    Received from [facility]     Humiliation, Afraid, Rape, and Kick questionnaire     Fear of Current or Ex-Partner: No     Emotionally Abused: No     Physically Abused: No     Sexually Abused: No   Housing Stability: Low Risk  (1/30/2024)    Housing Stability     Do you have housing? : Yes     Are you worried about losing your housing?: No        OBJECTIVE     Vital signs reviewed by Ld Diaz PA-C  /78   Pulse 78   Temp 97.5  F (36.4  C) (Tympanic)   Resp 16   Wt 117.4 kg (258 lb 14.4 oz)   LMP 07/30/2024 (Approximate)   SpO2 98%   BMI 50.17 kg/m       Physical Exam  Vitals and nursing note reviewed.   Constitutional:       General: She is not in acute distress.     Appearance: She is well-developed. She is not ill-appearing, toxic-appearing or diaphoretic.   HENT:      Head: Normocephalic and atraumatic.      Right Ear: Hearing, tympanic membrane, ear canal and external ear normal. Tympanic membrane is not perforated, erythematous, retracted or bulging.      Left Ear: Hearing, tympanic membrane, ear canal and external ear normal. Tympanic membrane is  not perforated, erythematous, retracted or bulging.      Nose: Congestion present. No mucosal edema or rhinorrhea.      Right Sinus: No maxillary sinus tenderness or frontal sinus tenderness.      Left Sinus: Maxillary sinus tenderness and frontal sinus tenderness present.      Mouth/Throat:      Pharynx: No pharyngeal swelling, oropharyngeal exudate, posterior oropharyngeal erythema or uvula swelling.      Tonsils: No tonsillar exudate or tonsillar abscesses. 0 on the right. 0 on the left.   Eyes:      General:         Right eye: No discharge.         Left eye: No discharge.      Pupils: Pupils are equal, round, and reactive to light.   Cardiovascular:      Rate and Rhythm: Normal rate and regular rhythm.      Heart sounds: Normal heart sounds. No murmur heard.     No friction rub. No gallop.   Pulmonary:      Effort: Pulmonary effort is normal. No respiratory distress.      Breath sounds: Normal breath sounds. No decreased breath sounds, wheezing, rhonchi or rales.   Chest:      Chest wall: No tenderness.   Abdominal:      General: Bowel sounds are normal. There is no distension.      Palpations: Abdomen is soft. There is no mass.      Tenderness: There is no abdominal tenderness. There is no guarding.   Musculoskeletal:      Cervical back: Normal range of motion and neck supple.   Lymphadenopathy:      Head:      Right side of head: No submental, submandibular, tonsillar, preauricular or posterior auricular adenopathy.      Left side of head: No submental, submandibular, tonsillar, preauricular or posterior auricular adenopathy.      Cervical: No cervical adenopathy.      Right cervical: No superficial or posterior cervical adenopathy.     Left cervical: No superficial or posterior cervical adenopathy.   Skin:     General: Skin is warm and dry.      Findings: No rash.   Neurological:      Mental Status: She is alert and oriented to person, place, and time.      Cranial Nerves: No cranial nerve deficit.      Deep  Tendon Reflexes: Reflexes are normal and symmetric.   Psychiatric:         Behavior: Behavior normal. Behavior is cooperative.         Thought Content: Thought content normal.         Judgment: Judgment normal.           Ld Diaz PA-C  10/6/2024, 9:14 AM

## 2024-10-06 NOTE — PROGRESS NOTES
Clinic Administered Medication Documentation        Patient was given Toradol. Prior to medication administration, verified patient's identity using patient s name and date of birth. Please see MAR and medication order for additional information. Patient instructed to remain in clinic for 15 minutes and report any adverse reaction to staff immediately.    Vial/Syringe: Single dose vial. Was entire vial of medication used? Yes    Olena Sawyer RN  [facility]

## 2024-10-07 ENCOUNTER — TELEPHONE (OUTPATIENT)
Dept: FAMILY MEDICINE | Facility: CLINIC | Age: 29
End: 2024-10-07
Payer: COMMERCIAL

## 2024-10-07 NOTE — TELEPHONE ENCOUNTER
RN called patient and relayed message per PCP    Please follow instructions per urgent care visit and plan to finish course of antibiotics if minimal side effects of Nausea, vomiting etc  Bita Veronica MD on 10/7/2024 at 11:18 AM      PAtient verbalized understanding and all questions answered.       Vane Sharma RN  [facility] - Registered Nurse  Clinic Triage [place]   October 7, 2024

## 2024-10-07 NOTE — TELEPHONE ENCOUNTER
Please follow instructions per urgent care visit and plan to finish course of antibiotics if minimal side effects of Nausea, vomiting etc  Bita Veronica MD on 10/7/2024 at 11:18 AM

## 2024-10-07 NOTE — TELEPHONE ENCOUNTER
Provider: patient is asking if this antibiotic is ok to take with her history of high iron?     S: Medication Question    B: patient was seen in UC over the weekend. Prescribed Augmentin for a sinus infection.  Patient is asking for providers input on if she should be taking this medication with her history of high iron? She took a dose yesterday but not yet today as she wants providers input.     A: Advised patient that message will be sent to provider for recommendations.     R: Patient verbalized understanding and is agreeable. She will await a call back.

## 2024-10-08 ENCOUNTER — TRANSFERRED RECORDS (OUTPATIENT)
Dept: HEALTH INFORMATION MANAGEMENT | Facility: CLINIC | Age: 29
End: 2024-10-08

## 2024-10-08 ENCOUNTER — OFFICE VISIT (OUTPATIENT)
Dept: ENDOCRINOLOGY | Facility: CLINIC | Age: 29
End: 2024-10-08
Payer: COMMERCIAL

## 2024-10-08 VITALS
SYSTOLIC BLOOD PRESSURE: 115 MMHG | DIASTOLIC BLOOD PRESSURE: 78 MMHG | OXYGEN SATURATION: 97 % | BODY MASS INDEX: 50.13 KG/M2 | WEIGHT: 258.7 LBS | HEART RATE: 91 BPM

## 2024-10-08 DIAGNOSIS — E25.9 CAH 21OH (CONGENITAL ADRENAL HYPERPLASIA DUE TO 21-HYDROXYLASE DEFICIENCY), LATE ONSET (H): ICD-10-CM

## 2024-10-08 DIAGNOSIS — E11.65 TYPE 2 DIABETES MELLITUS WITH HYPERGLYCEMIA, WITHOUT LONG-TERM CURRENT USE OF INSULIN (H): ICD-10-CM

## 2024-10-08 PROCEDURE — G2211 COMPLEX E/M VISIT ADD ON: HCPCS | Performed by: INTERNAL MEDICINE

## 2024-10-08 PROCEDURE — 99215 OFFICE O/P EST HI 40 MIN: CPT | Performed by: INTERNAL MEDICINE

## 2024-10-08 RX ORDER — METFORMIN HYDROCHLORIDE 500 MG/1
2000 TABLET, EXTENDED RELEASE ORAL
Qty: 360 TABLET | Refills: 3 | Status: SHIPPED | OUTPATIENT
Start: 2024-10-08

## 2024-10-08 RX ORDER — ACYCLOVIR 800 MG/1
1 TABLET ORAL
Qty: 6 EACH | Refills: 3 | Status: SHIPPED | OUTPATIENT
Start: 2024-10-08

## 2024-10-08 NOTE — LETTER
10/8/2024      Anh Flores  [address] [facility] [address]      Dear Colleague,    Thank you for referring your patient, Anh Flores, to the [facility]. Please see a copy of my visit note below.          Assessment     1. Nonclassical congenital adrenal hyperplasia, associated with primary amenorrhea, treated with dexamethasone until 2014, when dexamethasone was discontinued. She used to be medicated with metformin, spironolactone and birth control pills.  They were discontinued around 2019.  The patient reestablish care in April 2023, when she was started on treatment with birth control pills and metformin as the evaluation for nonclassical CAH revealed normal gonadotropins and normal androgens.  Since being on treatment with birth control pills, she has regular menstrual periods  Recommendations:  Continue treatment with birth control pills    2.  Class III obesity, associated with severe insulin resistance.   Recommended to resume taking semaglutide.  She can continue to take it at 0.25 mg weekly for 2 or 3 weeks and then increase the dose to 0.5 mg weekly.    3. Type 2 diabetes, suboptimally controlled, with no known microvascular disease  Plan:  Semaglutide as above  Resume taking metformin.  Advised the patient to gradually increase the dose of metformin until she reaches her prior dose 2 g extended release daily.    4.  Fatigue  Prior evaluation was negative for adrenal insufficiency.    5.  Diffuse itchiness  Recommended to schedule an appointment with dermatology.    Orders Placed This Encounter   Procedures     Comprehensive metabolic panel     Lipid panel reflex to direct LDL Fasting     Hematocrit     Albumin Random Urine Quantitative with Creat Ratio     Hemoglobin A1c     Adult Dermatology  Referral     =========================================================================================    The patient is seen for evaluation of nonclassical congenital  adrenal hyperplasia.  Her last visit in our clinic was in August 2024.    Anh Flores is a 29 year old female, previously seen by Dr. 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   Around age 6, while being evaluated for precocious puberty and darkening of the skin around her neck, she was diagnosed with late onset CAH and treated with 0.25 mg dexamethasone daily for a short period of time. Treatment with birth control pills did not induce a withdrawal bleeding. She 1st noticed the appearance of facial hair around age 13. Over the years, the patient had sporadic medical care.   Treatment with metformin, spironolactone and birth control pills was restarted in 2017.  Metformin used to cause some epigastric abdominal pain.    Since December 2023, Anh has been experiencing consistent diarrhea (she has a bowel movement each time she needs, approximately twice daily) and abdominal pain.  Despite discontinuing metformin and Ozempic, the symptoms did not improve.  She underwent endoscopies in September this year and the biopsy revealed mild chronic inactive gastritis.    Besides the GI issues, she also endorses persistent fatigue.  She has a follow-up appointment scheduled with the dietitian to try to troubleshoot the food products which can cause the GI symptoms.  An appointment with gastroenterology as scheduled in December.  A sleep study is scheduled in November.  Since discontinuing gabapentin (there are some concern regarding?  Liver enzymes), she has been experiencing a diffuse itchiness sensation throughout the body.     Treatment with birth control pills were started in April 2024 and the patient reports having monthly menstrual periods.   Her hands and feet are always cold.      Sivan was diagnosed with prediabetes in 2014 and she was formally diagnosed with type 2 diabetes in 2018, when A1c was 6.9.   Victoza was started  in February 2024 and then she was transition to semaglutide in May 2024, as she developed an allergic reaction at the injection site from Victoza.  On 4/5/2024, hemoglobin A1c was elevated at 8.4.  Subsequently, she was started on metformin.  Hemoglobin A1c was 5.6, in July 2024.     She has been off Ozempic for 3 weeks and then she resumed taking it approximately 2 weeks ago, at 0.5 mg weekly.  2 to 3 days after injecting Ozempic, she has noticed some nausea.  Denies vomiting.  She has been off metformin since the end of August.    Diabetes complications:  Last eye exam: last eye exam - 3/2024. No DR per patient.   No history of microalbuminuria.  Most recent urine microalbumin negative in January 2024.  Recent GFR above 90.  Since being off gabapentin, she noticed numbness and tingling in her feet. She also reports experiencing an itchiness all over the body.   Most recent lipid panel from 1/30/2024: LDL cholesterol 145, HDL cholesterol 51, triglycerides 132.  On 10 mg atorvastatin daily.    She has been using the jada sensor.  On the sensor, average glucose is 158, with a glucose variability of 37%, corresponding to an estimated GMI of 7.1%.  64% of the glucose numbers are within target, 8% are above 250 and the hypoglycemic episodes are very rare.      Most recent lab results reviewed:  Glucose      70 - 99 mg/dL 74    Patient Fasting? Yes    Ins Growth Factor 1      87 - 286 ng/mL 216    Adrenal Corticotropin      <47 pg/mL 37    Cortisol Serum        ug/dL 25.6    TSH      0.30 - 4.20 uIU/mL 1.08    T4 Free      0.90 - 1.70 ng/dL 1.10        ROS:  The facial and body hair has improved  She denies experiencing acne, new stretch marks.  Episodes of lightheadedness present mainly when she wakes up in the morning   Patches of velvety and hyperpigmented skin developed on her abdomen at the site of prior Victoza injections    Prior images:   Brain MRI images from 3/29/2024: Unremarkable pituitary gland  Pelvic US  2007 Limited sonographic evaluation secondary to the patient's body habitus.  CT abdomen and pelvis 2010 - normal uterus and ovaries  An MRI of the brain done on 10/15/14 was unremarkable  Sleep study was done in 2007 or 2008   CT abd from 9/24  - unremarkable adrenal glands     Past Medical History:   Diagnosis Date     CAH (congenital adrenal hyperplasia) 2/9/2010    Followed by Dr. Brewer; next follow up 1.2011.  Metformin increased to 850 mg twice a day.      Diabetes mellitus, type 2 (H) 10/19/2020     Vitamin D deficiency 2/9/2010   Morbid obesity  Acanthosis nigricans  Hydradenitis suppurativa   Headaches   Screening for celiac disease negative in January 2024    Past Surgical History:   Procedure Laterality Date     CHOLECYSTECTOMY      She was in 8th grade      COLONOSCOPY N/A 9/16/2024    Procedure: COLONOSCOPY, WITH BIOPSY;  Surgeon: Bacilio Yancey MD;  Location:  GI     ESOPHAGOSCOPY, GASTROSCOPY, DUODENOSCOPY (EGD), COMBINED N/A 9/16/2024    Procedure: ESOPHAGOGASTRODUODENOSCOPY, WITH BIOPSY;  Surgeon: Bacilio Yancey MD;  Location:  GI     Current Medications    Current Outpatient Medications:      alcohol swab prep pads, Use to swab area of injection/anthony as directed., Disp: 100 each, Rfl: 3     atorvastatin (LIPITOR) 10 MG tablet, TAKE 1 TABLET(10 MG) BY MOUTH DAILY, Disp: 90 tablet, Rfl: 2     blood glucose (NO BRAND SPECIFIED) test strip, Use to test blood sugar three times daily or as directed. Preferred blood glucose meter OR supplies to accompany: Blood Glucose Monitor Brands: per insurance., Disp: 100 strip, Rfl: 6     blood glucose monitoring (NO BRAND SPECIFIED) meter device kit, Use to test blood sugar three times daily or as directed. Preferred blood glucose meter OR supplies to accompany: Blood Glucose Monitor Brands: per insurance., Disp: 1 kit, Rfl: 0     cholestyramine light (QUESTRAN) 4 GM packet, Take 1 packet (4 g) by mouth 2 times daily (with meals)  for 180 days, Disp: 180 packet, Rfl: 1     Continuous Glucose Sensor (FREESTYLE AYSE 3 SENSOR) MISC, CHANGE EVERY 14 DAYS, Disp: 3 each, Rfl: 1     fluticasone (FLONASE) 50 MCG/ACT nasal spray, Spray 1 spray into both nostrils daily, Disp: 16 g, Rfl: 3     galcanezumab-gnlm (EMGALITY) 120 MG/ML injection, Inject 120 mg subcutaneously every 30 days., Disp: , Rfl:      insulin pen needle (ULTICARE MICRO) 32G X 4 MM miscellaneous, Use 1 pen needles daily or as directed., Disp: 100 each, Rfl: 3     norgestimate-ethinyl estradiol (ORTHO-CYCLEN) 0.25-35 MG-MCG tablet, Take 1 tablet by mouth daily, Disp: 84 tablet, Rfl: 3     ondansetron (ZOFRAN ODT) 4 MG ODT tab, Take 1 tablet (4 mg) by mouth every 8 hours as needed for nausea, Disp: 30 tablet, Rfl: 2     rimegepant (NURTEC) 75 MG ODT tablet, Place 75 mg under the tongue every 48 hours, Disp: , Rfl:      rizatriptan (MAXALT) 10 MG tablet, TAKE ONE TABLET BY MOUTH AS DIRECTED. MAY REPEAT AFTER 2 HOURS. MAX DOSE 30MG/24 HOURS, Disp: , Rfl:      SUMAtriptan (IMITREX) 50 MG tablet, Take 2 tablets (100 mg) by mouth at onset of headache for migraine. May repeat in 2 hours. Max 4 tablets/24 hours., Disp: , Rfl:      thin (NO BRAND SPECIFIED) lancets, Use with lanceting device. To accompany: Blood Glucose Monitor Brands: per insurance., Disp: 100 each, Rfl: 6     topiramate (TOPAMAX) 25 MG tablet, TAKE TWO TABLETS BY MOUTH EVERY NIGHT AT BEDTIME X 7 DAYS, THEN ONE IN THE AM AND TWO EVERY NIGHT AT BEDTIME X 7 DYAS, THEN TWO TWICE DAILY, Disp: , Rfl:      UBRELVY 100 MG tablet, Take 100 mg by mouth at onset of headache., Disp: , Rfl:      famotidine (PEPCID) 20 MG tablet, Take 1 tablet (20 mg) by mouth 2 times daily. (Patient not taking: Reported on 10/8/2024), Disp: 180 tablet, Rfl: 0     semaglutide (OZEMPIC) 2 MG/3ML pen, Inject 0.5 mg Subcutaneous every 7 days (Patient taking differently: Inject 0.25 mg subcutaneously every 7 days.), Disp: 9 mL, Rfl: 3    Family History    Problem Relation Age of Onset     Neurological Sister 16     migraines   Maternal uncle - colon cancer.  Both maternal grandparents and her mother have hypertension. There is a family history of obesity and hirsutism (both her sisters). There is a family history of obesity. Her mother and 2 great aunts have type 2 diabetes. Her mother is  American.      Social History  She denies smoking, drinking alcohol or using illicit drugs. Occupation: Used to work as a  but not recently given the medical issues.  She enjoys participating with her dog in dog shows.                Vital Signs     Previous Weights:    Wt Readings from Last 10 Encounters:   10/08/24 117.3 kg (258 lb 11.2 oz)   10/06/24 117.4 kg (258 lb 14.4 oz)   09/10/24 116.8 kg (257 lb 6.4 oz)   09/07/24 115.7 kg (255 lb)   08/21/24 121.3 kg (267 lb 8 oz)   07/30/24 123.1 kg (271 lb 4.8 oz)   07/11/24 124.7 kg (275 lb)   07/05/24 124.7 kg (275 lb)   06/29/24 126.7 kg (279 lb 6.4 oz)   05/25/24 130.4 kg (287 lb 8 oz)        BP Readings from Last 6 Encounters:   10/08/24 115/78   10/06/24 118/78   09/16/24 108/64   09/10/24 110/74   09/07/24 116/75   08/21/24 137/85     Vital signs:   /78 (BP Location: Right arm, Patient Position: Sitting, Cuff Size: Adult Large)   Pulse 91   Wt 117.3 kg (258 lb 11.2 oz)   LMP 07/30/2024 (Approximate)   SpO2 97%   BMI 50.13 kg/m      Physical Exam  General Appearance: General Obesity, round face, no plethora    Eyes:  conjutivae and extra-ocular motions are normal.                                    pupils round and reactive to light, no lid lag, no stare   Visual fields intact to confrontation    HEENT:   short neck, oropharynx clear and moist, no JVD, no bruits      unable to palpate the thyroid, no palpable nodules; mild overbite noticed with some spreading of the teeth  Mild prognathism   Cardiovascular:  regular rhythm, no murmurs, distal pulse palpable, no edema  Respiratory:        chest  clear, no rales, no rhonchi   Gastrointestinal:  abdomen soft, obese, non-tender, non-distended, normal bowel sounds  Musculoskeletal:  normal tone and strength  Psychological:          affect and judgment normal  Skin:  acanthosis nigricans - mainly at the flexural areas; severe facial hirsutism; no acne, benign striae   Mild small papular lesions on the back; hyperpigmentation of the skin  Neurological: no resting tremor.     Lab Results  I reviewed prior lab results documented in Epic.  Lab Results   Component Value Date    A1C 5.6 07/30/2024    A1C 8.0 (H) 04/30/2024    A1C 8.4 (H) 04/05/2024    A1C 8.2 (H) 03/29/2024    A1C 8.8 (H) 01/30/2024    A1C 7.6 (H) 01/18/2019    A1C 6.9 (H) 01/15/2018    A1C 6.4 (H) 03/15/2017    A1C 6.1 (H) 09/17/2014    A1C 5.7 07/09/2010     40 minutes spent on the date of the encounter doing chart review, history and exam, documentation and further activities as noted above.  The longitudinal plan of care for the diagnosis(es)/condition(s) as documented were addressed during this visit. Due to the added complexity in care, I will continue to support Anh in the subsequent management and with ongoing continuity of care.                       Again, thank you for allowing me to participate in the care of your patient.        Sincerely,        Alejandra Cronin MD

## 2024-10-08 NOTE — PROGRESS NOTES
Assessment     1. Nonclassical congenital adrenal hyperplasia, associated with primary amenorrhea, treated with dexamethasone until 2014, when dexamethasone was discontinued. She used to be medicated with metformin, spironolactone and birth control pills.  They were discontinued around 2019.  The patient reestablish care in April 2023, when she was started on treatment with birth control pills and metformin as the evaluation for nonclassical CAH revealed normal gonadotropins and normal androgens.  Since being on treatment with birth control pills, she has regular menstrual periods  Recommendations:  Continue treatment with birth control pills    2.  Class III obesity, associated with severe insulin resistance.   Recommended to resume taking semaglutide.  She can continue to take it at 0.25 mg weekly for 2 or 3 weeks and then increase the dose to 0.5 mg weekly.    3. Type 2 diabetes, suboptimally controlled, with no known microvascular disease  Plan:  Semaglutide as above  Resume taking metformin.  Advised the patient to gradually increase the dose of metformin until she reaches her prior dose 2 g extended release daily.    4.  Fatigue  Prior evaluation was negative for adrenal insufficiency.    5.  Diffuse itchiness  Recommended to schedule an appointment with dermatology.    Orders Placed This Encounter   Procedures    Comprehensive metabolic panel    Lipid panel reflex to direct LDL Fasting    Hematocrit    Albumin Random Urine Quantitative with Creat Ratio    Hemoglobin A1c    Adult Dermatology  Referral     =========================================================================================    The patient is seen for evaluation of nonclassical congenital adrenal hyperplasia.  Her last visit in our clinic was in August 2024.    Anh Flores is a 29 year old female, previously seen by Dr. 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   Around age 6, while being evaluated for precocious puberty and darkening of the skin around her neck, she was diagnosed with late onset CAH and treated with 0.25 mg dexamethasone daily for a short period of time. Treatment with birth control pills did not induce a withdrawal bleeding. She 1st noticed the appearance of facial hair around age 13. Over the years, the patient had sporadic medical care.   Treatment with metformin, spironolactone and birth control pills was restarted in 2017.  Metformin used to cause some epigastric abdominal pain.    Since December 2023, Anh has been experiencing consistent diarrhea (she has a bowel movement each time she needs, approximately twice daily) and abdominal pain.  Despite discontinuing metformin and Ozempic, the symptoms did not improve.  She underwent endoscopies in September this year and the biopsy revealed mild chronic inactive gastritis.    Besides the GI issues, she also endorses persistent fatigue.  She has a follow-up appointment scheduled with the dietitian to try to troubleshoot the food products which can cause the GI symptoms.  An appointment with gastroenterology as scheduled in December.  A sleep study is scheduled in November.  Since discontinuing gabapentin (there are some concern regarding?  Liver enzymes), she has been experiencing a diffuse itchiness sensation throughout the body.     Treatment with birth control pills were started in April 2024 and the patient reports having monthly menstrual periods.   Her hands and feet are always cold.      Sivan was diagnosed with prediabetes in 2014 and she was formally diagnosed with type 2 diabetes in 2018, when A1c was 6.9.   Victoza was started in February 2024 and then she was transition to semaglutide in May 2024, as she developed an allergic reaction at the injection site from Victoza.  On 4/5/2024, hemoglobin A1c was elevated at 8.4.  Subsequently, she was started on metformin.   Hemoglobin A1c was 5.6, in July 2024.     She has been off Ozempic for 3 weeks and then she resumed taking it approximately 2 weeks ago, at 0.5 mg weekly.  2 to 3 days after injecting Ozempic, she has noticed some nausea.  Denies vomiting.  She has been off metformin since the end of August.    Diabetes complications:  Last eye exam: last eye exam - 3/2024. No DR per patient.   No history of microalbuminuria.  Most recent urine microalbumin negative in January 2024.  Recent GFR above 90.  Since being off gabapentin, she noticed numbness and tingling in her feet. She also reports experiencing an itchiness all over the body.   Most recent lipid panel from 1/30/2024: LDL cholesterol 145, HDL cholesterol 51, triglycerides 132.  On 10 mg atorvastatin daily.    She has been using the jada sensor.  On the sensor, average glucose is 158, with a glucose variability of 37%, corresponding to an estimated GMI of 7.1%.  64% of the glucose numbers are within target, 8% are above 250 and the hypoglycemic episodes are very rare.      Most recent lab results reviewed:  Glucose      70 - 99 mg/dL 74    Patient Fasting? Yes    Ins Growth Factor 1      87 - 286 ng/mL 216    Adrenal Corticotropin      <47 pg/mL 37    Cortisol Serum        ug/dL 25.6    TSH      0.30 - 4.20 uIU/mL 1.08    T4 Free      0.90 - 1.70 ng/dL 1.10        ROS:  The facial and body hair has improved  She denies experiencing acne, new stretch marks.  Episodes of lightheadedness present mainly when she wakes up in the morning   Patches of velvety and hyperpigmented skin developed on her abdomen at the site of prior Victoza injections    Prior images:   Brain MRI images from 3/29/2024: Unremarkable pituitary gland  Pelvic US 2007 Limited sonographic evaluation secondary to the patient's body habitus.  CT abdomen and pelvis 2010 - normal uterus and ovaries  An MRI of the brain done on 10/15/14 was unremarkable  Sleep study was done in 2007 or 2008   CT abd from 9/24   - unremarkable adrenal glands     Past Medical History:   Diagnosis Date    CAH (congenital adrenal hyperplasia) 2/9/2010    Followed by Dr. Brewer; next follow up 1.2011.  Metformin increased to 850 mg twice a day.     Diabetes mellitus, type 2 (H) 10/19/2020    Vitamin D deficiency 2/9/2010   Morbid obesity  Acanthosis nigricans  Hydradenitis suppurativa   Headaches   Screening for celiac disease negative in January 2024    Past Surgical History:   Procedure Laterality Date    CHOLECYSTECTOMY      She was in 8th grade     COLONOSCOPY N/A 9/16/2024    Procedure: COLONOSCOPY, WITH BIOPSY;  Surgeon: Bacilio Yancey MD;  Location:  GI    ESOPHAGOSCOPY, GASTROSCOPY, DUODENOSCOPY (EGD), COMBINED N/A 9/16/2024    Procedure: ESOPHAGOGASTRODUODENOSCOPY, WITH BIOPSY;  Surgeon: Bacilio Yancey MD;  Location:  GI     Current Medications    Current Outpatient Medications:     alcohol swab prep pads, Use to swab area of injection/anthony as directed., Disp: 100 each, Rfl: 3    atorvastatin (LIPITOR) 10 MG tablet, TAKE 1 TABLET(10 MG) BY MOUTH DAILY, Disp: 90 tablet, Rfl: 2    blood glucose (NO BRAND SPECIFIED) test strip, Use to test blood sugar three times daily or as directed. Preferred blood glucose meter OR supplies to accompany: Blood Glucose Monitor Brands: per insurance., Disp: 100 strip, Rfl: 6    blood glucose monitoring (NO BRAND SPECIFIED) meter device kit, Use to test blood sugar three times daily or as directed. Preferred blood glucose meter OR supplies to accompany: Blood Glucose Monitor Brands: per insurance., Disp: 1 kit, Rfl: 0    cholestyramine light (QUESTRAN) 4 GM packet, Take 1 packet (4 g) by mouth 2 times daily (with meals) for 180 days, Disp: 180 packet, Rfl: 1    Continuous Glucose Sensor (FREESTYLE AYSE 3 SENSOR) MISC, CHANGE EVERY 14 DAYS, Disp: 3 each, Rfl: 1    fluticasone (FLONASE) 50 MCG/ACT nasal spray, Spray 1 spray into both nostrils daily, Disp: 16 g, Rfl: 3     galcanezumab-gnlm (EMGALITY) 120 MG/ML injection, Inject 120 mg subcutaneously every 30 days., Disp: , Rfl:     insulin pen needle (ULTICARE MICRO) 32G X 4 MM miscellaneous, Use 1 pen needles daily or as directed., Disp: 100 each, Rfl: 3    norgestimate-ethinyl estradiol (ORTHO-CYCLEN) 0.25-35 MG-MCG tablet, Take 1 tablet by mouth daily, Disp: 84 tablet, Rfl: 3    ondansetron (ZOFRAN ODT) 4 MG ODT tab, Take 1 tablet (4 mg) by mouth every 8 hours as needed for nausea, Disp: 30 tablet, Rfl: 2    rimegepant (NURTEC) 75 MG ODT tablet, Place 75 mg under the tongue every 48 hours, Disp: , Rfl:     rizatriptan (MAXALT) 10 MG tablet, TAKE ONE TABLET BY MOUTH AS DIRECTED. MAY REPEAT AFTER 2 HOURS. MAX DOSE 30MG/24 HOURS, Disp: , Rfl:     SUMAtriptan (IMITREX) 50 MG tablet, Take 2 tablets (100 mg) by mouth at onset of headache for migraine. May repeat in 2 hours. Max 4 tablets/24 hours., Disp: , Rfl:     thin (NO BRAND SPECIFIED) lancets, Use with lanceting device. To accompany: Blood Glucose Monitor Brands: per insurance., Disp: 100 each, Rfl: 6    topiramate (TOPAMAX) 25 MG tablet, TAKE TWO TABLETS BY MOUTH EVERY NIGHT AT BEDTIME X 7 DAYS, THEN ONE IN THE AM AND TWO EVERY NIGHT AT BEDTIME X 7 DYAS, THEN TWO TWICE DAILY, Disp: , Rfl:     UBRELVY 100 MG tablet, Take 100 mg by mouth at onset of headache., Disp: , Rfl:     famotidine (PEPCID) 20 MG tablet, Take 1 tablet (20 mg) by mouth 2 times daily. (Patient not taking: Reported on 10/8/2024), Disp: 180 tablet, Rfl: 0    semaglutide (OZEMPIC) 2 MG/3ML pen, Inject 0.5 mg Subcutaneous every 7 days (Patient taking differently: Inject 0.25 mg subcutaneously every 7 days.), Disp: 9 mL, Rfl: 3    Family History   Problem Relation Age of Onset    Neurological Sister 16     migraines   Maternal uncle - colon cancer.  Both maternal grandparents and her mother have hypertension. There is a family history of obesity and hirsutism (both her sisters). There is a family history of obesity.  Her mother and 2 great aunts have type 2 diabetes. Her mother is  American.      Social History  She denies smoking, drinking alcohol or using illicit drugs. Occupation: Used to work as a  but not recently given the medical issues.  She enjoys participating with her dog in dog shows.                Vital Signs     Previous Weights:    Wt Readings from Last 10 Encounters:   10/08/24 117.3 kg (258 lb 11.2 oz)   10/06/24 117.4 kg (258 lb 14.4 oz)   09/10/24 116.8 kg (257 lb 6.4 oz)   09/07/24 115.7 kg (255 lb)   08/21/24 121.3 kg (267 lb 8 oz)   07/30/24 123.1 kg (271 lb 4.8 oz)   07/11/24 124.7 kg (275 lb)   07/05/24 124.7 kg (275 lb)   06/29/24 126.7 kg (279 lb 6.4 oz)   05/25/24 130.4 kg (287 lb 8 oz)        BP Readings from Last 6 Encounters:   10/08/24 115/78   10/06/24 118/78   09/16/24 108/64   09/10/24 110/74   09/07/24 116/75   08/21/24 137/85     Vital signs:   /78 (BP Location: Right arm, Patient Position: Sitting, Cuff Size: Adult Large)   Pulse 91   Wt 117.3 kg (258 lb 11.2 oz)   LMP 07/30/2024 (Approximate)   SpO2 97%   BMI 50.13 kg/m      Physical Exam  General Appearance: General Obesity, round face, no plethora    Eyes:  conjutivae and extra-ocular motions are normal.                                    pupils round and reactive to light, no lid lag, no stare   Visual fields intact to confrontation    HEENT:   short neck, oropharynx clear and moist, no JVD, no bruits      unable to palpate the thyroid, no palpable nodules; mild overbite noticed with some spreading of the teeth  Mild prognathism   Cardiovascular:  regular rhythm, no murmurs, distal pulse palpable, no edema  Respiratory:        chest clear, no rales, no rhonchi   Gastrointestinal:  abdomen soft, obese, non-tender, non-distended, normal bowel sounds  Musculoskeletal:  normal tone and strength  Psychological:          affect and judgment normal  Skin:  acanthosis nigricans - mainly at the flexural areas; severe  facial hirsutism; no acne, benign striae   Mild small papular lesions on the back; hyperpigmentation of the skin  Neurological: no resting tremor.     Lab Results  I reviewed prior lab results documented in Epic.  Lab Results   Component Value Date    A1C 5.6 07/30/2024    A1C 8.0 (H) 04/30/2024    A1C 8.4 (H) 04/05/2024    A1C 8.2 (H) 03/29/2024    A1C 8.8 (H) 01/30/2024    A1C 7.6 (H) 01/18/2019    A1C 6.9 (H) 01/15/2018    A1C 6.4 (H) 03/15/2017    A1C 6.1 (H) 09/17/2014    A1C 5.7 07/09/2010     40 minutes spent on the date of the encounter doing chart review, history and exam, documentation and further activities as noted above.  The longitudinal plan of care for the diagnosis(es)/condition(s) as documented were addressed during this visit. Due to the added complexity in care, I will continue to support Anh in the subsequent management and with ongoing continuity of care.

## 2024-10-14 ENCOUNTER — OFFICE VISIT (OUTPATIENT)
Dept: GASTROENTEROLOGY | Facility: CLINIC | Age: 29
End: 2024-10-14
Attending: FAMILY MEDICINE
Payer: COMMERCIAL

## 2024-10-14 DIAGNOSIS — R10.9 CHRONIC ABDOMINAL PAIN: ICD-10-CM

## 2024-10-14 DIAGNOSIS — K52.9 CHRONIC DIARRHEA: ICD-10-CM

## 2024-10-14 DIAGNOSIS — E73.9 LACTOSE INTOLERANCE: ICD-10-CM

## 2024-10-14 DIAGNOSIS — E11.9 DIABETES MELLITUS, TYPE 2 (H): ICD-10-CM

## 2024-10-14 DIAGNOSIS — G89.29 CHRONIC ABDOMINAL PAIN: ICD-10-CM

## 2024-10-14 PROCEDURE — 97802 MEDICAL NUTRITION INDIV IN: CPT | Performed by: DIETITIAN, REGISTERED

## 2024-10-14 PROCEDURE — 99207 PR NO CHARGE LOS: CPT | Performed by: DIETITIAN, REGISTERED

## 2024-10-14 NOTE — PATIENT INSTRUCTIONS
It was nice meeting you today. Below are the nutrition recommendations we discussed at your visit.    Please let me know if you have any additional questions.    Nutrition Recommendations    Aim for eating multiple smaller meals per day such as 4-6 smaller meals per day.    Okay to replace some meals with some nutrition drinks such as Ripple protein drinks, Owyn  protein drinks,Orgain Vegan drinks, Evolve protein drinks.    Can use the plate method for general guidance on getting balanced meals.    If you're emptying study shows delayed gastric emptying, follow the gastroparesis diet.    Increase fluids to drink at least 48 oz-64 oz or more per day.    Follow up in 2 months after you see the Dr. Overton.    If you would like to schedule a follow up appointment with Jessie Coulter, Registered Dietitian, please call [phone].    Thank you,    Jessie Coulter, MS, RD, LD

## 2024-10-14 NOTE — LETTER
10/14/2024      Anh Flores  [address] [place] [id number]      Dear Colleague,    Thank you for referring your patient, Anh Flores, to the [facility] [place]. Please see a copy of my visit note below.    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  She stated that she had her gallbladder removed when she was 11 years old and since then she has always had issues with food and diarrhea.  She currently takes cholestyramine as needed.  She stated that she cannot tolerate to take it consistently twice per day as it bothers her teeth as they are sensitive.  She will take it twice a day when she has worsening flares of symptoms.  She stated that starting last December she was diagnosed with Cryptosporidium after attending a dog show around Thanksgiving time.  Since having the infection she began to consistently not feel well and has been eating less.  She complains of having upper left-sided pain that radiates to her back and also has pain at the side of her bellybutton.  She also reported that her stomach feels heavy with eating some heavier foods.  She has issues with oil and fats and can have diarrhea within 10 to 15 minutes of having something oily.  She also stated that with taking Ozempic foods do not taste as good either and has early satiety.  She also complains of having migraines and some brain fog and short-term memory issues.  She reported losing 75 pounds since December 2023, and stated she was  "diagnosed with type 2 diabetes in January 2024.  Her weight last year was about 325 to 330 pounds and currently 258 pounds.  She she is currently taking Ozempic.  She had stopped for few weeks but it did not change any of her symptoms, so she was restarted on Ozempic.  She did feel that she could be a little more off of Ozempic but it did not change her symptoms significantly.  She will also meet with Dr. Overton in hepatology in a couple of months. She stated that she was told that she may have fatty liver or cirrhosis so she is waiting to speak with Dr. Hernández to discuss.    She will have a gastric emptying study later this week on 10/17/2024.  Following visit writer messaged her primary care doctor who also copied her endocrinologist to find out how long she needs to be off this medication before her emptying study.  Her MD recommended at least 1 week, ideally 2 weeks but okay for 1 week off medication before her study.  Sent message to patient in [place] along with her nutrition instructions discussed today and encouraged her to reach out to her doctors if she had other questions.     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 She reported she 325-330 lbs last fall before getting sick in Dec. On Ozempic now but had weight loss before even starting this med which was " "started for her DX DM  Wt Readings from Last 10 Encounters:   10/08/24 117.3 kg (258 lb 11.2 oz)   10/06/24 117.4 kg (258 lb 14.4 oz)   09/10/24 116.8 kg (257 lb 6.4 oz)   09/07/24 115.7 kg (255 lb)   08/21/24 121.3 kg (267 lb 8 oz)   07/30/24 123.1 kg (271 lb 4.8 oz)   07/11/24 124.7 kg (275 lb)   07/05/24 124.7 kg (275 lb)   06/29/24 126.7 kg (279 lb 6.4 oz)   05/25/24 130.4 kg (287 lb 8 oz)     Wt Readings from Last 35 Encounters:   10/08/24 117.3 kg (258 lb 11.2 oz)   10/06/24 117.4 kg (258 lb 14.4 oz)   09/10/24 116.8 kg (257 lb 6.4 oz)   09/07/24 115.7 kg (255 lb)   08/21/24 121.3 kg (267 lb 8 oz)   07/30/24 123.1 kg (271 lb 4.8 oz)   07/11/24 124.7 kg (275 lb)   07/05/24 124.7 kg (275 lb)   06/29/24 126.7 kg (279 lb 6.4 oz)   05/25/24 130.4 kg (287 lb 8 oz)   04/30/24 137 kg (302 lb)   03/29/24 135.6 kg (299 lb)   03/11/24 136.2 kg (300 lb 3.2 oz)   01/30/24 136.5 kg (300 lb 14.4 oz)   12/08/23 132 kg (290 lb 14.4 oz)   10/19/20 139.4 kg (307 lb 6.4 oz)   01/23/19 146.2 kg (322 lb 4.8 oz)   01/21/19 147.9 kg (326 lb)   01/18/19 148 kg (326 lb 4.8 oz)   01/10/18 (!) 148.4 kg (327 lb 2.6 oz)   02/21/17 (!) 146.4 kg (322 lb 12.1 oz)   11/03/14 (!) 143.8 kg (317 lb)   10/17/14 (!) 142 kg (313 lb)   10/01/14 (!) 142 kg (313 lb)   09/27/14 (!) 142.9 kg (315 lb)   09/23/14 (!) 144.3 kg (318 lb 3.2 oz)   09/17/14 (!) 142 kg (313 lb)   09/11/14 (!) 142 kg (313 lb)   02/21/11 127.4 kg (280 lb 13.9 oz) (>99%, Z= 2.63)*   04/11/11 131.7 kg (290 lb 6 oz) (>99%, Z= 2.68)*   12/10/10 122.5 kg (270 lb) (>99%, Z= 2.57)*     * Growth percentiles are based on [facility] (Girls, 2-20 Years) data.     BMI: Estimated body mass index is 50.13 kg/m  as calculated from the following:    Height as of 9/10/24: 1.53 m (5' 0.24\").    Weight as of 10/8/24: 117.3 kg (258 lb 11.2 oz).    Diet Recall:  (some usual/recent meals/snacks/beverages): cooks mostly meals at home. The ozempic changed the way food tasted. Can't tolerate some oils within " 10-15 minues diahrrea and sick for days after that , can't eat dairy or eggs. If eats eggs immediately gets diarrhea. Stomach gets heavy feeling. Takes cholestyramine prn but if having bouts of the diarrhea then takes twice per day. Typically doesn't eat beef and pork but did have some in her spaghetti sauce last night. Having mirgraines so stated some short term memory issues/brain fogs.  Meal Food    Breakfast Skips (doesn't tolerate B type foods)., might have ~1x or less per week: banana/jose martin   Lunch Skips usually as well or may have 4-5 crackers/cheez its or about 4-5 PM: may have some broth based soup, 1/4 sl toast (but it tastes bad on the ozempic)   Dinner Spaghetti with tomato and ital sausage and grd beef sauce or salmon (if can stomach it) with side broccoli/salad, sometimes spoonful rice/potato or chicken, veg/salad, small amt rice/potato or occas pizza   Snacks None or rarely 1 sukhi cracker   Beverages 5 oz Body armour, 1 cup diet green tea, 1/2 liter or less water   Alcohol Intake none     Frequency of eating/taking out meals: ~1x/week on a weekend     Labs:    Last Comprehensive Metabolic Panel:  Sodium   Date Value Ref Range Status   09/07/2024 141 135 - 145 mmol/L Final   01/18/2019 136 133 - 144 mmol/L Final     Potassium   Date Value Ref Range Status   09/07/2024 4.0 3.4 - 5.3 mmol/L Final   01/18/2019 4.2 3.4 - 5.3 mmol/L Final     Chloride   Date Value Ref Range Status   09/07/2024 110 (H) 98 - 107 mmol/L Final   01/18/2019 103 94 - 109 mmol/L Final     Carbon Dioxide   Date Value Ref Range Status   01/18/2019 27 20 - 32 mmol/L Final     Carbon Dioxide (CO2)   Date Value Ref Range Status   09/07/2024 19 (L) 22 - 29 mmol/L Final     Anion Gap   Date Value Ref Range Status   09/07/2024 12 7 - 15 mmol/L Final   01/18/2019 6 3 - 14 mmol/L Final     Glucose   Date Value Ref Range Status   01/18/2019 99 70 - 99 mg/dL Final     Comment:     Non Fasting     GLUCOSE BY METER POCT   Date Value Ref  Range Status   09/16/2024 91 70 - 99 mg/dL Final     Urea Nitrogen   Date Value Ref Range Status   09/07/2024 10.8 6.0 - 20.0 mg/dL Final   01/18/2019 10 7 - 30 mg/dL Final     Creatinine   Date Value Ref Range Status   09/07/2024 0.77 0.51 - 0.95 mg/dL Final   01/18/2019 0.65 0.52 - 1.04 mg/dL Final     GFR Estimate   Date Value Ref Range Status   09/07/2024 >90 >60 mL/min/1.73m2 Final     Comment:     eGFR calculated using 2021 CKD-EPI equation.   01/18/2019 >90 >60 mL/min/[1.73_m2] Final     Comment:     Non  GFR Calc  Starting 12/18/2018, serum creatinine based estimated GFR (eGFR) will be   calculated using the Chronic Kidney Disease Epidemiology Collaboration   (CKD-EPI) equation.       Calcium   Date Value Ref Range Status   09/07/2024 9.4 8.8 - 10.4 mg/dL Final     Comment:     Reference intervals for this test were updated on 7/16/2024 to reflect our healthy population more accurately. There may be differences in the flagging of prior results with similar values performed with this method. Those prior results can be interpreted in the context of the updated reference intervals.   01/18/2019 9.8 8.5 - 10.1 mg/dL Final     CBC RESULTS:   Recent Labs   Lab Test 09/07/24  1753   WBC 7.0   RBC 4.46   HGB 13.9   HCT 40.9   MCV 92   MCH 31.2   MCHC 34.0   RDW 13.8        Pertinent Medications/vitamin and mineral supplements:      Current Outpatient Medications   Medication Sig Dispense Refill     alcohol swab prep pads Use to swab area of injection/anthony as directed. 100 each 3     atorvastatin (LIPITOR) 10 MG tablet TAKE 1 TABLET(10 MG) BY MOUTH DAILY 90 tablet 2     blood glucose (NO BRAND SPECIFIED) test strip Use to test blood sugar three times daily or as directed. Preferred blood glucose meter OR supplies to accompany: Blood Glucose Monitor Brands: per insurance. 100 strip 6     blood glucose monitoring (NO BRAND SPECIFIED) meter device kit Use to test blood sugar three times daily or  as directed. Preferred blood glucose meter OR supplies to accompany: Blood Glucose Monitor Brands: per insurance. 1 kit 0     cholestyramine light (QUESTRAN) 4 GM packet Take 1 packet (4 g) by mouth 2 times daily (with meals) for 180 days 180 packet 1     Continuous Glucose Sensor (FREESTYLE AYSE 3 SENSOR) MISC 1 each by Other route every 14 days. 6 each 3     famotidine (PEPCID) 20 MG tablet Take 1 tablet (20 mg) by mouth 2 times daily. (Patient not taking: Reported on 10/8/2024) 180 tablet 0     fluticasone (FLONASE) 50 MCG/ACT nasal spray Spray 1 spray into both nostrils daily 16 g 3     galcanezumab-gnlm (EMGALITY) 120 MG/ML injection Inject 120 mg subcutaneously every 30 days.       insulin pen needle (ULTICARE MICRO) 32G X 4 MM miscellaneous Use 1 pen needles daily or as directed. 100 each 3     metFORMIN (GLUCOPHAGE XR) 500 MG 24 hr tablet Take 4 tablets (2,000 mg) by mouth daily (with dinner). 360 tablet 3     norgestimate-ethinyl estradiol (ORTHO-CYCLEN) 0.25-35 MG-MCG tablet Take 1 tablet by mouth daily 84 tablet 3     ondansetron (ZOFRAN ODT) 4 MG ODT tab Take 1 tablet (4 mg) by mouth every 8 hours as needed for nausea 30 tablet 2     rimegepant (NURTEC) 75 MG ODT tablet Place 75 mg under the tongue every 48 hours       rizatriptan (MAXALT) 10 MG tablet TAKE ONE TABLET BY MOUTH AS DIRECTED. MAY REPEAT AFTER 2 HOURS. MAX DOSE 30MG/24 HOURS       semaglutide (OZEMPIC) 2 MG/3ML pen Inject 0.5 mg subcutaneously every 7 days. 9 mL 3     SUMAtriptan (IMITREX) 50 MG tablet Take 2 tablets (100 mg) by mouth at onset of headache for migraine. May repeat in 2 hours. Max 4 tablets/24 hours.       thin (NO BRAND SPECIFIED) lancets Use with lanceting device. To accompany: Blood Glucose Monitor Brands: per insurance. 100 each 6     topiramate (TOPAMAX) 25 MG tablet TAKE TWO TABLETS BY MOUTH EVERY NIGHT AT BEDTIME X 7 DAYS, THEN ONE IN THE AM AND TWO EVERY NIGHT AT BEDTIME X 7 DYAS, THEN TWO TWICE DAILY       UBRELVY  100 MG tablet Take 100 mg by mouth at onset of headache.       No current facility-administered medications for this visit.     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 Discussed depending on her results of her emptying study, if delayed emptying then gastroparesis diet recommended. At this time recommended eating multiple smaller meals, drinking some nutrition drinks and plate method for general guidance on meal as well as drinking adequate fluids.    Answered patient's questions. Patient verbalized understanding of education provided. See Goals below.     Educational Materials Provided:  Create a plate, protein sources, CHOs, gastroparesis nutr therapy.    Goals:    Nutrition Recommendations    Aim for eating multiple smaller meals per day such as 4-6 smaller meals per day.    Okay to replace some meals with some nutrition drinks such as Ripple protein drinks, Owyn  protein drinks,Orgain Vegan drinks, Evolve protein drinks.    Can use the plate method for general guidance on getting balanced meals.    If you're emptying study shows delayed gastric emptying, follow the gastroparesis diet.    Increase fluids to drink at least 48 oz-64 oz or more per day.    Follow up in 2 months after you see the Dr. Overton.    If you would like to schedule a follow up appointment with Jessie Coulter, Registered Dietitian, please call [phone].    Nutrition Monitoring and  Evaluation: Will monitor adherence to nutrition recommendations at future RD visits.     Further Medical Nutrition Therapy:  Follow-up in 2 months, after meeting with Dr. Hernández.    Patient was encouraged to contact RD with any further questions.    Jessie Coulter, MS, RD, LD        Again, thank you for allowing me to participate in the care of your patient.        Sincerely,        Jessie Coulter RD

## 2024-10-14 NOTE — PROGRESS NOTES
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  She stated that she had her gallbladder removed when she was 11 years old and since then she has always had issues with food and diarrhea.  She currently takes cholestyramine as needed.  She stated that she cannot tolerate to take it consistently twice per day as it bothers her teeth as they are sensitive.  She will take it twice a day when she has worsening flares of symptoms.  She stated that starting last December she was diagnosed with Cryptosporidium after attending a dog show around [facility].  Since having the infection she began to consistently not feel well and has been eating less.  She complains of having upper left-sided pain that radiates to her back and also has pain at the side of her bellybutton.  She also reported that her stomach feels heavy with eating some heavier foods.  She has issues with oil and fats and can have diarrhea within 10 to 15 minutes of having something oily.  She also stated that with taking Ozempic foods do not taste as good either and has early satiety.  She also complains of having migraines and some brain fog and short-term memory issues.  She reported losing 75 pounds since December 2023, and stated she was diagnosed with type 2 diabetes in January 2024.  Her weight last year was about 325 to 330 pounds and currently 258 pounds.  She she is currently taking Ozempic.  She had stopped for few weeks but it did not change any of her symptoms, so she was restarted on  "Ozempic.  She did feel that she could be a little more off of Ozempic but it did not change her symptoms significantly.  She will also meet with Dr. Overton in hepatology in a couple of months. She stated that she was told that she may have fatty liver or cirrhosis so she is waiting to speak with Dr. Hernández to discuss.    She will have a gastric emptying study later this week on 10/17/2024.  Following visit writer messaged her primary care doctor who also copied her endocrinologist to find out how long she needs to be off this medication before her emptying study.  Her MD recommended at least 1 week, ideally 2 weeks but okay for 1 week off medication before her study.  Sent message to patient in [employer] along with her nutrition instructions discussed today and encouraged her to reach out to her doctors if she had other questions.     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 She reported she 325-330 lbs last fall before getting sick in Dec. On Ozempic now but had weight loss before even starting this med which was started for her DX DM  Wt Readings from Last 10 Encounters:   10/08/24 117.3 kg (258 lb 11.2 oz)   10/06/24 117.4 kg (258 lb 14.4 oz)   09/10/24 116.8 kg (257 lb 6.4 oz)   09/07/24 115.7 kg (255 lb)   08/21/24 121.3 kg (267 lb 8 oz)   07/30/24 123.1 kg (271 lb 4.8 oz) " "  07/11/24 124.7 kg (275 lb)   07/05/24 124.7 kg (275 lb)   06/29/24 126.7 kg (279 lb 6.4 oz)   05/25/24 130.4 kg (287 lb 8 oz)     Wt Readings from Last 35 Encounters:   10/08/24 117.3 kg (258 lb 11.2 oz)   10/06/24 117.4 kg (258 lb 14.4 oz)   09/10/24 116.8 kg (257 lb 6.4 oz)   09/07/24 115.7 kg (255 lb)   08/21/24 121.3 kg (267 lb 8 oz)   07/30/24 123.1 kg (271 lb 4.8 oz)   07/11/24 124.7 kg (275 lb)   07/05/24 124.7 kg (275 lb)   06/29/24 126.7 kg (279 lb 6.4 oz)   05/25/24 130.4 kg (287 lb 8 oz)   04/30/24 137 kg (302 lb)   03/29/24 135.6 kg (299 lb)   03/11/24 136.2 kg (300 lb 3.2 oz)   01/30/24 136.5 kg (300 lb 14.4 oz)   12/08/23 132 kg (290 lb 14.4 oz)   10/19/20 139.4 kg (307 lb 6.4 oz)   01/23/19 146.2 kg (322 lb 4.8 oz)   01/21/19 147.9 kg (326 lb)   01/18/19 148 kg (326 lb 4.8 oz)   01/10/18 (!) 148.4 kg (327 lb 2.6 oz)   02/21/17 (!) 146.4 kg (322 lb 12.1 oz)   11/03/14 (!) 143.8 kg (317 lb)   10/17/14 (!) 142 kg (313 lb)   10/01/14 (!) 142 kg (313 lb)   09/27/14 (!) 142.9 kg (315 lb)   09/23/14 (!) 144.3 kg (318 lb 3.2 oz)   09/17/14 (!) 142 kg (313 lb)   09/11/14 (!) 142 kg (313 lb)   02/21/11 127.4 kg (280 lb 13.9 oz) (>99%, Z= 2.63)*   04/11/11 131.7 kg (290 lb 6 oz) (>99%, Z= 2.68)*   12/10/10 122.5 kg (270 lb) (>99%, Z= 2.57)*     * Growth percentiles are based on [facility] (Girls, 2-20 Years) data.     BMI: Estimated body mass index is 50.13 kg/m  as calculated from the following:    Height as of 9/10/24: 1.53 m (5' 0.24\").    Weight as of 10/8/24: 117.3 kg (258 lb 11.2 oz).    Diet Recall:  (some usual/recent meals/snacks/beverages): cooks mostly meals at home. The ozempic changed the way food tasted. Can't tolerate some oils within 10-15 minues diahrrea and sick for days after that , can't eat dairy or eggs. If eats eggs immediately gets diarrhea. Stomach gets heavy feeling. Takes cholestyramine prn but if having bouts of the diarrhea then takes twice per day. Typically doesn't eat beef and pork but " did have some in her spaghetti sauce last night. Having mirgraines so stated some short term memory issues/brain fogs.  Meal Food    Breakfast Skips (doesn't tolerate B type foods)., might have ~1x or less per week: banana/jose martin   Lunch Skips usually as well or may have 4-5 crackers/cheez its or about 4-5 PM: may have some broth based soup, 1/4 sl toast (but it tastes bad on the ozempic)   Dinner Spaghetti with tomato and ital sausage and grd beef sauce or salmon (if can stomach it) with side broccoli/salad, sometimes spoonful rice/potato or chicken, veg/salad, small amt rice/potato or occas pizza   Snacks None or rarely 1 sukhi cracker   Beverages 5 oz Body armour, 1 cup diet green tea, 1/2 liter or less water   Alcohol Intake none     Frequency of eating/taking out meals: ~1x/week on a weekend     Labs:    Last Comprehensive Metabolic Panel:  Sodium   Date Value Ref Range Status   09/07/2024 141 135 - 145 mmol/L Final   01/18/2019 136 133 - 144 mmol/L Final     Potassium   Date Value Ref Range Status   09/07/2024 4.0 3.4 - 5.3 mmol/L Final   01/18/2019 4.2 3.4 - 5.3 mmol/L Final     Chloride   Date Value Ref Range Status   09/07/2024 110 (H) 98 - 107 mmol/L Final   01/18/2019 103 94 - 109 mmol/L Final     Carbon Dioxide   Date Value Ref Range Status   01/18/2019 27 20 - 32 mmol/L Final     Carbon Dioxide (CO2)   Date Value Ref Range Status   09/07/2024 19 (L) 22 - 29 mmol/L Final     Anion Gap   Date Value Ref Range Status   09/07/2024 12 7 - 15 mmol/L Final   01/18/2019 6 3 - 14 mmol/L Final     Glucose   Date Value Ref Range Status   01/18/2019 99 70 - 99 mg/dL Final     Comment:     Non Fasting     GLUCOSE BY METER POCT   Date Value Ref Range Status   09/16/2024 91 70 - 99 mg/dL Final     Urea Nitrogen   Date Value Ref Range Status   09/07/2024 10.8 6.0 - 20.0 mg/dL Final   01/18/2019 10 7 - 30 mg/dL Final     Creatinine   Date Value Ref Range Status   09/07/2024 0.77 0.51 - 0.95 mg/dL Final   01/18/2019  0.65 0.52 - 1.04 mg/dL Final     GFR Estimate   Date Value Ref Range Status   09/07/2024 >90 >60 mL/min/1.73m2 Final     Comment:     eGFR calculated using 2021 CKD-EPI equation.   01/18/2019 >90 >60 mL/min/[1.73_m2] Final     Comment:     Non  GFR Calc  Starting 12/18/2018, serum creatinine based estimated GFR (eGFR) will be   calculated using the Chronic Kidney Disease Epidemiology Collaboration   (CKD-EPI) equation.       Calcium   Date Value Ref Range Status   09/07/2024 9.4 8.8 - 10.4 mg/dL Final     Comment:     Reference intervals for this test were updated on 7/16/2024 to reflect our healthy population more accurately. There may be differences in the flagging of prior results with similar values performed with this method. Those prior results can be interpreted in the context of the updated reference intervals.   01/18/2019 9.8 8.5 - 10.1 mg/dL Final     CBC RESULTS:   Recent Labs   Lab Test 09/07/24  1753   WBC 7.0   RBC 4.46   HGB 13.9   HCT 40.9   MCV 92   MCH 31.2   MCHC 34.0   RDW 13.8        Pertinent Medications/vitamin and mineral supplements:      Current Outpatient Medications   Medication Sig Dispense Refill    alcohol swab prep pads Use to swab area of injection/anthony as directed. 100 each 3    atorvastatin (LIPITOR) 10 MG tablet TAKE 1 TABLET(10 MG) BY MOUTH DAILY 90 tablet 2    blood glucose (NO BRAND SPECIFIED) test strip Use to test blood sugar three times daily or as directed. Preferred blood glucose meter OR supplies to accompany: Blood Glucose Monitor Brands: per insurance. 100 strip 6    blood glucose monitoring (NO BRAND SPECIFIED) meter device kit Use to test blood sugar three times daily or as directed. Preferred blood glucose meter OR supplies to accompany: Blood Glucose Monitor Brands: per insurance. 1 kit 0    cholestyramine light (QUESTRAN) 4 GM packet Take 1 packet (4 g) by mouth 2 times daily (with meals) for 180 days 180 packet 1    Continuous Glucose  Sensor (FREESTYLE AYSE 3 SENSOR) MISC 1 each by Other route every 14 days. 6 each 3    famotidine (PEPCID) 20 MG tablet Take 1 tablet (20 mg) by mouth 2 times daily. (Patient not taking: Reported on 10/8/2024) 180 tablet 0    fluticasone (FLONASE) 50 MCG/ACT nasal spray Spray 1 spray into both nostrils daily 16 g 3    galcanezumab-gnlm (EMGALITY) 120 MG/ML injection Inject 120 mg subcutaneously every 30 days.      insulin pen needle (ULTICARE MICRO) 32G X 4 MM miscellaneous Use 1 pen needles daily or as directed. 100 each 3    metFORMIN (GLUCOPHAGE XR) 500 MG 24 hr tablet Take 4 tablets (2,000 mg) by mouth daily (with dinner). 360 tablet 3    norgestimate-ethinyl estradiol (ORTHO-CYCLEN) 0.25-35 MG-MCG tablet Take 1 tablet by mouth daily 84 tablet 3    ondansetron (ZOFRAN ODT) 4 MG ODT tab Take 1 tablet (4 mg) by mouth every 8 hours as needed for nausea 30 tablet 2    rimegepant (NURTEC) 75 MG ODT tablet Place 75 mg under the tongue every 48 hours      rizatriptan (MAXALT) 10 MG tablet TAKE ONE TABLET BY MOUTH AS DIRECTED. MAY REPEAT AFTER 2 HOURS. MAX DOSE 30MG/24 HOURS      semaglutide (OZEMPIC) 2 MG/3ML pen Inject 0.5 mg subcutaneously every 7 days. 9 mL 3    SUMAtriptan (IMITREX) 50 MG tablet Take 2 tablets (100 mg) by mouth at onset of headache for migraine. May repeat in 2 hours. Max 4 tablets/24 hours.      thin (NO BRAND SPECIFIED) lancets Use with lanceting device. To accompany: Blood Glucose Monitor Brands: per insurance. 100 each 6    topiramate (TOPAMAX) 25 MG tablet TAKE TWO TABLETS BY MOUTH EVERY NIGHT AT BEDTIME X 7 DAYS, THEN ONE IN THE AM AND TWO EVERY NIGHT AT BEDTIME X 7 DYAS, THEN TWO TWICE DAILY      UBRELVY 100 MG tablet Take 100 mg by mouth at onset of headache.       No current facility-administered medications for this visit.     Food Allergies:  NKFA   Physical Activity:  dog walks, lead train dogs 15-20 minutes, 3-4 times per week    MALNUTRITION:  % Weight Loss:  20% in 1 year (moderate  malnutrition)  % Intake:  <75% for >/= 3 months (moderate malnutrition)  Subcutaneous Fat Loss:  None observed  Muscle Loss:  None observed  Fluid Retention:  None noted    Malnutrition Diagnosis: Moderate malnutrition  In Context of:  Chronic illness or disease    Nutrition Prescription: Nutrition Education     Nutrition Intervention      Provided diet education for food intolerances, diarrhea, abdominal pain, unintentional weight loss. Discussed depending on her results of her emptying study, if delayed emptying then gastroparesis diet recommended. At this time recommended eating multiple smaller meals, drinking some nutrition drinks and plate method for general guidance on meal as well as drinking adequate fluids.    Answered patient's questions. Patient verbalized understanding of education provided. See Goals below.     Educational Materials Provided:  Create a plate, protein sources, CHOs, gastroparesis nutr therapy.    Goals:    Nutrition Recommendations    Aim for eating multiple smaller meals per day such as 4-6 smaller meals per day.    Okay to replace some meals with some nutrition drinks such as Ripple protein drinks, Owyn  protein drinks,Orgain Vegan drinks, Evolve protein drinks.    Can use the plate method for general guidance on getting balanced meals.    If you're emptying study shows delayed gastric emptying, follow the gastroparesis diet.    Increase fluids to drink at least 48 oz-64 oz or more per day.    Follow up in 2 months after you see the Dr. Overton.    If you would like to schedule a follow up appointment with Jessie Coulter, Registered Dietitian, please call [phone].    Nutrition Monitoring and Evaluation: Will monitor adherence to nutrition recommendations at future RD visits.     Further Medical Nutrition Therapy:  Follow-up in 2 months, after meeting with Dr. Hernández.    Patient was encouraged to contact RD with any further questions.    Jessie Coulter, MS, RD, LD

## 2024-10-15 ENCOUNTER — TELEPHONE (OUTPATIENT)
Dept: ENDOCRINOLOGY | Facility: CLINIC | Age: 29
End: 2024-10-15
Payer: COMMERCIAL

## 2024-10-15 NOTE — TELEPHONE ENCOUNTER
The patient was calling because she have NM Gastric Emptying testing scheduled for 10/17 and need to speak with the care team about how to stop properly stop taking Ozempic, please review and follow up thank you.

## 2024-10-17 ENCOUNTER — TRANSFERRED RECORDS (OUTPATIENT)
Dept: HEALTH INFORMATION MANAGEMENT | Facility: CLINIC | Age: 29
End: 2024-10-17

## 2024-10-17 NOTE — TELEPHONE ENCOUNTER
Addressed by RD 10/14.    Marcella Wells RN, [facility]  Diabetes Education Department  [facility], [place]  Phone: [phone]  [phone]

## 2024-10-18 ENCOUNTER — OFFICE VISIT (OUTPATIENT)
Dept: FAMILY MEDICINE | Facility: CLINIC | Age: 29
End: 2024-10-18
Attending: FAMILY MEDICINE
Payer: COMMERCIAL

## 2024-10-18 VITALS
DIASTOLIC BLOOD PRESSURE: 70 MMHG | RESPIRATION RATE: 14 BRPM | HEIGHT: 60 IN | TEMPERATURE: 98.2 F | HEART RATE: 83 BPM | OXYGEN SATURATION: 97 % | BODY MASS INDEX: 51.65 KG/M2 | WEIGHT: 263.1 LBS | SYSTOLIC BLOOD PRESSURE: 115 MMHG

## 2024-10-18 DIAGNOSIS — Z00.00 ROUTINE GENERAL MEDICAL EXAMINATION AT A HEALTH CARE FACILITY: Primary | ICD-10-CM

## 2024-10-18 DIAGNOSIS — G43.809 OTHER MIGRAINE WITHOUT STATUS MIGRAINOSUS, NOT INTRACTABLE: ICD-10-CM

## 2024-10-18 DIAGNOSIS — E66.01 MORBID OBESITY WITH BODY MASS INDEX OF 60.0-69.9 IN ADULT (H): ICD-10-CM

## 2024-10-18 DIAGNOSIS — Z01.82 ENCOUNTER FOR ALLERGY TESTING: ICD-10-CM

## 2024-10-18 DIAGNOSIS — M26.609 TMJ (TEMPOROMANDIBULAR JOINT SYNDROME): ICD-10-CM

## 2024-10-18 DIAGNOSIS — E11.65 TYPE 2 DIABETES MELLITUS WITH HYPERGLYCEMIA, WITHOUT LONG-TERM CURRENT USE OF INSULIN (H): ICD-10-CM

## 2024-10-18 DIAGNOSIS — R79.89 ELEVATED FERRITIN: ICD-10-CM

## 2024-10-18 DIAGNOSIS — Z13.220 LIPID SCREENING: ICD-10-CM

## 2024-10-18 DIAGNOSIS — L50.8 CHRONIC URTICARIA: ICD-10-CM

## 2024-10-18 DIAGNOSIS — G56.03 BILATERAL CARPAL TUNNEL SYNDROME: ICD-10-CM

## 2024-10-18 DIAGNOSIS — Z12.4 CERVICAL CANCER SCREENING: ICD-10-CM

## 2024-10-18 PROBLEM — K52.9 CHRONIC DIARRHEA: Status: ACTIVE | Noted: 2024-10-18

## 2024-10-18 PROBLEM — R93.2 ABNORMAL FINDING ON IMAGING OF LIVER: Status: ACTIVE | Noted: 2024-10-18

## 2024-10-18 LAB
ALBUMIN SERPL BCG-MCNC: 4.2 G/DL (ref 3.5–5.2)
ALP SERPL-CCNC: 94 U/L (ref 40–150)
ALT SERPL W P-5'-P-CCNC: 84 U/L (ref 0–50)
ANION GAP SERPL CALCULATED.3IONS-SCNC: 12 MMOL/L (ref 7–15)
AST SERPL W P-5'-P-CCNC: 42 U/L (ref 0–45)
BASOPHILS # BLD AUTO: 0 10E3/UL (ref 0–0.2)
BASOPHILS NFR BLD AUTO: 0 %
BILIRUB SERPL-MCNC: 0.3 MG/DL
BUN SERPL-MCNC: 11.9 MG/DL (ref 6–20)
CALCIUM SERPL-MCNC: 10 MG/DL (ref 8.8–10.4)
CHLORIDE SERPL-SCNC: 107 MMOL/L (ref 98–107)
CHOLEST SERPL-MCNC: 157 MG/DL
CREAT SERPL-MCNC: 0.72 MG/DL (ref 0.51–0.95)
EGFRCR SERPLBLD CKD-EPI 2021: >90 ML/MIN/1.73M2
EOSINOPHIL # BLD AUTO: 0.3 10E3/UL (ref 0–0.7)
EOSINOPHIL NFR BLD AUTO: 4 %
ERYTHROCYTE [DISTWIDTH] IN BLOOD BY AUTOMATED COUNT: 13.9 % (ref 10–15)
FASTING STATUS PATIENT QL REPORTED: YES
FASTING STATUS PATIENT QL REPORTED: YES
FERRITIN SERPL-MCNC: 640 NG/ML (ref 6–175)
GLUCOSE SERPL-MCNC: 119 MG/DL (ref 70–99)
HCO3 SERPL-SCNC: 21 MMOL/L (ref 22–29)
HCT VFR BLD AUTO: 41.8 % (ref 35–47)
HDLC SERPL-MCNC: 60 MG/DL
HGB BLD-MCNC: 14 G/DL (ref 11.7–15.7)
IMM GRANULOCYTES # BLD: 0 10E3/UL
IMM GRANULOCYTES NFR BLD: 0 %
LDLC SERPL CALC-MCNC: 78 MG/DL
LYMPHOCYTES # BLD AUTO: 3.2 10E3/UL (ref 0.8–5.3)
LYMPHOCYTES NFR BLD AUTO: 37 %
MCH RBC QN AUTO: 31.1 PG (ref 26.5–33)
MCHC RBC AUTO-ENTMCNC: 33.5 G/DL (ref 31.5–36.5)
MCV RBC AUTO: 93 FL (ref 78–100)
MONOCYTES # BLD AUTO: 0.4 10E3/UL (ref 0–1.3)
MONOCYTES NFR BLD AUTO: 5 %
NEUTROPHILS # BLD AUTO: 4.6 10E3/UL (ref 1.6–8.3)
NEUTROPHILS NFR BLD AUTO: 54 %
NONHDLC SERPL-MCNC: 97 MG/DL
PLATELET # BLD AUTO: 294 10E3/UL (ref 150–450)
POTASSIUM SERPL-SCNC: 4.3 MMOL/L (ref 3.4–5.3)
PROT SERPL-MCNC: 8 G/DL (ref 6.4–8.3)
RBC # BLD AUTO: 4.5 10E6/UL (ref 3.8–5.2)
SODIUM SERPL-SCNC: 140 MMOL/L (ref 135–145)
TRIGL SERPL-MCNC: 95 MG/DL
WBC # BLD AUTO: 8.5 10E3/UL (ref 4–11)

## 2024-10-18 PROCEDURE — 99395 PREV VISIT EST AGE 18-39: CPT | Mod: 25 | Performed by: FAMILY MEDICINE

## 2024-10-18 PROCEDURE — 80061 LIPID PANEL: CPT | Performed by: FAMILY MEDICINE

## 2024-10-18 PROCEDURE — 82728 ASSAY OF FERRITIN: CPT | Performed by: FAMILY MEDICINE

## 2024-10-18 PROCEDURE — 96372 THER/PROPH/DIAG INJ SC/IM: CPT | Performed by: FAMILY MEDICINE

## 2024-10-18 PROCEDURE — 36415 COLL VENOUS BLD VENIPUNCTURE: CPT | Performed by: FAMILY MEDICINE

## 2024-10-18 PROCEDURE — 85025 COMPLETE CBC W/AUTO DIFF WBC: CPT | Performed by: FAMILY MEDICINE

## 2024-10-18 PROCEDURE — 80053 COMPREHEN METABOLIC PANEL: CPT | Performed by: FAMILY MEDICINE

## 2024-10-18 PROCEDURE — 90677 PCV20 VACCINE IM: CPT | Performed by: FAMILY MEDICINE

## 2024-10-18 PROCEDURE — 90656 IIV3 VACC NO PRSV 0.5 ML IM: CPT | Performed by: FAMILY MEDICINE

## 2024-10-18 PROCEDURE — 90471 IMMUNIZATION ADMIN: CPT | Performed by: FAMILY MEDICINE

## 2024-10-18 PROCEDURE — 90472 IMMUNIZATION ADMIN EACH ADD: CPT | Performed by: FAMILY MEDICINE

## 2024-10-18 RX ORDER — GABAPENTIN 300 MG/1
300 CAPSULE ORAL AT BEDTIME
Qty: 90 CAPSULE | Refills: 1 | Status: SHIPPED | OUTPATIENT
Start: 2024-10-18 | End: 2024-11-13

## 2024-10-18 RX ORDER — ATOGEPANT 60 MG/1
60 TABLET ORAL
COMMUNITY
Start: 2024-10-17

## 2024-10-18 RX ORDER — KETOROLAC TROMETHAMINE 30 MG/ML
30 INJECTION, SOLUTION INTRAMUSCULAR; INTRAVENOUS ONCE
Status: COMPLETED | OUTPATIENT
Start: 2024-10-18 | End: 2024-10-18

## 2024-10-18 RX ORDER — KETOROLAC TROMETHAMINE 30 MG/ML
1 INJECTION, SOLUTION INTRAMUSCULAR; INTRAVENOUS ONCE
Qty: 1 EACH | Refills: 0 | Status: SHIPPED | OUTPATIENT
Start: 2024-10-18 | End: 2024-10-18

## 2024-10-18 RX ORDER — HYDROCHLOROTHIAZIDE 12.5 MG/1
CAPSULE ORAL
Qty: 6 EACH | Refills: 3 | Status: SHIPPED | OUTPATIENT
Start: 2024-10-18

## 2024-10-18 RX ADMIN — KETOROLAC TROMETHAMINE 30 MG: 30 INJECTION, SOLUTION INTRAMUSCULAR; INTRAVENOUS at 10:48

## 2024-10-18 SDOH — HEALTH STABILITY: PHYSICAL HEALTH: ON AVERAGE, HOW MANY DAYS PER WEEK DO YOU ENGAGE IN MODERATE TO STRENUOUS EXERCISE (LIKE A BRISK WALK)?: 2 DAYS

## 2024-10-18 ASSESSMENT — SOCIAL DETERMINANTS OF HEALTH (SDOH): HOW OFTEN DO YOU GET TOGETHER WITH FRIENDS OR RELATIVES?: NEVER

## 2024-10-18 ASSESSMENT — PAIN SCALES - GENERAL: PAINLEVEL: WORST PAIN (10)

## 2024-10-18 NOTE — PATIENT INSTRUCTIONS
Dentist  Patient Education   Preventive Care Advice   This is general advice given by our system to help you stay healthy. However, your care team may have specific advice just for you. Please talk to your care team about your preventive care needs.  Nutrition  Eat 5 or more servings of fruits and vegetables each day.  Try wheat bread, brown rice and whole grain pasta (instead of white bread, rice, and pasta).  Get enough calcium and vitamin D. Check the label on foods and aim for 100% of the RDA (recommended daily allowance).  Lifestyle  Exercise at least 150 minutes each week  (30 minutes a day, 5 days a week).  Do muscle strengthening activities 2 days a week. These help control your weight and prevent disease.  No smoking.  Wear sunscreen to prevent skin cancer.  Have a dental exam and cleaning every 6 months.  Yearly exams  See your health care team every year to talk about:  Any changes in your health.  Any medicines your care team has prescribed.  Preventive care, family planning, and ways to prevent chronic diseases.  Shots (vaccines)   HPV shots (up to age 26), if you've never had them before.  Hepatitis B shots (up to age 59), if you've never had them before.  COVID-19 shot: Get this shot when it's due.  Flu shot: Get a flu shot every year.  Tetanus shot: Get a tetanus shot every 10 years.  Pneumococcal, hepatitis A, and RSV shots: Ask your care team if you need these based on your risk.  Shingles shot (for age 50 and up)  General health tests  Diabetes screening:  Starting at age 35, Get screened for diabetes at least every 3 years.  If you are younger than age 35, ask your care team if you should be screened for diabetes.  Cholesterol test: At age 39, start having a cholesterol test every 5 years, or more often if advised.  Bone density scan (DEXA): At age 50, ask your care team if you should have this scan for osteoporosis (brittle bones).  Hepatitis C: Get tested at least once in your life.  STIs  (sexually transmitted infections)  Before age 24: Ask your care team if you should be screened for STIs.  After age 24: Get screened for STIs if you're at risk. You are at risk for STIs (including HIV) if:  You are sexually active with more than one person.  You don't use condoms every time.  You or a partner was diagnosed with a sexually transmitted infection.  If you are at risk for HIV, ask about PrEP medicine to prevent HIV.  Get tested for HIV at least once in your life, whether you are at risk for HIV or not.  Cancer screening tests  Cervical cancer screening: If you have a cervix, begin getting regular cervical cancer screening tests starting at age 21.  Breast cancer scan (mammogram): If you've ever had breasts, begin having regular mammograms starting at age 40. This is a scan to check for breast cancer.  Colon cancer screening: It is important to start screening for colon cancer at age 45.  Have a colonoscopy test every 10 years (or more often if you're at risk) Or, ask your provider about stool tests like a FIT test every year or Cologuard test every 3 years.  To learn more about your testing options, visit:   .  For help making a decision, visit:   https://bit.ly/jk65716.  Prostate cancer screening test: If you have a prostate, ask your care team if a prostate cancer screening test (PSA) at age 55 is right for you.  Lung cancer screening: If you are a current or former smoker ages 50 to 80, ask your care team if ongoing lung cancer screenings are right for you.  For informational purposes only. Not to replace the advice of your health care provider. Copyright   2023 [employer]. All rights reserved. Clinically reviewed by the [facility] Transitions Program. [employer] [id number] - REV 01/24.

## 2024-10-18 NOTE — PROGRESS NOTES
Preventive Care Visit  [facility] RAYMOND Veronica MD, Family Medicine  Oct 18, 2024      Assessment & Plan     Routine general medical examination at a health care facility  - Pneumococcal 20 Valent Conjugate (Prevnar 20)  - INFLUENZA VACCINE,SPLIT VIRUS,TRIVALENT,PF(FLUZONE)    Cervical cancer screening  The patient declined services.    Chronic urticaria  - Adult Allergy/Asthma  Referral; Future    Encounter for allergy testing  - Adult Allergy/Asthma  Referral; Future    Morbid obesity with body mass index of 60.0-69.9 in adult (H)  Weight management plan: Discussed healthy diet and exercise guidelines      Elevated ferritin  - Ferritin; Future  - CBC with platelets and differential; Future  - Comprehensive metabolic panel  - Ferritin  - CBC with platelets and differential    Lipid screening  - Lipid panel reflex to direct LDL Fasting; Future  - Lipid panel reflex to direct LDL Fasting    Type 2 diabetes mellitus with hyperglycemia, without long-term current use of insulin (H)  Managed by Endocrinology, I appreciate Dr Moss's input and expertise.  - FOOT EXAM  - Continuous Glucose  (FREESTYLE AYSE 3 READER) ROSHAN; 1 each once for 1 dose. Use to read blood sugars per 's instructions.  - Continuous Glucose Sensor (FREESTYLE AYSE 3 PLUS SENSOR) MISC; Use 1 sensor every 15 days. Use to read blood sugars per 's instructions.    TMJ (temporomandibular joint syndrome)  - Physical Therapy  Referral; Future  -She was strongly advised to re-establish with a dentist  Bilateral carpal tunnel syndrome  - ketorolac (TORADOL) injection 30 mg  - gabapentin (NEURONTIN) 300 MG capsule; Take 1 capsule (300 mg) by mouth at bedtime.    Other migraine without status migrainosus, not intractable  - ketorolac (TORADOL) injection 30 mg  - gabapentin (NEURONTIN) 300 MG capsule; Take 1 capsule (300 mg) by mouth at  bedtime.            Counseling  Appropriate preventive services were addressed with this patient via screening, questionnaire, or discussion as appropriate for fall prevention, nutrition, physical activity, social engagement, weight loss and cognition.  Checklist reviewing preventive services available has been given to the patient.  Reviewed patient's diet, addressing concerns and/or questions.   She is at risk for lack of exercise and has been provided with information to increase physical activity for the benefit of her well-being.   Patient is at risk for social isolation and has been provided with information about the benefit of social connection.   The patient was instructed to see the dentist every 6 months.           Tanika Kamara is a 29 year old, presenting for the following:    Physical  -The patient declines a PAP smear today  -Patient and parent are requesting an allergy referral for chronic pruritus with and without hives. She has an upcoming dermatology appointment. No lesions or symptoms present today.  -She has chronic diarrhea and is established with Gastroenterology. EGD/colonoscopy unremarkable this far. She has a gastric emptying study re-scheduled  -She has chronic migraines, is established with Neurology with new changes to her medications. Gabapentin was helpful in the past for migraines and bilateral carpal tunnel syndrome. She is requesting refills at this time.        10/18/2024     9:53 AM   Additional Questions   Roomed by AG   Accompanied by self        Health Care Directive  Patient does not have a Health Care Directive or Living Will: Discussed advance care planning with patient; information given to patient to review.    HPI        10/18/2024   General Health   How would you rate your overall physical health? (!) FAIR   Feel stress (tense, anxious, or unable to sleep) To some extent      (!) STRESS CONCERN      10/18/2024   Nutrition   Three or more servings of calcium each  day? (!) NO   Diet: Diabetic   How many servings of fruit and vegetables per day? (!) 2-3   How many sweetened beverages each day? 0-1            10/18/2024   Exercise   Days per week of moderate/strenous exercise 2 days      (!) EXERCISE CONCERN      10/18/2024   Social Factors   Frequency of gathering with friends or relatives Never   Worry food won't last until get money to buy more No   Food not last or not have enough money for food? No   Do you have housing? (Housing is defined as stable permanent housing and does not include staying ouside in a car, in a tent, in an abandoned building, in an overnight shelter, or couch-surfing.) Yes   Are you worried about losing your housing? No   Lack of transportation? No   Unable to get utilities (heat,electricity)? No      (!) SOCIAL CONNECTIONS CONCERN      10/18/2024   Dental   Dentist two times every year? (!) NO             10/18/2024   TB Screening   Were you born outside of the [place]? No                10/18/2024   Substance Use   Alcohol more than 3/day or more than 7/wk Not Applicable   Do you use any other substances recreationally? No        Social History     Tobacco Use    Smoking status: Never     Passive exposure: Never    Smokeless tobacco: Never   Vaping Use    Vaping status: Never Used   Substance Use Topics    Alcohol use: No    Drug use: No                  10/18/2024   STI Screening   New sexual partner(s) since last STI/HIV test? No                   10/18/2024   Contraception/Family Planning   Questions about contraception or family planning No           Reviewed and updated as needed this visit by Provider                    Past Medical History:   Diagnosis Date    CAH (congenital adrenal hyperplasia) 2/9/2010    Followed by Dr. Brewer; next follow up 1.2011.  Metformin increased to 850 mg twice a day.     Diabetes mellitus, type 2 (H) 10/19/2020    Vitamin D deficiency 2/9/2010     Past Surgical History:   Procedure Laterality Date     "CHOLECYSTECTOMY      She was in 8th grade     COLONOSCOPY N/A 9/16/2024    Procedure: COLONOSCOPY, WITH BIOPSY;  Surgeon: Bacilio Yancey MD;  Location:  GI    ESOPHAGOSCOPY, GASTROSCOPY, DUODENOSCOPY (EGD), COMBINED N/A 9/16/2024    Procedure: ESOPHAGOGASTRODUODENOSCOPY, WITH BIOPSY;  Surgeon: Bacilio Yancey MD;  Location:  GI         Review of Systems  Constitutional, HEENT, cardiovascular, pulmonary, GI, , musculoskeletal, neuro, skin, endocrine and psych systems are negative, except as otherwise noted.     Objective    Exam  /70   Pulse 83   Temp 98.2  F (36.8  C) (Temporal)   Resp 14   Ht 1.525 m (5' 0.04\")   Wt 119.3 kg (263 lb 1.6 oz)   LMP 10/14/2024 (Approximate)   SpO2 97%   BMI 51.32 kg/m     Estimated body mass index is 51.32 kg/m  as calculated from the following:    Height as of this encounter: 1.525 m (5' 0.04\").    Weight as of this encounter: 119.3 kg (263 lb 1.6 oz).    Physical Exam  GENERAL: alert and no distress  EYES: Eyes grossly normal to inspection, PERRL and conjunctivae and sclerae normal  HENT: ear canals and TM's normal, nose and mouth without ulcers or lesions. Left TMJ  NECK: no adenopathy, no asymmetry, masses, or scars  RESP: lungs clear to auscultation - no rales, rhonchi or wheezes  CV: regular rate and rhythm, normal S1 S2, no S3 or S4, no murmur, click or rub, no peripheral edema  ABDOMEN: soft, nontender, no hepatosplenomegaly, no masses and bowel sounds normal  MS: no gross musculoskeletal defects noted, no edema  NEURO: Normal strength and tone, mentation intact and speech normal  PSYCH: mentation appears normal and affect is flat, slightly poor insight to medical illness but a great advocate for self.        Signed Electronically by: Bita Veronica MD    "

## 2024-10-22 ENCOUNTER — TRANSFERRED RECORDS (OUTPATIENT)
Dept: HEALTH INFORMATION MANAGEMENT | Facility: CLINIC | Age: 29
End: 2024-10-22
Payer: COMMERCIAL

## 2024-10-22 LAB — HEP C HIM: NORMAL

## 2024-10-30 DIAGNOSIS — E78.5 HYPERLIPIDEMIA LDL GOAL <100: ICD-10-CM

## 2024-10-30 RX ORDER — ATORVASTATIN CALCIUM 10 MG/1
10 TABLET, FILM COATED ORAL DAILY
Qty: 90 TABLET | Refills: 2 | Status: SHIPPED | OUTPATIENT
Start: 2024-10-30

## 2024-11-05 ENCOUNTER — TELEPHONE (OUTPATIENT)
Dept: FAMILY MEDICINE | Facility: CLINIC | Age: 29
End: 2024-11-05
Payer: COMMERCIAL

## 2024-11-05 ENCOUNTER — TRANSFERRED RECORDS (OUTPATIENT)
Dept: HEALTH INFORMATION MANAGEMENT | Facility: CLINIC | Age: 29
End: 2024-11-05
Payer: COMMERCIAL

## 2024-11-05 DIAGNOSIS — R79.89 ELEVATED FERRITIN: Primary | ICD-10-CM

## 2024-11-05 NOTE — TELEPHONE ENCOUNTER
Patient requesting result note/recommendations for elevated ferratin.     Please advise next steps.    Vane Sharma RN  [facility] - Registered Nurse  Clinic Triage [place]   November 5, 2024

## 2024-11-05 NOTE — TELEPHONE ENCOUNTER
Patient Returning Call    Reason for call:  high ferritan     Information relayed to patient:  member of care team will call    Patient has additional questions:  No    What are your questions/concerns:  high ferritan    Could we send this information to you in [facility] or would you prefer to receive a phone call?:   Patient would prefer a phone call   Okay to leave a detailed message?: No at Home number on file [phone] (home)

## 2024-11-06 NOTE — TELEPHONE ENCOUNTER
The patient was tested for Homozygous hemochromatosis by GI  and this was within normal limits. I feel we can recheck her ferritin at this time and if still elevated place an official consult to hematology

## 2024-11-06 NOTE — TELEPHONE ENCOUNTER
RN Triage    Patient Contact    Attempt # 1    Was call answered?  No.  Left message on voicemail with information to call me back.    Upon call back please relay MD note below    The patient was tested for Homozygous hemochromatosis by GI  and this was within normal limits. I feel we can recheck her ferritin at this time and if still elevated place an official consult to hematology       Vane Sharma RN  [facility] - Registered Nurse  Clinic Triage [place]   November 6, 2024

## 2024-11-06 NOTE — TELEPHONE ENCOUNTER
Consent to communicate is on file to speak with mother.    Mother notified of providers advice.    She states it was 640 in October. She is feeling sick.  She has occasional menstrual periods.    Mother would like her to have a referral to a hematology.  Mother states patient has the hemochromatosis genes.    Vandana Harden RN on 11/6/2024 at 3:56 PM

## 2024-11-07 ASSESSMENT — ANXIETY QUESTIONNAIRES
6. BECOMING EASILY ANNOYED OR IRRITABLE: NOT AT ALL
1. FEELING NERVOUS, ANXIOUS, OR ON EDGE: NOT AT ALL
4. TROUBLE RELAXING: NOT AT ALL
IF YOU CHECKED OFF ANY PROBLEMS ON THIS QUESTIONNAIRE, HOW DIFFICULT HAVE THESE PROBLEMS MADE IT FOR YOU TO DO YOUR WORK, TAKE CARE OF THINGS AT HOME, OR GET ALONG WITH OTHER PEOPLE: NOT DIFFICULT AT ALL
5. BEING SO RESTLESS THAT IT IS HARD TO SIT STILL: NOT AT ALL
7. FEELING AFRAID AS IF SOMETHING AWFUL MIGHT HAPPEN: NOT AT ALL
8. IF YOU CHECKED OFF ANY PROBLEMS, HOW DIFFICULT HAVE THESE MADE IT FOR YOU TO DO YOUR WORK, TAKE CARE OF THINGS AT HOME, OR GET ALONG WITH OTHER PEOPLE?: NOT DIFFICULT AT ALL
7. FEELING AFRAID AS IF SOMETHING AWFUL MIGHT HAPPEN: NOT AT ALL
GAD7 TOTAL SCORE: 0
2. NOT BEING ABLE TO STOP OR CONTROL WORRYING: NOT AT ALL
3. WORRYING TOO MUCH ABOUT DIFFERENT THINGS: NOT AT ALL

## 2024-11-08 ENCOUNTER — PATIENT OUTREACH (OUTPATIENT)
Dept: ONCOLOGY | Facility: CLINIC | Age: 29
End: 2024-11-08
Payer: COMMERCIAL

## 2024-11-08 NOTE — PROGRESS NOTES
New Patient Oncology Nurse Navigator Note     Referral Received: 11/08/24      Referring provider: Humza Pavon MD     Referring Clinic/Organization: [facility]     Referred to: Benign Hematology    Requested provider (if applicable): First available - did not specify      Evaluation for :   Diagnosis   R79.89 (ICD-10-CM) - Elevated ferritin      Clinical History (per Nurse review of records provided):      10/22/24 Jessie OV [facility]:        Latest Reference Range & Units 09/07/24 17:53 09/23/24 12:19 10/18/24 10:59   Ferritin 6 - 175 ng/mL 431 (H)  640 (H)   Glucose 70 - 99 mg/dL 88  119 (H)   HCG Qualitative Serum Negative  Negative     HDL Cholesterol >=50 mg/dL   60   LDL Cholesterol Calculated <100 mg/dL   78   Lipase 13 - 60 U/L 20     Non HDL Cholesterol <130 mg/dL   97   Rheumatoid Factor <14 IU/mL <10     Triglycerides <150 mg/dL   95   Troponin T, High Sensitivity <=14 ng/L <6     TSH (External) 0.450 - 4.500 uIU/mL  0.592 (E)    WBC 4.0 - 11.0 10e3/uL 7.0  8.5   Hemoglobin 11.7 - 15.7 g/dL 13.9  14.0   Hematocrit 35.0 - 47.0 % 40.9  41.8   Platelet Count 150 - 450 10e3/uL 262  294   RBC Count 3.80 - 5.20 10e6/uL 4.46  4.50   MCV 78 - 100 fL 92  93   MCH 26.5 - 33.0 pg 31.2  31.1   MCHC 31.5 - 36.5 g/dL 34.0  33.5   RDW 10.0 - 15.0 % 13.8  13.9   (H): Data is abnormally high  (E): External lab result    Records Location: Epic     Additional testing needed prior to consult:     ?    Referral updates and Plan:     11/08/2024 9:01 AM -  Referral received and reviewed. Sent to NPS to schedule routine first available.    DOMINICK BarretoN, RN  Hematology/Oncology Nurse Navigator  [facility]  [phone] / [phone]

## 2024-11-09 ENCOUNTER — APPOINTMENT (OUTPATIENT)
Dept: GENERAL RADIOLOGY | Facility: CLINIC | Age: 29
End: 2024-11-09
Attending: PHYSICIAN ASSISTANT
Payer: COMMERCIAL

## 2024-11-09 ENCOUNTER — HOSPITAL ENCOUNTER (EMERGENCY)
Facility: CLINIC | Age: 29
Discharge: HOME | End: 2024-11-09
Attending: EMERGENCY MEDICINE | Admitting: EMERGENCY MEDICINE
Payer: COMMERCIAL

## 2024-11-09 VITALS
RESPIRATION RATE: 14 BRPM | OXYGEN SATURATION: 98 % | HEART RATE: 80 BPM | DIASTOLIC BLOOD PRESSURE: 98 MMHG | SYSTOLIC BLOOD PRESSURE: 133 MMHG | TEMPERATURE: 97.6 F

## 2024-11-09 DIAGNOSIS — R07.9 CHEST PAIN: ICD-10-CM

## 2024-11-09 LAB
ALBUMIN SERPL BCG-MCNC: 4.2 G/DL (ref 3.5–5.2)
ALP SERPL-CCNC: 60 U/L (ref 40–150)
ALT SERPL W P-5'-P-CCNC: 17 U/L (ref 0–50)
ANION GAP SERPL CALCULATED.3IONS-SCNC: 14 MMOL/L (ref 7–15)
AST SERPL W P-5'-P-CCNC: 15 U/L (ref 0–45)
BASOPHILS # BLD AUTO: 0 10E3/UL (ref 0–0.2)
BASOPHILS # BLD AUTO: 0 10E3/UL (ref 0–0.2)
BASOPHILS NFR BLD AUTO: 0 %
BASOPHILS NFR BLD AUTO: 0 %
BILIRUB SERPL-MCNC: 0.2 MG/DL
BUN SERPL-MCNC: 12.3 MG/DL (ref 6–20)
CALCIUM SERPL-MCNC: 9.7 MG/DL (ref 8.8–10.4)
CHLORIDE SERPL-SCNC: 106 MMOL/L (ref 98–107)
CREAT SERPL-MCNC: 0.71 MG/DL (ref 0.51–0.95)
EGFRCR SERPLBLD CKD-EPI 2021: >90 ML/MIN/1.73M2
EOSINOPHIL # BLD AUTO: 0.3 10E3/UL (ref 0–0.7)
EOSINOPHIL # BLD AUTO: 0.4 10E3/UL (ref 0–0.7)
EOSINOPHIL NFR BLD AUTO: 4 %
EOSINOPHIL NFR BLD AUTO: 4 %
ERYTHROCYTE [DISTWIDTH] IN BLOOD BY AUTOMATED COUNT: 13.8 % (ref 10–15)
ERYTHROCYTE [DISTWIDTH] IN BLOOD BY AUTOMATED COUNT: 14 % (ref 10–15)
FERRITIN SERPL-MCNC: 303 NG/ML (ref 6–175)
GLUCOSE SERPL-MCNC: 110 MG/DL (ref 70–99)
HCG SERPL QL: NEGATIVE
HCO3 SERPL-SCNC: 21 MMOL/L (ref 22–29)
HCT VFR BLD AUTO: 41.4 % (ref 35–47)
HCT VFR BLD AUTO: 42.8 % (ref 35–47)
HGB BLD-MCNC: 14.2 G/DL (ref 11.7–15.7)
HGB BLD-MCNC: 14.4 G/DL (ref 11.7–15.7)
IMM GRANULOCYTES # BLD: 0 10E3/UL
IMM GRANULOCYTES # BLD: 0 10E3/UL
IMM GRANULOCYTES NFR BLD: 0 %
IMM GRANULOCYTES NFR BLD: 0 %
IRON BINDING CAPACITY (ROCHE): 355 UG/DL (ref 240–430)
IRON SATN MFR SERPL: 28 % (ref 15–46)
IRON SERPL-MCNC: 99 UG/DL (ref 37–145)
LIPASE SERPL-CCNC: 30 U/L (ref 13–60)
LYMPHOCYTES # BLD AUTO: 2.8 10E3/UL (ref 0.8–5.3)
LYMPHOCYTES # BLD AUTO: 3.7 10E3/UL (ref 0.8–5.3)
LYMPHOCYTES NFR BLD AUTO: 30 %
LYMPHOCYTES NFR BLD AUTO: 40 %
MCH RBC QN AUTO: 31.4 PG (ref 26.5–33)
MCH RBC QN AUTO: 31.4 PG (ref 26.5–33)
MCHC RBC AUTO-ENTMCNC: 33.6 G/DL (ref 31.5–36.5)
MCHC RBC AUTO-ENTMCNC: 34.3 G/DL (ref 31.5–36.5)
MCV RBC AUTO: 92 FL (ref 78–100)
MCV RBC AUTO: 93 FL (ref 78–100)
MONOCYTES # BLD AUTO: 0.4 10E3/UL (ref 0–1.3)
MONOCYTES # BLD AUTO: 0.5 10E3/UL (ref 0–1.3)
MONOCYTES NFR BLD AUTO: 4 %
MONOCYTES NFR BLD AUTO: 5 %
NEUTROPHILS # BLD AUTO: 4.6 10E3/UL (ref 1.6–8.3)
NEUTROPHILS # BLD AUTO: 5.7 10E3/UL (ref 1.6–8.3)
NEUTROPHILS NFR BLD AUTO: 51 %
NEUTROPHILS NFR BLD AUTO: 62 %
NRBC # BLD AUTO: 0 10E3/UL
NRBC # BLD AUTO: 0 10E3/UL
NRBC BLD AUTO-RTO: 0 /100
NRBC BLD AUTO-RTO: 0 /100
NT-PROBNP SERPL-MCNC: <36 PG/ML (ref 0–450)
PLATELET # BLD AUTO: 332 10E3/UL (ref 150–450)
PLATELET # BLD AUTO: 356 10E3/UL (ref 150–450)
POTASSIUM SERPL-SCNC: 4 MMOL/L (ref 3.4–5.3)
PROT SERPL-MCNC: 7.8 G/DL (ref 6.4–8.3)
RBC # BLD AUTO: 4.52 10E6/UL (ref 3.8–5.2)
RBC # BLD AUTO: 4.59 10E6/UL (ref 3.8–5.2)
SODIUM SERPL-SCNC: 141 MMOL/L (ref 135–145)
TROPONIN T SERPL HS-MCNC: <6 NG/L
WBC # BLD AUTO: 9.2 10E3/UL (ref 4–11)
WBC # BLD AUTO: 9.3 10E3/UL (ref 4–11)

## 2024-11-09 PROCEDURE — 84450 TRANSFERASE (AST) (SGOT): CPT

## 2024-11-09 PROCEDURE — 250N000013 HC RX MED GY IP 250 OP 250 PS 637: Performed by: PHYSICIAN ASSISTANT

## 2024-11-09 PROCEDURE — 85025 COMPLETE CBC W/AUTO DIFF WBC: CPT

## 2024-11-09 PROCEDURE — 71046 X-RAY EXAM CHEST 2 VIEWS: CPT

## 2024-11-09 PROCEDURE — 250N000009 HC RX 250: Performed by: PHYSICIAN ASSISTANT

## 2024-11-09 PROCEDURE — 83690 ASSAY OF LIPASE: CPT | Performed by: PHYSICIAN ASSISTANT

## 2024-11-09 PROCEDURE — 84484 ASSAY OF TROPONIN QUANT: CPT | Performed by: PHYSICIAN ASSISTANT

## 2024-11-09 PROCEDURE — 82247 BILIRUBIN TOTAL: CPT

## 2024-11-09 PROCEDURE — 71046 X-RAY EXAM CHEST 2 VIEWS: CPT | Mod: 26 | Performed by: RADIOLOGY

## 2024-11-09 PROCEDURE — 93010 ELECTROCARDIOGRAM REPORT: CPT | Performed by: PHYSICIAN ASSISTANT

## 2024-11-09 PROCEDURE — 83550 IRON BINDING TEST: CPT

## 2024-11-09 PROCEDURE — 36415 COLL VENOUS BLD VENIPUNCTURE: CPT | Performed by: PHYSICIAN ASSISTANT

## 2024-11-09 PROCEDURE — 83880 ASSAY OF NATRIURETIC PEPTIDE: CPT | Performed by: PHYSICIAN ASSISTANT

## 2024-11-09 PROCEDURE — 36415 COLL VENOUS BLD VENIPUNCTURE: CPT

## 2024-11-09 PROCEDURE — 84703 CHORIONIC GONADOTROPIN ASSAY: CPT | Performed by: PHYSICIAN ASSISTANT

## 2024-11-09 PROCEDURE — 99285 EMERGENCY DEPT VISIT HI MDM: CPT | Mod: 25 | Performed by: EMERGENCY MEDICINE

## 2024-11-09 PROCEDURE — 80048 BASIC METABOLIC PNL TOTAL CA: CPT | Performed by: PHYSICIAN ASSISTANT

## 2024-11-09 PROCEDURE — 84155 ASSAY OF PROTEIN SERUM: CPT

## 2024-11-09 PROCEDURE — 85004 AUTOMATED DIFF WBC COUNT: CPT | Performed by: PHYSICIAN ASSISTANT

## 2024-11-09 PROCEDURE — 93005 ELECTROCARDIOGRAM TRACING: CPT | Performed by: EMERGENCY MEDICINE

## 2024-11-09 PROCEDURE — 84460 ALANINE AMINO (ALT) (SGPT): CPT

## 2024-11-09 PROCEDURE — 84075 ASSAY ALKALINE PHOSPHATASE: CPT

## 2024-11-09 PROCEDURE — 83540 ASSAY OF IRON: CPT

## 2024-11-09 PROCEDURE — 99284 EMERGENCY DEPT VISIT MOD MDM: CPT | Mod: FS | Performed by: EMERGENCY MEDICINE

## 2024-11-09 PROCEDURE — 82728 ASSAY OF FERRITIN: CPT

## 2024-11-09 RX ORDER — MAGNESIUM HYDROXIDE/ALUMINUM HYDROXICE/SIMETHICONE 120; 1200; 1200 MG/30ML; MG/30ML; MG/30ML
15 SUSPENSION ORAL ONCE
Status: COMPLETED | OUTPATIENT
Start: 2024-11-09 | End: 2024-11-09

## 2024-11-09 RX ORDER — LIDOCAINE HYDROCHLORIDE 20 MG/ML
10 SOLUTION OROPHARYNGEAL ONCE
Status: COMPLETED | OUTPATIENT
Start: 2024-11-09 | End: 2024-11-09

## 2024-11-09 RX ORDER — FAMOTIDINE 20 MG/1
20 TABLET, FILM COATED ORAL ONCE
Status: COMPLETED | OUTPATIENT
Start: 2024-11-09 | End: 2024-11-09

## 2024-11-09 RX ADMIN — FAMOTIDINE 20 MG: 20 TABLET ORAL at 20:37

## 2024-11-09 RX ADMIN — ALUMINUM HYDROXIDE, MAGNESIUM HYDROXIDE, DIMETHICONE 15 ML: 200; 200; 20 LIQUID ORAL at 20:31

## 2024-11-09 RX ADMIN — LIDOCAINE HYDROCHLORIDE 10 ML: 20 SOLUTION ORAL at 20:31

## 2024-11-09 ASSESSMENT — COLUMBIA-SUICIDE SEVERITY RATING SCALE - C-SSRS
2. HAVE YOU ACTUALLY HAD ANY THOUGHTS OF KILLING YOURSELF IN THE PAST MONTH?: NO
1. IN THE PAST MONTH, HAVE YOU WISHED YOU WERE DEAD OR WISHED YOU COULD GO TO SLEEP AND NOT WAKE UP?: NO
6. HAVE YOU EVER DONE ANYTHING, STARTED TO DO ANYTHING, OR PREPARED TO DO ANYTHING TO END YOUR LIFE?: NO

## 2024-11-09 ASSESSMENT — ACTIVITIES OF DAILY LIVING (ADL)
ADLS_ACUITY_SCORE: 0
ADLS_ACUITY_SCORE: 0

## 2024-11-10 ENCOUNTER — LAB (OUTPATIENT)
Dept: LAB | Facility: CLINIC | Age: 29
End: 2024-11-10
Payer: COMMERCIAL

## 2024-11-10 DIAGNOSIS — E25.9 CAH 21-OH (CONGENITAL ADRENAL HYPERPLASIA), LATE ONSET (H): ICD-10-CM

## 2024-11-10 DIAGNOSIS — R79.89 ELEVATED FERRITIN: ICD-10-CM

## 2024-11-10 LAB
ATRIAL RATE - MUSE: 72 BPM
DIASTOLIC BLOOD PRESSURE - MUSE: NORMAL MMHG
INTERPRETATION ECG - MUSE: NORMAL
P AXIS - MUSE: 36 DEGREES
PR INTERVAL - MUSE: 132 MS
QRS DURATION - MUSE: 80 MS
QT - MUSE: 400 MS
QTC - MUSE: 438 MS
R AXIS - MUSE: 40 DEGREES
SYSTOLIC BLOOD PRESSURE - MUSE: NORMAL MMHG
T AXIS - MUSE: -2 DEGREES
VENTRICULAR RATE- MUSE: 72 BPM

## 2024-11-10 NOTE — ED TRIAGE NOTES
Arrives ambulatory with chest pain and MCCORMACK. Per patient it started in September that has gotten worse this last week     Triage Assessment (Adult)       Row Name 11/09/24 1955          Triage Assessment    Airway WDL WDL        Respiratory WDL    Respiratory WDL X;rhythm/pattern     Rhythm/Pattern, Respiratory dyspnea upon exertion        Skin Circulation/Temperature WDL    Skin Circulation/Temperature WDL WDL        Cardiac WDL    Cardiac WDL X;chest pain        Chest Pain Assessment    Chest Pain Location midsternal     Character tightness     Chest Pain Intervention 12-lead ECG obtained        Peripheral/Neurovascular WDL    Peripheral Neurovascular WDL WDL        Cognitive/Neuro/Behavioral WDL    Cognitive/Neuro/Behavioral WDL WDL

## 2024-11-10 NOTE — ED PROVIDER NOTES
ED Provider Note  [facility]      History     Chief Complaint   Patient presents with    Chest Pain     HPI  30yo F pmhx congenital adrenal hyperplasia, DM2, obesity p/w epigastric/substernal CP x 2 months.  Reports intermittent, but has been constant for the last week.  No exertional worsening the pain or radiation, but does feel she has dyspnea with exertion.  No cough, fever, chills, VTE history, recent surgery, extremity edema; does take OCPs.    Past Medical History  Past Medical History:   Diagnosis Date    CAH (congenital adrenal hyperplasia) 2/9/2010    Followed by Dr. Brewer; next follow up 1.2011.  Metformin increased to 850 mg twice a day.     Diabetes mellitus, type 2 (H) 10/19/2020    Vitamin D deficiency 2/9/2010     Past Surgical History:   Procedure Laterality Date    CHOLECYSTECTOMY      She was in 8th grade     COLONOSCOPY N/A 9/16/2024    Procedure: COLONOSCOPY, WITH BIOPSY;  Surgeon: Bacilio Yancey MD;  Location:  GI    ESOPHAGOSCOPY, GASTROSCOPY, DUODENOSCOPY (EGD), COMBINED N/A 9/16/2024    Procedure: ESOPHAGOGASTRODUODENOSCOPY, WITH BIOPSY;  Surgeon: Bacilio Yancey MD;  Location:  GI     alcohol swab prep pads  Atogepant (QULIPTA) 60 MG TABS  atorvastatin (LIPITOR) 10 MG tablet  blood glucose (NO BRAND SPECIFIED) test strip  blood glucose monitoring (NO BRAND SPECIFIED) meter device kit  cholestyramine light (QUESTRAN) 4 GM packet  Continuous Glucose Sensor (FREESTYLE AYSE 3 PLUS SENSOR) MISC  Continuous Glucose Sensor (FREESTYLE AYSE 3 SENSOR) MISC  fluticasone (FLONASE) 50 MCG/ACT nasal spray  gabapentin (NEURONTIN) 300 MG capsule  insulin pen needle (ULTICARE MICRO) 32G X 4 MM miscellaneous  metFORMIN (GLUCOPHAGE XR) 500 MG 24 hr tablet  norgestimate-ethinyl estradiol (ORTHO-CYCLEN) 0.25-35 MG-MCG tablet  ondansetron (ZOFRAN ODT) 4 MG ODT tab  rimegepant (NURTEC) 75 MG ODT tablet  semaglutide (OZEMPIC) 2 MG/3ML pen  SUMAtriptan  (IMITREX) 50 MG tablet  thin (NO BRAND SPECIFIED) lancets  topiramate (TOPAMAX) 25 MG tablet  UBRELVY 100 MG tablet      Allergies   Allergen Reactions    Other Environmental Allergy     Victoza [Liraglutide] Headache, Hives and Itching     Family History  Family History   Problem Relation Age of Onset    Neurologic Disorder Sister 16        migraines    Cerebrovascular Disease No family hx of     Alzheimer Disease No family hx of     Glaucoma No family hx of     Macular Degeneration No family hx of      Social History   Social History     Tobacco Use    Smoking status: Never     Passive exposure: Never    Smokeless tobacco: Never   Vaping Use    Vaping status: Never Used   Substance Use Topics    Alcohol use: No    Drug use: No      A medically appropriate review of systems was performed with pertinent positives and negatives noted in the HPI, and all other systems negative.    Physical Exam   BP: 121/82  Pulse: 73  Temp: 97.6  F (36.4  C)  Resp: 20  SpO2: 98 %  Physical Exam  Constitutional:       General: She is not in acute distress.     Appearance: Normal appearance. She is not diaphoretic.   HENT:      Head: Atraumatic.      Mouth/Throat:      Mouth: Mucous membranes are moist.   Eyes:      Conjunctiva/sclera: Conjunctivae normal.   Cardiovascular:      Rate and Rhythm: Normal rate and regular rhythm.      Heart sounds: Normal heart sounds.   Pulmonary:      Effort: Pulmonary effort is normal. No respiratory distress.      Breath sounds: Normal breath sounds.   Musculoskeletal:      Cervical back: Neck supple.   Skin:     General: Skin is warm.   Neurological:      Mental Status: She is alert.           ED Course, Procedures, & Data      Procedures            EKG Interpretation:      Interpreted by Oumar Rubalcava PA-C  Time reviewed: 2018  Symptoms at time of EKG: CP   Rhythm: normal sinus   Rate: Normal  Axis: Normal  Ectopy: none  Conduction: normal  ST Segments/ T Waves: T wave inversion III  Q Waves:  none  Comparison to prior: Unchanged    Clinical Impression: normal EKG                 Results for orders placed or performed during the hospital encounter of 11/09/24   XR Chest 2 Views     Status: None    Narrative    EXAM: XR CHEST 2 VIEWS  LOCATION: [facility]  DATE: 11/9/2024    INDICATION: CP  COMPARISON: None.      Impression    IMPRESSION: Negative chest.   Troponin T, High Sensitivity     Status: Normal   Result Value Ref Range    Troponin T, High Sensitivity <6 <=14 ng/L   Nt probnp inpatient (BNP)     Status: Normal   Result Value Ref Range    N terminal Pro BNP Inpatient <36 0 - 450 pg/mL   HCG qualitative pregnancy (blood)     Status: Normal   Result Value Ref Range    hCG Serum Qualitative Negative Negative   Comprehensive metabolic panel     Status: Abnormal   Result Value Ref Range    Sodium 141 135 - 145 mmol/L    Potassium 4.0 3.4 - 5.3 mmol/L    Carbon Dioxide (CO2) 21 (L) 22 - 29 mmol/L    Anion Gap 14 7 - 15 mmol/L    Urea Nitrogen 12.3 6.0 - 20.0 mg/dL    Creatinine 0.71 0.51 - 0.95 mg/dL    GFR Estimate >90 >60 mL/min/1.73m2    Calcium 9.7 8.8 - 10.4 mg/dL    Chloride 106 98 - 107 mmol/L    Glucose 110 (H) 70 - 99 mg/dL    Alkaline Phosphatase 60 40 - 150 U/L    AST 15 0 - 45 U/L    ALT 17 0 - 50 U/L    Protein Total 7.8 6.4 - 8.3 g/dL    Albumin 4.2 3.5 - 5.2 g/dL    Bilirubin Total 0.2 <=1.2 mg/dL   Lipase     Status: Normal   Result Value Ref Range    Lipase 30 13 - 60 U/L   CBC with platelets and differential     Status: None   Result Value Ref Range    WBC Count 9.2 4.0 - 11.0 10e3/uL    RBC Count 4.59 3.80 - 5.20 10e6/uL    Hemoglobin 14.4 11.7 - 15.7 g/dL    Hematocrit 42.8 35.0 - 47.0 %    MCV 93 78 - 100 fL    MCH 31.4 26.5 - 33.0 pg    MCHC 33.6 31.5 - 36.5 g/dL    RDW 14.0 10.0 - 15.0 %    Platelet Count 356 150 - 450 10e3/uL    % Neutrophils 51 %    % Lymphocytes 40 %    % Monocytes 5 %    % Eosinophils 4 %    % Basophils 0 %    %  Immature Granulocytes 0 %    NRBCs per 100 WBC 0 <1 /100    Absolute Neutrophils 4.6 1.6 - 8.3 10e3/uL    Absolute Lymphocytes 3.7 0.8 - 5.3 10e3/uL    Absolute Monocytes 0.5 0.0 - 1.3 10e3/uL    Absolute Eosinophils 0.4 0.0 - 0.7 10e3/uL    Absolute Basophils 0.0 0.0 - 0.2 10e3/uL    Absolute Immature Granulocytes 0.0 <=0.4 10e3/uL    Absolute NRBCs 0.0 10e3/uL   EKG 12 lead     Status: None (Preliminary result)   Result Value Ref Range    Systolic Blood Pressure  mmHg    Diastolic Blood Pressure  mmHg    Ventricular Rate 72 BPM    Atrial Rate 72 BPM    SC Interval 132 ms    QRS Duration 80 ms     ms    QTc 438 ms    P Axis 36 degrees    R AXIS 40 degrees    T Axis -2 degrees    Interpretation ECG       ** Poor data quality, interpretation may be adversely affected  Sinus rhythm  Normal ECG     CBC with platelets differential     Status: None    Narrative    The following orders were created for panel order CBC with platelets differential.  Procedure                               Abnormality         Status                     ---------                               -----------         ------                     CBC with platelets and d...[[id number]]                      Final result                 Please view results for these tests on the individual orders.     Medications   alum & mag hydroxide-simethicone (MAALOX) suspension 15 mL (15 mLs Oral $Given 11/9/24 2031)   famotidine (PEPCID) tablet 20 mg (20 mg Oral $Given 11/9/24 2037)   lidocaine (viscous) (XYLOCAINE) 2 % solution 10 mL (10 mLs Swish & Swallow $Given 11/9/24 2031)     Labs Ordered and Resulted from Time of ED Arrival to Time of ED Departure   COMPREHENSIVE METABOLIC PANEL - Abnormal       Result Value    Sodium 141      Potassium 4.0      Carbon Dioxide (CO2) 21 (*)     Anion Gap 14      Urea Nitrogen 12.3      Creatinine 0.71      GFR Estimate >90      Calcium 9.7      Chloride 106      Glucose 110 (*)     Alkaline Phosphatase 60      AST 15       ALT 17      Protein Total 7.8      Albumin 4.2      Bilirubin Total 0.2     TROPONIN T, HIGH SENSITIVITY - Normal    Troponin T, High Sensitivity <6     NT PROBNP INPATIENT - Normal    N terminal Pro BNP Inpatient <36     HCG QUALITATIVE PREGNANCY - Normal    hCG Serum Qualitative Negative     LIPASE - Normal    Lipase 30     CBC WITH PLATELETS AND DIFFERENTIAL    WBC Count 9.2      RBC Count 4.59      Hemoglobin 14.4      Hematocrit 42.8      MCV 93      MCH 31.4      MCHC 33.6      RDW 14.0      Platelet Count 356      % Neutrophils 51      % Lymphocytes 40      % Monocytes 5      % Eosinophils 4      % Basophils 0      % Immature Granulocytes 0      NRBCs per 100 WBC 0      Absolute Neutrophils 4.6      Absolute Lymphocytes 3.7      Absolute Monocytes 0.5      Absolute Eosinophils 0.4      Absolute Basophils 0.0      Absolute Immature Granulocytes 0.0      Absolute NRBCs 0.0       XR Chest 2 Views   Final Result   IMPRESSION: Negative chest.             Critical care was not performed.     Medical Decision Making  The patient's presentation was of high complexity (an acute health issue posing potential threat to life or bodily function).    The patient's evaluation involved:  review of external note(s) from 3+ sources (clinical)  ordering and/or review of 3+ test(s) in this encounter (see separate area of note for details)    The patient's management necessitated high risk (a decision regarding hospitalization).    Assessment & Plan    30yo F pmhx congenital adrenal hyperplasia, DM2, obesity p/w epigastric/substernal CP x 2 months.  Initially intermittent, but has been constant for the last week.  On OCPs, but no other PE risk factors. In ED, HDS/AF, normal SpO2.  No respiratory distress.  Clear lungs on exam.  No extremity edema.  DDx GERD versus CHF versus PNA versus PTX versus PTX versus unlikely PE versus other      Workup tonight unrevealing with negative troponin, lipase, BNP, pregnancy.  Unremarkable  CMP and CBC.  CXR negative.  EKG nonischemic and unchanged from prior.  Although etiology of symptoms are unclear, feel patient is safe for discharge at this time.  Should advise follow-up with her PCP and return to the ER she is any worsening symptoms.    I have reviewed the nursing notes. I have reviewed the findings, diagnosis, plan and need for follow up with the patient.    New Prescriptions    No medications on file       Final diagnoses:   Chest pain         Oumar Rubalcava PA-C  [facility] EMERGENCY DEPARTMENT  11/9/2024     Omuar Rubalcava PA-C  11/09/24 [id number]      --    ED Attending Physician Attestation    I Siva Moreno MD, cared for this patient with the Advanced Practice Provider (DEZ). I personally provided a substantive portion of the care for this patient, including approving the care plan for the number and complexity of problems addressed and taking responsibility related to the risk of complications and/or morbidity or mortality of patient management. Please see the DEZ's documentation for full details.          Siva Moreno MD  Emergency Medicine        Siva Mroeno MD  11/13/24 [id number]

## 2024-11-11 ENCOUNTER — TELEPHONE (OUTPATIENT)
Dept: FAMILY MEDICINE | Facility: CLINIC | Age: 29
End: 2024-11-11
Payer: COMMERCIAL

## 2024-11-11 NOTE — TELEPHONE ENCOUNTER
Provider: patient and mother are asking for emergent referral to hematology as they cannot get in before 12/11 in [place] for hematology, patient and mother do not feel patient can wait this long.  Please advise. Thank you!     S: Referral.     B: Patient and mother calling.  They are trying to schedule with Hematology but the earliest they can get in is 12/11 in [place] at [facility] and they do not feel she can wait this long. She is sick, she needs to be seen sooner. They are asking for the Hematology referral to be placed emergent so they can get in sooner than 12/11.      A: RN advised that message will be sent to provider with request.     R: Mother and patient will await a call back.

## 2024-11-11 NOTE — TELEPHONE ENCOUNTER
Recently evaluated at the ED and discharged home in stable condition. Ferritin is decreasing,iron is within normal limits, HB is normal   screen for hemochromatosis is negative, EGD/colonoscopy was unremarkable. I will discuss more at her upcoming appointment but I do not see the emergent nature of referral at this time  Bita Veronica MD on 11/11/2024 at 5:22 PM

## 2024-11-12 NOTE — PROGRESS NOTES
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 She follows with Dr. Cronin for diabetes care, last seen 10/8/24.     Previously seen by Dr. 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 Initially treated with dexamethasone until 2014, when dexamethasone was discontinued. She used to be medicated with metformin, spironolactone and birth control pills. They were discontinued around 2019. The patient reestablished care in April 2023, when she was started on treatment with birth control pills and metformin as the evaluation for nonclassical CAH revealed normal gonadotropins and normal androgens.     She has been struggling with not feeling well at all and states she felt best when on steroids for carpal tunnel.     She notes she feels traumatized by medical providers in the past when she was forced to have multiple genital exams at an early age for CAH. She declines any pelvic exam today.     She has lost 75 lbs on GLP 1.    GYN History  - LMP: Patient's last menstrual period was 10/14/2024 (approximate).  Menses are regular on OCP    OBHx  OB History   No obstetric history on file.     Past Medical History:   Diagnosis Date    CAH (congenital adrenal hyperplasia) 2/9/2010    Followed by Dr. Brewer; next follow up 1.2011.  Metformin increased to 850 mg twice a day.     Diabetes mellitus, type 2 (H) 10/19/2020    Vitamin D deficiency 2/9/2010     Past Surgical History:   Procedure Laterality Date    CHOLECYSTECTOMY      She was in 8th grade     COLONOSCOPY N/A 9/16/2024    Procedure: COLONOSCOPY, WITH BIOPSY;  Surgeon: Bacilio Yancey MD;  Location:  GI    ESOPHAGOSCOPY, GASTROSCOPY, DUODENOSCOPY (EGD), COMBINED N/A 9/16/2024    Procedure:  ESOPHAGOGASTRODUODENOSCOPY, WITH BIOPSY;  Surgeon: Bacilio Yancey MD;  Location:  GI       Current Outpatient Medications:     alcohol swab prep pads, Use to swab area of injection/anthony as directed., Disp: 100 each, Rfl: 3    Atogepant (QULIPTA) 60 MG TABS, Take 60 mg by mouth., Disp: , Rfl:     atorvastatin (LIPITOR) 10 MG tablet, Take 1 tablet (10 mg) by mouth daily., Disp: 90 tablet, Rfl: 2    blood glucose (NO BRAND SPECIFIED) test strip, Use to test blood sugar three times daily or as directed. Preferred blood glucose meter OR supplies to accompany: Blood Glucose Monitor Brands: per insurance., Disp: 100 strip, Rfl: 6    blood glucose monitoring (NO BRAND SPECIFIED) meter device kit, Use to test blood sugar three times daily or as directed. Preferred blood glucose meter OR supplies to accompany: Blood Glucose Monitor Brands: per insurance., Disp: 1 kit, Rfl: 0    cholestyramine light (QUESTRAN) 4 GM packet, Take 1 packet (4 g) by mouth 2 times daily (with meals) for 180 days, Disp: 180 packet, Rfl: 1    Continuous Glucose Sensor (FREESTYLE AYSE 3 PLUS SENSOR) MISC, Use 1 sensor every 15 days. Use to read blood sugars per 's instructions., Disp: 6 each, Rfl: 3    Continuous Glucose Sensor (FREESTYLE AYSE 3 SENSOR) MISC, 1 each by Other route every 14 days., Disp: 6 each, Rfl: 3    fluticasone (FLONASE) 50 MCG/ACT nasal spray, Spray 1 spray into both nostrils daily, Disp: 16 g, Rfl: 3    gabapentin (NEURONTIN) 300 MG capsule, Take 1 capsule (300 mg) by mouth at bedtime., Disp: 90 capsule, Rfl: 1    insulin pen needle (ULTICARE MICRO) 32G X 4 MM miscellaneous, Use 1 pen needles daily or as directed., Disp: 100 each, Rfl: 3    metFORMIN (GLUCOPHAGE XR) 500 MG 24 hr tablet, Take 4 tablets (2,000 mg) by mouth daily (with dinner)., Disp: 360 tablet, Rfl: 3    norgestimate-ethinyl estradiol (ORTHO-CYCLEN) 0.25-35 MG-MCG tablet, Take 1 tablet by mouth daily, Disp: 84 tablet, Rfl: 3     "ondansetron (ZOFRAN ODT) 4 MG ODT tab, Take 1 tablet (4 mg) by mouth every 8 hours as needed for nausea, Disp: 30 tablet, Rfl: 2    rimegepant (NURTEC) 75 MG ODT tablet, Place 75 mg under the tongue every 48 hours, Disp: , Rfl:     semaglutide (OZEMPIC) 2 MG/3ML pen, Inject 0.5 mg subcutaneously every 7 days., Disp: 9 mL, Rfl: 3    SUMAtriptan (IMITREX) 50 MG tablet, Take 2 tablets (100 mg) by mouth at onset of headache for migraine. May repeat in 2 hours. Max 4 tablets/24 hours., Disp: , Rfl:     thin (NO BRAND SPECIFIED) lancets, Use with lanceting device. To accompany: Blood Glucose Monitor Brands: per insurance., Disp: 100 each, Rfl: 6    topiramate (TOPAMAX) 25 MG tablet, TAKE TWO TABLETS BY MOUTH EVERY NIGHT AT BEDTIME X 7 DAYS, THEN ONE IN THE AM AND TWO EVERY NIGHT AT BEDTIME X 7 DYAS, THEN TWO TWICE DAILY, Disp: , Rfl:     UBRELVY 100 MG tablet, Take 100 mg by mouth at onset of headache., Disp: , Rfl:   Allergies   Allergen Reactions    Other Environmental Allergy     Victoza [Liraglutide] Headache, Hives and Itching     Family History   Problem Relation Age of Onset    Neurologic Disorder Sister 16        migraines    Cerebrovascular Disease No family hx of     Alzheimer Disease No family hx of     Glaucoma No family hx of     Macular Degeneration No family hx of      Social History     Tobacco Use    Smoking status: Never     Passive exposure: Never    Smokeless tobacco: Never   Vaping Use    Vaping status: Never Used   Substance Use Topics    Alcohol use: No    Drug use: No     ROS: 10-Point ROS negative except as noted in HPI    Physical Exam    /88   Pulse 91   Ht 1.525 m (5' 0.04\")   Wt 116.8 kg (257 lb 6.4 oz)   LMP 10/16/2024 (Approximate)   BMI 50.20 kg/m    Appears well but concerned about her ongoing health  Marked hirsutism of face and arms - she states this is better since starting OCPs this year  Acanthosis nigricans of neck, axilla  Lungs CTA  CV RRR  Pelvic " declined      Assessment/Plan:  Androstenedione  Bone density  FSH  Insulin, fasting glucose  LH  Serum 17- hydroxyprogesterone  Testosterone  HbA1C  Liver enzymes  Fasting lipid profile    Consider pelvic ultrasound but we need to discuss if she would tolerate vaginal probe  She desires to reconnect with Dr. Brewer and will see if we can add her to [facility] CAH clinic   She will need help to kathy DEXA as she had to leave our visit for another visit  She would like a new primary doc who won't see her as a burden    Jami Gutierrez MD

## 2024-11-12 NOTE — TELEPHONE ENCOUNTER
Spoke with mother, Evelyn, JULIANC verified. Relayed provider message below regarding referral. Mother reports that she feels that patient is quickly declining and more symptomatic, but has been waiting on hematology and liver specialists and doesn't feel as though this can wait. Verbalizes that she knows labs look okay, but patient's symptoms are worsening.     Advised that they could discuss this tomorrow at appointment with PCP.     Noa Salcedo, RN, BSN

## 2024-11-13 ENCOUNTER — OFFICE VISIT (OUTPATIENT)
Dept: OBGYN | Facility: CLINIC | Age: 29
End: 2024-11-13
Attending: INTERNAL MEDICINE
Payer: COMMERCIAL

## 2024-11-13 ENCOUNTER — OFFICE VISIT (OUTPATIENT)
Dept: FAMILY MEDICINE | Facility: CLINIC | Age: 29
End: 2024-11-13
Payer: COMMERCIAL

## 2024-11-13 VITALS
HEIGHT: 61 IN | RESPIRATION RATE: 18 BRPM | TEMPERATURE: 97.6 F | HEART RATE: 78 BPM | BODY MASS INDEX: 48.71 KG/M2 | WEIGHT: 258 LBS | DIASTOLIC BLOOD PRESSURE: 80 MMHG | SYSTOLIC BLOOD PRESSURE: 126 MMHG

## 2024-11-13 VITALS
HEIGHT: 60 IN | BODY MASS INDEX: 50.53 KG/M2 | DIASTOLIC BLOOD PRESSURE: 88 MMHG | WEIGHT: 257.4 LBS | HEART RATE: 91 BPM | SYSTOLIC BLOOD PRESSURE: 123 MMHG

## 2024-11-13 DIAGNOSIS — A07.2: ICD-10-CM

## 2024-11-13 DIAGNOSIS — E66.01 MORBID OBESITY (H): ICD-10-CM

## 2024-11-13 DIAGNOSIS — E11.65 TYPE 2 DIABETES MELLITUS WITH HYPERGLYCEMIA, WITHOUT LONG-TERM CURRENT USE OF INSULIN (H): ICD-10-CM

## 2024-11-13 DIAGNOSIS — G56.03 BILATERAL CARPAL TUNNEL SYNDROME: ICD-10-CM

## 2024-11-13 DIAGNOSIS — G43.809 OTHER MIGRAINE WITHOUT STATUS MIGRAINOSUS, NOT INTRACTABLE: ICD-10-CM

## 2024-11-13 DIAGNOSIS — Z14.8 CARRIER OF HEMOCHROMATOSIS HFE GENE MUTATION: ICD-10-CM

## 2024-11-13 DIAGNOSIS — E25.9 CAH 21-OH (CONGENITAL ADRENAL HYPERPLASIA), LATE ONSET (H): Primary | ICD-10-CM

## 2024-11-13 DIAGNOSIS — E25.9 CAH 21-OH (CONGENITAL ADRENAL HYPERPLASIA), LATE ONSET (H): ICD-10-CM

## 2024-11-13 DIAGNOSIS — K29.50 CHRONIC GASTRITIS WITHOUT BLEEDING, UNSPECIFIED GASTRITIS TYPE: Primary | ICD-10-CM

## 2024-11-13 LAB
ALBUMIN SERPL BCG-MCNC: 4 G/DL (ref 3.5–5.2)
ALP SERPL-CCNC: 58 U/L (ref 40–150)
ALT SERPL W P-5'-P-CCNC: 19 U/L (ref 0–50)
ANION GAP SERPL CALCULATED.3IONS-SCNC: 16 MMOL/L (ref 7–15)
AST SERPL W P-5'-P-CCNC: 21 U/L (ref 0–45)
BILIRUB SERPL-MCNC: 0.2 MG/DL
BUN SERPL-MCNC: 12.8 MG/DL (ref 6–20)
CALCIUM SERPL-MCNC: 9.6 MG/DL (ref 8.8–10.4)
CHLORIDE SERPL-SCNC: 108 MMOL/L (ref 98–107)
CHOLEST SERPL-MCNC: 143 MG/DL
CREAT SERPL-MCNC: 0.79 MG/DL (ref 0.51–0.95)
EGFRCR SERPLBLD CKD-EPI 2021: >90 ML/MIN/1.73M2
FASTING STATUS PATIENT QL REPORTED: NORMAL
GLUCOSE SERPL-MCNC: 59 MG/DL (ref 70–99)
HCO3 SERPL-SCNC: 16 MMOL/L (ref 22–29)
HDLC SERPL-MCNC: 58 MG/DL
LDLC SERPL CALC-MCNC: 67 MG/DL
NONHDLC SERPL-MCNC: 85 MG/DL
POTASSIUM SERPL-SCNC: 5 MMOL/L (ref 3.4–5.3)
PROT SERPL-MCNC: 7.6 G/DL (ref 6.4–8.3)
SODIUM SERPL-SCNC: 140 MMOL/L (ref 135–145)
TRIGL SERPL-MCNC: 88 MG/DL

## 2024-11-13 PROCEDURE — 82533 TOTAL CORTISOL: CPT | Mod: 90 | Performed by: FAMILY MEDICINE

## 2024-11-13 PROCEDURE — G0463 HOSPITAL OUTPT CLINIC VISIT: HCPCS | Performed by: OBSTETRICS & GYNECOLOGY

## 2024-11-13 PROCEDURE — 82626 DEHYDROEPIANDROSTERONE: CPT | Mod: 90 | Performed by: FAMILY MEDICINE

## 2024-11-13 PROCEDURE — 99214 OFFICE O/P EST MOD 30 MIN: CPT | Performed by: FAMILY MEDICINE

## 2024-11-13 PROCEDURE — 36415 COLL VENOUS BLD VENIPUNCTURE: CPT | Performed by: FAMILY MEDICINE

## 2024-11-13 PROCEDURE — 84270 ASSAY OF SEX HORMONE GLOBUL: CPT | Performed by: FAMILY MEDICINE

## 2024-11-13 PROCEDURE — 84403 ASSAY OF TOTAL TESTOSTERONE: CPT | Mod: 90 | Performed by: FAMILY MEDICINE

## 2024-11-13 PROCEDURE — 99000 SPECIMEN HANDLING OFFICE-LAB: CPT | Performed by: FAMILY MEDICINE

## 2024-11-13 PROCEDURE — 83498 ASY HYDROXYPROGESTERONE 17-D: CPT | Mod: 90 | Performed by: FAMILY MEDICINE

## 2024-11-13 PROCEDURE — 82633 DESOXYCORTICOSTERONE: CPT | Mod: 90 | Performed by: FAMILY MEDICINE

## 2024-11-13 PROCEDURE — 84144 ASSAY OF PROGESTERONE: CPT | Mod: 90 | Performed by: FAMILY MEDICINE

## 2024-11-13 PROCEDURE — 80061 LIPID PANEL: CPT | Performed by: FAMILY MEDICINE

## 2024-11-13 PROCEDURE — 84143 ASSAY OF 17-HYDROXYPREGNENO: CPT | Mod: 90 | Performed by: FAMILY MEDICINE

## 2024-11-13 PROCEDURE — 82634 DEOXYCORTISOL: CPT | Mod: 90 | Performed by: FAMILY MEDICINE

## 2024-11-13 PROCEDURE — 82157 ASSAY OF ANDROSTENEDIONE: CPT | Mod: 90 | Performed by: FAMILY MEDICINE

## 2024-11-13 RX ORDER — GABAPENTIN 300 MG/1
300 CAPSULE ORAL 2 TIMES DAILY
Qty: 180 CAPSULE | Refills: 0 | Status: SHIPPED | OUTPATIENT
Start: 2024-11-13 | End: 2025-02-11

## 2024-11-13 RX ORDER — PANTOPRAZOLE SODIUM 40 MG/1
40 TABLET, DELAYED RELEASE ORAL DAILY
Qty: 90 TABLET | Refills: 2 | Status: SHIPPED | OUTPATIENT
Start: 2024-11-13

## 2024-11-13 ASSESSMENT — PAIN SCALES - GENERAL: PAINLEVEL_OUTOF10: EXTREME PAIN (8)

## 2024-11-13 NOTE — LETTER
11/13/2024       RE: Anh Flores  [address] [facility] [address]     Dear Colleague,    Thank you for referring your patient, Anh Flores, to the [facility] [place] at [facility]. Please see a copy of my visit note below.    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 She follows with Dr. Cronin for diabetes care, last seen 10/8/24.     Previously seen by Dr. 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 Initially treated with dexamethasone until 2014, when dexamethasone was discontinued. She used to be medicated with metformin, spironolactone and birth control pills. They were discontinued around 2019. The patient reestablished care in April 2023, when she was started on treatment with birth control pills and metformin as the evaluation for nonclassical CAH revealed normal gonadotropins and normal androgens.     She has been struggling with not feeling well at all and states she felt best when on steroids for carpal tunnel.     She notes she feels traumatized by medical providers in the past when she was forced to have multiple genital exams at an early age for CAH. She declines any pelvic exam today.     GYN History  - LMP: Patient's last menstrual period was 10/14/2024 (approximate).  Menses are regular on OCP    OBHx  OB History   No obstetric history on file.     Past Medical History:   Diagnosis Date     CAH (congenital adrenal hyperplasia) 2/9/2010    Followed by Dr. Brewer; next follow up 1.2011.  Metformin increased to 850 mg twice a day.      Diabetes mellitus, type 2 (H) 10/19/2020     Vitamin D deficiency 2/9/2010     Past Surgical History:   Procedure Laterality  Date     CHOLECYSTECTOMY      She was in 8th grade      COLONOSCOPY N/A 9/16/2024    Procedure: COLONOSCOPY, WITH BIOPSY;  Surgeon: Bacilio Yancey MD;  Location:  GI     ESOPHAGOSCOPY, GASTROSCOPY, DUODENOSCOPY (EGD), COMBINED N/A 9/16/2024    Procedure: ESOPHAGOGASTRODUODENOSCOPY, WITH BIOPSY;  Surgeon: Bacilio Yancey MD;  Location:  GI       Current Outpatient Medications:      alcohol swab prep pads, Use to swab area of injection/anthony as directed., Disp: 100 each, Rfl: 3     Atogepant (QULIPTA) 60 MG TABS, Take 60 mg by mouth., Disp: , Rfl:      atorvastatin (LIPITOR) 10 MG tablet, Take 1 tablet (10 mg) by mouth daily., Disp: 90 tablet, Rfl: 2     blood glucose (NO BRAND SPECIFIED) test strip, Use to test blood sugar three times daily or as directed. Preferred blood glucose meter OR supplies to accompany: Blood Glucose Monitor Brands: per insurance., Disp: 100 strip, Rfl: 6     blood glucose monitoring (NO BRAND SPECIFIED) meter device kit, Use to test blood sugar three times daily or as directed. Preferred blood glucose meter OR supplies to accompany: Blood Glucose Monitor Brands: per insurance., Disp: 1 kit, Rfl: 0     cholestyramine light (QUESTRAN) 4 GM packet, Take 1 packet (4 g) by mouth 2 times daily (with meals) for 180 days, Disp: 180 packet, Rfl: 1     Continuous Glucose Sensor (FREESTYLE AYSE 3 PLUS SENSOR) MISC, Use 1 sensor every 15 days. Use to read blood sugars per 's instructions., Disp: 6 each, Rfl: 3     Continuous Glucose Sensor (FREESTYLE AYSE 3 SENSOR) [facility], 1 each by Other route every 14 days., Disp: 6 each, Rfl: 3     fluticasone (FLONASE) 50 MCG/ACT nasal spray, Spray 1 spray into both nostrils daily, Disp: 16 g, Rfl: 3     gabapentin (NEURONTIN) 300 MG capsule, Take 1 capsule (300 mg) by mouth at bedtime., Disp: 90 capsule, Rfl: 1     insulin pen needle (ULTICARE MICRO) 32G X 4 MM miscellaneous, Use 1 pen needles daily or as directed., Disp:  "100 each, Rfl: 3     metFORMIN (GLUCOPHAGE XR) 500 MG 24 hr tablet, Take 4 tablets (2,000 mg) by mouth daily (with dinner)., Disp: 360 tablet, Rfl: 3     norgestimate-ethinyl estradiol (ORTHO-CYCLEN) 0.25-35 MG-MCG tablet, Take 1 tablet by mouth daily, Disp: 84 tablet, Rfl: 3     ondansetron (ZOFRAN ODT) 4 MG ODT tab, Take 1 tablet (4 mg) by mouth every 8 hours as needed for nausea, Disp: 30 tablet, Rfl: 2     rimegepant (NURTEC) 75 MG ODT tablet, Place 75 mg under the tongue every 48 hours, Disp: , Rfl:      semaglutide (OZEMPIC) 2 MG/3ML pen, Inject 0.5 mg subcutaneously every 7 days., Disp: 9 mL, Rfl: 3     SUMAtriptan (IMITREX) 50 MG tablet, Take 2 tablets (100 mg) by mouth at onset of headache for migraine. May repeat in 2 hours. Max 4 tablets/24 hours., Disp: , Rfl:      thin (NO BRAND SPECIFIED) lancets, Use with lanceting device. To accompany: Blood Glucose Monitor Brands: per insurance., Disp: 100 each, Rfl: 6     topiramate (TOPAMAX) 25 MG tablet, TAKE TWO TABLETS BY MOUTH EVERY NIGHT AT BEDTIME X 7 DAYS, THEN ONE IN THE AM AND TWO EVERY NIGHT AT BEDTIME X 7 DYAS, THEN TWO TWICE DAILY, Disp: , Rfl:      UBRELVY 100 MG tablet, Take 100 mg by mouth at onset of headache., Disp: , Rfl:   Allergies   Allergen Reactions     Other Environmental Allergy      Victoza [Liraglutide] Headache, Hives and Itching     Family History   Problem Relation Age of Onset     Neurologic Disorder Sister 16        migraines     Cerebrovascular Disease No family hx of      Alzheimer Disease No family hx of      Glaucoma No family hx of      Macular Degeneration No family hx of      Social History     Tobacco Use     Smoking status: Never     Passive exposure: Never     Smokeless tobacco: Never   Vaping Use     Vaping status: Never Used   Substance Use Topics     Alcohol use: No     Drug use: No     ROS: 10-Point ROS negative except as noted in HPI    Physical Exam    /88   Pulse 91   Ht 1.525 m (5' 0.04\")   Wt 116.8 kg " (257 lb 6.4 oz)   LMP 10/16/2024 (Approximate)   BMI 50.20 kg/m    Appears well but concerned about her ongoing health  Marked hirsutism of face and arms - she states this is better since starting OCPs this year  Acanthosis nigricans of neck, axilla  Lungs CTA  CV RRR  Pelvic declined      Assessment/Plan:  Androstenedione  Bone density  FSH  Insulin, fasting glucose  LH  Serum 17- hydroxyprogesterone  Testosterone  HbA1C  Liver enzymes  Fasting lipid profile    Consider pelvic ultrasound but we need to discuss if she would tolerate vaginal probe  She desires to reconnect with Dr. Brewer and will see if we can add her to December CAH clinic   She will need help to kathy DEXA as she had to leave our visit for another visit  She would like a new primary doc who won't see her as a burden    Jami Gutierrez MD          Again, thank you for allowing me to participate in the care of your patient.      Sincerely,    Jami Gutierrez MD

## 2024-11-13 NOTE — PATIENT INSTRUCTIONS
Thank you for trusting us with your care!   Please be aware, if you are on Mychart, you may see your results prior to your providers review. If labs are abnormal, we will call or message you on [employer] with a follow up plan.    If you need to contact us for questions about:  Symptoms, Scheduling & Medical Questions; Non-urgent (2-3 day response) i.Meter message, Urgent (needing response today) [phone] (if after 3:30pm next day response)   Prescriptions: Please call your Pharmacy   Billing: [facility] [phone] or [facility]:[phone]

## 2024-11-13 NOTE — PROGRESS NOTES
Assessment & Plan     Chronic gastritis without bleeding, unspecified gastritis type  I switched her famotidine to protonix as famotidine wasn't as helpful  - pantoprazole (PROTONIX) 40 MG EC tablet; Take 1 tablet (40 mg) by mouth daily.    Bilateral carpal tunnel syndrome  Stable, continue present management  - gabapentin (NEURONTIN) 300 MG capsule; Take 1 capsule (300 mg) by mouth 2 times daily.    Other migraine without status migrainosus, not intractable  Established with Neurology   - gabapentin (NEURONTIN) 300 MG capsule; Take 1 capsule (300 mg) by mouth 2 times daily.    Carrier of hemochromatosis HFE gene mutation      CAH 21-OH (congenital adrenal hyperplasia), late onset (H)  Established with OBGYN/endocrinology. Appreciate input and expertise.  - CAH Steroid Panel Complete  - 17 OH progesterone  - Testosterone Free and Total  - Lipid Profile        MED REC REQUIRED  Post Medication Reconciliation Status: discharge medications reconciled, continue medications without change        Tanika Kamara is a 29 year old, presenting for the following health issues:  ER F/U      11/13/2024     2:02 PM   Additional Questions   Roomed by VE         11/13/2024     2:02 PM   Patient Reported Additional Medications   Patient reports taking the following new medications Ajovy     [facility]       ED/UC Followup:    Facility:  [facility]   Date of visit: 11/9/24  Reason for visit: Chest Pain  Current Status: still having pain    Chest pain has improved but she still complains of RUQ pain, See below. She is established with Gastroenterology [facility] and is scheduled for a gastric emptying exam. I reviewed her recent EGD/colonoscopy with the patient.     Narrative & Impression   EXAM: CT ABDOMEN PELVIS W/O CONTRAST  LOCATION: [facility]  DATE: 9/3/2024     INDICATION:  Diarrhea, unspecified type, Abdominal pain, generalized  COMPARISON: CT abdomen and pelvis 8/21/2007  TECHNIQUE: CT scan of  "the abdomen and pelvis was performed without IV contrast. Multiplanar reformats were obtained. Dose reduction techniques were used.  CONTRAST: None.     FINDINGS:   LOWER CHEST: Normal.     HEPATOBILIARY: Slightly nodular liver undersurface suspicious for fibrosis. Cholecystectomy.     PANCREAS: Normal.     SPLEEN: Normal.     ADRENAL GLANDS: Normal.     KIDNEYS/BLADDER: Normal.     BOWEL: No obstruction or inflammatory change.     LYMPH NODES: Normal.     VASCULATURE: Normal.     PELVIC ORGANS: Normal.     MUSCULOSKELETAL: Unremarkable                                                                      IMPRESSION:   1.  Slightly nodular liver undersurface suspicious for fibrosis. Otherwise unremarkable noncontrast CT abdomen and pelvis.          Review of Systems  Constitutional, HEENT, cardiovascular, pulmonary, GI, , musculoskeletal, neuro, skin, endocrine and psych systems are negative, except as otherwise noted.      Objective    /80 (BP Location: Right arm, Patient Position: Chair, Cuff Size: Adult Regular)   Pulse 78   Temp 97.6  F (36.4  C) (Temporal)   Resp 18   Ht 1.543 m (5' 0.75\")   Wt 117 kg (258 lb)   LMP 10/16/2024 (Approximate)   Breastfeeding No   BMI 49.15 kg/m    Body mass index is 49.15 kg/m .  Physical Exam   GENERAL: alert and no distress  EYES: Eyes grossly normal to inspection, PERRL and conjunctivae and sclerae normal  HENT: ear canals and TM's normal, nose and mouth without ulcers or lesions  NECK: no adenopathy, no asymmetry, masses, or scars  RESP: lungs clear to auscultation - no rales, rhonchi or wheezes  CV: regular rate and rhythm, normal S1 S2, no S3 or S4, no murmur, click or rub, no peripheral edema  ABDOMEN: soft, nontender, no hepatosplenomegaly, no masses and bowel sounds normal  MS: no gross musculoskeletal defects noted, no edema  NEURO: Normal strength and tone, mentation intact and speech normal  PSYCH: mentation appears normal, anxious, and judgement and " insight impaired    Lab on 11/10/2024   Component Date Value Ref Range Status    Ferritin 11/09/2024 303 (H)  6 - 175 ng/mL Final    Iron 11/09/2024 99  37 - 145 ug/dL Final    Iron Binding Capacity 11/09/2024 355  240 - 430 ug/dL Final    Iron Sat Index 11/09/2024 28  15 - 46 % Final    WBC Count 11/09/2024 9.3  4.0 - 11.0 10e3/uL Final    RBC Count 11/09/2024 4.52  3.80 - 5.20 10e6/uL Final    Hemoglobin 11/09/2024 14.2  11.7 - 15.7 g/dL Final    Hematocrit 11/09/2024 41.4  35.0 - 47.0 % Final    MCV 11/09/2024 92  78 - 100 fL Final    MCH 11/09/2024 31.4  26.5 - 33.0 pg Final    MCHC 11/09/2024 34.3  31.5 - 36.5 g/dL Final    RDW 11/09/2024 13.8  10.0 - 15.0 % Final    Platelet Count 11/09/2024 332  150 - 450 10e3/uL Final    % Neutrophils 11/09/2024 62  % Final    % Lymphocytes 11/09/2024 30  % Final    % Monocytes 11/09/2024 4  % Final    % Eosinophils 11/09/2024 4  % Final    % Basophils 11/09/2024 0  % Final    % Immature Granulocytes 11/09/2024 0  % Final    NRBCs per 100 WBC 11/09/2024 0  <1 /100 Final    Absolute Neutrophils 11/09/2024 5.7  1.6 - 8.3 10e3/uL Final    Absolute Lymphocytes 11/09/2024 2.8  0.8 - 5.3 10e3/uL Final    Absolute Monocytes 11/09/2024 0.4  0.0 - 1.3 10e3/uL Final    Absolute Eosinophils 11/09/2024 0.3  0.0 - 0.7 10e3/uL Final    Absolute Basophils 11/09/2024 0.0  0.0 - 0.2 10e3/uL Final    Absolute Immature Granulocytes 11/09/2024 0.0  <=0.4 10e3/uL Final    Absolute NRBCs 11/09/2024 0.0  10e3/uL Final    Sodium 11/09/2024 140  135 - 145 mmol/L Final    Potassium 11/09/2024 5.0  3.4 - 5.3 mmol/L Final    Carbon Dioxide (CO2) 11/09/2024 16 (L)  22 - 29 mmol/L Final    Anion Gap 11/09/2024 16 (H)  7 - 15 mmol/L Final    Urea Nitrogen 11/09/2024 12.8  6.0 - 20.0 mg/dL Final    Creatinine 11/09/2024 0.79  0.51 - 0.95 mg/dL Final    GFR Estimate 11/09/2024 >90  >60 mL/min/1.73m2 Final    eGFR calculated using 2021 CKD-EPI equation.    Calcium 11/09/2024 9.6  8.8 - 10.4 mg/dL Final     Reference intervals for this test were updated on 7/16/2024 to reflect our healthy population more accurately. There may be differences in the flagging of prior results with similar values performed with this method. Those prior results can be interpreted in the context of the updated reference intervals.    Chloride 11/09/2024 108 (H)  98 - 107 mmol/L Final    Glucose 11/09/2024 59 (L)  70 - 99 mg/dL Final    Alkaline Phosphatase 11/09/2024 58  40 - 150 U/L Final    AST 11/09/2024 21  0 - 45 U/L Final    ALT 11/09/2024 19  0 - 50 U/L Final    Protein Total 11/09/2024 7.6  6.4 - 8.3 g/dL Final    Albumin 11/09/2024 4.0  3.5 - 5.2 g/dL Final    Bilirubin Total 11/09/2024 0.2  <=1.2 mg/dL Final           Signed Electronically by: Bita Veronica MD

## 2024-11-14 ENCOUNTER — PATIENT OUTREACH (OUTPATIENT)
Dept: CARE COORDINATION | Facility: CLINIC | Age: 29
End: 2024-11-14
Payer: COMMERCIAL

## 2024-11-14 PROBLEM — A07.2: Status: ACTIVE | Noted: 2024-11-14

## 2024-11-14 LAB — SHBG SERPL-SCNC: 185 NMOL/L (ref 30–135)

## 2024-11-15 ENCOUNTER — THERAPY VISIT (OUTPATIENT)
Dept: SLEEP MEDICINE | Facility: CLINIC | Age: 29
End: 2024-11-15
Payer: COMMERCIAL

## 2024-11-15 DIAGNOSIS — G47.33 OBSTRUCTIVE SLEEP APNEA: ICD-10-CM

## 2024-11-15 NOTE — CONFIDENTIAL NOTE
DIAGNOSIS:     Fatty liver   Fibrosis of liver      Appt Date:  12.18.2024     NOTES STATUS DETAILS   OFFICE NOTE from referring provider Internal 09.10.2024  Bita Veronica MD    MEDICATION LIST Internal    IMAGING     ENDOSCOPY (IF AVAILABLE) Internal 09.16.2024   COLONOSCOPY (IF AVAILABLE) Internal 09.16.2024   LABS     HEPATIC PANEL (LIVER PANEL) Care Everywhere 12.13.2023   BASIC METABOLIC PANEL Internal 04.05.2024   COMPLETE METABOLIC PANEL Internal 11.09.2024   COMPLETE BLOOD COUNT (CBC) Internal 11.09.2024   HEPATITIS C ANTIBODY Internal 01.30.2024

## 2024-11-17 LAB
TESTOST FREE SERPL-MCNC: 0.2 NG/DL
TESTOST SERPL-MCNC: 41 NG/DL (ref 8–60)

## 2024-11-21 LAB
17OHP SERPL-MCNC: 194 NG/DL
SCANNED LAB RESULT: NORMAL

## 2024-11-25 ENCOUNTER — HOSPITAL ENCOUNTER (OUTPATIENT)
Dept: NUCLEAR MEDICINE | Facility: CLINIC | Age: 29
Setting detail: NUCLEAR MEDICINE
Discharge: HOME | End: 2024-11-25
Attending: FAMILY MEDICINE | Admitting: FAMILY MEDICINE
Payer: COMMERCIAL

## 2024-11-25 DIAGNOSIS — K52.9 CHRONIC DIARRHEA: ICD-10-CM

## 2024-11-25 DIAGNOSIS — R11.15 CYCLICAL VOMITING: ICD-10-CM

## 2024-11-25 DIAGNOSIS — G89.29 CHRONIC ABDOMINAL PAIN: ICD-10-CM

## 2024-11-25 DIAGNOSIS — R10.9 CHRONIC ABDOMINAL PAIN: ICD-10-CM

## 2024-11-25 PROCEDURE — 343N000001 HC RX 343 MED OP 636: Performed by: FAMILY MEDICINE

## 2024-11-25 PROCEDURE — 78264 GASTRIC EMPTYING IMG STUDY: CPT

## 2024-11-25 PROCEDURE — A9541 TC99M SULFUR COLLOID: HCPCS | Performed by: FAMILY MEDICINE

## 2024-11-25 RX ADMIN — Medication 2.1 MILLICURIE: at 08:18

## 2024-11-28 ENCOUNTER — TRANSFERRED RECORDS (OUTPATIENT)
Dept: HEALTH INFORMATION MANAGEMENT | Facility: CLINIC | Age: 29
End: 2024-11-28
Payer: COMMERCIAL

## 2024-12-02 NOTE — TELEPHONE ENCOUNTER
RECORDS STATUS - ALL OTHER DIAGNOSIS      RECORDS RECEIVED FROM: [facility] - Internal records   DATE RECEIVED: 12/2

## 2024-12-03 LAB — SLPCOMP: NORMAL

## 2024-12-04 ENCOUNTER — ONCOLOGY VISIT (OUTPATIENT)
Dept: ONCOLOGY | Facility: CLINIC | Age: 29
End: 2024-12-04
Attending: FAMILY MEDICINE
Payer: COMMERCIAL

## 2024-12-04 ENCOUNTER — PRE VISIT (OUTPATIENT)
Dept: ONCOLOGY | Facility: CLINIC | Age: 29
End: 2024-12-04
Payer: COMMERCIAL

## 2024-12-04 VITALS
HEIGHT: 61 IN | SYSTOLIC BLOOD PRESSURE: 130 MMHG | TEMPERATURE: 98.4 F | RESPIRATION RATE: 16 BRPM | BODY MASS INDEX: 49.84 KG/M2 | DIASTOLIC BLOOD PRESSURE: 87 MMHG | WEIGHT: 264 LBS | HEART RATE: 77 BPM | OXYGEN SATURATION: 95 %

## 2024-12-04 DIAGNOSIS — R79.89 ELEVATED FERRITIN: ICD-10-CM

## 2024-12-04 PROCEDURE — G0463 HOSPITAL OUTPT CLINIC VISIT: HCPCS | Performed by: INTERNAL MEDICINE

## 2024-12-04 PROCEDURE — 99204 OFFICE O/P NEW MOD 45 MIN: CPT | Performed by: INTERNAL MEDICINE

## 2024-12-04 ASSESSMENT — PAIN SCALES - GENERAL: PAINLEVEL_OUTOF10: EXTREME PAIN (8)

## 2024-12-04 NOTE — LETTER
"12/4/2024      Anh Flores  [address] [facility] [address]      Dear Colleague,    Thank you for referring your patient, Anh Flores, to the [facility]. Please see a copy of my visit note below.    Oncology Rooming Note    December 4, 2024 2:53 PM   Anh Flores is a 29 year old female who presents for:    Chief Complaint   Patient presents with     Hematology     Elevated ferritin - New consult     Initial Vitals: /87 (BP Location: Right arm, Patient Position: Sitting, Cuff Size: Adult Regular)   Pulse 77   Temp 98.4  F (36.9  C) (Tympanic)   Resp 16   Ht 1.537 m (5' 0.5\")   Wt 119.7 kg (264 lb)   LMP 10/16/2024 (Approximate)   SpO2 95%   BMI 50.71 kg/m   Estimated body mass index is 50.71 kg/m  as calculated from the following:    Height as of this encounter: 1.537 m (5' 0.5\").    Weight as of this encounter: 119.7 kg (264 lb). Body surface area is 2.26 meters squared.  Extreme Pain (8) Comment: Data Unavailable   Patient's last menstrual period was 10/16/2024 (approximate).  Allergies reviewed: Yes  Medications reviewed: Yes    Medications: Medication refills not needed today.  Pharmacy name entered into NGRAIN: [id number] - [place], [place] - [address]    Frailty Screening:   Is the patient here for a new oncology consult visit in cancer care? 2. No      Clinical concerns:  New consult      Parul Clifford CMA              [facility] Hematology and Oncology Consult Note    Patient: Anh Flores  MRN: [id number]  Date of Service: Dec 4, 2024       Reason for Visit    I was consulted by   Humza Pavon MD     For evaluation of an elevated ferritin.      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  Her iron " "saturation is normal at 28%.  9/20/2024 her ferritin is 303.  10/18/2024 ferritin was reported at 640.  She had  not donated or lost any blood in that time.  This fluctuation in ferritin and the normal iron saturation are consistent with ferritin elevation related to inflammation and not to iron storage disease.  No further hematologic investigation or treatment is warranted.        ______________________________________________________________________________        History of Present Illness    Ms. Anh Flores is a 29 year old woman with a history of obesity and diabetes presently on Ozempic.  Investigation showed elevated liver function tests.  Further investigation showed elevated serum ferritin with normal iron saturation.  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  CBC and CMP from 11/9/2024 are entirely within normal limits.  No family members have been tested positive for the gene however her father has not been tested.  She does not smoke cigarettes or use alcohol.    Review of systems.  Pertinent Findings are included in the History of Present Illness    Physical Exam    /87 (BP Location: Right arm, Patient Position: Sitting, Cuff Size: Adult Regular)   Pulse 77   Temp 98.4  F (36.9  C) (Tympanic)   Resp 16   Ht 1.537 m (5' 0.5\")   Wt 119.7 kg (264 lb)   LMP 10/16/2024 (Approximate)   SpO2 95%   BMI 50.71 kg/m       GENERAL APPEARANCE: Healthy appearing woman in no apparent distress.  HEAD: Atraumatic; normocephalic; without lesions.  EYES: Conjunctiva, corneas and eyelids normal; pupils equal, round, reactive to light; No Icterus.  MOUTH/OROPHARYNX: Oral mucosa intact  NECK: Supple with no nodes.  LUNGS:  Clear to auscultation and percussion with no extra sounds.  HEART: Regular rhythm and rate; S1 and S2 normal; no murmurs noted.  ABDOMEN: Soft; no masses or tenderness, no hepatosplenomegaly (exam limited by body habitus).  NEUROLOGIC: Alert and " oriented.  No obvious focal findings.  EXTREMITIES: No cyanosis, or edema.  SKIN: No abnormal bruising or bleeding. No suspicious lesions noted on exposed skin.  PSYCHIATRIC: Mental status normal; no apparent psychiatric issues    Medications:    Current Outpatient Medications   Medication Sig Dispense Refill     alcohol swab prep pads Use to swab area of injection/anthony as directed. 100 each 3     Atogepant (QULIPTA) 60 MG TABS Take 60 mg by mouth.       atorvastatin (LIPITOR) 10 MG tablet Take 1 tablet (10 mg) by mouth daily. 90 tablet 2     blood glucose (NO BRAND SPECIFIED) test strip Use to test blood sugar three times daily or as directed. Preferred blood glucose meter OR supplies to accompany: Blood Glucose Monitor Brands: per insurance. 100 strip 6     blood glucose monitoring (NO BRAND SPECIFIED) meter device kit Use to test blood sugar three times daily or as directed. Preferred blood glucose meter OR supplies to accompany: Blood Glucose Monitor Brands: per insurance. 1 kit 0     Continuous Glucose Sensor (FREESTYLE AYSE 3 PLUS SENSOR) MISC Use 1 sensor every 15 days. Use to read blood sugars per 's instructions. 6 each 3     Continuous Glucose Sensor (FREESTYLE AYSE 3 SENSOR) [facility] 1 each by Other route every 14 days. 6 each 3     fluticasone (FLONASE) 50 MCG/ACT nasal spray Spray 1 spray into both nostrils daily 16 g 3     gabapentin (NEURONTIN) 300 MG capsule Take 1 capsule (300 mg) by mouth 2 times daily. 180 capsule 0     insulin pen needle (ULTICARE MICRO) 32G X 4 MM miscellaneous Use 1 pen needles daily or as directed. 100 each 3     metFORMIN (GLUCOPHAGE XR) 500 MG 24 hr tablet Take 4 tablets (2,000 mg) by mouth daily (with dinner). 360 tablet 3     norgestimate-ethinyl estradiol (ORTHO-CYCLEN) 0.25-35 MG-MCG tablet Take 1 tablet by mouth daily 84 tablet 3     ondansetron (ZOFRAN ODT) 4 MG ODT tab Take 1 tablet (4 mg) by mouth every 8 hours as needed for nausea 30 tablet 2      pantoprazole (PROTONIX) 40 MG EC tablet Take 1 tablet (40 mg) by mouth daily. 90 tablet 2     rimegepant (NURTEC) 75 MG ODT tablet Place 75 mg under the tongue every 48 hours       semaglutide (OZEMPIC) 2 MG/3ML pen Inject 0.5 mg subcutaneously every 7 days. 9 mL 3     SUMAtriptan (IMITREX) 50 MG tablet Take 2 tablets (100 mg) by mouth at onset of headache for migraine. May repeat in 2 hours. Max 4 tablets/24 hours.       thin (NO BRAND SPECIFIED) lancets Use with lanceting device. To accompany: Blood Glucose Monitor Brands: per insurance. 100 each 6     topiramate (TOPAMAX) 25 MG tablet TAKE TWO TABLETS BY MOUTH EVERY NIGHT AT BEDTIME X 7 DAYS, THEN ONE IN THE AM AND TWO EVERY NIGHT AT BEDTIME X 7 DYAS, THEN TWO TWICE DAILY       UBRELVY 100 MG tablet Take 100 mg by mouth at onset of headache.       No current facility-administered medications for this visit.           Past History    Past Medical History:   Diagnosis Date     CAH (congenital adrenal hyperplasia) 2/9/2010    Followed by Dr. Brewer; next follow up 1.2011.  Metformin increased to 850 mg twice a day.      Diabetes mellitus, type 2 (H) 10/19/2020     Vitamin D deficiency 2/9/2010     Past Surgical History:   Procedure Laterality Date     CHOLECYSTECTOMY      She was in 8th grade      COLONOSCOPY N/A 9/16/2024    Procedure: COLONOSCOPY, WITH BIOPSY;  Surgeon: Bacilio Yancey MD;  Location:  GI     ESOPHAGOSCOPY, GASTROSCOPY, DUODENOSCOPY (EGD), COMBINED N/A 9/16/2024    Procedure: ESOPHAGOGASTRODUODENOSCOPY, WITH BIOPSY;  Surgeon: Bacilio Yancey MD;  Location:  GI     Allergies   Allergen Reactions     Emgality [Galcanezumab-Gnlm] Itching     Itchiness, bumps     Other Environmental Allergy      Victoza [Liraglutide] Headache, Hives and Itching     Family History   Problem Relation Age of Onset     Neurologic Disorder Sister 16        migraines     Cerebrovascular Disease No family hx of      Alzheimer Disease No family  hx of      Glaucoma No family hx of      Macular Degeneration No family hx of      Social History     Socioeconomic History     Marital status: Single     Spouse name: None     Number of children: 0     Years of education: 14+     Highest education level: None   Occupational History     Employer: [employer]UDENT     Comment: Dog grooming   Tobacco Use     Smoking status: Never     Passive exposure: Never     Smokeless tobacco: Never   Vaping Use     Vaping status: Never Used   Substance and Sexual Activity     Alcohol use: No     Drug use: No     Sexual activity: Never   Social History Narrative    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    Not sexually active and never has been    Jamibabar Gutierrez MD     Social Drivers of Health     Financial Resource Strain: Low Risk  (10/18/2024)    Financial Resource Strain      Within the past 12 months, have you or your family members you live with been unable to get utilities (heat, electricity) when it was really needed?: No   Food Insecurity: Low Risk  (10/18/2024)    Food Insecurity      Within the past 12 months, did you worry that your food would run out before you got money to buy more?: No      Within the past 12 months, did the food you bought just not last and you didn t have money to get more?: No   Transportation Needs: Low Risk  (10/18/2024)    Transportation Needs      Within the past 12 months, has lack of transportation kept you from medical appointments, getting your medicines, non-medical meetings or appointments, work, or from getting things that you need?: No   Physical Activity: Unknown (10/18/2024)    Exercise Vital Sign      Days of Exercise per Week: 2 days   Stress: Stress Concern Present (10/18/2024)    [employer] - Occupational Stress Questionnaire      Feeling of Stress : To some extent   Social Connections: Unknown (10/18/2024)    Social Connection and Isolation Panel [NHANES]      Frequency of Social Gatherings with Friends and  Family: Never   Interpersonal Safety: Low Risk  (10/18/2024)    Interpersonal Safety      Do you feel physically and emotionally safe where you currently live?: Yes      Within the past 12 months, have you been hit, slapped, kicked or otherwise physically hurt by someone?: No      Within the past 12 months, have you been humiliated or emotionally abused in other ways by your partner or ex-partner?: No   Housing Stability: Low Risk  (10/18/2024)    Housing Stability      Do you have housing? : Yes      Are you worried about losing your housing?: No           Lab Results  Recent Results (from the past 720 hours)   Ferritin    Collection Time: 11/09/24  9:01 AM   Result Value Ref Range    Ferritin 303 (H) 6 - 175 ng/mL   Iron and iron binding capacity    Collection Time: 11/09/24  9:01 AM   Result Value Ref Range    Iron 99 37 - 145 ug/dL    Iron Binding Capacity 355 240 - 430 ug/dL    Iron Sat Index 28 15 - 46 %   CBC with platelets and differential    Collection Time: 11/09/24  9:01 AM   Result Value Ref Range    WBC Count 9.3 4.0 - 11.0 10e3/uL    RBC Count 4.52 3.80 - 5.20 10e6/uL    Hemoglobin 14.2 11.7 - 15.7 g/dL    Hematocrit 41.4 35.0 - 47.0 %    MCV 92 78 - 100 fL    MCH 31.4 26.5 - 33.0 pg    MCHC 34.3 31.5 - 36.5 g/dL    RDW 13.8 10.0 - 15.0 %    Platelet Count 332 150 - 450 10e3/uL    % Neutrophils 62 %    % Lymphocytes 30 %    % Monocytes 4 %    % Eosinophils 4 %    % Basophils 0 %    % Immature Granulocytes 0 %    NRBCs per 100 WBC 0 <1 /100    Absolute Neutrophils 5.7 1.6 - 8.3 10e3/uL    Absolute Lymphocytes 2.8 0.8 - 5.3 10e3/uL    Absolute Monocytes 0.4 0.0 - 1.3 10e3/uL    Absolute Eosinophils 0.3 0.0 - 0.7 10e3/uL    Absolute Basophils 0.0 0.0 - 0.2 10e3/uL    Absolute Immature Granulocytes 0.0 <=0.4 10e3/uL    Absolute NRBCs 0.0 10e3/uL   Comprehensive metabolic panel    Collection Time: 11/09/24  9:01 AM   Result Value Ref Range    Sodium 140 135 - 145 mmol/L    Potassium 5.0 3.4 - 5.3 mmol/L     Carbon Dioxide (CO2) 16 (L) 22 - 29 mmol/L    Anion Gap 16 (H) 7 - 15 mmol/L    Urea Nitrogen 12.8 6.0 - 20.0 mg/dL    Creatinine 0.79 0.51 - 0.95 mg/dL    GFR Estimate >90 >60 mL/min/1.73m2    Calcium 9.6 8.8 - 10.4 mg/dL    Chloride 108 (H) 98 - 107 mmol/L    Glucose 59 (L) 70 - 99 mg/dL    Alkaline Phosphatase 58 40 - 150 U/L    AST 21 0 - 45 U/L    ALT 19 0 - 50 U/L    Protein Total 7.6 6.4 - 8.3 g/dL    Albumin 4.0 3.5 - 5.2 g/dL    Bilirubin Total 0.2 <=1.2 mg/dL   EKG 12 lead    Collection Time: 11/09/24  8:03 PM   Result Value Ref Range    Systolic Blood Pressure  mmHg    Diastolic Blood Pressure  mmHg    Ventricular Rate 72 BPM    Atrial Rate 72 BPM    NC Interval 132 ms    QRS Duration 80 ms     ms    QTc 438 ms    P Axis 36 degrees    R AXIS 40 degrees    T Axis -2 degrees    Interpretation ECG       ** Poor data quality, interpretation may be adversely affected  Sinus rhythm  Normal ECG  Unconfirmed report - interpretation of this ECG is computer generated - see medical record for final interpretation  Confirmed by - EMERGENCY ROOM, PHYSICIAN ([id number]),  ILAN HAZEL ([id number]) on 11/10/2024 4:11:42 PM     Troponin T, High Sensitivity    Collection Time: 11/09/24  8:27 PM   Result Value Ref Range    Troponin T, High Sensitivity <6 <=14 ng/L   Nt probnp inpatient (BNP)    Collection Time: 11/09/24  8:27 PM   Result Value Ref Range    N terminal Pro BNP Inpatient <36 0 - 450 pg/mL   HCG qualitative pregnancy (blood)    Collection Time: 11/09/24  8:27 PM   Result Value Ref Range    hCG Serum Qualitative Negative Negative   Comprehensive metabolic panel    Collection Time: 11/09/24  8:27 PM   Result Value Ref Range    Sodium 141 135 - 145 mmol/L    Potassium 4.0 3.4 - 5.3 mmol/L    Carbon Dioxide (CO2) 21 (L) 22 - 29 mmol/L    Anion Gap 14 7 - 15 mmol/L    Urea Nitrogen 12.3 6.0 - 20.0 mg/dL    Creatinine 0.71 0.51 - 0.95 mg/dL    GFR Estimate >90 >60 mL/min/1.73m2    Calcium 9.7 8.8 - 10.4 mg/dL     Chloride 106 98 - 107 mmol/L    Glucose 110 (H) 70 - 99 mg/dL    Alkaline Phosphatase 60 40 - 150 U/L    AST 15 0 - 45 U/L    ALT 17 0 - 50 U/L    Protein Total 7.8 6.4 - 8.3 g/dL    Albumin 4.2 3.5 - 5.2 g/dL    Bilirubin Total 0.2 <=1.2 mg/dL   Lipase    Collection Time: 11/09/24  8:27 PM   Result Value Ref Range    Lipase 30 13 - 60 U/L   CBC with platelets and differential    Collection Time: 11/09/24  8:27 PM   Result Value Ref Range    WBC Count 9.2 4.0 - 11.0 10e3/uL    RBC Count 4.59 3.80 - 5.20 10e6/uL    Hemoglobin 14.4 11.7 - 15.7 g/dL    Hematocrit 42.8 35.0 - 47.0 %    MCV 93 78 - 100 fL    MCH 31.4 26.5 - 33.0 pg    MCHC 33.6 31.5 - 36.5 g/dL    RDW 14.0 10.0 - 15.0 %    Platelet Count 356 150 - 450 10e3/uL    % Neutrophils 51 %    % Lymphocytes 40 %    % Monocytes 5 %    % Eosinophils 4 %    % Basophils 0 %    % Immature Granulocytes 0 %    NRBCs per 100 WBC 0 <1 /100    Absolute Neutrophils 4.6 1.6 - 8.3 10e3/uL    Absolute Lymphocytes 3.7 0.8 - 5.3 10e3/uL    Absolute Monocytes 0.5 0.0 - 1.3 10e3/uL    Absolute Eosinophils 0.4 0.0 - 0.7 10e3/uL    Absolute Basophils 0.0 0.0 - 0.2 10e3/uL    Absolute Immature Granulocytes 0.0 <=0.4 10e3/uL    Absolute NRBCs 0.0 10e3/uL   CAH Steroid Panel Complete    Collection Time: 11/13/24  3:12 PM   Result Value Ref Range    See Scanned Result CAH STEROID PANEL COMPLETE-Scanned    17 OH progesterone    Collection Time: 11/13/24  3:12 PM   Result Value Ref Range    17 OH Progesterone 194 <=630 ng/dL   Lipid Profile    Collection Time: 11/13/24  3:12 PM   Result Value Ref Range    Cholesterol 143 <200 mg/dL    Triglycerides 88 <150 mg/dL    Direct Measure HDL 58 >=50 mg/dL    LDL Cholesterol Calculated 67 <100 mg/dL    Non HDL Cholesterol 85 <130 mg/dL    Patient Fasting > 8hrs? Unknown    Sex Hormone Binding Globulin    Collection Time: 11/13/24  3:12 PM   Result Value Ref Range    Sex Hormone Binding Globulin 185 (H) 30 - 135 nmol/L   Testosterone Free and Total     Collection Time: 11/13/24  3:12 PM   Result Value Ref Range    Free Testosterone Calculated 0.20 ng/dL    Testosterone Total 41 8 - 60 ng/dL   Comprehensive Sleep Study    Collection Time: 11/15/24  7:52 PM   Result Value Ref Range    SLP Comprehensive Sleep           NM Gastric Emptying    Result Date: 11/25/2024  EXAM:  NM GASTRIC EMPTYING, 11/25/2024 12:01 PM HISTORY: Chronic abdominal pain; Chronic abdominal pain; Chronic diarrhea; Cyclical vomiting TECHNIQUE: The patient ingested 2.1 mCi of Tc-99m sulfur colloid in oatmeal. Static images were acquired at approximately 1 hour intervals out through 4 hours using a dual head gamma camera in an anterior and posterior position. The calculation of emptying was based on dual head imaging with geometric mean calculations. A Linear square fit was calculated to the emptying curve to determine the amount of residual activity at each time point. FINDINGS: Percent retention at 60 min = 33%. Percent retention at 120 min = 15%. Percent retention at 180 min = 2%. T 1/2 emptying time =  46 mins.     IMPRESSION: Lower limits of normal gastric emptying.   Near rapid emptying (see normal ranges below). ===================== Reference Range: Gastric emptying - 30 minutes: <70% retention (>30% emptying) suggests rapid emptying. - 60 minutes: <90% retention (>10% emptying) is normal; less than 30% retention (>70% emptying) suggests rapid emptying. - 120 minutes: <60% retention (> 40% emptying) is normal. - 180 minutes: <30% retention (> 70% emptying) is normal. - 240 minutes: <10% retention (> 90% emptying) is normal. Gastric emptying T-1/2: Solid: The normal range is  minutes Liquid only: Normal range is 10-45 minutes. Liquid only-children: At 60 minutes, normal range is 44-58%. Liquid only-infants: At 60 minutes, normal range is 32-64%. I have personally reviewed the examination and initial interpretation and I agree with the findings. MEGAN CLEMENT MD   SYSTEM ID:   [id number]    XR Chest 2 Views    Result Date: 11/9/2024  EXAM: XR CHEST 2 VIEWS LOCATION: [facility] DATE: 11/9/2024 INDICATION: CP COMPARISON: None.     IMPRESSION: Negative chest.       I spent 54 minutes on the patient's visit today.  This included preparation for the visit, face-to-face time with the patient and documentation following the visit.  It did not include teaching or procedure time.    Signed by: Peter E. Friedell, MD          Again, thank you for allowing me to participate in the care of your patient.        Sincerely,        Peter E. Friedell, MD

## 2024-12-04 NOTE — PROGRESS NOTES
"[facility] Hematology and Oncology Consult Note    Patient: Anh Flores  MRN: [id number]  Date of Service: Dec 4, 2024       Reason for Visit    I was consulted by   Humza Pavon MD     For evaluation of an elevated ferritin.      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  Her iron saturation is normal at 28%.  9/20/2024 her ferritin is 303.  10/18/2024 ferritin was reported at 640.  She had  not donated or lost any blood in that time.  This fluctuation in ferritin and the normal iron saturation are consistent with ferritin elevation related to inflammation and not to iron storage disease.  No further hematologic investigation or treatment is warranted.        ______________________________________________________________________________        History of Present Illness    Ms. Anh Flores is a 29 year old woman with a history of obesity and diabetes presently on Ozempic.  Investigation showed elevated liver function tests.  Further investigation showed elevated serum ferritin with normal iron saturation.  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  CBC and CMP from 11/9/2024 are entirely within normal limits.  No family members have been tested positive for the gene however her father has not been tested.  She does not smoke cigarettes or use alcohol.    Review of systems.  Pertinent Findings are included in the History of Present Illness    Physical Exam    /87 (BP Location: Right arm, Patient Position: Sitting, Cuff Size: Adult Regular)   Pulse 77   Temp 98.4  F (36.9  C) (Tympanic)   Resp 16   Ht 1.537 m (5' 0.5\")   Wt 119.7 kg (264 lb)   LMP 10/16/2024 (Approximate)   SpO2 95%   BMI 50.71 kg/m       GENERAL APPEARANCE: Healthy appearing woman in no " apparent distress.  HEAD: Atraumatic; normocephalic; without lesions.  EYES: Conjunctiva, corneas and eyelids normal; pupils equal, round, reactive to light; No Icterus.  MOUTH/OROPHARYNX: Oral mucosa intact  NECK: Supple with no nodes.  LUNGS:  Clear to auscultation and percussion with no extra sounds.  HEART: Regular rhythm and rate; S1 and S2 normal; no murmurs noted.  ABDOMEN: Soft; no masses or tenderness, no hepatosplenomegaly (exam limited by body habitus).  NEUROLOGIC: Alert and oriented.  No obvious focal findings.  EXTREMITIES: No cyanosis, or edema.  SKIN: No abnormal bruising or bleeding. No suspicious lesions noted on exposed skin.  PSYCHIATRIC: Mental status normal; no apparent psychiatric issues    Medications:    Current Outpatient Medications   Medication Sig Dispense Refill    alcohol swab prep pads Use to swab area of injection/anthony as directed. 100 each 3    Atogepant (QULIPTA) 60 MG TABS Take 60 mg by mouth.      atorvastatin (LIPITOR) 10 MG tablet Take 1 tablet (10 mg) by mouth daily. 90 tablet 2    blood glucose (NO BRAND SPECIFIED) test strip Use to test blood sugar three times daily or as directed. Preferred blood glucose meter OR supplies to accompany: Blood Glucose Monitor Brands: per insurance. 100 strip 6    blood glucose monitoring (NO BRAND SPECIFIED) meter device kit Use to test blood sugar three times daily or as directed. Preferred blood glucose meter OR supplies to accompany: Blood Glucose Monitor Brands: per insurance. 1 kit 0    Continuous Glucose Sensor (FREESTYLE AYSE 3 PLUS SENSOR) MISC Use 1 sensor every 15 days. Use to read blood sugars per 's instructions. 6 each 3    Continuous Glucose Sensor (FREESTYLE AYSE 3 SENSOR) [facility] [place] 1 each by Other route every 14 days. 6 each 3    fluticasone (FLONASE) 50 MCG/ACT nasal spray Spray 1 spray into both nostrils daily 16 g 3    gabapentin (NEURONTIN) 300 MG capsule Take 1 capsule (300 mg) by mouth 2 times daily. 180  capsule 0    insulin pen needle (ULTICARE MICRO) 32G X 4 MM miscellaneous Use 1 pen needles daily or as directed. 100 each 3    metFORMIN (GLUCOPHAGE XR) 500 MG 24 hr tablet Take 4 tablets (2,000 mg) by mouth daily (with dinner). 360 tablet 3    norgestimate-ethinyl estradiol (ORTHO-CYCLEN) 0.25-35 MG-MCG tablet Take 1 tablet by mouth daily 84 tablet 3    ondansetron (ZOFRAN ODT) 4 MG ODT tab Take 1 tablet (4 mg) by mouth every 8 hours as needed for nausea 30 tablet 2    pantoprazole (PROTONIX) 40 MG EC tablet Take 1 tablet (40 mg) by mouth daily. 90 tablet 2    rimegepant (NURTEC) 75 MG ODT tablet Place 75 mg under the tongue every 48 hours      semaglutide (OZEMPIC) 2 MG/3ML pen Inject 0.5 mg subcutaneously every 7 days. 9 mL 3    SUMAtriptan (IMITREX) 50 MG tablet Take 2 tablets (100 mg) by mouth at onset of headache for migraine. May repeat in 2 hours. Max 4 tablets/24 hours.      thin (NO BRAND SPECIFIED) lancets Use with lanceting device. To accompany: Blood Glucose Monitor Brands: per insurance. 100 each 6    topiramate (TOPAMAX) 25 MG tablet TAKE TWO TABLETS BY MOUTH EVERY NIGHT AT BEDTIME X 7 DAYS, THEN ONE IN THE AM AND TWO EVERY NIGHT AT BEDTIME X 7 DYAS, THEN TWO TWICE DAILY      UBRELVY 100 MG tablet Take 100 mg by mouth at onset of headache.       No current facility-administered medications for this visit.           Past History    Past Medical History:   Diagnosis Date    CAH (congenital adrenal hyperplasia) 2/9/2010    Followed by Dr. Brewer; next follow up 1.2011.  Metformin increased to 850 mg twice a day.     Diabetes mellitus, type 2 (H) 10/19/2020    Vitamin D deficiency 2/9/2010     Past Surgical History:   Procedure Laterality Date    CHOLECYSTECTOMY      She was in 8th grade     COLONOSCOPY N/A 9/16/2024    Procedure: COLONOSCOPY, WITH BIOPSY;  Surgeon: Bacilio Yancey MD;  Location:  GI    ESOPHAGOSCOPY, GASTROSCOPY, DUODENOSCOPY (EGD), COMBINED N/A 9/16/2024    Procedure:  ESOPHAGOGASTRODUODENOSCOPY, WITH BIOPSY;  Surgeon: Bacilio Yancey MD;  Location: [facility]     Allergies   Allergen Reactions    Emgality [Galcanezumab-Gnlm] Itching     Itchiness, bumps    Other Environmental Allergy     Victoza [Liraglutide] Headache, Hives and Itching     Family History   Problem Relation Age of Onset    Neurologic Disorder Sister 16        migraines    Cerebrovascular Disease No family hx of     Alzheimer Disease No family hx of     Glaucoma No family hx of     Macular Degeneration No family hx of      Social History     Socioeconomic History    Marital status: Single     Spouse name: None    Number of children: 0    Years of education: 14+    Highest education level: None   Occupational History     Employer: [employer]UDENT     Comment: Dog grooming   Tobacco Use    Smoking status: Never     Passive exposure: Never    Smokeless tobacco: Never   Vaping Use    Vaping status: Never Used   Substance and Sexual Activity    Alcohol use: No    Drug use: No    Sexual activity: Never   Social History Narrative    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    Not sexually active and never has been    Jamibabar Gutierrez MD     Social Drivers of Health     Financial Resource Strain: Low Risk  (10/18/2024)    Financial Resource Strain     Within the past 12 months, have you or your family members you live with been unable to get utilities (heat, electricity) when it was really needed?: No   Food Insecurity: Low Risk  (10/18/2024)    Food Insecurity     Within the past 12 months, did you worry that your food would run out before you got money to buy more?: No     Within the past 12 months, did the food you bought just not last and you didn t have money to get more?: No   Transportation Needs: Low Risk  (10/18/2024)    Transportation Needs     Within the past 12 months, has lack of transportation kept you from medical appointments, getting your medicines, non-medical meetings or appointments, work, or from getting  things that you need?: No   Physical Activity: Unknown (10/18/2024)    Exercise Vital Sign     Days of Exercise per Week: 2 days   Stress: Stress Concern Present (10/18/2024)    [employer] - Occupational Stress Questionnaire     Feeling of Stress : To some extent   Social Connections: Unknown (10/18/2024)    Social Connection and Isolation Panel [NHANES]     Frequency of Social Gatherings with Friends and Family: Never   Interpersonal Safety: Low Risk  (10/18/2024)    Interpersonal Safety     Do you feel physically and emotionally safe where you currently live?: Yes     Within the past 12 months, have you been hit, slapped, kicked or otherwise physically hurt by someone?: No     Within the past 12 months, have you been humiliated or emotionally abused in other ways by your partner or ex-partner?: No   Housing Stability: Low Risk  (10/18/2024)    Housing Stability     Do you have housing? : Yes     Are you worried about losing your housing?: No           Lab Results  Recent Results (from the past 720 hours)   Ferritin    Collection Time: 11/09/24  9:01 AM   Result Value Ref Range    Ferritin 303 (H) 6 - 175 ng/mL   Iron and iron binding capacity    Collection Time: 11/09/24  9:01 AM   Result Value Ref Range    Iron 99 37 - 145 ug/dL    Iron Binding Capacity 355 240 - 430 ug/dL    Iron Sat Index 28 15 - 46 %   CBC with platelets and differential    Collection Time: 11/09/24  9:01 AM   Result Value Ref Range    WBC Count 9.3 4.0 - 11.0 10e3/uL    RBC Count 4.52 3.80 - 5.20 10e6/uL    Hemoglobin 14.2 11.7 - 15.7 g/dL    Hematocrit 41.4 35.0 - 47.0 %    MCV 92 78 - 100 fL    MCH 31.4 26.5 - 33.0 pg    MCHC 34.3 31.5 - 36.5 g/dL    RDW 13.8 10.0 - 15.0 %    Platelet Count 332 150 - 450 10e3/uL    % Neutrophils 62 %    % Lymphocytes 30 %    % Monocytes 4 %    % Eosinophils 4 %    % Basophils 0 %    % Immature Granulocytes 0 %    NRBCs per 100 WBC 0 <1 /100    Absolute Neutrophils 5.7 1.6 - 8.3  10e3/uL    Absolute Lymphocytes 2.8 0.8 - 5.3 10e3/uL    Absolute Monocytes 0.4 0.0 - 1.3 10e3/uL    Absolute Eosinophils 0.3 0.0 - 0.7 10e3/uL    Absolute Basophils 0.0 0.0 - 0.2 10e3/uL    Absolute Immature Granulocytes 0.0 <=0.4 10e3/uL    Absolute NRBCs 0.0 10e3/uL   Comprehensive metabolic panel    Collection Time: 11/09/24  9:01 AM   Result Value Ref Range    Sodium 140 135 - 145 mmol/L    Potassium 5.0 3.4 - 5.3 mmol/L    Carbon Dioxide (CO2) 16 (L) 22 - 29 mmol/L    Anion Gap 16 (H) 7 - 15 mmol/L    Urea Nitrogen 12.8 6.0 - 20.0 mg/dL    Creatinine 0.79 0.51 - 0.95 mg/dL    GFR Estimate >90 >60 mL/min/1.73m2    Calcium 9.6 8.8 - 10.4 mg/dL    Chloride 108 (H) 98 - 107 mmol/L    Glucose 59 (L) 70 - 99 mg/dL    Alkaline Phosphatase 58 40 - 150 U/L    AST 21 0 - 45 U/L    ALT 19 0 - 50 U/L    Protein Total 7.6 6.4 - 8.3 g/dL    Albumin 4.0 3.5 - 5.2 g/dL    Bilirubin Total 0.2 <=1.2 mg/dL   EKG 12 lead    Collection Time: 11/09/24  8:03 PM   Result Value Ref Range    Systolic Blood Pressure  mmHg    Diastolic Blood Pressure  mmHg    Ventricular Rate 72 BPM    Atrial Rate 72 BPM    CT Interval 132 ms    QRS Duration 80 ms     ms    QTc 438 ms    P Axis 36 degrees    R AXIS 40 degrees    T Axis -2 degrees    Interpretation ECG       ** Poor data quality, interpretation may be adversely affected  Sinus rhythm  Normal ECG  Unconfirmed report - interpretation of this ECG is computer generated - see medical record for final interpretation  Confirmed by - EMERGENCY ROOM, PHYSICIAN ([id number]),  ILAN HAZEL ([id number]) on 11/10/2024 4:11:42 PM     Troponin T, High Sensitivity    Collection Time: 11/09/24  8:27 PM   Result Value Ref Range    Troponin T, High Sensitivity <6 <=14 ng/L   Nt probnp inpatient (BNP)    Collection Time: 11/09/24  8:27 PM   Result Value Ref Range    N terminal Pro BNP Inpatient <36 0 - 450 pg/mL   HCG qualitative pregnancy (blood)    Collection Time: 11/09/24  8:27 PM   Result Value Ref  Range    hCG Serum Qualitative Negative Negative   Comprehensive metabolic panel    Collection Time: 11/09/24  8:27 PM   Result Value Ref Range    Sodium 141 135 - 145 mmol/L    Potassium 4.0 3.4 - 5.3 mmol/L    Carbon Dioxide (CO2) 21 (L) 22 - 29 mmol/L    Anion Gap 14 7 - 15 mmol/L    Urea Nitrogen 12.3 6.0 - 20.0 mg/dL    Creatinine 0.71 0.51 - 0.95 mg/dL    GFR Estimate >90 >60 mL/min/1.73m2    Calcium 9.7 8.8 - 10.4 mg/dL    Chloride 106 98 - 107 mmol/L    Glucose 110 (H) 70 - 99 mg/dL    Alkaline Phosphatase 60 40 - 150 U/L    AST 15 0 - 45 U/L    ALT 17 0 - 50 U/L    Protein Total 7.8 6.4 - 8.3 g/dL    Albumin 4.2 3.5 - 5.2 g/dL    Bilirubin Total 0.2 <=1.2 mg/dL   Lipase    Collection Time: 11/09/24  8:27 PM   Result Value Ref Range    Lipase 30 13 - 60 U/L   CBC with platelets and differential    Collection Time: 11/09/24  8:27 PM   Result Value Ref Range    WBC Count 9.2 4.0 - 11.0 10e3/uL    RBC Count 4.59 3.80 - 5.20 10e6/uL    Hemoglobin 14.4 11.7 - 15.7 g/dL    Hematocrit 42.8 35.0 - 47.0 %    MCV 93 78 - 100 fL    MCH 31.4 26.5 - 33.0 pg    MCHC 33.6 31.5 - 36.5 g/dL    RDW 14.0 10.0 - 15.0 %    Platelet Count 356 150 - 450 10e3/uL    % Neutrophils 51 %    % Lymphocytes 40 %    % Monocytes 5 %    % Eosinophils 4 %    % Basophils 0 %    % Immature Granulocytes 0 %    NRBCs per 100 WBC 0 <1 /100    Absolute Neutrophils 4.6 1.6 - 8.3 10e3/uL    Absolute Lymphocytes 3.7 0.8 - 5.3 10e3/uL    Absolute Monocytes 0.5 0.0 - 1.3 10e3/uL    Absolute Eosinophils 0.4 0.0 - 0.7 10e3/uL    Absolute Basophils 0.0 0.0 - 0.2 10e3/uL    Absolute Immature Granulocytes 0.0 <=0.4 10e3/uL    Absolute NRBCs 0.0 10e3/uL   CAH Steroid Panel Complete    Collection Time: 11/13/24  3:12 PM   Result Value Ref Range    See Scanned Result CAH STEROID PANEL COMPLETE-Scanned    17 OH progesterone    Collection Time: 11/13/24  3:12 PM   Result Value Ref Range    17 OH Progesterone 194 <=630 ng/dL   Lipid Profile    Collection Time:  11/13/24  3:12 PM   Result Value Ref Range    Cholesterol 143 <200 mg/dL    Triglycerides 88 <150 mg/dL    Direct Measure HDL 58 >=50 mg/dL    LDL Cholesterol Calculated 67 <100 mg/dL    Non HDL Cholesterol 85 <130 mg/dL    Patient Fasting > 8hrs? Unknown    Sex Hormone Binding Globulin    Collection Time: 11/13/24  3:12 PM   Result Value Ref Range    Sex Hormone Binding Globulin 185 (H) 30 - 135 nmol/L   Testosterone Free and Total    Collection Time: 11/13/24  3:12 PM   Result Value Ref Range    Free Testosterone Calculated 0.20 ng/dL    Testosterone Total 41 8 - 60 ng/dL   Comprehensive Sleep Study    Collection Time: 11/15/24  7:52 PM   Result Value Ref Range    SLP Comprehensive Sleep           NM Gastric Emptying    Result Date: 11/25/2024  EXAM:  NM GASTRIC EMPTYING, 11/25/2024 12:01 PM HISTORY: Chronic abdominal pain; Chronic abdominal pain; Chronic diarrhea; Cyclical vomiting TECHNIQUE: The patient ingested 2.1 mCi of Tc-99m sulfur colloid in oatmeal. Static images were acquired at approximately 1 hour intervals out through 4 hours using a dual head gamma camera in an anterior and posterior position. The calculation of emptying was based on dual head imaging with geometric mean calculations. A Linear square fit was calculated to the emptying curve to determine the amount of residual activity at each time point. FINDINGS: Percent retention at 60 min = 33%. Percent retention at 120 min = 15%. Percent retention at 180 min = 2%. T 1/2 emptying time =  46 mins.     IMPRESSION: Lower limits of normal gastric emptying.   Near rapid emptying (see normal ranges below). ===================== Reference Range: Gastric emptying - 30 minutes: <70% retention (>30% emptying) suggests rapid emptying. - 60 minutes: <90% retention (>10% emptying) is normal; less than 30% retention (>70% emptying) suggests rapid emptying. - 120 minutes: <60% retention (> 40% emptying) is normal. - 180 minutes: <30% retention (> 70% emptying)  is normal. - 240 minutes: <10% retention (> 90% emptying) is normal. Gastric emptying T-1/2: Solid: The normal range is  minutes Liquid only: Normal range is 10-45 minutes. Liquid only-children: At 60 minutes, normal range is 44-58%. Liquid only-infants: At 60 minutes, normal range is 32-64%. I have personally reviewed the examination and initial interpretation and I agree with the findings. MEGAN CLEMENT MD   SYSTEM ID:  [id number]    XR Chest 2 Views    Result Date: 11/9/2024  EXAM: XR CHEST 2 VIEWS LOCATION: [facility] DATE: 11/9/2024 INDICATION: CP COMPARISON: None.     IMPRESSION: Negative chest.       I spent 54 minutes on the patient's visit today.  This included preparation for the visit, face-to-face time with the patient and documentation following the visit.  It did not include teaching or procedure time.    Signed by: Peter E. Friedell, MD

## 2024-12-04 NOTE — PROGRESS NOTES
"Oncology Rooming Note    December 4, 2024 2:53 PM   Anh Flores is a 29 year old female who presents for:    Chief Complaint   Patient presents with    Hematology     Elevated ferritin - New consult     Initial Vitals: /87 (BP Location: Right arm, Patient Position: Sitting, Cuff Size: Adult Regular)   Pulse 77   Temp 98.4  F (36.9  C) (Tympanic)   Resp 16   Ht 1.537 m (5' 0.5\")   Wt 119.7 kg (264 lb)   LMP 10/16/2024 (Approximate)   SpO2 95%   BMI 50.71 kg/m   Estimated body mass index is 50.71 kg/m  as calculated from the following:    Height as of this encounter: 1.537 m (5' 0.5\").    Weight as of this encounter: 119.7 kg (264 lb). Body surface area is 2.26 meters squared.  Extreme Pain (8) Comment: Data Unavailable   Patient's last menstrual period was 10/16/2024 (approximate).  Allergies reviewed: Yes  Medications reviewed: Yes    Medications: Medication refills not needed today.  Pharmacy name entered into [employer]: [id number] [place], [place] - [address]    Frailty Screening:   Is the patient here for a new oncology consult visit in cancer care? 2. No      Clinical concerns:  New consult      Parul Clifford CMA            "

## 2024-12-16 ENCOUNTER — OFFICE VISIT (OUTPATIENT)
Dept: URGENT CARE | Facility: URGENT CARE | Age: 29
End: 2024-12-16
Payer: COMMERCIAL

## 2024-12-16 VITALS
TEMPERATURE: 98.7 F | DIASTOLIC BLOOD PRESSURE: 84 MMHG | WEIGHT: 259.8 LBS | RESPIRATION RATE: 16 BRPM | SYSTOLIC BLOOD PRESSURE: 122 MMHG | HEART RATE: 75 BPM | OXYGEN SATURATION: 96 % | BODY MASS INDEX: 49.9 KG/M2

## 2024-12-16 DIAGNOSIS — R51.9 ACUTE NONINTRACTABLE HEADACHE, UNSPECIFIED HEADACHE TYPE: ICD-10-CM

## 2024-12-16 DIAGNOSIS — R07.0 THROAT PAIN: ICD-10-CM

## 2024-12-16 DIAGNOSIS — J01.00 ACUTE NON-RECURRENT MAXILLARY SINUSITIS: Primary | ICD-10-CM

## 2024-12-16 LAB — DEPRECATED S PYO AG THROAT QL EIA: NEGATIVE

## 2024-12-16 PROCEDURE — 87651 STREP A DNA AMP PROBE: CPT

## 2024-12-16 PROCEDURE — 99213 OFFICE O/P EST LOW 20 MIN: CPT | Mod: 25

## 2024-12-16 RX ORDER — DOXYCYCLINE HYCLATE 100 MG
100 TABLET ORAL 2 TIMES DAILY
Qty: 14 TABLET | Refills: 0 | Status: SHIPPED | OUTPATIENT
Start: 2024-12-16 | End: 2024-12-23

## 2024-12-16 RX ORDER — KETOROLAC TROMETHAMINE 30 MG/ML
30 INJECTION, SOLUTION INTRAMUSCULAR; INTRAVENOUS ONCE
Status: COMPLETED | OUTPATIENT
Start: 2024-12-16 | End: 2024-12-16

## 2024-12-16 RX ADMIN — KETOROLAC TROMETHAMINE 30 MG: 30 INJECTION, SOLUTION INTRAMUSCULAR; INTRAVENOUS at 20:12

## 2024-12-17 LAB — S PYO DNA THROAT QL NAA+PROBE: NOT DETECTED

## 2024-12-17 NOTE — PATIENT INSTRUCTIONS
Strep test is negative.  Intramuscular injection of Toradol provided for you in the clinic today.  Avoid taking aspirin, naproxen and ibuprofen the remainder of today as they are in the same medication class as Toradol.  Take the antibiotic as prescribed and finish the full course even if symptoms improve.  Try yogurt with active cultures or probiotics such as Culturelle daily to help prevent diarrhea while using antibiotics.  Use nasal saline spray and humidifier/steam for your nasal symptoms.  Follow-up with your primary care provider or ENT should symptoms persist.

## 2024-12-18 ENCOUNTER — ANCILLARY PROCEDURE (OUTPATIENT)
Dept: BONE DENSITY | Facility: CLINIC | Age: 29
End: 2024-12-18
Attending: OBSTETRICS & GYNECOLOGY
Payer: COMMERCIAL

## 2024-12-18 ENCOUNTER — OFFICE VISIT (OUTPATIENT)
Dept: GASTROENTEROLOGY | Facility: CLINIC | Age: 29
End: 2024-12-18
Attending: FAMILY MEDICINE
Payer: COMMERCIAL

## 2024-12-18 ENCOUNTER — PRE VISIT (OUTPATIENT)
Dept: GASTROENTEROLOGY | Facility: CLINIC | Age: 29
End: 2024-12-18

## 2024-12-18 ENCOUNTER — LAB (OUTPATIENT)
Dept: LAB | Facility: CLINIC | Age: 29
End: 2024-12-18
Payer: COMMERCIAL

## 2024-12-18 VITALS
DIASTOLIC BLOOD PRESSURE: 81 MMHG | OXYGEN SATURATION: 98 % | SYSTOLIC BLOOD PRESSURE: 121 MMHG | TEMPERATURE: 97.8 F | RESPIRATION RATE: 22 BRPM | HEIGHT: 61 IN | HEART RATE: 69 BPM | BODY MASS INDEX: 49.26 KG/M2 | WEIGHT: 260.9 LBS

## 2024-12-18 DIAGNOSIS — L29.9 ITCHING: Primary | ICD-10-CM

## 2024-12-18 DIAGNOSIS — K76.0 FATTY LIVER: Primary | ICD-10-CM

## 2024-12-18 DIAGNOSIS — L29.9 ITCHING: ICD-10-CM

## 2024-12-18 DIAGNOSIS — K74.00 FIBROSIS OF LIVER: ICD-10-CM

## 2024-12-18 DIAGNOSIS — K76.0 FATTY LIVER: ICD-10-CM

## 2024-12-18 DIAGNOSIS — E25.9 CAH 21-OH (CONGENITAL ADRENAL HYPERPLASIA), LATE ONSET (H): ICD-10-CM

## 2024-12-18 LAB
ALBUMIN SERPL BCG-MCNC: 4 G/DL (ref 3.5–5.2)
ALP SERPL-CCNC: 50 U/L (ref 40–150)
ALT SERPL W P-5'-P-CCNC: 16 U/L (ref 0–50)
AST SERPL W P-5'-P-CCNC: 16 U/L (ref 0–45)
BILIRUB DIRECT SERPL-MCNC: <0.2 MG/DL (ref 0–0.3)
BILIRUB SERPL-MCNC: 0.2 MG/DL
ERYTHROCYTE [DISTWIDTH] IN BLOOD BY AUTOMATED COUNT: 12.8 % (ref 10–15)
FERRITIN SERPL-MCNC: 381 NG/ML (ref 6–175)
GGT SERPL-CCNC: 51 U/L (ref 5–36)
HCT VFR BLD AUTO: 40.4 % (ref 35–47)
HCV AB SERPL QL IA: NONREACTIVE
HGB BLD-MCNC: 13.5 G/DL (ref 11.7–15.7)
IRON BINDING CAPACITY (ROCHE): 354 UG/DL (ref 240–430)
IRON SATN MFR SERPL: 19 % (ref 15–46)
IRON SERPL-MCNC: 66 UG/DL (ref 37–145)
MCH RBC QN AUTO: 31 PG (ref 26.5–33)
MCHC RBC AUTO-ENTMCNC: 33.4 G/DL (ref 31.5–36.5)
MCV RBC AUTO: 93 FL (ref 78–100)
PLATELET # BLD AUTO: 298 10E3/UL (ref 150–450)
PROT SERPL-MCNC: 7.5 G/DL (ref 6.4–8.3)
RBC # BLD AUTO: 4.35 10E6/UL (ref 3.8–5.2)
WBC # BLD AUTO: 7.6 10E3/UL (ref 4–11)

## 2024-12-18 PROCEDURE — 82728 ASSAY OF FERRITIN: CPT | Performed by: PATHOLOGY

## 2024-12-18 PROCEDURE — 82103 ALPHA-1-ANTITRYPSIN TOTAL: CPT | Mod: 90 | Performed by: PATHOLOGY

## 2024-12-18 PROCEDURE — 83550 IRON BINDING TEST: CPT | Performed by: PATHOLOGY

## 2024-12-18 PROCEDURE — 86364 TISS TRNSGLTMNASE EA IG CLAS: CPT | Performed by: STUDENT IN AN ORGANIZED HEALTH CARE EDUCATION/TRAINING PROGRAM

## 2024-12-18 PROCEDURE — 77080 DXA BONE DENSITY AXIAL: CPT | Performed by: INTERNAL MEDICINE

## 2024-12-18 PROCEDURE — 86803 HEPATITIS C AB TEST: CPT | Performed by: STUDENT IN AN ORGANIZED HEALTH CARE EDUCATION/TRAINING PROGRAM

## 2024-12-18 PROCEDURE — 36415 COLL VENOUS BLD VENIPUNCTURE: CPT | Performed by: PATHOLOGY

## 2024-12-18 PROCEDURE — 82104 ALPHA-1-ANTITRYPSIN PHENO: CPT | Mod: 90 | Performed by: PATHOLOGY

## 2024-12-18 PROCEDURE — 85027 COMPLETE CBC AUTOMATED: CPT | Performed by: PATHOLOGY

## 2024-12-18 PROCEDURE — 80076 HEPATIC FUNCTION PANEL: CPT | Performed by: PATHOLOGY

## 2024-12-18 PROCEDURE — G0463 HOSPITAL OUTPT CLINIC VISIT: HCPCS | Performed by: STUDENT IN AN ORGANIZED HEALTH CARE EDUCATION/TRAINING PROGRAM

## 2024-12-18 PROCEDURE — 99000 SPECIMEN HANDLING OFFICE-LAB: CPT | Performed by: PATHOLOGY

## 2024-12-18 PROCEDURE — 83540 ASSAY OF IRON: CPT | Performed by: PATHOLOGY

## 2024-12-18 PROCEDURE — 82977 ASSAY OF GGT: CPT | Performed by: PATHOLOGY

## 2024-12-18 ASSESSMENT — PAIN SCALES - GENERAL: PAINLEVEL_OUTOF10: EXTREME PAIN (9)

## 2024-12-18 NOTE — PROGRESS NOTES
[facility] New Patient Visit    Date of Visit: December 18, 2024    Reason for referral: Hepatic steatosis    Subjective: Ms. Flores is a 29 year old woman with a history of CAH, obesity, type 2 diabetes, PCOS who presents for evaluation of potential liver disease.    She was given steroids in the past for her CAH, has not been on for some time.  She was put on Ozempic earlier this year to help with weight loss overall has had success with this but has had to be stopped recently for procedures.    She is not drinking alcohol.  Testing for hepatitis B and C is negative.  She had a CT scan this fall that showed concern for nodular appearing liver on the underside of the liver.  She did not have splenomegaly or other signs of portal hypertension.  She originally was seen at [facility] for this and also for her diarrhea.  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  She had a follow-up ultrasound elastography at [facility] that apparently showed significant liver scarring.    She she is dealing with regular itching.  She also overall feels very fatigued.  She has intermittent severe bouts of diarrhea.  Also having abdominal pain, back pain and feels like she cannot take deep breaths.    ROS: 14 point ROS negative except for positives noted in HPI.    PMHx:  Past Medical History:   Diagnosis Date    CAH (congenital adrenal hyperplasia) 2/9/2010    Followed by Dr. Brewer; next follow up 1.2011.  Metformin increased to 850 mg twice a day.     Diabetes mellitus, type 2 (H) 10/19/2020    Vitamin D deficiency 2/9/2010   Migraines  PCOS    PSHx:  Past Surgical History:   Procedure Laterality Date    CHOLECYSTECTOMY      She was in 8th grade     COLONOSCOPY N/A 9/16/2024    Procedure: COLONOSCOPY, WITH BIOPSY;  Surgeon: Bacilio Yancey MD;  Location:  GI    ESOPHAGOSCOPY, GASTROSCOPY, DUODENOSCOPY (EGD), COMBINED N/A 9/16/2024    Procedure: ESOPHAGOGASTRODUODENOSCOPY, WITH  BIOPSY;  Surgeon: Bacilio Yancey MD;  Location: [facility]       FamHx:  Family History   Problem Relation Age of Onset    Neurologic Disorder Sister 16        migraines    Cerebrovascular Disease No family hx of     Alzheimer Disease No family hx of     Glaucoma No family hx of     Macular Degeneration No family hx of    No family history of liver disease, liver cancer    SocHx:  Social History     Socioeconomic History    Marital status: Single     Spouse name: Not on file    Number of children: 0    Years of education: 14+    Highest education level: Not on file   Occupational History     Employer: STUDENT     Comment: Dog grooming   Tobacco Use    Smoking status: Never     Passive exposure: Never    Smokeless tobacco: Never   Vaping Use    Vaping status: Never Used   Substance and Sexual Activity    Alcohol use: No    Drug use: No    Sexual activity: Never   Other Topics Concern    Parent/sibling w/ CABG, MI or angioplasty before 65F 55M? 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    Not sexually active and never has been    Jamibabar Gutierrez MD     Social Drivers of Health     Financial Resource Strain: Low Risk  (10/18/2024)    Financial Resource Strain     Within the past 12 months, have you or your family members you live with been unable to get utilities (heat, electricity) when it was really needed?: No   Food Insecurity: Low Risk  (10/18/2024)    Food Insecurity     Within the past 12 months, did you worry that your food would run out before you got money to buy more?: No     Within the past 12 months, did the food you bought just not last and you didn t have money to get more?: No   Transportation Needs: Low Risk  (10/18/2024)    Transportation Needs     Within the past 12 months, has lack of transportation kept you from medical appointments, getting your medicines, non-medical meetings or appointments, work, or from getting things that you need?: No   Physical  Activity: Unknown (10/18/2024)    Exercise Vital Sign     Days of Exercise per Week: 2 days     Minutes of Exercise per Session: Not on file   Stress: Stress Concern Present (10/18/2024)    [employer] - Occupational Stress Questionnaire     Feeling of Stress : To some extent   Social Connections: Unknown (10/18/2024)    Social Connection and Isolation Panel [NHANES]     Frequency of Communication with Friends and Family: Not on file     Frequency of Social Gatherings with Friends and Family: Never     Attends Sabianist Services: Not on file     Active Member of Clubs or Organizations: Not on file     Attends Club or Organization Meetings: Not on file     Marital Status: Not on file   Interpersonal Safety: Low Risk  (10/18/2024)    Interpersonal Safety     Do you feel physically and emotionally safe where you currently live?: Yes     Within the past 12 months, have you been hit, slapped, kicked or otherwise physically hurt by someone?: No     Within the past 12 months, have you been humiliated or emotionally abused in other ways by your partner or ex-partner?: No   Housing Stability: Low Risk  (10/18/2024)    Housing Stability     Do you have housing? : Yes     Are you worried about losing your housing?: No   No alcohol    Medications:  Current Outpatient Medications   Medication Sig Dispense Refill    alcohol swab prep pads Use to swab area of injection/anthony as directed. 100 each 3    Atogepant (QULIPTA) 60 MG TABS Take 60 mg by mouth.      atorvastatin (LIPITOR) 10 MG tablet Take 1 tablet (10 mg) by mouth daily. 90 tablet 2    blood glucose (NO BRAND SPECIFIED) test strip Use to test blood sugar three times daily or as directed. Preferred blood glucose meter OR supplies to accompany: Blood Glucose Monitor Brands: per insurance. 100 strip 6    blood glucose monitoring (NO BRAND SPECIFIED) meter device kit Use to test blood sugar three times daily or as directed. Preferred blood glucose  meter OR supplies to accompany: Blood Glucose Monitor Brands: per insurance. 1 kit 0    Continuous Glucose Sensor (FREESTYLE AYSE 3 PLUS SENSOR) MISC Use 1 sensor every 15 days. Use to read blood sugars per 's instructions. 6 each 3    Continuous Glucose Sensor (FREESTYLE AYSE 3 SENSOR) [facility] 1 each by Other route every 14 days. 6 each 3    doxycycline hyclate (VIBRA-TABS) 100 MG tablet Take 1 tablet (100 mg) by mouth 2 times daily for 7 days. 14 tablet 0    fluticasone (FLONASE) 50 MCG/ACT nasal spray Spray 1 spray into both nostrils daily 16 g 3    gabapentin (NEURONTIN) 300 MG capsule Take 1 capsule (300 mg) by mouth 2 times daily. 180 capsule 0    insulin pen needle (ULTICARE MICRO) 32G X 4 MM miscellaneous Use 1 pen needles daily or as directed. 100 each 3    metFORMIN (GLUCOPHAGE XR) 500 MG 24 hr tablet Take 4 tablets (2,000 mg) by mouth daily (with dinner). 360 tablet 3    norgestimate-ethinyl estradiol (ORTHO-CYCLEN) 0.25-35 MG-MCG tablet Take 1 tablet by mouth daily 84 tablet 3    ondansetron (ZOFRAN ODT) 4 MG ODT tab Take 1 tablet (4 mg) by mouth every 8 hours as needed for nausea 30 tablet 2    pantoprazole (PROTONIX) 40 MG EC tablet Take 1 tablet (40 mg) by mouth daily. 90 tablet 2    rimegepant (NURTEC) 75 MG ODT tablet Place 75 mg under the tongue daily as needed for migraine.      semaglutide (OZEMPIC) 2 MG/3ML pen Inject 0.5 mg subcutaneously every 7 days. 9 mL 3    SUMAtriptan (IMITREX) 50 MG tablet Take 2 tablets (100 mg) by mouth at onset of headache for migraine. May repeat in 2 hours. Max 4 tablets/24 hours.      thin (NO BRAND SPECIFIED) lancets Use with lanceting device. To accompany: Blood Glucose Monitor Brands: per insurance. 100 each 6    topiramate (TOPAMAX) 25 MG tablet TAKE TWO TABLETS BY MOUTH EVERY NIGHT AT BEDTIME X 7 DAYS, THEN ONE IN THE AM AND TWO EVERY NIGHT AT BEDTIME X 7 DYAS, THEN TWO TWICE DAILY      UBRELVY 100 MG tablet Take 100 mg by mouth at onset of  "headache.       No current facility-administered medications for this visit.     No OTCs, herbals    Allergies:  Allergies   Allergen Reactions    Emgality [Galcanezumab-Gnlm] Itching     Itchiness, bumps    Other Environmental Allergy     Victoza [Liraglutide] Headache, Hives and Itching       Objective:  /81 (BP Location: Right arm, Patient Position: Sitting, Cuff Size: Adult Large)   Pulse 69   Temp 97.8  F (36.6  C) (Oral)   Resp 22   Ht 1.537 m (5' 0.51\")   Wt 118.3 kg (260 lb 14.4 oz)   LMP 10/16/2024 (Approximate)   SpO2 98%   BMI 50.09 kg/m    Constitutional: pleasant woman in NAD  Eyes: non icteric  Respiratory: Normal respiratory excursion   MSK: normal range of motion of visualized extremities  Abd: Non distended  Skin: No jaundice  Psychiatric: normal mood and orientation    Labs:  Last Comprehensive Metabolic Panel:  Sodium   Date Value Ref Range Status   11/09/2024 141 135 - 145 mmol/L Final   01/18/2019 136 133 - 144 mmol/L Final     Potassium   Date Value Ref Range Status   11/09/2024 4.0 3.4 - 5.3 mmol/L Final   01/18/2019 4.2 3.4 - 5.3 mmol/L Final     Chloride   Date Value Ref Range Status   11/09/2024 106 98 - 107 mmol/L Final   01/18/2019 103 94 - 109 mmol/L Final     Carbon Dioxide   Date Value Ref Range Status   01/18/2019 27 20 - 32 mmol/L Final     Carbon Dioxide (CO2)   Date Value Ref Range Status   11/09/2024 21 (L) 22 - 29 mmol/L Final     Anion Gap   Date Value Ref Range Status   11/09/2024 14 7 - 15 mmol/L Final   01/18/2019 6 3 - 14 mmol/L Final     Glucose   Date Value Ref Range Status   11/09/2024 110 (H) 70 - 99 mg/dL Final   01/18/2019 99 70 - 99 mg/dL Final     Comment:     Non Fasting     GLUCOSE BY METER POCT   Date Value Ref Range Status   09/16/2024 91 70 - 99 mg/dL Final     Urea Nitrogen   Date Value Ref Range Status   11/09/2024 12.3 6.0 - 20.0 mg/dL Final   01/18/2019 10 7 - 30 mg/dL Final     Creatinine   Date Value Ref Range Status   11/09/2024 0.71 0.51 - " 0.95 mg/dL Final   01/18/2019 0.65 0.52 - 1.04 mg/dL Final     GFR Estimate   Date Value Ref Range Status   11/09/2024 >90 >60 mL/min/1.73m2 Final     Comment:     eGFR calculated using 2021 CKD-EPI equation.   01/18/2019 >90 >60 mL/min/[1.73_m2] Final     Comment:     Non  GFR Calc  Starting 12/18/2018, serum creatinine based estimated GFR (eGFR) will be   calculated using the Chronic Kidney Disease Epidemiology Collaboration   (CKD-EPI) equation.       Calcium   Date Value Ref Range Status   11/09/2024 9.7 8.8 - 10.4 mg/dL Final     Comment:     Reference intervals for this test were updated on 7/16/2024 to reflect our healthy population more accurately. There may be differences in the flagging of prior results with similar values performed with this method. Those prior results can be interpreted in the context of the updated reference intervals.   01/18/2019 9.8 8.5 - 10.1 mg/dL Final     Bilirubin Total   Date Value Ref Range Status   12/18/2024 0.2 <=1.2 mg/dL Final   01/15/2018 0.3 0.2 - 1.3 mg/dL Final     Alkaline Phosphatase   Date Value Ref Range Status   12/18/2024 50 40 - 150 U/L Final   01/15/2018 66 40 - 150 U/L Final     ALT   Date Value Ref Range Status   12/18/2024 16 0 - 50 U/L Final   01/15/2018 53 (H) 0 - 50 U/L Final     AST   Date Value Ref Range Status   12/18/2024 16 0 - 45 U/L Final   01/15/2018 22 0 - 45 U/L Final       Lab Results   Component Value Date    WBC 7.6 12/18/2024    WBC 7.1 09/27/2014     Lab Results   Component Value Date    RBC 4.35 12/18/2024    RBC 4.71 09/27/2014     Lab Results   Component Value Date    HGB 13.5 12/18/2024    HGB 14.9 09/27/2014     Lab Results   Component Value Date    HCT 40.4 12/18/2024    HCT 43.8 03/15/2017     Lab Results   Component Value Date    MCV 93 12/18/2024    MCV 92 09/27/2014     Lab Results   Component Value Date    MCH 31.0 12/18/2024    MCH 31.6 09/27/2014     Lab Results   Component Value Date    [facility] 33.4 12/18/2024     "MCHC 34.3 09/27/2014     Lab Results   Component Value Date    RDW 12.8 12/18/2024    RDW 13.7 09/27/2014     Lab Results   Component Value Date     12/18/2024     09/27/2014       Previous work-up:   Lab Results   Component Value Date    HCVAB Nonreactive 01/30/2024    MAGED 381 (H) 12/18/2024    IRONSAT 19 12/18/2024    TTG 0.6 01/30/2024    TTGG 0.9 01/30/2024     02/21/2011    DAHLIA Negative 09/10/2024    TSH 1.08 08/23/2024    CHOL 143 11/13/2024    HDL 58 11/13/2024    LDL 67 11/13/2024    TRIG 88 11/13/2024    A1C 5.6 07/30/2024      No results found for: \"SPECDES\", \"LDRESULTS\"    Imaging:    CT AP 9/2024    FINDINGS:   LOWER CHEST: Normal.     HEPATOBILIARY: Slightly nodular liver undersurface suspicious for fibrosis. Cholecystectomy.     PANCREAS: Normal.     SPLEEN: Normal.     ADRENAL GLANDS: Normal.     KIDNEYS/BLADDER: Normal.     BOWEL: No obstruction or inflammatory change.     LYMPH NODES: Normal.     VASCULATURE: Normal.     PELVIC ORGANS: Normal.     MUSCULOSKELETAL: Unremarkable                                                                      IMPRESSION:   1.  Slightly nodular liver undersurface suspicious for fibrosis. Otherwise unremarkable noncontrast CT abdomen and pelvis.    Endoscopy:    Reviewed recent EGD and colonoscopy    Independently reviewed labs and imaging.     Assessment/Plan: Ms. Flores is a 29 year old woman with a history of CAH, obesity, type 2 diabetes, PCOS who presents for evaluation of potential liver disease.    Discussed she has several risk factors for metabolic dysfunction associated steatotic liver disease.  Testing for hepatitis B, C, celiac disease is negative.  She is a carrier for hemochromatosis.  Ferritin was more elevated in the past, now is around 300.    She apparently had a ultrasound elastography that showed significant scarring.  Will obtain these records.  Discussed it is unusual for someone this young to develop significant scarring " from metabolic liver disease, but she had significant steroid use in the past related to her CAH.  Discussed that if the ultrasound elastography showed scarring, I would recommend a confirmatory MRI elastography given false positives that can be seen with this test.  We can also consider liver biopsy.  If her MRI elastography showed advanced fibrosis, she would need regular follow-up with rheumatology clinic for liver cancer screening.  Discussed that if she does not have advanced fibrosis on her imaging, she should continue to work with her PCP for further weight loss help with her metabolic liver disease.  We discussed that people with metabolic liver disease can have some vague right-sided abdominal pain, treatment for this weight loss.    FIB-4 Calculation: 0.42 at 12/18/2024  1:46 PM  Calculated from:  SGOT/AST: 16 U/L at 12/18/2024  1:46 PM  SGPT/ALT: 16 U/L at 12/18/2024  1:46 PM  Her fib 4 is low, placing her in her the low risk category for risk fibrosis.  Platelets: 298 10e3/uL at 12/18/2024  1:46 PM  Age: 29 years      Orders Placed This Encounter   Procedures    GGT       RTC PRN based on US elastography result      Cheyenne Overton MD MS  Hepatology/Liver Transplant  [facility]

## 2024-12-18 NOTE — LETTER
12/18/2024      Anh Flores  [address] [facility] [address]      Dear Colleague,    Thank you for referring your patient, Anh Flores, to the [facility] [place]. Please see a copy of my visit note below.    [facility] New Patient Visit    Date of Visit: December 18, 2024    Reason for referral: Hepatic steatosis    Subjective: Ms. Flores is a 29 year old woman with a history of CAH, obesity, type 2 diabetes, PCOS who presents for evaluation of potential liver disease.    She was given steroids in the past for her CAH, has not been on for some time.  She was put on Ozempic earlier this year to help with weight loss overall has had success with this but has had to be stopped recently for procedures.    She is not drinking alcohol.  Testing for hepatitis B and C is negative.  She had a CT scan this fall that showed concern for nodular appearing liver on the underside of the liver.  She did not have splenomegaly or other signs of portal hypertension.  She originally was seen at [facility] for this and also for her diarrhea.  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  She had a follow-up ultrasound elastography at [facility] that apparently showed significant liver scarring.    She she is dealing with regular itching.  She also overall feels very fatigued.  She has intermittent severe bouts of diarrhea.  Also having abdominal pain, back pain and feels like she cannot take deep breaths.    ROS: 14 point ROS negative except for positives noted in HPI.    PMHx:  Past Medical History:   Diagnosis Date     CAH (congenital adrenal hyperplasia) 2/9/2010    Followed by Dr. Brewer; next follow up 1.2011.  Metformin increased to 850 mg twice a day.      Diabetes mellitus, type 2 (H) 10/19/2020     Vitamin D deficiency 2/9/2010   Migraines  PCOS    PSHx:  Past Surgical History:   Procedure Laterality Date     CHOLECYSTECTOMY      She was in 8th grade       COLONOSCOPY N/A 9/16/2024    Procedure: COLONOSCOPY, WITH BIOPSY;  Surgeon: Bacilio Yancey MD;  Location:  GI     ESOPHAGOSCOPY, GASTROSCOPY, DUODENOSCOPY (EGD), COMBINED N/A 9/16/2024    Procedure: ESOPHAGOGASTRODUODENOSCOPY, WITH BIOPSY;  Surgeon: Bacilio Yancey MD;  Location:  GI       FamHx:  Family History   Problem Relation Age of Onset     Neurologic Disorder Sister 16        migraines     Cerebrovascular Disease No family hx of      Alzheimer Disease No family hx of      Glaucoma No family hx of      Macular Degeneration No family hx of    No family history of liver disease, liver cancer    SocHx:  Social History     Socioeconomic History     Marital status: Single     Spouse name: Not on file     Number of children: 0     Years of education: 14+     Highest education level: Not on file   Occupational History     Employer: STUDENT     Comment: Dog grooming   Tobacco Use     Smoking status: Never     Passive exposure: Never     Smokeless tobacco: Never   Vaping Use     Vaping status: Never Used   Substance and Sexual Activity     Alcohol use: No     Drug use: No     Sexual activity: Never   Other Topics Concern     Parent/sibling w/ CABG, MI or angioplasty before 65F 55M? 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    Not sexually active and never has been    Jamibabar Gutierrez MD     Social Drivers of Health     Financial Resource Strain: Low Risk  (10/18/2024)    Financial Resource Strain      Within the past 12 months, have you or your family members you live with been unable to get utilities (heat, electricity) when it was really needed?: No   Food Insecurity: Low Risk  (10/18/2024)    Food Insecurity      Within the past 12 months, did you worry that your food would run out before you got money to buy more?: No      Within the past 12 months, did the food you bought just not last and you didn t have money to get more?: No   Transportation  Needs: Low Risk  (10/18/2024)    Transportation Needs      Within the past 12 months, has lack of transportation kept you from medical appointments, getting your medicines, non-medical meetings or appointments, work, or from getting things that you need?: No   Physical Activity: Unknown (10/18/2024)    Exercise Vital Sign      Days of Exercise per Week: 2 days      Minutes of Exercise per Session: Not on file   Stress: Stress Concern Present (10/18/2024)    [employer] [employer] - Occupational Stress Questionnaire      Feeling of Stress : To some extent   Social Connections: Unknown (10/18/2024)    Social Connection and Isolation Panel [NHANES]      Frequency of Communication with Friends and Family: Not on file      Frequency of Social Gatherings with Friends and Family: Never      Attends Adventism Services: Not on file      Active Member of Clubs or Organizations: Not on file      Attends Club or Organization Meetings: Not on file      Marital Status: Not on file   Interpersonal Safety: Low Risk  (10/18/2024)    Interpersonal Safety      Do you feel physically and emotionally safe where you currently live?: Yes      Within the past 12 months, have you been hit, slapped, kicked or otherwise physically hurt by someone?: No      Within the past 12 months, have you been humiliated or emotionally abused in other ways by your partner or ex-partner?: No   Housing Stability: Low Risk  (10/18/2024)    Housing Stability      Do you have housing? : Yes      Are you worried about losing your housing?: No   No alcohol    Medications:  Current Outpatient Medications   Medication Sig Dispense Refill     alcohol swab prep pads Use to swab area of injection/anthony as directed. 100 each 3     Atogepant (QULIPTA) 60 MG TABS Take 60 mg by mouth.       atorvastatin (LIPITOR) 10 MG tablet Take 1 tablet (10 mg) by mouth daily. 90 tablet 2     blood glucose (NO BRAND SPECIFIED) test strip Use to test blood sugar three  times daily or as directed. Preferred blood glucose meter OR supplies to accompany: Blood Glucose Monitor Brands: per insurance. 100 strip 6     blood glucose monitoring (NO BRAND SPECIFIED) meter device kit Use to test blood sugar three times daily or as directed. Preferred blood glucose meter OR supplies to accompany: Blood Glucose Monitor Brands: per insurance. 1 kit 0     Continuous Glucose Sensor (FREESTYLE AYSE 3 PLUS SENSOR) MISC Use 1 sensor every 15 days. Use to read blood sugars per 's instructions. 6 each 3     Continuous Glucose Sensor (FREESTYLE AYSE 3 SENSOR) [facility] 1 each by Other route every 14 days. 6 each 3     doxycycline hyclate (VIBRA-TABS) 100 MG tablet Take 1 tablet (100 mg) by mouth 2 times daily for 7 days. 14 tablet 0     fluticasone (FLONASE) 50 MCG/ACT nasal spray Spray 1 spray into both nostrils daily 16 g 3     gabapentin (NEURONTIN) 300 MG capsule Take 1 capsule (300 mg) by mouth 2 times daily. 180 capsule 0     insulin pen needle (ULTICARE MICRO) 32G X 4 MM miscellaneous Use 1 pen needles daily or as directed. 100 each 3     metFORMIN (GLUCOPHAGE XR) 500 MG 24 hr tablet Take 4 tablets (2,000 mg) by mouth daily (with dinner). 360 tablet 3     norgestimate-ethinyl estradiol (ORTHO-CYCLEN) 0.25-35 MG-MCG tablet Take 1 tablet by mouth daily 84 tablet 3     ondansetron (ZOFRAN ODT) 4 MG ODT tab Take 1 tablet (4 mg) by mouth every 8 hours as needed for nausea 30 tablet 2     pantoprazole (PROTONIX) 40 MG EC tablet Take 1 tablet (40 mg) by mouth daily. 90 tablet 2     rimegepant (NURTEC) 75 MG ODT tablet Place 75 mg under the tongue daily as needed for migraine.       semaglutide (OZEMPIC) 2 MG/3ML pen Inject 0.5 mg subcutaneously every 7 days. 9 mL 3     SUMAtriptan (IMITREX) 50 MG tablet Take 2 tablets (100 mg) by mouth at onset of headache for migraine. May repeat in 2 hours. Max 4 tablets/24 hours.       thin (NO BRAND SPECIFIED) lancets Use with lanceting device. To  "accompany: Blood Glucose Monitor Brands: per insurance. 100 each 6     topiramate (TOPAMAX) 25 MG tablet TAKE TWO TABLETS BY MOUTH EVERY NIGHT AT BEDTIME X 7 DAYS, THEN ONE IN THE AM AND TWO EVERY NIGHT AT BEDTIME X 7 DYAS, THEN TWO TWICE DAILY       UBRELVY 100 MG tablet Take 100 mg by mouth at onset of headache.       No current facility-administered medications for this visit.     No OTCs, herbals    Allergies:  Allergies   Allergen Reactions     Emgality [Galcanezumab-Gnlm] Itching     Itchiness, bumps     Other Environmental Allergy      Victoza [Liraglutide] Headache, Hives and Itching       Objective:  /81 (BP Location: Right arm, Patient Position: Sitting, Cuff Size: Adult Large)   Pulse 69   Temp 97.8  F (36.6  C) (Oral)   Resp 22   Ht 1.537 m (5' 0.51\")   Wt 118.3 kg (260 lb 14.4 oz)   LMP 10/16/2024 (Approximate)   SpO2 98%   BMI 50.09 kg/m    Constitutional: pleasant woman in NAD  Eyes: non icteric  Respiratory: Normal respiratory excursion   MSK: normal range of motion of visualized extremities  Abd: Non distended  Skin: No jaundice  Psychiatric: normal mood and orientation    Labs:  Last Comprehensive Metabolic Panel:  Sodium   Date Value Ref Range Status   11/09/2024 141 135 - 145 mmol/L Final   01/18/2019 136 133 - 144 mmol/L Final     Potassium   Date Value Ref Range Status   11/09/2024 4.0 3.4 - 5.3 mmol/L Final   01/18/2019 4.2 3.4 - 5.3 mmol/L Final     Chloride   Date Value Ref Range Status   11/09/2024 106 98 - 107 mmol/L Final   01/18/2019 103 94 - 109 mmol/L Final     Carbon Dioxide   Date Value Ref Range Status   01/18/2019 27 20 - 32 mmol/L Final     Carbon Dioxide (CO2)   Date Value Ref Range Status   11/09/2024 21 (L) 22 - 29 mmol/L Final     Anion Gap   Date Value Ref Range Status   11/09/2024 14 7 - 15 mmol/L Final   01/18/2019 6 3 - 14 mmol/L Final     Glucose   Date Value Ref Range Status   11/09/2024 110 (H) 70 - 99 mg/dL Final   01/18/2019 99 70 - 99 mg/dL Final     " Comment:     Non Fasting     GLUCOSE BY METER POCT   Date Value Ref Range Status   09/16/2024 91 70 - 99 mg/dL Final     Urea Nitrogen   Date Value Ref Range Status   11/09/2024 12.3 6.0 - 20.0 mg/dL Final   01/18/2019 10 7 - 30 mg/dL Final     Creatinine   Date Value Ref Range Status   11/09/2024 0.71 0.51 - 0.95 mg/dL Final   01/18/2019 0.65 0.52 - 1.04 mg/dL Final     GFR Estimate   Date Value Ref Range Status   11/09/2024 >90 >60 mL/min/1.73m2 Final     Comment:     eGFR calculated using 2021 CKD-EPI equation.   01/18/2019 >90 >60 mL/min/[1.73_m2] Final     Comment:     Non  GFR Calc  Starting 12/18/2018, serum creatinine based estimated GFR (eGFR) will be   calculated using the Chronic Kidney Disease Epidemiology Collaboration   (CKD-EPI) equation.       Calcium   Date Value Ref Range Status   11/09/2024 9.7 8.8 - 10.4 mg/dL Final     Comment:     Reference intervals for this test were updated on 7/16/2024 to reflect our healthy population more accurately. There may be differences in the flagging of prior results with similar values performed with this method. Those prior results can be interpreted in the context of the updated reference intervals.   01/18/2019 9.8 8.5 - 10.1 mg/dL Final     Bilirubin Total   Date Value Ref Range Status   12/18/2024 0.2 <=1.2 mg/dL Final   01/15/2018 0.3 0.2 - 1.3 mg/dL Final     Alkaline Phosphatase   Date Value Ref Range Status   12/18/2024 50 40 - 150 U/L Final   01/15/2018 66 40 - 150 U/L Final     ALT   Date Value Ref Range Status   12/18/2024 16 0 - 50 U/L Final   01/15/2018 53 (H) 0 - 50 U/L Final     AST   Date Value Ref Range Status   12/18/2024 16 0 - 45 U/L Final   01/15/2018 22 0 - 45 U/L Final       Lab Results   Component Value Date    WBC 7.6 12/18/2024    WBC 7.1 09/27/2014     Lab Results   Component Value Date    RBC 4.35 12/18/2024    RBC 4.71 09/27/2014     Lab Results   Component Value Date    HGB 13.5 12/18/2024    HGB 14.9 09/27/2014  "    Lab Results   Component Value Date    HCT 40.4 12/18/2024    HCT 43.8 03/15/2017     Lab Results   Component Value Date    MCV 93 12/18/2024    MCV 92 09/27/2014     Lab Results   Component Value Date    MCH 31.0 12/18/2024    MCH 31.6 09/27/2014     Lab Results   Component Value Date    MCHC 33.4 12/18/2024    MCHC 34.3 09/27/2014     Lab Results   Component Value Date    RDW 12.8 12/18/2024    RDW 13.7 09/27/2014     Lab Results   Component Value Date     12/18/2024     09/27/2014       Previous work-up:   Lab Results   Component Value Date    HCVAB Nonreactive 01/30/2024    MAGED 381 (H) 12/18/2024    IRONSAT 19 12/18/2024    TTG 0.6 01/30/2024    TTGG 0.9 01/30/2024     02/21/2011    DAHLIA Negative 09/10/2024    TSH 1.08 08/23/2024    CHOL 143 11/13/2024    HDL 58 11/13/2024    LDL 67 11/13/2024    TRIG 88 11/13/2024    A1C 5.6 07/30/2024      No results found for: \"SPECDES\", \"LDRESULTS\"    Imaging:    CT AP 9/2024    FINDINGS:   LOWER CHEST: Normal.     HEPATOBILIARY: Slightly nodular liver undersurface suspicious for fibrosis. Cholecystectomy.     PANCREAS: Normal.     SPLEEN: Normal.     ADRENAL GLANDS: Normal.     KIDNEYS/BLADDER: Normal.     BOWEL: No obstruction or inflammatory change.     LYMPH NODES: Normal.     VASCULATURE: Normal.     PELVIC ORGANS: Normal.     MUSCULOSKELETAL: Unremarkable                                                                      IMPRESSION:   1.  Slightly nodular liver undersurface suspicious for fibrosis. Otherwise unremarkable noncontrast CT abdomen and pelvis.    Endoscopy:    Reviewed recent EGD and colonoscopy    Independently reviewed labs and imaging.     Assessment/Plan: Ms. Flores is a 29 year old woman with a history of CAH, obesity, type 2 diabetes, PCOS who presents for evaluation of potential liver disease.    Discussed she has several risk factors for metabolic dysfunction associated steatotic liver disease.  Testing for hepatitis B, C, " celiac disease is negative.  She is a carrier for hemochromatosis.  Ferritin was more elevated in the past, now is around 300.    She apparently had a ultrasound elastography that showed significant scarring.  Will obtain these records.  Discussed it is unusual for someone this young to develop significant scarring from metabolic liver disease, but she had significant steroid use in the past related to her CAH.  Discussed that if the ultrasound elastography showed scarring, I would recommend a confirmatory MRI elastography given false positives that can be seen with this test.  We can also consider liver biopsy.  If her MRI elastography showed advanced fibrosis, she would need regular follow-up with rheumatology clinic for liver cancer screening.  Discussed that if she does not have advanced fibrosis on her imaging, she should continue to work with her PCP for further weight loss help with her metabolic liver disease.  We discussed that people with metabolic liver disease can have some vague right-sided abdominal pain, treatment for this weight loss.    FIB-4 Calculation: 0.42 at 12/18/2024  1:46 PM  Calculated from:  SGOT/AST: 16 U/L at 12/18/2024  1:46 PM  SGPT/ALT: 16 U/L at 12/18/2024  1:46 PM  Her fib 4 is low, placing her in her the low risk category for risk fibrosis.  Platelets: 298 10e3/uL at 12/18/2024  1:46 PM  Age: 29 years      Orders Placed This Encounter   Procedures     GGT       RTC PRN based on US elastography result      Cheyenne Overton MD MS  Hepatology/Liver Transplant  [facility]      Again, thank you for allowing me to participate in the care of your patient.        Sincerely,        Cheyenne Overton MD

## 2024-12-18 NOTE — NURSING NOTE
"Chief Complaint   Patient presents with    Consult     History of elevated ALT and abnormal liver imaging      Vital signs:  Temp: 97.8  F (36.6  C) Temp src: Oral BP: 121/81 Pulse: 69   Resp: 22 SpO2: 98 %     Height: 153.7 cm (5' 0.51\") Weight: 118.3 kg (260 lb 14.4 oz)  Estimated body mass index is 50.09 kg/m  as calculated from the following:    Height as of this encounter: 1.537 m (5' 0.51\").    Weight as of this encounter: 118.3 kg (260 lb 14.4 oz).      Maddie Benton, [facility]  12/18/2024 2:22 PM    "

## 2024-12-19 LAB — TTG IGA SER-ACNC: 0.3 U/ML

## 2024-12-20 ENCOUNTER — TELEPHONE (OUTPATIENT)
Dept: GASTROENTEROLOGY | Facility: CLINIC | Age: 29
End: 2024-12-20
Payer: COMMERCIAL

## 2024-12-20 NOTE — TELEPHONE ENCOUNTER
[facility] Call Center    Phone Message    May a detailed message be left on voicemail: yes     Reason for Call: Other: Patient called and states that [facility] pushed US LIVER imaging over to Dr. Overton, for her reviewal.     Action Taken: Message routed to:  [facility]    Travel Screening: Not Applicable

## 2024-12-21 LAB
A1AT PHENOTYP SERPL-IMP: ABNORMAL
A1AT SERPL-MCNC: 251 MG/DL

## 2024-12-23 ENCOUNTER — TELEPHONE (OUTPATIENT)
Dept: GASTROENTEROLOGY | Facility: CLINIC | Age: 29
End: 2024-12-23
Payer: COMMERCIAL

## 2024-12-23 DIAGNOSIS — K76.0 FATTY LIVER: ICD-10-CM

## 2024-12-23 DIAGNOSIS — K74.00 FIBROSIS OF LIVER: Primary | ICD-10-CM

## 2024-12-24 NOTE — PROGRESS NOTES
[facility] elastography showing F4 fibrosis - given low FIB-4 recommend MRI elastography is a more accurate test of fibrosis.  I talked to her and her mother about this at the visit.  Please call her to schedule the MRI elastography

## 2024-12-30 ENCOUNTER — OFFICE VISIT (OUTPATIENT)
Dept: GASTROENTEROLOGY | Facility: CLINIC | Age: 29
End: 2024-12-30
Payer: COMMERCIAL

## 2024-12-30 ENCOUNTER — HOSPITAL ENCOUNTER (OUTPATIENT)
Dept: GENERAL RADIOLOGY | Facility: CLINIC | Age: 29
Discharge: HOME | End: 2024-12-30
Attending: INTERNAL MEDICINE
Payer: COMMERCIAL

## 2024-12-30 ENCOUNTER — OFFICE VISIT (OUTPATIENT)
Dept: INTERNAL MEDICINE | Facility: CLINIC | Age: 29
End: 2024-12-30
Attending: INTERNAL MEDICINE
Payer: COMMERCIAL

## 2024-12-30 VITALS
DIASTOLIC BLOOD PRESSURE: 83 MMHG | BODY MASS INDEX: 51.63 KG/M2 | HEIGHT: 60 IN | SYSTOLIC BLOOD PRESSURE: 135 MMHG | HEART RATE: 88 BPM | WEIGHT: 263 LBS

## 2024-12-30 DIAGNOSIS — G89.29 CHRONIC BILATERAL LOW BACK PAIN WITHOUT SCIATICA: ICD-10-CM

## 2024-12-30 DIAGNOSIS — K52.9 CHRONIC DIARRHEA: ICD-10-CM

## 2024-12-30 DIAGNOSIS — E73.9 LACTOSE INTOLERANCE: ICD-10-CM

## 2024-12-30 DIAGNOSIS — K76.0 FATTY LIVER: ICD-10-CM

## 2024-12-30 DIAGNOSIS — G56.03 BILATERAL CARPAL TUNNEL SYNDROME: ICD-10-CM

## 2024-12-30 DIAGNOSIS — M54.50 CHRONIC BILATERAL LOW BACK PAIN WITHOUT SCIATICA: ICD-10-CM

## 2024-12-30 DIAGNOSIS — R10.9 CHRONIC ABDOMINAL PAIN: ICD-10-CM

## 2024-12-30 DIAGNOSIS — K74.00 FIBROSIS OF LIVER: ICD-10-CM

## 2024-12-30 DIAGNOSIS — E11.9 DIABETES MELLITUS, TYPE 2 (H): ICD-10-CM

## 2024-12-30 DIAGNOSIS — G89.29 CHRONIC ABDOMINAL PAIN: ICD-10-CM

## 2024-12-30 DIAGNOSIS — J01.01 ACUTE RECURRENT MAXILLARY SINUSITIS: Primary | ICD-10-CM

## 2024-12-30 PROCEDURE — 97803 MED NUTRITION INDIV SUBSEQ: CPT | Performed by: DIETITIAN, REGISTERED

## 2024-12-30 PROCEDURE — G0463 HOSPITAL OUTPT CLINIC VISIT: HCPCS | Performed by: INTERNAL MEDICINE

## 2024-12-30 PROCEDURE — 72190 X-RAY EXAM OF PELVIS: CPT

## 2024-12-30 PROCEDURE — 99207 PR NO CHARGE LOS: CPT | Performed by: DIETITIAN, REGISTERED

## 2024-12-30 ASSESSMENT — ANXIETY QUESTIONNAIRES
GAD7 TOTAL SCORE: 2
8. IF YOU CHECKED OFF ANY PROBLEMS, HOW DIFFICULT HAVE THESE MADE IT FOR YOU TO DO YOUR WORK, TAKE CARE OF THINGS AT HOME, OR GET ALONG WITH OTHER PEOPLE?: SOMEWHAT DIFFICULT
IF YOU CHECKED OFF ANY PROBLEMS ON THIS QUESTIONNAIRE, HOW DIFFICULT HAVE THESE PROBLEMS MADE IT FOR YOU TO DO YOUR WORK, TAKE CARE OF THINGS AT HOME, OR GET ALONG WITH OTHER PEOPLE: SOMEWHAT DIFFICULT
1. FEELING NERVOUS, ANXIOUS, OR ON EDGE: NOT AT ALL
2. NOT BEING ABLE TO STOP OR CONTROL WORRYING: NOT AT ALL
7. FEELING AFRAID AS IF SOMETHING AWFUL MIGHT HAPPEN: NOT AT ALL
GAD7 TOTAL SCORE: 2
5. BEING SO RESTLESS THAT IT IS HARD TO SIT STILL: SEVERAL DAYS
7. FEELING AFRAID AS IF SOMETHING AWFUL MIGHT HAPPEN: NOT AT ALL
6. BECOMING EASILY ANNOYED OR IRRITABLE: SEVERAL DAYS
4. TROUBLE RELAXING: NOT AT ALL
3. WORRYING TOO MUCH ABOUT DIFFERENT THINGS: NOT AT ALL
GAD7 TOTAL SCORE: 2

## 2024-12-30 NOTE — LETTER
12/30/2024      Anh Flores  [address] [facility] [address]      Dear Colleague,    Thank you for referring your patient, Anh Flores, to the [facility] GASTROENTEROLOGY CLIN[facility] [place]. Please see a copy of my visit note below.    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    At initial visit, she stated that she had her gallbladder removed when she was 11 years old and since then she has always had issues with food and diarrhea.  She currently takes cholestyramine as needed.  She stated that she cannot tolerate to take it consistently twice per day as it bothers her teeth as they are sensitive.  She will take it twice a day when she has worsening flares of symptoms.  She stated that starting last December she was diagnosed with Cryptosporidium after attending a dog show around Thanksgiving time.  Since having the infection she began to consistently not feel well and has been eating less.  She complains of having upper left-sided pain that radiates to her back and also has pain at the side of her bellybutton.  She also reported that her stomach feels heavy with eating some heavier foods.  She has issues with oil and fats and can have diarrhea within 10 to 15 minutes of having something oily.  She also stated that with taking Ozempic foods do not taste as good either and has early satiety.  She also complains of having migraines and some brain fog and short-term memory issues.  She reported losing 75  pounds since December 2023, and stated she was diagnosed with type 2 diabetes in January 2024.  Her weight last year was about 325 to 330 pounds and currently 258 pounds.  She she is currently taking Ozempic.  She had stopped for few weeks but it did not change any of her symptoms, so she was restarted on Ozempic.  She did feel that she could be a little more off of Ozempic but it did not change her symptoms significantly.  She will also meet with Dr. Overton in hepatology in a couple of months. She stated that she was told that she may have fatty liver or cirrhosis so she is waiting to speak with Dr. Hernández to discuss. She will have a gastric emptying study later this week on 10/17/2024.  Following visit writer messaged her primary care doctor who also copied her endocrinologist to find out how long she needs to be off this medication before her emptying study.  Her MD recommended at least 1 week, ideally 2 weeks but okay for 1 week off medication before her study.  Sent message to patient in [place] [employer] along with her nutrition instructions discussed today and encouraged her to reach out to her doctors if she had other questions.     At follow up visit today on 12/30/24, she stated that she is still struggling to eat.  She does not feel well when she eats and has ongoing issues with diarrhea.  She feels better when she does not eat.  She is no longer consistently taking cholestyramine and only as needed now, but she does not tolerate it well so not taking it at this time.  She stated that she was told by one provider that she had cirrhosis but then that she did not have cirrhosis by another provider.  She is awaiting hepatologist reading of some recent testing she had done.  She stated that in the morning her first bowel movement of the day is normal but then progressively they turned to diarrhea and she will have stringy stools with pieces of food in her stools.  She has been keeping small servings of nuts in her  purse and by her for a small snack/s.  She is also eating 2-3 apples a day, a banana and grapes as these are not tolerated.  She will sometimes have oranges but that may or may not be tolerated.  They can have a bitter taste.  She continues to have some taste differences with taking Ozempic.  She tried some plant-based protein drinks and Premier protein drinks but did not like the flavor of any of the options.  On her recent trip in [place] she had difficulty finding foods that she could eat.  She also currently has a sinus infection, so has an appointment with a provider today to discuss and possibly start antibiotics if appropriate.  She had her gastric emptying study on 11/25/2024 and it was normal but on the lower limits of normal with a slightly faster motility.  She stated that her mother looked up diets for cirrhosis and saw that the Mediterranean diet was recommended.  Her mother purchased a Mediterranean diet cookbook and also a diabetic cookbook.  Some of the food choices in the Mediterranean plan are not her food preferences, but some are but may be more difficult to make at times for her especially when she is having more issues with symptoms and fatigue.  She stated that she is having a lot of fatigue as well.    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), late onset (H)     Chronic abdominal pain     Vitamin D deficiency     Chronic headaches     Morbid obesity (H)     Hidradenitis suppurativa     Morbid obesity with body mass index of 60.0-69.9 in adult (H)     Elevated BP without diagnosis of hypertension     Diabetes mellitus, type 2 (H)     Bilateral carpal tunnel syndrome     Obstructive sleep apnea     Insomnia, unspecified type     Lactose intolerance     Abnormal finding on imaging of liver     Chronic diarrhea     Carrier of hemochromatosis HFE gene mutation     Cryptosporidiasis (H)     Elevated ferritin     Height:  "  Ht Readings from Last 1 Encounters:   12/30/24 1.525 m (5' 0.05\")     Weight:She reported she 325-330 lbs last fall before getting sick in Dec. On Ozempic now but had weight loss before even starting this med which was started for her DX DM.     Wt Readings from Last 35 Encounters:   12/30/24 119.3 kg (263 lb)   12/18/24 118.3 kg (260 lb 14.4 oz)   12/16/24 117.8 kg (259 lb 12.8 oz)   12/04/24 119.7 kg (264 lb)   11/13/24 117 kg (258 lb)   11/13/24 116.8 kg (257 lb 6.4 oz)   10/18/24 119.3 kg (263 lb 1.6 oz)   10/08/24 117.3 kg (258 lb 11.2 oz)   10/06/24 117.4 kg (258 lb 14.4 oz)   09/10/24 116.8 kg (257 lb 6.4 oz)   09/07/24 115.7 kg (255 lb)   08/21/24 121.3 kg (267 lb 8 oz)   07/30/24 123.1 kg (271 lb 4.8 oz)   07/11/24 124.7 kg (275 lb)   07/05/24 124.7 kg (275 lb)   06/29/24 126.7 kg (279 lb 6.4 oz)   05/25/24 130.4 kg (287 lb 8 oz)   04/30/24 137 kg (302 lb)   03/29/24 135.6 kg (299 lb)   03/11/24 136.2 kg (300 lb 3.2 oz)   01/30/24 136.5 kg (300 lb 14.4 oz)   12/08/23 132 kg (290 lb 14.4 oz)   10/19/20 139.4 kg (307 lb 6.4 oz)   01/23/19 146.2 kg (322 lb 4.8 oz)   01/21/19 147.9 kg (326 lb)   01/18/19 148 kg (326 lb 4.8 oz)   01/10/18 (!) 148.4 kg (327 lb 2.6 oz)   02/21/17 (!) 146.4 kg (322 lb 12.1 oz)   11/03/14 (!) 143.8 kg (317 lb)   10/17/14 (!) 142 kg (313 lb)   10/01/14 (!) 142 kg (313 lb)   09/27/14 (!) 142.9 kg (315 lb)   09/23/14 (!) 144.3 kg (318 lb 3.2 oz)   09/17/14 (!) 142 kg (313 lb)   09/11/14 (!) 142 kg (313 lb)     BMI: Estimated body mass index is 51.28 kg/m  as calculated from the following:    Height as of an earlier encounter on 12/30/24: 1.525 m (5' 0.05\").    Weight as of an earlier encounter on 12/30/24: 119.3 kg (263 lb).    Diet Recall:  (some usual/recent meals/snacks/beverages):     Previously (10/24 visit): she reported cooks mostly meals at home. The ozempic changed the way food tasted. Can't tolerate some oils within 10-15 minues diahrrea and sick for days after that , can't " eat dairy or eggs. If eats eggs immediately gets diarrhea. Stomach gets heavy feeling. Takes cholestyramine prn but if having bouts of the diarrhea then takes twice per day. Typically doesn't eat beef and pork but did have some in her spaghetti sauce last night. Having mirgraines so stated some short term memory issues/brain fog.    Meal Food    Breakfast 9:30 AM: banana/jose martin/apple or grapes/pineapple with meds   Lunch Snack: handful cheez it   Dinner Broth based soup or salad or fish/salmon/chicken/ground turkey/hardly pork, veggie (cauliflower/carrots/betty/gr bean or salad), grapes, sometimes brown rice.or 1/2c-1 c potato stroganoff, and green beans   Snacks Apple before bed with meds. Over holidays some cookies but otherwise not usually   Beverages 5 oz Body armour, 1 cup diet green tea, 8-16 oz or less water, 5 oz Talley's zero sugar juice (0g added sugar)   Alcohol Intake none     Frequency of eating/taking out meals: ~1x/week on a weekend     Labs:    Last Comprehensive Metabolic Panel:  Sodium   Date Value Ref Range Status   11/09/2024 141 135 - 145 mmol/L Final   01/18/2019 136 133 - 144 mmol/L Final     Potassium   Date Value Ref Range Status   11/09/2024 4.0 3.4 - 5.3 mmol/L Final   01/18/2019 4.2 3.4 - 5.3 mmol/L Final     Chloride   Date Value Ref Range Status   11/09/2024 106 98 - 107 mmol/L Final   01/18/2019 103 94 - 109 mmol/L Final     Carbon Dioxide   Date Value Ref Range Status   01/18/2019 27 20 - 32 mmol/L Final     Carbon Dioxide (CO2)   Date Value Ref Range Status   11/09/2024 21 (L) 22 - 29 mmol/L Final     Anion Gap   Date Value Ref Range Status   11/09/2024 14 7 - 15 mmol/L Final   01/18/2019 6 3 - 14 mmol/L Final     Glucose   Date Value Ref Range Status   11/09/2024 110 (H) 70 - 99 mg/dL Final   01/18/2019 99 70 - 99 mg/dL Final     Comment:     Non Fasting     GLUCOSE BY METER POCT   Date Value Ref Range Status   09/16/2024 91 70 - 99 mg/dL Final     Urea Nitrogen   Date Value Ref  Range Status   11/09/2024 12.3 6.0 - 20.0 mg/dL Final   01/18/2019 10 7 - 30 mg/dL Final     Creatinine   Date Value Ref Range Status   11/09/2024 0.71 0.51 - 0.95 mg/dL Final   01/18/2019 0.65 0.52 - 1.04 mg/dL Final     GFR Estimate   Date Value Ref Range Status   11/09/2024 >90 >60 mL/min/1.73m2 Final     Comment:     eGFR calculated using 2021 CKD-EPI equation.   01/18/2019 >90 >60 mL/min/[1.73_m2] Final     Comment:     Non  GFR Calc  Starting 12/18/2018, serum creatinine based estimated GFR (eGFR) will be   calculated using the Chronic Kidney Disease Epidemiology Collaboration   (CKD-EPI) equation.       Calcium   Date Value Ref Range Status   11/09/2024 9.7 8.8 - 10.4 mg/dL Final     Comment:     Reference intervals for this test were updated on 7/16/2024 to reflect our healthy population more accurately. There may be differences in the flagging of prior results with similar values performed with this method. Those prior results can be interpreted in the context of the updated reference intervals.   01/18/2019 9.8 8.5 - 10.1 mg/dL Final     CBC RESULTS:   Recent Labs   Lab Test 09/07/24  1753   WBC 7.0   RBC 4.46   HGB 13.9   HCT 40.9   MCV 92   MCH 31.2   MCHC 34.0   RDW 13.8        Pertinent Medications/vitamin and mineral supplements:      Current Outpatient Medications   Medication Sig Dispense Refill     alcohol swab prep pads Use to swab area of injection/anthony as directed. 100 each 3     amoxicillin-clavulanate (AUGMENTIN) 875-125 MG tablet Take 1 tablet by mouth 2 times daily for 7 days. 14 tablet 0     Atogepant (QULIPTA) 60 MG TABS Take 60 mg by mouth.       atorvastatin (LIPITOR) 10 MG tablet Take 1 tablet (10 mg) by mouth daily. 90 tablet 2     blood glucose (NO BRAND SPECIFIED) test strip Use to test blood sugar three times daily or as directed. Preferred blood glucose meter OR supplies to accompany: Blood Glucose Monitor Brands: per insurance. 100 strip 6     blood glucose  monitoring (NO BRAND SPECIFIED) meter device kit Use to test blood sugar three times daily or as directed. Preferred blood glucose meter OR supplies to accompany: Blood Glucose Monitor Brands: per insurance. 1 kit 0     Continuous Glucose Sensor (FREESTYLE AYSE 3 PLUS SENSOR) MISC Use 1 sensor every 15 days. Use to read blood sugars per 's instructions. 6 each 3     Continuous Glucose Sensor (FREESTYLE AYSE 3 SENSOR) [facility] 1 each by Other route every 14 days. 6 each 3     fluticasone (FLONASE) 50 MCG/ACT nasal spray Spray 1 spray into both nostrils daily 16 g 3     gabapentin (NEURONTIN) 300 MG capsule Take 1 capsule (300 mg) by mouth 2 times daily. 180 capsule 0     insulin pen needle (ULTICARE MICRO) 32G X 4 MM miscellaneous Use 1 pen needles daily or as directed. 100 each 3     metFORMIN (GLUCOPHAGE XR) 500 MG 24 hr tablet Take 4 tablets (2,000 mg) by mouth daily (with dinner). 360 tablet 3     norgestimate-ethinyl estradiol (ORTHO-CYCLEN) 0.25-35 MG-MCG tablet Take 1 tablet by mouth daily 84 tablet 3     ondansetron (ZOFRAN ODT) 4 MG ODT tab Take 1 tablet (4 mg) by mouth every 8 hours as needed for nausea 30 tablet 2     pantoprazole (PROTONIX) 40 MG EC tablet Take 1 tablet (40 mg) by mouth daily. 90 tablet 2     rimegepant (NURTEC) 75 MG ODT tablet Place 75 mg under the tongue daily as needed for migraine.       semaglutide (OZEMPIC) 2 MG/3ML pen Inject 0.5 mg subcutaneously every 7 days. 9 mL 3     SUMAtriptan (IMITREX) 50 MG tablet Take 2 tablets (100 mg) by mouth at onset of headache for migraine. May repeat in 2 hours. Max 4 tablets/24 hours.       thin (NO BRAND SPECIFIED) lancets Use with lanceting device. To accompany: Blood Glucose Monitor Brands: per insurance. 100 each 6     topiramate (TOPAMAX) 25 MG tablet TAKE TWO TABLETS BY MOUTH EVERY NIGHT AT BEDTIME X 7 DAYS, THEN ONE IN THE AM AND TWO EVERY NIGHT AT BEDTIME X 7 DYAS, THEN TWO TWICE DAILY       UBRELVY 100 MG tablet Take 100 mg by  mouth at onset of headache.       No current facility-administered medications for this visit.     Food Allergies:  NKFA   Physical Activity:  dog walks, lead train dogs 15-20 minutes, 3-4 times per week    MALNUTRITION:  % Weight Loss:  20% in 1 year (moderate malnutrition)  % Intake:  <75% for >/= 3 months (moderate malnutrition)  Subcutaneous Fat Loss:  None observed  Muscle Loss:  None observed  Fluid Retention:  None noted    Malnutrition Diagnosis: Moderate malnutrition  In Context of:  Chronic illness or disease    Nutrition Prescription: Nutrition Education     Nutrition Intervention      Provided diet education for food intolerances, diarrhea, abdominal pain, unintentional weight loss, and tips with slightly faster emptying/lower end of normal GES.  Discussed tips for possible cirrhosis and Mediterranean diet tips.    Answered patient's questions. Patient verbalized understanding of education provided. See Goals below.     Educational Materials Provided: Preventing dumping    Goals:    Nutrition Recommendations    Aim for eating multiple smaller meals per day such as 4-6 smaller meals per day.    Can use the plate method for general guidance on getting balanced meals.    --You can fill your plate with foods in line with a mediterranean diet. Can read more information on The American Heart Association at www.heart.org.    Increase fluids to drink at least 48 oz-64 oz or more per day.    Include some foods with soluble fiber such as oats, barley, sweet potato, potato, apple, orange, banana, avocado, ruth seeds, flaxseeds (ground may go better), nuts.    Try having 1 teaspoon of citrucel or metamucil or psyllium husk (such as Konsyl brand or metamucil should have an unflavored option) per day. Mix 1 teaspoon in a full glass of water. Can do this for several days to a week and then from there can increase to 2 teaspoons per day and gradually increase to 1 tablespoon per day.     [employer] sells ready made  mediterranean meals that you just have to reheat in the microwave or oven. [email or web address].[email or web address]    If you would like to schedule a follow up appointment with Jessie Coulter, Registered Dietitian, please call [phone].    Nutrition Monitoring and Evaluation: Will monitor adherence to nutrition recommendations at future RD visits.     Further Medical Nutrition Therapy:  Follow-up in 2-3 months    Patient was encouraged to contact RD with any further questions.    Jessie Coulter, MS, RD, LD    Note: this note was partially completed using voice dictation while checked for errors some grammatical/spelling errors may still be present.          Again, thank you for allowing me to participate in the care of your patient.        Sincerely,        Jessie Coulter RD    Electronically signed

## 2024-12-30 NOTE — LETTER
12/30/2024       RE: Anh Flores  [address] [facility] [address]     Dear Colleague,    Thank you for referring your patient, Anh Flores, to the [facility]OMEN[facility] [place] at [facility]. Please see a copy of my visit note below.      Assessment & Plan    Acute recurrent maxillary sinusitis  Reviewed possible causes of symptoms, recommend treatment with Augmentin.  Patient is scheduled to be evaluated by ENT later this week.  Additional interventions will be determined by her ENT provider if she does not have sufficient response to course of antibiotics.  - amoxicillin-clavulanate (AUGMENTIN) 875-125 MG tablet; Take 1 tablet by mouth 2 times daily for 7 days.    Chronic bilateral low back pain without sciatica  Reviewed possible causes of back pain with patient.  Strongly favor muscle strain.  Recommend further evaluation by spine specialist.  Will obtain x-rays of bilateral hips to exclude early osteoarthritis.  - Spine  Referral; Future  - XR Pelvis G/E 3 Views; Future    Chronic diarrhea  Patient is currently followed by [place] [facility].  She was encouraged to see her provider.  Referral to [facility] gastroenterology was also placed per patient's recent [facility].  - Adult GI  Referral - Consult Only; Future    Bilateral carpal tunnel syndrome  Referral to orthopedics for recommendations on management of bilateral carpal tunnel was given to the patient today.  Reviewed EMG results with patient, advised on the need for potential interventions.  - Orthopedic  Referral; Future      I spent a total of 30 minutes on the day of the visit.   Time spent by me today doing chart review, history and exam, documentation and further activities per the note  No follow-ups on file.    Tanika Kamara is a 29 year old, presenting for the following health issues:  Consult (Diarrhea/back pain)    HPI  "    Patient is here to establish care. She reports that she has been working with GI on evaluation of GI issues. She has been dealing with ongoing diarrhea. She states that it is not predictable.  Her biggest concern at this point is a sinus infection that has not cleared with treatment with doxycycline.   Patient is also reporting back pain that started 3-4 months ago. She states that pain is located in the upper back and low back. Pain is worse with physical activity. She denies numbness or tingling in her lower extremities. She states that she is limited in her physical activity. She has been offered PT, however, would prefer to be evaluated prior to PT>       Objective   /83   Pulse 88   Ht 1.525 m (5' 0.05\")   Wt 119.3 kg (263 lb)   LMP 10/16/2024 (Approximate)   BMI 51.28 kg/m    Body mass index is 51.28 kg/m .  Physical Exam   GENERAL: alert and no distress  EYES: Eyes grossly normal to inspection, PERRL and conjunctivae and sclerae normal  RESP: normal effort  CV: regular rates and rhythm and no peripheral edema  SKIN: no suspicious lesions or rashes  NEURO: Normal strength and tone, mentation intact and speech normal            Signed Electronically by: Maria Guadalupe Triana MD        Again, thank you for allowing me to participate in the care of your patient.      Sincerely,    Maria Guadalupe Triana MD    "

## 2024-12-30 NOTE — PROGRESS NOTES
Assessment & Plan     Acute recurrent maxillary sinusitis  Reviewed possible causes of symptoms, recommend treatment with Augmentin.  Patient is scheduled to be evaluated by ENT later this week.  Additional interventions will be determined by her ENT provider if she does not have sufficient response to course of antibiotics.  - amoxicillin-clavulanate (AUGMENTIN) 875-125 MG tablet; Take 1 tablet by mouth 2 times daily for 7 days.    Chronic bilateral low back pain without sciatica  Reviewed possible causes of back pain with patient.  Strongly favor muscle strain.  Recommend further evaluation by spine specialist.  Will obtain x-rays of bilateral hips to exclude early osteoarthritis.  - Spine  Referral; Future  - XR Pelvis G/E 3 Views; Future    Chronic diarrhea  Patient is currently followed by [place] [facility].  She was encouraged to see her provider.  Referral to [facility] gastroenterology was also placed per patient's recent [facility].  - Adult GI  Referral - Consult Only; Future    Bilateral carpal tunnel syndrome  Referral to orthopedics for recommendations on management of bilateral carpal tunnel was given to the patient today.  Reviewed EMG results with patient, advised on the need for potential interventions.  - Orthopedic  Referral; Future      I spent a total of 30 minutes on the day of the visit.   Time spent by me today doing chart review, history and exam, documentation and further activities per the note  No follow-ups on file.    Tanika Kamara is a 29 year old, presenting for the following health issues:  Consult (Diarrhea/back pain)    HPI     Patient is here to establish care. She reports that she has been working with GI on evaluation of GI issues. She has been dealing with ongoing diarrhea. She states that it is not predictable.  Her biggest concern at this point is a sinus infection that has not cleared with treatment with doxycycline.   Patient  "is also reporting back pain that started 3-4 months ago. She states that pain is located in the upper back and low back. Pain is worse with physical activity. She denies numbness or tingling in her lower extremities. She states that she is limited in her physical activity. She has been offered PT, however, would prefer to be evaluated prior to PT>       Objective    /83   Pulse 88   Ht 1.525 m (5' 0.05\")   Wt 119.3 kg (263 lb)   LMP 10/16/2024 (Approximate)   BMI 51.28 kg/m    Body mass index is 51.28 kg/m .  Physical Exam   GENERAL: alert and no distress  EYES: Eyes grossly normal to inspection, PERRL and conjunctivae and sclerae normal  RESP: normal effort  CV: regular rates and rhythm and no peripheral edema  SKIN: no suspicious lesions or rashes  NEURO: Normal strength and tone, mentation intact and speech normal            Signed Electronically by: Maria Guadalupe Triana MD    "

## 2024-12-30 NOTE — PATIENT INSTRUCTIONS
It was nice speaking with you today. Below are the nutrition recommendations we discussed at your visit.    Please let me know if you have any additional questions.    Nutrition Recommendations    Aim for eating multiple smaller meals per day such as 4-6 smaller meals per day.    Can use the plate method for general guidance on getting balanced meals.    --You can fill your plate with foods in line with a mediterranean diet. Can read more information on The American Heart Association at www.heart.org.    Increase fluids to drink at least 48 oz-64 oz or more per day.    Include some foods with soluble fiber such as oats, barley, sweet potato, potato, apple, orange, banana, avocado, ruth seeds, flaxseeds (ground may go better), nuts, beans and legumes.    Try having 1 teaspoon of citrucel or metamucil or psyllium husk (such as Konsyl brand or metamucil should have an unflavored option) per day. Mix 1 teaspoon in a full glass of water. Can do this for several days to a week and then from there can increase to 2 teaspoons per day and gradually increase to 1 tablespoon per day.     [employer] sells ready made mediterranean meals that you just have to reheat in the microwave or oven. [email or web address]    Follow up in 2-3 months.    For follow up appointments, please call [phone].    Thank you,    Jessie Coulter, MS, RD, LD

## 2024-12-30 NOTE — PATIENT INSTRUCTIONS
Thank you for trusting us with your care!   Please be aware, if you are on Mychart, you may see your results prior to your providers review. If labs are abnormal, we will call or message you on [facility] with a follow up plan.    If you need to contact us for questions about:  Symptoms, Scheduling & Medical Questions; Non-urgent (2-3 day response) [facility] message, Urgent (needing response today) [phone] (if after 3:30pm next day response)   Prescriptions: Please call your Pharmacy   Billing: [place] [phone] or  Physicians:[phone].

## 2024-12-30 NOTE — PROGRESS NOTES
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    At initial visit, she stated that she had her gallbladder removed when she was 11 years old and since then she has always had issues with food and diarrhea.  She currently takes cholestyramine as needed.  She stated that she cannot tolerate to take it consistently twice per day as it bothers her teeth as they are sensitive.  She will take it twice a day when she has worsening flares of symptoms.  She stated that starting last December she was diagnosed with Cryptosporidium after attending a dog show around [facility] time.  Since having the infection she began to consistently not feel well and has been eating less.  She complains of having upper left-sided pain that radiates to her back and also has pain at the side of her bellybutton.  She also reported that her stomach feels heavy with eating some heavier foods.  She has issues with oil and fats and can have diarrhea within 10 to 15 minutes of having something oily.  She also stated that with taking Ozempic foods do not taste as good either and has early satiety.  She also complains of having migraines and some brain fog and short-term memory issues.  She reported losing 75 pounds since December 2023, and stated she was diagnosed with type 2 diabetes in January 2024.  Her weight last year was about 325 to 330 pounds and currently 258 pounds.  She she is currently taking Ozempic.  She had stopped for few weeks but it did not  change any of her symptoms, so she was restarted on Ozempic.  She did feel that she could be a little more off of Ozempic but it did not change her symptoms significantly.  She will also meet with Dr. Overton in hepatology in a couple of months. She stated that she was told that she may have fatty liver or cirrhosis so she is waiting to speak with Dr. Hernández to discuss. She will have a gastric emptying study later this week on 10/17/2024.  Following visit writer messaged her primary care doctor who also copied her endocrinologist to find out how long she needs to be off this medication before her emptying study.  Her MD recommended at least 1 week, ideally 2 weeks but okay for 1 week off medication before her study.  Sent message to patient in [employer] along with her nutrition instructions discussed today and encouraged her to reach out to her doctors if she had other questions.     At follow up visit today on 12/30/24, she stated that she is still struggling to eat.  She does not feel well when she eats and has ongoing issues with diarrhea.  She feels better when she does not eat.  She is no longer consistently taking cholestyramine and only as needed now, but she does not tolerate it well so not taking it at this time.  She stated that she was told by one provider that she had cirrhosis but then that she did not have cirrhosis by another provider.  She is awaiting hepatologist reading of some recent testing she had done.  She stated that in the morning her first bowel movement of the day is normal but then progressively they turned to diarrhea and she will have stringy stools with pieces of food in her stools.  She has been keeping small servings of nuts in her purse and by her for a small snack/s.  She is also eating 2-3 apples a day, a banana and grapes as these are not tolerated.  She will sometimes have oranges but that may or may not be tolerated.  They can have a bitter taste.  She continues to have some  "taste differences with taking Ozempic.  She tried some plant-based protein drinks and Premier protein drinks but did not like the flavor of any of the options.  On her recent trip in [place] she had difficulty finding foods that she could eat.  She also currently has a sinus infection, so has an appointment with a provider today to discuss and possibly start antibiotics if appropriate.  She had her gastric emptying study on 11/25/2024 and it was normal but on the lower limits of normal with a slightly faster motility.  She stated that her mother looked up diets for cirrhosis and saw that the Mediterranean diet was recommended.  Her mother purchased a Mediterranean diet cookbook and also a diabetic cookbook.  Some of the food choices in the Mediterranean plan are not her food preferences, but some are but may be more difficult to make at times for her especially when she is having more issues with symptoms and fatigue.  She stated that she is having a lot of fatigue as well.    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), late onset (H)    Chronic abdominal pain    Vitamin D deficiency    Chronic headaches    Morbid obesity (H)    Hidradenitis suppurativa    Morbid obesity with body mass index of 60.0-69.9 in adult (H)    Elevated BP without diagnosis of hypertension    Diabetes mellitus, type 2 (H)    Bilateral carpal tunnel syndrome    Obstructive sleep apnea    Insomnia, unspecified type    Lactose intolerance    Abnormal finding on imaging of liver    Chronic diarrhea    Carrier of hemochromatosis HFE gene mutation    Cryptosporidiasis (H)    Elevated ferritin     Height:   Ht Readings from Last 1 Encounters:   12/30/24 1.525 m (5' 0.05\")     Weight:She reported she 325-330 lbs last fall before getting sick in Dec. On Ozempic now but had weight loss before even starting this med which was started for her DX DM.     Wt Readings from Last 35 " "Encounters:   12/30/24 119.3 kg (263 lb)   12/18/24 118.3 kg (260 lb 14.4 oz)   12/16/24 117.8 kg (259 lb 12.8 oz)   12/04/24 119.7 kg (264 lb)   11/13/24 117 kg (258 lb)   11/13/24 116.8 kg (257 lb 6.4 oz)   10/18/24 119.3 kg (263 lb 1.6 oz)   10/08/24 117.3 kg (258 lb 11.2 oz)   10/06/24 117.4 kg (258 lb 14.4 oz)   09/10/24 116.8 kg (257 lb 6.4 oz)   09/07/24 115.7 kg (255 lb)   08/21/24 121.3 kg (267 lb 8 oz)   07/30/24 123.1 kg (271 lb 4.8 oz)   07/11/24 124.7 kg (275 lb)   07/05/24 124.7 kg (275 lb)   06/29/24 126.7 kg (279 lb 6.4 oz)   05/25/24 130.4 kg (287 lb 8 oz)   04/30/24 137 kg (302 lb)   03/29/24 135.6 kg (299 lb)   03/11/24 136.2 kg (300 lb 3.2 oz)   01/30/24 136.5 kg (300 lb 14.4 oz)   12/08/23 132 kg (290 lb 14.4 oz)   10/19/20 139.4 kg (307 lb 6.4 oz)   01/23/19 146.2 kg (322 lb 4.8 oz)   01/21/19 147.9 kg (326 lb)   01/18/19 148 kg (326 lb 4.8 oz)   01/10/18 (!) 148.4 kg (327 lb 2.6 oz)   02/21/17 (!) 146.4 kg (322 lb 12.1 oz)   11/03/14 (!) 143.8 kg (317 lb)   10/17/14 (!) 142 kg (313 lb)   10/01/14 (!) 142 kg (313 lb)   09/27/14 (!) 142.9 kg (315 lb)   09/23/14 (!) 144.3 kg (318 lb 3.2 oz)   09/17/14 (!) 142 kg (313 lb)   09/11/14 (!) 142 kg (313 lb)     BMI: Estimated body mass index is 51.28 kg/m  as calculated from the following:    Height as of an earlier encounter on 12/30/24: 1.525 m (5' 0.05\").    Weight as of an earlier encounter on 12/30/24: 119.3 kg (263 lb).    Diet Recall:  (some usual/recent meals/snacks/beverages):     Previously (10/24 visit): she reported cooks mostly meals at home. The ozempic changed the way food tasted. Can't tolerate some oils within 10-15 minues diahrrea and sick for days after that , can't eat dairy or eggs. If eats eggs immediately gets diarrhea. Stomach gets heavy feeling. Takes cholestyramine prn but if having bouts of the diarrhea then takes twice per day. Typically doesn't eat beef and pork but did have some in her spaghetti sauce last night. Having " willian so stated some short term memory issues/brain fog.    Meal Food    Breakfast 9:30 AM: banana/jose martin/apple or grapes/pineapple with meds   Lunch Snack: handful cheez it   Dinner Broth based soup or salad or fish/salmon/chicken/ground turkey/hardly pork, veggie (cauliflower/carrots/betty/gr bean or salad), grapes, sometimes brown rice.or 1/2c-1 c potato stroganoff, and green beans   Snacks Apple before bed with meds. Over holidays some cookies but otherwise not usually   Beverages 5 oz Body armour, 1 cup diet green tea, 8-16 oz or less water, 5 oz Talley's zero sugar juice (0g added sugar)   Alcohol Intake none     Frequency of eating/taking out meals: ~1x/week on a weekend     Labs:    Last Comprehensive Metabolic Panel:  Sodium   Date Value Ref Range Status   11/09/2024 141 135 - 145 mmol/L Final   01/18/2019 136 133 - 144 mmol/L Final     Potassium   Date Value Ref Range Status   11/09/2024 4.0 3.4 - 5.3 mmol/L Final   01/18/2019 4.2 3.4 - 5.3 mmol/L Final     Chloride   Date Value Ref Range Status   11/09/2024 106 98 - 107 mmol/L Final   01/18/2019 103 94 - 109 mmol/L Final     Carbon Dioxide   Date Value Ref Range Status   01/18/2019 27 20 - 32 mmol/L Final     Carbon Dioxide (CO2)   Date Value Ref Range Status   11/09/2024 21 (L) 22 - 29 mmol/L Final     Anion Gap   Date Value Ref Range Status   11/09/2024 14 7 - 15 mmol/L Final   01/18/2019 6 3 - 14 mmol/L Final     Glucose   Date Value Ref Range Status   11/09/2024 110 (H) 70 - 99 mg/dL Final   01/18/2019 99 70 - 99 mg/dL Final     Comment:     Non Fasting     GLUCOSE BY METER POCT   Date Value Ref Range Status   09/16/2024 91 70 - 99 mg/dL Final     Urea Nitrogen   Date Value Ref Range Status   11/09/2024 12.3 6.0 - 20.0 mg/dL Final   01/18/2019 10 7 - 30 mg/dL Final     Creatinine   Date Value Ref Range Status   11/09/2024 0.71 0.51 - 0.95 mg/dL Final   01/18/2019 0.65 0.52 - 1.04 mg/dL Final     GFR Estimate   Date Value Ref Range Status    11/09/2024 >90 >60 mL/min/1.73m2 Final     Comment:     eGFR calculated using 2021 CKD-EPI equation.   01/18/2019 >90 >60 mL/min/[1.73_m2] Final     Comment:     Non  GFR Calc  Starting 12/18/2018, serum creatinine based estimated GFR (eGFR) will be   calculated using the Chronic Kidney Disease Epidemiology Collaboration   (CKD-EPI) equation.       Calcium   Date Value Ref Range Status   11/09/2024 9.7 8.8 - 10.4 mg/dL Final     Comment:     Reference intervals for this test were updated on 7/16/2024 to reflect our healthy population more accurately. There may be differences in the flagging of prior results with similar values performed with this method. Those prior results can be interpreted in the context of the updated reference intervals.   01/18/2019 9.8 8.5 - 10.1 mg/dL Final     CBC RESULTS:   Recent Labs   Lab Test 09/07/24  1753   WBC 7.0   RBC 4.46   HGB 13.9   HCT 40.9   MCV 92   MCH 31.2   MCHC 34.0   RDW 13.8        Pertinent Medications/vitamin and mineral supplements:      Current Outpatient Medications   Medication Sig Dispense Refill    alcohol swab prep pads Use to swab area of injection/anthony as directed. 100 each 3    amoxicillin-clavulanate (AUGMENTIN) 875-125 MG tablet Take 1 tablet by mouth 2 times daily for 7 days. 14 tablet 0    Atogepant (QULIPTA) 60 MG TABS Take 60 mg by mouth.      atorvastatin (LIPITOR) 10 MG tablet Take 1 tablet (10 mg) by mouth daily. 90 tablet 2    blood glucose (NO BRAND SPECIFIED) test strip Use to test blood sugar three times daily or as directed. Preferred blood glucose meter OR supplies to accompany: Blood Glucose Monitor Brands: per insurance. 100 strip 6    blood glucose monitoring (NO BRAND SPECIFIED) meter device kit Use to test blood sugar three times daily or as directed. Preferred blood glucose meter OR supplies to accompany: Blood Glucose Monitor Brands: per insurance. 1 kit 0    Continuous Glucose Sensor (FREESTYLE AYSE 3 PLUS  SENSOR) MISC Use 1 sensor every 15 days. Use to read blood sugars per 's instructions. 6 each 3    Continuous Glucose Sensor (FREESTYLE AYSE 3 SENSOR) [facility] 1 each by Other route every 14 days. 6 each 3    fluticasone (FLONASE) 50 MCG/ACT nasal spray Spray 1 spray into both nostrils daily 16 g 3    gabapentin (NEURONTIN) 300 MG capsule Take 1 capsule (300 mg) by mouth 2 times daily. 180 capsule 0    insulin pen needle (ULTICARE MICRO) 32G X 4 MM miscellaneous Use 1 pen needles daily or as directed. 100 each 3    metFORMIN (GLUCOPHAGE XR) 500 MG 24 hr tablet Take 4 tablets (2,000 mg) by mouth daily (with dinner). 360 tablet 3    norgestimate-ethinyl estradiol (ORTHO-CYCLEN) 0.25-35 MG-MCG tablet Take 1 tablet by mouth daily 84 tablet 3    ondansetron (ZOFRAN ODT) 4 MG ODT tab Take 1 tablet (4 mg) by mouth every 8 hours as needed for nausea 30 tablet 2    pantoprazole (PROTONIX) 40 MG EC tablet Take 1 tablet (40 mg) by mouth daily. 90 tablet 2    rimegepant (NURTEC) 75 MG ODT tablet Place 75 mg under the tongue daily as needed for migraine.      semaglutide (OZEMPIC) 2 MG/3ML pen Inject 0.5 mg subcutaneously every 7 days. 9 mL 3    SUMAtriptan (IMITREX) 50 MG tablet Take 2 tablets (100 mg) by mouth at onset of headache for migraine. May repeat in 2 hours. Max 4 tablets/24 hours.      thin (NO BRAND SPECIFIED) lancets Use with lanceting device. To accompany: Blood Glucose Monitor Brands: per insurance. 100 each 6    topiramate (TOPAMAX) 25 MG tablet TAKE TWO TABLETS BY MOUTH EVERY NIGHT AT BEDTIME X 7 DAYS, THEN ONE IN THE AM AND TWO EVERY NIGHT AT BEDTIME X 7 DYAS, THEN TWO TWICE DAILY      UBRELVY 100 MG tablet Take 100 mg by mouth at onset of headache.       No current facility-administered medications for this visit.     Food Allergies:  NKFA   Physical Activity:  dog walks, lead train dogs 15-20 minutes, 3-4 times per week    MALNUTRITION:  % Weight Loss:  20% in 1 year (moderate malnutrition)  %  Intake:  <75% for >/= 3 months (moderate malnutrition)  Subcutaneous Fat Loss:  None observed  Muscle Loss:  None observed  Fluid Retention:  None noted    Malnutrition Diagnosis: Moderate malnutrition  In Context of:  Chronic illness or disease    Nutrition Prescription: Nutrition Education     Nutrition Intervention      Provided diet education for food intolerances, diarrhea, abdominal pain, unintentional weight loss, and tips with slightly faster emptying/lower end of normal GES.  Discussed tips for possible cirrhosis and Mediterranean diet tips.    Answered patient's questions. Patient verbalized understanding of education provided. See Goals below.     Educational Materials Provided: Preventing dumping    Goals:    Nutrition Recommendations    Aim for eating multiple smaller meals per day such as 4-6 smaller meals per day.    Can use the plate method for general guidance on getting balanced meals.    --You can fill your plate with foods in line with a mediterranean diet. Can read more information on The American Heart Association at www.heart.org.    Increase fluids to drink at least 48 oz-64 oz or more per day.    Include some foods with soluble fiber such as oats, barley, sweet potato, potato, apple, orange, banana, avocado, ruth seeds, flaxseeds (ground may go better), nuts.    Try having 1 teaspoon of citrucel or metamucil or psyllium husk (such as Konsyl brand or metamucil should have an unflavored option) per day. Mix 1 teaspoon in a full glass of water. Can do this for several days to a week and then from there can increase to 2 teaspoons per day and gradually increase to 1 tablespoon per day.     [employer] sells ready made mediterranean meals that you just have to reheat in the microwave or oven. [email or web address]    If you would like to schedule a follow up appointment with Jessie Coulter Registered Dietitian, please call [phone].    Nutrition Monitoring and Evaluation: Will monitor  adherence to nutrition recommendations at future RD visits.     Further Medical Nutrition Therapy:  Follow-up in 2-3 months    Patient was encouraged to contact RD with any further questions.    Jessie Coulter, MS, RD, LD    Note: this note was partially completed using voice dictation while checked for errors some grammatical/spelling errors may still be present.

## 2024-12-31 ENCOUNTER — TELEPHONE (OUTPATIENT)
Dept: GASTROENTEROLOGY | Facility: CLINIC | Age: 29
End: 2024-12-31
Payer: COMMERCIAL

## 2024-12-31 ENCOUNTER — PATIENT OUTREACH (OUTPATIENT)
Dept: CARE COORDINATION | Facility: CLINIC | Age: 29
End: 2024-12-31
Payer: COMMERCIAL

## 2024-12-31 DIAGNOSIS — G89.29 CHRONIC ABDOMINAL PAIN: Primary | ICD-10-CM

## 2024-12-31 DIAGNOSIS — R10.9 CHRONIC ABDOMINAL PAIN: Primary | ICD-10-CM

## 2024-12-31 NOTE — TELEPHONE ENCOUNTER
[facility] Call Center    Phone Message    May a detailed message be left on voicemail: yes     Reason for Call: Other: Patient is wanting to switch providers. Please call them back.      Action Taken: Message routed to:  [facility] Hepatology    Travel Screening: Not Applicable     Date of Service:

## 2024-12-31 NOTE — TELEPHONE ENCOUNTER
Returned call to patient and reviewed recent note from Dr. Overton recommending an MR elastography for more accurate test of fibrosis, given low FIB-4 score.  There was no answer, detailed message left and will try again later.    Reached patient and her mother. They are not trying to switch providers away from Dr. Overton, this was a misunderstanding. They were trying to schedule an appointment in the GI department with a gastroenterologist to address patient's issues with abdominal pain and diarrhea, from a referral placed yesterday. This referral appears to have been ordered appropriately, and patient should be able to make an appointment with a GI doctor, in addition to following with Dr. Overton in Hepatology for liver disease.     The patient did receive the message that Dr. Overton would like her to have an MR elastography, and was scheduled in the next available spot on 1/26/25.     In addition, patient and mother both expressed their frustration with the life long struggle to determine a diagnosis and proper treatment for the processes affecting the patient. Most distressing are the exhaustion, abdominal pain, back pain, chronic diarrhea, and itching that patient has experienced for many years. 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 They are hoping to obtain a referral to rheumatology, immunology, and genetics as their insurance requires a referral. They are excited to have secured an appointment with Dr. Brewer and Dr. Gutierrez.

## 2024-12-31 NOTE — TELEPHONE ENCOUNTER
[facility] Call Center    Phone Message    May a detailed message be left on voicemail: yes     Reason for Call: Other: Pt and her mother are calling in returning a call that they had missed. They request a call back at [phone] if Pt does not answer. Please call back as soon as possible to discuss.     Action Taken: Message routed to:  [facility] Hep    Travel Screening: Not Applicable     Date of Service:

## 2025-01-02 NOTE — TELEPHONE ENCOUNTER
Per Dr. Overton,  Their PCP placed a referral yo luminal GI - this will probably be denied as our luminal group does not need second opinions and she saw [facility] recently. She should try to schedule a visit with [facility] for the abdominal pain.    She can try omeprazole 20 mg daily. You can get abdominal pain with hepatic steatosis. She should talk to her pcp about potential referrals for rheumatology     Called and discussed with both pt and mother. They verbalized understanding, will reach out to [facility] to schedule follow-up for abdominal pain and diarrhea, will talk to PCP about referral to rheumatology.  In regards to omeprazole, pt states she is already taking pantoprazole 40mg daily, has been taking every morning since it was prescribe in November. Will forward to provider for further recommendations.    Amaya Reyes, RN Care Coordinator  [facility]  Hepatology Clinic/Specialty Program

## 2025-01-06 NOTE — TELEPHONE ENCOUNTER
DIAGNOSIS: Bilateral carpal tunnel syndrome [G56.03] / Maria Guadalupe Triana MD in [facility] IN WOMEN IM    APPOINTMENT DATE: 1/17/25   NOTES STATUS DETAILS   OFFICE NOTE from referring provider Internal 12/30/24 - Maria Guadalupe Triana MD - Internal Med    MEDICATION LIST Internal

## 2025-01-15 ENCOUNTER — TELEPHONE (OUTPATIENT)
Dept: GASTROENTEROLOGY | Facility: CLINIC | Age: 30
End: 2025-01-15
Payer: COMMERCIAL

## 2025-01-15 NOTE — TELEPHONE ENCOUNTER
Left Voicemail (1st Attempt) for the patient to call back and schedule the following:    Appointment type: Return   Provider: Jessie Coulter   Return date: 3/2025  Specialty phone number: [phone]  Additional appointment(s) needed:   Additonal Notes:

## 2025-01-17 ENCOUNTER — PRE VISIT (OUTPATIENT)
Dept: ORTHOPEDICS | Facility: CLINIC | Age: 30
End: 2025-01-17

## 2025-01-17 ENCOUNTER — OFFICE VISIT (OUTPATIENT)
Dept: ORTHOPEDICS | Facility: CLINIC | Age: 30
End: 2025-01-17
Attending: INTERNAL MEDICINE
Payer: COMMERCIAL

## 2025-01-17 DIAGNOSIS — E11.65 TYPE 2 DIABETES MELLITUS WITH HYPERGLYCEMIA, WITHOUT LONG-TERM CURRENT USE OF INSULIN (H): ICD-10-CM

## 2025-01-17 DIAGNOSIS — M77.11 LATERAL EPICONDYLITIS OF BOTH ELBOWS: ICD-10-CM

## 2025-01-17 DIAGNOSIS — M77.12 LATERAL EPICONDYLITIS OF BOTH ELBOWS: ICD-10-CM

## 2025-01-17 DIAGNOSIS — M65.311 TRIGGER THUMB OF BOTH THUMBS: ICD-10-CM

## 2025-01-17 DIAGNOSIS — M65.312 TRIGGER THUMB OF BOTH THUMBS: ICD-10-CM

## 2025-01-17 DIAGNOSIS — G56.03 BILATERAL CARPAL TUNNEL SYNDROME: ICD-10-CM

## 2025-01-17 DIAGNOSIS — G56.23 ULNAR NEUROPATHY OF BOTH UPPER EXTREMITIES: Primary | ICD-10-CM

## 2025-01-17 PROCEDURE — 99204 OFFICE O/P NEW MOD 45 MIN: CPT | Mod: 25 | Performed by: PHYSICIAN ASSISTANT

## 2025-01-17 PROCEDURE — 20550 NJX 1 TENDON SHEATH/LIGAMENT: CPT | Mod: FA | Performed by: PHYSICIAN ASSISTANT

## 2025-01-17 RX ADMIN — LIDOCAINE HYDROCHLORIDE 1 ML: 10 INJECTION, SOLUTION EPIDURAL; INFILTRATION; INTRACAUDAL; PERINEURAL at 13:49

## 2025-01-17 RX ADMIN — DEXAMETHASONE SODIUM PHOSPHATE 4 MG: 4 INJECTION, SOLUTION INTRA-ARTICULAR; INTRALESIONAL; INTRAMUSCULAR; INTRAVENOUS; SOFT TISSUE at 13:49

## 2025-01-17 NOTE — PROGRESS NOTES
Date of Service: Jan 17, 2025    Chief Complaint:   Chief Complaint   Patient presents with    Consult     Bilateral carpal tunnel syndrome        Occupation: .    History of Present Illness: Anh Flores is a 30 year old, right handed female who presents today for further evaluation of bilateral hand numbness and tingling, left worse than right. Numbness and tingling effects the thumb, index, long (middle), ring, and small. Symptoms first began 3-4 years ago. There was not an inciting event. Symptoms DO wake the patient up at night. Symptoms made worse during the following activities: cooking, dog grooming. Symptoms are present intermittently. The following modalities have been tried: night splints, injections    Reports bilateral forearm pain particularly at the ulnar aspect of the elbow.  As well as triggering of the bilateral thumbs.  She has not had any prior treatment for either of these problems.    Review of Systems: A 14-point review of systems was obtained on intake and reviewed.      Past Medical History:   Diagnosis Date    CAH (congenital adrenal hyperplasia) 2/9/2010    Followed by Dr. Brewer; next follow up 1.2011.  Metformin increased to 850 mg twice a day.     Diabetes mellitus, type 2 (H) 10/19/2020    Vitamin D deficiency 2/9/2010       Past Surgical History:   Procedure Laterality Date    CHOLECYSTECTOMY      She was in 8th grade     COLONOSCOPY N/A 9/16/2024    Procedure: COLONOSCOPY, WITH BIOPSY;  Surgeon: Bacilio Yancey MD;  Location:  GI    ESOPHAGOSCOPY, GASTROSCOPY, DUODENOSCOPY (EGD), COMBINED N/A 9/16/2024    Procedure: ESOPHAGOGASTRODUODENOSCOPY, WITH BIOPSY;  Surgeon: Bacilio Yancey MD;  Location:  GI         Current Outpatient Medications:     alcohol swab prep pads, Use to swab area of injection/anthony as directed., Disp: 100 each, Rfl: 3    amoxicillin-clavulanate (AUGMENTIN) 875-125 MG tablet, Take 1 tablet by mouth 2 times daily for  14 days., Disp: 28 tablet, Rfl: 0    Atogepant (QULIPTA) 60 MG TABS, Take 60 mg by mouth., Disp: , Rfl:     atorvastatin (LIPITOR) 10 MG tablet, Take 1 tablet (10 mg) by mouth daily., Disp: 90 tablet, Rfl: 2    blood glucose (NO BRAND SPECIFIED) test strip, Use to test blood sugar three times daily or as directed. Preferred blood glucose meter OR supplies to accompany: Blood Glucose Monitor Brands: per insurance., Disp: 100 strip, Rfl: 6    blood glucose monitoring (NO BRAND SPECIFIED) meter device kit, Use to test blood sugar three times daily or as directed. Preferred blood glucose meter OR supplies to accompany: Blood Glucose Monitor Brands: per insurance., Disp: 1 kit, Rfl: 0    Continuous Glucose Sensor (FREESTYLE AYSE 3 PLUS SENSOR) MISC, Use 1 sensor every 15 days. Use to read blood sugars per 's instructions., Disp: 6 each, Rfl: 3    Continuous Glucose Sensor (FREESTYLE AYSE 3 SENSOR) [facility] [place], 1 each by Other route every 14 days., Disp: 6 each, Rfl: 3    fluticasone (FLONASE) 50 MCG/ACT nasal spray, Spray 1 spray into both nostrils daily, Disp: 16 g, Rfl: 3    gabapentin (NEURONTIN) 300 MG capsule, Take 1 capsule (300 mg) by mouth 2 times daily., Disp: 180 capsule, Rfl: 0    insulin pen needle (ULTICARE MICRO) 32G X 4 MM miscellaneous, Use 1 pen needles daily or as directed., Disp: 100 each, Rfl: 3    metFORMIN (GLUCOPHAGE XR) 500 MG 24 hr tablet, Take 4 tablets (2,000 mg) by mouth daily (with dinner)., Disp: 360 tablet, Rfl: 3    mometasone (NASONEX) 50 MCG/ACT nasal spray, Spray 2 sprays into both nostrils daily., Disp: 17 g, Rfl: 3    norgestimate-ethinyl estradiol (ORTHO-CYCLEN) 0.25-35 MG-MCG tablet, Take 1 tablet by mouth daily, Disp: 84 tablet, Rfl: 3    ondansetron (ZOFRAN ODT) 4 MG ODT tab, Take 1 tablet (4 mg) by mouth every 8 hours as needed for nausea, Disp: 30 tablet, Rfl: 2    pantoprazole (PROTONIX) 40 MG EC tablet, Take 1 tablet (40 mg) by mouth daily., Disp: 90 tablet, Rfl: 2     rimegepant (NURTEC) 75 MG ODT tablet, Place 75 mg under the tongue daily as needed for migraine., Disp: , Rfl:     semaglutide (OZEMPIC) 2 MG/3ML pen, Inject 0.5 mg subcutaneously every 7 days., Disp: 9 mL, Rfl: 3    SUMAtriptan (IMITREX) 50 MG tablet, Take 2 tablets (100 mg) by mouth at onset of headache for migraine. May repeat in 2 hours. Max 4 tablets/24 hours., Disp: , Rfl:     thin (NO BRAND SPECIFIED) lancets, Use with lanceting device. To accompany: Blood Glucose Monitor Brands: per insurance., Disp: 100 each, Rfl: 6    topiramate (TOPAMAX) 25 MG tablet, TAKE TWO TABLETS BY MOUTH EVERY NIGHT AT BEDTIME X 7 DAYS, THEN ONE IN THE AM AND TWO EVERY NIGHT AT BEDTIME X 7 DYAS, THEN TWO TWICE DAILY, Disp: , Rfl:     UBRELVY 100 MG tablet, Take 100 mg by mouth at onset of headache., Disp: , Rfl:     Allergies   Allergen Reactions    Emgality [Galcanezumab-Gnlm] Itching     Itchiness, bumps    Other Environmental Allergy     Victoza [Liraglutide] Headache, Hives and Itching       Social History     Tobacco Use    Smoking status: Never     Passive exposure: Never    Smokeless tobacco: Never   Vaping Use    Vaping status: Never Used   Substance Use Topics    Alcohol use: No    Drug use: No       Family History   Problem Relation Age of Onset    Neurologic Disorder Sister 16        migraines    Cerebrovascular Disease No family hx of     Alzheimer Disease No family hx of     Glaucoma No family hx of     Macular Degeneration No family hx of        Physical examination:  Well-developed, well-nourished and in no acute distress.  Alert and oriented to surroundings.  On examination of the right upper extremity:     Skin clean, dry and intact  Sensation:  Median: diminished  Radial: intact  Ulnar: diminished  Thumb opposition strength: intact  Interosseous strength: intact  Thenar atrophy: Not Present  Interosseous atrophy: Not Present  Tinel's over carpal tunnel: Present  Tinel's over cubital tunnel: Present  Phalen's:  Negative  Carpal tunnel compression: Positive  Elbow flexion compression: Positive  No palpable ulnar nerve subluxation.  Affable tender nodule over the level of the A1 pulley of the thumb.  There is palpable triggering.    Two point discrimination (mm):   Median: 5 mm  Ulnar: 7 mm     On examination of the left upper extremity:     Skin clean, dry and intact  Sensation:  Median: diminished  Radial: intact  Ulnar: diminished  Thumb opposition strength: intact  Interosseous strength: intact  Thenar atrophy: Not Present  Interosseous atrophy: Not Present  Tinel's over carpal tunnel: Present  Tinel's over cubital tunnel: Present  Phalen's: Negative  Carpal tunnel compression: Positive  Elbow flexion compression: Positive  No palpable ulnar nerve subluxation.  Affable tender nodule over the level of the A1 pulley of the thumb.  There is palpable triggering.     Two point discrimination (mm):   Median: 5 mm  Ulnar: 5 mm    EMG/NCS: EMG obtained on 3/13/2024 demonstrates:  Techniques:  Motor conduction studies were done with surface recording electrodes. Sensory conduction studies were performed with surface electrodes, unless indicated otherwise by (n), designating the use of subdermal recording electrodes. Temperature was monitored and recorded throughout the study. Upper extremities were maintained at a temperature of 32 degrees Centigrade or higher.  EMG was done with a concentric needle electrode.      Results:  Motor nerve studies:  Left median motor study reveals normal distal latency, amplitude and borderline conduction velocity  Right median motor study reveals prolonged distal latency, low amplitude at the elbow but normal at the wrist, conduction velocity is normal  Left ulnar motor study is within normal limits  Right ulnar study is within normal limits     Sensory nerve studies:  Antidromic left median sensory study reveals normal amplitude with slowed conduction velocity  Antidromic right median study  reveals normal amplitude with slowed conduction velocity  Antidromic ulnar studies bilaterally are within normal limits  Left palmar study reveals a prolonged peak latency with median nerve stimulation compared to ulnar nerve stimulation  Right sided palmar studies also reveal a prolonged peak latency with median nerve stimulation compared to ulnar nerve stimulation     Needle examination:  Bilateral upper extremity needle examination is within normal limits     Interpretation:  This is an abnormal EMG.  Findings are consistent with bilateral median neuropathies at the wrist.  The left side would be considered mild in severity and the right side would be considered moderate in severity.  Clinically this can correlate with carpal tunnel syndrome.     Assessment: 30 year old female with:  Bilateral carpal tunnel syndrome.   Bilateral ulnar neuropathy  Bilateral Trigger thumbs.   Bilateral Tennis Elbow  Type 2 diabetes, well controlled. Last A1C 5/6 in 7/24.     Plan:     -We had a lengthy discussion about EMG results and possible treatment options for her carpal tunnel syndrome. We discussed three possible treatment options. The first would be to continue night splinting and to observe the symptoms for any change or progression. The second would be to perform a corticosteroid injection. The third is to consider surgery, which would be an open carpal tunnel release.  Patient also has clinical evidence of cubital tunnel bilaterally.  Given normal EMG would recommend obtaining bilateral ulnar nerve ultrasounds.  We did discuss should that show evidence of cubital tunnel we could proceed with a cubital tunnel release at the same time as her carpal tunnel releases.  -Bilateral trigger thumbs discussed treatment option, noting continued observation, splinting with ovulates, steroid injections, and surgical releases.  Patient elected to proceed with a steroid injection to assist with symptoms in the interim.  She will likely  consider surgical release at the time of her carpal and/or cubital tunnel releases.  Given history of type 2 diabetes recommended proceeding with just 1 injection today.  She will follow-up next week to do her other side.  -Referral placed for hand therapy for tennis elbow, carpal tunnel, trigger fingers.    - Follow-up after ultrasound to discuss rule out results and discuss surgical planning.      Amyloidosis screen for patients undergoing carpal tunnel release:    TIER 1  - bilateral carpal tunnel syndrome (or history of release on contralateral side)  - trigger finger (or history of prior trigger finger)  - male > 50 years old  - female > 60 years old    TIER 2  - spinal stenosis  - distal biceps rupture  - atrial fibrillation, atrial flutter, cardiac arrhythmias  - pacemaker  - congestive heart failure/valvular problems  - hearing loss  - family history of amyloidosis    TOTAL: 2 positive Tier 1, 0 positive Tier 2    (Biopsy indicated if two points from tier 1 OR one point from tier 1 + one point from tier 2. Biopsy is 1 cm2 of tenosynovium from within carpal tunnel, specify congo red staining)    Based on screening, testing IS indicated. To discuss at next visit.       Procedure: Left trigger thumb injection  After written informed consent was obtained, the patient's left thumb was prepped with chloraprep.  2 mL of a 1:1 mixture of 4 mg/mL dexamethasone and 1% lidocaine was injected into the flexor sheath of the  thumb at the level of the A1 pulley. There were no immediate complications.    BRITTANIE SLAUGHTER PA-C  Orthopaedic Surgery

## 2025-01-17 NOTE — PROGRESS NOTES
Hand / Upper Extremity Injection/Arthrocentesis: L thumb A1    Date/Time: 2025 1:49 PM    Performed by: Amaya Barfield PA-C  Authorized by: Amaya Barfield PA-C    Indications:  Pain  Needle Size:  25 G  Guidance: landmark    Condition: trigger finger    Location:  Thumb    Site:  L thumb A1  Medications:  4 mg dexAMETHasone 4 MG/ML; 1 mL lidocaine (PF) 1 %  Outcome:  Tolerated well, no immediate complications  Procedure discussed: discussed risks, benefits, and alternatives    Consent Given by:  Patient  Timeout: timeout called immediately prior to procedure    Prep: patient was prepped and draped in usual sterile fashion        [facility] [address]  [address] [facility] [id number] [phone]  Dept: [phone]  ______________________________________________________________________________    Patient: Anh Flores   : 1995   MRN: [id number]   2025    INVASIVE PROCEDURE SAFETY CHECKLIST    Date: 2025   Procedure:Left thumb trigger thumb steroid injection  Patient Name: Anh Flores  MRN: [id number]  YOB: 1995    Action: Complete sections as appropriate. Any discrepancy results in a HARD COPY until resolved.     PRE PROCEDURE:  Patient ID verified with 2 identifiers (name and  or MRN): Yes  Procedure and site verified with patient/designee (when able): Yes  Accurate consent documentation in medical record: Yes  H&P (or appropriate assessment) documented in medical record: Yes  H&P must be up to 20 days prior to procedure and updates within 24 hours of procedure as applicable: Yes  Relevant diagnostic and radiology test results appropriately labeled and displayed as applicable: NA  Procedure site(s) marked with provider initials: NA    TIMEOUT:  Time-Out performed immediately prior to starting procedure, including verbal and active participation of all team members addressing the  following:Yes  * Correct patient identify  * Confirmed that the correct side and site are marked  * An accurate procedure consent form  * Agreement on the procedure to be done  * Correct patient position  * Relevant images and results are properly labeled and appropriately displayed  * The need to administer antibiotics or fluids for irrigation purposes during the procedure as applicable   * Safety precautions based on patient history or medication use    DURING PROCEDURE: Verification of correct person, site, and procedures any time the responsibility for care of the patient is transferred to another member of the care team.       The following medications were given:         Prior to injection, verified patient identity using patient's name and date of birth.  Due to injection administration, patient instructed to remain in clinic for 15 minutes  afterwards, and to report any adverse reaction to me immediately.    Trigger point injection was performed.   Medication Name: Dexamethasone Sodium phosphate NDC 25953-829-76  Drug Amount Wasted:  None.  Vial/Syringe: Single dose vial  Expiration Date:  2/1/26    Medication Name Lidocaine 1% NDC 95542-865-29    Scribed by SHIRA CONRAD, ZULY for  Amaya Barfield PA-C  on January 17, 2025 at 1:56pm based on the provider's statements to me.     SHIRA CONRAD, ZULY

## 2025-01-17 NOTE — NURSING NOTE
Reason For Visit:   Chief Complaint   Patient presents with    Consult     Bilateral carpal tunnel syndrome        Primary MD: Maria Guadalupe Triana  Ref. MD: Mayte    Age: 30 year old    ?  No      There were no vitals taken for this visit.      Pain Assessment  Patient Currently in Pain: Yes  0-10 Pain Scale: 5  Primary Pain Location: Wrist (Bilateral wrists)  Pain Descriptors: Tingling, Numbness    Hand Dominance Evaluation  Hand Dominance: Right          QuickDASH Assessment       No data to display                   Current Outpatient Medications   Medication Sig Dispense Refill    alcohol swab prep pads Use to swab area of injection/anthony as directed. 100 each 3    amoxicillin-clavulanate (AUGMENTIN) 875-125 MG tablet Take 1 tablet by mouth 2 times daily for 14 days. 28 tablet 0    Atogepant (QULIPTA) 60 MG TABS Take 60 mg by mouth.      atorvastatin (LIPITOR) 10 MG tablet Take 1 tablet (10 mg) by mouth daily. 90 tablet 2    blood glucose (NO BRAND SPECIFIED) test strip Use to test blood sugar three times daily or as directed. Preferred blood glucose meter OR supplies to accompany: Blood Glucose Monitor Brands: per insurance. 100 strip 6    blood glucose monitoring (NO BRAND SPECIFIED) meter device kit Use to test blood sugar three times daily or as directed. Preferred blood glucose meter OR supplies to accompany: Blood Glucose Monitor Brands: per insurance. 1 kit 0    Continuous Glucose Sensor (FREESTYLE AYSE 3 PLUS SENSOR) MISC Use 1 sensor every 15 days. Use to read blood sugars per 's instructions. 6 each 3    Continuous Glucose Sensor (FREESTYLE AYSE 3 SENSOR) [facility] [place] 1 each by Other route every 14 days. 6 each 3    fluticasone (FLONASE) 50 MCG/ACT nasal spray Spray 1 spray into both nostrils daily 16 g 3    gabapentin (NEURONTIN) 300 MG capsule Take 1 capsule (300 mg) by mouth 2 times daily. 180 capsule 0    insulin pen needle (ULTICARE MICRO) 32G X 4 MM miscellaneous Use 1 pen needles  daily or as directed. 100 each 3    metFORMIN (GLUCOPHAGE XR) 500 MG 24 hr tablet Take 4 tablets (2,000 mg) by mouth daily (with dinner). 360 tablet 3    mometasone (NASONEX) 50 MCG/ACT nasal spray Spray 2 sprays into both nostrils daily. 17 g 3    norgestimate-ethinyl estradiol (ORTHO-CYCLEN) 0.25-35 MG-MCG tablet Take 1 tablet by mouth daily 84 tablet 3    ondansetron (ZOFRAN ODT) 4 MG ODT tab Take 1 tablet (4 mg) by mouth every 8 hours as needed for nausea 30 tablet 2    pantoprazole (PROTONIX) 40 MG EC tablet Take 1 tablet (40 mg) by mouth daily. 90 tablet 2    rimegepant (NURTEC) 75 MG ODT tablet Place 75 mg under the tongue daily as needed for migraine.      semaglutide (OZEMPIC) 2 MG/3ML pen Inject 0.5 mg subcutaneously every 7 days. 9 mL 3    SUMAtriptan (IMITREX) 50 MG tablet Take 2 tablets (100 mg) by mouth at onset of headache for migraine. May repeat in 2 hours. Max 4 tablets/24 hours.      thin (NO BRAND SPECIFIED) lancets Use with lanceting device. To accompany: Blood Glucose Monitor Brands: per insurance. 100 each 6    topiramate (TOPAMAX) 25 MG tablet TAKE TWO TABLETS BY MOUTH EVERY NIGHT AT BEDTIME X 7 DAYS, THEN ONE IN THE AM AND TWO EVERY NIGHT AT BEDTIME X 7 DYAS, THEN TWO TWICE DAILY      UBRELVY 100 MG tablet Take 100 mg by mouth at onset of headache.         Allergies   Allergen Reactions    Emgality [Galcanezumab-Gnlm] Itching     Itchiness, bumps    Other Environmental Allergy     Victoza [Liraglutide] Headache, Hives and Itching       Gwendolyn Ballesteros, ATC

## 2025-01-17 NOTE — LETTER
1/17/2025      Anh Flores  [address] [facility] [address]      Dear Colleague,    Thank you for referring your patient, Ahn Flores, to the [facility] [place]. Please see a copy of my visit note below.    Date of Service: Jan 17, 2025    Chief Complaint:   Chief Complaint   Patient presents with     Consult     Bilateral carpal tunnel syndrome        Occupation: .    History of Present Illness: Anh Flores is a 30 year old, right handed female who presents today for further evaluation of bilateral hand numbness and tingling, left worse than right. Numbness and tingling effects the thumb, index, long (middle), ring, and small. Symptoms first began 3-4 years ago. There was not an inciting event. Symptoms DO wake the patient up at night. Symptoms made worse during the following activities: cooking, dog grooming. Symptoms are present intermittently. The following modalities have been tried: night splints, injections    Reports bilateral forearm pain particularly at the ulnar aspect of the elbow.  As well as triggering of the bilateral thumbs.  She has not had any prior treatment for either of these problems.    Review of Systems: A 14-point review of systems was obtained on intake and reviewed.      Past Medical History:   Diagnosis Date     CAH (congenital adrenal hyperplasia) 2/9/2010    Followed by Dr. Brewer; next follow up 1.2011.  Metformin increased to 850 mg twice a day.      Diabetes mellitus, type 2 (H) 10/19/2020     Vitamin D deficiency 2/9/2010       Past Surgical History:   Procedure Laterality Date     CHOLECYSTECTOMY      She was in 8th grade      COLONOSCOPY N/A 9/16/2024    Procedure: COLONOSCOPY, WITH BIOPSY;  Surgeon: Bacilio Yancey MD;  Location:  GI     ESOPHAGOSCOPY, GASTROSCOPY, DUODENOSCOPY (EGD), COMBINED N/A 9/16/2024    Procedure: ESOPHAGOGASTRODUODENOSCOPY, WITH BIOPSY;  Surgeon: Bacilio Yancey MD;   Location:  GI         Current Outpatient Medications:      alcohol swab prep pads, Use to swab area of injection/anthony as directed., Disp: 100 each, Rfl: 3     amoxicillin-clavulanate (AUGMENTIN) 875-125 MG tablet, Take 1 tablet by mouth 2 times daily for 14 days., Disp: 28 tablet, Rfl: 0     Atogepant (QULIPTA) 60 MG TABS, Take 60 mg by mouth., Disp: , Rfl:      atorvastatin (LIPITOR) 10 MG tablet, Take 1 tablet (10 mg) by mouth daily., Disp: 90 tablet, Rfl: 2     blood glucose (NO BRAND SPECIFIED) test strip, Use to test blood sugar three times daily or as directed. Preferred blood glucose meter OR supplies to accompany: Blood Glucose Monitor Brands: per insurance., Disp: 100 strip, Rfl: 6     blood glucose monitoring (NO BRAND SPECIFIED) meter device kit, Use to test blood sugar three times daily or as directed. Preferred blood glucose meter OR supplies to accompany: Blood Glucose Monitor Brands: per insurance., Disp: 1 kit, Rfl: 0     Continuous Glucose Sensor (FREESTYLE AYSE 3 PLUS SENSOR) MISC, Use 1 sensor every 15 days. Use to read blood sugars per 's instructions., Disp: 6 each, Rfl: 3     Continuous Glucose Sensor (FREESTYLE ASYE 3 SENSOR) MISC, 1 each by Other route every 14 days., Disp: 6 each, Rfl: 3     fluticasone (FLONASE) 50 MCG/ACT nasal spray, Spray 1 spray into both nostrils daily, Disp: 16 g, Rfl: 3     gabapentin (NEURONTIN) 300 MG capsule, Take 1 capsule (300 mg) by mouth 2 times daily., Disp: 180 capsule, Rfl: 0     insulin pen needle (ULTICARE MICRO) 32G X 4 MM miscellaneous, Use 1 pen needles daily or as directed., Disp: 100 each, Rfl: 3     metFORMIN (GLUCOPHAGE XR) 500 MG 24 hr tablet, Take 4 tablets (2,000 mg) by mouth daily (with dinner)., Disp: 360 tablet, Rfl: 3     mometasone (NASONEX) 50 MCG/ACT nasal spray, Spray 2 sprays into both nostrils daily., Disp: 17 g, Rfl: 3     norgestimate-ethinyl estradiol (ORTHO-CYCLEN) 0.25-35 MG-MCG tablet, Take 1 tablet by mouth  daily, Disp: 84 tablet, Rfl: 3     ondansetron (ZOFRAN ODT) 4 MG ODT tab, Take 1 tablet (4 mg) by mouth every 8 hours as needed for nausea, Disp: 30 tablet, Rfl: 2     pantoprazole (PROTONIX) 40 MG EC tablet, Take 1 tablet (40 mg) by mouth daily., Disp: 90 tablet, Rfl: 2     rimegepant (NURTEC) 75 MG ODT tablet, Place 75 mg under the tongue daily as needed for migraine., Disp: , Rfl:      semaglutide (OZEMPIC) 2 MG/3ML pen, Inject 0.5 mg subcutaneously every 7 days., Disp: 9 mL, Rfl: 3     SUMAtriptan (IMITREX) 50 MG tablet, Take 2 tablets (100 mg) by mouth at onset of headache for migraine. May repeat in 2 hours. Max 4 tablets/24 hours., Disp: , Rfl:      thin (NO BRAND SPECIFIED) lancets, Use with lanceting device. To accompany: Blood Glucose Monitor Brands: per insurance., Disp: 100 each, Rfl: 6     topiramate (TOPAMAX) 25 MG tablet, TAKE TWO TABLETS BY MOUTH EVERY NIGHT AT BEDTIME X 7 DAYS, THEN ONE IN THE AM AND TWO EVERY NIGHT AT BEDTIME X 7 DYAS, THEN TWO TWICE DAILY, Disp: , Rfl:      UBRELVY 100 MG tablet, Take 100 mg by mouth at onset of headache., Disp: , Rfl:     Allergies   Allergen Reactions     Emgality [Galcanezumab-Gnlm] Itching     Itchiness, bumps     Other Environmental Allergy      Victoza [Liraglutide] Headache, Hives and Itching       Social History     Tobacco Use     Smoking status: Never     Passive exposure: Never     Smokeless tobacco: Never   Vaping Use     Vaping status: Never Used   Substance Use Topics     Alcohol use: No     Drug use: No       Family History   Problem Relation Age of Onset     Neurologic Disorder Sister 16        migraines     Cerebrovascular Disease No family hx of      Alzheimer Disease No family hx of      Glaucoma No family hx of      Macular Degeneration No family hx of        Physical examination:  Well-developed, well-nourished and in no acute distress.  Alert and oriented to surroundings.  On examination of the right upper extremity:     Skin clean, dry and  intact  Sensation:  Median: diminished  Radial: intact  Ulnar: diminished  Thumb opposition strength: intact  Interosseous strength: intact  Thenar atrophy: Not Present  Interosseous atrophy: Not Present  Tinel's over carpal tunnel: Present  Tinel's over cubital tunnel: Present  Phalen's: Negative  Carpal tunnel compression: Positive  Elbow flexion compression: Positive  No palpable ulnar nerve subluxation.  Affable tender nodule over the level of the A1 pulley of the thumb.  There is palpable triggering.    Two point discrimination (mm):   Median: 5 mm  Ulnar: 7 mm     On examination of the left upper extremity:     Skin clean, dry and intact  Sensation:  Median: diminished  Radial: intact  Ulnar: diminished  Thumb opposition strength: intact  Interosseous strength: intact  Thenar atrophy: Not Present  Interosseous atrophy: Not Present  Tinel's over carpal tunnel: Present  Tinel's over cubital tunnel: Present  Phalen's: Negative  Carpal tunnel compression: Positive  Elbow flexion compression: Positive  No palpable ulnar nerve subluxation.  Affable tender nodule over the level of the A1 pulley of the thumb.  There is palpable triggering.     Two point discrimination (mm):   Median: 5 mm  Ulnar: 5 mm    EMG/NCS: EMG obtained on 3/13/2024 demonstrates:  Techniques:  Motor conduction studies were done with surface recording electrodes. Sensory conduction studies were performed with surface electrodes, unless indicated otherwise by (n), designating the use of subdermal recording electrodes. Temperature was monitored and recorded throughout the study. Upper extremities were maintained at a temperature of 32 degrees Centigrade or higher.  EMG was done with a concentric needle electrode.      Results:  Motor nerve studies:  Left median motor study reveals normal distal latency, amplitude and borderline conduction velocity  Right median motor study reveals prolonged distal latency, low amplitude at the elbow but normal at  the wrist, conduction velocity is normal  Left ulnar motor study is within normal limits  Right ulnar study is within normal limits     Sensory nerve studies:  Antidromic left median sensory study reveals normal amplitude with slowed conduction velocity  Antidromic right median study reveals normal amplitude with slowed conduction velocity  Antidromic ulnar studies bilaterally are within normal limits  Left palmar study reveals a prolonged peak latency with median nerve stimulation compared to ulnar nerve stimulation  Right sided palmar studies also reveal a prolonged peak latency with median nerve stimulation compared to ulnar nerve stimulation     Needle examination:  Bilateral upper extremity needle examination is within normal limits     Interpretation:  This is an abnormal EMG.  Findings are consistent with bilateral median neuropathies at the wrist.  The left side would be considered mild in severity and the right side would be considered moderate in severity.  Clinically this can correlate with carpal tunnel syndrome.     Assessment: 30 year old female with:  Bilateral carpal tunnel syndrome.   Bilateral ulnar neuropathy  Bilateral Trigger thumbs.   Bilateral Tennis Elbow  Type 2 diabetes, well controlled. Last A1C 5/6 in 7/24.     Plan:     -We had a lengthy discussion about EMG results and possible treatment options for her carpal tunnel syndrome. We discussed three possible treatment options. The first would be to continue night splinting and to observe the symptoms for any change or progression. The second would be to perform a corticosteroid injection. The third is to consider surgery, which would be an open carpal tunnel release.  Patient also has clinical evidence of cubital tunnel bilaterally.  Given normal EMG would recommend obtaining bilateral ulnar nerve ultrasounds.  We did discuss should that show evidence of cubital tunnel we could proceed with a cubital tunnel release at the same time as her  carpal tunnel releases.  -Bilateral trigger thumbs discussed treatment option, noting continued observation, splinting with ovulates, steroid injections, and surgical releases.  Patient elected to proceed with a steroid injection to assist with symptoms in the interim.  She will likely consider surgical release at the time of her carpal and/or cubital tunnel releases.  Given history of type 2 diabetes recommended proceeding with just 1 injection today.  She will follow-up next week to do her other side.  -Referral placed for hand therapy for tennis elbow, carpal tunnel, trigger fingers.    - Follow-up after ultrasound to discuss rule out results and discuss surgical planning.      Amyloidosis screen for patients undergoing carpal tunnel release:    TIER 1  - bilateral carpal tunnel syndrome (or history of release on contralateral side)  - trigger finger (or history of prior trigger finger)  - male > 50 years old  - female > 60 years old    TIER 2  - spinal stenosis  - distal biceps rupture  - atrial fibrillation, atrial flutter, cardiac arrhythmias  - pacemaker  - congestive heart failure/valvular problems  - hearing loss  - family history of amyloidosis    TOTAL: 2 positive Tier 1, 0 positive Tier 2    (Biopsy indicated if two points from tier 1 OR one point from tier 1 + one point from tier 2. Biopsy is 1 cm2 of tenosynovium from within carpal tunnel, specify congo red staining)    Based on screening, testing IS indicated. To discuss at next visit.       Procedure: Left trigger thumb injection  After written informed consent was obtained, the patient's left thumb was prepped with chloraprep.  2 mL of a 1:1 mixture of 4 mg/mL dexamethasone and 1% lidocaine was injected into the flexor sheath of the  thumb at the level of the A1 pulley. There were no immediate complications.    BRITTANIE SLAUGHTER PA-C  Orthopaedic Surgery         Hand / Upper Extremity Injection/Arthrocentesis: L thumb A1    Date/Time: 1/17/2025 1:49  PM    Performed by: Amaya Barfield PA-C  Authorized by: Amaya Barfield PA-C    Indications:  Pain  Needle Size:  25 G  Guidance: landmark    Condition: trigger finger    Location:  Thumb    Site:  L thumb A1  Medications:  4 mg dexAMETHasone 4 MG/ML; 1 mL lidocaine (PF) 1 %  Outcome:  Tolerated well, no immediate complications  Procedure discussed: discussed risks, benefits, and alternatives    Consent Given by:  Patient  Timeout: timeout called immediately prior to procedure    Prep: patient was prepped and draped in usual sterile fashion        [facility] [address] [address] [phone]  Dept: [phone]  ______________________________________________________________________________    Patient: Anh Flores   : 1995   MRN: [id number]   2025    INVASIVE PROCEDURE SAFETY CHECKLIST    Date: 2025   Procedure:Left thumb trigger thumb steroid injection  Patient Name: Anh Flores  MRN: [id number]  YOB: 1995    Action: Complete sections as appropriate. Any discrepancy results in a HARD COPY until resolved.     PRE PROCEDURE:  Patient ID verified with 2 identifiers (name and  or MRN): Yes  Procedure and site verified with patient/designee (when able): Yes  Accurate consent documentation in medical record: Yes  H&P (or appropriate assessment) documented in medical record: Yes  H&P must be up to 20 days prior to procedure and updates within 24 hours of procedure as applicable: Yes  Relevant diagnostic and radiology test results appropriately labeled and displayed as applicable: NA  Procedure site(s) marked with provider initials: NA    TIMEOUT:  Time-Out performed immediately prior to starting procedure, including verbal and active participation of all team members addressing the following:Yes  * Correct patient identify  * Confirmed that the correct side and site are  marked  * An accurate procedure consent form  * Agreement on the procedure to be done  * Correct patient position  * Relevant images and results are properly labeled and appropriately displayed  * The need to administer antibiotics or fluids for irrigation purposes during the procedure as applicable   * Safety precautions based on patient history or medication use    DURING PROCEDURE: Verification of correct person, site, and procedures any time the responsibility for care of the patient is transferred to another member of the care team.       The following medications were given:         Prior to injection, verified patient identity using patient's name and date of birth.  Due to injection administration, patient instructed to remain in clinic for 15 minutes  afterwards, and to report any adverse reaction to me immediately.    Trigger point injection was performed.   Medication Name: Dexamethasone Sodium phosphate NDC 04260-088-74  Drug Amount Wasted:  None.  Vial/Syringe: Single dose vial  Expiration Date:  2/1/26    Medication Name Lidocaine 1% NDC 70384-206-16    Scribed by SHIRA CONRAD, ATC for  Amaya Barfield PA-C  on January 17, 2025 at 1:56pm based on the provider's statements to me.     SHIRA CONRAD ATC      Again, thank you for allowing me to participate in the care of your patient.        Sincerely,        Amaya Barfield PA-C    Electronically signed

## 2025-01-22 ENCOUNTER — OFFICE VISIT (OUTPATIENT)
Dept: SLEEP MEDICINE | Facility: CLINIC | Age: 30
End: 2025-01-22
Payer: COMMERCIAL

## 2025-01-22 ENCOUNTER — OFFICE VISIT (OUTPATIENT)
Dept: INTERNAL MEDICINE | Facility: CLINIC | Age: 30
End: 2025-01-22
Payer: COMMERCIAL

## 2025-01-22 ENCOUNTER — OFFICE VISIT (OUTPATIENT)
Dept: ORTHOPEDICS | Facility: CLINIC | Age: 30
End: 2025-01-22
Payer: COMMERCIAL

## 2025-01-22 VITALS
WEIGHT: 256 LBS | TEMPERATURE: 97.5 F | BODY MASS INDEX: 50.26 KG/M2 | DIASTOLIC BLOOD PRESSURE: 82 MMHG | RESPIRATION RATE: 16 BRPM | OXYGEN SATURATION: 98 % | HEART RATE: 86 BPM | HEIGHT: 60 IN | SYSTOLIC BLOOD PRESSURE: 117 MMHG

## 2025-01-22 VITALS
WEIGHT: 256.9 LBS | HEIGHT: 60 IN | HEART RATE: 78 BPM | BODY MASS INDEX: 50.44 KG/M2 | OXYGEN SATURATION: 97 % | SYSTOLIC BLOOD PRESSURE: 106 MMHG | DIASTOLIC BLOOD PRESSURE: 80 MMHG

## 2025-01-22 DIAGNOSIS — M65.311 TRIGGER THUMB OF BOTH THUMBS: Primary | ICD-10-CM

## 2025-01-22 DIAGNOSIS — F51.04 PSYCHOPHYSIOLOGICAL INSOMNIA: ICD-10-CM

## 2025-01-22 DIAGNOSIS — K52.9 CHRONIC DIARRHEA: ICD-10-CM

## 2025-01-22 DIAGNOSIS — R07.9 CHEST PAIN, UNSPECIFIED TYPE: ICD-10-CM

## 2025-01-22 DIAGNOSIS — Z12.4 CERVICAL CANCER SCREENING: Primary | ICD-10-CM

## 2025-01-22 DIAGNOSIS — M65.312 TRIGGER THUMB OF BOTH THUMBS: Primary | ICD-10-CM

## 2025-01-22 DIAGNOSIS — R03.0 ELEVATED BP WITHOUT DIAGNOSIS OF HYPERTENSION: ICD-10-CM

## 2025-01-22 DIAGNOSIS — R06.83 SNORING: ICD-10-CM

## 2025-01-22 DIAGNOSIS — R07.9 EXERTIONAL CHEST PAIN: ICD-10-CM

## 2025-01-22 DIAGNOSIS — G56.03 BILATERAL CARPAL TUNNEL SYNDROME: ICD-10-CM

## 2025-01-22 DIAGNOSIS — G47.8 UARS (UPPER AIRWAY RESISTANCE SYNDROME): Primary | ICD-10-CM

## 2025-01-22 LAB
ATRIAL RATE - MUSE: 81 BPM
DIASTOLIC BLOOD PRESSURE - MUSE: NORMAL MMHG
INTERPRETATION ECG - MUSE: NORMAL
P AXIS - MUSE: 61 DEGREES
PR INTERVAL - MUSE: 122 MS
QRS DURATION - MUSE: 88 MS
QT - MUSE: 390 MS
QTC - MUSE: 453 MS
R AXIS - MUSE: 84 DEGREES
SYSTOLIC BLOOD PRESSURE - MUSE: NORMAL MMHG
T AXIS - MUSE: 34 DEGREES
VENTRICULAR RATE- MUSE: 81 BPM

## 2025-01-22 PROCEDURE — 99203 OFFICE O/P NEW LOW 30 MIN: CPT | Performed by: NURSE PRACTITIONER

## 2025-01-22 PROCEDURE — 20550 NJX 1 TENDON SHEATH/LIGAMENT: CPT | Mod: F5 | Performed by: PHYSICIAN ASSISTANT

## 2025-01-22 PROCEDURE — 99207 PR DROP WITH A PROCEDURE: CPT | Performed by: PHYSICIAN ASSISTANT

## 2025-01-22 RX ORDER — GABAPENTIN 300 MG/1
300 CAPSULE ORAL 3 TIMES DAILY
Qty: 270 CAPSULE | Refills: 0 | Status: SHIPPED | OUTPATIENT
Start: 2025-01-22 | End: 2025-04-22

## 2025-01-22 RX ORDER — LIDOCAINE HYDROCHLORIDE 10 MG/ML
1 INJECTION, SOLUTION EPIDURAL; INFILTRATION; INTRACAUDAL; PERINEURAL
Status: COMPLETED | OUTPATIENT
Start: 2025-01-17 | End: 2025-01-17

## 2025-01-22 RX ORDER — DEXAMETHASONE SODIUM PHOSPHATE 4 MG/ML
4 INJECTION, SOLUTION INTRA-ARTICULAR; INTRALESIONAL; INTRAMUSCULAR; INTRAVENOUS; SOFT TISSUE
Status: COMPLETED | OUTPATIENT
Start: 2025-01-17 | End: 2025-01-17

## 2025-01-22 RX ADMIN — DEXAMETHASONE SODIUM PHOSPHATE 4 MG: 4 INJECTION, SOLUTION INTRA-ARTICULAR; INTRALESIONAL; INTRAMUSCULAR; INTRAVENOUS; SOFT TISSUE at 13:48

## 2025-01-22 RX ADMIN — LIDOCAINE HYDROCHLORIDE 1 ML: 10 INJECTION, SOLUTION EPIDURAL; INFILTRATION; INTRACAUDAL; PERINEURAL at 13:48

## 2025-01-22 ASSESSMENT — SLEEP AND FATIGUE QUESTIONNAIRES
HOW LIKELY ARE YOU TO NOD OFF OR FALL ASLEEP WHILE SITTING INACTIVE IN A PUBLIC PLACE: SLIGHT CHANCE OF DOZING
HOW LIKELY ARE YOU TO NOD OFF OR FALL ASLEEP IN A CAR, WHILE STOPPED FOR A FEW MINUTES IN TRAFFIC: SLIGHT CHANCE OF DOZING
HOW LIKELY ARE YOU TO NOD OFF OR FALL ASLEEP WHILE LYING DOWN TO REST IN THE AFTERNOON WHEN CIRCUMSTANCES PERMIT: HIGH CHANCE OF DOZING
HOW LIKELY ARE YOU TO NOD OFF OR FALL ASLEEP WHILE SITTING AND READING: HIGH CHANCE OF DOZING
HOW LIKELY ARE YOU TO NOD OFF OR FALL ASLEEP WHILE SITTING QUIETLY AFTER LUNCH WITHOUT ALCOHOL: SLIGHT CHANCE OF DOZING
HOW LIKELY ARE YOU TO NOD OFF OR FALL ASLEEP WHILE SITTING AND TALKING TO SOMEONE: SLIGHT CHANCE OF DOZING
HOW LIKELY ARE YOU TO NOD OFF OR FALL ASLEEP WHILE WATCHING TV: HIGH CHANCE OF DOZING
HOW LIKELY ARE YOU TO NOD OFF OR FALL ASLEEP WHEN YOU ARE A PASSENGER IN A CAR FOR AN HOUR WITHOUT A BREAK: MODERATE CHANCE OF DOZING

## 2025-01-22 NOTE — PROGRESS NOTES
Assessment & Plan         Chest pain, unspecified type  Discussed possible causes of chest pain with patient.  EKG today is consistent with normal sinus rhythm.  Suspect chest wall pain as one of the components of chest discomfort.  Recommend orthopedic evaluation.  May also consider physical therapy.  Patient is in agreement with the plan.  - EKG 12-lead complete w/read - Clinics    Chronic diarrhea  Patient is interested in seeing gastroenterology team at [facility].  She prefers not to return to her GI provider at [facility].  She continues to experience chronic diarrhea.  She has tried cholestyramine with limited benefit and would like to pursue other options.  - Adult GI  Referral - Consult Only; Future    Exertional chest pain  Reviewed possible causes of exertional chest pain.  Given associated dizziness, lightheadedness, and presyncope will symptoms, cannot exclude POTS.  Recommend cardiology evaluation.  Referral was placed today.  Will also evaluate for obesity hypoventilation syndrome with pulmonary function tests and 6-minute walk.  - Adult Cardiology Eval  Referral; Future  - Orthopedic  Referral; Future  - General PFT Lab (Please always keep checked); Future  - Pulmonary Function Test; Future  - 6 minute walk test; Future      I spent a total of 30 minutes on the day of the visit.   Time spent by me today doing chart review, history and exam, documentation and further activities per the note      No follow-ups on file.    Tanika Kamara is a 30 year old, presenting for the following health issues:  Consult (fatigue, severe headache, chest pain, back pain, diarrhea and has been unable to eat, in bed for last two weeks/)      1/22/2025    10:03 AM   Additional Questions   Roomed by [facility]   Accompanied by Mom     History of Present Illness       Reason for visit:  Chest pain  Symptom onset:  More than a month  Symptoms include:  Tightness  Symptom  "intensity:  Moderate  Symptom progression:  Worsening  Had these symptoms before:  Yes  Has tried/received treatment for these symptoms:  No  What makes it worse:  Activety  What makes it better:  No   She is taking medications regularly.     Patient reports that she continues to have issues with diarrhea.  She was previously evaluated by [facility], however she did not feel that her symptoms were appropriately addressed.  She would prefer to see gastro[facility]ology specialist at [facility].    Patient also continues to be significantly bothered by chest pain.  She finds it very uncomfortable to have a bra on as well as seatbelt on.  She is also concerned about chest pain with physical activity.  She is not able to walk even a short distance without experiencing chest tightness, dizziness, and lightheadedness.  She has to rest frequently.  She has difficulties climbing stairs as well with similar symptoms.  She has not been evaluated by cardiology or pulmonary medicine for those symptoms.      Objective    /82 (BP Location: Right arm, Patient Position: Sitting, Cuff Size: Adult Large)   Pulse 86   Temp 97.5  F (36.4  C) (Oral)   Resp 16   Ht 1.525 m (5' 0.04\")   Wt 116.1 kg (256 lb)   SpO2 98%   BMI 49.93 kg/m    Body mass index is 49.93 kg/m .  Physical Exam   GENERAL: alert and no distress  EYES: Eyes grossly normal to inspection, PERRL and conjunctivae and sclerae normal  RESP: normal effort, no wheezing  MS: no gross musculoskeletal defects noted, no edema  SKIN: no suspicious lesions or rashes  NEURO: Normal strength and tone, mentation intact and speech normal            Signed Electronically by: Maria Guadalupe Triana MD    "

## 2025-01-22 NOTE — TELEPHONE ENCOUNTER
RECORDS RECEIVED FROM:    DATE RECEIVED:    NOTES STATUS DETAILS   OFFICE NOTE from referring provider  Internal Dr. Triana 1-22-25   OFFICE NOTE from other cardiologists  N/A    RECORDS from hospital/ED Internal 11-9-24   MEDICATION LIST Internal    GENERAL CARDIO RECORDS   (ALL APPOINTMENT TYPES)     LABS (CBC,BMP,CMP, TSH) Internal    EKG (STRIPS & REPORTS) Internal 1-22-25   MONITORS (STRIPS & REPORTS) N/A    ECHOS (IMAGES AND REPORTS) N/A    STRESS TESTS (IMAGES AND REPORTS) N/A    cMRI (IMAGES AND REPORTS) N/A    Cardiac cath (IMAGES AND REPORTS) N/A    CT/CTA (IMAGES AND REPORTS) N/A

## 2025-01-22 NOTE — PATIENT INSTRUCTIONS
PFT Pulmonary Function Testing [phone] (3rd [place] [facility]

## 2025-01-22 NOTE — LETTER
2025      Anh Flores  [address] [facility] [address]      Dear Colleague,    Thank you for referring your patient, Anh Flores, to the [facility]. Please see a copy of my visit note below.    Hand / Upper Extremity Injection/Arthrocentesis: R thumb A1    Date/Time: 2025 1:48 PM    Performed by: Amaya Barfield PA-C  Authorized by: Amaya Barfield PA-C    Indications:  Pain  Needle Size:  27 G  Condition: trigger finger    Location:  Thumb    Site:  R thumb A1  Medications:  4 mg dexAMETHasone 4 MG/ML; 1 mL lidocaine (PF) 1 %  Outcome:  Tolerated well, no immediate complications  Procedure discussed: discussed risks, benefits, and alternatives    Consent Given by:  Patient  Timeout: timeout called immediately prior to procedure    Prep: patient was prepped and draped in usual sterile fashion        [facility] [address] [address]  [phone]  Dept: [phone]  ______________________________________________________________________________    Patient: Anh Flores   : 1995   MRN: [id number]   2025    INVASIVE PROCEDURE SAFETY CHECKLIST    Date: 2025   Procedure: Right thumb trigger finger steroid injection  Patient Name: Anh Flores  MRN: [id number]  YOB: 1995    Action: Complete sections as appropriate. Any discrepancy results in a HARD COPY until resolved.     PRE PROCEDURE:  Patient ID verified with 2 identifiers (name and  or MRN): Yes  Procedure and site verified with patient/designee (when able): Yes  Accurate consent documentation in medical record: Yes  H&P (or appropriate assessment) documented in medical record: Yes  H&P must be up to 20 days prior to procedure and updates within 24 hours of procedure as applicable: Yes  Relevant diagnostic and radiology test results appropriately labeled and displayed  as applicable: NA  Procedure site(s) marked with provider initials: NA    TIMEOUT:  Time-Out performed immediately prior to starting procedure, including verbal and active participation of all team members addressing the following:Yes  * Correct patient identify  * Confirmed that the correct side and site are marked  * An accurate procedure consent form  * Agreement on the procedure to be done  * Correct patient position  * Relevant images and results are properly labeled and appropriately displayed  * The need to administer antibiotics or fluids for irrigation purposes during the procedure as applicable   * Safety precautions based on patient history or medication use    DURING PROCEDURE: Verification of correct person, site, and procedures any time the responsibility for care of the patient is transferred to another member of the care team.       The following medications were given:         Prior to injection, verified patient identity using patient's name and date of birth.  Due to injection administration, patient instructed to remain in clinic for 15 minutes  afterwards, and to report any adverse reaction to me immediately.    Trigger point injection was performed.   Medication Name: Dexamethasone 4mg/ml NDC 24840-611-90  Drug Amount Wasted:  None.  Vial/Syringe: Single dose vial  Expiration Date:  11/1/25    Medication Name Lidocaine 1% NDC 58222-483-09    Scribed by Gwendolyn Ballesteros ATC for Amaya Barfield PA-C on January 22, 2025 at 1:51pm based on the provider's statements to me.     Gwendolyn Ballesteros ATC      Date of Service: Jan 22, 2025    Chief Complaint:   Chief Complaint   Patient presents with     RECHECK     Follow-up bilateral carpal tunnel syndrome. Injection helped for a few days, but now feels worse because she was using her hand again as it was starting to feel better.        Interval events: Anh Flores is a 30 year old female who presents today in follow-up for a right trigger thumb  injection.  Patient was last seen 1 week ago at which time she went a left trigger thumb injection.  She states it did help for a few days but it has been more sore the last couple days that she has been using it more.  Patient is interested in a right trigger finger injection.    She has not yet seen hand therapy.  She has her ultrasound scheduled for the beginning of March.    The past medical history was reviewed updated in the EMR. This includes medications, surgeries, social history, and review of systems.    Physical examination:  Well-developed, well-nourished and in no acute distress.  Alert and oriented to surroundings.  On examination of the right thumb, skin is clean and dry. There is no deformity. There is no effusion. Sensation is intact in median, radial and ulnar nerve distributions. There is full strength of EPL, interosseous muscles, and thumb opposition.  Palpable tender nodule at the level of the A1 pulley.  There is palpable clicking.  Fingers are warm and well-perfused.    Assessment: 30 year old female with:  Bilateral carpal tunnel syndrome.   Bilateral ulnar neuropathy  Bilateral Trigger thumbs.   Bilateral Tennis Elbow  Type 2 diabetes, well controlled. Last A1C 5/6 in 7/24.     Plan:    -Patient tolerated her left trigger thumb injection well and she would like to proceed with a right trigger thumb injection.  -Continue with scheduled bilateral ulnar nerve ultrasounds, follow-up after ultrasound to discuss results.  Continue with night splinting in the interim.  -Hand therapy scheduled for early February.    Procedure: Right trigger thumb injection  After written informed consent was obtained, the patient's right thumb was prepped with chloraprep.  2 mL of a 1:1 mixture of 4 mg/mL dexamethasone and 1% lidocaine was injected into the flexor sheath of the  thumb at the level of the A1 pulley. There were no immediate complications.    BRITTANIE SLAUGHTER PA-C  Orthopaedic Surgery          Again,  thank you for allowing me to participate in the care of your patient.        Sincerely,        Amaya Barfield PA-C    Electronically signed

## 2025-01-22 NOTE — PROGRESS NOTES
Chief Complaint   Patient presents with    Study Results     Sleep study results       Anh Flores is a 30 year old female who returns to [facility] for review of sleep testing results. She presented with symptoms suggestive of obstructive sleep apnea. The patient is a 29-year-old Female who is 5' and weighs 275.0 lbs. Her BMI is 54.0, Vallejo sleepiness scale 14 and neck circumference is 46 cm. Relevant medical history includes (Insomnia, DM, headaches and Morbid Obesity). A diagnostic polysomnogram was performed to evaluate for sleep apnea.     In discussing her sleep initiation insomnia, patient already takes gabapentin, 300 mg bid, at 930 a.m. and 9:30 PM.  Plan is to increase dose of nighttime gabapentin to 600 mg.  Patient will send a [employer] message indicating how does affected her sleep.  Patient also needs someone to prescribe gabapentin for her.  Will make a decision whether her new PCP will prescribe it for her or if sleep provider will continue to do so.  Was informed that if sleep provider will, will need to make a yearly visit with her.    Deviated nasal septum; Chronic frontal sinusitis     Fatigue, headaches, insomnia    Nonclassical congenital adrenal hyperplasia     Lab Results   Component Value Date     A1C 8.4 (H) 04/05/2024     A1C 8.2 (H) 03/29/2024     A1C 8.8 (H) 01/30/2024     A1C 7.6 (H) 01/18/2019     A1C 6.9 (H) 01/15/2018     A1C 6.4 (H) 03/15/2017     A1C 6.1 (H) 09/17/2014     A1C 5.7          Previous Weights:        Wt Readings from Last 10 Encounters:   03/29/24 135.6 kg (299 lb)   03/11/24 136.2 kg (300 lb 3.2 oz)   01/30/24 136.5 kg (300 lb 14.4 oz)   12/08/23 132 kg (290 lb 14.4 oz)   10/19/20 139.4 kg (307 lb 6.4 oz)   01/23/19 146.2 kg (322 lb 4.8 oz)   01/21/19 147.9 kg (326 lb)   01/18/19 148 kg (326 lb 4.8 oz)   01/10/18 (!) 148.4 kg (327 lb 2.6 oz)   02/21/17 (!) 146.4 kg (322 lb 12.1 oz)       Polysomnography - Test date  "11/15/2024        Reviewed by team:  Tobacco  Allergies  Meds  Problems  Med Hx  Surg Hx  Fam Hx        Reviewed by provider:  Tobacco  Allergies  Meds  Problems  Med Hx  Surg Hx  Fam Hx             Problem List:  Patient Active Problem List    Diagnosis Date Noted    Elevated ferritin 12/04/2024     Priority: Medium    Cryptosporidiasis (H) 11/14/2024     Priority: Medium    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     Priority: Medium    Abnormal finding on imaging of liver 10/18/2024     Priority: Medium    Chronic diarrhea 10/18/2024     Priority: Medium    Lactose intolerance 09/10/2024     Priority: Medium    Obstructive sleep apnea 07/05/2024     Priority: Medium    Insomnia, unspecified type 07/05/2024     Priority: Medium    Bilateral carpal tunnel syndrome 06/11/2024     Priority: Medium    Diabetes mellitus, type 2 (H) 10/19/2020     Priority: Medium    Hidradenitis suppurativa 01/23/2019     Priority: Medium    Morbid obesity with body mass index of 60.0-69.9 in adult (H) 01/23/2019     Priority: Medium    Elevated BP without diagnosis of hypertension 01/23/2019     Priority: Medium    Morbid obesity (H) 10/17/2014     Priority: Medium    Chronic headaches 10/13/2014     Priority: Medium    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 Brewer; next follow up 1.2011.    Metformin increased to 850 mg twice a day.      Chronic abdominal pain 02/09/2010     Priority: Medium    Vitamin D deficiency 02/09/2010     Priority: Medium        /80   Pulse 78   Ht 1.525 m (5' 0.04\")   Wt 116.5 kg (256 lb 14.4 oz)   SpO2 97%   BMI 50.11 kg/m      Impression/Plan:    ICD-10-CM    1. UARS (upper airway resistance syndrome)  G47.8 gabapentin (NEURONTIN) 300 MG capsule     Sleep Dental Referral      2. Elevated BP without diagnosis of hypertension  R03.0 Sleep Dental Referral      3. " Psychophysiological insomnia  F51.04 gabapentin (NEURONTIN) 300 MG capsule     Sleep Dental Referral      4. Bilateral carpal tunnel syndrome  G56.03 gabapentin (NEURONTIN) 300 MG capsule     Sleep Dental Referral      5. Snoring  R06.83 Sleep Dental Referral        Anh Flores is a pleasant 30-year-old female who presents to the sleep medicine clinic today to discuss the results of her sleep study which she underwent on the evening of 11/15/2024.  Results of her sleep study revealed upper airway resistance syndrome.  We did discuss basic etiology of UARS treatment.  Patient opted for mandibular advancement device.  A referral was placed for a sleep dental provider to create a mandibular advancement device for patient.  A copy of the referral was given to the patient.  List of dental providers was provided on dental referral as well as on her After Visit Summary.    Patient was given prescription for additional of gabapentin compensate for additional dose of 300 mg at nighttime.  In the future, be given her gabapentin by her primary care provider or she may return to the sleep for her gabapentin and be seen for med  Evaluations, would recommend patient optimize her sleep schedule as well as her sleep hygiene practices to mitigate any further sleep disruption.  Would recommend she employ safe driving practices such as not driving the motor vehicle should she become drowsy.  And would recommend weight management to BMI of 30.0.    Assessment:   .     41 minutes spent with patient, all of which were spent face-to-face counseling, consulting, coordinating plan of care.  The longitudinal plan of care for the diagnosis(es)/condition(s) as documented were addressed during this visit. Due to the added complexity in care, I will continue to support Anh in the subsequent management and with ongoing continuity of care.    NARCISA Meza CNP    CC:  Maria Guadalupe Triana,

## 2025-01-22 NOTE — PATIENT INSTRUCTIONS
[facility]  Search engine: https://mms.aadsm.org/members/directory/search_bootstrap.php?org_id=ADSM&   Certified in Dental Sleep Medicine    Vic Martin  Degree: YOSVANY  [address]  [address]  [place] [address]  Professional Phone: [phone]  Website: [email or web address]    Antony Gold  Degree: YOSVANY  [facility]  [address]  [address]  [place] [address]  Professional Phone: [phone]: [phone]    [facility]  [address] [place] [address]  [address]  [place] [address]  Appointments: [phone]  Fax: [phone]    Leidy Sullivan  [facility]  [address]  [place] [address]  Professional Phone: [phone]  Website: [email or web address]      Jefferson Muñiz   [facility]   Degree: MD YOSVANY   [address]  [address]  [place] [address]  Appointments: [phone]    Jerry Whitfield  Degree: YOSVANY  [address]  [address]  [place] [address]  Professional Phone: [phone]  Fax: [phone]    Link Grajeda  Degree: YOSVANY  [facility]  [address]  [address]  [place] [address]  Professional Phone: [phone]  Website: [email or web address]      Leeann Jameson  [facility]-you should verify insurance coverage  [address]  [address]  [place] [address]  Professional Phone: [phone]  Website: [email or web address]      Megan Newby--DOES NOT ACCEPT INSURANCE  Degree: YOSVANY--you should verify insurance coverage  [facility]  [address]  [address]  [place] [address]  Professional Phone: [phone]    Roxy Black  Degree: YOSVANY, PhD  [facility]  [address] [place] [address]   Appointments: [phone]   Fax: [phone]     [place]  [facility]  Degree: DDS  [address] [place] [address]  Professional Phone: [phone]  Fax: [phone]  Website: [email or web address]      Angel Villalpando  Degree: DDS  [facility]  [address] [place] [address]    Mariona Mulet Pradera  Degree: DDS, MS  [facility]  [address] [place] [address]  Professional Phone: [phone]      Maxine Soto  Degree: DDS, MS  [facility]  [address]  [address]  [place] [address]  Professional Phone: [phone]    Michelle Davila  Degree: RADHAS  [facility]  [address]  [place] [address]  Appointments: [phone]    Gabriel Shane  Degree: DDS  [facility]  [address] [address]  Professional Phone: [phone]  Website: [email or web address]      Will Jiang  Degree: DDS  [facility]  [address] [place] [address]  Professional Phone: [phone]  Fax: [phone]      Sherman Parsons  Degree: DDS  [facility]  [address]  [address]  [place] [address]    Ulises Garcia   Degree: DDS   [facility]   [address]   [address]  [place] [address]  Phone [phone]  Website: [email or web address]    Batool Lynn   Degree: DDS   [address]  [address]  [place] [address]  Appointments: [phone]    Amairani Bell   Degree: DDS   [address] [place] [address]   Appointments: [phone]    Alessandro Hagen   Degree: DDS   [address] [place] [address]   Appointments: [phone]    Bacilio Crooks   Degree: DDS   [address] [place] [address]   Appointments: [phone]    Cleveland Rubalcava   Degree: YOSVANY [facility], [address] [address]   Appointments: [phone]    Faiza Parikh   Degree: YOSVANY  [address] [place] [address]  [place] [address]  Appointments: [phone]    Alessandro Carrell   [address] [place] [address]  Appointments: [phone]    Taylor Hahn   Degree: DDS   [facility]   [address] [place] [address]   Appointments: [phone]    Jones Dumont   Degree: YOSVANY   [address] [place] [address]   Appointments: [phone]    [facility]    Degree: YOSVANY  [address]  [address]  [place] [address]  Appointments: [phone]    Dante Christine   Degree: YOSVANY   [facility]   [address]  [address]  [place] [address]  Appointment: [phone]    Yessica Girl   Degree: MS EMMA, FAAROBBY   [facility]  [address]   [address]  [place] [address]  Appointments: [phone]    Jefferson Kulkarni   Degree: YOSVANY   [address]   [place] [address]   Appointments: [phone]   Fax: [phone]        ACCEPT MEDICARE  [place] DDS  [address], [place] [address]  [phone]; [phone] (fax)  [employer]    Chris Escobar DDS, MS   [facility] [place] [address]   [address]   [place] [address]   Appointments: [phone]   Fax: [phone]     Justo Pearce   Degree: DMD, MSD   [facility]   [address] [facility] [place] [address]  [address]  [place] [address]  Appointments: [phone]  Fax: [phone]      ADDITIONAL PROVIDERS    Demetrius Madera DDS    [facility]   Dental and Oral Surgery Clinic  [address] [place] [address]  Appointments: [phone]     [facility]  [address] [facility] [address]  [address]  [place] [address]  Appointments: [phone]  Fax- [phone]    Elba De   Degree: YOSVANY   [facility]    [address]  [place] [address]  Appointments: [phone]     Your Body mass index is 50.11 kg/m .  Weight management is a personal decision.  If you are  interested in exploring weight loss strategies, the following discussion covers the approaches that may be successful. Body mass index (BMI) is one way to tell whether you are at a healthy weight, overweight, or obese. It measures your weight in relation to your height.  A BMI of 18.5 to 24.9 is in the healthy range. A person with a BMI of 25 to 29.9 is considered overweight, and someone with a BMI of 30 or greater is considered obese. More than two-thirds of American adults are considered overweight or obese.  Being overweight or obese increases the risk for further weight gain. Excess weight may lead to heart disease and diabetes.  Creating and following plans for healthy eating and physical activity may help you improve your health.  Weight control is part of healthy lifestyle and includes exercise, emotional health, and healthy eating habits. Careful eating habits lifelong are the mainstay of weight control. Though there are significant health benefits from weight loss, long-term weight loss with diet alone may be very difficult to achieve- studies show long-term success with dietary management in less than 10% of people. Attaining a healthy weight may be especially difficult to achieve in those with severe obesity. In some cases, medications, devices and surgical management might be considered.  What can you do?  If you are overweight or obese and are interested in methods for weight loss, you should discuss this with your provider.   Consider reducing daily calorie intake by 500 calories.   Keep a food journal.   Avoiding skipping meals, consider cutting portions instead.    Diet combined with exercise helps maintain muscle while optimizing fat loss. Strength training is particularly important for building and maintaining muscle mass. Exercise helps reduce stress, increase energy, and improves fitness. Increasing exercise without diet control, however, may not burn enough calories to loose weight.     Start  walking three days a week 10-20 minutes at a time  Work towards walking thirty minutes five days a week   Eventually, increase the speed of your walking for 1-2 minutes at time    In addition, we recommend that you review healthy lifestyles and methods for weight loss available through the National Institutes of Health patient information sites:  http://win.niddk.nih.gov/publications/index.htm    And look into health and wellness programs that may be available through your health insurance provider, employer, local community center, or mukesh club.            Your Body mass index is 50.11 kg/m .  Weight management is a personal decision.  If you are interested in exploring weight loss strategies, the following discussion covers the approaches that may be successful. Body mass index (BMI) is one way to tell whether you are at a healthy weight, overweight, or obese. It measures your weight in relation to your height.  A BMI of 18.5 to 24.9 is in the healthy range. A person with a BMI of 25 to 29.9 is considered overweight, and someone with a BMI of 30 or greater is considered obese. More than two-thirds of American adults are considered overweight or obese.  Being overweight or obese increases the risk for further weight gain. Excess weight may lead to heart disease and diabetes.  Creating and following plans for healthy eating and physical activity may help you improve your health.  Weight control is part of healthy lifestyle and includes exercise, emotional health, and healthy eating habits. Careful eating habits lifelong are the mainstay of weight control. Though there are significant health benefits from weight loss, long-term weight loss with diet alone may be very difficult to achieve- studies show long-term success with dietary management in less than 10% of people. Attaining a healthy weight may be especially difficult to achieve in those with severe obesity. In some cases, medications, devices and surgical  management might be considered.  What can you do?  If you are overweight or obese and are interested in methods for weight loss, you should discuss this with your provider.   Consider reducing daily calorie intake by 500 calories.   Keep a food journal.   Avoiding skipping meals, consider cutting portions instead.    Diet combined with exercise helps maintain muscle while optimizing fat loss. Strength training is particularly important for building and maintaining muscle mass. Exercise helps reduce stress, increase energy, and improves fitness. Increasing exercise without diet control, however, may not burn enough calories to loose weight.     Start walking three days a week 10-20 minutes at a time  Work towards walking thirty minutes five days a week   Eventually, increase the speed of your walking for 1-2 minutes at time    In addition, we recommend that you review healthy lifestyles and methods for weight loss available through the National Institutes of Health patient information sites:  http://win.niddk.nih.gov/publications/index.htm    And look into health and wellness programs that may be available through your health insurance provider, employer, local community center, or mukesh club.

## 2025-01-22 NOTE — NURSING NOTE
Reason For Visit:   Chief Complaint   Patient presents with    RECHECK     Follow-up bilateral carpal tunnel syndrome. Injection helped for a few days, but now feels worse because she was using her hand again as it was starting to feel better.        Primary MD: Maria Guadalupe Triana  Ref. MD: Mayte    Age: 30 year old    ?  No      There were no vitals taken for this visit.      Pain Assessment  Patient Currently in Pain: Yes  0-10 Pain Scale: 6 (Left hand/thumb 6, right hand pain is 7)  Primary Pain Location: Hand (bilateral)  Pain Descriptors: Sore, Constant    Hand Dominance Evaluation  Hand Dominance: Right          QuickDASH Assessment       No data to display                   Current Outpatient Medications   Medication Sig Dispense Refill    alcohol swab prep pads Use to swab area of injection/anthony as directed. 100 each 3    Atogepant (QULIPTA) 60 MG TABS Take 60 mg by mouth.      atorvastatin (LIPITOR) 10 MG tablet Take 1 tablet (10 mg) by mouth daily. 90 tablet 2    blood glucose (NO BRAND SPECIFIED) test strip Use to test blood sugar three times daily or as directed. Preferred blood glucose meter OR supplies to accompany: Blood Glucose Monitor Brands: per insurance. 100 strip 6    blood glucose monitoring (NO BRAND SPECIFIED) meter device kit Use to test blood sugar three times daily or as directed. Preferred blood glucose meter OR supplies to accompany: Blood Glucose Monitor Brands: per insurance. 1 kit 0    Continuous Glucose Sensor (FREESTYLE AYSE 3 PLUS SENSOR) MISC Use 1 sensor every 15 days. Use to read blood sugars per 's instructions. 6 each 3    Continuous Glucose Sensor (FREESTYLE AYSE 3 SENSOR) [facility] [place] 1 each by Other route every 14 days. 6 each 3    fluticasone (FLONASE) 50 MCG/ACT nasal spray Spray 1 spray into both nostrils daily 16 g 3    gabapentin (NEURONTIN) 300 MG capsule Take 1 capsule (300 mg) by mouth 3 times daily. Take 300 mg at 9:30 am, 600 mg at 9:30 pm 270  capsule 0    gabapentin (NEURONTIN) 300 MG capsule Take 1 capsule (300 mg) by mouth 2 times daily. 180 capsule 0    insulin pen needle (ULTICARE MICRO) 32G X 4 MM miscellaneous Use 1 pen needles daily or as directed. 100 each 3    metFORMIN (GLUCOPHAGE XR) 500 MG 24 hr tablet Take 4 tablets (2,000 mg) by mouth daily (with dinner). 360 tablet 3    mometasone (NASONEX) 50 MCG/ACT nasal spray Spray 2 sprays into both nostrils daily. 17 g 3    norgestimate-ethinyl estradiol (ORTHO-CYCLEN) 0.25-35 MG-MCG tablet Take 1 tablet by mouth daily 84 tablet 3    ondansetron (ZOFRAN ODT) 4 MG ODT tab Take 1 tablet (4 mg) by mouth every 8 hours as needed for nausea 30 tablet 2    pantoprazole (PROTONIX) 40 MG EC tablet Take 1 tablet (40 mg) by mouth daily. 90 tablet 2    rimegepant (NURTEC) 75 MG ODT tablet Place 75 mg under the tongue daily as needed for migraine.      semaglutide (OZEMPIC) 2 MG/3ML pen Inject 0.5 mg subcutaneously every 7 days. 9 mL 3    SUMAtriptan (IMITREX) 50 MG tablet Take 2 tablets (100 mg) by mouth at onset of headache for migraine. May repeat in 2 hours. Max 4 tablets/24 hours.      thin (NO BRAND SPECIFIED) lancets Use with lanceting device. To accompany: Blood Glucose Monitor Brands: per insurance. 100 each 6    topiramate (TOPAMAX) 25 MG tablet TAKE TWO TABLETS BY MOUTH EVERY NIGHT AT BEDTIME X 7 DAYS, THEN ONE IN THE AM AND TWO EVERY NIGHT AT BEDTIME X 7 DYAS, THEN TWO TWICE DAILY      UBRELVY 100 MG tablet Take 100 mg by mouth at onset of headache.         Allergies   Allergen Reactions    Adhesive Tape Itching    Emgality [Galcanezumab-Gnlm] Itching     Itchiness, bumps    Other Environmental Allergy     Victoza [Liraglutide] Headache, Hives and Itching       Gwendolyn Ballesteros, ATC

## 2025-01-22 NOTE — Clinical Note
Added additional dose of gabapentin making her gabapentin dosage as 300 mg at 9:30 AM and 600 mg at 9:30 PM to assist her with sleep initiation insomnia

## 2025-01-22 NOTE — PROGRESS NOTES
Hand / Upper Extremity Injection/Arthrocentesis: R thumb A1    Date/Time: 2025 1:48 PM    Performed by: Amaya Barfield PA-C  Authorized by: Amaya Barfield PA-C    Indications:  Pain  Needle Size:  27 G  Condition: trigger finger    Location:  Thumb    Site:  R thumb A1  Medications:  4 mg dexAMETHasone 4 MG/ML; 1 mL lidocaine (PF) 1 %  Outcome:  Tolerated well, no immediate complications  Procedure discussed: discussed risks, benefits, and alternatives    Consent Given by:  Patient  Timeout: timeout called immediately prior to procedure    Prep: patient was prepped and draped in usual sterile fashion        [facility] [address]  [address] [facility] [id number] [phone]  Dept: [phone]  ______________________________________________________________________________    Patient: Anh Flores   : 1995   MRN: [id number]   2025    INVASIVE PROCEDURE SAFETY CHECKLIST    Date: 2025   Procedure: Right thumb trigger finger steroid injection  Patient Name: Anh Flores  MRN: [id number]  YOB: 1995    Action: Complete sections as appropriate. Any discrepancy results in a HARD COPY until resolved.     PRE PROCEDURE:  Patient ID verified with 2 identifiers (name and  or MRN): Yes  Procedure and site verified with patient/designee (when able): Yes  Accurate consent documentation in medical record: Yes  H&P (or appropriate assessment) documented in medical record: Yes  H&P must be up to 20 days prior to procedure and updates within 24 hours of procedure as applicable: Yes  Relevant diagnostic and radiology test results appropriately labeled and displayed as applicable: NA  Procedure site(s) marked with provider initials: NA    TIMEOUT:  Time-Out performed immediately prior to starting procedure, including verbal and active participation of all team members addressing the following:Yes  * Correct  patient identify  * Confirmed that the correct side and site are marked  * An accurate procedure consent form  * Agreement on the procedure to be done  * Correct patient position  * Relevant images and results are properly labeled and appropriately displayed  * The need to administer antibiotics or fluids for irrigation purposes during the procedure as applicable   * Safety precautions based on patient history or medication use    DURING PROCEDURE: Verification of correct person, site, and procedures any time the responsibility for care of the patient is transferred to another member of the care team.       The following medications were given:         Prior to injection, verified patient identity using patient's name and date of birth.  Due to injection administration, patient instructed to remain in clinic for 15 minutes  afterwards, and to report any adverse reaction to me immediately.    Trigger point injection was performed.   Medication Name: Dexamethasone 4mg/ml NDC 83142-715-60  Drug Amount Wasted:  None.  Vial/Syringe: Single dose vial  Expiration Date:  11/1/25    Medication Name Lidocaine 1% NDC 12744-920-63    Scribed by Gwendolyn Ballesteros, ZULY for Amaya Barfield PA-C on January 22, 2025 at 1:51pm based on the provider's statements to me.     Gwendolyn Ballesteros ATC     normal...

## 2025-01-23 ENCOUNTER — PATIENT OUTREACH (OUTPATIENT)
Dept: CARE COORDINATION | Facility: CLINIC | Age: 30
End: 2025-01-23
Payer: COMMERCIAL

## 2025-01-23 DIAGNOSIS — E25.0 CONGENITAL ADRENAL HYPERPLASIA: ICD-10-CM

## 2025-01-23 RX ORDER — ONDANSETRON 4 MG/1
TABLET, ORALLY DISINTEGRATING ORAL
Qty: 30 TABLET | Refills: 1 | Status: SHIPPED | OUTPATIENT
Start: 2025-01-23

## 2025-01-24 NOTE — PROGRESS NOTES
"ASSESSMENT/PLAN:    (R00.2) Palpitations  (primary encounter diagnosis)  Comment: unclear etiology; not consistent w/ primary msk diagnosis; check holter/echo/ chest CT; f/up after studies; precautions given  Plan: Adult Holter Monitor 48 hour, Echocardiogram Complete          (R07.9) Exertional chest pain  Comment: see above  Plan: Echocardiogram Complete, CT Chest w/o Contrast          Jah Melendrez MD  January 25, 2025  8:55 AM      Pt is a 30 year old female here today for:   Patient states that she believes she's had exertional chest pain since April or May of 2024 without injury or change in activity. She states that she show/exhibits dogs for a hobby. She has small dogs (about 15 lbs) so not a lot of heavy lifting, she states it's not a physical activity outside of walking dogs back and forth and is only 10 minutes in total usually. With this activity, she's been getting tightness in her chest. She has noticed that wearing a bra, seatbelt, wearing a cross-body purse is painful. Occasional lifting weight of 1 gallon or her small dogs is painful. The pain is located across her bra line and midline, upper back. She has tried new bras or no bra without improvement. She has had light headedness and dizziness with these activities. She has been instructed by another provider to try to start exercising more and to see a cardiologist and pulmonology. She has temporarily stopped showing her dogs. She states that she lost about 75 lbs, since December 2023. She notes that she wasn't doing anything to lose weight, but notes she has unspecified liver issues that could be contributing.     Pain is not reproducible. It \"goes straight through to my back\". Gets dizzy, lightheaded and \"I feel like I'm going to die\"  Stopped going to the doctor because she got tired of being told everything was due to her weight   Has lost weight w/o change in symptoms     CXR - 11/9/24 normal   9/3/24:  IMPRESSION:   1.  Slightly nodular liver " "undersurface suspicious for fibrosis. Otherwise unremarkable noncontrast CT abdomen and pelvis.    Had endoscopy/ colonoscopy per GI. Has been to ED 2x and told it was GERD -> treated for reflux w/o change in symptoms  +palpitations \"all the time\"  Normal TSH  DM is well-controlled     Lab Results   Component Value Date    A1C 5.6 07/30/2024    A1C 8.0 04/30/2024    A1C 8.4 04/05/2024    A1C 8.2 03/29/2024    A1C 8.8 01/30/2024    A1C 7.6 01/18/2019    A1C 6.9 01/15/2018    A1C 6.4 03/15/2017    A1C 6.1 09/17/2014    A1C 5.7 07/09/2010       Per Dr Triana's note on 1/22/25:  Chest pain, unspecified type  Discussed possible causes of chest pain with patient.  EKG today is consistent with normal sinus rhythm.  Suspect chest wall pain as one of the components of chest discomfort.  Recommend orthopedic evaluation.  May also consider physical therapy.  Patient is in agreement with the plan.  - EKG 12-lead complete w/read - Clinics    Exertional chest pain  Reviewed possible causes of exertional chest pain.  Given associated dizziness, lightheadedness, and presyncope will symptoms, cannot exclude POTS.  Recommend cardiology evaluation.  Referral was placed today.  Will also evaluate for obesity hypoventilation syndrome with pulmonary function tests and 6-minute walk.  - Adult Cardiology Eval  Referral; Future  - Orthopedic  Referral; Future  - General PFT Lab (Please always keep checked); Future  - Pulmonary Function Test; Future  - 6 minute walk test; Future    Patient also continues to be significantly bothered by chest pain. She finds it very uncomfortable to have a bra on as well as seatbelt on. She is also concerned about chest pain with physical activity. She is not able to walk even a short distance without experiencing chest tightness, dizziness, and lightheadedness. She has to rest frequently. She has difficulties climbing stairs as well with similar symptoms. She has not been evaluated by " cardiology or pulmonary medicine for those symptoms.     Past Medical History:   Diagnosis Date    CAH (congenital adrenal hyperplasia) 2/9/2010    Followed by Dr. Brewer; next follow up 1.2011.  Metformin increased to 850 mg twice a day.     Diabetes mellitus, type 2 (H) 10/19/2020    Vitamin D deficiency 2/9/2010      Past Surgical History:   Procedure Laterality Date    CHOLECYSTECTOMY      She was in 8th grade     COLONOSCOPY N/A 9/16/2024    Procedure: COLONOSCOPY, WITH BIOPSY;  Surgeon: Bacilio Yancey MD;  Location:  GI    ESOPHAGOSCOPY, GASTROSCOPY, DUODENOSCOPY (EGD), COMBINED N/A 9/16/2024    Procedure: ESOPHAGOGASTRODUODENOSCOPY, WITH BIOPSY;  Surgeon: Bacilio Yanecy MD;  Location:  GI      Current Outpatient Medications   Medication Sig Dispense Refill    alcohol swab prep pads Use to swab area of injection/anthony as directed. 100 each 3    Atogepant (QULIPTA) 60 MG TABS Take 60 mg by mouth.      atorvastatin (LIPITOR) 10 MG tablet Take 1 tablet (10 mg) by mouth daily. 90 tablet 2    blood glucose (NO BRAND SPECIFIED) test strip Use to test blood sugar three times daily or as directed. Preferred blood glucose meter OR supplies to accompany: Blood Glucose Monitor Brands: per insurance. 100 strip 6    blood glucose monitoring (NO BRAND SPECIFIED) meter device kit Use to test blood sugar three times daily or as directed. Preferred blood glucose meter OR supplies to accompany: Blood Glucose Monitor Brands: per insurance. 1 kit 0    Continuous Glucose Sensor (FREESTYLE AYSE 3 PLUS SENSOR) MISC Use 1 sensor every 15 days. Use to read blood sugars per 's instructions. 6 each 3    Continuous Glucose Sensor (FREESTYLE AYSE 3 SENSOR) [employer] [facility] [employer] 1 each by Other route every 14 days. 6 each 3    fluticasone (FLONASE) 50 MCG/ACT nasal spray Spray 1 spray into both nostrils daily 16 g 3    gabapentin (NEURONTIN) 300 MG capsule Take 1 capsule (300 mg) by mouth 3 times daily. Take  300 mg at 9:30 am, 600 mg at 9:30 pm 270 capsule 0    gabapentin (NEURONTIN) 300 MG capsule Take 1 capsule (300 mg) by mouth 2 times daily. 180 capsule 0    insulin pen needle (ULTICARE MICRO) 32G X 4 MM miscellaneous Use 1 pen needles daily or as directed. 100 each 3    metFORMIN (GLUCOPHAGE XR) 500 MG 24 hr tablet Take 4 tablets (2,000 mg) by mouth daily (with dinner). 360 tablet 3    mometasone (NASONEX) 50 MCG/ACT nasal spray Spray 2 sprays into both nostrils daily. 17 g 3    norgestimate-ethinyl estradiol (ORTHO-CYCLEN) 0.25-35 MG-MCG tablet Take 1 tablet by mouth daily 84 tablet 3    ondansetron (ZOFRAN ODT) 4 MG ODT tab DISSOLVE ONE TABLET ON TONGUE EVERY 8 HOURS AS NEEDED FOR NAUSEA 30 tablet 1    pantoprazole (PROTONIX) 40 MG EC tablet Take 1 tablet (40 mg) by mouth daily. 90 tablet 2    rimegepant (NURTEC) 75 MG ODT tablet Place 75 mg under the tongue daily as needed for migraine.      semaglutide (OZEMPIC) 2 MG/3ML pen Inject 0.5 mg subcutaneously every 7 days. 9 mL 3    SUMAtriptan (IMITREX) 50 MG tablet Take 2 tablets (100 mg) by mouth at onset of headache for migraine. May repeat in 2 hours. Max 4 tablets/24 hours.      thin (NO BRAND SPECIFIED) lancets Use with lanceting device. To accompany: Blood Glucose Monitor Brands: per insurance. 100 each 6    topiramate (TOPAMAX) 25 MG tablet TAKE TWO TABLETS BY MOUTH EVERY NIGHT AT BEDTIME X 7 DAYS, THEN ONE IN THE AM AND TWO EVERY NIGHT AT BEDTIME X 7 DYAS, THEN TWO TWICE DAILY      UBRELVY 100 MG tablet Take 100 mg by mouth at onset of headache.        Allergies   Allergen Reactions    Adhesive Tape Itching    Emgality [Galcanezumab-Gnlm] Itching     Itchiness, bumps    Other Environmental Allergy     Victoza [Liraglutide] Headache, Hives and Itching      Social History     Tobacco Use    Smoking status: Never     Passive exposure: Never    Smokeless tobacco: Never   Vaping Use    Vaping status: Never Used   Substance Use Topics    Alcohol use: No    Drug  use: No      Family History   Problem Relation Age of Onset    Neurologic Disorder Sister 16        migraines    Cerebrovascular Disease No family hx of     Alzheimer Disease No family hx of     Glaucoma No family hx of     Macular Degeneration No family hx of       ROS:   Gen- + weight change, no fevers/chills   ENT- no cough, no congestion, no URI symptoms   Cardiac - see HPI  Respiratory - see HPI   GI - no nausea, no vomiting, no diarrhea, no constipation   Remainder of ROS negative.     Exam:   /78 (BP Location: Right arm, Patient Position: Sitting, Cuff Size: Adult Large)   Pulse 71   SpO2 98%    Alert and oriented x 3; No acute distress   Resp: clear to auscultation bilaterally, no wheezing/ronchi   CV: rate/rhythm regular, no murmurs/rubs/gallops   Extrem: no clubbing/cyanosis/edema   Neuro: no focal deficits   Derm: no rashes

## 2025-01-25 ENCOUNTER — OFFICE VISIT (OUTPATIENT)
Dept: ORTHOPEDICS | Facility: CLINIC | Age: 30
End: 2025-01-25
Attending: INTERNAL MEDICINE
Payer: COMMERCIAL

## 2025-01-25 VITALS — HEART RATE: 71 BPM | OXYGEN SATURATION: 98 % | DIASTOLIC BLOOD PRESSURE: 78 MMHG | SYSTOLIC BLOOD PRESSURE: 110 MMHG

## 2025-01-25 DIAGNOSIS — R07.9 EXERTIONAL CHEST PAIN: ICD-10-CM

## 2025-01-25 DIAGNOSIS — R00.2 PALPITATIONS: Primary | ICD-10-CM

## 2025-01-25 PROCEDURE — 99214 OFFICE O/P EST MOD 30 MIN: CPT | Performed by: FAMILY MEDICINE

## 2025-01-25 NOTE — PATIENT INSTRUCTIONS
Call and schedule imaging appointments at [phone]   Following your Echo, holter monitor, chest CT please schedule Follow-up with Dr. Melendrez [phone]

## 2025-01-25 NOTE — LETTER
"  1/25/2025      RE: Anh Flores  [address] [facility] [address]     Dear Colleague,    Thank you for referring your patient, Anh Flores, to the [facility] [place]. Please see a copy of my visit note below.    ASSESSMENT/PLAN:    (R00.2) Palpitations  (primary encounter diagnosis)  Comment: unclear etiology; not consistent w/ primary msk diagnosis; check holter/echo/ chest CT; f/up after studies; precautions given  Plan: Adult Holter Monitor 48 hour, Echocardiogram Complete          (R07.9) Exertional chest pain  Comment: see above  Plan: Echocardiogram Complete, CT Chest w/o Contrast          Jah Melendrez MD  January 25, 2025  8:55 AM      Pt is a 30 year old female here today for:   Patient states that she believes she's had exertional chest pain since April or May of 2024 without injury or change in activity. She states that she show/exhibits dogs for a hobby. She has small dogs (about 15 lbs) so not a lot of heavy lifting, she states it's not a physical activity outside of walking dogs back and forth and is only 10 minutes in total usually. With this activity, she's been getting tightness in her chest. She has noticed that wearing a bra, seatbelt, wearing a cross-body purse is painful. Occasional lifting weight of 1 gallon or her small dogs is painful. The pain is located across her bra line and midline, upper back. She has tried new bras or no bra without improvement. She has had light headedness and dizziness with these activities. She has been instructed by another provider to try to start exercising more and to see a cardiologist and pulmonology. She has temporarily stopped showing her dogs. She states that she lost about 75 lbs, since December 2023. She notes that she wasn't doing anything to lose weight, but notes she has unspecified liver issues that could be contributing.     Pain is not reproducible. It \"goes straight through to my back\". Gets dizzy, " "lightheaded and \"I feel like I'm going to die\"  Stopped going to the doctor because she got tired of being told everything was due to her weight   Has lost weight w/o change in symptoms     CXR - 11/9/24 normal   9/3/24:  IMPRESSION:   1.  Slightly nodular liver undersurface suspicious for fibrosis. Otherwise unremarkable noncontrast CT abdomen and pelvis.    Had endoscopy/ colonoscopy per GI. Has been to ED 2x and told it was GERD -> treated for reflux w/o change in symptoms  +palpitations \"all the time\"  Normal TSH  DM is well-controlled     Lab Results   Component Value Date    A1C 5.6 07/30/2024    A1C 8.0 04/30/2024    A1C 8.4 04/05/2024    A1C 8.2 03/29/2024    A1C 8.8 01/30/2024    A1C 7.6 01/18/2019    A1C 6.9 01/15/2018    A1C 6.4 03/15/2017    A1C 6.1 09/17/2014    A1C 5.7 07/09/2010       Per Dr Triana's note on 1/22/25:  Chest pain, unspecified type  Discussed possible causes of chest pain with patient.  EKG today is consistent with normal sinus rhythm.  Suspect chest wall pain as one of the components of chest discomfort.  Recommend orthopedic evaluation.  May also consider physical therapy.  Patient is in agreement with the plan.  - EKG 12-lead complete w/read - Clinics    Exertional chest pain  Reviewed possible causes of exertional chest pain.  Given associated dizziness, lightheadedness, and presyncope will symptoms, cannot exclude POTS.  Recommend cardiology evaluation.  Referral was placed today.  Will also evaluate for obesity hypoventilation syndrome with pulmonary function tests and 6-minute walk.  - Adult Cardiology Eval  Referral; Future  - Orthopedic  Referral; Future  - General PFT Lab (Please always keep checked); Future  - Pulmonary Function Test; Future  - 6 minute walk test; Future    Patient also continues to be significantly bothered by chest pain. She finds it very uncomfortable to have a bra on as well as seatbelt on. She is also concerned about chest pain with " physical activity. She is not able to walk even a short distance without experiencing chest tightness, dizziness, and lightheadedness. She has to rest frequently. She has difficulties climbing stairs as well with similar symptoms. She has not been evaluated by cardiology or pulmonary medicine for those symptoms.     Past Medical History:   Diagnosis Date     CAH (congenital adrenal hyperplasia) 2/9/2010    Followed by Dr. Brewer; next follow up 1.2011.  Metformin increased to 850 mg twice a day.      Diabetes mellitus, type 2 (H) 10/19/2020     Vitamin D deficiency 2/9/2010      Past Surgical History:   Procedure Laterality Date     CHOLECYSTECTOMY      She was in 8th grade      COLONOSCOPY N/A 9/16/2024    Procedure: COLONOSCOPY, WITH BIOPSY;  Surgeon: Bacilio Yancey MD;  Location:  GI     ESOPHAGOSCOPY, GASTROSCOPY, DUODENOSCOPY (EGD), COMBINED N/A 9/16/2024    Procedure: ESOPHAGOGASTRODUODENOSCOPY, WITH BIOPSY;  Surgeon: Bacilio Yancey MD;  Location: [facility]      Current Outpatient Medications   Medication Sig Dispense Refill     alcohol swab prep pads Use to swab area of injection/anthony as directed. 100 each 3     Atogepant (QULIPTA) 60 MG TABS Take 60 mg by mouth.       atorvastatin (LIPITOR) 10 MG tablet Take 1 tablet (10 mg) by mouth daily. 90 tablet 2     blood glucose (NO BRAND SPECIFIED) test strip Use to test blood sugar three times daily or as directed. Preferred blood glucose meter OR supplies to accompany: Blood Glucose Monitor Brands: per insurance. 100 strip 6     blood glucose monitoring (NO BRAND SPECIFIED) meter device kit Use to test blood sugar three times daily or as directed. Preferred blood glucose meter OR supplies to accompany: Blood Glucose Monitor Brands: per insurance. 1 kit 0     Continuous Glucose Sensor (FREESTYLE AYSE 3 PLUS SENSOR) MISC Use 1 sensor every 15 days. Use to read blood sugars per 's instructions. 6 each 3     Continuous Glucose  Sensor (FREESTYLE AYSE 3 SENSOR) MISC 1 each by Other route every 14 days. 6 each 3     fluticasone (FLONASE) 50 MCG/ACT nasal spray Spray 1 spray into both nostrils daily 16 g 3     gabapentin (NEURONTIN) 300 MG capsule Take 1 capsule (300 mg) by mouth 3 times daily. Take 300 mg at 9:30 am, 600 mg at 9:30 pm 270 capsule 0     gabapentin (NEURONTIN) 300 MG capsule Take 1 capsule (300 mg) by mouth 2 times daily. 180 capsule 0     insulin pen needle (ULTICARE MICRO) 32G X 4 MM miscellaneous Use 1 pen needles daily or as directed. 100 each 3     metFORMIN (GLUCOPHAGE XR) 500 MG 24 hr tablet Take 4 tablets (2,000 mg) by mouth daily (with dinner). 360 tablet 3     mometasone (NASONEX) 50 MCG/ACT nasal spray Spray 2 sprays into both nostrils daily. 17 g 3     norgestimate-ethinyl estradiol (ORTHO-CYCLEN) 0.25-35 MG-MCG tablet Take 1 tablet by mouth daily 84 tablet 3     ondansetron (ZOFRAN ODT) 4 MG ODT tab DISSOLVE ONE TABLET ON TONGUE EVERY 8 HOURS AS NEEDED FOR NAUSEA 30 tablet 1     pantoprazole (PROTONIX) 40 MG EC tablet Take 1 tablet (40 mg) by mouth daily. 90 tablet 2     rimegepant (NURTEC) 75 MG ODT tablet Place 75 mg under the tongue daily as needed for migraine.       semaglutide (OZEMPIC) 2 MG/3ML pen Inject 0.5 mg subcutaneously every 7 days. 9 mL 3     SUMAtriptan (IMITREX) 50 MG tablet Take 2 tablets (100 mg) by mouth at onset of headache for migraine. May repeat in 2 hours. Max 4 tablets/24 hours.       thin (NO BRAND SPECIFIED) lancets Use with lanceting device. To accompany: Blood Glucose Monitor Brands: per insurance. 100 each 6     topiramate (TOPAMAX) 25 MG tablet TAKE TWO TABLETS BY MOUTH EVERY NIGHT AT BEDTIME X 7 DAYS, THEN ONE IN THE AM AND TWO EVERY NIGHT AT BEDTIME X 7 DYAS, THEN TWO TWICE DAILY       UBRELVY 100 MG tablet Take 100 mg by mouth at onset of headache.        Allergies   Allergen Reactions     Adhesive Tape Itching     Emgality [Galcanezumab-Gnlm] Itching     Itchiness, bumps      Other Environmental Allergy      Victoza [Liraglutide] Headache, Hives and Itching      Social History     Tobacco Use     Smoking status: Never     Passive exposure: Never     Smokeless tobacco: Never   Vaping Use     Vaping status: Never Used   Substance Use Topics     Alcohol use: No     Drug use: No      Family History   Problem Relation Age of Onset     Neurologic Disorder Sister 16        migraines     Cerebrovascular Disease No family hx of      Alzheimer Disease No family hx of      Glaucoma No family hx of      Macular Degeneration No family hx of       ROS:   Gen- + weight change, no fevers/chills   ENT- no cough, no congestion, no URI symptoms   Cardiac - see HPI  Respiratory - see HPI   GI - no nausea, no vomiting, no diarrhea, no constipation   Remainder of ROS negative.     Exam:   /78 (BP Location: Right arm, Patient Position: Sitting, Cuff Size: Adult Large)   Pulse 71   SpO2 98%    Alert and oriented x 3; No acute distress   Resp: clear to auscultation bilaterally, no wheezing/ronchi   CV: rate/rhythm regular, no murmurs/rubs/gallops   Extrem: no clubbing/cyanosis/edema   Neuro: no focal deficits   Derm: no rashes       Again, thank you for allowing me to participate in the care of your patient.      Sincerely,    Jah Melendrez MD

## 2025-01-26 ENCOUNTER — ANCILLARY PROCEDURE (OUTPATIENT)
Dept: MRI IMAGING | Facility: CLINIC | Age: 30
End: 2025-01-26
Attending: STUDENT IN AN ORGANIZED HEALTH CARE EDUCATION/TRAINING PROGRAM
Payer: COMMERCIAL

## 2025-01-26 DIAGNOSIS — K76.0 FATTY LIVER: ICD-10-CM

## 2025-01-26 DIAGNOSIS — K74.00 FIBROSIS OF LIVER: ICD-10-CM

## 2025-01-26 PROCEDURE — 76391 MR ELASTOGRAPHY: CPT | Performed by: STUDENT IN AN ORGANIZED HEALTH CARE EDUCATION/TRAINING PROGRAM

## 2025-01-26 PROCEDURE — 74181 MRI ABDOMEN W/O CONTRAST: CPT | Performed by: STUDENT IN AN ORGANIZED HEALTH CARE EDUCATION/TRAINING PROGRAM

## 2025-01-26 RX ORDER — LIDOCAINE HYDROCHLORIDE 10 MG/ML
1 INJECTION, SOLUTION EPIDURAL; INFILTRATION; INTRACAUDAL; PERINEURAL
Status: COMPLETED | OUTPATIENT
Start: 2025-01-22 | End: 2025-01-22

## 2025-01-26 RX ORDER — DEXAMETHASONE SODIUM PHOSPHATE 4 MG/ML
4 INJECTION, SOLUTION INTRA-ARTICULAR; INTRALESIONAL; INTRAMUSCULAR; INTRAVENOUS; SOFT TISSUE
Status: COMPLETED | OUTPATIENT
Start: 2025-01-22 | End: 2025-01-22

## 2025-01-26 NOTE — PROGRESS NOTES
Date of Service: Jan 22, 2025    Chief Complaint:   Chief Complaint   Patient presents with    RECHECK     Follow-up bilateral carpal tunnel syndrome. Injection helped for a few days, but now feels worse because she was using her hand again as it was starting to feel better.        Interval events: Anh Flores is a 30 year old female who presents today in follow-up for a right trigger thumb injection.  Patient was last seen 1 week ago at which time she went a left trigger thumb injection.  She states it did help for a few days but it has been more sore the last couple days that she has been using it more.  Patient is interested in a right trigger finger injection.    She has not yet seen hand therapy.  She has her ultrasound scheduled for the beginning of March.    The past medical history was reviewed updated in the EMR. This includes medications, surgeries, social history, and review of systems.    Physical examination:  Well-developed, well-nourished and in no acute distress.  Alert and oriented to surroundings.  On examination of the right thumb, skin is clean and dry. There is no deformity. There is no effusion. Sensation is intact in median, radial and ulnar nerve distributions. There is full strength of EPL, interosseous muscles, and thumb opposition.  Palpable tender nodule at the level of the A1 pulley.  There is palpable clicking.  Fingers are warm and well-perfused.    Assessment: 30 year old female with:  Bilateral carpal tunnel syndrome.   Bilateral ulnar neuropathy  Bilateral Trigger thumbs.   Bilateral Tennis Elbow  Type 2 diabetes, well controlled. Last A1C 5/6 in 7/24.     Plan:    -Patient tolerated her left trigger thumb injection well and she would like to proceed with a right trigger thumb injection.  -Continue with scheduled bilateral ulnar nerve ultrasounds, follow-up after ultrasound to discuss results.  Continue with night splinting in the interim.  -Hand therapy scheduled for early  February.    Procedure: Right trigger thumb injection  After written informed consent was obtained, the patient's right thumb was prepped with chloraprep.  2 mL of a 1:1 mixture of 4 mg/mL dexamethasone and 1% lidocaine was injected into the flexor sheath of the  thumb at the level of the A1 pulley. There were no immediate complications.    BRITTANIE SLAUGHTER PA-C  Orthopaedic Surgery

## 2025-01-27 ENCOUNTER — TELEPHONE (OUTPATIENT)
Dept: ORTHOPEDICS | Facility: CLINIC | Age: 30
End: 2025-01-27
Payer: COMMERCIAL

## 2025-01-27 NOTE — TELEPHONE ENCOUNTER
M for Anh stating that Dr. Melendrez changed the order to STAT and that she could call to have them scheduled earlier now.        Orlin SHORT M.A., LAT, ATC  Certified Athletic Trainer

## 2025-01-27 NOTE — TELEPHONE ENCOUNTER
Other: Patient scheduled for Echocardiogram Complete & CT on 02/04. She was told they could get her in this week if Dr Melendrez changes the orders to STAT-so she is hoping he can do that     Could we send this information to you in [place] or would you prefer to receive a phone call?:   Patient would prefer a phone call   Okay to leave a detailed message?: No at Cell number on file:    Telephone Information:   Mobile [phone]

## 2025-01-27 NOTE — TELEPHONE ENCOUNTER
Left Voicemail (1st Attempt) and Sent Mychart (1st Attempt) for the patient to call back and schedule the following:    Appointment type: STAT ECHO and Return MSK  Provider: Dr. Melendrez  Return date: follow-up with Dr. Melendrez after CT on 2/4/25  Specialty phone number: [phone]

## 2025-01-28 ENCOUNTER — TELEPHONE (OUTPATIENT)
Dept: ORTHOPEDICS | Facility: CLINIC | Age: 30
End: 2025-01-28
Payer: COMMERCIAL

## 2025-01-28 NOTE — TELEPHONE ENCOUNTER
Patient confirmed scheduled appointment:  Date: 2/8/2025  Time: 8:40am  Visit type: Return MSK  Provider:   Location: [facility]

## 2025-01-29 ENCOUNTER — ANCILLARY PROCEDURE (OUTPATIENT)
Dept: CARDIOLOGY | Facility: CLINIC | Age: 30
End: 2025-01-29
Attending: FAMILY MEDICINE
Payer: COMMERCIAL

## 2025-01-29 DIAGNOSIS — R07.9 EXERTIONAL CHEST PAIN: ICD-10-CM

## 2025-01-29 DIAGNOSIS — R00.2 PALPITATIONS: ICD-10-CM

## 2025-01-29 LAB — LVEF ECHO: NORMAL

## 2025-01-29 PROCEDURE — 93306 TTE W/DOPPLER COMPLETE: CPT | Performed by: INTERNAL MEDICINE

## 2025-01-29 RX ADMIN — Medication 5 ML: at 17:46

## 2025-01-30 ENCOUNTER — TELEPHONE (OUTPATIENT)
Dept: GASTROENTEROLOGY | Facility: CLINIC | Age: 30
End: 2025-01-30
Payer: COMMERCIAL

## 2025-01-30 NOTE — TELEPHONE ENCOUNTER
Writer received a message from [employer] to help get Pt scheduled for a new Gen GI appointment with Dr. Yancey or DEZ. The appointment was ok'd per Dr. Ley. Writer called and talked with the Pt. Pt preferred the [place] location for an in-person clinic visit. Pt accepted an appointment with Yanely Hansen on 3/21/2025 at 11:30 AM.

## 2025-02-02 ENCOUNTER — HEALTH MAINTENANCE LETTER (OUTPATIENT)
Age: 30
End: 2025-02-02

## 2025-02-03 NOTE — TELEPHONE ENCOUNTER
REFERRAL INFORMATION:  Referring Provider:  Maria Guadalupe Triana MD   [place]  [facility]   Reason for Visit/Diagnosis: K52.9 (ICD-10-CM) - Chronic diarrhea      FUTURE VISIT INFORMATION:  Appointment Date: 3/21/25     NOTES STATUS DETAILS   OFFICE NOTE from Referring Provider Internal 1/22/25   OFFICE NOTE from Other Specialist Internal 12/30/24, 10/14/24-Jessie Coulter RD  12/18/24-Dr. Overton[facility]  8/21/24-Dr. Cronin   [facility] DISCHARGE SUMMARY/  ED VISITS Internal ED 9/7/24-Brody Griffin PA-C   MEDICATION LIST Internal    PROCEDURES     ENDOSCOPY  Internal 9/16/24   COLONOSCOPY Internal 9/16/24   STOOL TESTING     C. DIFF Care Everywhere 12/14/23   OVA + PARASITE Care Everywhere 12/14/23   LABS     PERTINENT LABS Internal    PATHOLOGY REPORTS (RELATED) Internal EGD 9/16/24  Colonoscopy 9/16/24   IMAGES     MRI Internal 1/26/25-MR Elastograph   CT Internal 9/3/24-CT abd pel   ULTRASOUND Internal 11/5/24-US abdomen   XRAY  NM Internal 11/25/24-NM Gastric Emptying

## 2025-02-04 ENCOUNTER — ANCILLARY PROCEDURE (OUTPATIENT)
Dept: CT IMAGING | Facility: CLINIC | Age: 30
End: 2025-02-04
Attending: FAMILY MEDICINE
Payer: COMMERCIAL

## 2025-02-04 DIAGNOSIS — R07.9 EXERTIONAL CHEST PAIN: ICD-10-CM

## 2025-02-04 PROCEDURE — 71250 CT THORAX DX C-: CPT | Performed by: RADIOLOGY

## 2025-02-06 ENCOUNTER — TELEPHONE (OUTPATIENT)
Dept: INTERNAL MEDICINE | Facility: CLINIC | Age: 30
End: 2025-02-06
Payer: COMMERCIAL

## 2025-02-06 NOTE — TELEPHONE ENCOUNTER
General Call    Contacts       Contact Date/Time Type Contact Phone/Fax    02/06/2025 02:52 PM CST Phone (Incoming) Evelyn Flores (Mother) [phone] (M)          Reason for Call:  [facility] Clinic/Dental Referral    What are your questions or concerns:  Pts mother states that she called to the [facility] to set up an appt for the dental apparatus for not grinding teeth. Pts mother was told by [facility] that they don't have the referral and it would need to be faxed over. Please fax over the referral. If there are any questions contact the ptAnh.   Fax: [phone]  Attention: Xin    Date of last appointment with provider: 1/22/2025--Shonna Simons    Could we send this information to you in [employer] or would you prefer to receive a phone call?:   Patient would prefer a phone call   Okay to leave a detailed message?: Yes at Cell number on file:    Telephone Information:   Mobile [phone]

## 2025-02-07 NOTE — PROGRESS NOTES
"ASSESSMENT/PLAN:    (R07.9) Exertional chest pain  (primary encounter diagnosis)  Comment: will complete PFTs, Holter, follow-up with cardiology; I will reach out to PCP to take over eval/ management as this could be chronic granulomatous disease but I have little expertise as a sports med physician in further workup/ mgmt   Plan: General PFT Lab (Please always keep checked), 6 minute walk test, Pulmonary Function Test    (D71) Chronic granulomatous disease (H)  Comment:   Plan: unsure if pulmonary symptoms could all be attributed to this diagnosis; she does have hx of chronic resp infections/ cryptosporidium infection; await PFTs          Jah Melendrez MD  February 8, 2025  9:09 AM        Pt is a 30 year old female last seen on 1/25/25 here today for: CT and Echo review. Patient reports feeling no changes.    Atypical chest pain - notes exertional dyspnea, coughing, rare hemoptysis    Has PFTs scheduled for 2/17/25  Needs to  holter monitor  Chest CT w/ chronic granulomatous disease  Does note she gets upper respiratory infections \"all the time\" -> usually has required prolonged Abx  +cryptosporidium infection in 12/2023 which \"set all of this off\"   Does have chronic diarrhea -> had c-scope/ endoscopy in 9/2024 -> these were normal   CT Abd - liver nodularity; MR elastography was normal -> no fibrosis/ fatty liver   No urinary symptoms  Does note dry eye, vision problems  Derm - xerosis - \"deep itch that cannot be itched\"    Chest CT - 2/4/25:  FINDINGS: No contrast. The included thyroid appears unremarkable.  Calcified prevascular and aortopulmonary window lymph nodes and left  hilar lymph nodes. Heart size normal. Thoracic aorta and pulmonary  artery caliber are normal. No pleural or pericardial effusion. No  enlarged noncalcified lymph nodes.  The included upper abdomen shows cholecystectomy clips. Left adrenal  is incompletely imaged, and adrenals were otherwise normal.  Bone detail shows degenerative " change in the mid to lower thoracic  spine.     Detail of the lungs shows subsegmental lingular atelectasis. Calcified  posterior left upper lobe granuloma near the oblique fissure.                                                                      IMPRESSION: Granulomatous disease.    Echo - 1/29/25:  Interpretation Summary  Left ventricular size, wall motion and function are normal. The ejection  fraction is 55-60%.     Global right ventricular function is normal. The right ventricle is normal size.   The inferior vena cava is normal.   No pericardial effusion is present.   There is no prior study for direct comparison.    Per my last note:  (R00.2) Palpitations  (primary encounter diagnosis)  Comment: unclear etiology; not consistent w/ primary msk diagnosis; check holter/echo/ chest CT; f/up after studies; precautions given  Plan: Adult Holter Monitor 48 hour, Echocardiogram Complete          (R07.9) Exertional chest pain  Comment: see above  Plan: Echocardiogram Complete, CT Chest w/o Contrast    Past Medical History:   Diagnosis Date    CAH (congenital adrenal hyperplasia) 2/9/2010    Followed by Dr. Brewer; next follow up 1.2011.  Metformin increased to 850 mg twice a day.     Diabetes mellitus, type 2 (H) 10/19/2020    Vitamin D deficiency 2/9/2010      Past Surgical History:   Procedure Laterality Date    CHOLECYSTECTOMY      She was in 8th grade     COLONOSCOPY N/A 9/16/2024    Procedure: COLONOSCOPY, WITH BIOPSY;  Surgeon: Bacilio Yancey MD;  Location:  GI    ESOPHAGOSCOPY, GASTROSCOPY, DUODENOSCOPY (EGD), COMBINED N/A 9/16/2024    Procedure: ESOPHAGOGASTRODUODENOSCOPY, WITH BIOPSY;  Surgeon: Bacilio Yancey MD;  Location:  GI      Current Outpatient Medications   Medication Sig Dispense Refill    alcohol swab prep pads Use to swab area of injection/anthony as directed. 100 each 3    Atogepant (QULIPTA) 60 MG TABS Take 60 mg by mouth.      atorvastatin (LIPITOR) 10 MG  tablet Take 1 tablet (10 mg) by mouth daily. 90 tablet 2    blood glucose (NO BRAND SPECIFIED) test strip Use to test blood sugar three times daily or as directed. Preferred blood glucose meter OR supplies to accompany: Blood Glucose Monitor Brands: per insurance. 100 strip 6    blood glucose monitoring (NO BRAND SPECIFIED) meter device kit Use to test blood sugar three times daily or as directed. Preferred blood glucose meter OR supplies to accompany: Blood Glucose Monitor Brands: per insurance. 1 kit 0    Continuous Glucose Sensor (FREESTYLE AYSE 3 PLUS SENSOR) MISC Use 1 sensor every 15 days. Use to read blood sugars per 's instructions. 6 each 3    Continuous Glucose Sensor (FREESTYLE AYSE 3 SENSOR) [facility] 1 each by Other route every 14 days. 6 each 3    fluticasone (FLONASE) 50 MCG/ACT nasal spray Spray 1 spray into both nostrils daily 16 g 3    gabapentin (NEURONTIN) 300 MG capsule Take 1 capsule (300 mg) by mouth 3 times daily. Take 300 mg at 9:30 am, 600 mg at 9:30 pm 270 capsule 0    gabapentin (NEURONTIN) 300 MG capsule Take 1 capsule (300 mg) by mouth 2 times daily. 180 capsule 0    insulin pen needle (ULTICARE MICRO) 32G X 4 MM miscellaneous Use 1 pen needles daily or as directed. 100 each 3    metFORMIN (GLUCOPHAGE XR) 500 MG 24 hr tablet Take 4 tablets (2,000 mg) by mouth daily (with dinner). 360 tablet 3    mometasone (NASONEX) 50 MCG/ACT nasal spray Spray 2 sprays into both nostrils daily. 17 g 3    norgestimate-ethinyl estradiol (ORTHO-CYCLEN) 0.25-35 MG-MCG tablet Take 1 tablet by mouth daily 84 tablet 3    ondansetron (ZOFRAN ODT) 4 MG ODT tab DISSOLVE ONE TABLET ON TONGUE EVERY 8 HOURS AS NEEDED FOR NAUSEA 30 tablet 1    pantoprazole (PROTONIX) 40 MG EC tablet Take 1 tablet (40 mg) by mouth daily. 90 tablet 2    rimegepant (NURTEC) 75 MG ODT tablet Place 75 mg under the tongue daily as needed for migraine.      semaglutide (OZEMPIC) 2 MG/3ML pen Inject 0.5 mg subcutaneously every 7  days. 9 mL 3    SUMAtriptan (IMITREX) 50 MG tablet Take 2 tablets (100 mg) by mouth at onset of headache for migraine. May repeat in 2 hours. Max 4 tablets/24 hours.      thin (NO BRAND SPECIFIED) lancets Use with lanceting device. To accompany: Blood Glucose Monitor Brands: per insurance. 100 each 6    topiramate (TOPAMAX) 25 MG tablet TAKE TWO TABLETS BY MOUTH EVERY NIGHT AT BEDTIME X 7 DAYS, THEN ONE IN THE AM AND TWO EVERY NIGHT AT BEDTIME X 7 DYAS, THEN TWO TWICE DAILY      UBRELVY 100 MG tablet Take 100 mg by mouth at onset of headache.        Allergies   Allergen Reactions    Adhesive Tape Itching    Emgality [Galcanezumab-Gnlm] Itching     Itchiness, bumps    Other Environmental Allergy     Victoza [Liraglutide] Headache, Hives and Itching        ROS:   Gen- no weight change, no fevers/chills   Head/ Eyes-see HPI  ENT- see HPI   Cardiac - see HPI  Respiratory - see HPI   GI - see HPI  Urinary - no dysuria, no polyuria   Remainder of ROS negative.     Exam:   There were no vitals taken for this visit.   Alert and oriented x 3; No acute distress

## 2025-02-08 ENCOUNTER — OFFICE VISIT (OUTPATIENT)
Dept: ORTHOPEDICS | Facility: CLINIC | Age: 30
End: 2025-02-08
Payer: COMMERCIAL

## 2025-02-08 DIAGNOSIS — R07.9 EXERTIONAL CHEST PAIN: Primary | ICD-10-CM

## 2025-02-08 DIAGNOSIS — D71 CHRONIC GRANULOMATOUS DISEASE (H): ICD-10-CM

## 2025-02-08 PROCEDURE — 99214 OFFICE O/P EST MOD 30 MIN: CPT | Performed by: FAMILY MEDICINE

## 2025-02-08 NOTE — LETTER
"  2/8/2025      RE: Anh Flores  [address] [facility] [address]     Dear Colleague,    Thank you for referring your patient, Anh Flores, to the [facility] [place]. Please see a copy of my visit note below.    ASSESSMENT/PLAN:    (R07.9) Exertional chest pain  (primary encounter diagnosis)  Comment: will complete PFTs, Holter, follow-up with cardiology; I will reach out to PCP to take over eval/ management as this could be chronic granulomatous disease but I have little expertise as a sports med physician in further workup/ mgmt   Plan: General PFT Lab (Please always keep checked), 6 minute walk test, Pulmonary Function Test    (D71) Chronic granulomatous disease (H)  Comment:   Plan: unsure if pulmonary symptoms could all be attributed to this diagnosis; she does have hx of chronic resp infections/ cryptosporidium infection; await PFTs          Jah Melendrez MD  February 8, 2025  9:09 AM        Pt is a 30 year old female last seen on 1/25/25 here today for: CT and Echo review. Patient reports feeling no changes.    Atypical chest pain - notes exertional dyspnea, coughing, rare hemoptysis    Has PFTs scheduled for 2/17/25  Needs to  holter monitor  Chest CT w/ chronic granulomatous disease  Does note she gets upper respiratory infections \"all the time\" -> usually has required prolonged Abx  +cryptosporidium infection in 12/2023 which \"set all of this off\"   Does have chronic diarrhea -> had c-scope/ endoscopy in 9/2024 -> these were normal   CT Abd - liver nodularity; MR elastography was normal -> no fibrosis/ fatty liver   No urinary symptoms  Does note dry eye, vision problems  Derm - xerosis - \"deep itch that cannot be itched\"    Chest CT - 2/4/25:  FINDINGS: No contrast. The included thyroid appears unremarkable.  Calcified prevascular and aortopulmonary window lymph nodes and left  hilar lymph nodes. Heart size normal. Thoracic aorta and pulmonary  artery " caliber are normal. No pleural or pericardial effusion. No  enlarged noncalcified lymph nodes.  The included upper abdomen shows cholecystectomy clips. Left adrenal  is incompletely imaged, and adrenals were otherwise normal.  Bone detail shows degenerative change in the mid to lower thoracic  spine.     Detail of the lungs shows subsegmental lingular atelectasis. Calcified  posterior left upper lobe granuloma near the oblique fissure.                                                                      IMPRESSION: Granulomatous disease.    Echo - 1/29/25:  Interpretation Summary  Left ventricular size, wall motion and function are normal. The ejection  fraction is 55-60%.     Global right ventricular function is normal. The right ventricle is normal size.   The inferior vena cava is normal.   No pericardial effusion is present.   There is no prior study for direct comparison.    Per my last note:  (R00.2) Palpitations  (primary encounter diagnosis)  Comment: unclear etiology; not consistent w/ primary msk diagnosis; check holter/echo/ chest CT; f/up after studies; precautions given  Plan: Adult Holter Monitor 48 hour, Echocardiogram Complete          (R07.9) Exertional chest pain  Comment: see above  Plan: Echocardiogram Complete, CT Chest w/o Contrast    Past Medical History:   Diagnosis Date     CAH (congenital adrenal hyperplasia) 2/9/2010    Followed by Dr. Brewer; next follow up 1.2011.  Metformin increased to 850 mg twice a day.      Diabetes mellitus, type 2 (H) 10/19/2020     Vitamin D deficiency 2/9/2010      Past Surgical History:   Procedure Laterality Date     CHOLECYSTECTOMY      She was in 8th grade      COLONOSCOPY N/A 9/16/2024    Procedure: COLONOSCOPY, WITH BIOPSY;  Surgeon: Bacilio Yancey MD;  Location:  GI     ESOPHAGOSCOPY, GASTROSCOPY, DUODENOSCOPY (EGD), COMBINED N/A 9/16/2024    Procedure: ESOPHAGOGASTRODUODENOSCOPY, WITH BIOPSY;  Surgeon: Bacilio Yancey MD;   Location:  GI      Current Outpatient Medications   Medication Sig Dispense Refill     alcohol swab prep pads Use to swab area of injection/anthony as directed. 100 each 3     Atogepant (QULIPTA) 60 MG TABS Take 60 mg by mouth.       atorvastatin (LIPITOR) 10 MG tablet Take 1 tablet (10 mg) by mouth daily. 90 tablet 2     blood glucose (NO BRAND SPECIFIED) test strip Use to test blood sugar three times daily or as directed. Preferred blood glucose meter OR supplies to accompany: Blood Glucose Monitor Brands: per insurance. 100 strip 6     blood glucose monitoring (NO BRAND SPECIFIED) meter device kit Use to test blood sugar three times daily or as directed. Preferred blood glucose meter OR supplies to accompany: Blood Glucose Monitor Brands: per insurance. 1 kit 0     Continuous Glucose Sensor (FREESTYLE AYSE 3 PLUS SENSOR) MISC Use 1 sensor every 15 days. Use to read blood sugars per 's instructions. 6 each 3     Continuous Glucose Sensor (FREESTYLE AYSE 3 SENSOR) [facility] each by Other route every 14 days. 6 each 3     fluticasone (FLONASE) 50 MCG/ACT nasal spray Spray 1 spray into both nostrils daily 16 g 3     gabapentin (NEURONTIN) 300 MG capsule Take 1 capsule (300 mg) by mouth 3 times daily. Take 300 mg at 9:30 am, 600 mg at 9:30 pm 270 capsule 0     gabapentin (NEURONTIN) 300 MG capsule Take 1 capsule (300 mg) by mouth 2 times daily. 180 capsule 0     insulin pen needle (ULTICARE MICRO) 32G X 4 MM miscellaneous Use 1 pen needles daily or as directed. 100 each 3     metFORMIN (GLUCOPHAGE XR) 500 MG 24 hr tablet Take 4 tablets (2,000 mg) by mouth daily (with dinner). 360 tablet 3     mometasone (NASONEX) 50 MCG/ACT nasal spray Spray 2 sprays into both nostrils daily. 17 g 3     norgestimate-ethinyl estradiol (ORTHO-CYCLEN) 0.25-35 MG-MCG tablet Take 1 tablet by mouth daily 84 tablet 3     ondansetron (ZOFRAN ODT) 4 MG ODT tab DISSOLVE ONE TABLET ON TONGUE EVERY 8 HOURS AS NEEDED FOR NAUSEA 30  tablet 1     pantoprazole (PROTONIX) 40 MG EC tablet Take 1 tablet (40 mg) by mouth daily. 90 tablet 2     rimegepant (NURTEC) 75 MG ODT tablet Place 75 mg under the tongue daily as needed for migraine.       semaglutide (OZEMPIC) 2 MG/3ML pen Inject 0.5 mg subcutaneously every 7 days. 9 mL 3     SUMAtriptan (IMITREX) 50 MG tablet Take 2 tablets (100 mg) by mouth at onset of headache for migraine. May repeat in 2 hours. Max 4 tablets/24 hours.       thin (NO BRAND SPECIFIED) lancets Use with lanceting device. To accompany: Blood Glucose Monitor Brands: per insurance. 100 each 6     topiramate (TOPAMAX) 25 MG tablet TAKE TWO TABLETS BY MOUTH EVERY NIGHT AT BEDTIME X 7 DAYS, THEN ONE IN THE AM AND TWO EVERY NIGHT AT BEDTIME X 7 DYAS, THEN TWO TWICE DAILY       UBRELVY 100 MG tablet Take 100 mg by mouth at onset of headache.        Allergies   Allergen Reactions     Adhesive Tape Itching     Emgality [Galcanezumab-Gnlm] Itching     Itchiness, bumps     Other Environmental Allergy      Victoza [Liraglutide] Headache, Hives and Itching        ROS:   Gen- no weight change, no fevers/chills   Head/ Eyes-see HPI  ENT- see HPI   Cardiac - see HPI  Respiratory - see HPI   GI - see HPI  Urinary - no dysuria, no polyuria   Remainder of ROS negative.     Exam:   There were no vitals taken for this visit.   Alert and oriented x 3; No acute distress       Again, thank you for allowing me to participate in the care of your patient.      Sincerely,    Jah Melendrez MD

## 2025-02-08 NOTE — Clinical Note
Sending back to you as she could have chronic granulomatous disease but I am unsure if this would explain her constellation of symptoms. She still has PFTs/ holter/ cardiology pending. Regardless, I think this is more complex than msk pain. Hopefully some of my work-up helped. Marleni Melendrez

## 2025-02-10 LAB — LVEF ECHO: NORMAL

## 2025-02-12 ENCOUNTER — TELEPHONE (OUTPATIENT)
Dept: ORTHOPEDICS | Facility: CLINIC | Age: 30
End: 2025-02-12
Payer: COMMERCIAL

## 2025-02-12 NOTE — TELEPHONE ENCOUNTER
Other: Anh called Dr. Melendrez was suppose to place a referral for pulmonology and immunology but does not see that they have been placed. Please discuss with Dr. Melendrez and contact patient to discuss     Could we send this information to you in [facility] [facility] or would you prefer to receive a phone call?:   Patient would prefer a phone call   Okay to leave a detailed message?: Yes at Cell number on file:    Telephone Information:   Mobile [phone]

## 2025-02-13 NOTE — PROGRESS NOTES
"Today's Date: 2/17/2025     Patient seen at the request of Maria Guadalupe Triana for an opinion and evaluation of Chronic bilateral low back pain without sciatica .      HISTORY OF PRESENT ILLNESS:  Anh Flores is a 30 year old right-hand-dominant female who presents with a chief complaint of chronic pain.      She was seen today in the clinic. She describes chronic years long pain affecting her whole back.  She also reports \"tailbone pain\" which started 3 years ago after a slip and fall.    She follows with the [facility], [facility] regarding migraines.  She did physical therapy for migraines.  She said the therapist also tried to help her with back pain.  However, the PT exercises made the back pain worse and so she stopped doing PT.      Today, she describes:  -neck, thoracic, and bilateral low back pain  -Intermittent \"shooting\" pain to both arms and legs in a nondermatomal distribution  -Intermittent episodes of her mid to low back feeling \"numb\" or itchy\"  -Subjective cold sensation of bilateral hands up to elbows & bilateral feet to the knees  -Numbness/tingling affecting all 10 fingers    Aggravating factors include: Lying down, prolonged sitting, riding in a car for 20 minutes or more, traveling sitting in an airplane seat, sitting on the toilet can bring on numbness in both legs  Relieving factors include: Standing, exercise, medication      Today, she rates the pain 8/10.    She endorses  problems.  She is currently seeing OT regarding carpal tunnel.  She endorses tennis elbow.    She says her arms and legs can feel \"heavy\" at times.    She has a history of chronic diarrhea.  She sees gastroenterology in this regard.  She endorses urinary and bowel urgency but no overt loss of bowel or bladder control. She denies saddle anesthesia.    She reports low-grade temperatures of 102 degrees 2-3 days a week with off-and-on fevers and chills.    She states that in 2023 she lost 75 pounds.  " She thought this occurred prior to starting Ozempic.          PRIOR INJURIES/TREATMENT:   Ice/Heat: uses a heated blanket  Brace: none  Physical Therapy:   PT for neck pain in 2024, but stopped the program due to worsening back pain       - Current Pain Medications -   Gabapentin 300 mg BID-TID  Sumatriptan PRN for migraine  Ubrelvy PRN for migraine  Topiramate for migraine  Ajovy injections for migraine    - Prior/Trialed Pain Medications -   Emgality  Zoloft for migraine, overly sedating  Meloxicam -per pt was discontinued by her PCP due to liver issues   Ibuprofen  Tylenol  Advil  Motrin      Prior Procedures:  Date    Procedure   Improvement (%)  none              Prior Related Surgery: none   Other (acupuncture, OMT, CMM, TENS, DME, etc.): none    Specialists Seen - (with most recent, available notes and clinic visits reviewed)   1. Sports medicine  2. Orthopedics - bilateral carpal tunnel  3. [facility] - migraine  4.  [facility] pain management clinic-2007, record not available for review today      IMAGING - reviewed   XR PELVIS G/E 3 VIEWS  LOCATION: [facility]  DATE: 12/30/2024     INDICATION:  Chronic bilateral low back pain without sciatica, Chronic bilateral low back pain without sciatica  COMPARISON: CT abdomen pelvis 9/3/2024       IMPRESSION: Mild degenerative arthrosis of both hips. No definite fracture. SI joint spaces are preserved. No discrete osseous erosions or periarticular sclerosis.     EMG 3/13/2024  Interpretation:  This is an abnormal EMG.  Findings are consistent with bilateral median neuropathies at the wrist.  The left side would be considered mild in severity and the right side would be considered moderate in severity.  Clinically this can correlate with carpal tunnel syndrome.     Review Of Systems:  I am responding to those symptoms which are directly relevant to the specific indication for my consultation.  I recommend that the patient follow up with their primary or referring provider to pursue any other symptoms which may be of concern.       Medical History:  She  has a past medical history of CAH (congenital adrenal hyperplasia) (2/9/2010), Diabetes mellitus, type 2 (H) (10/19/2020), and Vitamin D deficiency (2/9/2010).     She  has a past surgical history that includes Cholecystectomy; Esophagoscopy, gastroscopy, duodenoscopy (EGD), combined (N/A, 9/16/2024); and Colonoscopy (N/A, 9/16/2024).    Family History  Her family history includes Neurologic Disorder (age of onset: 16) in her sister.     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 Performs ADLs/IADLs independently.  She  reports that she has never smoked. She has never been exposed to tobacco smoke. She has never used smokeless tobacco. She reports that she does not drink alcohol and does not use drugs.        Current Medications:   She has a current medication list which includes the following prescription(s): alcohol swab, qulipta, atorvastatin, blood glucose, blood glucose monitoring, freestyle jada 3 plus sensor, freestyle jada 3 sensor, fluticasone, gabapentin, gabapentin, ulticare micro, metformin, mometasone, norgestimate-ethinyl estradiol, ondansetron, pantoprazole, rimegepant, semaglutide, sumatriptan, thin, topiramate, and ubrelvy.     Allergies:    -- Adhesive Tape -- Itching   -- Emgality [Galcanezumab-Gnlm] -- Itching    --  Itchiness, bumps   -- Other Environmental Allergy    -- Victoza [Liraglutide] -- Headache, Hives and Itching    PHYSICAL EXAMINATION:  /68 (BP Location: Right arm, Patient Position: Sitting, Cuff Size: Adult Large)   Pulse 82   SpO2 98%    PHYSICAL EXAMINATION:  --CONSTITUTIONAL: Vital signs as above. No acute distress. The patient is well nourished and well groomed.  --PSYCHIATRIC: The  patient is awake, alert, oriented to person, place, time and answering questions appropriately with clear speech. Appropriate mood and affect   --HEENT: Sclera are non-injected. Extraocular muscles are intact. Moist oral mucosa.  --SKIN: Skin over the face, bilateral upper extremities, bilateral lower extremities, and posterior torso is clean, dry, intact without rashes.  --RESPIRATORY: Normal rhythm and effort. No abnormal accessory muscle breathing patterns noted.   --GROSS MOTOR: Easily arises from a seated position. Toe walking and heel walking are normal.    --CERVICAL /THORACIC SPINE: Inspection reveals no evidence of deformity. Range of motion is not limited in cervical flexion, extension, lateral rotation but elicits pain. +tenderness to palpation lower cervical & mid thoracic spine.  Spurling maneuver negative bilaterally.  --STANDING EXAMINATION: Gait is non-antalgic. Normal lumbar lordosis noted, no lateral shift.  --UPPER EXTREMITY MOTOR TESTING:  Wrist flexion left 5/5, right 5/5  Wrist extension left 5/5, right 5/5  Biceps left 5/5, right 5/5   Triceps left 5/5, right 5/5   Shoulder abduction left 5/5, right 5/5   left 5/5, right 5/5  --LOWER EXTREMITY MOTOR TESTING:  Plantar flexion left 5/5, right 5/5   Dorsiflexion left 5/5, right 5/5   Great toe MTP extension left 5/5, right 5/5  Knee flexion left 5/5, right 5/5  Knee extension left 5/5, right 5/5   Hip flexion left 5/5, right 5/5  --MUSCULOSKELETAL: Lumbar spine inspection reveals no evidence of deformity. Range of motion is not limited in lumbar flexion, extension, lateral rotation but elicits pain. +tenderness to palpation lumbar spine. SLR negative bilat. Facet loading + bilat.  --SACROILIAC JOINT: David +. Pain with palpation of SI joints + Distraction neg. Fabers pos bilat.  Gaenslens pos. Sacroiliac Joint Compression Test neg. Sacral Thrust/Yeoman's Test neg.  --HIPS: Full range of motion bilaterally. Fabers pos bilat.  --NEUROLOGIC: CN  "III-XII are grossly intact.   2/4 symmetric biceps & brachioradialis reflexes bilaterally. Sensation to upper extremities is intact.  Negative Quintana's bilaterally.    2/4 patellar and achilles reflexes bilaterally.  Sensation to light touch is intact in the bilateral L4, L5, and S1 dermatomes. No clonus.    --VASCULAR:  Warm upper and lower limbs bilaterally.      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 Recommend healthy lifestyle modifications through diet and exercise.   # Type 2 DM with Hgb A1C 5.6% on 7/30/2024  # Migraine  # Bilateral carpal tunnel  # chronic resp infections/ cryptosporidium infection  # :Left thumb trigger thumb steroid injection       PLAN:  -X-ray of the pelvis 12/30/2024 showed mild bilateral OA  -Add x-ray of the sacrum and coccyx.  Add scoliosis view x-rays for initial survey  -Given her inability to do physical therapy because pain is exacerbated with the activity, add MRIs of the cervical, thoracic, and lumbar spine  -Once her pain is better controlled will refer to PT again to establish home exercise program  -No medication changes made today  -Follows with orthopedics for bilateral carpal tunnel, bilateral ulnar neuropathy, bilateral trigger thumbs, bilateral tennis elbow  -Follows with neurology regarding chronic migraine  -RTC for review of the imaging      Ready to learn, no apparent learning barriers.  Education provided on treatment plan according to patient's preferred " learning style.  Patient verbalizes understanding.   __________________________________  Edyta Corley NP  Physical Medicine & Rehabilitation        48 minutes spent by me on the date of the encounter doing chart review, history and exam, documentation and further activities per the note      no

## 2025-02-13 NOTE — TELEPHONE ENCOUNTER
Spoke to Anh and informed her that Dr. Melendrez placed a referral for an ECG and a PFT and not immunology. I mentioned that once she completes these tests she should let us and her PCP know so we can close the loop on our part and provide next steps. She understood and had no further questions.        Orlin SHORT M.A., LAT, ATC  Certified Athletic Trainer

## 2025-02-17 ENCOUNTER — TELEPHONE (OUTPATIENT)
Dept: FAMILY MEDICINE | Facility: CLINIC | Age: 30
End: 2025-02-17

## 2025-02-17 ENCOUNTER — THERAPY VISIT (OUTPATIENT)
Dept: OCCUPATIONAL THERAPY | Facility: CLINIC | Age: 30
End: 2025-02-17
Payer: COMMERCIAL

## 2025-02-17 ENCOUNTER — OFFICE VISIT (OUTPATIENT)
Dept: PHYSICAL MEDICINE AND REHAB | Facility: CLINIC | Age: 30
End: 2025-02-17
Attending: INTERNAL MEDICINE
Payer: COMMERCIAL

## 2025-02-17 ENCOUNTER — ANCILLARY PROCEDURE (OUTPATIENT)
Dept: CARDIOLOGY | Facility: CLINIC | Age: 30
End: 2025-02-17
Attending: FAMILY MEDICINE
Payer: COMMERCIAL

## 2025-02-17 VITALS — HEART RATE: 82 BPM | DIASTOLIC BLOOD PRESSURE: 68 MMHG | SYSTOLIC BLOOD PRESSURE: 103 MMHG | OXYGEN SATURATION: 98 %

## 2025-02-17 DIAGNOSIS — D71 CHRONIC GRANULOMATOUS DISEASE (H): ICD-10-CM

## 2025-02-17 DIAGNOSIS — R00.2 PALPITATIONS: ICD-10-CM

## 2025-02-17 DIAGNOSIS — M25.551 BILATERAL HIP PAIN: ICD-10-CM

## 2025-02-17 DIAGNOSIS — G89.29 CHRONIC BILATERAL LOW BACK PAIN WITHOUT SCIATICA: ICD-10-CM

## 2025-02-17 DIAGNOSIS — G56.03 BILATERAL CARPAL TUNNEL SYNDROME: ICD-10-CM

## 2025-02-17 DIAGNOSIS — G89.29 CHRONIC BILATERAL LOW BACK PAIN, UNSPECIFIED WHETHER SCIATICA PRESENT: ICD-10-CM

## 2025-02-17 DIAGNOSIS — R52 PAIN: Primary | ICD-10-CM

## 2025-02-17 DIAGNOSIS — M77.11 LATERAL EPICONDYLITIS OF BOTH ELBOWS: ICD-10-CM

## 2025-02-17 DIAGNOSIS — M65.312 TRIGGER THUMB OF BOTH THUMBS: ICD-10-CM

## 2025-02-17 DIAGNOSIS — R51.9 CHRONIC INTRACTABLE HEADACHE, UNSPECIFIED HEADACHE TYPE: ICD-10-CM

## 2025-02-17 DIAGNOSIS — M54.9 UPPER BACK PAIN: ICD-10-CM

## 2025-02-17 DIAGNOSIS — G56.23 ULNAR NEUROPATHY OF BOTH UPPER EXTREMITIES: ICD-10-CM

## 2025-02-17 DIAGNOSIS — R07.9 EXERTIONAL CHEST PAIN: ICD-10-CM

## 2025-02-17 DIAGNOSIS — M54.50 CHRONIC BILATERAL LOW BACK PAIN, UNSPECIFIED WHETHER SCIATICA PRESENT: ICD-10-CM

## 2025-02-17 DIAGNOSIS — R20.0 NUMBNESS AND TINGLING: ICD-10-CM

## 2025-02-17 DIAGNOSIS — M54.50 CHRONIC BILATERAL LOW BACK PAIN WITHOUT SCIATICA: ICD-10-CM

## 2025-02-17 DIAGNOSIS — R00.2 PALPITATION: ICD-10-CM

## 2025-02-17 DIAGNOSIS — R00.2 PALPITATION: Primary | ICD-10-CM

## 2025-02-17 DIAGNOSIS — G89.29 CHRONIC INTRACTABLE HEADACHE, UNSPECIFIED HEADACHE TYPE: ICD-10-CM

## 2025-02-17 DIAGNOSIS — M54.2 NECK PAIN: ICD-10-CM

## 2025-02-17 DIAGNOSIS — M54.6 CHRONIC BILATERAL THORACIC BACK PAIN: ICD-10-CM

## 2025-02-17 DIAGNOSIS — R20.2 NUMBNESS AND TINGLING: ICD-10-CM

## 2025-02-17 DIAGNOSIS — G89.29 CHRONIC BILATERAL THORACIC BACK PAIN: ICD-10-CM

## 2025-02-17 DIAGNOSIS — M25.552 BILATERAL HIP PAIN: ICD-10-CM

## 2025-02-17 DIAGNOSIS — M77.12 LATERAL EPICONDYLITIS OF BOTH ELBOWS: ICD-10-CM

## 2025-02-17 DIAGNOSIS — M65.311 TRIGGER THUMB OF BOTH THUMBS: ICD-10-CM

## 2025-02-17 LAB
6 MIN WALK (FT): 1100 FT
6 MIN WALK (M): 335 M

## 2025-02-17 PROCEDURE — 94618 PULMONARY STRESS TESTING: CPT | Performed by: INTERNAL MEDICINE

## 2025-02-17 PROCEDURE — 94375 RESPIRATORY FLOW VOLUME LOOP: CPT | Performed by: INTERNAL MEDICINE

## 2025-02-17 PROCEDURE — 94729 DIFFUSING CAPACITY: CPT | Performed by: INTERNAL MEDICINE

## 2025-02-17 PROCEDURE — 93226 XTRNL ECG REC<48 HR SCAN A/R: CPT

## 2025-02-17 PROCEDURE — 94726 PLETHYSMOGRAPHY LUNG VOLUMES: CPT | Performed by: INTERNAL MEDICINE

## 2025-02-17 PROCEDURE — 94150 VITAL CAPACITY TEST: CPT | Performed by: INTERNAL MEDICINE

## 2025-02-17 PROCEDURE — 99204 OFFICE O/P NEW MOD 45 MIN: CPT | Performed by: NURSE PRACTITIONER

## 2025-02-17 PROCEDURE — 97535 SELF CARE MNGMENT TRAINING: CPT | Mod: GO | Performed by: SPECIALIST/TECHNOLOGIST

## 2025-02-17 PROCEDURE — 97166 OT EVAL MOD COMPLEX 45 MIN: CPT | Mod: GO | Performed by: SPECIALIST/TECHNOLOGIST

## 2025-02-17 NOTE — PATIENT INSTRUCTIONS
It was a pleasure seeing you in the clinic today.  As discussed, we made the folloill send you a message about the x-ray result through [employer].     An XR order was added today.  I will send you a message about the x-ray result through [employer].     A MRI order was added today, which you can schedule after today's visit.      Please schedule follow up with me after MRI. (Please schedule the follow up at least 1 day after the MRI to give radiology time to do the report as well.)     Thank you,  Edyta Corley NP  Physical Medicine and Rehabilitation, Medical Spine  PM&R clinic Phone: [phone]  PM&R clinic Fax: [phone]

## 2025-02-17 NOTE — PROGRESS NOTES
Per Dr. Melendrez, patient to have 24 hour Holter monitor placed.  Diagnosis: palpitations [R00.2]  Monitor placed: Yes  Patient Instructed: Yes  Patient verbalized understanding: Yes  Holter # 5

## 2025-02-17 NOTE — PROGRESS NOTES
OCCUPATIONAL THERAPY EVALUATION  Type of Visit: Evaluation              Subjective        Presenting condition or subjective complaint: Carpal Tunnel / Tennis Elbow  Date of onset: 01/17/25    Relevant medical history: Chest pain; Diabetes; Dizziness; Migraines or headaches; Overweight   Dates & types of surgery:      Prior diagnostic imaging/testing results: EMG     Prior therapy history for the same diagnosis, illness or injury: Yes Occupational therapy    I have carpal tunnel and it hasn't gotten any better. They did injections for the thumbs, but still catching a little - still has thumb splints but take them off sometimes due to swelling. 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  I feel like my L side is weaker, numbness and tingling are about the same.  L dorsal thumb is pain, with some numbness and tingling; R thumb catches but not as painful unless gets caught in clothes. Gets stabbing pain in L lateral forearm and elbow, especially with grooming and use of clippers.     Bought some wrist splints for nighttime use, wears them 3-4 nights/week sometimes wears during the day. Feels they are almost too loose. Has another pair that get too tight.  Feels wearing the splints makes her fingers feel more tingly.     Patient reports she has an appointment for imaging on 3/*7/25 for Ultrasound.     Prior Level of Function  Ind with ADL, IADLS, work. Does not drive at baseline.     Living Environment  Social support: With family members   Type of home: House   Stairs to enter the home: No       Ramp: No   Stairs inside the home: Yes 5 Is there a railing: Yes     Help at home:    Equipment owned:       Employment: Not Applicable    Hobbies/Interests:      Objective   ADDITIONAL HISTORY:  Right hand dominant  Patient reports symptoms of pain, stiffness/loss of motion,  weakness/loss of strength, edema, numbness, and tingling   Transportation: Does not drive at baseline  Currently not working due to present treatment problem, among other health concerns    Functional Outcome Measure:       2/17/2025    12:23 PM   Upper Extremity Functional Index (  [address] [place] [address] [place])   a.  Any of your usual work, housework or school activities 2-Moderate Difficulty   b.  Your usual hobbies, recreational or sporting activities 1-Quite a bit of Difficulty   c.  Lifting a bag of groceries to waist level 1-Quite a bit of Difficulty   d.  Placing an object onto, or removing it from an overhead shelf 1-Quite a bit of Difficulty   e.  Washing your hair or scalp 1-Quite a bit of Difficulty   f.   Pushing up on your hands (e.g., from bathtub or chair) 2-Moderate Difficulty   g.  Preparing food (e.g., peeling, cutting) 2-Moderate Difficulty   I.   Vacuuming, sweeping, or raking 1-Quite a bit of Difficulty   j.   Dressing 4-No Difficulty   k.  Doing up buttons 2-Moderate Difficulty   l.   Using tools or appliances 2-Moderate Difficulty   m. Opening doors 2-Moderate Difficulty   n.  Cleaning 2-Moderate Difficulty   o.  Tying or lacing shoes 2-Moderate Difficulty   p.  Sleeping 2-Moderate Difficulty   q.  Laundering clothes. (e.g., washing, ironing, folding) 2-Moderate Difficulty   r.   Opening a jar 0-Extreme Difficulty   s.  Throwing a ball 2-Moderate Difficulty   t.   Carrying a small suitcase with your affected limb  2-Moderate Difficulty   Column Totals: /80 33        Patient-reported       Objective:    Pain Level (Scale 0-10)      At Rest 8/10 upon waking up   With Use 10/10 numbness and tingling     Pain Description      Location elbow, wrist, hand, thumb, index finger, long finger, and ring finger   Pain Quality Aching, Dull, Numb, Throbbing, and Tingling   Frequency intermittent or daily     Pain is worst  daytime or nighttime   Exacerbated by  Grooming, overuse   Relieved by otc medications, rest,  and splints; does previous exercises; injection helped for about 3-5 days   Progression Unchanged over the last 2-3 weeks, injection has worn off     Posture  Forward Neck Posture and Rounded Forward Shoulders    Sensation  Decreased Median Nerve distribution per pt report and Decreased Dorsal Radial Sensory Nerve distribution per pt report    ROM  Pain Report: - none  + mild    ++ moderate    +++ severe   Elbow     AROM (PROM) Left Right   Extension Full, but sore Full, more soreness   Flexion Full, pain free Full, pain free   Supination Full, pain free Full, pain free   Pronation Full, pain free Full, pain free     Wrist     AROM (PROM) Left Right   Extension 70, 6/10 at medial elbow/triceps 66, 2/10 pain palm of hand adjacent to ring finger   Flexion 65, 4/10 at wrist dorsum 58, 6/10 dorsum of wrist   RD Full, 6/10 pain at lateral elbow Full, 4/10 ulnar hand   UD 30, 7/10 at 1st DC 36, 7/10 at 1st DC     Resisted Testing  Pain Report:  - none    + mild    ++ moderate    +++ severe       Elbow Extension 5/5 B, L painful at Ulnar Forearm   Elbow Flexion 5/5 B, L painful at Radial Wrist   Supination  5/5 B, 6/10 pain at contact point   Pronation 4/5 B, 6/10 pain at contact point   Wrist Ext L 4/5, R 5/5   Wrist Flex with RD B 5/5, L wrist volar 6/10   EDC  4/5 B, no pain   Long Finger Test 5/5 B, sore at contact site         Strength   (Measured in pounds)  Pain Report: - none  + mild    ++ moderate    +++ severe         L R    30, sore at dorsal webspace 38, pain at palm through middle finger            L R   Elbow Ext 24, 7/10 through radial wrist/1st discontinue into med and lateral elbow with some tingling at MF 26, 7/10 through lateral forarm     Lat Pinch      L R    10, 6/10 through 1st DC 14, pain free     3 Pt Pinch      L R    7, 6/10 index through wrist 12, pain free       Palpation  Pain Report:  - none    + mild    ++ moderate    +++ severe       Triangular Interval    Infraspinatus    Teres  Major    Pec Major    Pec Minor    Proximal Triceps    Spiral Groove    Distal Triceps ++ B   Anconeus    ECRB at LEP ++ B   ECU at LEP + B   EDC at LEP + B   Radial Head    Extensor Wad ++B    PIN Site - B       6/10 L Lateral Epicondyle, 3/10 R Lateral Epicondyle    Special Tests    L: WHAT Test + 8/10 pain, Finkelstein's +8/10      Pain Report:  - none    + mild    ++ moderate    +++ severe       Median Nerve Compression at Pronator 6 sec L, 8 sec R   Carpal Compression Test--Durkan Test (30 sec) 7 sec L, 9 sec R   [place] Test for Lumbrical Incursion  (fist x 30 secs) 12 L, 17 R   Tinels at Carpal Tunnel ++ B   Phalens 5 sec L, 8 sec R           Stage of Stenosing Tenosynovitis (SST) Left Right   Thumb  3 2   Stage 1:  Normal  Stage 2:  Uneven motion of tendon  Stage 3:  Triggering, clicking, catching  Stage 4:  Locking in extension or flexion; unlocked by active motion  Stage 5:  Locking in extension or flexion; unlocked by passive motion  Stage 6:  Finger locked in extension or flexion    Patient reports catching is better than is was, is wearing oval 8 splints at night but they sometimes slide off or get too tight with swell           Assessment & Plan   CLINICAL IMPRESSIONS  Medical Diagnosis: Ulnar neuropathy of both upper extremities (G56.23)  - Primary    Bilateral carpal tunnel syndrome (G56.03)    Trigger thumb of both thumbs (M65.311, M65.312)    Lateral epicondylitis of both elbows (M77.11, M77.12)    Treatment Diagnosis: B UE pain    Impression/Assessment: Pt is a 30 year old female presenting to Occupational Therapy due to B UE pain.  The following significant findings have been identified: Impaired activity tolerance, Impaired coordination, Impaired sensation, Impaired strength, and Pain.  These identified deficits interfere with their ability to perform self care tasks, work tasks, recreational activities, household chores, meal planning and preparation, and community or volunteer activities as  compared to previous level of function.     Clinical Decision Making (Complexity):  Assessment of Occupational Performance: 5 or more Performance Deficits  Occupational Performance Limitations: bathing/showering, hygiene and grooming, health management and maintenance, home establishment and management, meal preparation and cleanup, shopping, sleep, work, and leisure activities  Clinical Decision Making (Complexity): Moderate complexity    PLAN OF CARE  Treatment Interventions:  Modalities:  US and Paraffin  Therapeutic Exercise:  AROM, AAROM, PROM, Tendon Gliding, Isotonics, Isometrics, and Stabilization  Neuromuscular re-education:  Nerve Gliding, Kinesthetic Training, Proprioceptive Training, Posture, Kinesiotaping, Isometrics, and Stabilization  Manual Techniques:  Friction massage, Myofascial release, and Manual edema mobilization  Orthotic Fabrication:  Static  Self Care:  Self Care Tasks, Ergonomic Considerations, and Work Tasks    Long Term Goals   OT Goal 1  Goal Identifier: IADLS  Goal Description: Anh will be able to open new jars and/or bottles for meal prep with nore more than 2/10 discomfort  Rationale: In order to maximize safety and independence with ADL/IADLs  Target Date: 05/12/25      Frequency of Treatment: 1x/week, tapering as appropriate  Duration of Treatment: 12 weeks     Recommended Referrals to Other Professionals:  NA  Education Assessment: Learner/Method: Patient;No Barriers to Learning     Risks and benefits of evaluation/treatment have been explained.   Patient/Family/caregiver agrees with Plan of Care.     Evaluation Time:    OT Eval, Moderate Complexity Minutes (08765): 45       Signing Clinician: SERGIO RIVERA [facility]                                                                                   OUTPATIENT OCCUPATIONAL THERAPY      PLAN OF TREATMENT FOR OUTPATIENT REHABILITATION   Patient's Last Name, First Name, Anh Lutz   L YOB: 1995   Provider's Name   [facility]   Medical Record No.  [id number]     Onset Date: 01/17/25 Start of Care Date: 02/17/25     Medical Diagnosis:  Ulnar neuropathy of both upper extremities (G56.23)  - Primary    Bilateral carpal tunnel syndrome (G56.03)    Trigger thumb of both thumbs (M65.311, M65.312)    Lateral epicondylitis of both elbows (M77.11, M77.12)      OT Treatment Diagnosis:  B UE pain Plan of Treatment  Frequency/Duration:1x/week, tapering as appropriate/12 weeks    Certification date from 02/17/25   To 05/12/25        See note for plan of treatment details and functional goals     ZAHRA HINES, OTR                         I CERTIFY THE NEED FOR THESE SERVICES FURNISHED UNDER        THIS PLAN OF TREATMENT AND WHILE UNDER MY CARE     (Physician attestation of this document indicates review and certification of the therapy plan).              Referring Provider:  Amaya Barfield PA-C    Initial Assessment  See Epic Evaluation- 02/17/25

## 2025-02-17 NOTE — LETTER
"2/17/2025       RE: Anh Flores  [address] [facility] [address]     Dear Colleague,    Thank you for referring your patient, Anh Flores, to the [facility] PHYSICAL MEDICINE AND REHABILITATION CLINIC [place] at [facility]. Please see a copy of my visit note below.    Today's Date: 2/17/2025     Patient seen at the request of Maria Guadalupe Triana for an opinion and evaluation of Chronic bilateral low back pain without sciatica .      HISTORY OF PRESENT ILLNESS:  Anh Flores is a 30 year old right-hand-dominant female who presents with a chief complaint of chronic pain.      She was seen today in the clinic. She describes chronic years long pain affecting her whole back.  She also reports \"tailbone pain\" which started 3 years ago after a slip and fall.    She follows with the [facility] regarding migraines.  She did physical therapy for migraines.  She said the therapist also tried to help her with back pain.  However, the PT exercises made the back pain worse and so she stopped doing PT.      Today, she describes:  -neck, thoracic, and bilateral low back pain  -Intermittent \"shooting\" pain to both arms and legs in a nondermatomal distribution  -Intermittent episodes of her mid to low back feeling \"numb\" or itchy\"  -Subjective cold sensation of bilateral hands up to elbows & bilateral feet to the knees  -Numbness/tingling affecting all 10 fingers    Aggravating factors include: Lying down, prolonged sitting, riding in a car for 20 minutes or more, traveling sitting in an airplane seat, sitting on the toilet can bring on numbness in both legs  Relieving factors include: Standing, exercise, medication      Today, she rates the pain 8/10.    She endorses  problems.  She is currently seeing OT regarding carpal tunnel.  She endorses tennis elbow.    She says her arms and legs can feel \"heavy\" at times.    She has a " history of chronic diarrhea.  She sees gastroenterology in this regard.  She endorses urinary and bowel urgency but no overt loss of bowel or bladder control. She denies saddle anesthesia.    She reports low-grade temperatures of 102 degrees 2-3 days a week with off-and-on fevers and chills.    She states that in 2023 she lost 75 pounds.  She thought this occurred prior to starting Ozempic.          PRIOR INJURIES/TREATMENT:   Ice/Heat: uses a heated blanket  Brace: none  Physical Therapy:   PT for neck pain in 2024, but stopped the program due to worsening back pain       - Current Pain Medications -   Gabapentin 300 mg BID-TID  Sumatriptan PRN for migraine  Ubrelvy PRN for migraine  Topiramate for migraine  Ajovy injections for migraine    - Prior/Trialed Pain Medications -   Emgality  Zoloft for migraine, overly sedating  Meloxicam -per pt was discontinued by her PCP due to liver issues   Ibuprofen  Tylenol  Advil  Motrin      Prior Procedures:  Date    Procedure   Improvement (%)  none              Prior Related Surgery: none   Other (acupuncture, OMT, CMM, TENS, DME, etc.): none    Specialists Seen - (with most recent, available notes and clinic visits reviewed)   1. Sports medicine  2. Orthopedics - bilateral carpal tunnel  3. [facility] - migraine  4.  [facility] pain management clinic-2007, record not available for review today      IMAGING - reviewed   XR PELVIS G/E 3 VIEWS  LOCATION: [facility]  DATE: 12/30/2024     INDICATION:  Chronic bilateral low back pain without sciatica, Chronic bilateral low back pain without sciatica  COMPARISON: CT abdomen pelvis 9/3/2024       IMPRESSION: Mild degenerative arthrosis of both hips. No definite fracture. SI joint spaces are preserved. No discrete osseous erosions or periarticular sclerosis.     EMG 3/13/2024  Interpretation:  This is an abnormal EMG.  Findings are consistent with bilateral  median neuropathies at the wrist.  The left side would be considered mild in severity and the right side would be considered moderate in severity.  Clinically this can correlate with carpal tunnel syndrome.     Review Of Systems:  I am responding to those symptoms which are directly relevant to the specific indication for my consultation. I recommend that the patient follow up with their primary or referring provider to pursue any other symptoms which may be of concern.       Medical History:  She  has a past medical history of CAH (congenital adrenal hyperplasia) (2/9/2010), Diabetes mellitus, type 2 (H) (10/19/2020), and Vitamin D deficiency (2/9/2010).     She  has a past surgical history that includes Cholecystectomy; Esophagoscopy, gastroscopy, duodenoscopy (EGD), combined (N/A, 9/16/2024); and Colonoscopy (N/A, 9/16/2024).    Family History  Her family history includes Neurologic Disorder (age of onset: 16) in her sister.     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 Performs ADLs/IADLs independently.  She  reports that she has never smoked. She has never been exposed to tobacco smoke. She has never used smokeless tobacco. She reports that she does not drink alcohol and does not use drugs.        Current Medications:   She has a current medication list which includes the following prescription(s): alcohol swab, qulipta, atorvastatin, blood glucose, blood glucose monitoring, freestyle jada 3 plus sensor, freestyle jada 3 sensor, fluticasone, gabapentin, gabapentin, ulticare micro, metformin, mometasone, norgestimate-ethinyl estradiol, ondansetron, pantoprazole, rimegepant, semaglutide, sumatriptan, thin, topiramate, and ubrelvy.     Allergies:    -- Adhesive Tape -- Itching   -- Emgality [Galcanezumab-Gnlm] -- Itching    --  Itchiness, bumps   -- Other Environmental Allergy    --  Victoza [Liraglutide] -- Headache, Hives and Itching    PHYSICAL EXAMINATION:  /68 (BP Location: Right arm, Patient Position: Sitting, Cuff Size: Adult Large)   Pulse 82   SpO2 98%    PHYSICAL EXAMINATION:  --CONSTITUTIONAL: Vital signs as above. No acute distress. The patient is well nourished and well groomed.  --PSYCHIATRIC: The patient is awake, alert, oriented to person, place, time and answering questions appropriately with clear speech. Appropriate mood and affect   --HEENT: Sclera are non-injected. Extraocular muscles are intact. Moist oral mucosa.  --SKIN: Skin over the face, bilateral upper extremities, bilateral lower extremities, and posterior torso is clean, dry, intact without rashes.  --RESPIRATORY: Normal rhythm and effort. No abnormal accessory muscle breathing patterns noted.   --GROSS MOTOR: Easily arises from a seated position. Toe walking and heel walking are normal.    --CERVICAL /THORACIC SPINE: Inspection reveals no evidence of deformity. Range of motion is not limited in cervical flexion, extension, lateral rotation but elicits pain. +tenderness to palpation lower cervical & mid thoracic spine.  Spurling maneuver negative bilaterally.  --STANDING EXAMINATION: Gait is non-antalgic. Normal lumbar lordosis noted, no lateral shift.  --UPPER EXTREMITY MOTOR TESTING:  Wrist flexion left 5/5, right 5/5  Wrist extension left 5/5, right 5/5  Biceps left 5/5, right 5/5   Triceps left 5/5, right 5/5   Shoulder abduction left 5/5, right 5/5   left 5/5, right 5/5  --LOWER EXTREMITY MOTOR TESTING:  Plantar flexion left 5/5, right 5/5   Dorsiflexion left 5/5, right 5/5   Great toe MTP extension left 5/5, right 5/5  Knee flexion left 5/5, right 5/5  Knee extension left 5/5, right 5/5   Hip flexion left 5/5, right 5/5  --MUSCULOSKELETAL: Lumbar spine inspection reveals no evidence of deformity. Range of motion is not limited in lumbar flexion, extension, lateral rotation but elicits pain.  "+tenderness to palpation lumbar spine. SLR negative bilat. Facet loading + bilat.  --SACROILIAC JOINT: David +. Pain with palpation of SI joints + Distraction neg. Fabers pos bilat.  Gaenslens pos. Sacroiliac Joint Compression Test neg. Sacral Thrust/Yeoman's Test neg.  --HIPS: Full range of motion bilaterally. Fabers pos bilat.  --NEUROLOGIC: CN III-XII are grossly intact.   2/4 symmetric biceps & brachioradialis reflexes bilaterally. Sensation to upper extremities is intact.  Negative Quintana's bilaterally.    2/4 patellar and achilles reflexes bilaterally.  Sensation to light touch is intact in the bilateral L4, L5, and S1 dermatomes. No clonus.    --VASCULAR:  Warm upper and lower limbs bilaterally.      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 Recommend healthy lifestyle modifications through diet and exercise.   # Type 2 DM with Hgb A1C 5.6% on 7/30/2024  # Migraine  # Bilateral carpal tunnel  # chronic resp infections/ cryptosporidium infection  # :Left thumb trigger thumb steroid injection       PLAN:  -X-ray of the pelvis 12/30/2024 showed mild bilateral OA  -Add x-ray of the sacrum and coccyx.  Add scoliosis view x-rays for initial survey  -Given her inability to do physical therapy because pain is exacerbated with the activity, add MRIs of the cervical, thoracic, and lumbar spine  -Once her pain is better controlled will refer to PT again to establish home exercise program  -No medication changes made " today  -Follows with orthopedics for bilateral carpal tunnel, bilateral ulnar neuropathy, bilateral trigger thumbs, bilateral tennis elbow  -Follows with neurology regarding chronic migraine  -RTC for review of the imaging      Ready to learn, no apparent learning barriers.  Education provided on treatment plan according to patient's preferred learning style.  Patient verbalizes understanding.   __________________________________  Edyta Corley NP  Physical Medicine & Rehabilitation        48 minutes spent by me on the date of the encounter doing chart review, history and exam, documentation and further activities per the note         Again, thank you for allowing me to participate in the care of your patient.      Sincerely,    NARCISA Cruz CNP

## 2025-02-17 NOTE — TELEPHONE ENCOUNTER
Called and spoke with Dr. Melendrez to confirm Holter monitor order. New order placed for standard 6 lead holter monitor for 24 hours. Previous order mistakenly said 48 hour 12 lead holter.

## 2025-02-17 NOTE — TELEPHONE ENCOUNTER
Other: Cam with [facility] calling about order from Dr. Melendrez for 12 lead holter monitor. She is looking to confirm that this is the correct order because it is typically only used in special circumstances. Please call her back as soon as possible to confirm order, patient is scheduled with them today.      Could we send this information to you in [employer] or would you prefer to receive a phone call?:   Patient would prefer a phone call   Okay to leave a detailed message?: Yes at Other phone number:  [phone]

## 2025-02-18 LAB
DLCOUNC-%PRED-PRE: 118 %
DLCOUNC-PRE: 23.5 ML/MIN/MMHG
DLCOUNC-PRED: 19.81 ML/MIN/MMHG
ERV-%PRED-PRE: 38 %
ERV-PRE: 0.41 L
ERV-PRED: 1.09 L
EXPTIME-PRE: 4.86 SEC
FEF2575-%PRED-PRE: 109 %
FEF2575-PRE: 3.31 L/SEC
FEF2575-PRED: 3.03 L/SEC
FEFMAX-%PRED-PRE: 85 %
FEFMAX-PRE: 5.48 L/SEC
FEFMAX-PRED: 6.41 L/SEC
FEV1-%PRED-PRE: 100 %
FEV1-PRE: 2.59 L
FEV1FEV6-PRE: 87 %
FEV1FEV6-PRED: 85 %
FEV1FVC-PRE: 89 %
FEV1FVC-PRED: 86 %
FEV1SVC-PRE: 89 %
FEV1SVC-PRED: 81 %
FIFMAX-PRE: 2.99 L/SEC
FRCPLETH-%PRED-PRE: 77 %
FRCPLETH-PRE: 1.87 L
FRCPLETH-PRED: 2.4 L
FVC-%PRED-PRE: 97 %
FVC-PRE: 2.93 L
FVC-PRED: 3.01 L
IC-%PRED-PRE: 115 %
IC-PRE: 2.5 L
IC-PRED: 2.17 L
RVPLETH-%PRED-PRE: 114 %
RVPLETH-PRE: 1.45 L
RVPLETH-PRED: 1.27 L
TLCPLETH-%PRED-PRE: 102 %
TLCPLETH-PRE: 4.37 L
TLCPLETH-PRED: 4.27 L
VA-%PRED-PRE: 85 %
VA-PRE: 3.6 L
VC-%PRED-PRE: 91 %
VC-PRE: 2.92 L
VC-PRED: 3.21 L

## 2025-02-21 ENCOUNTER — ANCILLARY PROCEDURE (OUTPATIENT)
Dept: ULTRASOUND IMAGING | Facility: CLINIC | Age: 30
End: 2025-02-21
Attending: OBSTETRICS & GYNECOLOGY
Payer: COMMERCIAL

## 2025-02-21 ENCOUNTER — OFFICE VISIT (OUTPATIENT)
Dept: ENDOCRINOLOGY | Facility: CLINIC | Age: 30
End: 2025-02-21
Attending: PEDIATRICS
Payer: COMMERCIAL

## 2025-02-21 ENCOUNTER — OFFICE VISIT (OUTPATIENT)
Dept: ENDOCRINOLOGY | Facility: CLINIC | Age: 30
End: 2025-02-21
Attending: OBSTETRICS & GYNECOLOGY
Payer: COMMERCIAL

## 2025-02-21 VITALS
HEART RATE: 101 BPM | HEIGHT: 60 IN | DIASTOLIC BLOOD PRESSURE: 91 MMHG | SYSTOLIC BLOOD PRESSURE: 153 MMHG | BODY MASS INDEX: 50.06 KG/M2 | WEIGHT: 255 LBS

## 2025-02-21 VITALS
WEIGHT: 255 LBS | HEIGHT: 60 IN | HEART RATE: 101 BPM | BODY MASS INDEX: 50.06 KG/M2 | SYSTOLIC BLOOD PRESSURE: 153 MMHG | DIASTOLIC BLOOD PRESSURE: 91 MMHG

## 2025-02-21 DIAGNOSIS — G47.33 OBSTRUCTIVE SLEEP APNEA: ICD-10-CM

## 2025-02-21 DIAGNOSIS — Z71.83 ENCOUNTER FOR NONPROCREATIVE GENETIC COUNSELING: ICD-10-CM

## 2025-02-21 DIAGNOSIS — E25.9 CAH 21-OH (CONGENITAL ADRENAL HYPERPLASIA), LATE ONSET: ICD-10-CM

## 2025-02-21 DIAGNOSIS — G47.00 INSOMNIA, UNSPECIFIED TYPE: ICD-10-CM

## 2025-02-21 DIAGNOSIS — E25.9: Primary | ICD-10-CM

## 2025-02-21 DIAGNOSIS — Z31.5 ENCOUNTER FOR PROCREATIVE GENETIC COUNSELING: ICD-10-CM

## 2025-02-21 DIAGNOSIS — E25.9 CAH 21-OH (CONGENITAL ADRENAL HYPERPLASIA), LATE ONSET: Primary | ICD-10-CM

## 2025-02-21 PROBLEM — M65.311 TRIGGER THUMB OF BOTH THUMBS: Status: ACTIVE | Noted: 2025-02-21

## 2025-02-21 PROBLEM — G56.23 ULNAR NEUROPATHY OF BOTH UPPER EXTREMITIES: Status: ACTIVE | Noted: 2025-02-21

## 2025-02-21 PROBLEM — M65.312 TRIGGER THUMB OF BOTH THUMBS: Status: ACTIVE | Noted: 2025-02-21

## 2025-02-21 PROBLEM — M77.12 LATERAL EPICONDYLITIS OF BOTH ELBOWS: Status: ACTIVE | Noted: 2025-02-21

## 2025-02-21 PROBLEM — M77.11 LATERAL EPICONDYLITIS OF BOTH ELBOWS: Status: ACTIVE | Noted: 2025-02-21

## 2025-02-21 PROCEDURE — G0463 HOSPITAL OUTPT CLINIC VISIT: HCPCS | Mod: 25 | Performed by: PEDIATRICS

## 2025-02-21 PROCEDURE — 99215 OFFICE O/P EST HI 40 MIN: CPT | Performed by: PEDIATRICS

## 2025-02-21 PROCEDURE — 76830 TRANSVAGINAL US NON-OB: CPT

## 2025-02-21 PROCEDURE — 96041 GENETIC COUNSELING SVC EA 30: CPT | Performed by: GENETIC COUNSELOR, MS

## 2025-02-21 PROCEDURE — 99417 PROLNG OP E/M EACH 15 MIN: CPT | Performed by: PEDIATRICS

## 2025-02-21 PROCEDURE — 76856 US EXAM PELVIC COMPLETE: CPT

## 2025-02-21 PROCEDURE — 76856 US EXAM PELVIC COMPLETE: CPT | Mod: 26 | Performed by: STUDENT IN AN ORGANIZED HEALTH CARE EDUCATION/TRAINING PROGRAM

## 2025-02-21 PROCEDURE — 76830 TRANSVAGINAL US NON-OB: CPT | Mod: 26 | Performed by: STUDENT IN AN ORGANIZED HEALTH CARE EDUCATION/TRAINING PROGRAM

## 2025-02-21 NOTE — NURSING NOTE
Chief Complaint   Patient presents with    [facility] Care     [facility]    Christine Martin LPN

## 2025-02-21 NOTE — PROGRESS NOTES
Chief Complaint   Patient presents with    [facility] Care     [facility]    Christine Martin LPN

## 2025-02-21 NOTE — PROGRESS NOTES
Presenting Information:  Anh Flores is a 30-year-old woman with non-classic congenital adrenal hyperplasia (CAH).  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  Anh was seen today to re-establish care with Dr. Brewer who she has not seen since 2011.  I met with Anh at the request of Dr. Brewer to obtain a medical and family history, review the genetics and inheritance of CAH, and discuss recurrence of CAH.      Personal History:  Anh was diagnosed with CAH around age 5 or 6.  She believes this diagnosis was made after she showed signs of early puberty.  Menarche was reportedly at age 12 or 13.  She has a history of significant hirsutism and reports a longstanding history of irregular periods.  They have gotten more regular since beginning oral contraceptives in April 2024.  She also noted an improvement in hirsutism as well as breast growth while on oral contraceptives.  Anh reports a lifelong history of frequent illnesses, significant fatigue, and chronic diarrhea.  She is currently undergoing a work-up for possible granulomatous disease and is seeing a GI provider next week.  Anh has type 2 diabetes, and has had recent weight loss with the aid of semaglutide.  Anh also experiences migraines with aura.  We did briefly discuss that migraine with aura may be a contraindication to use of oral contraceptives and I encouraged her to ensure the prescribing provider is aware of her history.      Family History:  A detailed pedigree was obtained and scanned into the electronic medical record.  It is significant for the following:   Anh is the youngest of five children her parents have together.  She has two brothers, one of whom has type 2 diabetes.  She has two sisters, one of whom has developmental delay/intellectual disability that has been attributed to birth trauma.  None of the siblings  are known to have CAH.   Anh's mother is 65-years-old and also has type 2 diabetes.    Anh's father is in his 70s and generally healthy.   Anh is of unknown  and  ancestry on her maternal side, and /[place] ancestry on her paternal side.  Consanguinity was denied.     Discussion:  During today's visit we first reviewed the genetics of congenital adrenal hyperplasia (CAH).  Genes are long stretches of DNA that are responsible for how our bodies look and how our bodies work.  We all have two copies of every gene, one inherited from the mother and one inherited from the father.  When there is a change, called a mutation, in a gene it can cause it to not do its job correctly which can cause the signs and symptoms of a genetic condition.      CAH due to 21-hydroxylase deficiency is caused by mutations in a gene called BVJ11E3.  The WQI45G5 gene is responsible for making an enzyme called 21-hydroxylase that works in the adrenal glands.  There it helps produce hormones called cortisol and aldosterone.  These hormones play an important role in several body processes including the regulation of blood sugar, stress, inflammation, and salt retention.  Mutations in the MOE02P6 gene disrupt the production of these hormones, which in turn disrupts these body processes.  Additionally, mutations cause the accumulation of the pre-curser substances to these hormones, which are instead converted to androgens (male sex hormones).  These disruptions cause the signs and symptoms of CAH.      CAH is inherited in an autosomal recessive pattern.  This means that to be affected an individual must inherit a mutation in both copies of the YZL26P0 gene (one from each parent).  Individuals with just one mutation in the EZG83F4 gene are said to be carriers.  Carriers do not have CAH but can have an affected child if their partner is also a carrier.  When both parents are carriers, with each  pregnancy there is a 25% chance for the child to be affected, a 50% chance for the child to be a carrier, and a 25% chance for the child to be unaffected and not a carrier.       There are three main types of CAH.  The most severe type is called salt-wasting classic CAH.  These individuals lose too much salt in their urine which can be life-threatening.  Salt-wasting classic CAH also typically results in ambiguous genitalia in female babies, as well as other symptoms of androgen excess throughout life for females and males.  The other classic type of CAH is called simple-virilizing type.  Individuals with this type do not have salt-wasting but females do have ambiguous genitalia, and females and males have other signs of androgen excess as they age.  The mildest type is called non-classic CAH.  This type does not cause salt-wasting or ambiguous genitalia, but can result in some symptoms of androgen excess.  Some individuals with non-classic CAH are asymptomatic.  The specific mutations in the NGL18Z7 gene predict residual enzyme function, which in turn helps predict disease severity.      Anh had genetic testing of the YAE13F5 gene in .  This returned showing one copy of the V281L mutation.  The other copy of her BBB01G6 gene either also has a V281L mutation or a deletion.  These possibilities could not be distinguished between based on the technology used at the time.  Parental testing or updated testing for Anh herself would be available to determine this.  Regardless, both genotypes would be expected to be associated with non-classic CAH which is in line with what has been seen clinically for Anh.      Determining whether Anh has a deletion can be important for several reasons.  First, there is gene next to the EQS05C7 gene called TNXB.  In some patients with a OCK59S0 gene deletion, their deletion extends into TNXB and is associated with an extended phenotype (clinical picture) referred to as  CAH-X.  TNXB plays an important role in the connective tissue of the body.   Connective tissue helps give tissues of the body structure, strength, and flexibility.  When the TNXB gene is interrupted, this can therefore result in the signs and symptoms of a connective tissue disorder.  Specifically, this results in a phenotype called Marion-Danlos syndrome.  Signs and symptoms include hypermobility (flexible joints), an increased chance for joint subluxation, hernias, and an increased chance for cardiac defects.  Fortunately Anh has had a normal echocardiogram recently.      Second, determining whether Anh has a deletion is important for determining the chance for a future child of Anh to have CAH, including the more severe classic CAH.  Because both copies of Trents GVT69W0 gene have a mutation, no matter which copy she passed down to a child, they would inherit a mutation and at least be a carrier for CAH.  A child could actually have CAH if Anh's partner is a carrier.  If he were, with each pregnancy there would be a 50% chance for the child to have CAH.  Importantly, if her partner were a carrier for a classic mutation and Anh does in fact have a deletion that she passed on, a child would have classic CAH.   If Anh's partner is not found to be a carrier, the chance to have a child with CAH would be reduced, but not zero.  Carrier screening is available to Anh's partner and I encouraged her to contact me if and when she is contemplating a pregnancy and/or would like her partner tested.       Finally, Anh's diagnosis of CAH also has implications for other family members.  Because non-classic CAH can be subtle, it is possible one or more of her siblings is also affected and not diagnosed.  Each of her siblings also has an increased chance to be a carrier for CAH.  Testing would be available to them and their partners if desired.  Similarly, other extended family members also have  an increased chance to be CAH carriers.  They should be made aware of this and the availability of carrier screening.      I appreciate the opportunity to be a part of Anh's care today.  She is encouraged to contact me with any additional questions or concerns.     Plan:  1.  Additional genetic testing and/or parental testing would be recommended to determine whether Anh has a deletion of the YUU15O8 gene.  This can be done at any time in the future.   2.  Anh is encouraged to reach out if and when she is planning to start a family for further genetic counseling and potential carrier screening for her partner.   3.  Follow-up as recommended by Dr. Daniella Jimenez [facility]  Genetic Counselor  Division of Genetics and Metabolism      100 minutes spent on the date of the encounter in chart review, patient visit, test coordination/review, documentation, and/or discussion with other providers about the issues documented above

## 2025-02-21 NOTE — PROGRESS NOTES
Adult Endocrinology Initial Consultation    Patient: Anh Flores MRN# [id number]   YOB: 1995 Age: 30year 1month old   Date of Visit: Feb 21, 2025    Dear  Referred Self:    I had the pleasure of seeing your patient, Anh Flores in the Pediatric Endocrinology Clinic, [facility], on Feb 21, 2025 for a consultation regarding Non-classical CAH .           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     Priority: Medium    Abnormal finding on imaging of liver 10/18/2024     Priority: Medium    Chronic diarrhea 10/18/2024     Priority: Medium    Lactose intolerance 09/10/2024     Priority: Medium    Obstructive sleep apnea 07/05/2024     Priority: Medium    Insomnia, unspecified type 07/05/2024     Priority: Medium    Bilateral carpal tunnel syndrome 06/11/2024     Priority: Medium    Diabetes mellitus, type 2 (H) 10/19/2020     Priority: Medium    Hidradenitis suppurativa 01/23/2019     Priority: Medium    Morbid obesity with body mass index of 60.0-69.9 in adult (H) 01/23/2019     Priority: Medium    Elevated BP without diagnosis of hypertension 01/23/2019     Priority: Medium    Morbid obesity (H) 10/17/2014     Priority: Medium    Chronic headaches 10/13/2014     Priority: Medium    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 Brewer; next follow up 1.2011.    Metformin increased to 850 mg twice a day.      Chronic abdominal pain 02/09/2010     Priority: Medium  "   Vitamin D deficiency 02/09/2010     Priority: Medium            HPI:   Anh Flores is a 30-year-old woman with non-classic congenital adrenal hyperplasia (CAH). 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 Anh has not regularly followed up with a medical provider for many years and presented with multiple health concerns she is currently addressing:    Endocrinology:  Anh reports that she has not been on medication or followed up with an adult endocrinologist since her last visit with Dr. Brewer. She describes feeling \"sick\" for years, with severe fatigue that forces her to rest or nap as early as 10 AM after minimal activity. She recalls that earlier last year, she was prescribed prednisone for carpal tunnel syndrome, which provided the best symptom relief she had experienced in years.    She also reports a history of irregular menstrual cycles since menarche at 12 years old. She is currently under the care of Dr. Gutierrez, who prescribed birth control pills to regulate her cycles. While they have helped, she now experiences menstruation twice a month. A transvaginal ultrasound was recently performed to evaluate potential causes such as polycystic ovary syndrome (PCOS). Anh also mentions that she never felt she fully went through puberty, with notable breast development occurring only recently, likely due to the birth control pills.    Additionally, her type 2 diabetes was previously uncontrolled, with an A1C of 9.0%. However, she has recently reduced it to 5.2% with the use of Wegovy, alongside a noted 30 lb weight loss.    Cardiac/Pulmonary:  Since being diagnosed with cryptosporidiosis in 2023, Anh has experienced chronic chest pain and shortness of breath. She is currently under evaluation by a specialist at [facility], who has ordered an echocardiogram, EKG, chest CT, and X-rays to investigate the " etiology of these symptoms.    Gastrointestinal:  Following her cryptosporidiosis diagnosis, Anh continues to experience persistent watery diarrhea daily, despite undergoing evaluation by a GI specialist at [facility]. An endoscopy and colonoscopy reportedly revealed inflammation in both the stomach and colon. Despite multiple medication trials, she has not found relief.    Neurology & ENT:  Anh has suffered from chronic migraines since her cryptosporidiosis diagnosis, with daily occurrences and associated auras. She also experiences ocular headaches, which she believes have worsened since starting birth control pills. Additionally, she reports frequent sinus infections that contribute to pressure-like pain around her forehead.    Primary Concern:  Anh's main concern for today's visit is  her persistent and debilitating fatigue, which significantly limits her productivity and daily functioning.        I have reviewed the available past laboratory evaluations, imaging studies, and medical records available to me at this visit. I have reviewed the Anh's growth chart.    History was obtained from patient.     Birth History:   unknown            Past Medical History:     Past Medical History:   Diagnosis Date    CAH (congenital adrenal hyperplasia) 2/9/2010    Followed by Dr. Brewer; next follow up 1.2011.  Metformin increased to 850 mg twice a day.     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 She has two brothers, one of whom has type 2 diabetes. She has two sisters, one of whom has developmental delay/intellectual disability     Family History   Problem Relation Age of Onset    Neurologic Disorder Sister 16        migraines    Cerebrovascular Disease No family hx of     Alzheimer Disease No family hx of     Glaucoma No family hx of     Macular Degeneration No family hx of        History of:  Adrenal insufficiency: none.  Autoimmune disease: none.  Calcium problems: none.  Delayed puberty: none.  Diabetes mellitus: none.  Early puberty: none.  Genetic disease: none.  Short stature: none.  Thyroid disease: none.         Allergies:     Allergies   Allergen Reactions    Adhesive Tape Itching    Emgality [Galcanezumab-Gnlm] Itching     Itchiness, bumps    Other Environmental Allergy     Victoza [Liraglutide] Headache, Hives and Itching             Medications:     Current Outpatient Medications   Medication Sig Dispense Refill    Atogepant (QULIPTA) 60 MG TABS Take 60 mg by mouth.      atorvastatin (LIPITOR) 10 MG tablet Take 1 tablet (10 mg) by mouth daily. 90 tablet 2    Continuous Glucose Sensor (FREESTYLE AYSE 3 PLUS SENSOR) MISC Use 1 sensor every 15 days. Use to read blood sugars per 's instructions. 6 each 3    Continuous Glucose Sensor (FREESTYLE AYSE 3 SENSOR) [facility] 1 each by Other route every 14 days. 6 each 3    fluticasone (FLONASE) 50 MCG/ACT nasal spray Spray 1 spray into both nostrils daily 16 g 3    gabapentin (NEURONTIN) 300 MG capsule Take 1 capsule (300 mg) by mouth 3 times daily. Take 300 mg at 9:30 am, 600 mg at 9:30 pm 270 capsule 0    metFORMIN (GLUCOPHAGE XR) 500 MG 24 hr tablet Take 4 tablets (2,000 mg) by mouth daily (with  dinner). 360 tablet 3    mometasone (NASONEX) 50 MCG/ACT nasal spray Spray 2 sprays into both nostrils daily. 17 g 3    norgestimate-ethinyl estradiol (ORTHO-CYCLEN) 0.25-35 MG-MCG tablet Take 1 tablet by mouth daily 84 tablet 3    ondansetron (ZOFRAN ODT) 4 MG ODT tab DISSOLVE ONE TABLET ON TONGUE EVERY 8 HOURS AS NEEDED FOR NAUSEA 30 tablet 1    pantoprazole (PROTONIX) 40 MG EC tablet Take 1 tablet (40 mg) by mouth daily. 90 tablet 2    rimegepant (NURTEC) 75 MG ODT tablet Place 75 mg under the tongue daily as needed for migraine.      semaglutide (OZEMPIC) 2 MG/3ML pen Inject 0.5 mg subcutaneously every 7 days. 9 mL 3    SUMAtriptan (IMITREX) 50 MG tablet Take 2 tablets (100 mg) by mouth at onset of headache for migraine. May repeat in 2 hours. Max 4 tablets/24 hours.      topiramate (TOPAMAX) 25 MG tablet TAKE TWO TABLETS BY MOUTH EVERY NIGHT AT BEDTIME X 7 DAYS, THEN ONE IN THE AM AND TWO EVERY NIGHT AT BEDTIME X 7 DYAS, THEN TWO TWICE DAILY      UBRELVY 100 MG tablet Take 100 mg by mouth at onset of headache.      alcohol swab prep pads Use to swab area of injection/anthony as directed. 100 each 3    blood glucose (NO BRAND SPECIFIED) test strip Use to test blood sugar three times daily or as directed. Preferred blood glucose meter OR supplies to accompany: Blood Glucose Monitor Brands: per insurance. 100 strip 6    blood glucose monitoring (NO BRAND SPECIFIED) meter device kit Use to test blood sugar three times daily or as directed. Preferred blood glucose meter OR supplies to accompany: Blood Glucose Monitor Brands: per insurance. 1 kit 0    gabapentin (NEURONTIN) 300 MG capsule Take 1 capsule (300 mg) by mouth 2 times daily. 180 capsule 0    insulin pen needle (ULTICARE MICRO) 32G X 4 MM miscellaneous Use 1 pen needles daily or as directed. 100 each 3    thin (NO BRAND SPECIFIED) lancets Use with lanceting device. To accompany: Blood Glucose Monitor Brands: per insurance. 100 each 6             Review of  "Systems:   Gen: Negative  Eye: Negative  ENT: Negative  Pulmonary:  Negative  Cardio: Negative  Gastrointestinal: Negative  Hematologic: Negative  Genitourinary: Negative  Musculoskeletal: Negative  Psychiatric: Negative  Neurologic: Negative  Skin: Negative  Endocrine: see HPI.            Physical Exam:   Blood pressure (!) 153/91, pulse 101, height 1.524 m (5'), weight 115.7 kg (255 lb), not currently breastfeeding.  Growth %ile SmartLinks can only be used for patients less than 20 years old.  Height: 152.4 cm  (60\") Facility age limit for growth %marci is 20 years.  Weight: 115.7 kg (actual weight), Facility age limit for growth %marci is 20 years.  BMI: Body mass index is 49.8 kg/m . Facility age limit for growth %marci is 20 years.      Constitutional: awake, alert, cooperative, no apparent distress  Eyes: Lids and lashes normal, sclera clear, conjunctiva normal  ENT: Normocephalic, without obvious abnormality, external ears without lesions,   Face:  Evidence of terminal hair growth on the upper lip and chin, with stubble present despite recent shaving.  Neck: Supple, symmetrical, trachea midline, thyroid symmetric, not enlarged and no tenderness, slight hyperpigmentation  Hematologic / Lymphatic: no cervical lymphadenopathy  Lungs: No increased work of breathing, clear to auscultation bilaterally with good air entry.  Cardiovascular: Regular rate and rhythm, no murmurs.  Abdomen: No scars, normal bowel sounds, soft, non-distended, non-tender, no masses palpated, no hepatosplenomegaly  Genitourinary:  Breasts: deferred  Genitalia deferred  Musculoskeletal: There is no redness, warmth, or swelling of the joints.    Neurologic: Awake, alert, oriented to name, place and time.  Neuropsychiatric: normal  Skin: no lesions          Laboratory results:            Assessment and Plan:   Anh is a 30-year-old female with multiple health concerns presenting for an evaluation of possible untreated non-classical congenital " adrenal hyperplasia (CAH) as a potential cause of severe fatigue.    Topics Discussed at Today's Visit:  We reviewed Anh s 2024 lab results, including cortisol, androgens, and ACTH--key markers for CAH. All values were within the normal range despite her not being on glucocorticoid therapy, suggesting that she does not require glucocorticoid treatment. Given these findings, it is unlikely that her fatigue is due to CAH. Instead, contributing factors may include her ongoing health conditions, depression, or anxiety. We discussed the potential benefits of speaking with a psychologist, but the patient was not receptive to this suggestion.    Plan:  Given the normal cortisol, androgen, and ACTH levels, non-classical CAH appears well-controlled without intervention. Routine follow-up for CAH is not necessary unless new concerns arise.    Recommend that Anh discuss her migraines with aura at her next appointment with Dr. Gutierrez, as this may contraindicate the use of her current birth control pills.        Thank you for allowing me to participate in the care of your patient.  Please do not hesitate to call with questions or concerns.    Sincerely,    NAN Larson-S2 under supervision of Dr. Brewer       CC  Patient Care Team:  Maria Guadalupe Triana MD as PCP - General (Internal Medicine)  Erick Smith OD as MD (Optometry)  Amaya Salcedo MD as MD (Neurology)  Bita Veronica MD as Assigned PCP  Alejanrda Cronin MD as Assigned Endocrinology Provider  Bob Reyes MD as Assigned Surgical Provider  Cheyenne Overton MD as MD (Gastroenterology)  Leanne Fallon PA-C as Physician Assistant (Dermatology)  Alejandra Cronin MD as MD (Endocrinology, Diabetes, and Metabolism)  Canelo Herrera MD as MD (Dermatology)  Friedell, Peter E, MD as MD (Internal Medicine-Hematology & Oncology)  Humza Pavon MD as MD (Family Medicine)  Shahnaz  MD Feliciano as MD (Hematology & Oncology)  Jami Gutierrez MD as Assigned OBGYN Provider  Cheyenne Overton MD as Assigned Gastroenterology Provider  Friedell, Peter E, MD as Assigned Cancer Care Provider  Grzegorz Alcantar MD as MD (Cardiovascular Disease)  Amaya Barfield PA-C as Assigned Musculoskeletal Provider  SELF, REFERRED    Copy to patient  GENOVEVA BURGESS   [address] [place] [id number]

## 2025-02-21 NOTE — PROGRESS NOTES
Presenting Information:  Anh Flores is a 30-year-old woman with non-classic congenital adrenal hyperplasia (CAH).  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  I met with Anh today ***  Personal History:    Family History:  A detailed pedigree was obtained and scanned into the electronic medical record.  It is significant for the following:     Discussion:    Plan:  1.    2.    Adelaida Jimenez MS [facility]  Genetic Counselor  Division of Genetics and Metabolism      *** minutes spent on the date of the encounter in chart review, patient visit, test coordination/review, documentation, and/or discussion with other providers about the issues documented above

## 2025-02-21 NOTE — PATIENT INSTRUCTIONS
Thank you for trusting us with your care!   Please be aware, if you are on Mychart, you may see your results prior to your providers review. If labs are abnormal, we will call or message you on [facility] labst with a follow up plan.    If you need to contact us for questions about:  Symptoms, Scheduling & Medical Questions; Non-urgent (2-3 day response) Khush message, Urgent (needing response today) [phone] (if after 3:30pm next day response)   Prescriptions: Please call your Pharmacy   Billing: [facility] [phone] or [facility]:[phone]

## 2025-02-21 NOTE — PATIENT INSTRUCTIONS
Thank you for trusting us with your care!   Please be aware, if you are on Mychart, you may see your results prior to your providers review. If labs are abnormal, we will call or message you on [email or web address] with a follow up plan.    If you need to contact us for questions about:  Symptoms, Scheduling & Medical Questions; Non-urgent (2-3 day response) WegoWise message, Urgent (needing response today) [phone] (if after 3:30pm next day response)   Prescriptions: Please call your Pharmacy   Billing: Tashia [phone] or [facility]:[phone]

## 2025-02-26 ENCOUNTER — OFFICE VISIT (OUTPATIENT)
Dept: OBGYN | Facility: CLINIC | Age: 30
End: 2025-02-26
Attending: OBSTETRICS & GYNECOLOGY
Payer: COMMERCIAL

## 2025-02-26 VITALS
SYSTOLIC BLOOD PRESSURE: 124 MMHG | HEART RATE: 83 BPM | DIASTOLIC BLOOD PRESSURE: 89 MMHG | WEIGHT: 253.8 LBS | HEIGHT: 60 IN | BODY MASS INDEX: 49.83 KG/M2

## 2025-02-26 DIAGNOSIS — E25.9 CAH 21-OH (CONGENITAL ADRENAL HYPERPLASIA), LATE ONSET: ICD-10-CM

## 2025-02-26 PROCEDURE — 99214 OFFICE O/P EST MOD 30 MIN: CPT | Performed by: OBSTETRICS & GYNECOLOGY

## 2025-02-26 PROCEDURE — G0463 HOSPITAL OUTPT CLINIC VISIT: HCPCS | Performed by: OBSTETRICS & GYNECOLOGY

## 2025-02-26 RX ORDER — ACETAMINOPHEN AND CODEINE PHOSPHATE 120; 12 MG/5ML; MG/5ML
0.35 SOLUTION ORAL DAILY
Qty: 90 TABLET | Refills: 3 | Status: SHIPPED | OUTPATIENT
Start: 2025-02-26

## 2025-02-26 RX ORDER — SPIRONOLACTONE 50 MG/1
50 TABLET, FILM COATED ORAL DAILY
Qty: 90 TABLET | Refills: 3 | Status: SHIPPED | OUTPATIENT
Start: 2025-02-26

## 2025-02-26 NOTE — LETTER
2/26/2025       RE: Anh Flores  [address] [place] [address]     Dear Colleague,    Thank you for referring your patient, Anh Flores, to the [facility] [place] [facility]. Please see a copy of my visit note below.    30-yo P0 here for followup and ongoing care of non-classic congenital adrenal hyperplasia (CAH).  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    Personal History:  Anh was diagnosed with CAH around age 5 or 6.  She believes this diagnosis was made after she showed signs of early puberty.  Menarche was reportedly at age 12 or 13.  She has a history of significant hirsutism and reports a longstanding history of irregular periods.  They have gotten more regular since beginning oral contraceptives in April 2024.  She also noted an improvement in hirsutism as well as breast growth while on oral contraceptives.  Anh reports a lifelong history of frequent illnesses, significant fatigue, and chronic diarrhea.  She is currently undergoing a work-up for possible granulomatous disease and is seeing a GI provider next week.  Anh has type 2 diabetes, and has had recent weight loss with the aid of semaglutide.  Anh also experiences migraines with aura.  We did briefly discuss that migraine with aura may be a contraindication to use of oral contraceptives and I encouraged her to ensure the prescribing provider is aware of her history.      Today she desires discussion on her hirsutism which is persistent and unrelenting. She cannot afford laser therapy.   Also, she is newly diagnosed with migraines with aura and was advised to discuss OCPs with gynecology. Today we reviewed the increased risks of CVA with estrogen containing compounds in this setting. We discussed alternatives. She started OCPs due to endometrial  thickening and irregular heavy menses many years ago.  She is not planning pregnancy at this time.   She presents with her mother today who helps with history and decision making.     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), late onset     Chronic abdominal pain     Vitamin D deficiency     Chronic headaches     Morbid obesity (H)     Hidradenitis suppurativa     Morbid obesity with body mass index of 60.0-69.9 in adult (H)     Elevated BP without diagnosis of hypertension     Diabetes mellitus, type 2 (H)     Bilateral carpal tunnel syndrome     Obstructive sleep apnea     Insomnia, unspecified type     Lactose intolerance     Abnormal finding on imaging of liver     Chronic diarrhea     Carrier of hemochromatosis HFE gene mutation     Cryptosporidiasis (H)     Elevated ferritin     Ulnar neuropathy of both upper extremities     Trigger thumb of both thumbs     Lateral epicondylitis of both elbows     Past Medical History:   Diagnosis Date     CAH (congenital adrenal hyperplasia) 2/9/2010    Followed by Dr. Brewer; next follow up 1.2011.  Metformin increased to 850 mg twice a day.      Diabetes mellitus, type 2 (H) 10/19/2020     Vitamin D deficiency 2/9/2010     Past Surgical History:   Procedure Laterality Date     CHOLECYSTECTOMY      She was in 8th grade      COLONOSCOPY N/A 9/16/2024    Procedure: COLONOSCOPY, WITH BIOPSY;  Surgeon: Bacilio Yancey MD;  Location:  GI     ESOPHAGOSCOPY, GASTROSCOPY, DUODENOSCOPY (EGD), COMBINED N/A 9/16/2024    Procedure: ESOPHAGOGASTRODUODENOSCOPY, WITH BIOPSY;  Surgeon: Bacilio Yancye MD;  Location:  GI     OB History   No obstetric history on file.     /89   Pulse 83   Ht 1.524 m (5')   Wt 115.1 kg (253 lb 12.8 oz)   LMP 01/29/2025 (Exact Date)   BMI 49.57 kg/m    Appears well  Evidence of shaving of thick dark hair on chin and neck.   No acanthosis  nigricans of neck  Hirsutism evident on chest and back.     A/P:  CAH - non classic, not on steroids, normal labs  New dx migraine with aura: discontinue combination OCP and start POP  Hirsutism: re-start spironolactone -- advised to stop this in advance of pregnancy planning    All questions answered.     Jami Gutierrez MD         Again, thank you for allowing me to participate in the care of your patient.      Sincerely,    Jami Gutierrez MD

## 2025-02-26 NOTE — PATIENT INSTRUCTIONS
Thank you for trusting us with your care!   Please be aware, if you are on Mychart, you may see your results prior to your providers review. If labs are abnormal, we will call or message you on [employer] with a follow up plan.    If you need to contact us for questions about:  Symptoms, Scheduling & Medical Questions; Non-urgent (2-3 day response) BlogCN message, Urgent (needing response today) [phone] (if after 3:30pm next day response)   Prescriptions: Please call your Pharmacy   Billing: Tashia [phone] or [facility]:[phone]

## 2025-03-02 ENCOUNTER — OFFICE VISIT (OUTPATIENT)
Dept: URGENT CARE | Facility: URGENT CARE | Age: 30
End: 2025-03-02
Payer: COMMERCIAL

## 2025-03-02 VITALS
RESPIRATION RATE: 16 BRPM | WEIGHT: 252.5 LBS | BODY MASS INDEX: 49.31 KG/M2 | OXYGEN SATURATION: 96 % | DIASTOLIC BLOOD PRESSURE: 77 MMHG | HEART RATE: 85 BPM | TEMPERATURE: 97.8 F | SYSTOLIC BLOOD PRESSURE: 116 MMHG

## 2025-03-02 DIAGNOSIS — G43.911 INTRACTABLE MIGRAINE WITH STATUS MIGRAINOSUS, UNSPECIFIED MIGRAINE TYPE: Primary | ICD-10-CM

## 2025-03-02 PROCEDURE — 96372 THER/PROPH/DIAG INJ SC/IM: CPT | Performed by: PHYSICIAN ASSISTANT

## 2025-03-02 PROCEDURE — 99214 OFFICE O/P EST MOD 30 MIN: CPT | Mod: 25 | Performed by: PHYSICIAN ASSISTANT

## 2025-03-02 RX ORDER — KETOROLAC TROMETHAMINE 30 MG/ML
30 INJECTION, SOLUTION INTRAMUSCULAR; INTRAVENOUS ONCE
Status: COMPLETED | OUTPATIENT
Start: 2025-03-02 | End: 2025-03-02

## 2025-03-02 RX ADMIN — KETOROLAC TROMETHAMINE 30 MG: 30 INJECTION, SOLUTION INTRAMUSCULAR; INTRAVENOUS at 16:07

## 2025-03-02 ASSESSMENT — ENCOUNTER SYMPTOMS
FEVER: 0
HEADACHES: 1
RESPIRATORY NEGATIVE: 1
MYALGIAS: 0
VOMITING: 0
DIARRHEA: 0
SHORTNESS OF BREATH: 0
CARDIOVASCULAR NEGATIVE: 1
RHINORRHEA: 0
ENDOCRINE NEGATIVE: 1
ALLERGIC/IMMUNOLOGIC NEGATIVE: 1
PALPITATIONS: 0
NECK PAIN: 0
WEAKNESS: 0
JOINT SWELLING: 0
NAUSEA: 0
MUSCULOSKELETAL NEGATIVE: 1
DIZZINESS: 0
LIGHT-HEADEDNESS: 0
BACK PAIN: 0
WOUND: 0
COUGH: 0
SORE THROAT: 0
CHILLS: 0
ARTHRALGIAS: 0
EYES NEGATIVE: 1
NECK STIFFNESS: 0

## 2025-03-02 NOTE — PROGRESS NOTES
Chief Complaint:    Chief Complaint   Patient presents with    Urgent Care    Migraine     Been couple weeks, have chronic migraine and have all the best meds can take and not working, want Toradol shot        Medical Decision Making:    Vital signs reviewed by Ld Diaz PA-C  /77 (BP Location: Left arm, Patient Position: Sitting, Cuff Size: Adult Large)   Pulse 85   Temp 97.8  F (36.6  C) (Tympanic)   Resp 16   Wt 114.5 kg (252 lb 8 oz)   LMP 01/29/2025 (Exact Date)   SpO2 96%   BMI 49.31 kg/m      ASSESSMENT:     1. Intractable migraine with status migrainosus, unspecified migraine type         PLAN:    Patient is in no acute distress.    Patient given 30 Mg Toradol IM in clinic today per patient request.  Patient instructed to follow up with neurology if headache does not resolve.  Worrisome symptoms discussed with instructions to go to the ED.  Patient verbalized understanding and agreed with this plan.    Labs:     No results found for any visits on 03/02/25.    Current Meds:    Current Outpatient Medications:     alcohol swab prep pads, Use to swab area of injection/anthony as directed., Disp: 100 each, Rfl: 3    Atogepant (QULIPTA) 60 MG TABS, Take 60 mg by mouth., Disp: , Rfl:     atorvastatin (LIPITOR) 10 MG tablet, Take 1 tablet (10 mg) by mouth daily., Disp: 90 tablet, Rfl: 2    blood glucose (NO BRAND SPECIFIED) test strip, Use to test blood sugar three times daily or as directed. Preferred blood glucose meter OR supplies to accompany: Blood Glucose Monitor Brands: per insurance., Disp: 100 strip, Rfl: 6    blood glucose monitoring (NO BRAND SPECIFIED) meter device kit, Use to test blood sugar three times daily or as directed. Preferred blood glucose meter OR supplies to accompany: Blood Glucose Monitor Brands: per insurance., Disp: 1 kit, Rfl: 0    Continuous Glucose Sensor (FREESTYLE AYSE 3 PLUS SENSOR) MISC, Use 1 sensor every 15 days. Use to read blood sugars per 's  instructions., Disp: 6 each, Rfl: 3    Continuous Glucose Sensor (FREESTYLE AYSE 3 SENSOR) [facility], 1 each by Other route every 14 days., Disp: 6 each, Rfl: 3    fluticasone (FLONASE) 50 MCG/ACT nasal spray, Spray 1 spray into both nostrils daily, Disp: 16 g, Rfl: 3    gabapentin (NEURONTIN) 300 MG capsule, Take 1 capsule (300 mg) by mouth 3 times daily. Take 300 mg at 9:30 am, 600 mg at 9:30 pm, Disp: 270 capsule, Rfl: 0    insulin pen needle (ULTICARE MICRO) 32G X 4 MM miscellaneous, Use 1 pen needles daily or as directed., Disp: 100 each, Rfl: 3    metFORMIN (GLUCOPHAGE XR) 500 MG 24 hr tablet, Take 4 tablets (2,000 mg) by mouth daily (with dinner)., Disp: 360 tablet, Rfl: 3    mometasone (NASONEX) 50 MCG/ACT nasal spray, Spray 2 sprays into both nostrils daily., Disp: 17 g, Rfl: 3    norethindrone (MICRONOR) 0.35 MG tablet, Take 1 tablet (0.35 mg) by mouth daily., Disp: 90 tablet, Rfl: 3    ondansetron (ZOFRAN ODT) 4 MG ODT tab, DISSOLVE ONE TABLET ON TONGUE EVERY 8 HOURS AS NEEDED FOR NAUSEA, Disp: 30 tablet, Rfl: 1    pantoprazole (PROTONIX) 40 MG EC tablet, Take 1 tablet (40 mg) by mouth daily., Disp: 90 tablet, Rfl: 2    rimegepant (NURTEC) 75 MG ODT tablet, Place 75 mg under the tongue daily as needed for migraine., Disp: , Rfl:     semaglutide (OZEMPIC) 2 MG/3ML pen, Inject 0.5 mg subcutaneously every 7 days., Disp: 9 mL, Rfl: 3    spironolactone (ALDACTONE) 50 MG tablet, Take 1 tablet (50 mg) by mouth daily., Disp: 90 tablet, Rfl: 3    SUMAtriptan (IMITREX) 50 MG tablet, Take 2 tablets (100 mg) by mouth at onset of headache for migraine. May repeat in 2 hours. Max 4 tablets/24 hours., Disp: , Rfl:     thin (NO BRAND SPECIFIED) lancets, Use with lanceting device. To accompany: Blood Glucose Monitor Brands: per insurance., Disp: 100 each, Rfl: 6    topiramate (TOPAMAX) 25 MG tablet, TAKE TWO TABLETS BY MOUTH EVERY NIGHT AT BEDTIME X 7 DAYS, THEN ONE IN THE AM AND TWO EVERY NIGHT AT BEDTIME X 7 DYAS, THEN TWO  TWICE DAILY, Disp: , Rfl:     UBRELVY 100 MG tablet, Take 100 mg by mouth at onset of headache., Disp: , Rfl:     gabapentin (NEURONTIN) 300 MG capsule, Take 1 capsule (300 mg) by mouth 2 times daily., Disp: 180 capsule, Rfl: 0    Current Facility-Administered Medications:     ketorolac (TORADOL) injection 30 mg, 30 mg, Intramuscular, Once,     Allergies:  Allergies   Allergen Reactions    Adhesive Tape Itching    Emgality [Galcanezumab-Gnlm] Itching     Itchiness, bumps    Other Environmental Allergy     Victoza [Liraglutide] Headache, Hives and Itching       SUBJECTIVE    HPI: Anh Flores is an 30 year old female who presents for evaluation and treatment of migraine headache.  Patient has a Hx of migraines and is followed by neurology for this.  Patient has been taking her medications and these have not been helping and would like Toradol injection.  She states that this feels like one of her typical migraines.      ROS:      Review of Systems   Constitutional:  Negative for chills and fever.   HENT:  Negative for congestion, ear pain, rhinorrhea and sore throat.    Eyes: Negative.    Respiratory: Negative.  Negative for cough and shortness of breath.    Cardiovascular: Negative.  Negative for chest pain and palpitations.   Gastrointestinal:  Negative for diarrhea, nausea and vomiting.   Endocrine: Negative.    Genitourinary: Negative.    Musculoskeletal: Negative.  Negative for arthralgias, back pain, joint swelling, myalgias, neck pain and neck stiffness.   Skin: Negative.  Negative for rash and wound.   Allergic/Immunologic: Negative.  Negative for immunocompromised state.   Neurological:  Positive for headaches. Negative for dizziness, weakness and light-headedness.        Family History   Family History   Problem Relation Age of Onset    Neurologic Disorder Sister 16        migraines    Cerebrovascular Disease No family hx of     Alzheimer Disease No family hx of     Glaucoma No family hx of     Macular  Degeneration No family hx of        Social History  Social History     Socioeconomic History    Marital status: Single     Spouse name: Not on file    Number of children: 0    Years of education: 14+    Highest education level: Not on file   Occupational History     Employer: STUDENT     Comment: Dog grooming   Tobacco Use    Smoking status: Never     Passive exposure: Never    Smokeless tobacco: Never   Vaping Use    Vaping status: Never Used   Substance and Sexual Activity    Alcohol use: No    Drug use: No    Sexual activity: Never   Other Topics Concern    Parent/sibling w/ CABG, MI or angioplasty before 65F 55M? 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    Not sexually active and never has been    Jamibabar Gutierrez MD     Social Drivers of Health     Financial Resource Strain: Low Risk  (12/30/2024)    Financial Resource Strain     Within the past 12 months, have you or your family members you live with been unable to get utilities (heat, electricity) when it was really needed?: No   Food Insecurity: Low Risk  (12/30/2024)    Food Insecurity     Within the past 12 months, did you worry that your food would run out before you got money to buy more?: No     Within the past 12 months, did the food you bought just not last and you didn t have money to get more?: No   Transportation Needs: Low Risk  (12/30/2024)    Transportation Needs     Within the past 12 months, has lack of transportation kept you from medical appointments, getting your medicines, non-medical meetings or appointments, work, or from getting things that you need?: No   Physical Activity: Unknown (10/18/2024)    Exercise Vital Sign     Days of Exercise per Week: 2 days     Minutes of Exercise per Session: Not on file   Stress: Stress Concern Present (10/18/2024)    [employer] [employer] - Occupational Stress Questionnaire     Feeling of Stress : To some extent   Social Connections: Unknown  (10/18/2024)    Social Connection and Isolation Panel [NHANES]     Frequency of Communication with Friends and Family: Not on file     Frequency of Social Gatherings with Friends and Family: Never     Attends Mandaeism Services: Not on file     Active Member of Clubs or Organizations: Not on file     Attends Club or Organization Meetings: Not on file     Marital Status: Not on file   Interpersonal Safety: Low Risk  (10/18/2024)    Interpersonal Safety     Do you feel physically and emotionally safe where you currently live?: Yes     Within the past 12 months, have you been hit, slapped, kicked or otherwise physically hurt by someone?: No     Within the past 12 months, have you been humiliated or emotionally abused in other ways by your partner or ex-partner?: No   Housing Stability: Low Risk  (12/30/2024)    Housing Stability     Do you have housing? 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), late onset    Chronic abdominal pain    Vitamin D deficiency    Chronic headaches    Morbid obesity (H)    Hidradenitis suppurativa    Morbid obesity with body mass index of 60.0-69.9 in adult (H)    Elevated BP without diagnosis of hypertension    Diabetes mellitus, type 2 (H)    Bilateral carpal tunnel syndrome    Obstructive sleep apnea    Insomnia, unspecified type    Lactose intolerance    Abnormal finding on imaging of liver    Chronic  diarrhea    Carrier of hemochromatosis HFE gene mutation    Cryptosporidiasis (H)    Elevated ferritin    Ulnar neuropathy of both upper extremities    Trigger thumb of both thumbs    Lateral epicondylitis of both elbows        OBJECTIVE:     Vital signs noted and reviewed by Ld Diaz PA-C  /77 (BP Location: Left arm, Patient Position: Sitting, Cuff Size: Adult Large)   Pulse 85   Temp 97.8  F (36.6  C) (Tympanic)   Resp 16   Wt 114.5 kg (252 lb 8 oz)   LMP 01/29/2025 (Exact Date)   SpO2 96%   BMI 49.31 kg/m       PEFR:    Physical Exam  Vitals and nursing note reviewed.   Constitutional:       General: She is not in acute distress.     Appearance: She is well-developed. She is not ill-appearing, toxic-appearing or diaphoretic.   HENT:      Head: Normocephalic and atraumatic.      Right Ear: Tympanic membrane and external ear normal. No drainage, swelling or tenderness. Tympanic membrane is not perforated, erythematous, retracted or bulging.      Left Ear: Tympanic membrane and external ear normal. No drainage, swelling or tenderness. Tympanic membrane is not perforated, erythematous, retracted or bulging.      Nose: No mucosal edema, congestion or rhinorrhea.      Right Sinus: No maxillary sinus tenderness or frontal sinus tenderness.      Left Sinus: No maxillary sinus tenderness or frontal sinus tenderness.      Mouth/Throat:      Pharynx: No pharyngeal swelling, oropharyngeal exudate, posterior oropharyngeal erythema or uvula swelling.      Tonsils: No tonsillar abscesses.   Eyes:      Pupils: Pupils are equal, round, and reactive to light.   Neck:      Trachea: Trachea normal.   Cardiovascular:      Rate and Rhythm: Normal rate and regular rhythm.      Heart sounds: Normal heart sounds, S1 normal and S2 normal. No murmur heard.     No friction rub. No gallop.   Pulmonary:      Effort: Pulmonary effort is normal. No respiratory distress.      Breath sounds: Normal breath sounds. No decreased  breath sounds, wheezing, rhonchi or rales.   Abdominal:      General: Bowel sounds are normal. There is no distension.      Palpations: Abdomen is soft. Abdomen is not rigid. There is no mass.      Tenderness: There is no abdominal tenderness. There is no guarding or rebound.   Musculoskeletal:      Cervical back: Full passive range of motion without pain, normal range of motion and neck supple.   Lymphadenopathy:      Cervical: No cervical adenopathy.   Skin:     General: Skin is warm and dry.   Neurological:      Mental Status: She is alert and oriented to person, place, and time.      Cranial Nerves: No cranial nerve deficit.      Deep Tendon Reflexes: Reflexes are normal and symmetric.   Psychiatric:         Behavior: Behavior normal. Behavior is cooperative.         Thought Content: Thought content normal.         Judgment: Judgment normal.             Ld Diaz PA-C  3/2/2025, 3:53 PM

## 2025-03-02 NOTE — NURSING NOTE
Clinic Administered Medication Documentation        Patient was given Toradol. Prior to medication administration, verified patient's identity using patient s name and date of birth. Please see MAR and medication order for additional information. Patient instructed to report any adverse reaction to staff immediately.    Vial/Syringe: Single dose vial. Was entire vial of medication used? Yes    Elías Demarco, CMA

## 2025-03-03 NOTE — PROGRESS NOTES
Adult Endocrinology Initial Consultation    Patient: Anh Flores MRN# [id number]   YOB: 1995 Age: 30year 1month old   Date of Visit: Feb 21, 2025    Dear  Referred Self:    I had the pleasure of seeing your patient, Anh Flores in the Pediatric Endocrinology Clinic, [facility], on Feb 21, 2025 for a consultation regarding Non-classical CAH .           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     Priority: Medium    Abnormal finding on imaging of liver 10/18/2024     Priority: Medium    Chronic diarrhea 10/18/2024     Priority: Medium    Lactose intolerance 09/10/2024     Priority: Medium    Obstructive sleep apnea 07/05/2024     Priority: Medium    Insomnia, unspecified type 07/05/2024     Priority: Medium    Bilateral carpal tunnel syndrome 06/11/2024     Priority: Medium    Diabetes mellitus, type 2 (H) 10/19/2020     Priority: Medium    Hidradenitis suppurativa 01/23/2019     Priority: Medium    Morbid obesity with body mass index of 60.0-69.9 in adult (H) 01/23/2019     Priority: Medium    Elevated BP without diagnosis of hypertension 01/23/2019     Priority: Medium    Morbid obesity (H) 10/17/2014     Priority: Medium    Chronic headaches 10/13/2014     Priority: Medium    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 Brewer; next follow up 1.2011.    Metformin increased to 850 mg twice a day.      Chronic abdominal pain 02/09/2010     Priority: Medium  "   Vitamin D deficiency 02/09/2010     Priority: Medium            HPI:   Anh Flores is a 30-year- 1 month old woman with non-classic congenital adrenal hyperplasia (CAH). 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 Anh has not regularly followed up with a medical provider for many years and presented with multiple health concerns she is currently addressing:    Endocrinology:  Anh reports that she has not been on medication or followed up with an adult endocrinologist since her last visit with Dr. Brewer. She describes feeling \"sick\" for years, with severe fatigue that forces her to rest or nap as early as 10 AM after minimal activity. She recalls that earlier last year, she was prescribed prednisone for carpal tunnel syndrome, which provided the best symptom relief she had experienced in years.    She also reports a history of irregular menstrual cycles since menarche at 12 years old. She is currently under the care of Dr. Gutierrez, who prescribed birth control pills to regulate her cycles. While they have helped, she now experiences menstruation twice a month. A transvaginal ultrasound was recently performed to evaluate potential causes such as polycystic ovary syndrome (PCOS). Anh also mentions that she never felt she fully went through puberty, with notable breast development occurring only recently, likely due to the birth control pills.    Additionally, her type 2 diabetes was previously uncontrolled, with an A1C of 9.0%. However, she has recently reduced it to 5.2% with the use of Wegovy, alongside a noted 30 lb weight loss.    Cardiac/Pulmonary:  Since being diagnosed with cryptosporidiosis in 2023, Anh has experienced chronic chest pain and shortness of breath. She is currently under evaluation by a specialist at [place], who has ordered an echocardiogram, EKG, chest CT, and X-rays to " investigate the etiology of these symptoms.    Gastrointestinal:  Following her cryptosporidiosis diagnosis, Anh continues to experience persistent watery diarrhea daily, despite undergoing evaluation by a GI specialist at [facility]. An endoscopy and colonoscopy reportedly revealed inflammation in both the stomach and colon. Despite multiple medication trials, she has not found relief.    Neurology & ENT:  Anh has suffered from chronic migraines since her cryptosporidiosis diagnosis, with daily occurrences and associated auras. She also experiences ocular headaches, which she believes have worsened since starting birth control pills. Additionally, she reports frequent sinus infections that contribute to pressure-like pain around her forehead.    Primary Concern:  Anh's main concern for today's visit is  her persistent and debilitating fatigue, which significantly limits her productivity and daily functioning.        I have reviewed the available past laboratory evaluations, imaging studies, and medical records available to me at this visit. I have reviewed the Anh's growth chart.    History was obtained from patient.     Birth History:   unknown            Past Medical History:     Past Medical History:   Diagnosis Date    CAH (congenital adrenal hyperplasia) 2/9/2010    Followed by Dr. Brewer; next follow up 1.2011.  Metformin increased to 850 mg twice a day.     Diabetes mellitus, type 2 (H) 10/19/2020    Vitamin D deficiency 2/9/2010            Past Surgical History:     Past Surgical History:   Procedure Laterality Date    CHOLECYSTECTOMY      She was in 8th grade     COLONOSCOPY N/A 9/16/2024    Procedure: COLONOSCOPY, WITH BIOPSY;  Surgeon: Bacilio Yancey MD;  Location: [facility]    ESOPHAGOSCOPY, GASTROSCOPY, DUODENOSCOPY (EGD), COMBINED N/A 9/16/2024    Procedure: ESOPHAGOGASTRODUODENOSCOPY, WITH BIOPSY;  Surgeon: Bacilio Yancey MD;  Location:  GI               Social  History:     Social History     Social History Narrative    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 She has two brothers, one of whom has type 2 diabetes. She has two sisters, one of whom has developmental delay/intellectual disability     Family History   Problem Relation Age of Onset    Neurologic Disorder Sister 16        migraines    Cerebrovascular Disease No family hx of     Alzheimer Disease No family hx of     Glaucoma No family hx of     Macular Degeneration No family hx of        History of:  Adrenal insufficiency: none.  Autoimmune disease: none.  Calcium problems: none.  Delayed puberty: none.  Diabetes mellitus: none.  Early puberty: none.  Genetic disease: none.  Short stature: none.  Thyroid disease: none.         Allergies:     Allergies   Allergen Reactions    Adhesive Tape Itching    Emgality [Galcanezumab-Gnlm] Itching     Itchiness, bumps    Other Environmental Allergy     Victoza [Liraglutide] Headache, Hives and Itching             Medications:     Current Outpatient Medications   Medication Sig Dispense Refill    Atogepant (QULIPTA) 60 MG TABS Take 60 mg by mouth.      atorvastatin (LIPITOR) 10 MG tablet Take 1 tablet (10 mg) by mouth daily. 90 tablet 2    Continuous Glucose Sensor (FREESTYLE AYSE 3 PLUS SENSOR) MISC Use 1 sensor every 15 days. Use to read blood sugars per 's instructions. 6 each 3    Continuous Glucose Sensor (FREESTYLE AYSE 3 SENSOR) [facility] 1 each by Other route every 14 days. 6 each 3    fluticasone (FLONASE) 50 MCG/ACT nasal spray Spray 1 spray into both nostrils daily 16 g 3    gabapentin (NEURONTIN) 300 MG capsule Take 1 capsule (300 mg) by mouth 3 times daily. Take 300 mg at 9:30 am, 600 mg at 9:30 pm 270 capsule 0    metFORMIN (GLUCOPHAGE XR) 500 MG 24 hr tablet Take 4 tablets (2,000 mg) by mouth  daily (with dinner). 360 tablet 3    mometasone (NASONEX) 50 MCG/ACT nasal spray Spray 2 sprays into both nostrils daily. 17 g 3    norgestimate-ethinyl estradiol (ORTHO-CYCLEN) 0.25-35 MG-MCG tablet Take 1 tablet by mouth daily 84 tablet 3    ondansetron (ZOFRAN ODT) 4 MG ODT tab DISSOLVE ONE TABLET ON TONGUE EVERY 8 HOURS AS NEEDED FOR NAUSEA 30 tablet 1    pantoprazole (PROTONIX) 40 MG EC tablet Take 1 tablet (40 mg) by mouth daily. 90 tablet 2    rimegepant (NURTEC) 75 MG ODT tablet Place 75 mg under the tongue daily as needed for migraine.      semaglutide (OZEMPIC) 2 MG/3ML pen Inject 0.5 mg subcutaneously every 7 days. 9 mL 3    SUMAtriptan (IMITREX) 50 MG tablet Take 2 tablets (100 mg) by mouth at onset of headache for migraine. May repeat in 2 hours. Max 4 tablets/24 hours.      topiramate (TOPAMAX) 25 MG tablet TAKE TWO TABLETS BY MOUTH EVERY NIGHT AT BEDTIME X 7 DAYS, THEN ONE IN THE AM AND TWO EVERY NIGHT AT BEDTIME X 7 DYAS, THEN TWO TWICE DAILY      UBRELVY 100 MG tablet Take 100 mg by mouth at onset of headache.      alcohol swab prep pads Use to swab area of injection/anthony as directed. 100 each 3    blood glucose (NO BRAND SPECIFIED) test strip Use to test blood sugar three times daily or as directed. Preferred blood glucose meter OR supplies to accompany: Blood Glucose Monitor Brands: per insurance. 100 strip 6    blood glucose monitoring (NO BRAND SPECIFIED) meter device kit Use to test blood sugar three times daily or as directed. Preferred blood glucose meter OR supplies to accompany: Blood Glucose Monitor Brands: per insurance. 1 kit 0    gabapentin (NEURONTIN) 300 MG capsule Take 1 capsule (300 mg) by mouth 2 times daily. 180 capsule 0    insulin pen needle (ULTICARE MICRO) 32G X 4 MM miscellaneous Use 1 pen needles daily or as directed. 100 each 3    thin (NO BRAND SPECIFIED) lancets Use with lanceting device. To accompany: Blood Glucose Monitor Brands: per insurance. 100 each 6              "Review of Systems:   Gen: Negative  Eye: Negative  ENT: Negative  Pulmonary:  Negative  Cardio: Negative  Gastrointestinal: Negative  Hematologic: Negative  Genitourinary: Negative  Musculoskeletal: Negative  Psychiatric: Negative  Neurologic: Negative  Skin: Negative  Endocrine: see HPI.            Physical Exam:   Blood pressure (!) 153/91, pulse 101, height 1.524 m (5'), weight 115.7 kg (255 lb), not currently breastfeeding.  Growth %ile SmartLinks can only be used for patients less than 20 years old.  Height: 152.4 cm  (60\") Facility age limit for growth %marci is 20 years.  Weight: 115.7 kg (actual weight), Facility age limit for growth %marci is 20 years.  BMI: Body mass index is 49.8 kg/m . Facility age limit for growth %marci is 20 years.      Constitutional: awake, alert, cooperative, no apparent distress  Eyes: Lids and lashes normal, sclera clear, conjunctiva normal  ENT: Normocephalic, without obvious abnormality, external ears without lesions,   Face:  Evidence of terminal hair growth on the upper lip and chin, with stubble present despite recent shaving.  Neck: Supple, symmetrical, trachea midline, thyroid symmetric, not enlarged and no tenderness, slight hyperpigmentation  Hematologic / Lymphatic: no cervical lymphadenopathy  Lungs: No increased work of breathing, clear to auscultation bilaterally with good air entry.  Cardiovascular: Regular rate and rhythm, no murmurs.  Abdomen: No scars, normal bowel sounds, soft, non-distended, non-tender, no masses palpated, no hepatosplenomegaly  Genitourinary:  Breasts: deferred  Genitalia deferred  Musculoskeletal: There is no redness, warmth, or swelling of the joints.    Neurologic: Awake, alert, oriented to name, place and time.  Neuropsychiatric: normal  Skin: no lesions        Laboratory results:   Baseline adrenal steroid profile on 11/2024 was normal.         Assessment and Plan:   Anh is a 30-year-old female with multiple health concerns presenting " for an evaluation of possible untreated non-classical congenital adrenal hyperplasia (CAH) as a potential cause of severe fatigue.    Topics Discussed at Today's Visit:  We reviewed Anh s 11/2024 adrenal steroid profile lab results, including  cortisol , 17- hydroxyprogesterone and adrenal androgens, which revealed normal adrenal cortisol production and non elevated adrenal androgen. Given these findings, it is unlikely that her fatigue is due to CAH. Instead, contributing factors may include her ongoing health conditions, depression, or anxiety. We discussed the potential benefits of speaking with a psychologist, but the patient was not receptive to this suggestion.  Plan:  Given the normal cortisol, androgen, and ACTH levels,  Anh does not need does not need cortisol replacement therapy for her her non-classical CAH. Routine follow-up for CAH is not necessary unless new concerns arise.    Recommend that Anh discuss her migraines with aura at her next appointment with Dr. Gutierrez, as this may contraindicate the use of her current birth control pills.    KAREN Larson under supervision of Dr. Brewer     Patient  was seen in the [facility] by me, Lizeth Brewer and KAREN Larson. I reviewed, edited and augmented the history & repeated all key aspects of the physical exam.  I agree with her findings and plan of care as documented in her note.     I have reviewed patient's past medical history, family history, social history, medications and allergies as documented in the electronic medical record.  There were no additional findings except as noted.     I spent 70 minutes of total time, before, during, and after the visit reviewing and interpreting previous labs and records, examining the patient, formulating and discussing the plan of care, ordering  Labs, reviewing resulted labs, and documenting clinical information in the  electronic health record.    It is our pleasure to be involved in Anh Flores care. If you or the family has questions or concerns regarding these test results, please feel free to contact us via our Access Center at [phone].      Sincerely,    Lizeth Brewer MD  Professor   Dept. of Pediatrics - Divisions of Endocrinology and Genetics & Metabolism  Dept. of Experimental & Clinical Pharmacology  [address], [facility], [place] [address]  Ph: [phone]  Email: [email or web address]    CC  SELF, REFERRED    Copy to patient  GENOVEVA FLORES   [address] [place] [id number]                 CC  Patient Care Team:  Maria Guadalupe Triana MD as PCP - General (Internal Medicine)  Erick Smith OD as MD (Optometry)  Amaya Salcedo MD as MD (Neurology)  Bita Veronica MD as Assigned PCP  Alejandra Cronin MD as Assigned Endocrinology Provider  Bob Reyes MD as Assigned Surgical Provider  Cheyenne Overton MD as MD (Gastroenterology)  Leanne Fallon PA-C as Physician Assistant (Dermatology)  Alejandra Cronin MD as MD (Endocrinology, Diabetes, and Metabolism)  Canelo Herrera MD as MD (Dermatology)  Friedell, Peter E, MD as MD (Internal Medicine-Hematology & Oncology)  Humza Pavon MD as MD (Family Medicine)  Feliciano Christie MD as MD (Hematology & Oncology)  Jami Gutierrez MD as Assigned OBGYN Provider  Cheyenne Overton MD as Assigned Gastroenterology Provider  Friedell, Peter E, MD as Assigned Cancer Care Provider  Grzegorz Alcantar MD as MD (Cardiovascular Disease)  Amaya Barfield PA-C as Assigned Musculoskeletal Provider  SELF, REFERRED    Copy to patient  GENOVEVA FLORES   [address] [facility] [id number]

## 2025-03-05 NOTE — PROGRESS NOTES
30-yo P0 here for followup and ongoing care of non-classic congenital adrenal hyperplasia (CAH).  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    Personal History:  Anh was diagnosed with CAH around age 5 or 6.  She believes this diagnosis was made after she showed signs of early puberty.  Menarche was reportedly at age 12 or 13.  She has a history of significant hirsutism and reports a longstanding history of irregular periods.  They have gotten more regular since beginning oral contraceptives in April 2024.  She also noted an improvement in hirsutism as well as breast growth while on oral contraceptives.  Anh reports a lifelong history of frequent illnesses, significant fatigue, and chronic diarrhea.  She is currently undergoing a work-up for possible granulomatous disease and is seeing a GI provider next week.  Anh has type 2 diabetes, and has had recent weight loss with the aid of semaglutide.  Anh also experiences migraines with aura.  We did briefly discuss that migraine with aura may be a contraindication to use of oral contraceptives and I encouraged her to ensure the prescribing provider is aware of her history.      Today she desires discussion on her hirsutism which is persistent and unrelenting. She cannot afford laser therapy.   Also, she is newly diagnosed with migraines with aura and was advised to discuss OCPs with gynecology. Today we reviewed the increased risks of CVA with estrogen containing compounds in this setting. We discussed alternatives. She started OCPs due to endometrial thickening and irregular heavy menses many years ago.  She is not planning pregnancy at this time.   She presents with her mother today who helps with history and decision making.     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), late onset     Chronic abdominal pain    Vitamin D deficiency    Chronic headaches    Morbid obesity (H)    Hidradenitis suppurativa    Morbid obesity with body mass index of 60.0-69.9 in adult (H)    Elevated BP without diagnosis of hypertension    Diabetes mellitus, type 2 (H)    Bilateral carpal tunnel syndrome    Obstructive sleep apnea    Insomnia, unspecified type    Lactose intolerance    Abnormal finding on imaging of liver    Chronic diarrhea    Carrier of hemochromatosis HFE gene mutation    Cryptosporidiasis (H)    Elevated ferritin    Ulnar neuropathy of both upper extremities    Trigger thumb of both thumbs    Lateral epicondylitis of both elbows     Past Medical History:   Diagnosis Date    CAH (congenital adrenal hyperplasia) 2/9/2010    Followed by Dr. Brewer; next follow up 1.2011.  Metformin increased to 850 mg twice a day.     Diabetes mellitus, type 2 (H) 10/19/2020    Vitamin D deficiency 2/9/2010     Past Surgical History:   Procedure Laterality Date    CHOLECYSTECTOMY      She was in 8th grade     COLONOSCOPY N/A 9/16/2024    Procedure: COLONOSCOPY, WITH BIOPSY;  Surgeon: Bacilio Yancey MD;  Location:  GI    ESOPHAGOSCOPY, GASTROSCOPY, DUODENOSCOPY (EGD), COMBINED N/A 9/16/2024    Procedure: ESOPHAGOGASTRODUODENOSCOPY, WITH BIOPSY;  Surgeon: Bacilio Yancey MD;  Location:  GI     OB History   No obstetric history on file.     /89   Pulse 83   Ht 1.524 m (5')   Wt 115.1 kg (253 lb 12.8 oz)   LMP 01/29/2025 (Exact Date)   BMI 49.57 kg/m    Appears well  Evidence of shaving of thick dark hair on chin and neck.   No acanthosis nigricans of neck  Hirsutism evident on chest and back.     A/P:  CAH - non classic, not on steroids, normal labs  New dx migraine with aura: discontinue combination OCP and start POP  Hirsutism: re-start spironolactone -- advised to stop this in advance of pregnancy planning    All questions answered.     Jami Gutierrez MD

## 2025-03-06 NOTE — PROGRESS NOTES
"PM&R Follow-Up Visit -     Date of Initial Visit: 2/17/2025  LOV: 2/17/2025  TD: 3/11/2025     Recall: Anh Flores is a 30 year old female ***    INTERVAL HISTORY:  Patient was last seen in clinic 2/17/2025. At that visit, the plan was to ***  PLAN:  -X-ray of the pelvis 12/30/2024 showed mild bilateral OA  -Add x-ray of the sacrum and coccyx.  Add scoliosis view x-rays for initial survey  -Given her inability to do physical therapy because pain is exacerbated with the activity, add MRIs of the cervical, thoracic, and lumbar spine  -Once her pain is better controlled will refer to PT again to establish home exercise program  -No medication changes made today  -Follows with orthopedics for bilateral carpal tunnel, bilateral ulnar neuropathy, bilateral trigger thumbs, bilateral tennis elbow  -Follows with neurology regarding chronic migraine  -RTC for review of the imaging    Imaging is scheduled for 3/10            Coccyx injection  Difficulty sitting  Slippeddown carpeted stairs  Hard to sit down or lay flat    Bilateral US changes   Possible LUE surgery    Pain after slip and fall in thoracic spine    Us issue with       Since last time, ***          At the last visit she described:  -neck, thoracic, and bilateral low back pain  -Intermittent \"shooting\" pain to both arms and legs in a nondermatomal distribution  -Intermittent episodes of her mid to low back feeling \"numb\" or itchy\"  -Subjective cold sensation of bilateral hands up to elbows & bilateral feet to the knees  -Numbness/tingling affecting all 10 fingers  -Chronic migraines, follows with [facility]   -Carpal tunnel, current OT    Today, she reports ongoing pain and paresthesias as described above.    Since last time, she had a follow-up with orthopedics and an ultrasound was done showing bilateral***.  Additionally, she says she is considering surgical procedure ***.    She continues to have neck pain and migraines.  She has " "appointment with the [facility] regarding her migraine regimen tomorrow.  She has been using Ajovy, but has not found it helpful.  She continues to have daily migraines.  She was seen in urgent care due to uncontrolled migraine pain and received a Toradol injection.  She also recently prior changed her birth control in an effort to decrease migraines.  She says that her neck pain is somewhat reduced from last time.    She was not able to previously participate with PT due to aggravated/worsening pain and migraine.    Today, she states that her most bothersome area of pain is at the low back specifically at the tailbone area.  Tailbone started when she landed on her buttocks after a fall down carpeted stairs 3 years ago.  She has pain with riding in the car.  When she is sitting, she leans to 1 side to offload the area.  She has pain with walking.  Given the ongoing significant tailbone pain, she would like to pursue an interventional procedure for pain.      She does endorse ongoing thoracic level pain just below the bra line.  She states that this happened after she slipped and fell over a year ago.                  RECALL HISTORY OF PRESENT ILLNESS: 2/17/2025 Anh Flores is a 30 year old right-hand-dominant female who presents with a chief complaint of chronic pain.      She was seen today in the clinic. She describes chronic years long pain affecting her whole back.  She also reports \"tailbone pain\" which started 3 years ago after a slip and fall.    She follows with the [facility] regarding migraines.  She did physical therapy for migraines.  She said the therapist also tried to help her with back pain.  However, the PT exercises made the back pain worse and so she stopped doing PT.      Today, she describes:  -neck, thoracic, and bilateral low back pain  -Intermittent \"shooting\" pain to both arms and legs in a nondermatomal distribution  -Intermittent " "episodes of her mid to low back feeling \"numb\" or itchy\"  -Subjective cold sensation of bilateral hands up to elbows & bilateral feet to the knees  -Numbness/tingling affecting all 10 fingers    Aggravating factors include: Lying down, prolonged sitting, riding in a car for 20 minutes or more, traveling sitting in an airplane seat, sitting on the toilet can bring on numbness in both legs  Relieving factors include: Standing, exercise, medication      Today, she rates the pain 8/10.    She endorses  problems.  She is currently seeing OT regarding carpal tunnel.  She endorses tennis elbow.    She says her arms and legs can feel \"heavy\" at times.    She has a history of chronic diarrhea.  She sees gastroenterology in this regard.  She endorses urinary and bowel urgency but no overt loss of bowel or bladder control. She denies saddle anesthesia.    She reports low-grade temperatures of 102 degrees 2-3 days a week with off-and-on fevers and chills.    She states that in 2023 she lost 75 pounds.  She thought this occurred prior to starting Ozempic.          PRIOR INJURIES/TREATMENT:   Ice/Heat: uses a heated blanket  Brace: none  Physical Therapy:   PT for neck pain in 2024, but stopped the program due to worsening back pain       - Current Pain Medications -   Gabapentin 300 mg BID-TID  Sumatriptan PRN for migraine  Ubrelvy PRN for migraine  Topiramate for migraine  Ajovy injections for migraine    - Prior/Trialed Pain Medications -   Emgality  Zoloft for migraine, overly sedating  Meloxicam -per pt was discontinued by her PCP due to liver issues   Ibuprofen  Tylenol  Advil  Motrin      Prior Procedures:  Date    Procedure   Improvement (%)  none              Prior Related Surgery: none   Other (acupuncture, OMT, CMM, TENS, DME, etc.): none    Specialists Seen - (with most recent, available notes and clinic visits reviewed)   1. Sports medicine  2. Orthopedics - bilateral carpal tunnel  3. [facility] - migraine  4.  [facility] pain management clinic-2007, record not available for review today      IMAGING - reviewed   XR PELVIS G/E 3 VIEWS  LOCATION: [facility]  DATE: 12/30/2024     INDICATION:  Chronic bilateral low back pain without sciatica, Chronic bilateral low back pain without sciatica  COMPARISON: CT abdomen pelvis 9/3/2024       IMPRESSION: Mild degenerative arthrosis of both hips. No definite fracture. SI joint spaces are preserved. No discrete osseous erosions or periarticular sclerosis.     EMG 3/13/2024  Interpretation:  This is an abnormal EMG.  Findings are consistent with bilateral median neuropathies at the wrist.  The left side would be considered mild in severity and the right side would be considered moderate in severity.  Clinically this can correlate with carpal tunnel syndrome.     Review Of Systems:  I am responding to those symptoms which are directly relevant to the specific indication for my consultation. I recommend that the patient follow up with their primary or referring provider to pursue any other symptoms which may be of concern.       Medical History:  She  has a past medical history of CAH (congenital adrenal hyperplasia) (2/9/2010), Diabetes mellitus, type 2 (H) (10/19/2020), and Vitamin D deficiency (2/9/2010).     She  has a past surgical history that includes Cholecystectomy; Esophagoscopy, gastroscopy, duodenoscopy (EGD), combined (N/A, 9/16/2024); and Colonoscopy (N/A, 9/16/2024).    Family History  Her family history includes Neurologic Disorder (age of onset: 16) in her sister.     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 Performs ADLs/IADLs independently.  She  reports that she has never smoked. She has never been exposed to tobacco smoke. She has never used smokeless tobacco.  She reports that she does not drink alcohol and does not use drugs.        Current Medications:   She has a current medication list which includes the following prescription(s): alcohol swab, qulipta, atorvastatin, blood glucose, blood glucose monitoring, freestyle jada 3 plus sensor, freestyle jada 3 sensor, fluticasone, gabapentin, gabapentin, ulticare micro, metformin, mometasone, norgestimate-ethinyl estradiol, ondansetron, pantoprazole, rimegepant, semaglutide, sumatriptan, thin, topiramate, and ubrelvy.     Allergies:    -- Adhesive Tape -- Itching   -- Emgality [Galcanezumab-Gnlm] -- Itching    --  Itchiness, bumps   -- Other Environmental Allergy    -- Victoza [Liraglutide] -- Headache, Hives and Itching    PHYSICAL EXAMINATION:  /75 (BP Location: Right arm, Patient Position: Sitting, Cuff Size: Adult Regular)   Pulse 75   LMP 02/26/2025 (Exact Date)   SpO2 99%    PHYSICAL EXAMINATION:  --CONSTITUTIONAL: Vital signs as above. No acute distress. The patient is well nourished and well groomed.  --PSYCHIATRIC: The patient is awake, alert, oriented to person, place, time and answering questions appropriately with clear speech. Appropriate mood and affect   --HEENT: Sclera are non-injected. Extraocular muscles are intact. Moist oral mucosa.  --SKIN: Skin over the face, bilateral upper extremities, bilateral lower extremities, and posterior torso is clean, dry, intact without rashes.  --RESPIRATORY: Normal rhythm and effort. No abnormal accessory muscle breathing patterns noted.   --GROSS MOTOR: Easily arises from a seated position. Toe walking and heel walking are normal.    --CERVICAL /THORACIC SPINE: Inspection reveals no evidence of deformity. Range of motion is not limited in cervical flexion, extension, lateral rotation but elicits pain. +tenderness to palpation lower cervical & mid thoracic spine.  Spurling maneuver negative bilaterally.  --STANDING EXAMINATION: Gait is non-antalgic. Normal  "lumbar lordosis noted, no lateral shift.  --UPPER EXTREMITY MOTOR TESTING:  Wrist flexion left 5/5, right 5/5  Wrist extension left 5/5, right 5/5  Biceps left 5/5, right 5/5   Triceps left 5/5, right 5/5   Shoulder abduction left 5/5, right 5/5   left 5/5, right 5/5  --LOWER EXTREMITY MOTOR TESTING:  Plantar flexion left 5/5, right 5/5   Dorsiflexion left 5/5, right 5/5   Great toe MTP extension left 5/5, right 5/5  Knee flexion left 5/5, right 5/5  Knee extension left 5/5, right 5/5   Hip flexion left 5/5, right 5/5  --MUSCULOSKELETAL: Lumbar spine inspection reveals no evidence of deformity. Range of motion is not limited in lumbar flexion, extension, lateral rotation but elicits pain. +tenderness to palpation lumbar spine. SLR negative bilat. Facet loading + bilat.  --SACROILIAC JOINT: David +. Pain with palpation of SI joints + Distraction neg. Fabers pos bilat.  Gaenslens pos. Sacroiliac Joint Compression Test neg. Sacral Thrust/Yeoman's Test neg.  --HIPS: Full range of motion bilaterally. Fabers pos bilat.  --NEUROLOGIC: CN III-XII are grossly intact.   2/4 symmetric biceps & brachioradialis reflexes bilaterally. Sensation to upper extremities is intact.  Negative Quintana's bilaterally.    2/4 patellar and achilles reflexes bilaterally.  Sensation to light touch is intact in the bilateral L4, L5, and S1 dermatomes. No clonus.    --VASCULAR:  Warm upper and lower limbs bilaterally.      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 Recommend healthy lifestyle modifications through diet and exercise.   # Type 2 DM with Hgb A1C 5.6% on 7/30/2024  # Migraine  # Bilateral carpal tunnel  # chronic resp infections/ cryptosporidium infection  # :Left thumb trigger thumb steroid injection       PLAN:  -We had a detailed conversation about the recent imaging including: x-rays of the pelvis and hips, spine and MRIs of the spine  -She is following with orthopedics for bilateral carpal tunnel, bilateral ulnar neuropathy, bilateral trigger thumbs, bilateral tennis elbowand plans for possible ***surgery.  Recent ultrasound showed ***  -Follows with neurology regarding chronic migraine and has an appointment there tomorrow  -Unclear etiology of the numbness/tingling affecting the lower extremities, possibly due to neuropathy.  Will add EMG of the bilateral lower extremities  -Consider orthopedics/sports medicine referral in regards to the chronic hip pain  -Could consider thoracic level IL JHON  -The tailbone pain is most bothersome for her.  Add sacrococcygeal ligament steroid injection  -RTC for procedure      Ready to learn, no apparent learning barriers.  Education provided on treatment plan according to patient's preferred learning style.  Patient verbalizes understanding.   __________________________________  Edyta Corley NP  Physical Medicine & Rehabilitation        *** minutes spent by me on the date of the encounter doing chart review, history and exam, documentation and further activities per the note

## 2025-03-07 ENCOUNTER — ANCILLARY PROCEDURE (OUTPATIENT)
Dept: ULTRASOUND IMAGING | Facility: CLINIC | Age: 30
End: 2025-03-07
Attending: PHYSICIAN ASSISTANT
Payer: COMMERCIAL

## 2025-03-07 ENCOUNTER — LAB (OUTPATIENT)
Dept: LAB | Facility: CLINIC | Age: 30
End: 2025-03-07
Payer: COMMERCIAL

## 2025-03-07 DIAGNOSIS — G56.23 ULNAR NEUROPATHY OF BOTH UPPER EXTREMITIES: ICD-10-CM

## 2025-03-07 DIAGNOSIS — E11.65 TYPE 2 DIABETES MELLITUS WITH HYPERGLYCEMIA, WITHOUT LONG-TERM CURRENT USE OF INSULIN (H): ICD-10-CM

## 2025-03-07 LAB
ALBUMIN SERPL BCG-MCNC: 4.4 G/DL (ref 3.5–5.2)
ALP SERPL-CCNC: 53 U/L (ref 40–150)
ALT SERPL W P-5'-P-CCNC: 20 U/L (ref 0–50)
ANION GAP SERPL CALCULATED.3IONS-SCNC: 12 MMOL/L (ref 7–15)
AST SERPL W P-5'-P-CCNC: 18 U/L (ref 0–45)
BILIRUB SERPL-MCNC: 0.2 MG/DL
BUN SERPL-MCNC: 9.4 MG/DL (ref 6–20)
CALCIUM SERPL-MCNC: 9.8 MG/DL (ref 8.8–10.4)
CHLORIDE SERPL-SCNC: 104 MMOL/L (ref 98–107)
CHOLEST SERPL-MCNC: 137 MG/DL
CREAT SERPL-MCNC: 0.81 MG/DL (ref 0.51–0.95)
CREAT UR-MCNC: 89.1 MG/DL
EGFRCR SERPLBLD CKD-EPI 2021: >90 ML/MIN/1.73M2
EST. AVERAGE GLUCOSE BLD GHB EST-MCNC: 108 MG/DL
FASTING STATUS PATIENT QL REPORTED: YES
FASTING STATUS PATIENT QL REPORTED: YES
GLUCOSE SERPL-MCNC: 80 MG/DL (ref 70–99)
HBA1C MFR BLD: 5.4 %
HCO3 SERPL-SCNC: 22 MMOL/L (ref 22–29)
HCT VFR BLD AUTO: 42.2 % (ref 35–47)
HDLC SERPL-MCNC: 56 MG/DL
LDLC SERPL CALC-MCNC: 63 MG/DL
MICROALBUMIN UR-MCNC: <12 MG/L
MICROALBUMIN/CREAT UR: NORMAL MG/G{CREAT}
NONHDLC SERPL-MCNC: 81 MG/DL
POTASSIUM SERPL-SCNC: 4.2 MMOL/L (ref 3.4–5.3)
PROT SERPL-MCNC: 7.9 G/DL (ref 6.4–8.3)
SODIUM SERPL-SCNC: 138 MMOL/L (ref 135–145)
TRIGL SERPL-MCNC: 90 MG/DL

## 2025-03-07 PROCEDURE — 80053 COMPREHEN METABOLIC PANEL: CPT | Performed by: PATHOLOGY

## 2025-03-07 PROCEDURE — 82043 UR ALBUMIN QUANTITATIVE: CPT | Performed by: INTERNAL MEDICINE

## 2025-03-07 PROCEDURE — 99000 SPECIMEN HANDLING OFFICE-LAB: CPT | Performed by: PATHOLOGY

## 2025-03-07 PROCEDURE — 36415 COLL VENOUS BLD VENIPUNCTURE: CPT | Performed by: PATHOLOGY

## 2025-03-07 PROCEDURE — 83036 HEMOGLOBIN GLYCOSYLATED A1C: CPT | Performed by: INTERNAL MEDICINE

## 2025-03-07 PROCEDURE — 76881 US COMPL JOINT R-T W/IMG: CPT | Performed by: RADIOLOGY

## 2025-03-07 PROCEDURE — 85014 HEMATOCRIT: CPT | Performed by: PATHOLOGY

## 2025-03-07 PROCEDURE — 80061 LIPID PANEL: CPT | Performed by: PATHOLOGY

## 2025-03-10 ENCOUNTER — OFFICE VISIT (OUTPATIENT)
Dept: ORTHOPEDICS | Facility: CLINIC | Age: 30
End: 2025-03-10
Attending: PHYSICIAN ASSISTANT
Payer: COMMERCIAL

## 2025-03-10 ENCOUNTER — ANCILLARY PROCEDURE (OUTPATIENT)
Dept: GENERAL RADIOLOGY | Facility: CLINIC | Age: 30
End: 2025-03-10
Attending: NURSE PRACTITIONER
Payer: COMMERCIAL

## 2025-03-10 ENCOUNTER — ANCILLARY PROCEDURE (OUTPATIENT)
Dept: GENERAL RADIOLOGY | Facility: CLINIC | Age: 30
End: 2025-03-10
Attending: PHYSICIAN ASSISTANT
Payer: COMMERCIAL

## 2025-03-10 ENCOUNTER — PRE VISIT (OUTPATIENT)
Dept: CARDIOLOGY | Facility: CLINIC | Age: 30
End: 2025-03-10

## 2025-03-10 ENCOUNTER — OFFICE VISIT (OUTPATIENT)
Dept: CARDIOLOGY | Facility: CLINIC | Age: 30
End: 2025-03-10
Attending: INTERNAL MEDICINE
Payer: COMMERCIAL

## 2025-03-10 ENCOUNTER — ANCILLARY PROCEDURE (OUTPATIENT)
Dept: MRI IMAGING | Facility: CLINIC | Age: 30
End: 2025-03-10
Attending: NURSE PRACTITIONER
Payer: COMMERCIAL

## 2025-03-10 VITALS
WEIGHT: 252.2 LBS | DIASTOLIC BLOOD PRESSURE: 78 MMHG | OXYGEN SATURATION: 97 % | BODY MASS INDEX: 49.25 KG/M2 | HEART RATE: 90 BPM | SYSTOLIC BLOOD PRESSURE: 112 MMHG

## 2025-03-10 DIAGNOSIS — M79.642 BILATERAL HAND PAIN: Primary | ICD-10-CM

## 2025-03-10 DIAGNOSIS — E11.65 TYPE 2 DIABETES MELLITUS WITH HYPERGLYCEMIA, WITHOUT LONG-TERM CURRENT USE OF INSULIN (H): ICD-10-CM

## 2025-03-10 DIAGNOSIS — M54.50 CHRONIC BILATERAL LOW BACK PAIN, UNSPECIFIED WHETHER SCIATICA PRESENT: ICD-10-CM

## 2025-03-10 DIAGNOSIS — M54.2 NECK PAIN: ICD-10-CM

## 2025-03-10 DIAGNOSIS — M65.311 TRIGGER THUMB OF BOTH THUMBS: ICD-10-CM

## 2025-03-10 DIAGNOSIS — M65.4 TENOSYNOVITIS, DE QUERVAIN: ICD-10-CM

## 2025-03-10 DIAGNOSIS — M79.641 BILATERAL HAND PAIN: Primary | ICD-10-CM

## 2025-03-10 DIAGNOSIS — M54.9 UPPER BACK PAIN: ICD-10-CM

## 2025-03-10 DIAGNOSIS — M77.11 LATERAL EPICONDYLITIS OF BOTH ELBOWS: ICD-10-CM

## 2025-03-10 DIAGNOSIS — G56.23 ULNAR NEUROPATHY OF BOTH UPPER EXTREMITIES: ICD-10-CM

## 2025-03-10 DIAGNOSIS — M54.50 CHRONIC BILATERAL LOW BACK PAIN WITHOUT SCIATICA: ICD-10-CM

## 2025-03-10 DIAGNOSIS — R20.0 NUMBNESS AND TINGLING: ICD-10-CM

## 2025-03-10 DIAGNOSIS — G89.29 CHRONIC BILATERAL THORACIC BACK PAIN: ICD-10-CM

## 2025-03-10 DIAGNOSIS — G89.29 CHRONIC BILATERAL LOW BACK PAIN WITHOUT SCIATICA: ICD-10-CM

## 2025-03-10 DIAGNOSIS — R51.9 CHRONIC INTRACTABLE HEADACHE, UNSPECIFIED HEADACHE TYPE: ICD-10-CM

## 2025-03-10 DIAGNOSIS — R52 PAIN: ICD-10-CM

## 2025-03-10 DIAGNOSIS — G89.29 CHRONIC BILATERAL LOW BACK PAIN, UNSPECIFIED WHETHER SCIATICA PRESENT: ICD-10-CM

## 2025-03-10 DIAGNOSIS — M54.6 CHRONIC BILATERAL THORACIC BACK PAIN: ICD-10-CM

## 2025-03-10 DIAGNOSIS — M79.641 BILATERAL HAND PAIN: ICD-10-CM

## 2025-03-10 DIAGNOSIS — R20.2 NUMBNESS AND TINGLING: ICD-10-CM

## 2025-03-10 DIAGNOSIS — E66.01 MORBID OBESITY (H): ICD-10-CM

## 2025-03-10 DIAGNOSIS — G89.29 CHRONIC INTRACTABLE HEADACHE, UNSPECIFIED HEADACHE TYPE: ICD-10-CM

## 2025-03-10 DIAGNOSIS — M77.12 LATERAL EPICONDYLITIS OF BOTH ELBOWS: ICD-10-CM

## 2025-03-10 DIAGNOSIS — M25.552 BILATERAL HIP PAIN: ICD-10-CM

## 2025-03-10 DIAGNOSIS — M65.312 TRIGGER THUMB OF BOTH THUMBS: ICD-10-CM

## 2025-03-10 DIAGNOSIS — M79.642 BILATERAL HAND PAIN: ICD-10-CM

## 2025-03-10 DIAGNOSIS — M25.551 BILATERAL HIP PAIN: ICD-10-CM

## 2025-03-10 DIAGNOSIS — G56.03 BILATERAL CARPAL TUNNEL SYNDROME: ICD-10-CM

## 2025-03-10 DIAGNOSIS — R07.9 EXERTIONAL CHEST PAIN: Primary | ICD-10-CM

## 2025-03-10 PROCEDURE — 99214 OFFICE O/P EST MOD 30 MIN: CPT | Performed by: PHYSICIAN ASSISTANT

## 2025-03-10 PROCEDURE — 72148 MRI LUMBAR SPINE W/O DYE: CPT | Performed by: RADIOLOGY

## 2025-03-10 PROCEDURE — 72220 X-RAY EXAM SACRUM TAILBONE: CPT | Mod: GC | Performed by: RADIOLOGY

## 2025-03-10 PROCEDURE — 72141 MRI NECK SPINE W/O DYE: CPT | Performed by: RADIOLOGY

## 2025-03-10 PROCEDURE — 72082 X-RAY EXAM ENTIRE SPI 2/3 VW: CPT | Performed by: STUDENT IN AN ORGANIZED HEALTH CARE EDUCATION/TRAINING PROGRAM

## 2025-03-10 PROCEDURE — 73130 X-RAY EXAM OF HAND: CPT | Mod: LT | Performed by: RADIOLOGY

## 2025-03-10 PROCEDURE — G0463 HOSPITAL OUTPT CLINIC VISIT: HCPCS | Performed by: INTERNAL MEDICINE

## 2025-03-10 PROCEDURE — 72146 MRI CHEST SPINE W/O DYE: CPT | Performed by: RADIOLOGY

## 2025-03-10 ASSESSMENT — PAIN SCALES - GENERAL: PAINLEVEL_OUTOF10: MODERATE PAIN (6)

## 2025-03-10 NOTE — NURSING NOTE
Chief Complaint   Patient presents with    New Patient     reason for visit: referral from Maria Guadalupe Triana MD for exertional chest pain       Vitals were take, medications reconciled     Glynn Luis, Clinic Assistant     9:59 AM

## 2025-03-10 NOTE — PATIENT INSTRUCTIONS
Patient Instructions:  It was a pleasure to see you in the cardiology clinic today.      If you have any questions, call  Alvina Bejarano RN, at [phone].   [facility].  To schedule an appointment or to leave a message for your Care Team Press #1  If you are a physician calling for another physician Press #2  For Billing Press #3  For Medical Records Press #4  We are encouraging the use of Lexity to communicate with your HealthCare Provider    Note the new medications: none    Tests Ordered: CTa     Please follow up pending results of CTa      If you have an urgent need after hours (8:00 am to 4:30 pm) please call [phone] and ask for the cardiology fellow on call.

## 2025-03-10 NOTE — LETTER
3/10/2025      RE: Anh Flores  [address] [facility] [address]       Dear Colleague,    Thank you for the opportunity to participate in the care of your patient, Anh Flores, at the [facility] [place] [facility]. Please see a copy of my visit note below.    [facility]  CARDIOVASCULAR MEDICINE CLINIC NOTE    Referring Provider: Maria Guadalupe Triana   Primary Care Provider: Maria Guadalupe Triana     Patient Name: Anh Flores   MRN: [id number]     PERTINENT CLINICAL HISTORY:   Anh Flores is a 30 year old woman w/ h/o non-classical congenital adrenal hyperplasia, DM2, and family history of premature CAD.  She is referred for evaluation of nearly 1 year of nearly continuous chest pain.  She notes severe substernal and L sided chest pain and pressure.  The pain worsens with exertion and improves with rest.  It is associated with dizziness in some circumstances.  It is reproducible with chest pressure from a seat belt or clothing.  There has been no rash or skin findings.  The pain is not related to body position or diet.      She had a 24 hour holter monitor on 2/17/2025 showing no arrhythmia though there were isolated supraventricular ectopic beats.  There were 4 events noted in the diary at that time.  TTE performed 1/29/2025 showed normal biventricular function without valvular pathology.    Ms. Flores does have frequent migraines with aura which are severe in symptoms.  She uses sumatriptan as rescue therapy.  She has also had b/l upper and lower extremity numbness and tingling noted and addressed by her physician team with concern for diabetic neuropathy though no clear diagnoses have yet been made.    I have personally reviewed outside notes from endocrinology and primary care.     PAST MEDICAL HISTORY:     Past Medical History:   Diagnosis Date     CAH (congenital adrenal hyperplasia) 2/9/2010    Followed by   Daniella; next follow up 1.2011.  Metformin increased to 850 mg twice a day.      Diabetes mellitus, type 2 (H) 10/19/2020     Vitamin D deficiency 2/9/2010        PAST SURGICAL HISTORY:     Past Surgical History:   Procedure Laterality Date     CHOLECYSTECTOMY      She was in 8th grade      COLONOSCOPY N/A 9/16/2024    Procedure: COLONOSCOPY, WITH BIOPSY;  Surgeon: Bacilio Yancey MD;  Location:  GI     ESOPHAGOSCOPY, GASTROSCOPY, DUODENOSCOPY (EGD), COMBINED N/A 9/16/2024    Procedure: ESOPHAGOGASTRODUODENOSCOPY, WITH BIOPSY;  Surgeon: Bacilio Yancey MD;  Location:  GI        CURRENT MEDICATIONS:     Current Outpatient Medications   Medication Sig Dispense Refill     alcohol swab prep pads Use to swab area of injection/anthony as directed. 100 each 3     Atogepant (QULIPTA) 60 MG TABS Take 60 mg by mouth.       atorvastatin (LIPITOR) 10 MG tablet Take 1 tablet (10 mg) by mouth daily. 90 tablet 2     blood glucose (NO BRAND SPECIFIED) test strip Use to test blood sugar three times daily or as directed. Preferred blood glucose meter OR supplies to accompany: Blood Glucose Monitor Brands: per insurance. 100 strip 6     blood glucose monitoring (NO BRAND SPECIFIED) meter device kit Use to test blood sugar three times daily or as directed. Preferred blood glucose meter OR supplies to accompany: Blood Glucose Monitor Brands: per insurance. 1 kit 0     Continuous Glucose Sensor (FREESTYLE AYSE 3 PLUS SENSOR) MISC Use 1 sensor every 15 days. Use to read blood sugars per 's instructions. 6 each 3     Continuous Glucose Sensor (FREESTYLE AYSE 3 SENSOR) [facility] [place] 1 each by Other route every 14 days. 6 each 3     fluticasone (FLONASE) 50 MCG/ACT nasal spray Spray 1 spray into both nostrils daily 16 g 3     gabapentin (NEURONTIN) 300 MG capsule Take 1 capsule (300 mg) by mouth 3 times daily. Take 300 mg at 9:30 am, 600 mg at 9:30 pm 270 capsule 0     insulin pen needle (ULTICARE MICRO) 32G  X 4 MM miscellaneous Use 1 pen needles daily or as directed. 100 each 3     metFORMIN (GLUCOPHAGE XR) 500 MG 24 hr tablet Take 4 tablets (2,000 mg) by mouth daily (with dinner). 360 tablet 3     mometasone (NASONEX) 50 MCG/ACT nasal spray Spray 2 sprays into both nostrils daily. 17 g 3     norethindrone (MICRONOR) 0.35 MG tablet Take 1 tablet (0.35 mg) by mouth daily. 90 tablet 3     ondansetron (ZOFRAN ODT) 4 MG ODT tab DISSOLVE ONE TABLET ON TONGUE EVERY 8 HOURS AS NEEDED FOR NAUSEA 30 tablet 1     pantoprazole (PROTONIX) 40 MG EC tablet Take 1 tablet (40 mg) by mouth daily. 90 tablet 2     rimegepant (NURTEC) 75 MG ODT tablet Place 75 mg under the tongue daily as needed for migraine.       semaglutide (OZEMPIC) 2 MG/3ML pen Inject 0.5 mg subcutaneously every 7 days. 9 mL 3     spironolactone (ALDACTONE) 50 MG tablet Take 1 tablet (50 mg) by mouth daily. 90 tablet 3     SUMAtriptan (IMITREX) 50 MG tablet Take 2 tablets (100 mg) by mouth at onset of headache for migraine. May repeat in 2 hours. Max 4 tablets/24 hours.       thin (NO BRAND SPECIFIED) lancets Use with lanceting device. To accompany: Blood Glucose Monitor Brands: per insurance. 100 each 6     topiramate (TOPAMAX) 25 MG tablet TAKE TWO TABLETS BY MOUTH EVERY NIGHT AT BEDTIME X 7 DAYS, THEN ONE IN THE AM AND TWO EVERY NIGHT AT BEDTIME X 7 DYAS, THEN TWO TWICE DAILY       UBRELVY 100 MG tablet Take 100 mg by mouth at onset of headache.       gabapentin (NEURONTIN) 300 MG capsule Take 1 capsule (300 mg) by mouth 2 times daily. 180 capsule 0        ALLERGIES:     Allergies   Allergen Reactions     Adhesive Tape Itching     Emgality [Galcanezumab-Gnlm] Itching     Itchiness, bumps     Other Environmental Allergy      Victoza [Liraglutide] Headache, Hives and Itching         PHYSICAL EXAMINATION:   /78 (BP Location: Right arm, Patient Position: Chair, Cuff Size: Adult Large)   Pulse 90   Wt 114.4 kg (252 lb 3.2 oz)   LMP 02/26/2025 (Exact Date)    SpO2 97%   BMI 49.25 kg/m    Body mass index is 49.25 kg/m .  Wt Readings from Last 2 Encounters:   03/10/25 114.4 kg (252 lb 3.2 oz)   03/07/25 114.2 kg (251 lb 11.2 oz)     Constitutional: no acute distress, pleasant and cooperative, appears overall well.  Cardiovascular: RRR nl S1S2, JVP not elevated, extremities with no edema or cyanosis  Respiratory: clear to auscultation and percussion bilaterally anterior and posterior  Gastrointestinal: soft, nontender, non distended, no hepatosplenomegaly or masses  Neurologic: AOx3     LABORATORY DATA:   I have reviewed the labs below.    LIPID RESULTS:  Recent Labs   Lab Test 03/07/25  1428 11/13/24  1512   CHOL 137 143   HDL 56 58   LDL 63 67   TRIG 90 88        LIVER ENZYME RESULTS:  Recent Labs   Lab Test 03/07/25  1428 12/18/24  1346   AST 18 16   ALT 20 16       CBC RESULTS:  Recent Labs   Lab Test 03/07/25  1428 12/18/24  1346 11/09/24 2027   WBC  --  7.6 9.2   HGB  --  13.5 14.4   HCT 42.2 40.4 42.8   PLT  --  298 356       BMP RESULTS:  Recent Labs   Lab Test 03/07/25  1428 11/09/24 2027    141   POTASSIUM 4.2 4.0   CHLORIDE 104 106   CO2 22 21*   ANIONGAP 12 14   GLC 80 110*   BUN 9.4 12.3   CR 0.81 0.71   DANIEL 9.8 9.7       A1C RESULTS:  Lab Results   Component Value Date    A1C 5.4 03/07/2025    A1C 7.6 (H) 01/18/2019        PROCEDURES & FURTHER ASSESSMENTS:   I have reviewed the test results below.    ECHO: 1/29/2025  Left ventricular size, wall motion and function are normal. The ejection fraction is 55-60%.  Global right ventricular function is normal. The right ventricle is normal size.  The inferior vena cava is normal.  No pericardial effusion is present.  There is no prior study for direct comparison.       CLINICAL IMPRESSION:   Anh Flores is a 30 year old woman w/ h/o non-classical congenital adrenal hyperplasia, DM2, and family history of premature CAD presenting for evaluation of atypical chest pain.  Concerning characteristics include  the exertional nature, location, and severity.  However, the ability to reproduce with external chest pressure and the duration of symptoms without impact on her heart function argue against a cardiac cause.  No arrhythmia was observed on holter monitor despite episodes of symptoms.    PLAN:  Coronary CTA    Follow-up: PRN    Thank you for allowing us to take part in the care of this very pleasant patient.  Please do not hesitate to call if any further questions or concerns arise.    I spent 45 min today reviewing the medical record, meeting with the patient, and completing this note.    Grzegorz Alcantar MD, PhD  Interventional/Critical Care Cardiology  [phone]    March 10, 2025      CC  Patient Care Team:  Maria Guadalupe Gonzales MD as PCP - General (Internal Medicine)  Erick Smith OD as MD (Optometry)  Amaya Salcedo MD as MD (Neurology)  Bita Veronica MD as Assigned PCP  Alejandra Cronin MD as Assigned Endocrinology Provider  Bob Reyes MD as Assigned Surgical Provider  Cheyenne Overton MD as MD (Gastroenterology)  Leanne Fallon PA-C as Physician Assistant (Dermatology)  Alejandra Cronin MD as MD (Endocrinology, Diabetes, and Metabolism)  Canelo Herrera MD as MD (Dermatology)  Friedell, Peter E, MD as MD (Internal Medicine-Hematology & Oncology)  Humza Pavon MD as MD (Family Medicine)  Feliciano Christie MD as MD (Hematology & Oncology)  Jami Gutierrez MD as Assigned OBGYN Provider  Cheyenne Overton MD as Assigned Gastroenterology Provider  Friedell, Peter E, MD as Assigned Cancer Care Provider  Grzegorz Alcantar MD as MD (Cardiovascular Disease)  Amaya Barfield PA-C as Assigned Musculoskeletal Provider  Shonna Simons APRN CNP as Assigned Pulmonology Provider  MARIA GUADALUPE GONZALES      Please do not hesitate to contact me if you have any questions/concerns.     Sincerely,     Grzegorz Alcantar MD

## 2025-03-10 NOTE — PROGRESS NOTES
Date of Service: Mar 10, 2025    Chief Complaint:   Chief Complaint   Patient presents with    RECHECK     Follow-up 1.Bilateral carpal tunnel syndrome. 2.Bilateral ulnar neuropathy  3.Bilateral Trigger thumbs. 4.Bilateral Tennis Elbow  [facility] US results from 3/7/25       Interval events: Anh Flores is a 30 year old female who presents today in follow-up bilateral hand pain, left worse than right.She was last seen in January at which time she underwent bilateral trigger thumb injections. These have helped with some soreness but she continues to have triggering. She notes her entire left arm is painful, radiating up to the shoulder. She reports diffuse hand pain, including bothersome left radial sided wrist pain, left index finger and 1st webspace pain. She notes burning pain, numbness and tingling in all five fingers.     She is currently being worked up for back pain and migraines. She had cervical, thoracic and lumbar MRIs today which showed no evidence of cervical radiculopathy. She takes gabapentin at baseline. Does note some pain and tingling in her bilateral feet but reports that she was told she did not have diabetic neuropathy in the past.     The past medical history was reviewed updated in the EMR. This includes medications, surgeries, social history, and review of systems.    Physical examination:  Well-developed, well-nourished and in no acute distress.  Alert and oriented to surroundings.    On examination of the Left upper extremity:   Skin is clean and dry. Palpable tender nodule at the level of the A1 pulley of the thumb.  There is palpable clicking.  Fingers are warm and well-perfused. Tenderness to palpation to the first dorsal compartment, positive finkelstein. Tenderness to palpation to the lateral epicondyle and generalized throughout the forearm.     Sensation:  Median: diminished  Radial: intact  Ulnar: intact  Thumb opposition strength: intact  Interosseous strength: intact  Thenar  atrophy: Not Present  Interosseous atrophy: Not Present  Tinel's over carpal tunnel: Present  Tinel's over cubital tunnel: Present  Phalen's: Positive  Carpal tunnel compression: Positive  Elbow flexion compression: Positive  No palpable ulnar nerve subluxation.     Two point discrimination (mm):   Median: 5 mm  Ulnar: 7 mm    Right  upper extremity:   Skin is clean and dry. Palpable tender nodule at the level of the A1 pulley of the thumb.  There is palpable clicking.  Fingers are warm and well-perfused. Tenderness to palpation to the lateral epicondyle and generalized throughout the forearm.     Skin clean, dry and intact  Sensation:  Median: diminished  Radial: intact  Ulnar: intact  Thumb opposition strength: intact  Interosseous strength: intact  Thenar atrophy: Not Present  Interosseous atrophy: Not Present  Tinel's over carpal tunnel: Present  Tinel's over cubital tunnel: Present  Phalen's: Positive  Carpal tunnel compression: Positive  Elbow flexion compression: Positive  No palpable ulnar nerve subluxation.     Two point discrimination (mm):   Median: 5 mm  Ulnar: 5 mm    Imaging:   3 view of the bilateral hands were obtained and independently reviewed. These results were discussed with the patient and demonstrate no fractures or dislocations. No significant degenerative changes. Per formal radiology read, possible boarderline widening of right SL interval, measuring 3 mm.     [facility] Ultrasound from 3/7/2025:   Right:  Ulnar nerve  Distal upper arm: 6.0, 5.7 mm^2  Proximal to cubital tunnel: 7.4, 8.9 mm^2   Within the cubital tunnel: 7.1, 8.0 mm^2      Left:  Ulnar nerve  Distal upper arm: 7.3, 7.8 mm^2  Proximal to cubital tunnel: 8.5, 9.1 mm^2   Within the cubital tunnel: 10.4, 9.9 mm^2     Partial ventral subluxation of the bilateral ulnar nerves at the level  of the cubital tunnel with flexion.                                                                   Impression:   1. Mild enlargement of the left  ulnar nerve within the cubital tunnel.  No substantial right ulnar nerve enlargement.  2. Partial ventral subluxation of the bilateral ulnar nerves at the  level of the cubital tunnel with flexion.    Cervical spine MRI from 3/10/2025:   Cervical spine:  The cervical vertebrae appear normally aligned.   There is no significant disc space narrowing at any level.  There is  no definite abnormal signal within the cervical spinal cord at any  level.  The findings on a level by level basis are as follows:  C2-3:  There is no focal abnormality.  C3-4:  There is no focal abnormality.  C4-5:  There is no focal abnormality.  C5-6:  There is no focal abnormality.  C6-7:  There is no focal abnormality.  C7-T1: There is no focal abnormality.    EMG/NCS: EMG obtained on 3/13/2024 demonstrates:  Techniques:  Motor conduction studies were done with surface recording electrodes. Sensory conduction studies were performed with surface electrodes, unless indicated otherwise by (n), designating the use of subdermal recording electrodes. Temperature was monitored and recorded throughout the study. Upper extremities were maintained at a temperature of 32 degrees Centigrade or higher.  EMG was done with a concentric needle electrode.      Results:  Motor nerve studies:  Left median motor study reveals normal distal latency, amplitude and borderline conduction velocity  Right median motor study reveals prolonged distal latency, low amplitude at the elbow but normal at the wrist, conduction velocity is normal  Left ulnar motor study is within normal limits  Right ulnar study is within normal limits     Sensory nerve studies:  Antidromic left median sensory study reveals normal amplitude with slowed conduction velocity  Antidromic right median study reveals normal amplitude with slowed conduction velocity  Antidromic ulnar studies bilaterally are within normal limits  Left palmar study reveals a prolonged peak latency with median nerve  stimulation compared to ulnar nerve stimulation  Right sided palmar studies also reveal a prolonged peak latency with median nerve stimulation compared to ulnar nerve stimulation     Needle examination:  Bilateral upper extremity needle examination is within normal limits.    Interpretation:  This is an abnormal EMG. Findings are consistent with bilateral median neuropathies at the wrist. The left side would be considered mild in severity and the right side would be considered moderate in severity. Clinically this can correlate with carpal tunnel syndrome.    Assessment: 30 year old female with:  Bilateral carpal tunnel syndrome, left worse than right.  Bilateral ulnar neuropathy, Left worse than right. Normal EMG. Mild enlargement of left ulnar n. At the cubital tunnel, partial subluxation.   Bilateral Trigger thumbs.   Bilateral Tennis Elbow  Left DeQuervain Tenosynovitis.   Type 2 diabetes, well controlled. Last A1C 5.4 in 3/7/2025. Query possible component of diabetic neuropathy.   Morbid Obesity, BMI 49.25.     Plan:    Discussed at length treatment options for her carpal tunnel syndrome including continue observation, conservative measures with splinting, steroid injections and surgical release.  Reviewed EMG, MRI of her cervical spine, and [facility] ultrasound. While EMG does not show changes to the ulnar nerve, ultrasound does some increased diameter of the ulnar nerve proximal to the cubital tunnel with mild partial subluxation. We discussed that subluxation of the ulnar nerve could be contributing to some of her symptoms. Discussed treatment options including continued observation, hand therapy, night splint, and surgical intervention with a cubital tunnel release and possible ulnar nerve transposition.   For her trigger fingers we discussed continued observation vs. trial of repeat injection vs surgical release.   For her bilateral tennis elbow recommend continue conservative cares with Hand therapy,  antiinflammatories, stretching.   For her left Dequervain's discussed hand therapy, splinting, antiinflammatories, steroid injections and surgical release. She has not had any treatment for this except for intermittent splint use.    After extensive discussion of the above options, patient would like to proceed with surgical intervention on the left upper extremity with the following: Left trigger thumb release, left open carpal tunnel release, left first dorsal compartment release, left cubital tunnel release with possible ulnar nerve transposition. This will be with Dr. Romel Roberts, [facility] anesthesia with a block.     I discussed risks of surgery including infection, bleeding, pain, scarring, damage to nerves, blood vessels, or other nearby structures, failure to improve, persistent numbness, continued discomfort, need for further surgery, failure of fascial sling or stenosis due to fascial sling in case of transposition, and risks of anesthesia. We discussed diabetes puts her at a higher risk of wound complications and infection. Patient sent with surgical packet.     Discussed with Dr. Romel Arias who was in agreement with the plan.     BRITTANIE SLAUGHTER PA-C  Orthopaedic Surgery

## 2025-03-10 NOTE — Clinical Note
Ruben,   I saw this patient yesterday and placed a case request for Dr. Roberts after talking with him this morning. Details in the chart. I sent  her with soap and a surgical packet. Given her BMI anticipate needed at [place] or . Can you please call her to do pre-op teaching?   Thanks! Amaya

## 2025-03-10 NOTE — LETTER
3/10/2025      Anh Flores  [address] [facility] [address]      Dear Colleague,    Thank you for referring your patient, Anh Flores, to the [facility] [place]. Please see a copy of my visit note below.    Date of Service: Mar 10, 2025    Chief Complaint:   Chief Complaint   Patient presents with     RECHECK     Follow-up 1.Bilateral carpal tunnel syndrome. 2.Bilateral ulnar neuropathy  3.Bilateral Trigger thumbs. 4.Bilateral Tennis Elbow  [facility] US results from 3/7/25       Interval events: Anh Flores is a 30 year old female who presents today in follow-up bilateral hand pain, left worse than right.She was last seen in January at which time she underwent bilateral trigger thumb injections. These have helped with some soreness but she continues to have triggering. She notes her entire left arm is painful, radiating up to the shoulder. She reports diffuse hand pain, including bothersome left radial sided wrist pain, left index finger and 1st webspace pain. She notes burning pain, numbness and tingling in all five fingers.     She is currently being worked up for back pain and migraines. She had cervical, thoracic and lumbar MRIs today which showed no evidence of cervical radiculopathy. She takes gabapentin at baseline. Does note some pain and tingling in her bilateral feet but reports that she was told she did not have diabetic neuropathy in the past.     The past medical history was reviewed updated in the EMR. This includes medications, surgeries, social history, and review of systems.    Physical examination:  Well-developed, well-nourished and in no acute distress.  Alert and oriented to surroundings.    On examination of the Left upper extremity:   Skin is clean and dry. Palpable tender nodule at the level of the A1 pulley of the thumb.  There is palpable clicking.  Fingers are warm and well-perfused. Tenderness to palpation to the first dorsal compartment, positive  finkelstein. Tenderness to palpation to the lateral epicondyle and generalized throughout the forearm.     Sensation:  Median: diminished  Radial: intact  Ulnar: intact  Thumb opposition strength: intact  Interosseous strength: intact  Thenar atrophy: Not Present  Interosseous atrophy: Not Present  Tinel's over carpal tunnel: Present  Tinel's over cubital tunnel: Present  Phalen's: Positive  Carpal tunnel compression: Positive  Elbow flexion compression: Positive  No palpable ulnar nerve subluxation.     Two point discrimination (mm):   Median: 5 mm  Ulnar: 7 mm    Right  upper extremity:   Skin is clean and dry. Palpable tender nodule at the level of the A1 pulley of the thumb.  There is palpable clicking.  Fingers are warm and well-perfused. Tenderness to palpation to the lateral epicondyle and generalized throughout the forearm.     Skin clean, dry and intact  Sensation:  Median: diminished  Radial: intact  Ulnar: intact  Thumb opposition strength: intact  Interosseous strength: intact  Thenar atrophy: Not Present  Interosseous atrophy: Not Present  Tinel's over carpal tunnel: Present  Tinel's over cubital tunnel: Present  Phalen's: Positive  Carpal tunnel compression: Positive  Elbow flexion compression: Positive  No palpable ulnar nerve subluxation.     Two point discrimination (mm):   Median: 5 mm  Ulnar: 5 mm    Imaging:   3 view of the bilateral hands were obtained and independently reviewed. These results were discussed with the patient and demonstrate no fractures or dislocations. No significant degenerative changes. Per formal radiology read, possible boarderline widening of right SL interval, measuring 3 mm.     [facility] Ultrasound from 3/7/2025:   Right:  Ulnar nerve  Distal upper arm: 6.0, 5.7 mm^2  Proximal to cubital tunnel: 7.4, 8.9 mm^2   Within the cubital tunnel: 7.1, 8.0 mm^2      Left:  Ulnar nerve  Distal upper arm: 7.3, 7.8 mm^2  Proximal to cubital tunnel: 8.5, 9.1 mm^2   Within the cubital  tunnel: 10.4, 9.9 mm^2     Partial ventral subluxation of the bilateral ulnar nerves at the level  of the cubital tunnel with flexion.                                                                   Impression:   1. Mild enlargement of the left ulnar nerve within the cubital tunnel.  No substantial right ulnar nerve enlargement.  2. Partial ventral subluxation of the bilateral ulnar nerves at the  level of the cubital tunnel with flexion.    Cervical spine MRI from 3/10/2025:   Cervical spine:  The cervical vertebrae appear normally aligned.   There is no significant disc space narrowing at any level.  There is  no definite abnormal signal within the cervical spinal cord at any  level.  The findings on a level by level basis are as follows:  C2-3:  There is no focal abnormality.  C3-4:  There is no focal abnormality.  C4-5:  There is no focal abnormality.  C5-6:  There is no focal abnormality.  C6-7:  There is no focal abnormality.  C7-T1: There is no focal abnormality.    EMG/NCS: EMG obtained on 3/13/2024 demonstrates:  Techniques:  Motor conduction studies were done with surface recording electrodes. Sensory conduction studies were performed with surface electrodes, unless indicated otherwise by (n), designating the use of subdermal recording electrodes. Temperature was monitored and recorded throughout the study. Upper extremities were maintained at a temperature of 32 degrees Centigrade or higher.  EMG was done with a concentric needle electrode.      Results:  Motor nerve studies:  Left median motor study reveals normal distal latency, amplitude and borderline conduction velocity  Right median motor study reveals prolonged distal latency, low amplitude at the elbow but normal at the wrist, conduction velocity is normal  Left ulnar motor study is within normal limits  Right ulnar study is within normal limits     Sensory nerve studies:  Antidromic left median sensory study reveals normal amplitude with slowed  conduction velocity  Antidromic right median study reveals normal amplitude with slowed conduction velocity  Antidromic ulnar studies bilaterally are within normal limits  Left palmar study reveals a prolonged peak latency with median nerve stimulation compared to ulnar nerve stimulation  Right sided palmar studies also reveal a prolonged peak latency with median nerve stimulation compared to ulnar nerve stimulation     Needle examination:  Bilateral upper extremity needle examination is within normal limits.    Interpretation:  This is an abnormal EMG. Findings are consistent with bilateral median neuropathies at the wrist. The left side would be considered mild in severity and the right side would be considered moderate in severity. Clinically this can correlate with carpal tunnel syndrome.    Assessment: 30 year old female with:  Bilateral carpal tunnel syndrome, left worse than right.  Bilateral ulnar neuropathy, Left worse than right. Normal EMG. Mild enlargement of left ulnar n. At the cubital tunnel, partial subluxation.   Bilateral Trigger thumbs.   Bilateral Tennis Elbow  Left DeQuervain Tenosynovitis.   Type 2 diabetes, well controlled. Last A1C 5.4 in 3/7/2025. Query possible component of diabetic neuropathy.   Morbid Obesity, BMI 49.25.     Plan:    Discussed at length treatment options for her carpal tunnel syndrome including continue observation, conservative measures with splinting, steroid injections and surgical release.  Reviewed EMG, MRI of her cervical spine, and [facility] ultrasound. While EMG does not show changes to the ulnar nerve, ultrasound does some increased diameter of the ulnar nerve proximal to the cubital tunnel with mild partial subluxation. We discussed that subluxation of the ulnar nerve could be contributing to some of her symptoms. Discussed treatment options including continued observation, hand therapy, night splint, and surgical intervention with a cubital tunnel release and  possible ulnar nerve transposition.   For her trigger fingers we discussed continued observation vs. trial of repeat injection vs surgical release.   For her bilateral tennis elbow recommend continue conservative cares with Hand therapy, antiinflammatories, stretching.   For her left Dequervain's discussed hand therapy, splinting, antiinflammatories, steroid injections and surgical release. She has not had any treatment for this except for intermittent splint use.    After extensive discussion of the above options, patient would like to proceed with surgical intervention on the left upper extremity with the following: Left trigger thumb release, left open carpal tunnel release, left first dorsal compartment release, left cubital tunnel release with possible ulnar nerve transposition. This will be with Dr. Romel Roberts, [facility] anesthesia with a block.     I discussed risks of surgery including infection, bleeding, pain, scarring, damage to nerves, blood vessels, or other nearby structures, failure to improve, persistent numbness, continued discomfort, need for further surgery, failure of fascial sling or stenosis due to fascial sling in case of transposition, and risks of anesthesia. We discussed diabetes puts her at a higher risk of wound complications and infection. Patient sent with surgical packet.     Discussed with Dr. Romel Arias who was in agreement with the plan.     AMAYA SLAUGHTER PA-C  Orthopaedic Surgery          Again, thank you for allowing me to participate in the care of your patient.        Sincerely,        Amaya Slaughter PA-C    Electronically signed

## 2025-03-10 NOTE — PROGRESS NOTES
[facility]  CARDIOVASCULAR MEDICINE CLINIC NOTE    Referring Provider: Maria Guadalupe Triana   Primary Care Provider: Maria Guadalupe Triana     Patient Name: Anh Flores   MRN: [id number]     PERTINENT CLINICAL HISTORY:   Anh Flores is a 30 year old woman w/ h/o non-classical congenital adrenal hyperplasia, DM2, and family history of premature CAD.  She is referred for evaluation of nearly 1 year of nearly continuous chest pain.  She notes severe substernal and L sided chest pain and pressure.  The pain worsens with exertion and improves with rest.  It is associated with dizziness in some circumstances.  It is reproducible with chest pressure from a seat belt or clothing.  There has been no rash or skin findings.  The pain is not related to body position or diet.      She had a 24 hour holter monitor on 2/17/2025 showing no arrhythmia though there were isolated supraventricular ectopic beats.  There were 4 events noted in the diary at that time.  TTE performed 1/29/2025 showed normal biventricular function without valvular pathology.    Ms. Flores does have frequent migraines with aura which are severe in symptoms.  She uses sumatriptan as rescue therapy.  She has also had b/l upper and lower extremity numbness and tingling noted and addressed by her physician team with concern for diabetic neuropathy though no clear diagnoses have yet been made.    I have personally reviewed outside notes from endocrinology and primary care.     PAST MEDICAL HISTORY:     Past Medical History:   Diagnosis Date    CAH (congenital adrenal hyperplasia) 2/9/2010    Followed by Dr. Brewer; next follow up 1.2011.  Metformin increased to 850 mg twice a day.     Diabetes mellitus, type 2 (H) 10/19/2020    Vitamin D deficiency 2/9/2010        PAST SURGICAL HISTORY:     Past Surgical History:   Procedure Laterality Date    CHOLECYSTECTOMY      She was in 8th grade     COLONOSCOPY N/A 9/16/2024    Procedure:  COLONOSCOPY, WITH BIOPSY;  Surgeon: Bacilio Yancey MD;  Location:  GI    ESOPHAGOSCOPY, GASTROSCOPY, DUODENOSCOPY (EGD), COMBINED N/A 9/16/2024    Procedure: ESOPHAGOGASTRODUODENOSCOPY, WITH BIOPSY;  Surgeon: Bacilio Yancey MD;  Location:  GI        CURRENT MEDICATIONS:     Current Outpatient Medications   Medication Sig Dispense Refill    alcohol swab prep pads Use to swab area of injection/anthony as directed. 100 each 3    Atogepant (QULIPTA) 60 MG TABS Take 60 mg by mouth.      atorvastatin (LIPITOR) 10 MG tablet Take 1 tablet (10 mg) by mouth daily. 90 tablet 2    blood glucose (NO BRAND SPECIFIED) test strip Use to test blood sugar three times daily or as directed. Preferred blood glucose meter OR supplies to accompany: Blood Glucose Monitor Brands: per insurance. 100 strip 6    blood glucose monitoring (NO BRAND SPECIFIED) meter device kit Use to test blood sugar three times daily or as directed. Preferred blood glucose meter OR supplies to accompany: Blood Glucose Monitor Brands: per insurance. 1 kit 0    Continuous Glucose Sensor (FREESTYLE AYSE 3 PLUS SENSOR) MISC Use 1 sensor every 15 days. Use to read blood sugars per 's instructions. 6 each 3    Continuous Glucose Sensor (FREESTYLE AYSE 3 SENSOR) [facility] [place] Other route every 14 days. 6 each 3    fluticasone (FLONASE) 50 MCG/ACT nasal spray Spray 1 spray into both nostrils daily 16 g 3    gabapentin (NEURONTIN) 300 MG capsule Take 1 capsule (300 mg) by mouth 3 times daily. Take 300 mg at 9:30 am, 600 mg at 9:30 pm 270 capsule 0    insulin pen needle (ULTICARE MICRO) 32G X 4 MM miscellaneous Use 1 pen needles daily or as directed. 100 each 3    metFORMIN (GLUCOPHAGE XR) 500 MG 24 hr tablet Take 4 tablets (2,000 mg) by mouth daily (with dinner). 360 tablet 3    mometasone (NASONEX) 50 MCG/ACT nasal spray Spray 2 sprays into both nostrils daily. 17 g 3    norethindrone (MICRONOR) 0.35 MG tablet Take 1 tablet  (0.35 mg) by mouth daily. 90 tablet 3    ondansetron (ZOFRAN ODT) 4 MG ODT tab DISSOLVE ONE TABLET ON TONGUE EVERY 8 HOURS AS NEEDED FOR NAUSEA 30 tablet 1    pantoprazole (PROTONIX) 40 MG EC tablet Take 1 tablet (40 mg) by mouth daily. 90 tablet 2    rimegepant (NURTEC) 75 MG ODT tablet Place 75 mg under the tongue daily as needed for migraine.      semaglutide (OZEMPIC) 2 MG/3ML pen Inject 0.5 mg subcutaneously every 7 days. 9 mL 3    spironolactone (ALDACTONE) 50 MG tablet Take 1 tablet (50 mg) by mouth daily. 90 tablet 3    SUMAtriptan (IMITREX) 50 MG tablet Take 2 tablets (100 mg) by mouth at onset of headache for migraine. May repeat in 2 hours. Max 4 tablets/24 hours.      thin (NO BRAND SPECIFIED) lancets Use with lanceting device. To accompany: Blood Glucose Monitor Brands: per insurance. 100 each 6    topiramate (TOPAMAX) 25 MG tablet TAKE TWO TABLETS BY MOUTH EVERY NIGHT AT BEDTIME X 7 DAYS, THEN ONE IN THE AM AND TWO EVERY NIGHT AT BEDTIME X 7 DYAS, THEN TWO TWICE DAILY      UBRELVY 100 MG tablet Take 100 mg by mouth at onset of headache.      gabapentin (NEURONTIN) 300 MG capsule Take 1 capsule (300 mg) by mouth 2 times daily. 180 capsule 0        ALLERGIES:     Allergies   Allergen Reactions    Adhesive Tape Itching    Emgality [Galcanezumab-Gnlm] Itching     Itchiness, bumps    Other Environmental Allergy     Victoza [Liraglutide] Headache, Hives and Itching         PHYSICAL EXAMINATION:   /78 (BP Location: Right arm, Patient Position: Chair, Cuff Size: Adult Large)   Pulse 90   Wt 114.4 kg (252 lb 3.2 oz)   LMP 02/26/2025 (Exact Date)   SpO2 97%   BMI 49.25 kg/m    Body mass index is 49.25 kg/m .  Wt Readings from Last 2 Encounters:   03/10/25 114.4 kg (252 lb 3.2 oz)   03/07/25 114.2 kg (251 lb 11.2 oz)     Constitutional: no acute distress, pleasant and cooperative, appears overall well.  Cardiovascular: RRR nl S1S2, JVP not elevated, extremities with no edema or cyanosis  Respiratory:  clear to auscultation and percussion bilaterally anterior and posterior  Gastrointestinal: soft, nontender, non distended, no hepatosplenomegaly or masses  Neurologic: AOx3     LABORATORY DATA:   I have reviewed the labs below.    LIPID RESULTS:  Recent Labs   Lab Test 03/07/25  1428 11/13/24  1512   CHOL 137 143   HDL 56 58   LDL 63 67   TRIG 90 88        LIVER ENZYME RESULTS:  Recent Labs   Lab Test 03/07/25  1428 12/18/24  1346   AST 18 16   ALT 20 16       CBC RESULTS:  Recent Labs   Lab Test 03/07/25  1428 12/18/24  1346 11/09/24 2027   WBC  --  7.6 9.2   HGB  --  13.5 14.4   HCT 42.2 40.4 42.8   PLT  --  298 356       BMP RESULTS:  Recent Labs   Lab Test 03/07/25  1428 11/09/24 2027    141   POTASSIUM 4.2 4.0   CHLORIDE 104 106   CO2 22 21*   ANIONGAP 12 14   GLC 80 110*   BUN 9.4 12.3   CR 0.81 0.71   DANIEL 9.8 9.7       A1C RESULTS:  Lab Results   Component Value Date    A1C 5.4 03/07/2025    A1C 7.6 (H) 01/18/2019        PROCEDURES & FURTHER ASSESSMENTS:   I have reviewed the test results below.    ECHO: 1/29/2025  Left ventricular size, wall motion and function are normal. The ejection fraction is 55-60%.  Global right ventricular function is normal. The right ventricle is normal size.  The inferior vena cava is normal.  No pericardial effusion is present.  There is no prior study for direct comparison.       CLINICAL IMPRESSION:   Anh Flores is a 30 year old woman w/ h/o non-classical congenital adrenal hyperplasia, DM2, and family history of premature CAD presenting for evaluation of atypical chest pain.  Concerning characteristics include the exertional nature, location, and severity.  However, the ability to reproduce with external chest pressure and the duration of symptoms without impact on her heart function argue against a cardiac cause.  No arrhythmia was observed on holter monitor despite episodes of symptoms.    PLAN:  Coronary CTA    Follow-up: PRN    Thank you for allowing us to  take part in the care of this very pleasant patient.  Please do not hesitate to call if any further questions or concerns arise.    I spent 45 min today reviewing the medical record, meeting with the patient, and completing this note.    Grzegorz Alcantar MD, PhD  Interventional/Critical Care Cardiology  [phone]    March 10, 2025        Patient Care Team:  Maria Guadalupe Gonzales MD as PCP - General (Internal Medicine)  Erick Smith OD as MD (Optometry)  Amaya Salcedo MD as MD (Neurology)  Bita Veronica MD as Assigned PCP  Alejandra Cronin MD as Assigned Endocrinology Provider  Bob Reyes MD as Assigned Surgical Provider  Cheyenne Overton MD as MD (Gastroenterology)  Leanne Fallon PA-C as Physician Assistant (Dermatology)  Alejandra Cronin MD as MD (Endocrinology, Diabetes, and Metabolism)  Canelo Herrera MD as MD (Dermatology)  Friedell, Peter E, MD as MD (Internal Medicine-Hematology & Oncology)  Humza Pavon MD as MD (Family Medicine)  Feliciano Christie MD as MD (Hematology & Oncology)  Jami Gutierrez MD as Assigned OBGYN Provider  Cheyenne Overton MD as Assigned Gastroenterology Provider  Friedell, Peter E, MD as Assigned Cancer Care Provider  Grzegorz Alcantar MD as MD (Cardiovascular Disease)  Amaya Barfield PA-C as Assigned Musculoskeletal Provider  Shonna Simons APRN CNP as Assigned Pulmonology Provider  MARIA GUADALUPE GONZALES

## 2025-03-10 NOTE — NURSING NOTE
Reason For Visit:   Chief Complaint   Patient presents with    RECHECK     Follow-up 1.Bilateral carpal tunnel syndrome. 2.Bilateral ulnar neuropathy  3.Bilateral Trigger thumbs. 4.Bilateral Tennis Elbow  MSK US results from 3/7/25         Primary MD: Maria Guadalupe Triana  Ref. MD: Mayte    Age: 30 year old    ?  No      LMP 02/26/2025 (Exact Date)       Pain Assessment  Patient Currently in Pain: Yes  0-10 Pain Scale: 8  Primary Pain Location: Finger (Comment which one) (Left > Right Elbows, wrists, and thumbs)  Pain Descriptors: Constant, Numbness, Tingling, Throbbing    Hand Dominance Evaluation  Hand Dominance: Right          QuickDASH Assessment      3/10/2025    10:46 AM   QuickDASH Main   1. Open a tight or new jar Unable   2. Do heavy household chores (e.g., wash walls, floors) Moderate difficulty   3. Carry a shopping bag or briefcase Moderate difficulty   4. Wash your back No difficulty   5. Use a knife to cut food Moderate difficulty   6. Recreational activities in which you take some force or impact through your arm, shoulder or hand (e.g., golf, hammering, tennis, etc.) Unable to answer   7. During the past week, to what extent has your arm, shoulder or hand problem interfered with your normal social activities with family, friends, neighbours or groups Unable to answer   8. During the past week, were you limited in your work or other regular daily activities as a result of your arm, shoulder or hand problem Very limited   9. Arm, shoulder or hand pain Moderate   10.Tingling (pins and needles) in your arm,shoulder or hand Severe   11. During the past week, how much difficulty have you had sleeping because of the pain in your arm, shoulder or hand Moderate difficulty   Quickdash Ability Score 40.91          Current Outpatient Medications   Medication Sig Dispense Refill    alcohol swab prep pads Use to swab area of injection/anthony as directed. 100 each 3    Atogepant (QULIPTA) 60 MG TABS Take 60  mg by mouth.      atorvastatin (LIPITOR) 10 MG tablet Take 1 tablet (10 mg) by mouth daily. 90 tablet 2    blood glucose (NO BRAND SPECIFIED) test strip Use to test blood sugar three times daily or as directed. Preferred blood glucose meter OR supplies to accompany: Blood Glucose Monitor Brands: per insurance. 100 strip 6    blood glucose monitoring (NO BRAND SPECIFIED) meter device kit Use to test blood sugar three times daily or as directed. Preferred blood glucose meter OR supplies to accompany: Blood Glucose Monitor Brands: per insurance. 1 kit 0    Continuous Glucose Sensor (FREESTYLE AYSE 3 PLUS SENSOR) MISC Use 1 sensor every 15 days. Use to read blood sugars per 's instructions. 6 each 3    Continuous Glucose Sensor (FREESTYLE AYSE 3 SENSOR) [facility] [place] 1 each by Other route every 14 days. 6 each 3    fluticasone (FLONASE) 50 MCG/ACT nasal spray Spray 1 spray into both nostrils daily 16 g 3    gabapentin (NEURONTIN) 300 MG capsule Take 1 capsule (300 mg) by mouth 3 times daily. Take 300 mg at 9:30 am, 600 mg at 9:30 pm 270 capsule 0    gabapentin (NEURONTIN) 300 MG capsule Take 1 capsule (300 mg) by mouth 2 times daily. 180 capsule 0    insulin pen needle (ULTICARE MICRO) 32G X 4 MM miscellaneous Use 1 pen needles daily or as directed. 100 each 3    metFORMIN (GLUCOPHAGE XR) 500 MG 24 hr tablet Take 4 tablets (2,000 mg) by mouth daily (with dinner). 360 tablet 3    mometasone (NASONEX) 50 MCG/ACT nasal spray Spray 2 sprays into both nostrils daily. 17 g 3    norethindrone (MICRONOR) 0.35 MG tablet Take 1 tablet (0.35 mg) by mouth daily. 90 tablet 3    ondansetron (ZOFRAN ODT) 4 MG ODT tab DISSOLVE ONE TABLET ON TONGUE EVERY 8 HOURS AS NEEDED FOR NAUSEA 30 tablet 1    pantoprazole (PROTONIX) 40 MG EC tablet Take 1 tablet (40 mg) by mouth daily. 90 tablet 2    rimegepant (NURTEC) 75 MG ODT tablet Place 75 mg under the tongue daily as needed for migraine.      semaglutide (OZEMPIC) 2 MG/3ML pen Inject  0.5 mg subcutaneously every 7 days. 9 mL 3    spironolactone (ALDACTONE) 50 MG tablet Take 1 tablet (50 mg) by mouth daily. 90 tablet 3    SUMAtriptan (IMITREX) 50 MG tablet Take 2 tablets (100 mg) by mouth at onset of headache for migraine. May repeat in 2 hours. Max 4 tablets/24 hours.      thin (NO BRAND SPECIFIED) lancets Use with lanceting device. To accompany: Blood Glucose Monitor Brands: per insurance. 100 each 6    topiramate (TOPAMAX) 25 MG tablet TAKE TWO TABLETS BY MOUTH EVERY NIGHT AT BEDTIME X 7 DAYS, THEN ONE IN THE AM AND TWO EVERY NIGHT AT BEDTIME X 7 DYAS, THEN TWO TWICE DAILY      UBRELVY 100 MG tablet Take 100 mg by mouth at onset of headache.         Allergies   Allergen Reactions    Adhesive Tape Itching    Emgality [Galcanezumab-Gnlm] Itching     Itchiness, bumps    Other Environmental Allergy     Victoza [Liraglutide] Headache, Hives and Itching       ANAMARIA,SHIRA, ATC

## 2025-03-11 ENCOUNTER — OFFICE VISIT (OUTPATIENT)
Dept: PHYSICAL MEDICINE AND REHAB | Facility: CLINIC | Age: 30
End: 2025-03-11
Payer: COMMERCIAL

## 2025-03-11 ENCOUNTER — OFFICE VISIT (OUTPATIENT)
Dept: OTOLARYNGOLOGY | Facility: CLINIC | Age: 30
End: 2025-03-11
Payer: COMMERCIAL

## 2025-03-11 ENCOUNTER — MYC MEDICAL ADVICE (OUTPATIENT)
Dept: ORTHOPEDICS | Facility: CLINIC | Age: 30
End: 2025-03-11

## 2025-03-11 ENCOUNTER — OFFICE VISIT (OUTPATIENT)
Dept: ENDOCRINOLOGY | Facility: CLINIC | Age: 30
End: 2025-03-11
Payer: COMMERCIAL

## 2025-03-11 VITALS
RESPIRATION RATE: 16 BRPM | SYSTOLIC BLOOD PRESSURE: 131 MMHG | BODY MASS INDEX: 48.43 KG/M2 | OXYGEN SATURATION: 98 % | HEART RATE: 90 BPM | WEIGHT: 248 LBS | DIASTOLIC BLOOD PRESSURE: 86 MMHG

## 2025-03-11 VITALS — OXYGEN SATURATION: 99 % | HEART RATE: 75 BPM | DIASTOLIC BLOOD PRESSURE: 75 MMHG | SYSTOLIC BLOOD PRESSURE: 113 MMHG

## 2025-03-11 DIAGNOSIS — J34.3 NASAL TURBINATE HYPERTROPHY: ICD-10-CM

## 2025-03-11 DIAGNOSIS — E11.65 TYPE 2 DIABETES MELLITUS WITH HYPERGLYCEMIA, WITHOUT LONG-TERM CURRENT USE OF INSULIN (H): ICD-10-CM

## 2025-03-11 DIAGNOSIS — J34.2 DEVIATED NASAL SEPTUM: ICD-10-CM

## 2025-03-11 DIAGNOSIS — R20.0 NUMBNESS AND TINGLING: Primary | ICD-10-CM

## 2025-03-11 DIAGNOSIS — J30.1 SEASONAL ALLERGIC RHINITIS DUE TO POLLEN: ICD-10-CM

## 2025-03-11 DIAGNOSIS — M53.3 COCCYDYNIA: ICD-10-CM

## 2025-03-11 DIAGNOSIS — E66.01 MORBID OBESITY (H): Primary | ICD-10-CM

## 2025-03-11 DIAGNOSIS — R20.2 NUMBNESS AND TINGLING: ICD-10-CM

## 2025-03-11 DIAGNOSIS — J32.1 CHRONIC FRONTAL SINUSITIS: Primary | ICD-10-CM

## 2025-03-11 DIAGNOSIS — R20.0 NUMBNESS AND TINGLING: ICD-10-CM

## 2025-03-11 DIAGNOSIS — R20.2 NUMBNESS AND TINGLING: Primary | ICD-10-CM

## 2025-03-11 PROCEDURE — 99215 OFFICE O/P EST HI 40 MIN: CPT | Performed by: INTERNAL MEDICINE

## 2025-03-11 PROCEDURE — 82607 VITAMIN B-12: CPT | Performed by: INTERNAL MEDICINE

## 2025-03-11 PROCEDURE — 36415 COLL VENOUS BLD VENIPUNCTURE: CPT | Performed by: INTERNAL MEDICINE

## 2025-03-11 RX ORDER — BUDESONIDE 0.5 MG/2ML
0.5 INHALANT ORAL DAILY
Qty: 60 ML | Refills: 0 | Status: SHIPPED | OUTPATIENT
Start: 2025-03-11

## 2025-03-11 ASSESSMENT — ENCOUNTER SYMPTOMS
EYES NEGATIVE: 1
SINUS PAIN: 1
DIZZINESS: 1
MUSCULOSKELETAL NEGATIVE: 1
HEADACHES: 1
NAUSEA: 1
RESPIRATORY NEGATIVE: 1
PSYCHIATRIC NEGATIVE: 1
CARDIOVASCULAR NEGATIVE: 1
CONSTITUTIONAL NEGATIVE: 1

## 2025-03-11 NOTE — PROGRESS NOTES
Chief Complaint   Patient presents with    Follow Up     Chronic frontal sinusitis, nasal congestion, and DNS, minimal improvements. Continues to have an itchy nose, facial pressure, burning sensation in the nose, and increased nasal drainage. On Nasonex, uses consistently but it makes her constantly have a runny nose. Was given 2 more week of Augmentin which helped, but the symptoms came back about a week after she was done with the antibiotics.      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), late onset    Chronic abdominal pain    Vitamin D deficiency    Chronic headaches    Morbid obesity (H)    Hidradenitis suppurativa    Morbid obesity with body mass index of 60.0-69.9 in adult (H)    Elevated BP without diagnosis of hypertension    Diabetes mellitus, type 2 (H)    Bilateral carpal tunnel syndrome    Obstructive sleep apnea    Insomnia, unspecified type    Lactose intolerance    Abnormal finding on imaging of liver    Chronic diarrhea    Carrier of hemochromatosis HFE gene mutation    Cryptosporidiasis (H)    Elevated ferritin    Ulnar neuropathy of both upper extremities    Trigger thumb of both thumbs    Lateral epicondylitis of both elbows      Current Medications:  Current Outpatient Medications   Medication Sig Dispense Refill    alcohol swab prep pads Use to swab area of injection/anthony as directed. 100 each 3    Atogepant (QULIPTA) 60 MG TABS Take 60 mg by mouth.      atorvastatin (LIPITOR) 10 MG tablet Take 1 tablet (10 mg) by mouth daily. 90 tablet 2    blood glucose (NO BRAND SPECIFIED) test strip Use to test blood sugar three times daily or as directed. Preferred blood glucose meter OR supplies to accompany: Blood Glucose Monitor Brands: per insurance. 100 strip 6    blood glucose monitoring (NO BRAND  SPECIFIED) meter device kit Use to test blood sugar three times daily or as directed. Preferred blood glucose meter OR supplies to accompany: Blood Glucose Monitor Brands: per insurance. 1 kit 0    Continuous Glucose Sensor (FREESTYLE AYSE 3 PLUS SENSOR) MISC Use 1 sensor every 15 days. Use to read blood sugars per 's instructions. 6 each 3    Continuous Glucose Sensor (FREESTYLE AYSE 3 SENSOR) [facility] [place] 1 each by Other route every 14 days. 6 each 3    fluticasone (FLONASE) 50 MCG/ACT nasal spray Spray 1 spray into both nostrils daily 16 g 3    gabapentin (NEURONTIN) 300 MG capsule Take 1 capsule (300 mg) by mouth 3 times daily. Take 300 mg at 9:30 am, 600 mg at 9:30 pm 270 capsule 0    insulin pen needle (ULTICARE MICRO) 32G X 4 MM miscellaneous Use 1 pen needles daily or as directed. 100 each 3    metFORMIN (GLUCOPHAGE XR) 500 MG 24 hr tablet Take 4 tablets (2,000 mg) by mouth daily (with dinner). 360 tablet 3    mometasone (NASONEX) 50 MCG/ACT nasal spray Spray 2 sprays into both nostrils daily. 17 g 3    norethindrone (MICRONOR) 0.35 MG tablet Take 1 tablet (0.35 mg) by mouth daily. 90 tablet 3    ondansetron (ZOFRAN ODT) 4 MG ODT tab DISSOLVE ONE TABLET ON TONGUE EVERY 8 HOURS AS NEEDED FOR NAUSEA 30 tablet 1    pantoprazole (PROTONIX) 40 MG EC tablet Take 1 tablet (40 mg) by mouth daily. 90 tablet 2    rimegepant (NURTEC) 75 MG ODT tablet Place 75 mg under the tongue daily as needed for migraine.      semaglutide (OZEMPIC) 2 MG/3ML pen Inject 0.5 mg subcutaneously every 7 days. 9 mL 3    spironolactone (ALDACTONE) 50 MG tablet Take 1 tablet (50 mg) by mouth daily. 90 tablet 3    SUMAtriptan (IMITREX) 50 MG tablet Take 2 tablets (100 mg) by mouth at onset of headache for migraine. May repeat in 2 hours. Max 4 tablets/24 hours.      thin (NO BRAND SPECIFIED) lancets Use with lanceting device. To accompany: Blood Glucose Monitor Brands: per insurance. 100 each 6    topiramate (TOPAMAX) 25 MG tablet TAKE  TWO TABLETS BY MOUTH EVERY NIGHT AT BEDTIME X 7 DAYS, THEN ONE IN THE AM AND TWO EVERY NIGHT AT BEDTIME X 7 DYAS, THEN TWO TWICE DAILY      UBRELVY 100 MG tablet Take 100 mg by mouth at onset of headache.      gabapentin (NEURONTIN) 300 MG capsule Take 1 capsule (300 mg) by mouth 2 times daily. 180 capsule 0     No current facility-administered medications for this visit.     Surgical History:  Past Surgical History:   Procedure Laterality Date    CHOLECYSTECTOMY      She was in 8th grade     COLONOSCOPY N/A 9/16/2024    Procedure: COLONOSCOPY, WITH BIOPSY;  Surgeon: Bacilio Yancey MD;  Location:  GI    ESOPHAGOSCOPY, GASTROSCOPY, DUODENOSCOPY (EGD), COMBINED N/A 9/16/2024    Procedure: ESOPHAGOGASTRODUODENOSCOPY, WITH BIOPSY;  Surgeon: Bacilio Yancey MD;  Location: [facility]       HPI  Pleasant 30 year old female presents today as a(n) established patient for chronic frontal sinusitis and left septal deviation. Was last seen by ENT 01/03/2025. At that time her course of Augmentin was extended to 3 weeks and she was prescribed budesonide nasal spray. The possibility of septoplasty with turbinoplasty if symptoms persisted was also discussed.    Today she reports recurrence of symptoms in interval since last appointment. Reports that Augmentin was helpful in reducing symptoms while she was taking it but symptoms began to recur the week after she finished prescription. She is currently experiencing rhinorrhea that is clear mainly but intermittently cloudy, pruritus to bilateral ears, nasal congestion, and sinus pressure. Tried Nasonex with no improvement in symptoms and reports she is not using CPAP at this time. Also notes increased migraines with aura to left eye mainly including flashing lights along with associated nausea and dizziness, pending appointment with Neurology.    Review of Systems   Constitutional: Negative.    HENT:  Positive for congestion and sinus pain.         +  Rhinorrhea  + Pruritus to bilateral ears   Eyes: Negative.    Respiratory: Negative.     Cardiovascular: Negative.    Gastrointestinal:  Positive for nausea.   Genitourinary: Negative.    Musculoskeletal: Negative.    Skin: Negative.    Neurological:  Positive for dizziness and headaches (daily).   Endo/Heme/Allergies: Negative.    Psychiatric/Behavioral: Negative.         Physical Exam  Vitals and nursing note reviewed.   Constitutional:       Appearance: Normal appearance.   HENT:      Head: Normocephalic and atraumatic.      Jaw: There is normal jaw occlusion.      Right Ear: Hearing, tympanic membrane and ear canal normal.      Left Ear: Hearing, tympanic membrane and ear canal normal.      Nose: Septal deviation (to left side) and congestion present. No mucosal edema or rhinorrhea.      Right Nostril: No occlusion.      Left Nostril: No occlusion.      Right Turbinates: Not enlarged or swollen.      Left Turbinates: Not enlarged or swollen.      Right Sinus: No maxillary sinus tenderness or frontal sinus tenderness.      Left Sinus: No maxillary sinus tenderness or frontal sinus tenderness.      Mouth/Throat:      Mouth: Mucous membranes are moist.      Pharynx: Oropharynx is clear. Uvula midline.   Eyes:      Extraocular Movements: Extraocular movements intact.      Pupils: Pupils are equal, round, and reactive to light.   Neurological:      Mental Status: She is alert.         A/P  This pleasant patient has chronic frontal sinusitis and left septal deviation. Previous imaging records were independently reviewed and discussed in detail with the patient. She has a left septal deviation on previous CT and examination in office today. Discussed potential for scope evaluation, will defer at this time and have patient follow up with Allergy for further examination. No signs of acute sinus infection in office today so no antibiotics needed. Will initiate budesonide irrigation.     Follow up in clinic after Allergy  appointment    Scribe/Staff:    Scribe Disclosure:   I, Tomasa Sissy, am serving as a scribe; to document services personally performed by Bob Reyes MD based on data collection and the provider's statements to me.     Provider Disclosure:  I agree with above History, Review of Systems, Physical exam and Plan.  I have reviewed the content of the documentation and have edited it as needed. I have personally performed the services documented here and the documentation accurately represents those services and the decisions I have made.      Electronically signed by:  Bob Reyes MD

## 2025-03-11 NOTE — LETTER
3/11/2025      Anh Flores  [address] [facility] [address]      Dear Colleague,    Thank you for referring your patient, Anh Flores, to the [facility]. Please see a copy of my visit note below.    Chief Complaint   Patient presents with     Follow Up     Chronic frontal sinusitis, nasal congestion, and DNS, minimal improvements. Continues to have an itchy nose, facial pressure, burning sensation in the nose, and increased nasal drainage. On Nasonex, uses consistently but it makes her constantly have a runny nose. Was given 2 more week of Augmentin which helped, but the symptoms came back about a week after she was done with the antibiotics.      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), late onset     Chronic abdominal pain     Vitamin D deficiency     Chronic headaches     Morbid obesity (H)     Hidradenitis suppurativa     Morbid obesity with body mass index of 60.0-69.9 in adult (H)     Elevated BP without diagnosis of hypertension     Diabetes mellitus, type 2 (H)     Bilateral carpal tunnel syndrome     Obstructive sleep apnea     Insomnia, unspecified type     Lactose intolerance     Abnormal finding on imaging of liver     Chronic diarrhea     Carrier of hemochromatosis HFE gene mutation     Cryptosporidiasis (H)     Elevated ferritin     Ulnar neuropathy of both upper extremities     Trigger thumb of both thumbs     Lateral epicondylitis of both elbows      Current Medications:  Current Outpatient Medications   Medication Sig Dispense Refill     alcohol swab prep pads Use to swab area of injection/anthony as directed. 100 each 3     Atogepant (QULIPTA) 60 MG TABS Take 60 mg by mouth.       atorvastatin (LIPITOR) 10 MG tablet Take 1 tablet (10 mg) by mouth daily. 90 tablet 2      blood glucose (NO BRAND SPECIFIED) test strip Use to test blood sugar three times daily or as directed. Preferred blood glucose meter OR supplies to accompany: Blood Glucose Monitor Brands: per insurance. 100 strip 6     blood glucose monitoring (NO BRAND SPECIFIED) meter device kit Use to test blood sugar three times daily or as directed. Preferred blood glucose meter OR supplies to accompany: Blood Glucose Monitor Brands: per insurance. 1 kit 0     Continuous Glucose Sensor (FREESTYLE AYSE 3 PLUS SENSOR) MISC Use 1 sensor every 15 days. Use to read blood sugars per 's instructions. 6 each 3     Continuous Glucose Sensor (FREESTYLE AYSE 3 SENSOR) [facility] [place] 1 each by Other route every 14 days. 6 each 3     fluticasone (FLONASE) 50 MCG/ACT nasal spray Spray 1 spray into both nostrils daily 16 g 3     gabapentin (NEURONTIN) 300 MG capsule Take 1 capsule (300 mg) by mouth 3 times daily. Take 300 mg at 9:30 am, 600 mg at 9:30 pm 270 capsule 0     insulin pen needle (ULTICARE MICRO) 32G X 4 MM miscellaneous Use 1 pen needles daily or as directed. 100 each 3     metFORMIN (GLUCOPHAGE XR) 500 MG 24 hr tablet Take 4 tablets (2,000 mg) by mouth daily (with dinner). 360 tablet 3     mometasone (NASONEX) 50 MCG/ACT nasal spray Spray 2 sprays into both nostrils daily. 17 g 3     norethindrone (MICRONOR) 0.35 MG tablet Take 1 tablet (0.35 mg) by mouth daily. 90 tablet 3     ondansetron (ZOFRAN ODT) 4 MG ODT tab DISSOLVE ONE TABLET ON TONGUE EVERY 8 HOURS AS NEEDED FOR NAUSEA 30 tablet 1     pantoprazole (PROTONIX) 40 MG EC tablet Take 1 tablet (40 mg) by mouth daily. 90 tablet 2     rimegepant (NURTEC) 75 MG ODT tablet Place 75 mg under the tongue daily as needed for migraine.       semaglutide (OZEMPIC) 2 MG/3ML pen Inject 0.5 mg subcutaneously every 7 days. 9 mL 3     spironolactone (ALDACTONE) 50 MG tablet Take 1 tablet (50 mg) by mouth daily. 90 tablet 3     SUMAtriptan (IMITREX) 50 MG tablet Take 2 tablets (100 mg)  by mouth at onset of headache for migraine. May repeat in 2 hours. Max 4 tablets/24 hours.       thin (NO BRAND SPECIFIED) lancets Use with lanceting device. To accompany: Blood Glucose Monitor Brands: per insurance. 100 each 6     topiramate (TOPAMAX) 25 MG tablet TAKE TWO TABLETS BY MOUTH EVERY NIGHT AT BEDTIME X 7 DAYS, THEN ONE IN THE AM AND TWO EVERY NIGHT AT BEDTIME X 7 DYAS, THEN TWO TWICE DAILY       UBRELVY 100 MG tablet Take 100 mg by mouth at onset of headache.       gabapentin (NEURONTIN) 300 MG capsule Take 1 capsule (300 mg) by mouth 2 times daily. 180 capsule 0     No current facility-administered medications for this visit.     Surgical History:  Past Surgical History:   Procedure Laterality Date     CHOLECYSTECTOMY      She was in 8th grade      COLONOSCOPY N/A 9/16/2024    Procedure: COLONOSCOPY, WITH BIOPSY;  Surgeon: Bacilio Yancey MD;  Location:  GI     ESOPHAGOSCOPY, GASTROSCOPY, DUODENOSCOPY (EGD), COMBINED N/A 9/16/2024    Procedure: ESOPHAGOGASTRODUODENOSCOPY, WITH BIOPSY;  Surgeon: Bacilio Yancey MD;  Location:  GI       HPI  Pleasant 30 year old female presents today as a(n) established patient for chronic frontal sinusitis and left septal deviation. Was last seen by ENT 01/03/2025. At that time her course of Augmentin was extended to 3 weeks and she was prescribed budesonide nasal spray. The possibility of septoplasty with turbinoplasty if symptoms persisted was also discussed.    Today she reports recurrence of symptoms in interval since last appointment. Reports that Augmentin was helpful in reducing symptoms while she was taking it but symptoms began to recur the week after she finished prescription. She is currently experiencing rhinorrhea that is clear mainly but intermittently cloudy, pruritus to bilateral ears, nasal congestion, and sinus pressure. Tried Nasonex with no improvement in symptoms and reports she is not using CPAP at this time. Also notes  increased migraines with aura to left eye mainly including flashing lights along with associated nausea and dizziness, pending appointment with Neurology.    Review of Systems   Constitutional: Negative.    HENT:  Positive for congestion and sinus pain.         + Rhinorrhea  + Pruritus to bilateral ears   Eyes: Negative.    Respiratory: Negative.     Cardiovascular: Negative.    Gastrointestinal:  Positive for nausea.   Genitourinary: Negative.    Musculoskeletal: Negative.    Skin: Negative.    Neurological:  Positive for dizziness and headaches (daily).   Endo/Heme/Allergies: Negative.    Psychiatric/Behavioral: Negative.         Physical Exam  Vitals and nursing note reviewed.   Constitutional:       Appearance: Normal appearance.   HENT:      Head: Normocephalic and atraumatic.      Jaw: There is normal jaw occlusion.      Right Ear: Hearing, tympanic membrane and ear canal normal.      Left Ear: Hearing, tympanic membrane and ear canal normal.      Nose: Septal deviation (to left side) and congestion present. No mucosal edema or rhinorrhea.      Right Nostril: No occlusion.      Left Nostril: No occlusion.      Right Turbinates: Not enlarged or swollen.      Left Turbinates: Not enlarged or swollen.      Right Sinus: No maxillary sinus tenderness or frontal sinus tenderness.      Left Sinus: No maxillary sinus tenderness or frontal sinus tenderness.      Mouth/Throat:      Mouth: Mucous membranes are moist.      Pharynx: Oropharynx is clear. Uvula midline.   Eyes:      Extraocular Movements: Extraocular movements intact.      Pupils: Pupils are equal, round, and reactive to light.   Neurological:      Mental Status: She is alert.         A/P  This pleasant patient has chronic frontal sinusitis and left septal deviation. Previous imaging records were independently reviewed and discussed in detail with the patient. She has a left septal deviation on previous CT and examination in office today. Discussed potential  for scope evaluation, will defer at this time and have patient follow up with Allergy for further examination. No signs of acute sinus infection in office today so no antibiotics needed. Will initiate budesonide irrigation.     Follow up in clinic after Allergy appointment    Scribe/Staff:    Scribe Disclosure:   I, Tomasa Sheehan, am serving as a scribe; to document services personally performed by Bob Reyes MD based on data collection and the provider's statements to me.     Provider Disclosure:  I agree with above History, Review of Systems, Physical exam and Plan.  I have reviewed the content of the documentation and have edited it as needed. I have personally performed the services documented here and the documentation accurately represents those services and the decisions I have made.      Electronically signed by:  Bob Reyes MD         Again, thank you for allowing me to participate in the care of your patient.        Sincerely,        Bob Reyes MD    Electronically signed

## 2025-03-11 NOTE — NURSING NOTE
Anh Flores's goals for this visit include:   Chief Complaint   Patient presents with    Follow Up    Diabetes       She requests these members of her care team be copied on today's visit information: yes    PCP: Maria Guadalupe Triana    Referring Provider:  Referred Self, MD  No address on file    /86   Pulse 90   Resp 16   Wt 112.5 kg (248 lb)   LMP 02/26/2025 (Exact Date)   SpO2 98%   BMI 48.43 kg/m      Do you need any medication refills at today's visit? None    Omar Martinez, EMT

## 2025-03-11 NOTE — NURSING NOTE
Anh Flores's chief complaint for this visit includes:  Chief Complaint   Patient presents with    Follow Up     Chronic frontal sinusitis, nasal congestion, and DNS, minimal improvements. Continues to have an itchy nose, facial pressure, burning sensation in the nose, and increased nasal drainage. On Nasonex, uses consistently but it makes her constantly have a runny nose. Was given 2 more week of Augmentin which helped, but the symptoms came back about a week after she was done with the antibiotics.     PCP: Maria Guadalupe Triana    Referring Provider:  Referred Self, MD  No address on file    LMP 02/26/2025 (Exact Date)     Diego Merritt, EMT  March 11, 2025

## 2025-03-11 NOTE — TELEPHONE ENCOUNTER
Amaya PATEL PA-C discussed with Dr. Roberts and placed case request. Pt will need to be hospital based d/t BMI of 48.43.    Procedure: Left trigger thumb release, Left open carpal tunnel release, Left first dorsal compartment release, Left cubital tunnel release with possible ulnar nerve transposition  Facility: [facility] d/t BMI 48+  Length: 60 minutes  Anesthesia: [facility], [facility]  Post-op appointments needed: 2 weeks with surgeon or PA, 6 weeks with surgeon only.  Surgery packet/instructions given to patient?  Yes     Ruben Phillips, RNCC

## 2025-03-11 NOTE — PATIENT INSTRUCTIONS
It was a pleasure seeing you in the clinic today.  As discussed, we made the following updates to your plan of care:     An order for EMG was added today. The clinic will call you to schedule. You may also call the Neurology clinic at [phone] if you do not hear from them in the next couple of days.     I added an order for steroid injection with Dr Fitzpatrick. You will receive a call to help schedule the appointment. I included additional information about the injection below.  Please let me know if you have any questions or concerns.    Let's plan to follow up 2 weeks after the steroid injection to see how you are doing and talk about next steps.       Thank you,  Edyta Corley NP  Physical Medicine and Rehabilitation, Medical Spine  PM&R clinic Phone: [phone]  PM[facility]R clinic Fax: [phone]

## 2025-03-11 NOTE — PROGRESS NOTES
Assessment     1. Nonclassical congenital adrenal hyperplasia, associated with primary amenorrhea, treated with dexamethasone until 2014, when dexamethasone was discontinued. She used to be medicated with metformin, spironolactone and birth control pills.  They were discontinued around 2019.  The patient reestablish care in April 2023, when she was started on treatment with birth control pills and metformin as the evaluation for nonclassical CAH revealed normal gonadotropins and normal androgens.  Since being on treatment with birth control pills, she has regular menstrual periods and improved hirtuism. She f/up with Dr. Gutierrez from [facility]. Due to migraines with aura, switched birth control from combined OCPs to the mini pill. She was also restarted on spironolactone at this time.  Patient titrated Metformin dose to 2g XR daily. She reports significant diarrhea, which seems unrelated to Metformin. While her glycemic control has greatly improved, metformin is still beneficial for treating CAH and the associated insulin resistance. She has an appointment with GI  next week, so I will defer to their recommendations for holding metformin 2/2 GI upset, at least temporarily, on a trial basis.   Plan:  -Continue Metformin XR 2g daily pending GI recommendations     2.  Class III obesity, associated with severe insulin resistance.   Patient has had positive response to semaglutide with 50 lbs weight loss since 03/2024 and reduction of HgbA1c to 5.6%. She tolerated medication well with no reported side effects.  Recommended to increase semaglutide from 0.5 mg weekly to 1 mg weekly.  Plan:  -Increase semaglutide from 0.5 mg weekly to 1 mg weekly    3. Type 2 diabetes, now well controlled, with no known microvascular disease  Patient has greatly improved glycemic control, Hgb A1c decreased from 8.6% in 04/2024 to 5.4% on 3/07/2025. Reports episodes of hypoglycemia, where her blood sugar is as low as 68, and she experiences  light headedness. Discussed her current medication regimen is unlikely to cause hypoglycemia and light headedness is not typical symptom of hypoglycemia  That being said, this may not be true hypoglycemia.During these episodes, I recommend checking sugars with glucose meter rather than jada sensor. Bring meter to next appointment.  Plan:  -Semaglutide as above  -Continue Metformin XR 2g daily pending GI recommendations   -Annual eye exam    4.  Bilateral upper/lower extremity numbness and tingling, r/o neuropathy  Patient has been experiencing bilateral upper and lower extremity numbness, tingling and coldness fro approximately 1 year. Considered a broad differential including carpal tunnel syndrome, peripheral neuropathy, vitamin deficiency, diabetic neuropathy, spinal cord compression, PAD. She was recently evaluated by orthopedics and occupational therapy, who attributed these symptoms to diabetic neuropathy. Given the symptoms began after her diabetes was well-controlled, this makes diabetic neuropathy less likely. Metformin can cause B12 deficiency, which would be consistent with her stocking glove distribution. Has no prior B12 level, so plan to check B12 today. Also considered PAD, but less likely given patient has hair growth on extremities and radial/dorsal pulses are symmetric and intact b/l.   -Labs: Vitamin B12      Orders Placed This Encounter   Procedures    Vitamin B12     =========================================================================================    The patient is seen for evaluation of nonclassical congenital adrenal hyperplasia.  Her last visit in our clinic was in August 2024.    Anh Flores is a 30 year old female, previously seen by Dr. 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   Around age 6, while being evaluated for precocious puberty and darkening of the skin around her neck, she was  "diagnosed with late onset CAH and treated with 0.25 mg dexamethasone daily for a short period of time. Treatment with birth control pills did not induce a withdrawal bleeding. She 1st noticed the appearance of facial hair around age 13. Over the years, the patient had sporadic medical care.   Treatment with metformin, spironolactone and birth control pills was restarted in 2017.  Metformin used to cause some epigastric abdominal pain.    Since December 2023, Anh has been experiencing consistent diarrhea (she has a bowel movement each time she needs, approximately twice daily) and abdominal pain.  Despite discontinuing metformin and Ozempic, the symptoms did not improve.  She underwent endoscopies in September this year and the biopsy revealed mild chronic inactive gastritis.    Treatment with birth control pills were started in April 2024 and the patient reported having monthly menstrual periods. In February of 2025, OBGYN began managing CAH. Due to migraines with aura, switched BC pills to the mini pill and started spironolactone.     Sivan was diagnosed with prediabetes in 2014 and she was formally diagnosed with type 2 diabetes in 2018, when A1c was 6.9.   Victoza was started in February 2024 and then she was transition to semaglutide in May 2024, as she developed an allergic reaction at the injection site from Victoza.  On 4/5/2024, hemoglobin A1c was elevated at 8.4.  Subsequently, she was started on metformin.  Hemoglobin A1c was 5.6, in July 2024.  On 3/11/2025, hemoglobin A1c is 5.4% with much improved hypoglycemic control on semaglutide 0.5g weekly and metformin XR 2g daily. Patient has lost ~50lbs since starting Ozempic.    She has been on Ozempic at 0.5 mg weekly.  Reports no nausea, vomiting or constipation. She is pleased with the weight loss, feeling like Ozempic has allowed her to learn how to eat. Also reports she titrated Metformin up to 2g within 1.5 months. Experiences \"flares\" of diarrhea " lasting up to 2 weeks of the month. During this time, she initially would decrease metformin to 2 pills (1g), but felt like this did not help the diarrhea. She has since been taking the metformin as prescribed. Feels GI symptoms are related to the metformin. She has an appointment with GI next week to discuss diarrhea.     Experiences episodes of hypoglycemia, with her lowest BG at 68. Describes hypoglycemic symptoms of lightheadedness, no tremor, palpitations or sweating. Thinks they occur when she is eating a lot of vegetables. Discussed measuring BG with meter during these episodes for more accurate results. Also discussed the her medication regimen of Ozempic or Metformin is unlikely to cause hypoglycemia.     Working with nutritionist, but would like to see diabetes specific nutritionist. Not exercising regularly, light walking and some gardening.     Diabetes complications:  Last eye exam: last eye exam - 3/2024. No DR per patient.   No history of microalbuminuria.  Most recent urine microalbumin negative in March 2025.  Recent GFR above 90.  Since being off gabapentin, she noticed numbness and tingling in her feet, as well as cold extremities.  Most recent lipid panel from 3/7/2025: LDL cholesterol 63, HDL cholesterol 56, triglycerides 90.  On 10 mg atorvastatin daily.    She has been using the jada sensor.  On the sensor, average glucose is 108, with a glucose variability of 14.8%, corresponding to an estimated GMI of 5.4%.  95% of the glucose numbers are within target, none are above 250 and the hypoglycemic episodes are very rare.      Most recent lab results reviewed:  Glucose      70 - 99 mg/dL 74    Patient Fasting? Yes    Ins Growth Factor 1      87 - 286 ng/mL 216    Adrenal Corticotropin      <47 pg/mL 37    Cortisol Serum        ug/dL 25.6    TSH      0.30 - 4.20 uIU/mL 1.08    T4 Free      0.90 - 1.70 ng/dL 1.10      Patient reports her hands and feet are always cold and she has constant numbness  and tingling. These symptoms began 1 year ago and have been present consistently. Thinks working with her hands exacerbates these symptoms. Seen by Orthopedics and occupational therapy who thought symptoms were unlikely due to carpal tunnel and more likely 2/2 diabetes. Discussed this is unlikely given the symptoms began once her diabetes was more well controlled. She does not take a multivitamin, as she was advised to avoid vitamins until liver was evaluated.    ROS:  The facial and body hair has improved  She denies experiencing acne, new stretch marks.  Episodes of lightheadedness with migraines.  Diarrhea lasting 2 weeks of the month.     Prior images:   Brain MRI images from 3/29/2024: Unremarkable pituitary gland  Pelvic US 2007 Limited sonographic evaluation secondary to the patient's body habitus.  CT abdomen and pelvis 2010 - normal uterus and ovaries  An MRI of the brain done on 10/15/14 was unremarkable  Sleep study was done in 2007 or 2008   CT abd from 9/24  - unremarkable adrenal glands     Past Medical History:   Diagnosis Date    CAH (congenital adrenal hyperplasia) 2/9/2010    Followed by Dr. Brewer; next follow up 1.2011.  Metformin increased to 850 mg twice a day.     Diabetes mellitus, type 2 (H) 10/19/2020    Vitamin D deficiency 2/9/2010   Morbid obesity  Acanthosis nigricans  Hydradenitis suppurativa   Headaches   Screening for celiac disease negative in January 2024    Past Surgical History:   Procedure Laterality Date    CHOLECYSTECTOMY      She was in 8th grade     COLONOSCOPY N/A 9/16/2024    Procedure: COLONOSCOPY, WITH BIOPSY;  Surgeon: Bacilio Yancey MD;  Location:  GI    ESOPHAGOSCOPY, GASTROSCOPY, DUODENOSCOPY (EGD), COMBINED N/A 9/16/2024    Procedure: ESOPHAGOGASTRODUODENOSCOPY, WITH BIOPSY;  Surgeon: Bacilio Yancey MD;  Location:  GI     Current Medications    Current Outpatient Medications:     alcohol swab prep pads, Use to swab area of  injection/anthony as directed., Disp: 100 each, Rfl: 3    Atogepant (QULIPTA) 60 MG TABS, Take 60 mg by mouth., Disp: , Rfl:     atorvastatin (LIPITOR) 10 MG tablet, Take 1 tablet (10 mg) by mouth daily., Disp: 90 tablet, Rfl: 2    blood glucose (NO BRAND SPECIFIED) test strip, Use to test blood sugar three times daily or as directed. Preferred blood glucose meter OR supplies to accompany: Blood Glucose Monitor Brands: per insurance., Disp: 100 strip, Rfl: 6    blood glucose monitoring (NO BRAND SPECIFIED) meter device kit, Use to test blood sugar three times daily or as directed. Preferred blood glucose meter OR supplies to accompany: Blood Glucose Monitor Brands: per insurance., Disp: 1 kit, Rfl: 0    budesonide (PULMICORT) 0.5 MG/2ML neb solution, Take 2 mLs (0.5 mg) by nebulization daily., Disp: 60 mL, Rfl: 0    Continuous Glucose Sensor (FREESTYLE AYSE 3 PLUS SENSOR) MISC, Use 1 sensor every 15 days. Use to read blood sugars per 's instructions., Disp: 6 each, Rfl: 3    Continuous Glucose Sensor (FREESTYLE AYSE 3 SENSOR) MISC, 1 each by Other route every 14 days., Disp: 6 each, Rfl: 3    fluticasone (FLONASE) 50 MCG/ACT nasal spray, Spray 1 spray into both nostrils daily, Disp: 16 g, Rfl: 3    gabapentin (NEURONTIN) 300 MG capsule, Take 1 capsule (300 mg) by mouth 3 times daily. Take 300 mg at 9:30 am, 600 mg at 9:30 pm, Disp: 270 capsule, Rfl: 0    insulin pen needle (ULTICARE MICRO) 32G X 4 MM miscellaneous, Use 1 pen needles daily or as directed., Disp: 100 each, Rfl: 3    metFORMIN (GLUCOPHAGE XR) 500 MG 24 hr tablet, Take 4 tablets (2,000 mg) by mouth daily (with dinner)., Disp: 360 tablet, Rfl: 3    mometasone (NASONEX) 50 MCG/ACT nasal spray, Spray 2 sprays into both nostrils daily., Disp: 17 g, Rfl: 3    norethindrone (MICRONOR) 0.35 MG tablet, Take 1 tablet (0.35 mg) by mouth daily., Disp: 90 tablet, Rfl: 3    ondansetron (ZOFRAN ODT) 4 MG ODT tab, DISSOLVE ONE TABLET ON TONGUE EVERY 8  HOURS AS NEEDED FOR NAUSEA, Disp: 30 tablet, Rfl: 1    pantoprazole (PROTONIX) 40 MG EC tablet, Take 1 tablet (40 mg) by mouth daily., Disp: 90 tablet, Rfl: 2    rimegepant (NURTEC) 75 MG ODT tablet, Place 75 mg under the tongue daily as needed for migraine., Disp: , Rfl:     Semaglutide, 1 MG/DOSE, (OZEMPIC) 4 MG/3ML pen, Inject 1 mg subcutaneously every 7 days., Disp: 9 mL, Rfl: 3    spironolactone (ALDACTONE) 50 MG tablet, Take 1 tablet (50 mg) by mouth daily., Disp: 90 tablet, Rfl: 3    SUMAtriptan (IMITREX) 50 MG tablet, Take 2 tablets (100 mg) by mouth at onset of headache for migraine. May repeat in 2 hours. Max 4 tablets/24 hours., Disp: , Rfl:     thin (NO BRAND SPECIFIED) lancets, Use with lanceting device. To accompany: Blood Glucose Monitor Brands: per insurance., Disp: 100 each, Rfl: 6    topiramate (TOPAMAX) 25 MG tablet, TAKE TWO TABLETS BY MOUTH EVERY NIGHT AT BEDTIME X 7 DAYS, THEN ONE IN THE AM AND TWO EVERY NIGHT AT BEDTIME X 7 DYAS, THEN TWO TWICE DAILY, Disp: , Rfl:     UBRELVY 100 MG tablet, Take 100 mg by mouth at onset of headache., Disp: , Rfl:     gabapentin (NEURONTIN) 300 MG capsule, Take 1 capsule (300 mg) by mouth 2 times daily., Disp: 180 capsule, Rfl: 0    Family History   Problem Relation Age of Onset    Neurological Sister 16     migraines   Maternal uncle - colon cancer.  Both maternal grandparents and her mother have hypertension. There is a family history of obesity and hirsutism (both her sisters). There is a family history of obesity. Her mother and 2 great aunts have type 2 diabetes. Her mother is  American.      Social History  She denies smoking, drinking alcohol or using illicit drugs. Occupation: Used to work as a  but not recently given the medical issues.  She enjoys participating with her dog in dog shows.                Vital Signs     Previous Weights:    Wt Readings from Last 10 Encounters:   03/11/25 112.5 kg (248 lb)   03/10/25 114.4 kg (252 lb 3.2  oz)   03/07/25 114.2 kg (251 lb 11.2 oz)   03/02/25 114.5 kg (252 lb 8 oz)   02/26/25 115.1 kg (253 lb 12.8 oz)   02/21/25 115.7 kg (255 lb)   02/21/25 115.7 kg (255 lb)   01/22/25 116.1 kg (256 lb)   01/22/25 116.5 kg (256 lb 14.4 oz)   12/30/24 119.3 kg (263 lb)        BP Readings from Last 6 Encounters:   03/11/25 131/86   03/10/25 112/78   03/07/25 120/84   03/02/25 116/77   02/26/25 124/89   02/21/25 (!) 153/91     Vital signs:   /86   Pulse 90   Resp 16   Wt 112.5 kg (248 lb)   LMP 02/26/2025 (Exact Date)   SpO2 98%   BMI 48.43 kg/m      Physical Exam  General Appearance: General Obesity, round face, no plethora    Eyes:  conjutivae and extra-ocular motions are normal.                                       HEENT:   short neck, no JVD, no bruits      unable to palpate the thyroid, no palpable nodules; mild overbite noticed with some spreading of the teeth  Mild prognathism   Cardiovascular:  regular rhythm, no murmurs, distal radius and dorsal pulse palpable, no edema  Respiratory:        chest clear, no rales, no rhonchi   Gastrointestinal:  abdomen soft, obese, non-distended  Musculoskeletal:  normal tone and strength  Psychological:          affect and judgment normal  Skin:  acanthosis nigricans - mainly at the flexural areas; severe facial hirsutism; no acne, benign striae   ; hyperpigmentation of the skin  Neurological: no resting tremor. Sensation to light touch intact all 4 extremities. Sensation to cold temperature slightly diminished on palmar surface of hands b//l. Patellar reflexes intact, diminished reflexes on R side.     Lab Results  I reviewed prior lab results documented in Epic.  Lab Results   Component Value Date    A1C 5.4 03/07/2025    A1C 5.6 07/30/2024    A1C 8.0 (H) 04/30/2024    A1C 8.4 (H) 04/05/2024    A1C 8.2 (H) 03/29/2024    A1C 7.6 (H) 01/18/2019    A1C 6.9 (H) 01/15/2018    A1C 6.4 (H) 03/15/2017    A1C 6.1 (H) 09/17/2014    A1C 5.7 07/09/2010     40 minutes spent on  the date of the encounter doing chart review, history and exam, documentation and further activities as noted above.  The longitudinal plan of care for the diagnosis(es)/condition(s) as documented were addressed during this visit. Due to the added complexity in care, I will continue to support Anh in the subsequent management and with ongoing continuity of care.

## 2025-03-11 NOTE — LETTER
3/11/2025      Anh Flores  [address] [facility] [address]      Dear Colleague,    Thank you for referring your patient, Anh Flores, to the [facility]. Please see a copy of my visit note below.           Assessment     1. Nonclassical congenital adrenal hyperplasia, associated with primary amenorrhea, treated with dexamethasone until 2014, when dexamethasone was discontinued. She used to be medicated with metformin, spironolactone and birth control pills.  They were discontinued around 2019.  The patient reestablish care in April 2023, when she was started on treatment with birth control pills and metformin as the evaluation for nonclassical CAH revealed normal gonadotropins and normal androgens.  Since being on treatment with birth control pills, she has regular menstrual periods and improved hirtuism. She f/up with Dr. Gutierrez from [facility]. Due to migraines with aura, switched birth control from combined OCPs to the mini pill. She was also restarted on spironolactone at this time.  Patient titrated Metformin dose to 2g XR daily. She reports significant diarrhea, which seems unrelated to Metformin. While her glycemic control has greatly improved, metformin is still beneficial for treating CAH and the associated insulin resistance. She has an appointment with GI  next week, so I will defer to their recommendations for holding metformin 2/2 GI upset, at least temporarily, on a trial basis.   Plan:  -Continue Metformin XR 2g daily pending GI recommendations     2.  Class III obesity, associated with severe insulin resistance.   Patient has had positive response to semaglutide with 50 lbs weight loss since 03/2024 and reduction of HgbA1c to 5.6%. She tolerated medication well with no reported side effects.  Recommended to increase semaglutide from 0.5 mg weekly to 1 mg weekly.  Plan:  -Increase semaglutide from 0.5 mg weekly to 1 mg weekly    3. Type 2 diabetes, now well  controlled, with no known microvascular disease  Patient has greatly improved glycemic control, Hgb A1c decreased from 8.6% in 04/2024 to 5.4% on 3/07/2025. Reports episodes of hypoglycemia, where her blood sugar is as low as 68, and she experiences light headedness. Discussed her current medication regimen is unlikely to cause hypoglycemia and light headedness is not typical symptom of hypoglycemia  That being said, this may not be true hypoglycemia.During these episodes, I recommend checking sugars with glucose meter rather than jada sensor. Bring meter to next appointment.  Plan:  -Semaglutide as above  -Continue Metformin XR 2g daily pending GI recommendations   -Annual eye exam    4.  Bilateral upper/lower extremity numbness and tingling, r/o neuropathy  Patient has been experiencing bilateral upper and lower extremity numbness, tingling and coldness fro approximately 1 year. Considered a broad differential including carpal tunnel syndrome, peripheral neuropathy, vitamin deficiency, diabetic neuropathy, spinal cord compression, PAD. She was recently evaluated by orthopedics and occupational therapy, who attributed these symptoms to diabetic neuropathy. Given the symptoms began after her diabetes was well-controlled, this makes diabetic neuropathy less likely. Metformin can cause B12 deficiency, which would be consistent with her stocking glove distribution. Has no prior B12 level, so plan to check B12 today. Also considered PAD, but less likely given patient has hair growth on extremities and radial/dorsal pulses are symmetric and intact b/l.   -Labs: Vitamin B12      Orders Placed This Encounter   Procedures     Vitamin B12     =========================================================================================    The patient is seen for evaluation of nonclassical congenital adrenal hyperplasia.  Her last visit in our clinic was in August 2024.    Anh Flores is a 30 year old female, previously seen  by Dr. 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   Around age 6, while being evaluated for precocious puberty and darkening of the skin around her neck, she was diagnosed with late onset CAH and treated with 0.25 mg dexamethasone daily for a short period of time. Treatment with birth control pills did not induce a withdrawal bleeding. She 1st noticed the appearance of facial hair around age 13. Over the years, the patient had sporadic medical care.   Treatment with metformin, spironolactone and birth control pills was restarted in 2017.  Metformin used to cause some epigastric abdominal pain.    Since December 2023, Anh has been experiencing consistent diarrhea (she has a bowel movement each time she needs, approximately twice daily) and abdominal pain.  Despite discontinuing metformin and Ozempic, the symptoms did not improve.  She underwent endoscopies in September this year and the biopsy revealed mild chronic inactive gastritis.    Treatment with birth control pills were started in April 2024 and the patient reported having monthly menstrual periods. In February of 2025, OBGYN began managing CAH. Due to migraines with aura, switched BC pills to the mini pill and started spironolactone.     Sivan was diagnosed with prediabetes in 2014 and she was formally diagnosed with type 2 diabetes in 2018, when A1c was 6.9.   Victoza was started in February 2024 and then she was transition to semaglutide in May 2024, as she developed an allergic reaction at the injection site from Victoza.  On 4/5/2024, hemoglobin A1c was elevated at 8.4.  Subsequently, she was started on metformin.  Hemoglobin A1c was 5.6, in July 2024.  On 3/11/2025, hemoglobin A1c is 5.4% with much improved hypoglycemic control on semaglutide 0.5g weekly and metformin XR 2g daily. Patient has lost ~50lbs since starting Ozempic.    She has been on Ozempic at 0.5  "mg weekly.  Reports no nausea, vomiting or constipation. She is pleased with the weight loss, feeling like Ozempic has allowed her to learn how to eat. Also reports she titrated Metformin up to 2g within 1.5 months. Experiences \"flares\" of diarrhea lasting up to 2 weeks of the month. During this time, she initially would decrease metformin to 2 pills (1g), but felt like this did not help the diarrhea. She has since been taking the metformin as prescribed. Feels GI symptoms are related to the metformin. She has an appointment with GI next week to discuss diarrhea.     Experiences episodes of hypoglycemia, with her lowest BG at 68. Describes hypoglycemic symptoms of lightheadedness, no tremor, palpitations or sweating. Thinks they occur when she is eating a lot of vegetables. Discussed measuring BG with meter during these episodes for more accurate results. Also discussed the her medication regimen of Ozempic or Metformin is unlikely to cause hypoglycemia.     Working with nutritionist, but would like to see diabetes specific nutritionist. Not exercising regularly, light walking and some gardening.     Diabetes complications:  Last eye exam: last eye exam - 3/2024. No DR per patient.   No history of microalbuminuria.  Most recent urine microalbumin negative in March 2025.  Recent GFR above 90.  Since being off gabapentin, she noticed numbness and tingling in her feet, as well as cold extremities.  Most recent lipid panel from 3/7/2025: LDL cholesterol 63, HDL cholesterol 56, triglycerides 90.  On 10 mg atorvastatin daily.    She has been using the jada sensor.  On the sensor, average glucose is 108, with a glucose variability of 14.8%, corresponding to an estimated GMI of 5.4%.  95% of the glucose numbers are within target, none are above 250 and the hypoglycemic episodes are very rare.      Most recent lab results reviewed:  Glucose      70 - 99 mg/dL 74    Patient Fasting? Yes    Ins Growth Factor 1      87 - 286 " ng/mL 216    Adrenal Corticotropin      <47 pg/mL 37    Cortisol Serum        ug/dL 25.6    TSH      0.30 - 4.20 uIU/mL 1.08    T4 Free      0.90 - 1.70 ng/dL 1.10      Patient reports her hands and feet are always cold and she has constant numbness and tingling. These symptoms began 1 year ago and have been present consistently. Thinks working with her hands exacerbates these symptoms. Seen by Orthopedics and occupational therapy who thought symptoms were unlikely due to carpal tunnel and more likely 2/2 diabetes. Discussed this is unlikely given the symptoms began once her diabetes was more well controlled. She does not take a multivitamin, as she was advised to avoid vitamins until liver was evaluated.    ROS:  The facial and body hair has improved  She denies experiencing acne, new stretch marks.  Episodes of lightheadedness with migraines.  Diarrhea lasting 2 weeks of the month.     Prior images:   Brain MRI images from 3/29/2024: Unremarkable pituitary gland  Pelvic US 2007 Limited sonographic evaluation secondary to the patient's body habitus.  CT abdomen and pelvis 2010 - normal uterus and ovaries  An MRI of the brain done on 10/15/14 was unremarkable  Sleep study was done in 2007 or 2008   CT abd from 9/24  - unremarkable adrenal glands     Past Medical History:   Diagnosis Date     CAH (congenital adrenal hyperplasia) 2/9/2010    Followed by Dr. Brewer; next follow up 1.2011.  Metformin increased to 850 mg twice a day.      Diabetes mellitus, type 2 (H) 10/19/2020     Vitamin D deficiency 2/9/2010   Morbid obesity  Acanthosis nigricans  Hydradenitis suppurativa   Headaches   Screening for celiac disease negative in January 2024    Past Surgical History:   Procedure Laterality Date     CHOLECYSTECTOMY      She was in 8th grade      COLONOSCOPY N/A 9/16/2024    Procedure: COLONOSCOPY, WITH BIOPSY;  Surgeon: Bacilio Yancey MD;  Location:  GI     ESOPHAGOSCOPY, GASTROSCOPY, DUODENOSCOPY  (EGD), COMBINED N/A 9/16/2024    Procedure: ESOPHAGOGASTRODUODENOSCOPY, WITH BIOPSY;  Surgeon: Bacilio Yancey MD;  Location:  GI     Current Medications    Current Outpatient Medications:      alcohol swab prep pads, Use to swab area of injection/anthony as directed., Disp: 100 each, Rfl: 3     Atogepant (QULIPTA) 60 MG TABS, Take 60 mg by mouth., Disp: , Rfl:      atorvastatin (LIPITOR) 10 MG tablet, Take 1 tablet (10 mg) by mouth daily., Disp: 90 tablet, Rfl: 2     blood glucose (NO BRAND SPECIFIED) test strip, Use to test blood sugar three times daily or as directed. Preferred blood glucose meter OR supplies to accompany: Blood Glucose Monitor Brands: per insurance., Disp: 100 strip, Rfl: 6     blood glucose monitoring (NO BRAND SPECIFIED) meter device kit, Use to test blood sugar three times daily or as directed. Preferred blood glucose meter OR supplies to accompany: Blood Glucose Monitor Brands: per insurance., Disp: 1 kit, Rfl: 0     budesonide (PULMICORT) 0.5 MG/2ML neb solution, Take 2 mLs (0.5 mg) by nebulization daily., Disp: 60 mL, Rfl: 0     Continuous Glucose Sensor (FREESTYLE AYSE 3 PLUS SENSOR) MISC, Use 1 sensor every 15 days. Use to read blood sugars per 's instructions., Disp: 6 each, Rfl: 3     Continuous Glucose Sensor (FREESTYLE AYSE 3 SENSOR) MISC, 1 each by Other route every 14 days., Disp: 6 each, Rfl: 3     fluticasone (FLONASE) 50 MCG/ACT nasal spray, Spray 1 spray into both nostrils daily, Disp: 16 g, Rfl: 3     gabapentin (NEURONTIN) 300 MG capsule, Take 1 capsule (300 mg) by mouth 3 times daily. Take 300 mg at 9:30 am, 600 mg at 9:30 pm, Disp: 270 capsule, Rfl: 0     insulin pen needle (ULTICARE MICRO) 32G X 4 MM miscellaneous, Use 1 pen needles daily or as directed., Disp: 100 each, Rfl: 3     metFORMIN (GLUCOPHAGE XR) 500 MG 24 hr tablet, Take 4 tablets (2,000 mg) by mouth daily (with dinner)., Disp: 360 tablet, Rfl: 3     mometasone (NASONEX) 50 MCG/ACT  nasal spray, Spray 2 sprays into both nostrils daily., Disp: 17 g, Rfl: 3     norethindrone (MICRONOR) 0.35 MG tablet, Take 1 tablet (0.35 mg) by mouth daily., Disp: 90 tablet, Rfl: 3     ondansetron (ZOFRAN ODT) 4 MG ODT tab, DISSOLVE ONE TABLET ON TONGUE EVERY 8 HOURS AS NEEDED FOR NAUSEA, Disp: 30 tablet, Rfl: 1     pantoprazole (PROTONIX) 40 MG EC tablet, Take 1 tablet (40 mg) by mouth daily., Disp: 90 tablet, Rfl: 2     rimegepant (NURTEC) 75 MG ODT tablet, Place 75 mg under the tongue daily as needed for migraine., Disp: , Rfl:      Semaglutide, 1 MG/DOSE, (OZEMPIC) 4 MG/3ML pen, Inject 1 mg subcutaneously every 7 days., Disp: 9 mL, Rfl: 3     spironolactone (ALDACTONE) 50 MG tablet, Take 1 tablet (50 mg) by mouth daily., Disp: 90 tablet, Rfl: 3     SUMAtriptan (IMITREX) 50 MG tablet, Take 2 tablets (100 mg) by mouth at onset of headache for migraine. May repeat in 2 hours. Max 4 tablets/24 hours., Disp: , Rfl:      thin (NO BRAND SPECIFIED) lancets, Use with lanceting device. To accompany: Blood Glucose Monitor Brands: per insurance., Disp: 100 each, Rfl: 6     topiramate (TOPAMAX) 25 MG tablet, TAKE TWO TABLETS BY MOUTH EVERY NIGHT AT BEDTIME X 7 DAYS, THEN ONE IN THE AM AND TWO EVERY NIGHT AT BEDTIME X 7 DYAS, THEN TWO TWICE DAILY, Disp: , Rfl:      UBRELVY 100 MG tablet, Take 100 mg by mouth at onset of headache., Disp: , Rfl:      gabapentin (NEURONTIN) 300 MG capsule, Take 1 capsule (300 mg) by mouth 2 times daily., Disp: 180 capsule, Rfl: 0    Family History   Problem Relation Age of Onset     Neurological Sister 16     migraines   Maternal uncle - colon cancer.  Both maternal grandparents and her mother have hypertension. There is a family history of obesity and hirsutism (both her sisters). There is a family history of obesity. Her mother and 2 great aunts have type 2 diabetes. Her mother is  American.      Social History  She denies smoking, drinking alcohol or using illicit drugs. Occupation:  Used to work as a  but not recently given the medical issues.  She enjoys participating with her dog in dog shows.                Vital Signs     Previous Weights:    Wt Readings from Last 10 Encounters:   03/11/25 112.5 kg (248 lb)   03/10/25 114.4 kg (252 lb 3.2 oz)   03/07/25 114.2 kg (251 lb 11.2 oz)   03/02/25 114.5 kg (252 lb 8 oz)   02/26/25 115.1 kg (253 lb 12.8 oz)   02/21/25 115.7 kg (255 lb)   02/21/25 115.7 kg (255 lb)   01/22/25 116.1 kg (256 lb)   01/22/25 116.5 kg (256 lb 14.4 oz)   12/30/24 119.3 kg (263 lb)        BP Readings from Last 6 Encounters:   03/11/25 131/86   03/10/25 112/78   03/07/25 120/84   03/02/25 116/77   02/26/25 124/89   02/21/25 (!) 153/91     Vital signs:   /86   Pulse 90   Resp 16   Wt 112.5 kg (248 lb)   LMP 02/26/2025 (Exact Date)   SpO2 98%   BMI 48.43 kg/m      Physical Exam  General Appearance: General Obesity, round face, no plethora    Eyes:  conjutivae and extra-ocular motions are normal.                                       HEENT:   short neck, no JVD, no bruits      unable to palpate the thyroid, no palpable nodules; mild overbite noticed with some spreading of the teeth  Mild prognathism   Cardiovascular:  regular rhythm, no murmurs, distal radius and dorsal pulse palpable, no edema  Respiratory:        chest clear, no rales, no rhonchi   Gastrointestinal:  abdomen soft, obese, non-distended  Musculoskeletal:  normal tone and strength  Psychological:          affect and judgment normal  Skin:  acanthosis nigricans - mainly at the flexural areas; severe facial hirsutism; no acne, benign striae   ; hyperpigmentation of the skin  Neurological: no resting tremor. Sensation to light touch intact all 4 extremities. Sensation to cold temperature slightly diminished on palmar surface of hands b//l. Patellar reflexes intact, diminished reflexes on R side.     Lab Results  I reviewed prior lab results documented in Epic.  Lab Results   Component Value  Date    A1C 5.4 03/07/2025    A1C 5.6 07/30/2024    A1C 8.0 (H) 04/30/2024    A1C 8.4 (H) 04/05/2024    A1C 8.2 (H) 03/29/2024    A1C 7.6 (H) 01/18/2019    A1C 6.9 (H) 01/15/2018    A1C 6.4 (H) 03/15/2017    A1C 6.1 (H) 09/17/2014    A1C 5.7 07/09/2010     40 minutes spent on the date of the encounter doing chart review, history and exam, documentation and further activities as noted above.  The longitudinal plan of care for the diagnosis(es)/condition(s) as documented were addressed during this visit. Due to the added complexity in care, I will continue to support Anh in the subsequent management and with ongoing continuity of care.                               Again, thank you for allowing me to participate in the care of your patient.        Sincerely,        Alejandra Cronin MD    Electronically signed

## 2025-03-11 NOTE — PATIENT INSTRUCTIONS
Welcome to the [facility] Endocrinology and Diabetes Clinics     Our Endocrinology Clinics are here to provide you with a team-based, collaborative approach in the diagnosis and treatment of patients with diabetes and endocrine disorders. The team is made up of Physicians, Physician Assistants, Certified Diabetes Educators, Registered Nurses, Medical Assistants, Emergency Medical Technicians, and many others, all of whom have the unified goal of providing our patients with high quality care.     Please see below for some helpful tips to best navigate and use the [facility] Endocrinology clinic:     [place] Respect: At [facility], we are committed to a respectful and safe space for all patients, visitors, and staff.  We believe that mutual respect between patients and their care team is the foundation of quality care.  It is our expectation that you will be treated with respect by your care team.  In turn, we ask that all communication with the care team (written and verbal) be respectful and free from profanity, threatening, or abusive language.  Disrespectful communication undermines our therapeutic relationship with you and may result in us being unable to continue to provide your care.    Refills: A provider must see you at least annually to prescribe and refill medications. This is to ensure your safety as well as meet insurance and compliance regulations.    Scheduling: Many of our Providers offer both in-person or video visits. Please call to schedule any needed follow ups as soon as possible because our provider schedules fill up very quickly. Our care team has the right to require an in-person visit when they believe that it is medically necessary. Please remember that for any virtual visits, you must be in the [facility] at the time of the visit, otherwise we are unable to see you and you will need to be rescheduled.    Missed Appointments: If you need to cancel or miss your  scheduled appointment, please call the clinic at [phone] to reschedule.  Please note if you repeatedly miss appointments or repeatedly miss appointments without calling to inform us ahead of time (no-show), the clinic may elect to not allow you to reschedule without speaking to a manager, may require a Partnership In Care Agreement prior to rescheduling, or could result in you no longer being able to receive care from the clinic. Providing the clinic with timely notification if you have to miss an appointment, allows us to better serve the needs of all of our patients.    Primary Care Provider: Our Endocrinologists are Specialists in their field. We expect you to have a Primary Care Provider established to handle any needs outside of your diabetes and endocrine care.  We would be happy to assist you find a Primary Care Provider, if you do not have one.    Placely: Placely is a wonderful resource that allows you access to your Care Team via online or the fanta. Please ask a member of the team if you would like help creating an account. Please note that it may take up to 2 business days for a response. Placely messages are not reviewed on weekends or after business hours.  Emergent or urgent care needs should never be communicated via Placely.  If you experience a medical emergency call 911 or go to the nearest emergency room.    Labs: It is recommended that you stay within the [facility] for labs but you are welcome to obtain ordered labs (with some exceptions) from any location of your choice as long as they are able to complete and process the needed labs. If you need us to fax orders to your preferred lab, please provide us the name and fax number of the lab you would like to go to so we can fax the orders. If your labs are drawn outside of the [facility], please have them fax the results to [phone] ([place]) or [phone] ([place]) or via [facility]. It is your  responsibility to ensure that outside lab results are sent to us.    We look forward to working with you. Please do not hesitate to reach out with any questions.    Thank you,    The Endocrine Team    [facility] Address:   [place] Address:     [place] [address]    Phone: [phone]  Fax: [phone]   [address] [place] [address]    Phone: [phone]  Fax: [phone]     [facility] Cost Estimate Phone Number: [phone]    General Lab and Imaging Scheduling Phone Number: [phone]      If you are interested in exploring research opportunities in the Division of Diabetes, Endocrinology and Metabolism and within the [facility] you are welcome to view the following QR codes by scanning them using your phone's camera to access a link to more information.  Participating in a research study is voluntary. Your decision whether or not to participate will not affect your current or future relations with the [facility] or [facility]. Current available studies are:     Type 1 Diabetes  Adults     Pediatrics     Type 2 Diabetes  Weight Loss - People Treated with Diet or Metformin Only     Pediatrics and Young Adults

## 2025-03-12 ENCOUNTER — TRANSFERRED RECORDS (OUTPATIENT)
Dept: HEALTH INFORMATION MANAGEMENT | Facility: CLINIC | Age: 30
End: 2025-03-12
Payer: COMMERCIAL

## 2025-03-12 LAB — VIT B12 SERPL-MCNC: 301 PG/ML (ref 232–1245)

## 2025-03-12 NOTE — TELEPHONE ENCOUNTER
Left voicemail for patient to schedule surgery with Dr. Roberts. Provided direct line for patient to call.    P: [phone]    Ame Hansen on 3/12/2025 at 1:39 PM

## 2025-03-13 NOTE — TELEPHONE ENCOUNTER
Received call to schedule surgery with: Dr. Roberts    Spoke with: Evelyn     Date of Surgery: 5/19    Estimated Arrival time Discussed with Patient:   1 PM    Location of surgery: [facility]    Pre-operative H&P: clinic PAC visit 5/2 at 12:30 PM    Post-operative Appointment w/DEZ or Surgeon: Dr. Roberts 5/30 at 8:10 AM    Additional Appointments: N/A, no additional visits requested    Discussed with patient PAC RN will provide arrival time and instructions for surgery at the time of the appointment: [[facility] only]: Yes    Standard Ortho Surgery Packet: Yes patient received during appointment with provider 3/11/25    All patients questions were answered and was instructed to contact the clinic with any questions or concerns.     Additional Comments: May need to postpone surgery into late June/July, they will call if needed      Jossy Gomez on 3/13/2025 at 1:43 PM

## 2025-03-18 ENCOUNTER — TELEPHONE (OUTPATIENT)
Dept: PHYSICAL MEDICINE AND REHAB | Facility: CLINIC | Age: 30
End: 2025-03-18
Payer: COMMERCIAL

## 2025-03-18 PROBLEM — M53.3 COCCYDYNIA: Status: ACTIVE | Noted: 2025-03-11

## 2025-03-18 NOTE — TELEPHONE ENCOUNTER
Phoned the patient and left VM. Stated to call the pain clinic to schedule injection procedure at [phone].

## 2025-03-18 NOTE — TELEPHONE ENCOUNTER
Called patient to schedule procedure with Dr. Fitzpatrick    Date of Procedure: 4/9/25    Arrival time given: Yes: Arrival Time 8:15am       Procedure Location: [facility]     Verified Location with Patient:  Yes  Address provided to the patient     Pre-op H&P Required:  No: Local anesthesia        Post-Op/Follow Up Appt:  Yes: 4/25/25  @10:20am     Informed patient they will need a  to drive them home:  Yes    Patients : Spouse     Patient is aware that pre-op RN from the procedure center will call 2-3 days prior to scheduled procedure to confirm arrival time and review any instructions:  Yes       Additional Comments: N/A        Shala Luque MA on 3/18/2025 at 10:26 AM      P: [phone]*

## 2025-03-19 NOTE — TELEPHONE ENCOUNTER
FUTURE VISIT INFORMATION      SURGERY INFORMATION:  Date: 25  Location: [facility] OR  Surgeon:  Vic Roberts MD   Anesthesia Type:  Choice with Block   Procedure: LEFT TRIGGER THUMB RELEASE, LEFT OPEN CARPAL TUNNEL RELEASE, LEFT FIRST DORSAL COMPARTMENT RELEASE, LEFT CUBITAL TUNNEL RELEASE WITH POSSIBLE ULNAR NERVE TRANSPOSITION  Consult: 3/10/25 - [place]     RECORDS REQUESTED FROM:       Primary Care Provider: Sofy Triana MD - [facility]     Pertinent Medical History: TOMMY; Diabetes Type 2; Congenital adrenal hyperplasia    Most recent EKG+ Tracin25    Most recent ECHO: 25    Most recent PFT's: 25    Most recent Sleep Study: 11/15/24

## 2025-03-21 ENCOUNTER — OFFICE VISIT (OUTPATIENT)
Dept: GASTROENTEROLOGY | Facility: CLINIC | Age: 30
End: 2025-03-21
Payer: COMMERCIAL

## 2025-03-21 ENCOUNTER — PRE VISIT (OUTPATIENT)
Dept: GASTROENTEROLOGY | Facility: CLINIC | Age: 30
End: 2025-03-21

## 2025-03-21 ENCOUNTER — TRANSFERRED RECORDS (OUTPATIENT)
Dept: MULTI SPECIALTY CLINIC | Facility: CLINIC | Age: 30
End: 2025-03-21

## 2025-03-21 VITALS
SYSTOLIC BLOOD PRESSURE: 120 MMHG | HEART RATE: 78 BPM | WEIGHT: 252.1 LBS | OXYGEN SATURATION: 99 % | HEIGHT: 60 IN | DIASTOLIC BLOOD PRESSURE: 75 MMHG | BODY MASS INDEX: 49.49 KG/M2

## 2025-03-21 DIAGNOSIS — K52.9 CHRONIC DIARRHEA: Primary | ICD-10-CM

## 2025-03-21 DIAGNOSIS — K58.0 IRRITABLE BOWEL SYNDROME WITH DIARRHEA: ICD-10-CM

## 2025-03-21 DIAGNOSIS — K90.89 BILE ACID MALABSORPTION SYNDROME: ICD-10-CM

## 2025-03-21 PROCEDURE — 99215 OFFICE O/P EST HI 40 MIN: CPT | Performed by: PHYSICIAN ASSISTANT

## 2025-03-21 RX ORDER — COLESTIPOL HYDROCHLORIDE 1 G/1
1 TABLET ORAL 2 TIMES DAILY
Qty: 60 TABLET | Refills: 2 | Status: SHIPPED | OUTPATIENT
Start: 2025-03-21

## 2025-03-21 ASSESSMENT — PAIN SCALES - GENERAL: PAINLEVEL_OUTOF10: SEVERE PAIN (7)

## 2025-03-21 NOTE — LETTER
3/21/2025      Anh Flores  [address] [facility] [address]      Dear Colleague,    Thank you for referring your patient, Anh Flores, to the [facility] [place]. Please see a copy of my visit note below.      GI CLINIC VISIT    CC/REFERRING MD:  No ref. provider found  REASON FOR CONSULTATION: diarrhea    ASSESSMENT/PLAN:  Anh Flores is a 30 year old year old female with PMHx significant for history of CAH, obesity, type 2 diabetes, PCOS who [facility] seeing [facility] for diarrhea.     Previously seen and evaluated with [facility] and here for a second opinion.     #Chronic diarrhea  #IBS-D  #Bile acid malabsorption  Onset of baseline diarrhea age 12/11 after CCY with sx compounded in 2023 after cryptosporidium infection. Celiac serologies NEG 2024 and recently had bidirectional endoscopies with Dr Yancey 9/2024 - TI normal and random colon biopsies negative for microscopic colitis. Duodenal biopsies were negative for celiac disease but did show some mild duodenitis. Gastric biopsy with mild chronic inactive gastritis w/o h.pylori. She went on cholestyramine with fair relief though use was irregular which further compounded the diarrhea/irregular stooling cycle (at times passing firm/hard small stools). Currently she's passing 2 soft but formed stools and shares she stools daily. These symptoms are consistent with IBS-D (meeting [facility]-IV criteria) with flares compounded by bile acid malabsorption (s/p CCY). I am also highly suspicious of an underlying pelvic floor dysfunction. Ddx includes other malabsorption, less likely underlying inflammation (CTAP W contrast 2024 without bowel inflammatory changes, no obstruction either) vs other. Maintaining oral intake and hydration; never required IVF/admission.     -Cholestyramine powder binds to her teeth and makes them hurt which is the reason why she does not use it regularly.  Switched this out to colestipol  tablets 1 g twice daily.  I advise she speak with the pharmacist about dosing of this medication in relation to her other medications, for concern of binding her other agents.   -Recommended start of daily bulking fiber with Citrucel, per AVS  -Order Cryptosporidium/Giardia + enteric panel  -Order CRP and fecal calprotectin.  Obtain MRE for evaluation of small bowel given longstanding diarrhea   -Order fecal elastase, caution false result due to liquid consistency of stools  -Order timed fecal fat, 72 hours  -Order pelvic floor evaluation.  We discussed this testing, its indication.  She is agreeable.  Will asked team to assist with referral  -Cont working with external dietitian     Future consideration  1.  Given predominant abdominal pain component, she would be a good candidate for a neuromodulator such as a tricyclic antidepressant. EKG 1/22/2025 - 453 m/s.  We can consider this in the future  2.  Breath test for small intestinal bacterial overgrowth, fructose intolerance, carbohydrate malabsorption  3. If the diarrhea continues despite our management options, we can consider evaluation for rare causes of chronic diarrhea such as hormone secreting tumors (eg.,  fasting serum gastrin, serum calcitonin, somatostatin, vasoactive intestinal polypeptide, 24-hour urine 5-HIAA)  4.  Laboratory studies to assess for malabsorption can include albumin, RBC folate, serum iron, total iron-binding capacity, B12, calcium, magnesium, carotene, vitamin D    Orders Placed This Encounter   Procedures     MR Enterography wo and w Contrast     Elastase Fecal     CRP inflammation     Calprotectin Feces     Fat Stool Quant Timed Collection     GIARDIA AND CRYPTOSPORIDIUM ANTIGEN PANEL     Enteric Bacteria and Virus Panel PCR     Colorectal cancer screening: aged 45    RTC 10-12 wk or sooner PRN     Thank you for this consultation.  It was a pleasure to participate in the care of this patient; please contact me with any further  questions.     Yanely Hansen PA-C    Follow up: As planned above. Today, I personally spent 60  minutes spent on the date of the encounter doing chart review, history and exam, documentation and further activities per the note.    HPI  Anh Flores is a 30 year old year old female with PMHx of history of CAH, obesity, type 2 diabetes, PCOS following with the [facility] group for diarrhea since age [age].     Previously seen with [facility] for chronic diarrhea - per notes, improved with infrequent use of bile acid sequestrant     She had CCY at age 10 and 11 - then developed baseline diarrhea  She then got cryptosporidium infx in 12/2023 and had a month of N/V/D  -since then, the diarrhea has worsened    -reports flares of the diarrhea, can last 2d-3wk; consistency varies from frequent pudding consistency stools to BSC type 7 stools that are difficult to manage. She endorses frequent small volumed incomplete BM that will continue for hours until she empties  --If she eats something that irritates her stomach, usually within 20-30 min, her abd feels bubbly and she then needs to have a BM     -She can then get a week of 'normal' stooling, usually passing 2 bsc type 4 stools though shares throughout the week, the stools can get looser . Even with the normal stools, she does not endorse complete evacuation.     -she stools every day, no days without a BM     She was put on cholestyramine that she takes intermittently (when diarrhea gets very bad - she characterizes this as stools that look like a BM after a colonoscopy prep) and it has helped at times but shares it binds to her teeth and makes it hurt . Not been on colestipol.   -states she was on all the OTC meds - imodium, pepto bismol without relief   -she is not on a daily bowel regiment     Currently she is reporting she is in day 2 of a softer stool but not actively in a flare     She is working w/ dietary and had been on a few different diets   -even if she fasts, she  can get diarrhea    She endorse chronic baseline abdominal pain (since aged [age]) that can flare with her episodes of diarrhea too; pressure sensation, fullness and a pulsing sensation. She feels like her intestines are always sore   -no meds like NSAIDs, is on gabapentin   -nothing she has tried has helped this pain     She has been on ozempic for a year now, but went off it for a NMGES and endoscopy procedure. The stomach pain did not change being on or off the ozempic.     She is on metformin XR 1000 mg BID, and scales back her metformin use (from 4x500 to 2-3x500). She doesn't feel the metformin makes the diarrhea worse though later shares when diarrhea flares, she cuts down on metformin as she has limited PO intake and finds her sugars drop so she adjusts metformin accordingly     She endorses some nausea w/o emesis, poor appetite, but no consistent unintentional weight loss (can vacillate a few lb, gaining/losing)  She has gone to ER for concerns of dehydration though she was not given IVF     No black stools. Can see some blood on water/TP when she stools frequently.     Not had SB checked, not checked with breath test     Family Hx  Maternal Uncle - colon cancer   No other known family history or GI related malignancy (esophageal, gastric, pancreatic, liver or colon) or family history of IBD/celiac disease.     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     Priority:  Medium     Abnormal finding on imaging of liver 10/18/2024     Priority: Medium     Chronic diarrhea 10/18/2024     Priority: Medium     Lactose intolerance 09/10/2024     Priority: Medium     Obstructive sleep apnea 07/05/2024     Priority: Medium     Insomnia, unspecified type 07/05/2024     Priority: Medium     Bilateral carpal tunnel syndrome 06/11/2024     Priority: Medium     Diabetes mellitus, type 2 (H) 10/19/2020     Priority: Medium     Hidradenitis suppurativa 01/23/2019     Priority: Medium     Morbid obesity with body mass index of 60.0-69.9 in adult (H) 01/23/2019     Priority: Medium     Elevated BP without diagnosis of hypertension 01/23/2019     Priority: Medium     Morbid obesity (H) 10/17/2014     Priority: Medium     Chronic headaches 10/13/2014     Priority: Medium     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 Brewer; next follow up 1.2011.    Metformin increased to 850 mg twice a day.       Chronic abdominal pain 02/09/2010     Priority: Medium     Vitamin D deficiency 02/09/2010     Priority: Medium       PERTINENT PAST MEDICAL HISTORY:  Past Medical History:   Diagnosis Date     CAH (congenital adrenal hyperplasia) 2/9/2010    Followed by Dr. Brewer; next follow up 1.2011.  Metformin increased to 850 mg twice a day.      Diabetes mellitus, type 2 (H) 10/19/2020     Vitamin D deficiency 2/9/2010       PREVIOUS SURGERIES:  Past Surgical History:   Procedure Laterality Date     CHOLECYSTECTOMY      She was in 8th grade      COLONOSCOPY N/A 9/16/2024    Procedure: COLONOSCOPY, WITH BIOPSY;  Surgeon: Bacilio Yancey MD;  Location:  GI     ESOPHAGOSCOPY, GASTROSCOPY, DUODENOSCOPY (EGD), COMBINED N/A 9/16/2024    Procedure: ESOPHAGOGASTRODUODENOSCOPY, WITH BIOPSY;  Surgeon: Bacilio Yancey MD;  Location:  GI       ALLERGIES:     Allergies   Allergen Reactions      Adhesive Tape Itching     Emgality [Galcanezumab-Gnlm] Itching     Itchiness, bumps     Other Environmental Allergy      Victoza [Liraglutide] Headache, Hives and Itching       PERTINENT MEDICATIONS:    Current Outpatient Medications:      alcohol swab prep pads, Use to swab area of injection/anthony as directed., Disp: 100 each, Rfl: 3     Atogepant (QULIPTA) 60 MG TABS, Take 60 mg by mouth., Disp: , Rfl:      atorvastatin (LIPITOR) 10 MG tablet, Take 1 tablet (10 mg) by mouth daily., Disp: 90 tablet, Rfl: 2     blood glucose (NO BRAND SPECIFIED) test strip, Use to test blood sugar three times daily or as directed. Preferred blood glucose meter OR supplies to accompany: Blood Glucose Monitor Brands: per insurance., Disp: 100 strip, Rfl: 6     blood glucose monitoring (NO BRAND SPECIFIED) meter device kit, Use to test blood sugar three times daily or as directed. Preferred blood glucose meter OR supplies to accompany: Blood Glucose Monitor Brands: per insurance., Disp: 1 kit, Rfl: 0     budesonide (PULMICORT) 0.5 MG/2ML neb solution, Take 2 mLs (0.5 mg) by nebulization daily., Disp: 60 mL, Rfl: 0     Continuous Glucose Sensor (FREESTYLE AYSE 3 PLUS SENSOR) MISC, Use 1 sensor every 15 days. Use to read blood sugars per 's instructions., Disp: 6 each, Rfl: 3     Continuous Glucose Sensor (FREESTYLE AYSE 3 SENSOR) MISC, 1 each by Other route every 14 days., Disp: 6 each, Rfl: 3     fluticasone (FLONASE) 50 MCG/ACT nasal spray, Spray 1 spray into both nostrils daily, Disp: 16 g, Rfl: 3     gabapentin (NEURONTIN) 300 MG capsule, Take 1 capsule (300 mg) by mouth 3 times daily. Take 300 mg at 9:30 am, 600 mg at 9:30 pm, Disp: 270 capsule, Rfl: 0     gabapentin (NEURONTIN) 300 MG capsule, Take 1 capsule (300 mg) by mouth 2 times daily., Disp: 180 capsule, Rfl: 0     insulin pen needle (ULTICARE MICRO) 32G X 4 MM miscellaneous, Use 1 pen needles daily or as directed., Disp: 100 each, Rfl: 3     metFORMIN  (GLUCOPHAGE XR) 500 MG 24 hr tablet, Take 4 tablets (2,000 mg) by mouth daily (with dinner)., Disp: 360 tablet, Rfl: 3     mometasone (NASONEX) 50 MCG/ACT nasal spray, Spray 2 sprays into both nostrils daily., Disp: 17 g, Rfl: 3     norethindrone (MICRONOR) 0.35 MG tablet, Take 1 tablet (0.35 mg) by mouth daily., Disp: 90 tablet, Rfl: 3     ondansetron (ZOFRAN ODT) 4 MG ODT tab, DISSOLVE ONE TABLET ON TONGUE EVERY 8 HOURS AS NEEDED FOR NAUSEA, Disp: 30 tablet, Rfl: 1     pantoprazole (PROTONIX) 40 MG EC tablet, Take 1 tablet (40 mg) by mouth daily., Disp: 90 tablet, Rfl: 2     rimegepant (NURTEC) 75 MG ODT tablet, Place 75 mg under the tongue daily as needed for migraine., Disp: , Rfl:      Semaglutide, 1 MG/DOSE, (OZEMPIC) 4 MG/3ML pen, Inject 1 mg subcutaneously every 7 days., Disp: 9 mL, Rfl: 3     spironolactone (ALDACTONE) 50 MG tablet, Take 1 tablet (50 mg) by mouth daily., Disp: 90 tablet, Rfl: 3     SUMAtriptan (IMITREX) 50 MG tablet, Take 2 tablets (100 mg) by mouth at onset of headache for migraine. May repeat in 2 hours. Max 4 tablets/24 hours., Disp: , Rfl:      thin (NO BRAND SPECIFIED) lancets, Use with lanceting device. To accompany: Blood Glucose Monitor Brands: per insurance., Disp: 100 each, Rfl: 6     topiramate (TOPAMAX) 25 MG tablet, TAKE TWO TABLETS BY MOUTH EVERY NIGHT AT BEDTIME X 7 DAYS, THEN ONE IN THE AM AND TWO EVERY NIGHT AT BEDTIME X 7 DYAS, THEN TWO TWICE DAILY, Disp: , Rfl:      UBRELVY 100 MG tablet, Take 100 mg by mouth at onset of headache., Disp: , Rfl:     SOCIAL HISTORY:  Social History     Socioeconomic History     Marital status: Single     Spouse name: Not on file     Number of children: 0     Years of education: 14+     Highest education level: Not on file   Occupational History     Employer: STUDENT     Comment: Dog grooming   Tobacco Use     Smoking status: Never     Passive exposure: Never     Smokeless tobacco: Never   Vaping Use     Vaping status: Never Used   Substance  and Sexual Activity     Alcohol use: No     Drug use: No     Sexual activity: Never   Other Topics Concern     Parent/sibling w/ CABG, MI or angioplasty before 65F 55M? 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    Not sexually active and never has been    Jami Gutierrez MD     Social Drivers of Health     Financial Resource Strain: Low Risk  (12/30/2024)    Financial Resource Strain      Within the past 12 months, have you or your family members you live with been unable to get utilities (heat, electricity) when it was really needed?: No   Food Insecurity: Low Risk  (12/30/2024)    Food Insecurity      Within the past 12 months, did you worry that your food would run out before you got money to buy more?: No      Within the past 12 months, did the food you bought just not last and you didn t have money to get more?: No   Transportation Needs: Low Risk  (12/30/2024)    Transportation Needs      Within the past 12 months, has lack of transportation kept you from medical appointments, getting your medicines, non-medical meetings or appointments, work, or from getting things that you need?: No   Physical Activity: Unknown (10/18/2024)    Exercise Vital Sign      Days of Exercise per Week: 2 days      Minutes of Exercise per Session: Not on file   Stress: Stress Concern Present (10/18/2024)    [place] [employer] - Occupational Stress Questionnaire      Feeling of Stress : To some extent   Social Connections: Unknown (10/18/2024)    Social Connection and Isolation Panel [NHANES]      Frequency of Communication with Friends and Family: Not on file      Frequency of Social Gatherings with Friends and Family: Never      Attends [employer] Services: Not on file      Active Member of Clubs or Organizations: Not on file      Attends Club or Organization Meetings: Not on file      Marital Status: Not on file   Interpersonal Safety: Low Risk  (10/18/2024)    Interpersonal  Safety      Do you feel physically and emotionally safe where you currently live?: Yes      Within the past 12 months, have you been hit, slapped, kicked or otherwise physically hurt by someone?: No      Within the past 12 months, have you been humiliated or emotionally abused in other ways by your partner or ex-partner?: No   Housing Stability: Low Risk  (12/30/2024)    Housing Stability      Do you have housing? : Yes      Are you worried about losing your housing?: No       FAMILY HISTORY:  Family History   Problem Relation Age of Onset     Neurologic Disorder Sister 16        migraines     Cerebrovascular Disease No family hx of      Alzheimer Disease No family hx of      Glaucoma No family hx of      Macular Degeneration No family hx of        Past/family/social history reviewed and no changes    PHYSICAL EXAMINATION:  Vitals reviewed: /75   Pulse 78   Ht 1.524 m (5')   Wt 114.4 kg (252 lb 1.6 oz)   LMP 02/26/2025 (Exact Date)   SpO2 99%   BMI 49.23 kg/m    Constitutional: aaox3, cooperative, pleasant, not dyspneic/diaphoretic, no acute distress  Eyes: Sclera anicteric/injected  Ears/nose/mouth/throat: hearing intact  Neck: supple, active ROM w/o limitation or pain   CV: No edema  Respiratory: Unlabored breathing  Abd:  Nondistended, nontender, no peritoneal signs  Skin: warm, perfused, no jaundice  Psych: Normal affect  MSK: Normal gait     PERTINENT STUDIES:    Office Visit on 03/11/2025   Component Date Value Ref Range Status     Vitamin B12 03/11/2025 301  232 - 1,245 pg/mL Final        Lab Results   Component Value Date    WBC 7.6 12/18/2024    WBC 9.2 11/09/2024    WBC 9.3 11/09/2024    HGB 13.5 12/18/2024    HGB 14.4 11/09/2024    HGB 14.2 11/09/2024     12/18/2024     11/09/2024     11/09/2024    CHOL 137 03/07/2025    CHOL 143 11/13/2024    CHOL 157 10/18/2024    TRIG 90 03/07/2025    TRIG 88 11/13/2024    TRIG 95 10/18/2024    HDL 56 03/07/2025    HDL 58 11/13/2024     HDL 60 10/18/2024    ALT 20 03/07/2025    ALT 16 12/18/2024    ALT 17 11/09/2024    AST 18 03/07/2025    AST 16 12/18/2024    AST 15 11/09/2024     03/07/2025     11/09/2024     11/09/2024    BUN 9.4 03/07/2025    BUN 12.3 11/09/2024    BUN 12.8 11/09/2024    CO2 22 03/07/2025    CO2 21 (L) 11/09/2024    CO2 16 (L) 11/09/2024    TSH 1.08 08/23/2024    TSH 0.93 04/30/2024    TSH 1.33 01/30/2024        Liver Function Studies -   Recent Labs   Lab Test 03/07/25  1428   PROTTOTAL 7.9   ALBUMIN 4.4   BILITOTAL 0.2   ALKPHOS 53   AST 18   ALT 20        PREVIOUS ENDOSCOPY    9/16/2024 -EGD for diarrhea, nausea with vomiting.  GE flap valve Hill grade 1.  Normal stomach, biopsied (mild chronic inactive gastritis, negative H. pylori.  No intestinal metaplasia).  Normal duodenum, biopsied (mild peptic type duodenopathy.  No evidence of celiac disease)     9/16/2024 colonoscopy for chronic diarrhea status postcholecystectomy.  Performed with Dr. Bonner.  Excellent prep, terminal ileal intubation.  Unremarkable macro exam.  Random colon biopsies unremarkable colon mucosa, no microscopic colitis or other histopathologic changes noted.    -Continue with psyllium fiber, Imodium, cholestyramine.  Consider nutrition consult for weight management in the setting of metabolic associated steatotic liver disease and diarrhea.      Again, thank you for allowing me to participate in the care of your patient.        Sincerely,        Yanely Hansen PA-C    Electronically signed

## 2025-03-21 NOTE — NURSING NOTE
Do you have a history of colon cancer in your immediate family? YES    If yes who: MOTHER SIDE UNCLE    And what age  were they diagnosed: 65      Chief Complaint   Patient presents with    New Patient       Vitals:    03/21/25 1135   BP: 120/75   Pulse: 78   SpO2: 99%   Weight: 114.4 kg (252 lb 1.6 oz)   Height: 1.524 m (5')       Body mass index is 49.23 kg/m .    Sherry Bobby MA

## 2025-03-21 NOTE — PATIENT INSTRUCTIONS
It was a pleasure meeting with you today and discussing your healthcare plan. Below is a summary of what we covered:    Blood work and stool studies ordered - please schedule an appt   Special MRI of intestines ordered - please call to schedule   Stop powder medicine. I will give you a pill to take. I want you to take this every day.   I want you to start citrucel powder every day - 1 tsp daily x 2 weeks. Increase by a tsp weekly for a goal of 2-3 tbsp in a day   Evaluation for pelvic floor dysfunction   Return 10-12 wk     Please see below for any additional questions and scheduling guidelines.    Sign up for [employer]: [employer] patient portal serves as a secure platform for accessing your medical records from the [facility]. Additionally, [employer] facilitates easy, timely, and secure messaging with your care team. If you have not signed up, you may do so by using the provided code or calling [phone].    Coordinating your care after your visit:  There are multiple options for scheduling your follow-up care based on your provider's recommendation.    How do I schedule a follow-up clinic appointment:   After your appointment, you may receive scheduling assistance with the Clinic Coordinators by having a seat in the waiting room and a Clinic Coordinator will call you up to schedule.  Virtual visits or after you leave the clinic:  Your provider has placed a follow-up order in the [employer] portal for scheduling your return appointment. A member of the scheduling team will contact you to schedule.  [employer] Scheduling: Timely scheduling through [employer] is advised to ensure appointment availability.   Call to schedule: You may schedule your follow-up appointment(s) by calling [phone], option 1.    How do I schedule my endoscopy or colonoscopy procedure:  If a procedure, such as a colonoscopy or upper endoscopy was ordered by your provider, the scheduling team will contact you to schedule this procedure.  Or you may choose to call to schedule at   [phone], option 2.  Please allow 20-30 minutes when scheduling a procedure.    How do I get my blood work done? To get your blood work done, you need to schedule a lab appointment at an [facility]. There are multiple ways to schedule:   At the clinic: The Clinic Coordinator you meet after your visit can help you schedule a lab appointment.   [employer] scheduling: [employer] offers online lab scheduling at all [facility] laboratory locations.   Call to schedule: You can call [phone] to schedule your lab appointment.    How do I schedule my imaging study: To schedule imaging studies, such as CT scans, ultrasounds, MRIs, or X-rays, contact Imaging Services at [phone].    How do I schedule a referral to another doctor: If your provider recommended a referral to another specialist(s), the referral order was placed by your provider. You will receive a phone call to schedule this referral, or you may choose to call the number attached to the referral to self-schedule.    For Post-Visit Question(s):  For any inquiries following today's visit:  Please utilize [employer] messaging and allow 48 hours for reply or contact the Call Center during normal business hours at [phone], option 3.  For Emergent After-hours questions, contact the On-Call GI Fellow through the [facility]  at [phone].  In addition, you may contact your Nurse directly using the provided contact information.    Test Results:  Test results will be accessible via [employer] in compliance with the 21st Century Cures Act. This means that your results will be available to you at the same time as your provider. Often you may see your results before your provider does. Results are reviewed by staff within two weeks with communication follow-up. Results may be released in the patient portal prior to your care team review.    Prescription Refill(s):  Medication  prescribed by your provider will be addressed during your visit. For future refills, please coordinate with your pharmacy. If you have not had a recent clinic visit or routine labs, for your safety, your provider may not be able to refill your prescription.     Sincerely,    Yanely Hansen PA-C  Gastroenterology

## 2025-03-21 NOTE — PROGRESS NOTES
GI CLINIC VISIT    CC/REFERRING MD:  No ref. provider found  REASON FOR CONSULTATION: diarrhea    ASSESSMENT/PLAN:  Anh Flores is a 30 year old year old female with PMHx significant for history of CAH, obesity, type 2 diabetes, PCOS who [facility] seeing [facility] for [facility].     Previously seen and evaluated with [facility] and here for a second opinion.     #Chronic diarrhea  #IBS-D  #Bile acid malabsorption  Onset of baseline diarrhea age 12/11 after CCY with sx compounded in 2023 after cryptosporidium infection. Celiac serologies NEG 2024 and recently had bidirectional endoscopies with Dr Yancey 9/2024 - TI normal and random colon biopsies negative for microscopic colitis. Duodenal biopsies were negative for celiac disease but did show some mild duodenitis. Gastric biopsy with mild chronic inactive gastritis w/o h.pylori. She went on cholestyramine with fair relief though use was irregular which further compounded the diarrhea/irregular stooling cycle (at times passing firm/hard small stools). Currently she's passing 2 soft but formed stools and shares she stools daily. These symptoms are consistent with IBS-D (meeting [place]-IV criteria) with flares compounded by bile acid malabsorption (s/p CCY). I am also highly suspicious of an underlying pelvic floor dysfunction. Ddx includes other malabsorption, less likely underlying inflammation (CTAP W contrast 2024 without bowel inflammatory changes, no obstruction either) vs other. Maintaining oral intake and hydration; never required IVF/admission.     -Cholestyramine powder binds to her teeth and makes them hurt which is the reason why she does not use it regularly.  Switched this out to colestipol tablets 1 g twice daily.  I advise she speak with the pharmacist about dosing of this medication in relation to her other medications, for concern of binding her other agents.   -Recommended start of daily bulking fiber with Citrucel, per AVS  -Order  Cryptosporidium/Giardia + enteric panel  -Order CRP and fecal calprotectin.  Obtain MRE for evaluation of small bowel given longstanding diarrhea   -Order fecal elastase, caution false result due to liquid consistency of stools  -Order timed fecal fat, 72 hours  -Order pelvic floor evaluation.  We discussed this testing, its indication.  She is agreeable.  Will asked team to assist with referral  -Cont working with external dietitian     Future consideration  1.  Given predominant abdominal pain component, she would be a good candidate for a neuromodulator such as a tricyclic antidepressant. EKG 1/22/2025 - 453 m/s.  We can consider this in the future  2.  Breath test for small intestinal bacterial overgrowth, fructose intolerance, carbohydrate malabsorption  3. If the diarrhea continues despite our management options, we can consider evaluation for rare causes of chronic diarrhea such as hormone secreting tumors (eg.,  fasting serum gastrin, serum calcitonin, somatostatin, vasoactive intestinal polypeptide, 24-hour urine 5-HIAA)  4.  Laboratory studies to assess for malabsorption can include albumin, RBC folate, serum iron, total iron-binding capacity, B12, calcium, magnesium, carotene, vitamin D    Orders Placed This Encounter   Procedures    MR Enterography wo and w Contrast    Elastase Fecal    CRP inflammation    Calprotectin Feces    Fat Stool Quant Timed Collection    GIARDIA AND CRYPTOSPORIDIUM ANTIGEN PANEL    Enteric Bacteria and Virus Panel PCR     Colorectal cancer screening: aged 45    RTC 10-12 wk or sooner PRN     Thank you for this consultation.  It was a pleasure to participate in the care of this patient; please contact me with any further questions.     Yanely Hansen PA-C    Follow up: As planned above. Today, I personally spent 60  minutes spent on the date of the encounter doing chart review, history and exam, documentation and further activities per the note.    HPI  Anh Flores is a 30 year  old year old female with PMHx of history of CAH, obesity, type 2 diabetes, PCOS following with the [facility] group for diarrhea since age [age].     Previously seen with [facility] for chronic diarrhea - per notes, improved with infrequent use of bile acid sequestrant     She had CCY at age 10 and 11 - then developed baseline diarrhea  She then got cryptosporidium infx in 12/2023 and had a month of N/V/D  -since then, the diarrhea has worsened    -reports flares of the diarrhea, can last 2d-3wk; consistency varies from frequent pudding consistency stools to BSC type 7 stools that are difficult to manage. She endorses frequent small volumed incomplete BM that will continue for hours until she empties  --If she eats something that irritates her stomach, usually within 20-30 min, her abd feels bubbly and she then needs to have a BM     -She can then get a week of 'normal' stooling, usually passing 2 bsc type 4 stools though shares throughout the week, the stools can get looser . Even with the normal stools, she does not endorse complete evacuation.     -she stools every day, no days without a BM     She was put on cholestyramine that she takes intermittently (when diarrhea gets very bad - she characterizes this as stools that look like a BM after a colonoscopy prep) and it has helped at times but shares it binds to her teeth and makes it hurt . Not been on colestipol.   -states she was on all the OTC meds - imodium, pepto bismol without relief   -she is not on a daily bowel regiment     Currently she is reporting she is in day 2 of a softer stool but not actively in a flare     She is working w/ dietary and had been on a few different diets   -even if she fasts, she can get diarrhea    She endorse chronic baseline abdominal pain (since aged [age]) that can flare with her episodes of diarrhea too; pressure sensation, fullness and a pulsing sensation. She feels like her intestines are always sore   -no meds like NSAIDs, is on  gabapentin   -nothing she has tried has helped this pain     She has been on ozempic for a year now, but went off it for a NMGES and endoscopy procedure. The stomach pain did not change being on or off the ozempic.     She is on metformin XR 1000 mg BID, and scales back her metformin use (from 4x500 to 2-3x500). She doesn't feel the metformin makes the diarrhea worse though later shares when diarrhea flares, she cuts down on metformin as she has limited PO intake and finds her sugars drop so she adjusts metformin accordingly     She endorses some nausea w/o emesis, poor appetite, but no consistent unintentional weight loss (can vacillate a few lb, gaining/losing)  She has gone to ER for concerns of dehydration though she was not given IVF     No black stools. Can see some blood on water/TP when she stools frequently.     Not had SB checked, not checked with breath test     Family Hx  Maternal Uncle - colon cancer   No other known family history or GI related malignancy (esophageal, gastric, pancreatic, liver or colon) or family history of IBD/celiac disease.     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     Priority: Medium    Abnormal finding on imaging of liver 10/18/2024     Priority: Medium    Chronic diarrhea 10/18/2024     Priority: Medium    Lactose intolerance 09/10/2024     Priority: Medium    Obstructive sleep apnea 07/05/2024     Priority: Medium    Insomnia, unspecified  type 07/05/2024     Priority: Medium    Bilateral carpal tunnel syndrome 06/11/2024     Priority: Medium    Diabetes mellitus, type 2 (H) 10/19/2020     Priority: Medium    Hidradenitis suppurativa 01/23/2019     Priority: Medium    Morbid obesity with body mass index of 60.0-69.9 in adult (H) 01/23/2019     Priority: Medium    Elevated BP without diagnosis of hypertension 01/23/2019     Priority: Medium    Morbid obesity (H) 10/17/2014     Priority: Medium    Chronic headaches 10/13/2014     Priority: Medium    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 Brewer; next follow up 1.2011.    Metformin increased to 850 mg twice a day.      Chronic abdominal pain 02/09/2010     Priority: Medium    Vitamin D deficiency 02/09/2010     Priority: Medium       PERTINENT PAST MEDICAL HISTORY:  Past Medical History:   Diagnosis Date    CAH (congenital adrenal hyperplasia) 2/9/2010    Followed by Dr. Brewer; next follow up 1.2011.  Metformin increased to 850 mg twice a day.     Diabetes mellitus, type 2 (H) 10/19/2020    Vitamin D deficiency 2/9/2010       PREVIOUS SURGERIES:  Past Surgical History:   Procedure Laterality Date    CHOLECYSTECTOMY      She was in 8th grade     COLONOSCOPY N/A 9/16/2024    Procedure: COLONOSCOPY, WITH BIOPSY;  Surgeon: Bacilio Yancey MD;  Location:  GI    ESOPHAGOSCOPY, GASTROSCOPY, DUODENOSCOPY (EGD), COMBINED N/A 9/16/2024    Procedure: ESOPHAGOGASTRODUODENOSCOPY, WITH BIOPSY;  Surgeon: Bacilio Yancey MD;  Location:  GI       ALLERGIES:     Allergies   Allergen Reactions    Adhesive Tape Itching    Emgality [Galcanezumab-Gnlm] Itching     Itchiness, bumps    Other Environmental Allergy     Victoza [Liraglutide] Headache, Hives and Itching       PERTINENT MEDICATIONS:    Current Outpatient Medications:     alcohol swab prep pads, Use to swab area of injection/anthony  as directed., Disp: 100 each, Rfl: 3    Atogepant (QULIPTA) 60 MG TABS, Take 60 mg by mouth., Disp: , Rfl:     atorvastatin (LIPITOR) 10 MG tablet, Take 1 tablet (10 mg) by mouth daily., Disp: 90 tablet, Rfl: 2    blood glucose (NO BRAND SPECIFIED) test strip, Use to test blood sugar three times daily or as directed. Preferred blood glucose meter OR supplies to accompany: Blood Glucose Monitor Brands: per insurance., Disp: 100 strip, Rfl: 6    blood glucose monitoring (NO BRAND SPECIFIED) meter device kit, Use to test blood sugar three times daily or as directed. Preferred blood glucose meter OR supplies to accompany: Blood Glucose Monitor Brands: per insurance., Disp: 1 kit, Rfl: 0    budesonide (PULMICORT) 0.5 MG/2ML neb solution, Take 2 mLs (0.5 mg) by nebulization daily., Disp: 60 mL, Rfl: 0    Continuous Glucose Sensor (FREESTYLE AYSE 3 PLUS SENSOR) MISC, Use 1 sensor every 15 days. Use to read blood sugars per 's instructions., Disp: 6 each, Rfl: 3    Continuous Glucose Sensor (FREESTYLE AYSE 3 SENSOR) MISC, 1 each by Other route every 14 days., Disp: 6 each, Rfl: 3    fluticasone (FLONASE) 50 MCG/ACT nasal spray, Spray 1 spray into both nostrils daily, Disp: 16 g, Rfl: 3    gabapentin (NEURONTIN) 300 MG capsule, Take 1 capsule (300 mg) by mouth 3 times daily. Take 300 mg at 9:30 am, 600 mg at 9:30 pm, Disp: 270 capsule, Rfl: 0    gabapentin (NEURONTIN) 300 MG capsule, Take 1 capsule (300 mg) by mouth 2 times daily., Disp: 180 capsule, Rfl: 0    insulin pen needle (ULTICARE MICRO) 32G X 4 MM miscellaneous, Use 1 pen needles daily or as directed., Disp: 100 each, Rfl: 3    metFORMIN (GLUCOPHAGE XR) 500 MG 24 hr tablet, Take 4 tablets (2,000 mg) by mouth daily (with dinner)., Disp: 360 tablet, Rfl: 3    mometasone (NASONEX) 50 MCG/ACT nasal spray, Spray 2 sprays into both nostrils daily., Disp: 17 g, Rfl: 3    norethindrone (MICRONOR) 0.35 MG tablet, Take 1 tablet (0.35 mg) by mouth daily., Disp:  90 tablet, Rfl: 3    ondansetron (ZOFRAN ODT) 4 MG ODT tab, DISSOLVE ONE TABLET ON TONGUE EVERY 8 HOURS AS NEEDED FOR NAUSEA, Disp: 30 tablet, Rfl: 1    pantoprazole (PROTONIX) 40 MG EC tablet, Take 1 tablet (40 mg) by mouth daily., Disp: 90 tablet, Rfl: 2    rimegepant (NURTEC) 75 MG ODT tablet, Place 75 mg under the tongue daily as needed for migraine., Disp: , Rfl:     Semaglutide, 1 MG/DOSE, (OZEMPIC) 4 MG/3ML pen, Inject 1 mg subcutaneously every 7 days., Disp: 9 mL, Rfl: 3    spironolactone (ALDACTONE) 50 MG tablet, Take 1 tablet (50 mg) by mouth daily., Disp: 90 tablet, Rfl: 3    SUMAtriptan (IMITREX) 50 MG tablet, Take 2 tablets (100 mg) by mouth at onset of headache for migraine. May repeat in 2 hours. Max 4 tablets/24 hours., Disp: , Rfl:     thin (NO BRAND SPECIFIED) lancets, Use with lanceting device. To accompany: Blood Glucose Monitor Brands: per insurance., Disp: 100 each, Rfl: 6    topiramate (TOPAMAX) 25 MG tablet, TAKE TWO TABLETS BY MOUTH EVERY NIGHT AT BEDTIME X 7 DAYS, THEN ONE IN THE AM AND TWO EVERY NIGHT AT BEDTIME X 7 DYAS, THEN TWO TWICE DAILY, Disp: , Rfl:     UBRELVY 100 MG tablet, Take 100 mg by mouth at onset of headache., Disp: , Rfl:     SOCIAL HISTORY:  Social History     Socioeconomic History    Marital status: Single     Spouse name: Not on file    Number of children: 0    Years of education: 14+    Highest education level: Not on file   Occupational History     Employer: STUDENT     Comment: Dog grooming   Tobacco Use    Smoking status: Never     Passive exposure: Never    Smokeless tobacco: Never   Vaping Use    Vaping status: Never Used   Substance and Sexual Activity    Alcohol use: No    Drug use: No    Sexual activity: Never   Other Topics Concern    Parent/sibling w/ CABG, MI or angioplasty before 65F 55M? 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    Not sexually active and never has been    Jami Gutierrez MD     Social Drivers of Health      Financial Resource Strain: Low Risk  (12/30/2024)    Financial Resource Strain     Within the past 12 months, have you or your family members you live with been unable to get utilities (heat, electricity) when it was really needed?: No   Food Insecurity: Low Risk  (12/30/2024)    Food Insecurity     Within the past 12 months, did you worry that your food would run out before you got money to buy more?: No     Within the past 12 months, did the food you bought just not last and you didn t have money to get more?: No   Transportation Needs: Low Risk  (12/30/2024)    Transportation Needs     Within the past 12 months, has lack of transportation kept you from medical appointments, getting your medicines, non-medical meetings or appointments, work, or from getting things that you need?: No   Physical Activity: Unknown (10/18/2024)    Exercise Vital Sign     Days of Exercise per Week: 2 days     Minutes of Exercise per Session: Not on file   Stress: Stress Concern Present (10/18/2024)    [employer] [place] [employer] - Occupational Stress Questionnaire     Feeling of Stress : To some extent   Social Connections: Unknown (10/18/2024)    Social Connection and Isolation Panel [NHANES]     Frequency of Communication with Friends and Family: Not on file     Frequency of Social Gatherings with Friends and Family: Never     Attends Rastafari Services: Not on file     Active Member of Clubs or Organizations: Not on file     Attends Club or Organization Meetings: Not on file     Marital Status: Not on file   Interpersonal Safety: Low Risk  (10/18/2024)    Interpersonal Safety     Do you feel physically and emotionally safe where you currently live?: Yes     Within the past 12 months, have you been hit, slapped, kicked or otherwise physically hurt by someone?: No     Within the past 12 months, have you been humiliated or emotionally abused in other ways by your partner or ex-partner?: No   Housing Stability: Low Risk   (12/30/2024)    Housing Stability     Do you have housing? : Yes     Are you worried about losing your housing?: No       FAMILY HISTORY:  Family History   Problem Relation Age of Onset    Neurologic Disorder Sister 16        migraines    Cerebrovascular Disease No family hx of     Alzheimer Disease No family hx of     Glaucoma No family hx of     Macular Degeneration No family hx of        Past/family/social history reviewed and no changes    PHYSICAL EXAMINATION:  Vitals reviewed: /75   Pulse 78   Ht 1.524 m (5')   Wt 114.4 kg (252 lb 1.6 oz)   LMP 02/26/2025 (Exact Date)   SpO2 99%   BMI 49.23 kg/m    Constitutional: aaox3, cooperative, pleasant, not dyspneic/diaphoretic, no acute distress  Eyes: Sclera anicteric/injected  Ears/nose/mouth/throat: hearing intact  Neck: supple, active ROM w/o limitation or pain   CV: No edema  Respiratory: Unlabored breathing  Abd:  Nondistended, nontender, no peritoneal signs  Skin: warm, perfused, no jaundice  Psych: Normal affect  MSK: Normal gait     PERTINENT STUDIES:    Office Visit on 03/11/2025   Component Date Value Ref Range Status    Vitamin B12 03/11/2025 301  232 - 1,245 pg/mL Final        Lab Results   Component Value Date    WBC 7.6 12/18/2024    WBC 9.2 11/09/2024    WBC 9.3 11/09/2024    HGB 13.5 12/18/2024    HGB 14.4 11/09/2024    HGB 14.2 11/09/2024     12/18/2024     11/09/2024     11/09/2024    CHOL 137 03/07/2025    CHOL 143 11/13/2024    CHOL 157 10/18/2024    TRIG 90 03/07/2025    TRIG 88 11/13/2024    TRIG 95 10/18/2024    HDL 56 03/07/2025    HDL 58 11/13/2024    HDL 60 10/18/2024    ALT 20 03/07/2025    ALT 16 12/18/2024    ALT 17 11/09/2024    AST 18 03/07/2025    AST 16 12/18/2024    AST 15 11/09/2024     03/07/2025     11/09/2024     11/09/2024    BUN 9.4 03/07/2025    BUN 12.3 11/09/2024    BUN 12.8 11/09/2024    CO2 22 03/07/2025    CO2 21 (L) 11/09/2024    CO2 16 (L) 11/09/2024    TSH 1.08  08/23/2024    TSH 0.93 04/30/2024    TSH 1.33 01/30/2024        Liver Function Studies -   Recent Labs   Lab Test 03/07/25  1428   PROTTOTAL 7.9   ALBUMIN 4.4   BILITOTAL 0.2   ALKPHOS 53   AST 18   ALT 20        PREVIOUS ENDOSCOPY    9/16/2024 -EGD for diarrhea, nausea with vomiting.  GE flap valve [place] grade 1.  Normal stomach, biopsied (mild chronic inactive gastritis, negative H. pylori.  No intestinal metaplasia).  Normal duodenum, biopsied (mild peptic type duodenopathy.  No evidence of celiac disease)     9/16/2024 colonoscopy for chronic diarrhea status postcholecystectomy.  Performed with Dr. Bonner.  Excellent prep, terminal ileal intubation.  Unremarkable macro exam.  Random colon biopsies unremarkable colon mucosa, no microscopic colitis or other histopathologic changes noted.    -Continue with psyllium fiber, Imodium, cholestyramine.  Consider nutrition consult for weight management in the setting of metabolic associated steatotic liver disease and diarrhea.

## 2025-03-24 LAB
ALT SERPL-CCNC: 27 IU/L (ref 0–32)
AST SERPL-CCNC: 17 IU/L (ref 0–40)
CREATININE (EXTERNAL): 0.71 MG/DL (ref 0.57–1)
GFR ESTIMATED (EXTERNAL): 116 ML/MIN/1.73
GLUCOSE (EXTERNAL): 92 MG/DL (ref 70–99)
INR (EXTERNAL): 0.9 (ref 0.9–1.2)
POTASSIUM (EXTERNAL): 4.4 MMOL/L (ref 3.5–5.2)

## 2025-03-30 ENCOUNTER — TRANSFERRED RECORDS (OUTPATIENT)
Dept: LAB | Facility: CLINIC | Age: 30
End: 2025-03-30
Payer: COMMERCIAL

## 2025-04-01 NOTE — PROCEDURES
2025        Anh Flores   [address] [place] [id number]      Anh Flores,  :  1995    It was good to see you the other day.  I'm writing to tell you about the tests that were taken.   Thank you for allowing [facility] the opportunity to take part in your healthcare.  At [facility] we strive to provide each patient with the finest gastroenterology care available.  We hope your experience was pleasant and informative.    Your attached labs are normal.  Hepatic Function Panel (7) 2025 08:37   Description Result Units Flags Range   Bilirubin, Total 0.3 mg/dL  0.0-1.2   Bilirubin, Direct 0.14 mg/dL  0.00-0.40   AST (SGOT) 17 IU/L  0-40   ALT (SGPT) 27 IU/L  0-32   Albumin 4.8 g/dL  3.9-4.9   Alkaline Phosphatase 55 IU/L     Protein, Total 7.2 g/dL  6.0-8.5   Comments   Performed At: [facility], [facility] [address] [place], [place], [id number]  Brandt Johnson MD, Phone: [phone]   Basic Metabolic Panel (8) 2025 08:37   Description Result Units Flags Range   BUN 10 mg/dL  6-20   BUN/Creatinine Ratio 14   9-23   Carbon Dioxide, Total 18 mmol/L L 20-29   Calcium 9.7 mg/dL  8.7-10.2   Chloride 108 mmol/L H    Creatinine 0.71 mg/dL  0.57-1.00   eGFR 116 mL/min/1.73  >59   Glucose 92 mg/dL  70-99   Potassium 4.4 mmol/L  3.5-5.2   Sodium 140 mmol/L  134-144   Comments   Performed At: [facility], [facility] [address], [id number]  Brandt Johnson MD, Phone: [phone]   CBC, Platelet, No Differential 2025 08:37   Description Result Units Flags Range   Hematocrit 42.3 %  34.0-46.6   Hemoglobin 14.1 g/dL  11.1-15.9   MCH 31.2 pg  26.6-33.0   MCHC 33.3 g/dL  31.5-35.7   MCV 94 fL  79-97   Platelets 288 x10E3/uL  150-450   RBC 4.52 x10E6/uL  3.77-5.28   RDW 13.3 %  11.7-15.4   WBC 9.6 x10E3/uL  3.4-10.8   Comments   First Available              Performed At: [facility], [facility]  [address] [place] [address], [id number]  Brandt Johnson MD, Phone: [phone]      Prothrombin Time (PT) 03/24/2025 08:37   Description Result Units Flags Range   INR 0.9   0.9-1.2   Prothrombin Time 10.9 sec  9.1-12.0   Comments   First Available              Performed At: , [facility] [address], [place], [place], [id number]  Brandt Johnson MD, Phone: [phone]   INR:                 Reference interval is for non-anticoagulated patients.                                                                      .                 Suggested INR therapeutic range for Vitamin K                 antagonist therapy:                    Standard Dose (moderate intensity                                   therapeutic range):       2.0 - 3.0                    Higher intensity therapeutic range       2.5 - 3.5         Please call sooner if you have a problem, otherwise I'll see you as we had previously scheduled.        Thank you.    Electronically signed by:  Tanisha John MD 03/30/2025 09:51 PM  Document generated by:  Tanisha John MD  03/30/2025  If your provider ordered multiple tests; the results may not become available at the same time.  If multiple test results are received within 14 days of one another, you may receive a duplicate.  cc:  Bita Veroinca MD

## 2025-04-08 NOTE — TELEPHONE ENCOUNTER
FUTURE VISIT INFORMATION      FUTURE VISIT INFORMATION:  Date: 7/2/25  Time: 9a  [place] [facility] [place]  REFERRAL INFORMATION:  Referring provider:  Bita Veronica MD   Referring providers clinic:  [facility]  Reason for visit/diagnosis  L50.8 (ICD-10-CM) - Chronic urticaria, Z01.82 (ICD-10-CM) - Encounter for allergy testing    RECORDS REQUESTED FROM:       Clinic name Comments Records Status   [facility] 10/19/24 - [facility], [facility]

## 2025-04-09 ENCOUNTER — HOSPITAL ENCOUNTER (OUTPATIENT)
Facility: AMBULATORY SURGERY CENTER | Age: 30
Discharge: HOME | End: 2025-04-09
Attending: PHYSICAL MEDICINE & REHABILITATION | Admitting: PHYSICAL MEDICINE & REHABILITATION
Payer: COMMERCIAL

## 2025-04-09 ENCOUNTER — OFFICE VISIT (OUTPATIENT)
Dept: OBGYN | Facility: CLINIC | Age: 30
End: 2025-04-09
Attending: OBSTETRICS & GYNECOLOGY
Payer: COMMERCIAL

## 2025-04-09 ENCOUNTER — ANCILLARY PROCEDURE (OUTPATIENT)
Dept: RADIOLOGY | Facility: AMBULATORY SURGERY CENTER | Age: 30
End: 2025-04-09
Attending: PHYSICAL MEDICINE & REHABILITATION
Payer: COMMERCIAL

## 2025-04-09 VITALS
OXYGEN SATURATION: 99 % | HEART RATE: 74 BPM | SYSTOLIC BLOOD PRESSURE: 139 MMHG | BODY MASS INDEX: 49.48 KG/M2 | RESPIRATION RATE: 16 BRPM | TEMPERATURE: 97 F | HEIGHT: 60 IN | DIASTOLIC BLOOD PRESSURE: 92 MMHG | WEIGHT: 252 LBS

## 2025-04-09 VITALS
DIASTOLIC BLOOD PRESSURE: 80 MMHG | HEART RATE: 87 BPM | WEIGHT: 250 LBS | BODY MASS INDEX: 48.82 KG/M2 | SYSTOLIC BLOOD PRESSURE: 118 MMHG

## 2025-04-09 DIAGNOSIS — E25.0 CAH 21OH (CONGENITAL ADRENAL HYPERPLASIA DUE TO 21-HYDROXYLASE DEFICIENCY), SIMPLE VIRILIZING: Primary | ICD-10-CM

## 2025-04-09 DIAGNOSIS — R52 PAIN: ICD-10-CM

## 2025-04-09 PROCEDURE — G0463 HOSPITAL OUTPT CLINIC VISIT: HCPCS | Performed by: OBSTETRICS & GYNECOLOGY

## 2025-04-09 RX ORDER — TRIAMCINOLONE ACETONIDE 40 MG/ML
INJECTION, SUSPENSION INTRA-ARTICULAR; INTRAMUSCULAR DAILY PRN
Status: DISCONTINUED | OUTPATIENT
Start: 2025-04-09 | End: 2025-04-09 | Stop reason: HOSPADM

## 2025-04-09 RX ORDER — BUPIVACAINE HYDROCHLORIDE 2.5 MG/ML
INJECTION, SOLUTION INFILTRATION; PERINEURAL DAILY PRN
Status: DISCONTINUED | OUTPATIENT
Start: 2025-04-09 | End: 2025-04-09 | Stop reason: HOSPADM

## 2025-04-09 RX ORDER — LIDOCAINE HYDROCHLORIDE 10 MG/ML
INJECTION, SOLUTION EPIDURAL; INFILTRATION; INTRACAUDAL; PERINEURAL DAILY PRN
Status: DISCONTINUED | OUTPATIENT
Start: 2025-04-09 | End: 2025-04-09 | Stop reason: HOSPADM

## 2025-04-09 RX ORDER — IOPAMIDOL 408 MG/ML
INJECTION, SOLUTION INTRATHECAL DAILY PRN
Status: DISCONTINUED | OUTPATIENT
Start: 2025-04-09 | End: 2025-04-09 | Stop reason: HOSPADM

## 2025-04-09 NOTE — DISCHARGE INSTRUCTIONS
Home Care Instructions after a Coccygeal Nerve Block     A coccygeal nerve block is used for pain  in the lower region of the back, specifically in the rectum and the tailbone, which is also known as the coccyx.    Activity  -You may resume most normal activity levels with the exception of strenuous activity. It is important for us to know if your pain with normal activity is relieved after this injection.  -DO NOT shower for 24 hours  -DO NOT remove bandaid for 24 hours      Pain  -You may experience soreness at the injection site for one or two days  -You may use an ice pack for 20 minutes every 2 hours for the first 24 hours  -You may use a heating pad after the first 24 hours  -You may use Tylenol (acetaminophen) every 4 hours or other pain medicines as     directed by your physician    DID YOU RECEIVE STEROIDS TODAY?  Yes    Common side effects of steroids:  Not everyone will experience corticosteroid side effects. If side effects are experienced, they will gradually subside in the 7-10 day period following an injection. Most common side effects include:  -Flushed face and/or chest  -Feeling of warmth, particularly in the face but could be an overall feeling of warmth  -Increased blood sugar in diabetic patients  -Menstrual irregularities my occur. If taking hormone-based birth control an alternate method of birth control is recommended  -Sleep disturbances and/or mood swings are possible  -Leg cramps      PLEASE KEEP TRACK OF YOUR SYMPTOMS AND NOTE YOUR IMPROVEMENT FOR YOUR DOCTOR.     Please contact us if you have:  -Severe pain  -Fever more than 101.5 degrees Fahrenheit  -Signs of infection at the injection site (redness, swelling, or drainage)    FOR PM&R PATIENTS:  For patients seen by the PM and R service, please call [phone] (Monday through Friday 8a-5p.  After business hours and weekends call [phone] and ask for the PM and R doctor on call). If you need to fax a pain diary to PM&R the fax  number is [phone]. If you are unable to fax, uploading to [facility] is encouraged, then send to provider. If you have procedure scheduling questions please call [phone] Option #2.

## 2025-04-09 NOTE — PROGRESS NOTES
Patient is a 30-yo P0 here for followup and ongoing care of non-classic congenital adrenal hyperplasia (CAH).  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    Personal History:  Anh was diagnosed with CAH around age 5 or 6.  She believes this diagnosis was made after she showed signs of early puberty.  Menarche was reportedly at age 12 or 13.  She has a history of significant hirsutism and reports a longstanding history of irregular periods.  They have gotten more regular since beginning oral contraceptives in April 2024.  She also noted an improvement in hirsutism as well as breast growth while on oral contraceptives.  Anh reports a lifelong history of frequent illnesses, significant fatigue, and chronic diarrhea.  She is currently undergoing a work-up for possible granulomatous disease and is seeing a GI provider next week.  Anh has type 2 diabetes, and has had recent weight loss with the aid of semaglutide.  Anh also experiences migraines with aura.  She was recently switched to progesterone pills to see if this would improve her migraines.      Today she is following up on the birth control switch. She is not happy with the mood and energu disturbances she attributes to starting the progesterone pill.     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), late onset    Chronic abdominal pain    Vitamin D deficiency    Chronic headaches    Morbid obesity (H)    Hidradenitis suppurativa    Morbid obesity with body mass index of 60.0-69.9 in adult (H)    Elevated BP without diagnosis of hypertension    Diabetes mellitus, type 2 (H)    Bilateral carpal tunnel syndrome    Obstructive sleep apnea    Insomnia, unspecified type    Lactose intolerance    Abnormal finding on imaging of liver    Chronic diarrhea    Carrier of hemochromatosis HFE gene  mutation    Cryptosporidiasis (H)    Elevated ferritin    Ulnar neuropathy of both upper extremities    Trigger thumb of both thumbs    Lateral epicondylitis of both elbows    Coccydynia      Past Medical History:   Diagnosis Date    CAH (congenital adrenal hyperplasia) 2/9/2010    Followed by Dr. Brewer; next follow up 1.2011.  Metformin increased to 850 mg twice a day.     Diabetes mellitus, type 2 (H) 10/19/2020    Vitamin D deficiency 2/9/2010      /80   Pulse 87   Wt 113.4 kg (250 lb)   LMP 02/26/2025 (Exact Date)   BMI 48.82 kg/m     Appears in distress.    Hirsutism evident on face, chest, and back.     A&P:  Concern of Progesterone side effects  Stop progesterone pills immediately. Patient counseled on withdrawal bleeding.  IUD + PAP under sedation. Surgery scheduling team will reach out.  Hirsutism   Increase spirolactone dose after carpal tunnel surgery       Medical Student: Karmen Robertson, MS3    I agree with the PFSH and ROS as completed by the MS and the documentation has been edited by me. The remainder of the encounter was performed by me and scribed by the MS. The scribed note accurately reflects my personal services and the decisions made by me.  Jami Gutierrez MD

## 2025-04-09 NOTE — OP NOTE
PROCEDURE NOTE: Coccyx Joint and Ligament Steroid Injection     PROCEDURE DATE: 4/9/2025      NAME: Anh Flores   YOB: 1995      ATTENDING PHYSICIAN: Edwina Fitzpatrick MD  RESIDENT/FELLOW PHYSICIAN: None      PREPROCEDURE DIAGNOSIS:   Chronic sacrococcygeal pain  POSTPROCEDURE DIAGNOSIS:  Coccydynia      PROCEDURE PERFORMED: Coccyx Joint and Ligament Steroid Injection     FLUOROSCOPY WAS USED.     INDICATIONS FOR PROCEDURE:   Anh Flores is a 30 year old female with a clinical picture consistent with the above-mentioned diagnosis, consistent with chronic sacral and coccygeal pain.     PROCEDURE AND FINDINGS:    Anh was greeted in the pre-procedure holding area. The risk, benefits and alternatives to the procedure were again reviewed with the patient and written informed consent was placed in the chart. An IV line was not placed.  A 500 mL bag of NS was not connected to the patient. She was taken to the procedure room and positioned prone on the fluoroscopy table.  Routine monitors were applied including blood pressure cuff, and pulse oximetry. Prior to the procedure a time out was completed, verifying correct patient, procedure, site, positioning, and implants and/or special equipment.      The skin over the caudal area was prepped and draped in the usual sterile fashion. The sacral cornua, sacral hiatus and coccyx were identified with manual palpation. This location was then confirmed on fluoroscopy with AP and lateral views. The skin and subcutaneous tissue overlying this level was anesthetized using a 27-gauge 1-1/4-inch needle with 1% preservative-free lidocaine for a total volume of 2ml. Under fluoroscopic guidance, using an AP and lateral views, a 25-gauge 3.5-inch Quinke spinal needle was inserted into the Cy1-Cy2 space and subsequently withdrawn and treated the sacrococcygeal ligament posteriorly.     After negative aspiration, 1mls of Isovue-M contrast was injected under AP view  with live fluoroscopy and confirmed adequate spread.  There was no evidence of epidural, intravascular uptake or intrathecal spread on imaging. Additional AP and lateral views confirmed adequate spread and positioning.       At this point, after negative aspiration, a total 4mL volume of treatment injectate, consisting of 1mL Kenalog (40mg/mL), 1mL of 1% Lidocaine, and 2mL of 0.25% Bupivacaine was injected easily. 0.75mL of the treatment solution was injected into the intra-coccygeal joint with the remainder distributed posteriorly along the sacrococcygeal ligament. The needle was then flushed with 0.5ml of local anesthetic, restyletted and removed. The needle insertion site was dressed appropriately.      She was taken to the recovery room where she was monitored for a brief period of time. Anh tolerated the procedure well and was discharged home in stable condition with post procedural instructions.     Before the procedure, she reported a pain score of 7/10.   After the procedure, she reported a pain score of 0/10.       [facility]      COMPLICATIONS: None     COMMENTS: None

## 2025-04-09 NOTE — LETTER
4/9/2025       RE: Anh Flores  [address] [facility] [address]     Dear Colleague,    Thank you for referring your patient, Anh Flores, to the [facility] [place] at [facility]. Please see a copy of my visit note below.    Chief Complaint   Patient presents with     RECHECK     CAH follow-up    Christine Martin LPN     Patient is a 30-yo P0 here for followup and ongoing care of non-classic congenital adrenal hyperplasia (CAH).  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    Personal History:  Anh was diagnosed with CAH around age 5 or 6.  She believes this diagnosis was made after she showed signs of early puberty.  Menarche was reportedly at age 12 or 13.  She has a history of significant hirsutism and reports a longstanding history of irregular periods.  They have gotten more regular since beginning oral contraceptives in April 2024.  She also noted an improvement in hirsutism as well as breast growth while on oral contraceptives.  Anh reports a lifelong history of frequent illnesses, significant fatigue, and chronic diarrhea.  She is currently undergoing a work-up for possible granulomatous disease and is seeing a GI provider next week.  Anh has type 2 diabetes, and has had recent weight loss with the aid of semaglutide.  Anh also experiences migraines with aura.  She was recently switched to progesterone pills to see if this would improve her migraines.      Today she is following up on the birth control switch. She is not happy with the mood and energu disturbances she attributes to starting the progesterone pill.     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), late onset     Chronic abdominal pain     Vitamin D deficiency     Chronic headaches      Morbid obesity (H)     Hidradenitis suppurativa     Morbid obesity with body mass index of 60.0-69.9 in adult (H)     Elevated BP without diagnosis of hypertension     Diabetes mellitus, type 2 (H)     Bilateral carpal tunnel syndrome     Obstructive sleep apnea     Insomnia, unspecified type     Lactose intolerance     Abnormal finding on imaging of liver     Chronic diarrhea     Carrier of hemochromatosis HFE gene mutation     Cryptosporidiasis (H)     Elevated ferritin     Ulnar neuropathy of both upper extremities     Trigger thumb of both thumbs     Lateral epicondylitis of both elbows     Coccydynia      Past Medical History:   Diagnosis Date     CAH (congenital adrenal hyperplasia) 2/9/2010    Followed by Dr. Brewer; next follow up 1.2011.  Metformin increased to 850 mg twice a day.      Diabetes mellitus, type 2 (H) 10/19/2020     Vitamin D deficiency 2/9/2010      /80   Pulse 87   Wt 113.4 kg (250 lb)   LMP 02/26/2025 (Exact Date)   BMI 48.82 kg/m     Appears in distress.    Hirsutism evident on face, chest, and back.     A&P:  Concern of Progesterone side effects  Stop progesterone pills immediately. Patient counseled on withdrawal bleeding.  IUD + PAP under sedation. Surgery scheduling team will reach out.  Hirsutism   Increase spirolactone dose after carpal tunnel surgery       Medical Student: Karmen Robertson, MS3    I agree with the PFSH and ROS as completed by the MS and the documentation has been edited by me. The remainder of the encounter was performed by me and scribed by the MS. The scribed note accurately reflects my personal services and the decisions made by me.  Jami Gutierrez MD          Again, thank you for allowing me to participate in the care of your patient.      Sincerely,    Jami Gutierrez MD

## 2025-04-10 RX ORDER — ACETAMINOPHEN 325 MG/1
975 TABLET ORAL ONCE
OUTPATIENT
Start: 2025-04-10 | End: 2025-04-10

## 2025-04-14 ENCOUNTER — TELEPHONE (OUTPATIENT)
Dept: OBGYN | Facility: CLINIC | Age: 30
End: 2025-04-14

## 2025-04-14 ENCOUNTER — OFFICE VISIT (OUTPATIENT)
Dept: GASTROENTEROLOGY | Facility: CLINIC | Age: 30
End: 2025-04-14
Payer: COMMERCIAL

## 2025-04-14 DIAGNOSIS — E73.9 LACTOSE INTOLERANCE: ICD-10-CM

## 2025-04-14 DIAGNOSIS — R10.9 CHRONIC ABDOMINAL PAIN: ICD-10-CM

## 2025-04-14 DIAGNOSIS — K52.9 CHRONIC DIARRHEA: ICD-10-CM

## 2025-04-14 DIAGNOSIS — E11.9 DIABETES MELLITUS, TYPE 2 (H): ICD-10-CM

## 2025-04-14 DIAGNOSIS — K74.00 FIBROSIS OF LIVER: ICD-10-CM

## 2025-04-14 DIAGNOSIS — G89.29 CHRONIC ABDOMINAL PAIN: ICD-10-CM

## 2025-04-14 DIAGNOSIS — K76.0 FATTY LIVER: ICD-10-CM

## 2025-04-14 PROCEDURE — 97803 MED NUTRITION INDIV SUBSEQ: CPT | Performed by: DIETITIAN, REGISTERED

## 2025-04-14 PROCEDURE — 99207 PR NO CHARGE LOS: CPT | Performed by: DIETITIAN, REGISTERED

## 2025-04-14 NOTE — PATIENT INSTRUCTIONS
It was nice speaking with you today. Below are the nutrition recommendations we discussed at your visit.    Please let me know if you have any additional questions.    Nutrition Recommendations    Aim for eating multiple smaller meals per day such as 4-6 smaller meals/snacks per day.     Prevent overeating at one meal or sitting.     Continue eating higher fiber foods, lean proteins, choosing unsaturated fats such as avocado oil, nuts, nut butters, avocado slices.    Can use the plate method for general guidance on getting balanced meals. Use smaller plates at meals such as salad size plate.  1/2 plate non starchy vegetables,  1/4 plate lean protein  1/4 plate higher fiber carbohydrate foods  Include some healthy, unsaturated fat at a meal    Continue to drink at least 64 oz or more water per day.    Continue drinking one cup of hot water or hot water with lemon.    Aim for drinking the majority of fluids/water in between meal. Limit the amount of fluids you drink with eating meals and snacks, otherwise you may feel too full not to eat or keep food down.    Include some foods with soluble fiber such as oats, barley, sweet potato, potato, apple, orange, banana, avocado, ruth seeds, flaxseeds (ground may go better), nuts.    Continue taking your citrucel fiber.    Follow in 4-6 months.    For  follow up appointment with Jessie Coulter, Registered Dietitian, please call [phone].    Thank you,    Jessie Coulter, MS, RD, LD

## 2025-04-14 NOTE — PROGRESS NOTES
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    At initial visit, she stated that she had her gallbladder removed when she was 11 years old and since then she has always had issues with food and diarrhea.  She currently takes cholestyramine as needed.  She stated that she cannot tolerate to take it consistently twice per day as it bothers her teeth as they are sensitive.  She will take it twice a day when she has worsening flares of symptoms.  She stated that starting last December she was diagnosed with Cryptosporidium after attending a dog show around Thanksgiving time.  Since having the infection she began to consistently not feel well and has been eating less.  She complains of having upper left-sided pain that radiates to her back and also has pain at the side of her bellybutton.  She also reported that her stomach feels heavy with eating some heavier foods.  She has issues with oil and fats and can have diarrhea within 10 to 15 minutes of having something oily.  She also stated that with taking Ozempic foods do not taste as good either and has early satiety.  She also complains of having migraines and some brain fog and short-term memory issues.  She reported losing 75 pounds since December 2023, and stated she was diagnosed with type 2 diabetes in January 2024.  Her weight last year was about 325 to 330 pounds and currently 258 pounds.  She she is currently taking Ozempic.  She had stopped for few weeks but it did not  change any of her symptoms, so she was restarted on Ozempic.  She did feel that she could be a little more off of Ozempic but it did not change her symptoms significantly.  She will also meet with Dr. Overton in hepatology in a couple of months. She stated that she was told that she may have fatty liver or cirrhosis so she is waiting to speak with Dr. Hernández to discuss. She will have a gastric emptying study later this week on 10/17/2024.  Following visit writer messaged her primary care doctor who also copied her endocrinologist to find out how long she needs to be off this medication before her emptying study.  Her MD recommended at least 1 week, ideally 2 weeks but okay for 1 week off medication before her study.  Sent message to patient in [employer] along with her nutrition instructions discussed today and encouraged her to reach out to her doctors if she had other questions.     At follow up visit on 12/30/24, she stated that she is still struggling to eat.  She does not feel well when she eats and has ongoing issues with diarrhea.  She feels better when she does not eat.  She is no longer consistently taking cholestyramine and only as needed now, but she does not tolerate it well so not taking it at this time.  She stated that she was told by one provider that she had cirrhosis but then that she did not have cirrhosis by another provider.  She is awaiting hepatologist reading of some recent testing she had done.  She stated that in the morning her first bowel movement of the day is normal but then progressively they turned to diarrhea and she will have stringy stools with pieces of food in her stools.  She has been keeping small servings of nuts in her purse and by her for a small snack/s.  She is also eating 2-3 apples a day, a banana and grapes as these are not tolerated.  She will sometimes have oranges but that may or may not be tolerated.  They can have a bitter taste.  She continues to have some taste  differences with taking Ozempic.  She tried some plant-based protein drinks and Premier protein drinks but did not like the flavor of any of the options.  On her recent trip in [place] she had difficulty finding foods that she could eat.  She also currently has a sinus infection, so has an appointment with a provider today to discuss and possibly start antibiotics if appropriate.  She had her gastric emptying study on 11/25/2024 and it was normal but on the lower limits of normal with a slightly faster motility.  She stated that her mother looked up diets for cirrhosis and saw that the Mediterranean diet was recommended.  Her mother purchased a Mediterranean diet cookbook and also a diabetic cookbook.  Some of the food choices in the Mediterranean plan are not her food preferences, but some are but may be more difficult to make at times for her especially when she is having more issues with symptoms and fatigue.  She stated that she is having a lot of fatigue as well.    At follow up visit today on 4/14/25, she stated that her doctor increased her ozempic from 0.5 to 1 mg, and she has been taking citrucel 1T, 3-4 times per week and pills for bile acid, 3-4 x/week. With the increase in ozempic and the pills for diahrea, now if eats too much or too much in a day or at a meal, then she gets significant stomach pain and has vomiting. If vomiting then gets low BS.  This is how she felt when she first started taking Ozempic, but then her point 5 mg was better tolerated it and she was able to eat more.  Now with the increased dose she feels similar to how she felt when she initially started taking Ozempic.  She is measuring all of her foods out.  She doesn't like leftovers and likes fresh foods/freshly made foods.  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  She uses avocado oil. Doesn't t tolerate olive oil or eggs or a lot of potato, onions, beans, minute rice or  brown rice or rebeka rice.  She tolerates basmati rice and wild rice and long grain rice.  She wonders if the texture of those braces are just better tolerated suggestively for her.  She stated that with her medications she will sometimes go all 3 or 4 days without a bowel movement, but the medications help get formed to her bowel movements.  She actually prefers having looser stools or diarrhea to being constipated though.  She typically eats 1 meal which could be a breakfast or lunch or dinner and then has some snacks instead of meal.  She aims for eating 3 vegetable servings and 2 fruit servings daily.  She is eating some salmon, chicken and veggie burgers.  She tolerates raw vegetables better than cooked vegetables.  With taking Ozempic she has altered taste.  She is brushing her teeth and using mouthwash after every thing she eats.  She eats lower gluten in her diet which is better tolerated.  She is not on 100% gluten-free but mostly.  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), late onset    Chronic abdominal pain    Vitamin D deficiency    Chronic headaches    Morbid obesity (H)    Hidradenitis suppurativa    Morbid obesity with body mass index of 60.0-69.9 in adult (H)    Elevated BP without diagnosis of hypertension    Diabetes mellitus, type 2 (H)    Bilateral carpal tunnel syndrome    Obstructive sleep apnea    Insomnia, unspecified type    Lactose intolerance    Abnormal finding on imaging of liver    Chronic diarrhea    Carrier of hemochromatosis HFE gene mutation    Cryptosporidiasis (H)    Elevated ferritin    Ulnar neuropathy of both upper extremities    Trigger thumb of both thumbs    Lateral epicondylitis of both elbows    Coccydynia     Height:   Ht Readings from Last 1 Encounters:   04/09/25 1.524 m (5')     Weight: This past February 2025, she was on oral contraceptives and create more sweets,  also ate sweets/candy at that time and gained a couple pounds.  Now she is no longer on medication and not eating sweets.    At previous visits, she reported she 325-330 lbs last fall before getting sick in Dec. On Ozempic now but had weight loss before even starting this med which was started for her DX DM.     Wt Readings from Last 35 Encounters:   04/09/25 114.3 kg (252 lb)   04/09/25 113.4 kg (250 lb)   03/21/25 114.4 kg (252 lb 1.6 oz)   03/11/25 112.5 kg (248 lb)   03/10/25 114.4 kg (252 lb 3.2 oz)   03/07/25 114.2 kg (251 lb 11.2 oz)   03/02/25 114.5 kg (252 lb 8 oz)   02/26/25 115.1 kg (253 lb 12.8 oz)   01/22/25 116.1 kg (256 lb)   01/22/25 116.5 kg (256 lb 14.4 oz)   12/30/24 119.3 kg (263 lb)   12/18/24 118.3 kg (260 lb 14.4 oz)   12/16/24 117.8 kg (259 lb 12.8 oz)   12/04/24 119.7 kg (264 lb)   11/13/24 117 kg (258 lb)   11/13/24 116.8 kg (257 lb 6.4 oz)   10/18/24 119.3 kg (263 lb 1.6 oz)   10/08/24 117.3 kg (258 lb 11.2 oz)   10/06/24 117.4 kg (258 lb 14.4 oz)   09/10/24 116.8 kg (257 lb 6.4 oz)   09/07/24 115.7 kg (255 lb)   08/21/24 121.3 kg (267 lb 8 oz)   07/30/24 123.1 kg (271 lb 4.8 oz)   07/11/24 124.7 kg (275 lb)   07/05/24 124.7 kg (275 lb)   06/29/24 126.7 kg (279 lb 6.4 oz)   05/25/24 130.4 kg (287 lb 8 oz)   04/30/24 137 kg (302 lb)   03/29/24 135.6 kg (299 lb)   03/11/24 136.2 kg (300 lb 3.2 oz)   01/30/24 136.5 kg (300 lb 14.4 oz)   12/08/23 132 kg (290 lb 14.4 oz)   10/19/20 139.4 kg (307 lb 6.4 oz)   01/23/19 146.2 kg (322 lb 4.8 oz)   01/21/19 147.9 kg (326 lb)     BMI: Estimated body mass index is 48.82 kg/m  as calculated from the following:    Height as of 4/9/25: 1.524 m (5').    Weight as of 4/9/25: 113.4 kg (250 lb).    Diet Recall:  (some usual/recent meals/snacks/beverages): at follow up today on 4/14/25: she reported that she typically eating one meal per day. Sometimes breakfast or dinner and has some snacks in the day (3 veggies servings and 2 fruit servings per day). Can  tolerates a little lower sugar activa (1-2x per week). Eats bananas, berries and cauliflower, broccoli, carrots and celery, peppers, green beans, and salad kits with rajan or spring mix and tomatoes. Has to be careful about how much cauliflower she eats. A soft diet causes more diarrhea but tolerated roughage like salads or less cooked veggies or raw is helpful so she can have a BM and formed. Trying to eat more gluten free diet but eats some gluten. Gets a skin rash with gluten. Will see dermatology in June. She drinks a lot of fluids with eating meals. She didn't like the mediterranean diet recipes.     Meal Food    Breakfast bowl cereal (cheerios) with almond milk and 1/2 banana (more green) or low sugar activa with granola, fruit or skips     Lunch Just has some veg and fruit as snacks or activa with fruit and granola or tuna/chicken thigh with seasoning and salad/vegetables     Dinner [place] and salad, wild rice or chicken occas or veggie burger with ketchup, pickles on GF bread.    Uses avocado oil. Doesn't t tolerate olive oil or eggs or a lot of potato, onions, beans, minute rice or brown rice or rebeka rice. Tolerates basmati rice and wild rice and long grain rice     Snacks Fruit and veggies     Beverages 6-8 x 16.9 oz bottles of water and mixes citrucel with body armour. Occas SF crystal lite packets in water. Honley lemon citron.    Occas 1 cup diet green tea, occasionally has 5 oz Talley's zero sugar juice (0g added sugar)   Alcohol Intake none     Frequency of eating/taking out meals:rarely now     Labs:    Last Comprehensive Metabolic Panel:  Sodium   Date Value Ref Range Status   03/07/2025 138 135 - 145 mmol/L Final   01/18/2019 136 133 - 144 mmol/L Final     Potassium   Date Value Ref Range Status   03/07/2025 4.2 3.4 - 5.3 mmol/L Final   01/18/2019 4.2 3.4 - 5.3 mmol/L Final     Chloride   Date Value Ref Range Status   03/07/2025 104 98 - 107 mmol/L Final   01/18/2019 103 94 - 109 mmol/L Final      Carbon Dioxide   Date Value Ref Range Status   01/18/2019 27 20 - 32 mmol/L Final     Carbon Dioxide (CO2)   Date Value Ref Range Status   03/07/2025 22 22 - 29 mmol/L Final     Anion Gap   Date Value Ref Range Status   03/07/2025 12 7 - 15 mmol/L Final   01/18/2019 6 3 - 14 mmol/L Final     Glucose   Date Value Ref Range Status   03/07/2025 80 70 - 99 mg/dL Final   01/18/2019 99 70 - 99 mg/dL Final     Comment:     Non Fasting     GLUCOSE BY METER POCT   Date Value Ref Range Status   09/16/2024 91 70 - 99 mg/dL Final     Urea Nitrogen   Date Value Ref Range Status   03/07/2025 9.4 6.0 - 20.0 mg/dL Final   01/18/2019 10 7 - 30 mg/dL Final     Creatinine   Date Value Ref Range Status   03/07/2025 0.81 0.51 - 0.95 mg/dL Final   01/18/2019 0.65 0.52 - 1.04 mg/dL Final     GFR Estimate   Date Value Ref Range Status   03/07/2025 >90 >60 mL/min/1.73m2 Final     Comment:     eGFR calculated using 2021 CKD-EPI equation.   01/18/2019 >90 >60 mL/min/[1.73_m2] Final     Comment:     Non  GFR Calc  Starting 12/18/2018, serum creatinine based estimated GFR (eGFR) will be   calculated using the Chronic Kidney Disease Epidemiology Collaboration   (CKD-EPI) equation.       Calcium   Date Value Ref Range Status   03/07/2025 9.8 8.8 - 10.4 mg/dL Final   01/18/2019 9.8 8.5 - 10.1 mg/dL Final     CBC RESULTS:   Recent Labs   Lab Test 09/07/24  1753   WBC 7.0   RBC 4.46   HGB 13.9   HCT 40.9   MCV 92   MCH 31.2   MCHC 34.0   RDW 13.8        Pertinent Medications/vitamin and mineral supplements:      Current Outpatient Medications   Medication Sig Dispense Refill    alcohol swab prep pads Use to swab area of injection/anthony as directed. 100 each 3    Atogepant (QULIPTA) 60 MG TABS Take 60 mg by mouth.      atorvastatin (LIPITOR) 10 MG tablet Take 1 tablet (10 mg) by mouth daily. 90 tablet 2    blood glucose (NO BRAND SPECIFIED) test strip Use to test blood sugar three times daily or as directed. Preferred blood  glucose meter OR supplies to accompany: Blood Glucose Monitor Brands: per insurance. 100 strip 6    blood glucose monitoring (NO BRAND SPECIFIED) meter device kit Use to test blood sugar three times daily or as directed. Preferred blood glucose meter OR supplies to accompany: Blood Glucose Monitor Brands: per insurance. 1 kit 0    budesonide (PULMICORT) 0.5 MG/2ML neb solution Take 2 mLs (0.5 mg) by nebulization daily. 60 mL 0    colestipol (COLESTID) 1 g tablet Take 1 tablet (1 g) by mouth 2 times daily. 60 tablet 2    Continuous Glucose Sensor (FREESTYLE AYSE 3 PLUS SENSOR) MISC Use 1 sensor every 15 days. Use to read blood sugars per 's instructions. 6 each 3    Continuous Glucose Sensor (FREESTYLE AYSE 3 SENSOR) [facility] 1 each by Other route every 14 days. 6 each 3    fluticasone (FLONASE) 50 MCG/ACT nasal spray Spray 1 spray into both nostrils daily 16 g 3    gabapentin (NEURONTIN) 300 MG capsule Take 1 capsule (300 mg) by mouth 3 times daily. Take 300 mg at 9:30 am, 600 mg at 9:30 pm 270 capsule 0    gabapentin (NEURONTIN) 300 MG capsule Take 1 capsule (300 mg) by mouth 2 times daily. 180 capsule 0    insulin pen needle (ULTICARE MICRO) 32G X 4 MM miscellaneous Use 1 pen needles daily or as directed. 100 each 3    metFORMIN (GLUCOPHAGE XR) 500 MG 24 hr tablet Take 4 tablets (2,000 mg) by mouth daily (with dinner). 360 tablet 3    mometasone (NASONEX) 50 MCG/ACT nasal spray Spray 2 sprays into both nostrils daily. 17 g 3    norethindrone (MICRONOR) 0.35 MG tablet Take 1 tablet (0.35 mg) by mouth daily. 90 tablet 3    ondansetron (ZOFRAN ODT) 4 MG ODT tab DISSOLVE ONE TABLET ON TONGUE EVERY 8 HOURS AS NEEDED FOR NAUSEA 30 tablet 1    pantoprazole (PROTONIX) 40 MG EC tablet Take 1 tablet (40 mg) by mouth daily. 90 tablet 2    rimegepant (NURTEC) 75 MG ODT tablet Place 75 mg under the tongue daily as needed for migraine.      Semaglutide, 1 MG/DOSE, (OZEMPIC) 4 MG/3ML pen Inject 1 mg subcutaneously  every 7 days. 9 mL 3    spironolactone (ALDACTONE) 50 MG tablet Take 1 tablet (50 mg) by mouth daily. 90 tablet 3    SUMAtriptan (IMITREX) 50 MG tablet Take 2 tablets (100 mg) by mouth at onset of headache for migraine. May repeat in 2 hours. Max 4 tablets/24 hours.      thin (NO BRAND SPECIFIED) lancets Use with lanceting device. To accompany: Blood Glucose Monitor Brands: per insurance. 100 each 6    topiramate (TOPAMAX) 25 MG tablet TAKE TWO TABLETS BY MOUTH EVERY NIGHT AT BEDTIME X 7 DAYS, THEN ONE IN THE AM AND TWO EVERY NIGHT AT BEDTIME X 7 DYAS, THEN TWO TWICE DAILY      UBRELVY 100 MG tablet Take 100 mg by mouth at onset of headache.       No current facility-administered medications for this visit.     Food Allergies:  NKFA   Physical Activity:  dog walks, lead train dogs 15-20 minutes, 3-4 times per week    MALNUTRITION:  % Weight Loss:  weight loss does not meet criteria for malnutrition.  % Intake:  <75% for >/= 3 months (moderate malnutrition)--Improved from initial visits.  Subcutaneous Fat Loss:  None observed  Muscle Loss:  None observed  Fluid Retention:  None noted    Malnutrition Diagnosis: does not meet criteria.    Nutrition Prescription: Nutrition Education     Nutrition Intervention      Provided diet education review for food intolerances, diarrhea, abdominal pain, and tips with slightly faster emptying/lower end of normal GES.     Answered patient's questions. Patient verbalized understanding of education provided. See Goals below.     Goals:    Nutrition Recommendations    Aim for eating multiple smaller meals per day such as 4-6 smaller meals/snacks per day.     Prevent overeating at one meal or sitting.     Continue eating higher fiber foods, lean proteins, choosing unsaturated fats such as avocado oil, nuts, nut butters, avocado slices.    Can use the plate method for general guidance on getting balanced meals. Use smaller plates at meals such as salad size plate.  1/2 plate non starchy  vegetables,  1/4 plate lean protein  1/4 plate higher fiber carbohydrate foods  Include some healthy, unsaturated fat at a meal    Continue to drink at least 64 oz or more water per day.    Aim for drinking the majority of fluids/water in between meal. Limit the amount of fluids you drink with eating meals and snacks, otherwise you may feel too full not to eat or keep food down.    Include some foods with soluble fiber such as oats, barley, sweet potato, potato, apple, orange, banana, avocado, ruth seeds, flaxseeds (ground may go better), nuts.    Continue taking your citrucel fiber.      Nutrition Monitoring and Evaluation: Will monitor adherence to nutrition recommendations at future RD visits.     Further Medical Nutrition Therapy:  Follow-up in 2-3 months    Patient was encouraged to contact RD with any further questions.    Jessie Coulter MS, RD, LD    Note: this note was partially completed using voice dictation while checked for errors some grammatical/spelling errors may still be present.

## 2025-04-14 NOTE — LETTER
4/14/2025      Anh Flores  [address] [facility] [address]      Dear Colleague,    Thank you for referring your patient, Anh Flores, to the [facility] [place]. Please see a copy of my visit note below.    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    At initial visit, she stated that she had her gallbladder removed when she was 11 years old and since then she has always had issues with food and diarrhea.  She currently takes cholestyramine as needed.  She stated that she cannot tolerate to take it consistently twice per day as it bothers her teeth as they are sensitive.  She will take it twice a day when she has worsening flares of symptoms.  She stated that starting last December she was diagnosed with Cryptosporidium after attending a dog show around Thanksgiving time.  Since having the infection she began to consistently not feel well and has been eating less.  She complains of having upper left-sided pain that radiates to her back and also has pain at the side of her bellybutton.  She also reported that her stomach feels heavy with eating some heavier foods.  She has issues with oil and fats and can have diarrhea within 10 to 15 minutes of having something oily.  She also stated that with taking Ozempic foods do not taste as good either and has early satiety.  She also complains of having migraines and some brain fog and short-term memory issues.  She reported losing 75  pounds since December 2023, and stated she was diagnosed with type 2 diabetes in January 2024.  Her weight last year was about 325 to 330 pounds and currently 258 pounds.  She she is currently taking Ozempic.  She had stopped for few weeks but it did not change any of her symptoms, so she was restarted on Ozempic.  She did feel that she could be a little more off of Ozempic but it did not change her symptoms significantly.  She will also meet with Dr. Overton in hepatology in a couple of months. She stated that she was told that she may have fatty liver or cirrhosis so she is waiting to speak with Dr. Hernández to discuss. She will have a gastric emptying study later this week on 10/17/2024.  Following visit writer messaged her primary care doctor who also copied her endocrinologist to find out how long she needs to be off this medication before her emptying study.  Her MD recommended at least 1 week, ideally 2 weeks but okay for 1 week off medication before her study.  Sent message to patient in [employer] along with her nutrition instructions discussed today and encouraged her to reach out to her doctors if she had other questions.     At follow up visit on 12/30/24, she stated that she is still struggling to eat.  She does not feel well when she eats and has ongoing issues with diarrhea.  She feels better when she does not eat.  She is no longer consistently taking cholestyramine and only as needed now, but she does not tolerate it well so not taking it at this time.  She stated that she was told by one provider that she had cirrhosis but then that she did not have cirrhosis by another provider.  She is awaiting hepatologist reading of some recent testing she had done.  She stated that in the morning her first bowel movement of the day is normal but then progressively they turned to diarrhea and she will have stringy stools with pieces of food in her stools.  She has been keeping small servings of nuts in her purse  and by her for a small snack/s.  She is also eating 2-3 apples a day, a banana and grapes as these are not tolerated.  She will sometimes have oranges but that may or may not be tolerated.  They can have a bitter taste.  She continues to have some taste differences with taking Ozempic.  She tried some plant-based protein drinks and Premier protein drinks but did not like the flavor of any of the options.  On her recent trip in [place] she had difficulty finding foods that she could eat.  She also currently has a sinus infection, so has an appointment with a provider today to discuss and possibly start antibiotics if appropriate.  She had her gastric emptying study on 11/25/2024 and it was normal but on the lower limits of normal with a slightly faster motility.  She stated that her mother looked up diets for cirrhosis and saw that the Mediterranean diet was recommended.  Her mother purchased a Mediterranean diet cookbook and also a diabetic cookbook.  Some of the food choices in the Mediterranean plan are not her food preferences, but some are but may be more difficult to make at times for her especially when she is having more issues with symptoms and fatigue.  She stated that she is having a lot of fatigue as well.    At follow up visit today on 4/14/25, she stated that her doctor increased her ozempic from 0.5 to 1 mg, and she has been taking citrucel 1T, 3-4 times per week and pills for bile acid, 3-4 x/week. With the increase in ozempic and the pills for diahrea, now if eats too much or too much in a day or at a meal, then she gets significant stomach pain and has vomiting. If vomiting then gets low BS.  This is how she felt when she first started taking Ozempic, but then her point 5 mg was better tolerated it and she was able to eat more.  Now with the increased dose she feels similar to how she felt when she initially started taking Ozempic.  She is measuring all of her foods out.  She doesn't like leftovers  and likes fresh foods/freshly made foods.  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  She uses avocado oil. Doesn't t tolerate olive oil or eggs or a lot of potato, onions, beans, minute rice or brown rice or rebeka rice.  She tolerates basmati rice and wild rice and long grain rice.  She wonders if the texture of those braces are just better tolerated suggestively for her.  She stated that with her medications she will sometimes go all 3 or 4 days without a bowel movement, but the medications help get formed to her bowel movements.  She actually prefers having looser stools or diarrhea to being constipated though.  She typically eats 1 meal which could be a breakfast or lunch or dinner and then has some snacks instead of meal.  She aims for eating 3 vegetable servings and 2 fruit servings daily.  She is eating some salmon, chicken and veggie burgers.  She tolerates raw vegetables better than cooked vegetables.  With taking Ozempic she has altered taste.  She is brushing her teeth and using mouthwash after every thing she eats.  She eats lower gluten in her diet which is better tolerated.  She is not on 100% gluten-free but mostly.  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), late onset     Chronic abdominal pain     Vitamin D deficiency     Chronic headaches     Morbid obesity (H)     Hidradenitis suppurativa     Morbid obesity with body mass index of 60.0-69.9 in adult (H)     Elevated BP without diagnosis of hypertension     Diabetes mellitus, type 2 (H)     Bilateral carpal tunnel syndrome     Obstructive sleep apnea     Insomnia, unspecified type     Lactose intolerance     Abnormal finding on imaging of liver     Chronic diarrhea     Carrier of hemochromatosis HFE gene mutation     Cryptosporidiasis (H)     Elevated ferritin     Ulnar neuropathy of both  upper extremities     Trigger thumb of both thumbs     Lateral epicondylitis of both elbows     Coccydynia     Height:   Ht Readings from Last 1 Encounters:   04/09/25 1.524 m (5')     Weight: This past February 2025, she was on oral contraceptives and create more sweets, also ate sweets/candy at that time and gained a couple pounds.  Now she is no longer on medication and not eating sweets.    At previous visits, she reported she 325-330 lbs last fall before getting sick in Dec. On Ozempic now but had weight loss before even starting this med which was started for her DX DM.     Wt Readings from Last 35 Encounters:   04/09/25 114.3 kg (252 lb)   04/09/25 113.4 kg (250 lb)   03/21/25 114.4 kg (252 lb 1.6 oz)   03/11/25 112.5 kg (248 lb)   03/10/25 114.4 kg (252 lb 3.2 oz)   03/07/25 114.2 kg (251 lb 11.2 oz)   03/02/25 114.5 kg (252 lb 8 oz)   02/26/25 115.1 kg (253 lb 12.8 oz)   01/22/25 116.1 kg (256 lb)   01/22/25 116.5 kg (256 lb 14.4 oz)   12/30/24 119.3 kg (263 lb)   12/18/24 118.3 kg (260 lb 14.4 oz)   12/16/24 117.8 kg (259 lb 12.8 oz)   12/04/24 119.7 kg (264 lb)   11/13/24 117 kg (258 lb)   11/13/24 116.8 kg (257 lb 6.4 oz)   10/18/24 119.3 kg (263 lb 1.6 oz)   10/08/24 117.3 kg (258 lb 11.2 oz)   10/06/24 117.4 kg (258 lb 14.4 oz)   09/10/24 116.8 kg (257 lb 6.4 oz)   09/07/24 115.7 kg (255 lb)   08/21/24 121.3 kg (267 lb 8 oz)   07/30/24 123.1 kg (271 lb 4.8 oz)   07/11/24 124.7 kg (275 lb)   07/05/24 124.7 kg (275 lb)   06/29/24 126.7 kg (279 lb 6.4 oz)   05/25/24 130.4 kg (287 lb 8 oz)   04/30/24 137 kg (302 lb)   03/29/24 135.6 kg (299 lb)   03/11/24 136.2 kg (300 lb 3.2 oz)   01/30/24 136.5 kg (300 lb 14.4 oz)   12/08/23 132 kg (290 lb 14.4 oz)   10/19/20 139.4 kg (307 lb 6.4 oz)   01/23/19 146.2 kg (322 lb 4.8 oz)   01/21/19 147.9 kg (326 lb)     BMI: Estimated body mass index is 48.82 kg/m  as calculated from the following:    Height as of 4/9/25: 1.524 m (5').    Weight as of 4/9/25: 113.4 kg (250  lb).    Diet Recall:  (some usual/recent meals/snacks/beverages): at follow up today on 4/14/25: she reported that she typically eating one meal per day. Sometimes breakfast or dinner and has some snacks in the day (3 veggies servings and 2 fruit servings per day). Can tolerates a little lower sugar activa (1-2x per week). Eats bananas, berries and cauliflower, broccoli, carrots and celery, peppers, green beans, and salad kits with rajan or spring mix and tomatoes. Has to be careful about how much cauliflower she eats. A soft diet causes more diarrhea but tolerated roughage like salads or less cooked veggies or raw is helpful so she can have a BM and formed. Trying to eat more gluten free diet but eats some gluten. Gets a skin rash with gluten. Will see dermatology in June. She drinks a lot of fluids with eating meals. She didn't like the mediterranean diet recipes.     Meal Food    Breakfast bowl cereal (cheerios) with almond milk and 1/2 banana (more green) or low sugar activa with granola, fruit or skips     Lunch Just has some veg and fruit as snacks or activa with fruit and granola or tuna/chicken thigh with seasoning and salad/vegetables     Dinner Amalia and salad, wild rice or chicken occas or veggie burger with ketchup, pickles on GF bread.    Uses avocado oil. Doesn't t tolerate olive oil or eggs or a lot of potato, onions, beans, minute rice or brown rice or rebeka rice. Tolerates basmati rice and wild rice and long grain rice     Snacks Fruit and veggies     Beverages 6-8 x 16.9 oz bottles of water and mixes citrucel with body armour. Occas SF crystal lite packets in water. Honley lemon citron.    Occas 1 cup diet green tea, occasionally has 5 oz Talley's zero sugar juice (0g added sugar)   Alcohol Intake none     Frequency of eating/taking out meals:rarely now     Labs:    Last Comprehensive Metabolic Panel:  Sodium   Date Value Ref Range Status   03/07/2025 138 135 - 145 mmol/L Final   01/18/2019 136  133 - 144 mmol/L Final     Potassium   Date Value Ref Range Status   03/07/2025 4.2 3.4 - 5.3 mmol/L Final   01/18/2019 4.2 3.4 - 5.3 mmol/L Final     Chloride   Date Value Ref Range Status   03/07/2025 104 98 - 107 mmol/L Final   01/18/2019 103 94 - 109 mmol/L Final     Carbon Dioxide   Date Value Ref Range Status   01/18/2019 27 20 - 32 mmol/L Final     Carbon Dioxide (CO2)   Date Value Ref Range Status   03/07/2025 22 22 - 29 mmol/L Final     Anion Gap   Date Value Ref Range Status   03/07/2025 12 7 - 15 mmol/L Final   01/18/2019 6 3 - 14 mmol/L Final     Glucose   Date Value Ref Range Status   03/07/2025 80 70 - 99 mg/dL Final   01/18/2019 99 70 - 99 mg/dL Final     Comment:     Non Fasting     GLUCOSE BY METER POCT   Date Value Ref Range Status   09/16/2024 91 70 - 99 mg/dL Final     Urea Nitrogen   Date Value Ref Range Status   03/07/2025 9.4 6.0 - 20.0 mg/dL Final   01/18/2019 10 7 - 30 mg/dL Final     Creatinine   Date Value Ref Range Status   03/07/2025 0.81 0.51 - 0.95 mg/dL Final   01/18/2019 0.65 0.52 - 1.04 mg/dL Final     GFR Estimate   Date Value Ref Range Status   03/07/2025 >90 >60 mL/min/1.73m2 Final     Comment:     eGFR calculated using 2021 CKD-EPI equation.   01/18/2019 >90 >60 mL/min/[1.73_m2] Final     Comment:     Non  GFR Calc  Starting 12/18/2018, serum creatinine based estimated GFR (eGFR) will be   calculated using the Chronic Kidney Disease Epidemiology Collaboration   (CKD-EPI) equation.       Calcium   Date Value Ref Range Status   03/07/2025 9.8 8.8 - 10.4 mg/dL Final   01/18/2019 9.8 8.5 - 10.1 mg/dL Final     CBC RESULTS:   Recent Labs   Lab Test 09/07/24  1753   WBC 7.0   RBC 4.46   HGB 13.9   HCT 40.9   MCV 92   MCH 31.2   MCHC 34.0   RDW 13.8        Pertinent Medications/vitamin and mineral supplements:      Current Outpatient Medications   Medication Sig Dispense Refill     alcohol swab prep pads Use to swab area of injection/anthony as directed. 100 each 3      Atogepant (QULIPTA) 60 MG TABS Take 60 mg by mouth.       atorvastatin (LIPITOR) 10 MG tablet Take 1 tablet (10 mg) by mouth daily. 90 tablet 2     blood glucose (NO BRAND SPECIFIED) test strip Use to test blood sugar three times daily or as directed. Preferred blood glucose meter OR supplies to accompany: Blood Glucose Monitor Brands: per insurance. 100 strip 6     blood glucose monitoring (NO BRAND SPECIFIED) meter device kit Use to test blood sugar three times daily or as directed. Preferred blood glucose meter OR supplies to accompany: Blood Glucose Monitor Brands: per insurance. 1 kit 0     budesonide (PULMICORT) 0.5 MG/2ML neb solution Take 2 mLs (0.5 mg) by nebulization daily. 60 mL 0     colestipol (COLESTID) 1 g tablet Take 1 tablet (1 g) by mouth 2 times daily. 60 tablet 2     Continuous Glucose Sensor (FREESTYLE AYSE 3 PLUS SENSOR) MISC Use 1 sensor every 15 days. Use to read blood sugars per 's instructions. 6 each 3     Continuous Glucose Sensor (FREESTYLE AYSE 3 SENSOR) [facility] 1 each by Other route every 14 days. 6 each 3     fluticasone (FLONASE) 50 MCG/ACT nasal spray Spray 1 spray into both nostrils daily 16 g 3     gabapentin (NEURONTIN) 300 MG capsule Take 1 capsule (300 mg) by mouth 3 times daily. Take 300 mg at 9:30 am, 600 mg at 9:30 pm 270 capsule 0     gabapentin (NEURONTIN) 300 MG capsule Take 1 capsule (300 mg) by mouth 2 times daily. 180 capsule 0     insulin pen needle (ULTICARE MICRO) 32G X 4 MM miscellaneous Use 1 pen needles daily or as directed. 100 each 3     metFORMIN (GLUCOPHAGE XR) 500 MG 24 hr tablet Take 4 tablets (2,000 mg) by mouth daily (with dinner). 360 tablet 3     mometasone (NASONEX) 50 MCG/ACT nasal spray Spray 2 sprays into both nostrils daily. 17 g 3     norethindrone (MICRONOR) 0.35 MG tablet Take 1 tablet (0.35 mg) by mouth daily. 90 tablet 3     ondansetron (ZOFRAN ODT) 4 MG ODT tab DISSOLVE ONE TABLET ON TONGUE EVERY 8 HOURS AS NEEDED FOR NAUSEA 30  tablet 1     pantoprazole (PROTONIX) 40 MG EC tablet Take 1 tablet (40 mg) by mouth daily. 90 tablet 2     rimegepant (NURTEC) 75 MG ODT tablet Place 75 mg under the tongue daily as needed for migraine.       Semaglutide, 1 MG/DOSE, (OZEMPIC) 4 MG/3ML pen Inject 1 mg subcutaneously every 7 days. 9 mL 3     spironolactone (ALDACTONE) 50 MG tablet Take 1 tablet (50 mg) by mouth daily. 90 tablet 3     SUMAtriptan (IMITREX) 50 MG tablet Take 2 tablets (100 mg) by mouth at onset of headache for migraine. May repeat in 2 hours. Max 4 tablets/24 hours.       thin (NO BRAND SPECIFIED) lancets Use with lanceting device. To accompany: Blood Glucose Monitor Brands: per insurance. 100 each 6     topiramate (TOPAMAX) 25 MG tablet TAKE TWO TABLETS BY MOUTH EVERY NIGHT AT BEDTIME X 7 DAYS, THEN ONE IN THE AM AND TWO EVERY NIGHT AT BEDTIME X 7 DYAS, THEN TWO TWICE DAILY       UBRELVY 100 MG tablet Take 100 mg by mouth at onset of headache.       No current facility-administered medications for this visit.     Food Allergies:  NKFA   Physical Activity:  dog walks, lead train dogs 15-20 minutes, 3-4 times per week    MALNUTRITION:  % Weight Loss:  weight loss does not meet criteria for malnutrition.  % Intake:  <75% for >/= 3 months (moderate malnutrition)--Improved from initial visits.  Subcutaneous Fat Loss:  None observed  Muscle Loss:  None observed  Fluid Retention:  None noted    Malnutrition Diagnosis: does not meet criteria.    Nutrition Prescription: Nutrition Education     Nutrition Intervention      Provided diet education review for food intolerances, diarrhea, abdominal pain, and tips with slightly faster emptying/lower end of normal GES.     Answered patient's questions. Patient verbalized understanding of education provided. See Goals below.     Goals:    Nutrition Recommendations    Aim for eating multiple smaller meals per day such as 4-6 smaller meals/snacks per day.     Prevent overeating at one meal or sitting.      Continue eating higher fiber foods, lean proteins, choosing unsaturated fats such as avocado oil, nuts, nut butters, avocado slices.    Can use the plate method for general guidance on getting balanced meals. Use smaller plates at meals such as salad size plate.  1/2 plate non starchy vegetables,  1/4 plate lean protein  1/4 plate higher fiber carbohydrate foods  Include some healthy, unsaturated fat at a meal    Continue to drink at least 64 oz or more water per day.    Aim for drinking the majority of fluids/water in between meal. Limit the amount of fluids you drink with eating meals and snacks, otherwise you may feel too full not to eat or keep food down.    Include some foods with soluble fiber such as oats, barley, sweet potato, potato, apple, orange, banana, avocado, ruth seeds, flaxseeds (ground may go better), nuts.    Continue taking your citrucel fiber.      Nutrition Monitoring and Evaluation: Will monitor adherence to nutrition recommendations at future RD visits.     Further Medical Nutrition Therapy:  Follow-up in 2-3 months    Patient was encouraged to contact RD with any further questions.    Jessie Coulter, MS, RD, LD    Note: this note was partially completed using voice dictation while checked for errors some grammatical/spelling errors may still be present.          Again, thank you for allowing me to participate in the care of your patient.        Sincerely,        Jessie Coulter RD    Electronically signed

## 2025-04-17 ENCOUNTER — TELEPHONE (OUTPATIENT)
Dept: MEDSURG UNIT | Facility: CLINIC | Age: 30
End: 2025-04-17
Payer: COMMERCIAL

## 2025-04-17 PROCEDURE — 82653 EL-1 FECAL QUANTITATIVE: CPT | Performed by: PHYSICIAN ASSISTANT

## 2025-04-17 PROCEDURE — 99000 SPECIMEN HANDLING OFFICE-LAB: CPT | Performed by: PATHOLOGY

## 2025-04-17 PROCEDURE — 83993 ASSAY FOR CALPROTECTIN FECAL: CPT | Performed by: PHYSICIAN ASSISTANT

## 2025-04-18 ENCOUNTER — HOSPITAL ENCOUNTER (OUTPATIENT)
Dept: MRI IMAGING | Facility: CLINIC | Age: 30
Discharge: HOME | End: 2025-04-18
Attending: PHYSICIAN ASSISTANT
Payer: COMMERCIAL

## 2025-04-18 ENCOUNTER — HOSPITAL ENCOUNTER (OUTPATIENT)
Facility: CLINIC | Age: 30
Discharge: HOME | End: 2025-04-18
Admitting: RADIOLOGY
Payer: COMMERCIAL

## 2025-04-18 ENCOUNTER — TELEPHONE (OUTPATIENT)
Dept: GASTROENTEROLOGY | Facility: CLINIC | Age: 30
End: 2025-04-18

## 2025-04-18 VITALS
DIASTOLIC BLOOD PRESSURE: 88 MMHG | OXYGEN SATURATION: 99 % | SYSTOLIC BLOOD PRESSURE: 122 MMHG | RESPIRATION RATE: 16 BRPM | HEART RATE: 70 BPM

## 2025-04-18 DIAGNOSIS — K52.9 CHRONIC DIARRHEA: ICD-10-CM

## 2025-04-18 PROCEDURE — 250N000011 HC RX IP 250 OP 636: Mod: JZ | Performed by: PHYSICIAN ASSISTANT

## 2025-04-18 PROCEDURE — A9585 GADOBUTROL INJECTION: HCPCS | Performed by: PHYSICIAN ASSISTANT

## 2025-04-18 PROCEDURE — 255N000002 HC RX 255 OP 636: Performed by: PHYSICIAN ASSISTANT

## 2025-04-18 PROCEDURE — 999N000154 HC STATISTIC RADIOLOGY XRAY, US, CT, MAR, NM

## 2025-04-18 PROCEDURE — 72197 MRI PELVIS W/O & W/DYE: CPT

## 2025-04-18 PROCEDURE — 74183 MRI ABD W/O CNTR FLWD CNTR: CPT

## 2025-04-18 RX ORDER — GADOBUTROL 604.72 MG/ML
11 INJECTION INTRAVENOUS ONCE
Status: COMPLETED | OUTPATIENT
Start: 2025-04-18 | End: 2025-04-18

## 2025-04-18 RX ADMIN — GLUCAGON 0.5 MG: 1 INJECTION, POWDER, LYOPHILIZED, FOR SOLUTION INTRAMUSCULAR; INTRAVENOUS at 08:39

## 2025-04-18 RX ADMIN — GLUCAGON 0.5 MG: 1 INJECTION, POWDER, LYOPHILIZED, FOR SOLUTION INTRAMUSCULAR; INTRAVENOUS at 08:25

## 2025-04-18 RX ADMIN — GADOBUTROL 11 ML: 604.72 INJECTION INTRAVENOUS at 08:19

## 2025-04-18 ASSESSMENT — ACTIVITIES OF DAILY LIVING (ADL)
ADLS_ACUITY_SCORE: 41

## 2025-04-18 NOTE — TELEPHONE ENCOUNTER
[facility] Call Center    Phone Message    May a detailed message be left on voicemail: yes     Reason for Call: Symptoms or Concerns     If patient has red-flag symptoms, warm transfer to triage line    Current symptom or concern: Diarrhea    Symptoms have been present for:  2 day(s)    Has patient previously been seen for this? Yes    By: Yanely Hansen    Date: 3/21/25    Are there any new or worsening symptoms? Yes: Diarrhea is getting worse    Action Taken: Message routed to:  [facility] GI    Travel Screening: Not Applicable     Date of Service:

## 2025-04-18 NOTE — PROGRESS NOTES
[facility] Procedure Nursing Note    Patient Information  Name: Anh Flores  Age: 30 year old    Procedure  Procedure: MRI with enterography     Concerns/abnormal assessment: no immediate  If abnormal assessment, provider notified: N/A  Plan/Other: Proceed as planned.    0825 First glucagon 0.5 mg given.  Pt tolerated well.  VS stable.  0839 Second glucagon 0.5 mg given.  Pt tolerated well.  VS stable.  Pt will be discharged from MRI department    Itz Austin RN

## 2025-04-19 ENCOUNTER — OFFICE VISIT (OUTPATIENT)
Dept: OPHTHALMOLOGY | Facility: CLINIC | Age: 30
End: 2025-04-19
Payer: COMMERCIAL

## 2025-04-19 DIAGNOSIS — H52.223 REGULAR ASTIGMATISM OF BOTH EYES: ICD-10-CM

## 2025-04-19 DIAGNOSIS — H52.13 MYOPIA OF BOTH EYES: ICD-10-CM

## 2025-04-19 DIAGNOSIS — H57.12 PAIN IN AND AROUND EYE, LEFT: ICD-10-CM

## 2025-04-19 DIAGNOSIS — E11.9 TYPE 2 DIABETES MELLITUS WITHOUT RETINOPATHY (H): Primary | ICD-10-CM

## 2025-04-19 DIAGNOSIS — G43.E01 CHRONIC MIGRAINE WITH AURA AND WITH STATUS MIGRAINOSUS, NOT INTRACTABLE: ICD-10-CM

## 2025-04-19 PROCEDURE — 92015 DETERMINE REFRACTIVE STATE: CPT | Performed by: OPTOMETRIST

## 2025-04-19 PROCEDURE — 92014 COMPRE OPH EXAM EST PT 1/>: CPT | Performed by: OPTOMETRIST

## 2025-04-19 ASSESSMENT — REFRACTION_WEARINGRX
OD_SPHERE: -1.50
SPECS_TYPE: SVL
OS_CYLINDER: +1.25
OD_AXIS: 090
OS_SPHERE: -2.00
OS_AXIS: 080
OD_CYLINDER: +1.25

## 2025-04-19 ASSESSMENT — SLIT LAMP EXAM - LIDS
COMMENTS: NORMAL
COMMENTS: NORMAL

## 2025-04-19 ASSESSMENT — REFRACTION_MANIFEST
OS_AXIS: 080
OS_CYLINDER: +1.25
OD_CYLINDER: +1.25
OD_AXIS: 085
OS_SPHERE: -2.00
OD_SPHERE: -1.50

## 2025-04-19 ASSESSMENT — VISUAL ACUITY
OD_CC: 20/20
OD_CC+: -1
METHOD: SNELLEN - LINEAR
CORRECTION_TYPE: GLASSES
OS_CC: 20/30

## 2025-04-19 ASSESSMENT — EXTERNAL EXAM - LEFT EYE: OS_EXAM: NORMAL

## 2025-04-19 ASSESSMENT — CONF VISUAL FIELD
OS_SUPERIOR_NASAL_RESTRICTION: 0
OS_INFERIOR_TEMPORAL_RESTRICTION: 0
OS_INFERIOR_NASAL_RESTRICTION: 0
OD_INFERIOR_TEMPORAL_RESTRICTION: 0
OD_SUPERIOR_TEMPORAL_RESTRICTION: 0
METHOD: COUNTING FINGERS
OS_NORMAL: 1
OD_SUPERIOR_NASAL_RESTRICTION: 0
OS_SUPERIOR_TEMPORAL_RESTRICTION: 0
OD_INFERIOR_NASAL_RESTRICTION: 0
OD_NORMAL: 1

## 2025-04-19 ASSESSMENT — EXTERNAL EXAM - RIGHT EYE: OD_EXAM: NORMAL

## 2025-04-19 ASSESSMENT — TONOMETRY
IOP_METHOD: ICARE
OD_IOP_MMHG: 17
OS_IOP_MMHG: 17

## 2025-04-19 ASSESSMENT — CUP TO DISC RATIO
OS_RATIO: 0.4
OD_RATIO: 0.4

## 2025-04-19 NOTE — PATIENT INSTRUCTIONS
BS controlled  Pain in left eye   Migraines left eye   Dry eyes left eye>right eye     NO DBR each eye  Explained diabetes is a small vessel disease and need for yearly exam  Reinforced BS control   NO DBR each eye  Explaine diabetes and need for yearly exam  Reinforced BS control  Yearly exam Recommended artificial tears 2-4 X times daily for dry eyes.  Drink plenty of water  Avoid lots of caffiene  Take fish oil vitamin 2000 mg daily   New prescription for glasses photophobia needs tint   Warm compresses with lid massage   Recheck at [facility] and [facility]

## 2025-04-19 NOTE — PROGRESS NOTES
Assessment & Plan       Anh Flores is a 30 year old female with the following diagnoses:   1. Type 2 diabetes mellitus without retinopathy (H)    2. Chronic migraine with aura and with status migrainosus, not intractable    3. Regular astigmatism of both eyes    4. Myopia of both eyes    5. Pain in and around eye, left       BS controlled  Pain in left eye   Migraines left eye   Dry eyes left eye>right eye     NO DBR each eye  Explained diabetes is a small vessel disease and need for yearly exam  Reinforced BS control   NO DBR each eye  Explaine diabetes and need for yearly exam  Reinforced BS control  Yearly exam Recommended artificial tears 2-4 X times daily for dry eyes.  Drink plenty of water  Avoid lots of caffiene  Take fish oil vitamin 2000 mg daily   New prescription for glasses photophobia needs tint   Warm compresses with lid massage   Recheck at [facility] and      Patient disposition:   No follow-ups on file.          Complete documentation of historical and exam elements from today's encounter can be found in the full encounter summary report (not reduplicated in this progress note). I personally obtained the chief complaint(s) and history of present illness.  I confirmed and edited as necessary the review of systems, past medical/surgical history, family history, social history, and examination findings as documented by others; and I examined the patient myself. I personally reviewed the relevant tests, images, and reports as documented above. I formulated and edited as necessary the assessment and plan and discussed the findings and management plan with the patient and family.  Dr. Dameon Perry

## 2025-04-29 ENCOUNTER — TELEPHONE (OUTPATIENT)
Dept: OBGYN | Facility: CLINIC | Age: 30
End: 2025-04-29
Payer: COMMERCIAL

## 2025-04-29 ENCOUNTER — TELEPHONE (OUTPATIENT)
Dept: INTERNAL MEDICINE | Facility: CLINIC | Age: 30
End: 2025-04-29
Payer: COMMERCIAL

## 2025-04-29 NOTE — TELEPHONE ENCOUNTER
[facility] Call Center    Phone Message    May a detailed message be left on voicemail: yes     Reason for Call: Other: PT has some questions about the IUD and her cycle and is wondering if any nurses can speak her.      Action Taken: Message routed to:  Other: [facility]    Travel Screening: Not Applicable     Date of Service:

## 2025-04-29 NOTE — TELEPHONE ENCOUNTER
[facility] Call Center    Phone Message    May a detailed message be left on voicemail: yes     Reason for Call: Other: patient needs a pre-op appt for IUD surgical placement,  maybe scheduled on 6/6 or 6/13 depending on operating room availability, patient has a return appt on 6/2/25 at 1:30pm wondering if that visit can be changed to per-op visit. And would schedule return visit for a different date. Please call patient at [phone].to discuss her options Thank you     Action Taken: Message routed to:  [facility]      Travel Screening: Not Applicable     Date of Service:

## 2025-04-29 NOTE — TELEPHONE ENCOUNTER
Patient confirmed rescheduled appointment:  Date: 6/9  Time: 7am  Visit type: Return   Provider: PCP  Location: [facility]  Additional notes: Changed 6/2 to a pre-op for IUD

## 2025-04-29 NOTE — TELEPHONE ENCOUNTER
Pt let surgery scheduler Hannah know that she has questions about IUD insertion under sedation. Attempted to call pt back to discuss; no answer, LVM.

## 2025-04-30 NOTE — TELEPHONE ENCOUNTER
SALEEM & PHQ9 screenings completed.         11/7/2024     7:29 PM 12/30/2024    12:14 PM 4/30/2025    10:34 AM   SALEEM-7 SCORE   Total Score 0 (minimal anxiety) 2 (minimal anxiety) 18 (severe anxiety)   Total Score 0  2  18        Patient-reported         4/30/2025    10:38 AM   PHQ   PHQ-9 Total Score 20    Q9: Thoughts of better off dead/self-harm past 2 weeks Not at all       Patient-reported      Routed to Dr. Gutierrez. Scheduled 5/1 with provider.

## 2025-04-30 NOTE — TELEPHONE ENCOUNTER
"Patient inquires on IUD response to acne and mood.     She is a  with hx of CAH, migraine with aura, hirsutism and irregular periods. She was last seen  by Dr. Gutierrez. Patient was discontinued off GANGA due to migraines and started POP in 2025. Restarted spironolactone at this time. In 2025, she was discontinued off POP due to mood and energy disturbances. A case referral was placed for IUD (Mirena) under sedation. IUD insertion has not been scheduled yet as patient did not call back surgery scheduler. States she was waiting for Dr. Gutierrez to return to schedule this.    Patient reports she stopped taking the POP for approximately 10 days but restarted due to acne, hidradenitis suppurative (HS) flare ups and mood disorder. She endorses \"horrible\" breakouts on face that weren't as bad while on birth control pills but then had to start using heavier body wash that she \"hasn't needed for several years.\" States she has been working out and her HS has worsened since off birth control as well. States this presents under folds is super painful and itchy. Patient feels her birth control helped combat HS flare ups. Noted she does not see dermatology until 2025.     She repeatedly denies suicidal ideation and intrusive thoughts but voices it is harder to get out of bed and do everyday things. States she wants to stay in bed, cry, and eat and did not feel like this while on birth control. Notes that she is more unhappy and is \"normally a very happy person.\" Her dog and job as a  helps her stay busy and distracted. Voices that she does not want to take antidepressants but is nervous about proceeding with IUD and needing these eventually.     Patient notes her last cycle was 10 days long (normally 4-5 days) and ended . States she was bleeding through a pad every 1.5 hours. Reports feelings of dizziness, lightheadedness during her period that just subsided yesterday. Wondering if this occurred from " "stopping birth control and restarting. However, notes that her periods have been more \"clotty\" the last few months notably heavier with switch in birth controls.     Patient states she \"needs a cycle\" for her HS and acne. Informed her it is hard to know how she will react since everyone responds differently. Validated patient's feelings and concerns. Encouraged visit with provider to seek clarification on her concerns. Patient scheduled 5/1 for phone visit but is worried provider will rush her. She was agreeable to receive SALEEM-7 & PHQ-9 questionnaires via [employer]. [employer] message sent. Encouraged her to reach out to clinic via message or call if she has any further questions/concerns.     "

## 2025-05-01 ENCOUNTER — VIRTUAL VISIT (OUTPATIENT)
Dept: OBGYN | Facility: CLINIC | Age: 30
End: 2025-05-01
Attending: OBSTETRICS & GYNECOLOGY
Payer: COMMERCIAL

## 2025-05-01 DIAGNOSIS — N92.0 MENORRHAGIA WITH REGULAR CYCLE: Primary | ICD-10-CM

## 2025-05-01 DIAGNOSIS — E25.9 CAH 21-OH (CONGENITAL ADRENAL HYPERPLASIA), LATE ONSET: ICD-10-CM

## 2025-05-01 RX ORDER — NORGESTIMATE AND ETHINYL ESTRADIOL 0.25-0.035
1 KIT ORAL DAILY
Qty: 84 TABLET | Refills: 0 | Status: SHIPPED | OUTPATIENT
Start: 2025-05-01

## 2025-05-01 RX ORDER — TRANEXAMIC ACID 650 MG/1
1300 TABLET ORAL 2 TIMES DAILY PRN
Qty: 30 TABLET | Refills: 1 | Status: SHIPPED | OUTPATIENT
Start: 2025-05-01

## 2025-05-01 NOTE — LETTER
"5/1/2025       RE: Anh Flores  [address] [facility] [address]     Dear Colleague,    Thank you for referring your patient, Anh Flores, to the [facility] [place] at [facility]. Please see a copy of my visit note below.    29 yo P0 with hx of CAH, migraine with aura, hirsutism and irregular periods. She was last seen 4/9/2025.  Patient was discontinued off GANGA due to migraines and started POP in 2/2025. Restarted spironolactone at this time. In 4/2025, she was discontinued off POP due to mood and energy disturbances. A case referral was placed for IUD (Mirena) under sedation. IUD insertion has not been scheduled yet as patient did not call back surgery scheduler.      Patient reports she stopped taking the POP for approximately 10 days but restarted due to acne, hidradenitis suppurativa (HS) flare ups and mood disorder. She endorses \"horrible\" breakouts on face that weren't as bad while on birth control pills but then had to start using heavier body wash that she \"hasn't needed for several years.\" States she has been working out and her HS has worsened since off birth control as well. States this presents under folds is super painful and itchy. Patient feels her birth control helped combat HS flare ups. Noted she does not see dermatology until 6/2025.      She repeatedly denies suicidal ideation and intrusive thoughts but voices it is harder to get out of bed and do everyday things. States she wants to stay in bed, cry, and eat and did not feel like this while on birth control. Notes that she is more unhappy and is \"normally a very happy person.\" Her dog and job as a  helps her stay busy and distracted. Voices that she does not want to take antidepressants but is nervous about proceeding with IUD and needing these eventually.      Patient notes her last cycle was 10 days long (normally 4-5 days) and ended 4/28. States she " "was bleeding through a pad every 1.5 hours. Reports feelings of dizziness, lightheadedness during her period that just subsided yesterday. Wondering if this occurred from stopping birth control and restarting. However, notes that her periods have been more \"clotty\" the last few months notably heavier with switch in birth controls.      Patient states she \"needs cycle suppression\" for her HS and acne.     She also noted an increase and worsening of her migraines off of her estrogen containing OCPs.     We discussed Mirena IUD and it will not have a systemic effect (ie will not help HS, acne, or migraines) but will benefit and protect the uterus from hyperplasia and cancer.       Telemedicine Visit: The patient's condition can be safely assessed and treated in telemedicine encounter.      Reason for Telemedicine Visit: Patient has requested telehealth visit COVID 19    Originating Site (Patient Location): Patient's home    Distant Site (Provider Location): [facility]: [facility]    Consent:  The patient/guardian has verbally consented to: the potential risks and benefits of telemedicine versus in person care; bill my insurance or make self-payment for services provided; and responsibility for payment of non-covered services.     Mode of Communication:  Phone    As the provider I attest to compliance with applicable laws and regulations related to telemedicine.    Time in chart review and phone call on the day of service is 35 mins    Jami Gutierrez MD                    Again, thank you for allowing me to participate in the care of your patient.      Sincerely,    Jami Gutierrez MD    "

## 2025-05-01 NOTE — PROGRESS NOTES
"31 yo P0 with hx of CAH, migraine with aura, hirsutism and irregular periods. She was last seen 4/9/2025.  Patient was discontinued off GANGA due to migraines and started POP in 2/2025. Restarted spironolactone at this time. In 4/2025, she was discontinued off POP due to mood and energy disturbances. A case referral was placed for IUD (Mirena) under sedation. IUD insertion has not been scheduled yet as patient did not call back surgery scheduler.      Patient reports she stopped taking the POP for approximately 10 days but restarted due to acne, hidradenitis suppurativa (HS) flare ups and mood disorder. She endorses \"horrible\" breakouts on face that weren't as bad while on birth control pills but then had to start using heavier body wash that she \"hasn't needed for several years.\" States she has been working out and her HS has worsened since off birth control as well. States this presents under folds is super painful and itchy. Patient feels her birth control helped combat HS flare ups. Noted she does not see dermatology until 6/2025.      She repeatedly denies suicidal ideation and intrusive thoughts but voices it is harder to get out of bed and do everyday things. States she wants to stay in bed, cry, and eat and did not feel like this while on birth control. Notes that she is more unhappy and is \"normally a very happy person.\" Her dog and job as a  helps her stay busy and distracted. Voices that she does not want to take antidepressants but is nervous about proceeding with IUD and needing these eventually.      Patient notes her last cycle was 10 days long (normally 4-5 days) and ended 4/28. States she was bleeding through a pad every 1.5 hours. Reports feelings of dizziness, lightheadedness during her period that just subsided yesterday. Wondering if this occurred from stopping birth control and restarting. However, notes that her periods have been more \"clotty\" the last few months notably heavier with " "switch in birth controls.      Patient states she \"needs cycle suppression\" for her HS and acne.     She also noted an increase and worsening of her migraines off of her estrogen containing OCPs.     We discussed Mirena IUD and it will not have a systemic effect (ie will not help HS, acne, or migraines) but will benefit and protect the uterus from hyperplasia and cancer.       Telemedicine Visit: The patient's condition can be safely assessed and treated in telemedicine encounter.      Reason for Telemedicine Visit: Patient has requested telehealth visit COVID 19    Originating Site (Patient Location): Patient's home    Distant Site (Provider Location): [facility]: [facility]    Consent:  The patient/guardian has verbally consented to: the potential risks and benefits of telemedicine versus in person care; bill my insurance or make self-payment for services provided; and responsibility for payment of non-covered services.     Mode of Communication:  Phone    As the provider I attest to compliance with applicable laws and regulations related to telemedicine.    Time in chart review and phone call on the day of service is 35 mins    Jami Gutierrez MD                  "

## 2025-05-02 ENCOUNTER — HOSPITAL ENCOUNTER (OUTPATIENT)
Dept: CT IMAGING | Facility: CLINIC | Age: 30
Discharge: HOME | End: 2025-05-02
Attending: INTERNAL MEDICINE
Payer: COMMERCIAL

## 2025-05-02 ENCOUNTER — ANESTHESIA EVENT (OUTPATIENT)
Dept: SURGERY | Facility: CLINIC | Age: 30
End: 2025-05-02
Payer: COMMERCIAL

## 2025-05-02 ENCOUNTER — OFFICE VISIT (OUTPATIENT)
Dept: NEUROLOGY | Facility: CLINIC | Age: 30
End: 2025-05-02
Attending: NURSE PRACTITIONER
Payer: COMMERCIAL

## 2025-05-02 ENCOUNTER — LAB (OUTPATIENT)
Dept: LAB | Facility: CLINIC | Age: 30
End: 2025-05-02
Payer: COMMERCIAL

## 2025-05-02 ENCOUNTER — PRE VISIT (OUTPATIENT)
Dept: SURGERY | Facility: CLINIC | Age: 30
End: 2025-05-02

## 2025-05-02 ENCOUNTER — ALLIED HEALTH/NURSE VISIT (OUTPATIENT)
Dept: OBGYN | Facility: CLINIC | Age: 30
End: 2025-05-02
Attending: OBSTETRICS & GYNECOLOGY
Payer: COMMERCIAL

## 2025-05-02 VITALS — RESPIRATION RATE: 22 BRPM | DIASTOLIC BLOOD PRESSURE: 61 MMHG | SYSTOLIC BLOOD PRESSURE: 93 MMHG | HEART RATE: 76 BPM

## 2025-05-02 DIAGNOSIS — G56.23 ULNAR NEUROPATHY OF BOTH UPPER EXTREMITIES: ICD-10-CM

## 2025-05-02 DIAGNOSIS — M65.311 TRIGGER THUMB OF BOTH THUMBS: ICD-10-CM

## 2025-05-02 DIAGNOSIS — R20.2 NUMBNESS AND TINGLING: ICD-10-CM

## 2025-05-02 DIAGNOSIS — Z92.29 UP TO DATE WITH HEPATITIS B IMMUNIZATION: Primary | ICD-10-CM

## 2025-05-02 DIAGNOSIS — K52.9 CHRONIC DIARRHEA: ICD-10-CM

## 2025-05-02 DIAGNOSIS — R07.9 EXERTIONAL CHEST PAIN: ICD-10-CM

## 2025-05-02 DIAGNOSIS — M65.312 TRIGGER THUMB OF BOTH THUMBS: ICD-10-CM

## 2025-05-02 DIAGNOSIS — R20.0 NUMBNESS AND TINGLING: ICD-10-CM

## 2025-05-02 DIAGNOSIS — G56.03 BILATERAL CARPAL TUNNEL SYNDROME: ICD-10-CM

## 2025-05-02 LAB
CREAT BLD-MCNC: 0.7 MG/DL (ref 0.5–1)
CREAT SERPL-MCNC: 0.79 MG/DL (ref 0.51–0.95)
EGFRCR SERPLBLD CKD-EPI 2021: >60 ML/MIN/1.73M2
EGFRCR SERPLBLD CKD-EPI 2021: >90 ML/MIN/1.73M2
HGB BLD-MCNC: 13.9 G/DL (ref 11.7–15.7)

## 2025-05-02 PROCEDURE — 75574 CT ANGIO HRT W/3D IMAGE: CPT | Mod: 26 | Performed by: INTERNAL MEDICINE

## 2025-05-02 PROCEDURE — 250N000011 HC RX IP 250 OP 636: Performed by: INTERNAL MEDICINE

## 2025-05-02 PROCEDURE — 36415 COLL VENOUS BLD VENIPUNCTURE: CPT | Performed by: PATHOLOGY

## 2025-05-02 PROCEDURE — 85018 HEMOGLOBIN: CPT | Performed by: PATHOLOGY

## 2025-05-02 PROCEDURE — 82565 ASSAY OF CREATININE: CPT

## 2025-05-02 PROCEDURE — 250N000011 HC RX IP 250 OP 636

## 2025-05-02 PROCEDURE — G0010 ADMIN HEPATITIS B VACCINE: HCPCS

## 2025-05-02 PROCEDURE — 95886 MUSC TEST DONE W/N TEST COMP: CPT | Performed by: PHYSICAL MEDICINE & REHABILITATION

## 2025-05-02 PROCEDURE — 95911 NRV CNDJ TEST 9-10 STUDIES: CPT | Performed by: PHYSICAL MEDICINE & REHABILITATION

## 2025-05-02 PROCEDURE — 75574 CT ANGIO HRT W/3D IMAGE: CPT

## 2025-05-02 PROCEDURE — 82565 ASSAY OF CREATININE: CPT | Performed by: PATHOLOGY

## 2025-05-02 PROCEDURE — 250N000013 HC RX MED GY IP 250 OP 250 PS 637: Performed by: INTERNAL MEDICINE

## 2025-05-02 PROCEDURE — 90746 HEPB VACCINE 3 DOSE ADULT IM: CPT

## 2025-05-02 RX ORDER — NITROGLYCERIN 0.4 MG/1
.4-.8 TABLET SUBLINGUAL
Status: DISCONTINUED | OUTPATIENT
Start: 2025-05-02 | End: 2025-05-03 | Stop reason: HOSPADM

## 2025-05-02 RX ORDER — LIDOCAINE 40 MG/G
CREAM TOPICAL
Status: DISCONTINUED | OUTPATIENT
Start: 2025-05-02 | End: 2025-05-03 | Stop reason: HOSPADM

## 2025-05-02 RX ORDER — IVABRADINE 5 MG/1
5-15 TABLET, FILM COATED ORAL
Status: COMPLETED | OUTPATIENT
Start: 2025-05-02 | End: 2025-05-02

## 2025-05-02 RX ORDER — IOPAMIDOL 755 MG/ML
120 INJECTION, SOLUTION INTRAVASCULAR ONCE
Status: COMPLETED | OUTPATIENT
Start: 2025-05-02 | End: 2025-05-02

## 2025-05-02 RX ORDER — METOPROLOL TARTRATE 25 MG/1
25-100 TABLET, FILM COATED ORAL
Status: COMPLETED | OUTPATIENT
Start: 2025-05-02 | End: 2025-05-02

## 2025-05-02 RX ORDER — METOPROLOL TARTRATE 1 MG/ML
5-20 INJECTION, SOLUTION INTRAVENOUS
Status: DISCONTINUED | OUTPATIENT
Start: 2025-05-02 | End: 2025-05-03 | Stop reason: HOSPADM

## 2025-05-02 RX ORDER — ONDANSETRON 2 MG/ML
4 INJECTION INTRAMUSCULAR; INTRAVENOUS
Status: DISCONTINUED | OUTPATIENT
Start: 2025-05-02 | End: 2025-05-03 | Stop reason: HOSPADM

## 2025-05-02 RX ADMIN — NITROGLYCERIN 0.8 MG: 0.4 TABLET SUBLINGUAL at 09:21

## 2025-05-02 RX ADMIN — IOPAMIDOL 120 ML: 755 INJECTION, SOLUTION INTRAVENOUS at 09:17

## 2025-05-02 RX ADMIN — METOPROLOL TARTRATE 50 MG: 50 TABLET, FILM COATED ORAL at 08:04

## 2025-05-02 RX ADMIN — IVABRADINE 10 MG: 5 TABLET, FILM COATED ORAL at 08:04

## 2025-05-02 NOTE — PROGRESS NOTES
Pt arrived for Coronary CT angiogram. Test, meds and side effects reviewed with pt. Resting HR 78 bpm. Given 50 mg PO Metoprolol + 10 mg PO Ivabradine per verbal order. Administered 0.8 mg SL nitro on CTA table per order. CTA completed. Patient tolerated procedure well and denies symptoms of allergic reaction. Post monitoring completed and VSS. D/C instructions reviewed with pt whom verbalized understanding of need to increase PO fluids today. D/C to gold waiting room accompanied by staff.    Alvina Gee RN

## 2025-05-02 NOTE — LETTER
2025       RE: Anh Flores  [address] [facility] [address]     Dear Colleague,    Thank you for referring your patient, Anh Flores, to the [facility] EMG [facility]IN[facility] [place] at [facility]. Please see a copy of my visit note below.                          [facility]  Electrodiagnostic Laboratory                 Department of Neurology                                                                                                         Test Date:  2025    Patient: Anh Flores : 1995 Physician: Eunice Ralph MD   Sex: Female AGE: 30 year Ref Phys: NARCISA Cruz CNP   ID#: [id number]   Technician: Aracely Lloyd       History and Examination:  BLE, Numbness, Pain, PN, Radiculopathy      Techniques:  Motor conduction studies were done with surface recording electrodes. Sensory conduction studies were performed with surface electrodes, unless indicated otherwise by (n), designating the use of subdermal recording electrodes. Temperature was monitored and recorded throughout the study. Upper extremities were maintained at a temperature of 32 degrees Centigrade or higher and Lower extremities were maintained at a temperature of 31 degrees Centigrade or higher.  EMG was done with a concentric needle electrode.       Results  All nerve conduction studies (as indicated in the following tables) were within normal limits.      All F Wave latencies were within normal limits.      Needle evaluation of the right Gastrocnemius muscle showed Incr Ins Act and no activation due to pain.  The left Gastrocnemius muscle showed increased Fibs/PSW.  All remaining muscles (as indicated in the following table) showed no evidence of electrical instability.        Interpretation:  Slightly abnormal study     --Needle EMG abnormalities are too limited to be diagnostic, but the most likely explanation for the positive sharp waves /  fibrillations present at the left medial gastrocnemius is active left S1 radiculopathy.    --There is no electrodiagnostic evidence of diffuse peripheral neuropathy or focal mononeuropathy in the lower extremities.    --There is no electrodiagnostic evidence of right lower extremity radiculopathy.    ___________________________  Eunice Ralph MD        Nerve Conduction Studies  Motor Sites      Latency Neg. Amp Neg. Amp Diff Segment Distance Velocity Neg. Dur Neg Area Diff Temperature Comment   Site (ms) Norm (mV) Norm (%)  cm m/s Norm (ms) (%) ( C)    Left Fibular (EDB) Motor   Ankle 4.6  < 6.0 7.2 > 2.5  Ankle-EDB 8   5.4  29.7    Right Fibular (EDB) Motor   Ankle 4.7  < 6.0 10.9 -  Ankle-EDB 8   5.6  28.7    Bel Fibular Head 10.1 - 10.9 - 0 Bel Fibular Head-Ankle 27.5 51  > 38 6.0 4 29.1    Pop Fossa 11.5 - 10.5 - -4 Pop Fossa-Bel Fibular Head 7.7 55  > 38 5.6 -6 29    Left Tibial (AHB) Motor   Ankle 5.6  < 6.5 7.5  > 5.0  Ankle-AH 6.5   6.1  29.6    Right Tibial (AHB) Motor   Ankle 4.6  < 6.5 10.6  > 5.0  Ankle-AH 4.3   6.5  29.2    Knee 11.5 - 9.0 - -15 Knee-Ankle 33.5 49  > 38 7.5 -9 29.6      F-Wave Sites      Min F-Lat Max-Min F-Lat Mean F-Lat   Site (ms) (ms) (ms)   Right Fibular F-Wave   Ankle 44.8 0 -   Right Tibial F-Wave   Ankle 53.1 39.9 -     Sensory Sites      Onset Lat Peak Lat Amp (O-P) Amp (P-P) Segment Distance Velocity Temperature Comment   Site ms (ms)  V Norm ( V)  cm m/s Norm ( C)    Left Superficial Fibular Sensory   Lower Leg-Ankle 2.4 3.1 9  > 3 10 Lower Leg-Ankle 12.5 52 - 29.3    Right Superficial Fibular Sensory   Lower Leg-Ankle 2.4 3.2 13  > 3 13 Lower Leg-Ankle 12.5 52 - 29.6    Left Sural Sensory   Calf-Lat Malleolus 2.2 3.0 9  > 5 11 Calf-Lat Malleolus 14 64  > 38 29.4    Right Sural Sensory   Calf-Lat Malleolus 2.1 2.9 11  > 5 15 Calf-Lat Malleolus 14 67  > 38 29.1        Electromyography     Side Muscle Ins Act Fibs/PSW Fasc HF Amp Dur Poly Recrt Int Pat   Right Tib ant Nml None  Nml 0 Nml Nml 0 Nml Nml   Right Gastroc Incr None Nml 0 Nml Nml 0 Nml No activation    Right Vastus lat Nml None Nml 0 Nml Nml 0 Nml Nml   Left Tib ant Nml None Nml 0 Nml Nml 0 Nml Nml   Left Gastroc Nml 1+ Nml 0 Nml Nml 0 Nml Nml   Left Vastus lat Nml None Nml 0 Nml Nml 0 Nml Nml   Right Biceps fem SH Nml None Nml 0 Nml Nml 0 Nml Nml   Right Tens fasc lat Nml None Nml 0 Nml Nml 0 Nml Nml   Left Biceps fem SH Nml None Nml 0 Nml Nml 0 Nml Nml   Left Tens fasc lat Nml None Nml 0 Nml Nml 0 Nml Nml         Waveforms / Images:    Motor                Sensory                F-Wave                   Again, thank you for allowing me to participate in the care of your patient.      Sincerely,    Eunice Ralph MD

## 2025-05-02 NOTE — PROGRESS NOTES
[facility]  Electrodiagnostic Laboratory                 Department of Neurology                                                                                                         Test Date:  2025    Patient: Anh Flores : 1995 Physician: Eunice Ralph MD   Sex: Female AGE: 30 year Ref Phys: NARCISA Cruz CNP   ID#: [id number]   Technician: Aracely Lloyd       History and Examination:  BLE, Numbness, Pain, PN, Radiculopathy      Techniques:  Motor conduction studies were done with surface recording electrodes. Sensory conduction studies were performed with surface electrodes, unless indicated otherwise by (n), designating the use of subdermal recording electrodes. Temperature was monitored and recorded throughout the study. Upper extremities were maintained at a temperature of 32 degrees Centigrade or higher and Lower extremities were maintained at a temperature of 31 degrees Centigrade or higher.  EMG was done with a concentric needle electrode.       Results  All nerve conduction studies (as indicated in the following tables) were within normal limits.      All F Wave latencies were within normal limits.      Needle evaluation of the right Gastrocnemius muscle showed Incr Ins Act and no activation due to pain.  The left Gastrocnemius muscle showed increased Fibs/PSW.  All remaining muscles (as indicated in the following table) showed no evidence of electrical instability.        Interpretation:  Slightly abnormal study     --Needle EMG abnormalities are too limited to be diagnostic, but the most likely explanation for the positive sharp waves / fibrillations present at the left medial gastrocnemius is active left S1 radiculopathy.    --There is no electrodiagnostic evidence of diffuse peripheral neuropathy or focal mononeuropathy in the lower extremities.    --There is no electrodiagnostic evidence of right lower extremity  radiculopathy.    ___________________________  Eunice Ralph MD        Nerve Conduction Studies  Motor Sites      Latency Neg. Amp Neg. Amp Diff Segment Distance Velocity Neg. Dur Neg Area Diff Temperature Comment   Site (ms) Norm (mV) Norm (%)  cm m/s Norm (ms) (%) ( C)    Left Fibular (EDB) Motor   Ankle 4.6  < 6.0 7.2 > 2.5  Ankle-EDB 8   5.4  29.7    Right Fibular (EDB) Motor   Ankle 4.7  < 6.0 10.9 -  Ankle-EDB 8   5.6  28.7    Bel Fibular Head 10.1 - 10.9 - 0 Bel Fibular Head-Ankle 27.5 51  > 38 6.0 4 29.1    Pop Fossa 11.5 - 10.5 - -4 Pop Fossa-Bel Fibular Head 7.7 55  > 38 5.6 -6 29    Left Tibial (AHB) Motor   Ankle 5.6  < 6.5 7.5  > 5.0  Ankle-AH 6.5   6.1  29.6    Right Tibial (AHB) Motor   Ankle 4.6  < 6.5 10.6  > 5.0  Ankle-AH 4.3   6.5  29.2    Knee 11.5 - 9.0 - -15 Knee-Ankle 33.5 49  > 38 7.5 -9 29.6      F-Wave Sites      Min F-Lat Max-Min F-Lat Mean F-Lat   Site (ms) (ms) (ms)   Right Fibular F-Wave   Ankle 44.8 0 -   Right Tibial F-Wave   Ankle 53.1 39.9 -     Sensory Sites      Onset Lat Peak Lat Amp (O-P) Amp (P-P) Segment Distance Velocity Temperature Comment   Site ms (ms)  V Norm ( V)  cm m/s Norm ( C)    Left Superficial Fibular Sensory   Lower Leg-Ankle 2.4 3.1 9  > 3 10 Lower Leg-Ankle 12.5 52 - 29.3    Right Superficial Fibular Sensory   Lower Leg-Ankle 2.4 3.2 13  > 3 13 Lower Leg-Ankle 12.5 52 - 29.6    Left Sural Sensory   Calf-Lat Malleolus 2.2 3.0 9  > 5 11 Calf-Lat Malleolus 14 64  > 38 29.4    Right Sural Sensory   Calf-Lat Malleolus 2.1 2.9 11  > 5 15 Calf-Lat Malleolus 14 67  > 38 29.1        Electromyography     Side Muscle Ins Act Fibs/PSW Fasc HF Amp Dur Poly Recrt Int Pat   Right Tib ant Nml None Nml 0 Nml Nml 0 Nml Nml   Right Gastroc Incr None Nml 0 Nml Nml 0 Nml No activation    Right Vastus lat Nml None Nml 0 Nml Nml 0 Nml Nml   Left Tib ant Nml None Nml 0 Nml Nml 0 Nml Nml   Left Gastroc Nml 1+ Nml 0 Nml Nml 0 Nml Nml   Left Vastus lat Nml None Nml 0 Nml Nml 0 Nml Nml    Right Biceps fem SH Nml None Nml 0 Nml Nml 0 Nml Nml   Right Tens fasc lat Nml None Nml 0 Nml Nml 0 Nml Nml   Left Biceps fem SH Nml None Nml 0 Nml Nml 0 Nml Nml   Left Tens fasc lat Nml None Nml 0 Nml Nml 0 Nml Nml         Waveforms / Images:    Motor                Sensory                F-Wave

## 2025-05-07 ENCOUNTER — RESULTS FOLLOW-UP (OUTPATIENT)
Dept: GASTROENTEROLOGY | Facility: CLINIC | Age: 30
End: 2025-05-07

## 2025-05-07 ENCOUNTER — OFFICE VISIT (OUTPATIENT)
Dept: GASTROENTEROLOGY | Facility: CLINIC | Age: 30
End: 2025-05-07
Payer: COMMERCIAL

## 2025-05-07 ENCOUNTER — ANCILLARY PROCEDURE (OUTPATIENT)
Dept: GENERAL RADIOLOGY | Facility: CLINIC | Age: 30
End: 2025-05-07
Attending: PHYSICIAN ASSISTANT
Payer: COMMERCIAL

## 2025-05-07 VITALS
OXYGEN SATURATION: 97 % | SYSTOLIC BLOOD PRESSURE: 102 MMHG | BODY MASS INDEX: 48.1 KG/M2 | WEIGHT: 245 LBS | HEART RATE: 72 BPM | DIASTOLIC BLOOD PRESSURE: 68 MMHG | HEIGHT: 60 IN

## 2025-05-07 DIAGNOSIS — R10.84 ABDOMINAL PAIN, GENERALIZED: ICD-10-CM

## 2025-05-07 DIAGNOSIS — R10.84 ABDOMINAL PAIN, GENERALIZED: Primary | ICD-10-CM

## 2025-05-07 DIAGNOSIS — R19.8 IRREGULAR BOWEL HABITS: ICD-10-CM

## 2025-05-07 PROCEDURE — 74018 RADEX ABDOMEN 1 VIEW: CPT | Mod: GC | Performed by: STUDENT IN AN ORGANIZED HEALTH CARE EDUCATION/TRAINING PROGRAM

## 2025-05-07 ASSESSMENT — PAIN SCALES - GENERAL: PAINLEVEL_OUTOF10: MODERATE PAIN (6)

## 2025-05-07 NOTE — PROGRESS NOTES
GI CLINIC VISIT    CC/REFERRING MD:  No ref. provider found  REASON FOR CONSULTATION: diarrhea    ASSESSMENT/PLAN:  Anh Flores is a 30 year old year old female with PMHx significant for history of CAH, obesity, type 2 diabetes, PCOS who presents seeing the  [facility] for diarrhea.  Tangential historian and report is variable.     #Chronic diarrhea  #IBS  #Bile acid malabsorption  Onset of baseline diarrhea age 12/11 after CCY with sx compounded in 2023 after cryptosporidium infection. Celiac serologies NEG 2024 and recently had bidirectional endoscopies with Dr Yancey 9/2024 - TI normal and random colon biopsies negative for microscopic colitis. Duodenal biopsies were negative for celiac disease but did show some mild duodenitis. Gastric biopsy with mild chronic inactive gastritis w/o h.pylori. MRE 4/2025 unremarkable of small bowel disease, associated fecal calpro NEG (12). Fecal elastase normal. Enteric panel cancelled due to collection, repeat order is pending collection. She went on cholestyramine with fair relief though use was irregular which further compounded the diarrhea/irregular stooling cycle (at times passing firm/hard small stools).     These symptoms are consistent with IBS (meeting Gerry-IV criteria) with flares compounded by bile acid malabsorption (s/p CCY). I am also highly suspicious of an underlying pelvic floor dysfunction. Ddx includes other malabsorption, less likely underlying inflammation (fecal calpro 12, CTAP W contrast 2024 without bowel inflammatory changes, no obstruction either; 2024 colonoscopy without IBD) vs other. Maintaining oral intake and hydration; never required IVF/admission.     Today - found colestipol helpful but she's severely constipated now after recent travel with last BM 5/1.  She has an upcoming surgery 5/19 for trigger release.  Abd distended with some tenderness (LUQ>LLQ>RLQ) and hypoactive bowel sounds. Vitals stable and she is nontoxic  appearing. Oral intake maintained. We discussed the following plan    -KUB today   Bowel Regiment  Day 1 -mag citrate with glycerin suppository  Day 2 - mag citrate with glycerin suppository  Day 3 -MiraLAX cleanout  Day 4 -daily Citrucel powder 1 teaspoon, titrate per AVS  Surgery 5/19, send mychart update to me 5/22    --HOLD colestipol  -- Limit Zofran  --Take MiraLAX for multiple days without stooling  -- ER precautions reviewed    Future consideration  1.  Given predominant abdominal pain component, she would be a good candidate for a neuromodulator such as a tricyclic antidepressant. EKG 1/22/2025 - 453 m/s.  We can consider this in the future  2.  Breath test for small intestinal bacterial overgrowth, fructose intolerance, carbohydrate malabsorption  3. If the diarrhea continues despite our management options, we can consider evaluation for rare causes of chronic diarrhea such as hormone secreting tumors (eg.,  fasting serum gastrin, serum calcitonin, somatostatin, vasoactive intestinal polypeptide, 24-hour urine 5-HIAA)  4.  Laboratory studies to assess for malabsorption can include albumin, RBC folate, serum iron, total iron-binding capacity, B12, calcium, magnesium, carotene, vitamin D    No orders of the defined types were placed in this encounter.    Colorectal cancer screening: aged 43    RTC 6/9 already scheduled     Thank you for this consultation.  It was a pleasure to participate in the care of this patient; please contact me with any further questions.     Yanely Hansen PA-C    Follow up: As planned above. Today, I personally spent 45  minutes spent on the date of the encounter doing chart review, history and exam, documentation and further activities per the note.    HPI  Anh Flores is a 30 year old year old female with PMHx of history of CAH, obesity, type 2 diabetes, PCOS following with the [facility] GI group for diarrhea since age [age].  Tangential historian    3/21/25  Previously seen with [facility]  for chronic diarrhea - per notes, improved with infrequent use of bile acid sequestrant     She had CCY at age 10 and 11 - then developed baseline diarrhea  She then got cryptosporidium infx in 12/2023 and had a month of N/V/D  -since then, the diarrhea has worsened    -reports flares of the diarrhea, can last 2d-3wk; consistency varies from frequent pudding consistency stools to BSC type 7 stools that are difficult to manage. She endorses frequent small volumed incomplete BM that will continue for hours until she empties  --If she eats something that irritates her stomach, usually within 20-30 min, her abd feels bubbly and she then needs to have a BM     -She can then get a week of 'normal' stooling, usually passing 2 bsc type 4 stools though shares throughout the week, the stools can get looser . Even with the normal stools, she does not endorse complete evacuation.     -she stools every day, no days without a BM     She was put on cholestyramine that she takes intermittently (when diarrhea gets very bad - she characterizes this as stools that look like a BM after a colonoscopy prep) and it has helped at times but shares it binds to her teeth and makes it hurt . Not been on colestipol.   -states she was on all the OTC meds - imodium, pepto bismol without relief   -she is not on a daily bowel regiment     Currently she is reporting she is in day 2 of a softer stool but not actively in a flare     She is working w/ dietary and had been on a few different diets   -even if she fasts, she can get diarrhea    She endorse chronic baseline abdominal pain (since aged [age]) that can flare with her episodes of diarrhea too; pressure sensation, fullness and a pulsing sensation. She feels like her intestines are always sore   -no meds like NSAIDs, is on gabapentin   -nothing she has tried has helped this pain     She has been on ozempic for a year now, but went off it for a NMGES and endoscopy procedure. The stomach pain did  not change being on or off the ozempic.     She is on metformin XR 1000 mg BID, and scales back her metformin use (from 4x500 to 2-3x500). She doesn't feel the metformin makes the diarrhea worse though later shares when diarrhea flares, she cuts down on metformin as she has limited PO intake and finds her sugars drop so she adjusts metformin accordingly     She endorses some nausea w/o emesis, poor appetite, but no consistent unintentional weight loss (can vacillate a few lb, gaining/losing)  She has gone to ER for concerns of dehydration though she was not given IVF     No black stools. Can see some blood on water/TP when she stools frequently.     Not had SB checked, not checked with breath test     Today May 7, 2025    4/29 - fecal elastase WNL , fecal calpro 12s  Enteric cancelled and reordered 4/29 mychart MRE WNL  Asked details of worsenign diarrhea and asked to increase colestipol    Got back from [place] 2:30 AM today, she dropped off her dog with close friends   Last BM was last Thursday, none since then   During the entire trip she has not had a bowel movement, last one was   Endo increased her ozempic in past 6 weeks;  held 5/17/25   She started with intermittent vomiting about 3 weeks ago. Usually she vomits a few times a week, usually after she eats; she gets abd pain and then vomits. Then she feels better; no bloody emesis (just partially digested food she eats)  She is on a soft textured diet, having small frequent meals throughout the day   She is taking Citrucel 1 tsp TID - she likes it.   She takes colestipol 2-3x a week  Finds it helps with the diarrhea. No black stools. Can see some bright red blood   Diarrhea - much improved, infrequent now. She is more blocked up now and has abd pain related to this.   She started vomiting and noticed  more abd pain when she takes the colestipol   16 oz bottle x 6-9 bottles of water a day   5/19 surgery L trigger release  Ozempic held 5/17/25     Family Hx  Maternal  Uncle - colon cancer   No other known family history or GI related malignancy (esophageal, gastric, pancreatic, liver or colon) or family history of IBD/celiac disease.     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     Priority: Medium    Abnormal finding on imaging of liver 10/18/2024     Priority: Medium    Chronic diarrhea 10/18/2024     Priority: Medium    Lactose intolerance 09/10/2024     Priority: Medium    Obstructive sleep apnea 07/05/2024     Priority: Medium    Insomnia, unspecified type 07/05/2024     Priority: Medium    Bilateral carpal tunnel syndrome 06/11/2024     Priority: Medium    Diabetes mellitus, type 2 (H) 10/19/2020     Priority: Medium    Hidradenitis suppurativa 01/23/2019     Priority: Medium    Morbid obesity with body mass index of 60.0-69.9 in adult (H) 01/23/2019     Priority: Medium    Elevated BP without diagnosis of hypertension 01/23/2019     Priority: Medium    Morbid obesity (H) 10/17/2014     Priority: Medium    Chronic headaches 10/13/2014     Priority: Medium    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 Brewer; next follow up 1.2011.    Metformin increased to 850 mg twice a day.      Chronic abdominal pain 02/09/2010     Priority: Medium    Vitamin D deficiency 02/09/2010      Priority: Medium       PERTINENT PAST MEDICAL HISTORY:  Past Medical History:   Diagnosis Date    CAH (congenital adrenal hyperplasia) 2/9/2010    Followed by Dr. Brewer; next follow up 1.2011.  Metformin increased to 850 mg twice a day.     Diabetes mellitus, type 2 (H) 10/19/2020    Vitamin D deficiency 2/9/2010       PREVIOUS SURGERIES:  Past Surgical History:   Procedure Laterality Date    CHOLECYSTECTOMY      She was in 8th grade     COLONOSCOPY N/A 9/16/2024    Procedure: COLONOSCOPY, WITH BIOPSY;  Surgeon: Bacilio Yancey MD;  Location:  GI    ESOPHAGOSCOPY, GASTROSCOPY, DUODENOSCOPY (EGD), COMBINED N/A 9/16/2024    Procedure: ESOPHAGOGASTRODUODENOSCOPY, WITH BIOPSY;  Surgeon: Bacilio Yancey MD;  Location:  GI    INJECT STEROID (LOCATION) N/A 4/9/2025    Procedure: Sacrococcygeal ligament/coccyx steroid injection;  Surgeon: Edwina Fitzpatrick MD;  Location: [facility] OR     PERTINENT MEDICATIONS:    Current Outpatient Medications:     AJOVY 225 MG/1.5ML SOAJ, Inject 225 mg subcutaneously every 30 days. Last dose 4/26, Disp: , Rfl:     alcohol swab prep pads, Use to swab area of injection/anthony as directed., Disp: 100 each, Rfl: 3    Atogepant (QULIPTA) 60 MG TABS, Take 60 mg by mouth as needed., Disp: , Rfl:     atorvastatin (LIPITOR) 10 MG tablet, Take 1 tablet (10 mg) by mouth daily. (Patient taking differently: Take 10 mg by mouth every morning.), Disp: 90 tablet, Rfl: 2    blood glucose (NO BRAND SPECIFIED) test strip, Use to test blood sugar three times daily or as directed. Preferred blood glucose meter OR supplies to accompany: Blood Glucose Monitor Brands: per insurance., Disp: 100 strip, Rfl: 6    blood glucose monitoring (NO BRAND SPECIFIED) meter device kit, Use to test blood sugar three times daily or as directed. Preferred blood glucose meter OR supplies to accompany: Blood Glucose Monitor Brands: per insurance., Disp: 1 kit, Rfl: 0    budesonide (PULMICORT) 0.5 MG/2ML  neb solution, Take 2 mLs (0.5 mg) by nebulization daily. (Patient taking differently: Take 0.5 mg by nebulization as needed.), Disp: 60 mL, Rfl: 0    colestipol (COLESTID) 1 g tablet, Take 1 tablet (1 g) by mouth 2 times daily., Disp: 60 tablet, Rfl: 2    Continuous Glucose Sensor (FREESTYLE AYSE 3 PLUS SENSOR) MISC, Use 1 sensor every 15 days. Use to read blood sugars per 's instructions., Disp: 6 each, Rfl: 3    Continuous Glucose Sensor (FREESTYLE AYSE 3 SENSOR) MISC, 1 each by Other route every 14 days., Disp: 6 each, Rfl: 3    cyclobenzaprine (FLEXERIL) 5 MG tablet, Take 1 tablet (5 mg) by mouth 3 times daily as needed for muscle spasms., Disp: 30 tablet, Rfl: 0    fluticasone (FLONASE) 50 MCG/ACT nasal spray, Spray 1 spray into both nostrils daily (Patient taking differently: Spray 1 spray into both nostrils every morning.), Disp: 16 g, Rfl: 3    gabapentin (NEURONTIN) 300 MG capsule, Take 1 capsule (300 mg) by mouth 3 times daily. Take 300 mg at 9:30 am, 600 mg at 9:30 pm, Disp: 270 capsule, Rfl: 0    insulin pen needle (ULTICARE MICRO) 32G X 4 MM miscellaneous, Use 1 pen needles daily or as directed., Disp: 100 each, Rfl: 3    metFORMIN (GLUCOPHAGE XR) 500 MG 24 hr tablet, Take 4 tablets (2,000 mg) by mouth daily (with dinner)., Disp: 360 tablet, Rfl: 3    mometasone (NASONEX) 50 MCG/ACT nasal spray, Spray 2 sprays into both nostrils daily. (Patient taking differently: Spray 2 sprays into both nostrils every evening.), Disp: 17 g, Rfl: 3    norgestimate-ethinyl estradiol (ORTHO-CYCLEN) 0.25-35 MG-MCG tablet, Take 1 tablet by mouth daily. (Patient taking differently: Take 1 tablet by mouth every evening.), Disp: 84 tablet, Rfl: 0    OnabotulinumtoxinA (BOTOX IJ), Inject as directed every 3 months. Last dose 4/23/25, Disp: , Rfl:     ondansetron (ZOFRAN ODT) 4 MG ODT tab, DISSOLVE ONE TABLET ON TONGUE EVERY 8 HOURS AS NEEDED FOR NAUSEA, Disp: 30 tablet, Rfl: 1    pantoprazole (PROTONIX) 40 MG EC  tablet, Take 1 tablet (40 mg) by mouth daily. (Patient taking differently: Take 40 mg by mouth every morning.), Disp: 90 tablet, Rfl: 2    rimegepant (NURTEC) 75 MG ODT tablet, Place 75 mg under the tongue daily as needed for migraine., Disp: , Rfl:     rizatriptan (MAXALT) 10 MG tablet, Take 10 mg by mouth at onset of headache., Disp: , Rfl:     Semaglutide, 1 MG/DOSE, (OZEMPIC) 4 MG/3ML pen, Inject 1 mg subcutaneously every 7 days. (Patient taking differently: Inject 1 mg subcutaneously every 7 days. Last dose 4/28/25), Disp: 9 mL, Rfl: 3    spironolactone (ALDACTONE) 50 MG tablet, Take 1 tablet (50 mg) by mouth daily. (Patient taking differently: Take 50 mg by mouth every evening.), Disp: 90 tablet, Rfl: 3    study - chlorhexidine, IDS# 6242, 4% soap, Rinse the body with water. Apply soap from the neck to the toes and avoid contact with the eyes, ears, nose, mouth, and genitals. Leave for 1 minute then rinse body again. Use once the night before surgery and once the morning of surgery., Disp: 118 mL, Rfl: 0    study - povidone idoine, IDS# 6242, 5% nasal solution, Clean nostrils using tissue. Open the cap, dip one swab into the bottle and stir the solution. Withdraw the swab slowly. Insert the swab into the nostril and rotate 360 degrees covering all surfaces. Repeat in the other nostril using the new swab. Repeat the application in both nostrils using a fresh swab each time. Do not blow the nose., Disp: 4 mL, Rfl: 0    SUMAtriptan (IMITREX) 50 MG tablet, Take 2 tablets (100 mg) by mouth at onset of headache for migraine. May repeat in 2 hours. Max 4 tablets/24 hours., Disp: , Rfl:     thin (NO BRAND SPECIFIED) lancets, Use with lanceting device. To accompany: Blood Glucose Monitor Brands: per insurance., Disp: 100 each, Rfl: 6    topiramate (TOPAMAX) 25 MG tablet, Take 25 mg by mouth 2 times daily., Disp: , Rfl:     tranexamic acid (LYSTEDA) 650 MG tablet, Take 2 tablets (1,300 mg) by mouth 2 times daily as  needed (menorrhagia)., Disp: 30 tablet, Rfl: 1    UBRELVY 100 MG tablet, Take 100 mg by mouth at onset of headache., Disp: , Rfl:     PHYSICAL EXAMINATION:  Vitals reviewed: LMP 04/19/2025 (Exact Date)   Constitutional: aaox3, cooperative, pleasant, not dyspneic/diaphoretic, no acute distress  Eyes: Sclera anicteric/injected  Ears/nose/mouth/throat: hearing intact  Neck: supple, active ROM w/o limitation or pain   CV: No edema  Respiratory: Unlabored breathing  Abd:  mildly distended, hypoactive bowel sounds throughout, tenderness to abd LUQ>LLQ>RLQ but no guarding or peritoneal signs  Skin: warm, perfused, no jaundice  Psych: Normal affect  MSK: Normal gait     PERTINENT STUDIES:    Office Visit on 03/11/2025   Component Date Value Ref Range Status    Vitamin B12 03/11/2025 301  232 - 1,245 pg/mL Final        Lab Results   Component Value Date    WBC 7.6 12/18/2024    WBC 9.2 11/09/2024    WBC 9.3 11/09/2024    HGB 13.9 05/02/2025    HGB 13.5 12/18/2024    HGB 14.4 11/09/2024     12/18/2024     11/09/2024     11/09/2024    CHOL 137 03/07/2025    CHOL 143 11/13/2024    CHOL 157 10/18/2024    TRIG 90 03/07/2025    TRIG 88 11/13/2024    TRIG 95 10/18/2024    HDL 56 03/07/2025    HDL 58 11/13/2024    HDL 60 10/18/2024    ALT 20 03/07/2025    ALT 16 12/18/2024    ALT 17 11/09/2024    AST 18 03/07/2025    AST 16 12/18/2024    AST 15 11/09/2024     03/07/2025     11/09/2024     11/09/2024    BUN 9.4 03/07/2025    BUN 12.3 11/09/2024    BUN 12.8 11/09/2024    CO2 22 03/07/2025    CO2 21 (L) 11/09/2024    CO2 16 (L) 11/09/2024    TSH 1.08 08/23/2024    TSH 0.93 04/30/2024    TSH 1.33 01/30/2024    INR 0.9 03/24/2025        Liver Function Studies -   Recent Labs   Lab Test 03/07/25  1428   PROTTOTAL 7.9   ALBUMIN 4.4   BILITOTAL 0.2   ALKPHOS 53   AST 18   ALT 20        PREVIOUS ENDOSCOPY    9/16/2024 -EGD for diarrhea, nausea with vomiting.  GE flap valve Hill grade 1.  Normal stomach,  biopsied (mild chronic inactive gastritis, negative H. pylori.  No intestinal metaplasia).  Normal duodenum, biopsied (mild peptic type duodenopathy.  No evidence of celiac disease)     9/16/2024 colonoscopy for chronic diarrhea status postcholecystectomy.  Performed with Dr. Bonner.  Excellent prep, terminal ileal intubation.  Unremarkable macro exam.  Random colon biopsies unremarkable colon mucosa, no microscopic colitis or other histopathologic changes noted.    -Continue with psyllium fiber, Imodium, cholestyramine.  Consider nutrition consult for weight management in the setting of metabolic associated steatotic liver disease and diarrhea.

## 2025-05-07 NOTE — NURSING NOTE
Do you have a history of colon cancer in your immediate family? NO    If yes who: negative     And what age  were they diagnosed:       Chief Complaint   Patient presents with    RECHECK       Vitals:    05/07/25 0834   BP: 102/68   Pulse: 72   SpO2: 97%   Weight: 111.1 kg (245 lb)   Height: 1.524 m (5')       Body mass index is 47.85 kg/m .    Sherry Bobby MA

## 2025-05-07 NOTE — PATIENT INSTRUCTIONS
It was a pleasure taking care of you today.  I've included a brief summary of our discussion and care plan from today's visit below.  Please review this information with your primary care provider.  _______________________________________________________________________    My recommendations are summarized as follows:    I want to perform a bowel clean out, see below instructions  Day 1 - Take 1 bottle of magnesium citrate and 1 glycerin suppository (insert rounded end, not pointed end, first)  Day - 2 - Take 1 bottle of magnesium citrate and 1 glycerin suppository (insert rounded end, not pointed end, first)  Day 3 -  ONE Miralax bottle (238 g, 14 caps) and TWO 32 ounce Gatorade or other clear liquid (64 ounces). Mix the two. Allow to sit in fridge for 30-60 minutes to allow the Miralax to fully dissolve. Then drink 1 glass every 15-30 minutes over the next 3-4 hours. BUT IT IS OK TO TAKE BREAKS. DON'T FORCE IT DOWN.   If you do not have stools, I want to know about it   Day 4 - day after the clean out, I want you to restart the Citrucel powder at 1 tsp daily, then increase up to three times a day.   -This is your bowel regiment to surgery 5/19 and until I speak with you again. If you have multiple days without a bowel movement, stop the fiber and reach out to me     -If you have a day without a bowel movement, I want you to take 0.5 caps of miralax consistently     Do not take the colestipol until we revisit on [employer] 5/22 (send note to me)     Try to limit Zofran as we've discussed     Continue the soft textured diet as we discussed, try to avoid high fiber foods right now    GO to the ER for bad symptoms like we discussed - bad abdominal pain, vomiting, trouble with passing gas     Return to GI Clinic in 6/9 to review your progress.     Please see below for any additional questions and scheduling guidelines.    Sign up for [employer] MyChart patient portal serves as a secure platform for accessing your  medical records from the [facility]. Additionally, [employer] facilitates easy, timely, and secure messaging with your care team. If you have not signed up, you may do so by using the provided code or calling [phone].    Coordinating your care after your visit:  There are multiple options for scheduling your follow-up care based on your provider's recommendation.    How do I schedule a follow-up clinic appointment:   After your appointment, you may receive scheduling assistance with the Clinic Coordinators by having a seat in the waiting room and a Clinic Coordinator will call you up to schedule.  Virtual visits or after you leave the clinic:  Your provider has placed a follow-up order in the [employer] portal for scheduling your return appointment. A member of the scheduling team will contact you to schedule.  [employer] Scheduling: Timely scheduling through [employer] is advised to ensure appointment availability.   Call to schedule: You may schedule your follow-up appointment(s) by calling [phone], option 1.    How do I schedule my endoscopy or colonoscopy procedure:  If a procedure, such as a colonoscopy or upper endoscopy was ordered by your provider, the scheduling team will contact you to schedule this procedure. Or you may choose to call to schedule at   [phone], option 2.  Please allow 20-30 minutes when scheduling a procedure.    How do I get my blood work done? To get your blood work done, you need to schedule a lab appointment at an [facility]. There are multiple ways to schedule:   At the clinic: The Clinic Coordinator you meet after your visit can help you schedule a lab appointment.   [facility] scheduling: [employer] offers online lab scheduling at all [facility] laboratory locations.   Call to schedule: You can call [phone] to schedule your lab appointment.    How do I schedule my imaging study: To schedule imaging studies, such as CT scans, ultrasounds, MRIs,  or X-rays, contact Imaging Services at [phone].    How do I schedule a referral to another doctor: If your provider recommended a referral to another specialist(s), the referral order was placed by your provider. You will receive a phone call to schedule this referral, or you may choose to call the number attached to the referral to self-schedule.    For Post-Visit Question(s):  For any inquiries following today's visit:  Please utilize [employer] messaging and allow 48 hours for reply or contact the Call Center during normal business hours at [phone], option 3.  For Emergent After-hours questions, contact the On-Call GI Fellow through the [facility] at [phone].  In addition, you may contact your Nurse directly using the provided contact information.    Test Results:  Test results will be accessible via [employer] in compliance with the 21st Century Cures Act. This means that your results will be available to you at the same time as your provider. Often you may see your results before your provider does. Results are reviewed by staff within two weeks with communication follow-up. Results may be released in the patient portal prior to your care team review.    Prescription Refill(s):  Medication prescribed by your provider will be addressed during your visit. For future refills, please coordinate with your pharmacy. If you have not had a recent clinic visit or routine labs, for your safety, your provider may not be able to refill your prescription.     Sincerely,    Yanely Hansen PA-C  Gastroenterology

## 2025-05-07 NOTE — LETTER
5/7/2025      Anh Flores  [address] [facility] [address]      Dear Colleague,    Thank you for referring your patient, Anh Floers, to the [facility] [place]. Please see a copy of my visit note below.      GI CLINIC VISIT    CC/REFERRING MD:  No ref. provider found  REASON FOR CONSULTATION: diarrhea    ASSESSMENT/PLAN:  Anh Flores is a 30 year old year old female with PMHx significant for history of CAH, obesity, type 2 diabetes, PCOS who presents seeing the  [facility] GI team for diarrhea.  Tangential historian and report is variable.     #Chronic diarrhea  #IBS  #Bile acid malabsorption  Onset of baseline diarrhea age 12/13 after CCY with sx compounded in 2023 after cryptosporidium infection. Celiac serologies NEG 2024 and recently had bidirectional endoscopies with Dr Yancey 9/2024 - TI normal and random colon biopsies negative for microscopic colitis. Duodenal biopsies were negative for celiac disease but did show some mild duodenitis. Gastric biopsy with mild chronic inactive gastritis w/o h.pylori. MRE 4/2025 unremarkable of small bowel disease, associated fecal calpro NEG (12). Fecal elastase normal. Enteric panel cancelled due to collection, repeat order is pending collection. She went on cholestyramine with fair relief though use was irregular which further compounded the diarrhea/irregular stooling cycle (at times passing firm/hard small stools).     These symptoms are consistent with IBS (meeting [place]-IV criteria) with flares compounded by bile acid malabsorption (s/p CCY). I am also highly suspicious of an underlying pelvic floor dysfunction. Ddx includes other malabsorption, less likely underlying inflammation (fecal calpro 12, CTAP W contrast 2024 without bowel inflammatory changes, no obstruction either; 2024 colonoscopy without IBD) vs other. Maintaining oral intake and hydration; never required IVF/admission.     Today - found colestipol  helpful but she's severely constipated now after recent travel with last BM 5/1.  She has an upcoming surgery 5/19 for trigger release.  Abd distended with some tenderness (LUQ>LLQ>RLQ) and hypoactive bowel sounds. Vitals stable and she is nontoxic appearing. Oral intake maintained. We discussed the following plan    -KUB today   Bowel Regiment  Day 1 -mag citrate with glycerin suppository  Day 2 - mag citrate with glycerin suppository  Day 3 -MiraLAX cleanout  Day 4 -daily Citrucel powder 1 teaspoon, titrate per AVS  Surgery 5/19, send mychart update to me 5/22    --HOLD colestipol  -- Limit Zofran  --Take MiraLAX for multiple days without stooling  -- ER precautions reviewed    Future consideration  1.  Given predominant abdominal pain component, she would be a good candidate for a neuromodulator such as a tricyclic antidepressant. EKG 1/22/2025 - 453 m/s.  We can consider this in the future  2.  Breath test for small intestinal bacterial overgrowth, fructose intolerance, carbohydrate malabsorption  3. If the diarrhea continues despite our management options, we can consider evaluation for rare causes of chronic diarrhea such as hormone secreting tumors (eg.,  fasting serum gastrin, serum calcitonin, somatostatin, vasoactive intestinal polypeptide, 24-hour urine 5-HIAA)  4.  Laboratory studies to assess for malabsorption can include albumin, RBC folate, serum iron, total iron-binding capacity, B12, calcium, magnesium, carotene, vitamin D    No orders of the defined types were placed in this encounter.    Colorectal cancer screening: aged 43    RTC 6/9 already scheduled     Thank you for this consultation.  It was a pleasure to participate in the care of this patient; please contact me with any further questions.     Yanely Hansen PA-C    Follow up: As planned above. Today, I personally spent 45  minutes spent on the date of the encounter doing chart review, history and exam, documentation and further activities per  the note.    HPI  Anh Flores is a 30 year old year old female with PMHx of history of CAH, obesity, type 2 diabetes, PCOS following [facility] for diarrhea since age [age].  Tangential historian    3/21/25  Previously seen with [facility] for chronic diarrhea - per notes, improved with infrequent use of bile acid sequestrant     She had CCY at age 10 and 11 - then developed baseline diarrhea  She then got cryptosporidium infx in 12/2023 and had a month of N/V/D  -since then, the diarrhea has worsened    -reports flares of the diarrhea, can last 2d-3wk; consistency varies from frequent pudding consistency stools to BSC type 7 stools that are difficult to manage. She endorses frequent small volumed incomplete BM that will continue for hours until she empties  --If she eats something that irritates her stomach, usually within 20-30 min, her abd feels bubbly and she then needs to have a BM     -She can then get a week of 'normal' stooling, usually passing 2 bsc type 4 stools though shares throughout the week, the stools can get looser . Even with the normal stools, she does not endorse complete evacuation.     -she stools every day, no days without a BM     She was put on cholestyramine that she takes intermittently (when diarrhea gets very bad - she characterizes this as stools that look like a BM after a colonoscopy prep) and it has helped at times but shares it binds to her teeth and makes it hurt . Not been on colestipol.   -states she was on all the OTC meds - imodium, pepto bismol without relief   -she is not on a daily bowel regiment     Currently she is reporting she is in day 2 of a softer stool but not actively in a flare     She is working w/ dietary and had been on a few different diets   -even if she fasts, she can get diarrhea    She endorse chronic baseline abdominal pain (since aged [age]) that can flare with her episodes of diarrhea too; pressure sensation, fullness and a pulsing sensation.  She feels like her intestines are always sore   -no meds like NSAIDs, is on gabapentin   -nothing she has tried has helped this pain     She has been on ozempic for a year now, but went off it for a NMGES and endoscopy procedure. The stomach pain did not change being on or off the ozempic.     She is on metformin XR 1000 mg BID, and scales back her metformin use (from 4x500 to 2-3x500). She doesn't feel the metformin makes the diarrhea worse though later shares when diarrhea flares, she cuts down on metformin as she has limited PO intake and finds her sugars drop so she adjusts metformin accordingly     She endorses some nausea w/o emesis, poor appetite, but no consistent unintentional weight loss (can vacillate a few lb, gaining/losing)  She has gone to ER for concerns of dehydration though she was not given IVF     No black stools. Can see some blood on water/TP when she stools frequently.     Not had SB checked, not checked with breath test     Today May 7, 2025    4/29 - fecal elastase WNL , fecal calpro 12s  Enteric cancelled and reordered 4/29 mychart MRE WNL  Asked details of worsenign diarrhea and asked to increase colestipol    Got back from [place] 2:30 AM today, she dropped off her dog with close friends   Last BM was last Thursday, none since then   During the entire trip she has not had a bowel movement, last one was   Endo increased her ozempic in past 6 weeks;  held 5/17/25   She started with intermittent vomiting about 3 weeks ago. Usually she vomits a few times a week, usually after she eats; she gets abd pain and then vomits. Then she feels better; no bloody emesis (just partially digested food she eats)  She is on a soft textured diet, having small frequent meals throughout the day   She is taking Citrucel 1 tsp TID - she likes it.   She takes colestipol 2-3x a week  Finds it helps with the diarrhea. No black stools. Can see some bright red blood   Diarrhea - much improved, infrequent now. She is more  blocked up now and has abd pain related to this.   She started vomiting and noticed  more abd pain when she takes the colestipol   16 oz bottle x 6-9 bottles of water a day   5/19 surgery L trigger release  Ozempic held 5/17/25     Family Hx  Maternal Uncle - colon cancer   No other known family history or GI related malignancy (esophageal, gastric, pancreatic, liver or colon) or family history of IBD/celiac disease.     Social Hx   No use of NSAIDs  No ETOH  No tobacco products   No recreational drug use     [place] - mariaa markham; good friends in [place] (sitting her dog)   PROBLEM LIST  Patient Active Problem List    Diagnosis Date Noted     Coccydynia 03/11/2025     Priority: Medium     Ulnar neuropathy of both upper extremities 02/21/2025     Priority: Medium     Trigger thumb of both thumbs 02/21/2025     Priority: Medium     Lateral epicondylitis of both elbows 02/21/2025     Priority: Medium     Elevated ferritin 12/04/2024     Priority: Medium     Cryptosporidiasis (H) 11/14/2024     Priority: Medium     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     Priority: Medium     Abnormal finding on imaging of liver 10/18/2024     Priority: Medium     Chronic diarrhea 10/18/2024     Priority: Medium     Lactose intolerance 09/10/2024     Priority: Medium     Obstructive sleep apnea 07/05/2024     Priority: Medium     Insomnia, unspecified type 07/05/2024     Priority: Medium     Bilateral carpal tunnel syndrome 06/11/2024     Priority: Medium     Diabetes mellitus, type 2 (H) 10/19/2020     Priority: Medium     Hidradenitis suppurativa 01/23/2019     Priority: Medium     Morbid obesity with body mass index of 60.0-69.9 in adult (H) 01/23/2019     Priority: Medium     Elevated BP without diagnosis of hypertension 01/23/2019     Priority: Medium     Morbid obesity (H) 10/17/2014     Priority: Medium     Chronic headaches 10/13/2014     Priority: Medium     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 Brewer; next follow up 1.2011.    Metformin increased to 850 mg twice a day.       Chronic abdominal pain 02/09/2010     Priority: Medium     Vitamin D deficiency 02/09/2010     Priority: Medium       PERTINENT PAST MEDICAL HISTORY:  Past Medical History:   Diagnosis Date     CAH (congenital adrenal hyperplasia) 2/9/2010    Followed by Dr. Brewer; next follow up 1.2011.  Metformin increased to 850 mg twice a day.      Diabetes mellitus, type 2 (H) 10/19/2020     Vitamin D deficiency 2/9/2010       PREVIOUS SURGERIES:  Past Surgical History:   Procedure Laterality Date     CHOLECYSTECTOMY      She was in 8th grade      COLONOSCOPY N/A 9/16/2024    Procedure: COLONOSCOPY, WITH BIOPSY;  Surgeon: Bacilio Yancey MD;  Location: [facility]     ESOPHAGOSCOPY, GASTROSCOPY, DUODENOSCOPY (EGD), COMBINED N/A 9/16/2024    Procedure: ESOPHAGOGASTRODUODENOSCOPY, WITH BIOPSY;  Surgeon: Bacilio Yancey MD;  Location: [facility]     INJECT STEROID (LOCATION) N/A 4/9/2025    Procedure: Sacrococcygeal ligament/coccyx steroid injection;  Surgeon: Edwina Fitzpatrick MD;  Location: [facility] OR     PERTINENT MEDICATIONS:    Current Outpatient Medications:      AJOVY 225 MG/1.5ML SOAJ, Inject 225 mg subcutaneously every 30 days. Last dose 4/26, Disp: , Rfl:      alcohol swab prep pads, Use to swab area of injection/anthony as directed., Disp: 100 each, Rfl: 3     Atogepant (QULIPTA) 60 MG TABS, Take 60 mg by mouth as needed., Disp: , Rfl:      atorvastatin (LIPITOR) 10 MG tablet, Take 1 tablet (10 mg) by mouth daily. (Patient taking differently: Take 10 mg by mouth every morning.), Disp: 90 tablet, Rfl: 2     blood glucose (NO BRAND SPECIFIED) test strip, Use to test blood sugar three times daily or as directed. Preferred blood glucose meter OR supplies to accompany: Blood Glucose Monitor Brands: per insurance., Disp: 100 strip, Rfl:  6     blood glucose monitoring (NO BRAND SPECIFIED) meter device kit, Use to test blood sugar three times daily or as directed. Preferred blood glucose meter OR supplies to accompany: Blood Glucose Monitor Brands: per insurance., Disp: 1 kit, Rfl: 0     budesonide (PULMICORT) 0.5 MG/2ML neb solution, Take 2 mLs (0.5 mg) by nebulization daily. (Patient taking differently: Take 0.5 mg by nebulization as needed.), Disp: 60 mL, Rfl: 0     colestipol (COLESTID) 1 g tablet, Take 1 tablet (1 g) by mouth 2 times daily., Disp: 60 tablet, Rfl: 2     Continuous Glucose Sensor (FREESTYLE AYSE 3 PLUS SENSOR) MISC, Use 1 sensor every 15 days. Use to read blood sugars per 's instructions., Disp: 6 each, Rfl: 3     Continuous Glucose Sensor (FREESTYLE AYSE 3 SENSOR) MISC, 1 each by Other route every 14 days., Disp: 6 each, Rfl: 3     cyclobenzaprine (FLEXERIL) 5 MG tablet, Take 1 tablet (5 mg) by mouth 3 times daily as needed for muscle spasms., Disp: 30 tablet, Rfl: 0     fluticasone (FLONASE) 50 MCG/ACT nasal spray, Spray 1 spray into both nostrils daily (Patient taking differently: Spray 1 spray into both nostrils every morning.), Disp: 16 g, Rfl: 3     gabapentin (NEURONTIN) 300 MG capsule, Take 1 capsule (300 mg) by mouth 3 times daily. Take 300 mg at 9:30 am, 600 mg at 9:30 pm, Disp: 270 capsule, Rfl: 0     insulin pen needle (ULTICARE MICRO) 32G X 4 MM miscellaneous, Use 1 pen needles daily or as directed., Disp: 100 each, Rfl: 3     metFORMIN (GLUCOPHAGE XR) 500 MG 24 hr tablet, Take 4 tablets (2,000 mg) by mouth daily (with dinner)., Disp: 360 tablet, Rfl: 3     mometasone (NASONEX) 50 MCG/ACT nasal spray, Spray 2 sprays into both nostrils daily. (Patient taking differently: Spray 2 sprays into both nostrils every evening.), Disp: 17 g, Rfl: 3     norgestimate-ethinyl estradiol (ORTHO-CYCLEN) 0.25-35 MG-MCG tablet, Take 1 tablet by mouth daily. (Patient taking differently: Take 1 tablet by mouth every  evening.), Disp: 84 tablet, Rfl: 0     OnabotulinumtoxinA (BOTOX IJ), Inject as directed every 3 months. Last dose 4/23/25, Disp: , Rfl:      ondansetron (ZOFRAN ODT) 4 MG ODT tab, DISSOLVE ONE TABLET ON TONGUE EVERY 8 HOURS AS NEEDED FOR NAUSEA, Disp: 30 tablet, Rfl: 1     pantoprazole (PROTONIX) 40 MG EC tablet, Take 1 tablet (40 mg) by mouth daily. (Patient taking differently: Take 40 mg by mouth every morning.), Disp: 90 tablet, Rfl: 2     rimegepant (NURTEC) 75 MG ODT tablet, Place 75 mg under the tongue daily as needed for migraine., Disp: , Rfl:      rizatriptan (MAXALT) 10 MG tablet, Take 10 mg by mouth at onset of headache., Disp: , Rfl:      Semaglutide, 1 MG/DOSE, (OZEMPIC) 4 MG/3ML pen, Inject 1 mg subcutaneously every 7 days. (Patient taking differently: Inject 1 mg subcutaneously every 7 days. Last dose 4/28/25), Disp: 9 mL, Rfl: 3     spironolactone (ALDACTONE) 50 MG tablet, Take 1 tablet (50 mg) by mouth daily. (Patient taking differently: Take 50 mg by mouth every evening.), Disp: 90 tablet, Rfl: 3     study - chlorhexidine, IDS# 6242, 4% soap, Rinse the body with water. Apply soap from the neck to the toes and avoid contact with the eyes, ears, nose, mouth, and genitals. Leave for 1 minute then rinse body again. Use once the night before surgery and once the morning of surgery., Disp: 118 mL, Rfl: 0     study - povidone idoine, IDS# 6242, 5% nasal solution, Clean nostrils using tissue. Open the cap, dip one swab into the bottle and stir the solution. Withdraw the swab slowly. Insert the swab into the nostril and rotate 360 degrees covering all surfaces. Repeat in the other nostril using the new swab. Repeat the application in both nostrils using a fresh swab each time. Do not blow the nose., Disp: 4 mL, Rfl: 0     SUMAtriptan (IMITREX) 50 MG tablet, Take 2 tablets (100 mg) by mouth at onset of headache for migraine. May repeat in 2 hours. Max 4 tablets/24 hours., Disp: , Rfl:      thin (NO BRAND  SPECIFIED) lancets, Use with lanceting device. To accompany: Blood Glucose Monitor Brands: per insurance., Disp: 100 each, Rfl: 6     topiramate (TOPAMAX) 25 MG tablet, Take 25 mg by mouth 2 times daily., Disp: , Rfl:      tranexamic acid (LYSTEDA) 650 MG tablet, Take 2 tablets (1,300 mg) by mouth 2 times daily as needed (menorrhagia)., Disp: 30 tablet, Rfl: 1     UBRELVY 100 MG tablet, Take 100 mg by mouth at onset of headache., Disp: , Rfl:     PHYSICAL EXAMINATION:  Vitals reviewed: LMP 04/19/2025 (Exact Date)   Constitutional: aaox3, cooperative, pleasant, not dyspneic/diaphoretic, no acute distress  Eyes: Sclera anicteric/injected  Ears/nose/mouth/throat: hearing intact  Neck: supple, active ROM w/o limitation or pain   CV: No edema  Respiratory: Unlabored breathing  Abd:  mildly distended, hypoactive bowel sounds throughout, tenderness to abd LUQ>LLQ>RLQ but no guarding or peritoneal signs  Skin: warm, perfused, no jaundice  Psych: Normal affect  MSK: Normal gait     PERTINENT STUDIES:    Office Visit on 03/11/2025   Component Date Value Ref Range Status     Vitamin B12 03/11/2025 301  232 - 1,245 pg/mL Final        Lab Results   Component Value Date    WBC 7.6 12/18/2024    WBC 9.2 11/09/2024    WBC 9.3 11/09/2024    HGB 13.9 05/02/2025    HGB 13.5 12/18/2024    HGB 14.4 11/09/2024     12/18/2024     11/09/2024     11/09/2024    CHOL 137 03/07/2025    CHOL 143 11/13/2024    CHOL 157 10/18/2024    TRIG 90 03/07/2025    TRIG 88 11/13/2024    TRIG 95 10/18/2024    HDL 56 03/07/2025    HDL 58 11/13/2024    HDL 60 10/18/2024    ALT 20 03/07/2025    ALT 16 12/18/2024    ALT 17 11/09/2024    AST 18 03/07/2025    AST 16 12/18/2024    AST 15 11/09/2024     03/07/2025     11/09/2024     11/09/2024    BUN 9.4 03/07/2025    BUN 12.3 11/09/2024    BUN 12.8 11/09/2024    CO2 22 03/07/2025    CO2 21 (L) 11/09/2024    CO2 16 (L) 11/09/2024    TSH 1.08 08/23/2024    TSH 0.93 04/30/2024     TSH 1.33 01/30/2024    INR 0.9 03/24/2025        Liver Function Studies -   Recent Labs   Lab Test 03/07/25  1428   PROTTOTAL 7.9   ALBUMIN 4.4   BILITOTAL 0.2   ALKPHOS 53   AST 18   ALT 20        PREVIOUS ENDOSCOPY    9/16/2024 -EGD for diarrhea, nausea with vomiting.  GE flap valve Hill grade 1.  Normal stomach, biopsied (mild chronic inactive gastritis, negative H. pylori.  No intestinal metaplasia).  Normal duodenum, biopsied (mild peptic type duodenopathy.  No evidence of celiac disease)     9/16/2024 colonoscopy for chronic diarrhea status postcholecystectomy.  Performed with Dr. Bonner.  Excellent prep, terminal ileal intubation.  Unremarkable macro exam.  Random colon biopsies unremarkable colon mucosa, no microscopic colitis or other histopathologic changes noted.    -Continue with psyllium fiber, Imodium, cholestyramine.  Consider nutrition consult for weight management in the setting of metabolic associated steatotic liver disease and diarrhea.      Again, thank you for allowing me to participate in the care of your patient.        Sincerely,        Yanely Hansen PA-C    Electronically signed

## 2025-05-19 ENCOUNTER — HOSPITAL ENCOUNTER (OUTPATIENT)
Facility: CLINIC | Age: 30
Discharge: HOME | End: 2025-05-19
Attending: STUDENT IN AN ORGANIZED HEALTH CARE EDUCATION/TRAINING PROGRAM | Admitting: STUDENT IN AN ORGANIZED HEALTH CARE EDUCATION/TRAINING PROGRAM
Payer: COMMERCIAL

## 2025-05-19 ENCOUNTER — ANESTHESIA (OUTPATIENT)
Dept: SURGERY | Facility: CLINIC | Age: 30
End: 2025-05-19
Payer: COMMERCIAL

## 2025-05-19 ENCOUNTER — OFFICE VISIT (OUTPATIENT)
Dept: OPHTHALMOLOGY | Facility: CLINIC | Age: 30
End: 2025-05-19
Attending: OPTOMETRIST
Payer: COMMERCIAL

## 2025-05-19 VITALS
WEIGHT: 243.39 LBS | BODY MASS INDEX: 47.78 KG/M2 | HEART RATE: 83 BPM | TEMPERATURE: 98 F | HEIGHT: 60 IN | OXYGEN SATURATION: 97 % | SYSTOLIC BLOOD PRESSURE: 105 MMHG | RESPIRATION RATE: 12 BRPM | DIASTOLIC BLOOD PRESSURE: 70 MMHG

## 2025-05-19 DIAGNOSIS — H54.7 PROBLEM FOCUSING EYES: ICD-10-CM

## 2025-05-19 DIAGNOSIS — E11.9 TYPE 2 DIABETES MELLITUS WITHOUT COMPLICATION, WITHOUT LONG-TERM CURRENT USE OF INSULIN (H): ICD-10-CM

## 2025-05-19 DIAGNOSIS — G56.23 ULNAR NEUROPATHY OF BOTH UPPER EXTREMITIES: Primary | ICD-10-CM

## 2025-05-19 DIAGNOSIS — H04.123 DRY EYES, BILATERAL: ICD-10-CM

## 2025-05-19 DIAGNOSIS — H57.12 PAIN IN AND AROUND EYE, LEFT: ICD-10-CM

## 2025-05-19 DIAGNOSIS — H53.40 VISUAL FIELD DEFECT: Primary | ICD-10-CM

## 2025-05-19 LAB
GLUCOSE BLDC GLUCOMTR-MCNC: 71 MG/DL (ref 70–99)
GLUCOSE BLDC GLUCOMTR-MCNC: 82 MG/DL (ref 70–99)
GLUCOSE BLDC GLUCOMTR-MCNC: 98 MG/DL (ref 70–99)

## 2025-05-19 PROCEDURE — 64718 REVISE ULNAR NERVE AT ELBOW: CPT | Mod: LT | Performed by: STUDENT IN AN ORGANIZED HEALTH CARE EDUCATION/TRAINING PROGRAM

## 2025-05-19 PROCEDURE — 250N000011 HC RX IP 250 OP 636: Performed by: NURSE ANESTHETIST, CERTIFIED REGISTERED

## 2025-05-19 PROCEDURE — 710N000010 HC RECOVERY PHASE 1, LEVEL 2, PER MIN: Performed by: STUDENT IN AN ORGANIZED HEALTH CARE EDUCATION/TRAINING PROGRAM

## 2025-05-19 PROCEDURE — 272N000001 HC OR GENERAL SUPPLY STERILE: Performed by: STUDENT IN AN ORGANIZED HEALTH CARE EDUCATION/TRAINING PROGRAM

## 2025-05-19 PROCEDURE — 64721 CARPAL TUNNEL SURGERY: CPT | Mod: LT | Performed by: STUDENT IN AN ORGANIZED HEALTH CARE EDUCATION/TRAINING PROGRAM

## 2025-05-19 PROCEDURE — 92083 EXTENDED VISUAL FIELD XM: CPT | Performed by: OPTOMETRIST

## 2025-05-19 PROCEDURE — 250N000011 HC RX IP 250 OP 636

## 2025-05-19 PROCEDURE — 26055 INCISE FINGER TENDON SHEATH: CPT | Mod: FA | Performed by: STUDENT IN AN ORGANIZED HEALTH CARE EDUCATION/TRAINING PROGRAM

## 2025-05-19 PROCEDURE — 258N000003 HC RX IP 258 OP 636: Performed by: NURSE ANESTHETIST, CERTIFIED REGISTERED

## 2025-05-19 PROCEDURE — 82962 GLUCOSE BLOOD TEST: CPT

## 2025-05-19 PROCEDURE — G0463 HOSPITAL OUTPT CLINIC VISIT: HCPCS | Performed by: OPTOMETRIST

## 2025-05-19 PROCEDURE — 64415 NJX AA&/STRD BRCH PLXS IMG: CPT | Mod: XU,LT

## 2025-05-19 PROCEDURE — 25000 INCISION OF TENDON SHEATH: CPT | Mod: LT | Performed by: STUDENT IN AN ORGANIZED HEALTH CARE EDUCATION/TRAINING PROGRAM

## 2025-05-19 PROCEDURE — 250N000009 HC RX 250: Performed by: NURSE ANESTHETIST, CERTIFIED REGISTERED

## 2025-05-19 PROCEDURE — 370N000017 HC ANESTHESIA TECHNICAL FEE, PER MIN: Performed by: STUDENT IN AN ORGANIZED HEALTH CARE EDUCATION/TRAINING PROGRAM

## 2025-05-19 PROCEDURE — 360N000076 HC SURGERY LEVEL 3, PER MIN: Performed by: STUDENT IN AN ORGANIZED HEALTH CARE EDUCATION/TRAINING PROGRAM

## 2025-05-19 PROCEDURE — 999N000141 HC STATISTIC PRE-PROCEDURE NURSING ASSESSMENT: Performed by: STUDENT IN AN ORGANIZED HEALTH CARE EDUCATION/TRAINING PROGRAM

## 2025-05-19 PROCEDURE — 710N000012 HC RECOVERY PHASE 2, PER MINUTE: Performed by: STUDENT IN AN ORGANIZED HEALTH CARE EDUCATION/TRAINING PROGRAM

## 2025-05-19 RX ORDER — HYDROMORPHONE HCL IN WATER/PF 6 MG/30 ML
0.2 PATIENT CONTROLLED ANALGESIA SYRINGE INTRAVENOUS EVERY 5 MIN PRN
Status: DISCONTINUED | OUTPATIENT
Start: 2025-05-19 | End: 2025-05-19 | Stop reason: HOSPADM

## 2025-05-19 RX ORDER — HYDROMORPHONE HCL IN WATER/PF 6 MG/30 ML
0.4 PATIENT CONTROLLED ANALGESIA SYRINGE INTRAVENOUS EVERY 5 MIN PRN
Refills: 0 | Status: DISCONTINUED | OUTPATIENT
Start: 2025-05-19 | End: 2025-05-19 | Stop reason: HOSPADM

## 2025-05-19 RX ORDER — NALOXONE HYDROCHLORIDE 0.4 MG/ML
0.4 INJECTION, SOLUTION INTRAMUSCULAR; INTRAVENOUS; SUBCUTANEOUS
Status: DISCONTINUED | OUTPATIENT
Start: 2025-05-19 | End: 2025-05-19 | Stop reason: HOSPADM

## 2025-05-19 RX ORDER — FENTANYL CITRATE 50 UG/ML
INJECTION, SOLUTION INTRAMUSCULAR; INTRAVENOUS PRN
Status: DISCONTINUED | OUTPATIENT
Start: 2025-05-19 | End: 2025-05-19

## 2025-05-19 RX ORDER — LIDOCAINE HYDROCHLORIDE 20 MG/ML
INJECTION, SOLUTION INFILTRATION; PERINEURAL PRN
Status: DISCONTINUED | OUTPATIENT
Start: 2025-05-19 | End: 2025-05-19

## 2025-05-19 RX ORDER — OXYCODONE HYDROCHLORIDE 5 MG/1
5 TABLET ORAL
Refills: 0 | Status: CANCELLED | OUTPATIENT
Start: 2025-05-19

## 2025-05-19 RX ORDER — NALOXONE HYDROCHLORIDE 0.4 MG/ML
0.1 INJECTION, SOLUTION INTRAMUSCULAR; INTRAVENOUS; SUBCUTANEOUS
Status: CANCELLED | OUTPATIENT
Start: 2025-05-19

## 2025-05-19 RX ORDER — PROPOFOL 10 MG/ML
INJECTION, EMULSION INTRAVENOUS PRN
Status: DISCONTINUED | OUTPATIENT
Start: 2025-05-19 | End: 2025-05-19

## 2025-05-19 RX ORDER — NALOXONE HYDROCHLORIDE 0.4 MG/ML
0.1 INJECTION, SOLUTION INTRAMUSCULAR; INTRAVENOUS; SUBCUTANEOUS
Status: DISCONTINUED | OUTPATIENT
Start: 2025-05-19 | End: 2025-05-19 | Stop reason: HOSPADM

## 2025-05-19 RX ORDER — FLUMAZENIL 0.1 MG/ML
0.2 INJECTION, SOLUTION INTRAVENOUS
Status: DISCONTINUED | OUTPATIENT
Start: 2025-05-19 | End: 2025-05-19 | Stop reason: HOSPADM

## 2025-05-19 RX ORDER — CEPHALEXIN 500 MG/1
500 CAPSULE ORAL 4 TIMES DAILY
Qty: 12 CAPSULE | Refills: 0 | Status: SHIPPED | OUTPATIENT
Start: 2025-05-19

## 2025-05-19 RX ORDER — PROPOFOL 10 MG/ML
INJECTION, EMULSION INTRAVENOUS CONTINUOUS PRN
Status: DISCONTINUED | OUTPATIENT
Start: 2025-05-19 | End: 2025-05-19

## 2025-05-19 RX ORDER — METHOCARBAMOL 500 MG/1
500 TABLET, FILM COATED ORAL 4 TIMES DAILY PRN
Qty: 12 TABLET | Refills: 0 | Status: SHIPPED | OUTPATIENT
Start: 2025-05-19

## 2025-05-19 RX ORDER — SODIUM CHLORIDE, SODIUM LACTATE, POTASSIUM CHLORIDE, CALCIUM CHLORIDE 600; 310; 30; 20 MG/100ML; MG/100ML; MG/100ML; MG/100ML
INJECTION, SOLUTION INTRAVENOUS CONTINUOUS
Status: DISCONTINUED | OUTPATIENT
Start: 2025-05-19 | End: 2025-05-19 | Stop reason: HOSPADM

## 2025-05-19 RX ORDER — FENTANYL CITRATE 50 UG/ML
25-50 INJECTION, SOLUTION INTRAMUSCULAR; INTRAVENOUS
Status: DISCONTINUED | OUTPATIENT
Start: 2025-05-19 | End: 2025-05-19 | Stop reason: HOSPADM

## 2025-05-19 RX ORDER — FENTANYL CITRATE 50 UG/ML
25 INJECTION, SOLUTION INTRAMUSCULAR; INTRAVENOUS EVERY 5 MIN PRN
Status: DISCONTINUED | OUTPATIENT
Start: 2025-05-19 | End: 2025-05-19 | Stop reason: HOSPADM

## 2025-05-19 RX ORDER — FENTANYL CITRATE 50 UG/ML
50 INJECTION, SOLUTION INTRAMUSCULAR; INTRAVENOUS EVERY 5 MIN PRN
Status: DISCONTINUED | OUTPATIENT
Start: 2025-05-19 | End: 2025-05-19 | Stop reason: HOSPADM

## 2025-05-19 RX ORDER — LABETALOL HYDROCHLORIDE 5 MG/ML
10 INJECTION, SOLUTION INTRAVENOUS
Status: DISCONTINUED | OUTPATIENT
Start: 2025-05-19 | End: 2025-05-19 | Stop reason: HOSPADM

## 2025-05-19 RX ORDER — SODIUM CHLORIDE, SODIUM LACTATE, POTASSIUM CHLORIDE, CALCIUM CHLORIDE 600; 310; 30; 20 MG/100ML; MG/100ML; MG/100ML; MG/100ML
INJECTION, SOLUTION INTRAVENOUS CONTINUOUS PRN
Status: DISCONTINUED | OUTPATIENT
Start: 2025-05-19 | End: 2025-05-19

## 2025-05-19 RX ORDER — ONDANSETRON 2 MG/ML
4 INJECTION INTRAMUSCULAR; INTRAVENOUS EVERY 30 MIN PRN
Status: CANCELLED | OUTPATIENT
Start: 2025-05-19

## 2025-05-19 RX ORDER — HYDROMORPHONE HCL IN WATER/PF 6 MG/30 ML
0.4 PATIENT CONTROLLED ANALGESIA SYRINGE INTRAVENOUS EVERY 5 MIN PRN
Status: DISCONTINUED | OUTPATIENT
Start: 2025-05-19 | End: 2025-05-19 | Stop reason: HOSPADM

## 2025-05-19 RX ORDER — CEFAZOLIN SODIUM/WATER 2 G/20 ML
SYRINGE (ML) INTRAVENOUS PRN
Status: DISCONTINUED | OUTPATIENT
Start: 2025-05-19 | End: 2025-05-19

## 2025-05-19 RX ORDER — ONDANSETRON 4 MG/1
4 TABLET, ORALLY DISINTEGRATING ORAL EVERY 30 MIN PRN
Status: DISCONTINUED | OUTPATIENT
Start: 2025-05-19 | End: 2025-05-19 | Stop reason: HOSPADM

## 2025-05-19 RX ORDER — ESMOLOL HYDROCHLORIDE 10 MG/ML
INJECTION INTRAVENOUS PRN
Status: DISCONTINUED | OUTPATIENT
Start: 2025-05-19 | End: 2025-05-19

## 2025-05-19 RX ORDER — ONDANSETRON 4 MG/1
4 TABLET, ORALLY DISINTEGRATING ORAL EVERY 30 MIN PRN
Status: CANCELLED | OUTPATIENT
Start: 2025-05-19

## 2025-05-19 RX ORDER — OXYCODONE HYDROCHLORIDE 10 MG/1
10 TABLET ORAL
Refills: 0 | Status: CANCELLED | OUTPATIENT
Start: 2025-05-19

## 2025-05-19 RX ORDER — OXYCODONE HYDROCHLORIDE 5 MG/1
5 TABLET ORAL EVERY 6 HOURS PRN
Qty: 12 TABLET | Refills: 0 | Status: SHIPPED | OUTPATIENT
Start: 2025-05-19 | End: 2025-05-22

## 2025-05-19 RX ORDER — DOCUSATE SODIUM 100 MG/1
100 CAPSULE, LIQUID FILLED ORAL 2 TIMES DAILY
Qty: 12 CAPSULE | Refills: 0 | Status: SHIPPED | OUTPATIENT
Start: 2025-05-19

## 2025-05-19 RX ORDER — ONDANSETRON 2 MG/ML
4 INJECTION INTRAMUSCULAR; INTRAVENOUS EVERY 30 MIN PRN
Status: DISCONTINUED | OUTPATIENT
Start: 2025-05-19 | End: 2025-05-19 | Stop reason: HOSPADM

## 2025-05-19 RX ORDER — DEXAMETHASONE SODIUM PHOSPHATE 4 MG/ML
4 INJECTION, SOLUTION INTRA-ARTICULAR; INTRALESIONAL; INTRAMUSCULAR; INTRAVENOUS; SOFT TISSUE
Status: DISCONTINUED | OUTPATIENT
Start: 2025-05-19 | End: 2025-05-19 | Stop reason: HOSPADM

## 2025-05-19 RX ORDER — NALOXONE HYDROCHLORIDE 0.4 MG/ML
0.2 INJECTION, SOLUTION INTRAMUSCULAR; INTRAVENOUS; SUBCUTANEOUS
Status: DISCONTINUED | OUTPATIENT
Start: 2025-05-19 | End: 2025-05-19 | Stop reason: HOSPADM

## 2025-05-19 RX ORDER — DEXAMETHASONE SODIUM PHOSPHATE 4 MG/ML
4 INJECTION, SOLUTION INTRA-ARTICULAR; INTRALESIONAL; INTRAMUSCULAR; INTRAVENOUS; SOFT TISSUE
Status: CANCELLED | OUTPATIENT
Start: 2025-05-19

## 2025-05-19 RX ORDER — HYDROMORPHONE HCL IN WATER/PF 6 MG/30 ML
0.2 PATIENT CONTROLLED ANALGESIA SYRINGE INTRAVENOUS EVERY 5 MIN PRN
Refills: 0 | Status: DISCONTINUED | OUTPATIENT
Start: 2025-05-19 | End: 2025-05-19 | Stop reason: HOSPADM

## 2025-05-19 RX ORDER — ONDANSETRON 4 MG/1
4 TABLET, FILM COATED ORAL EVERY 6 HOURS PRN
Qty: 12 TABLET | Refills: 0 | Status: SHIPPED | OUTPATIENT
Start: 2025-05-19

## 2025-05-19 RX ORDER — ONDANSETRON 2 MG/ML
INJECTION INTRAMUSCULAR; INTRAVENOUS PRN
Status: DISCONTINUED | OUTPATIENT
Start: 2025-05-19 | End: 2025-05-19

## 2025-05-19 RX ORDER — FENTANYL CITRATE 50 UG/ML
25 INJECTION, SOLUTION INTRAMUSCULAR; INTRAVENOUS EVERY 5 MIN PRN
Refills: 0 | Status: DISCONTINUED | OUTPATIENT
Start: 2025-05-19 | End: 2025-05-19 | Stop reason: HOSPADM

## 2025-05-19 RX ORDER — DEXAMETHASONE SODIUM PHOSPHATE 4 MG/ML
INJECTION, SOLUTION INTRA-ARTICULAR; INTRALESIONAL; INTRAMUSCULAR; INTRAVENOUS; SOFT TISSUE PRN
Status: DISCONTINUED | OUTPATIENT
Start: 2025-05-19 | End: 2025-05-19

## 2025-05-19 RX ORDER — FENTANYL CITRATE 50 UG/ML
50 INJECTION, SOLUTION INTRAMUSCULAR; INTRAVENOUS EVERY 5 MIN PRN
Refills: 0 | Status: DISCONTINUED | OUTPATIENT
Start: 2025-05-19 | End: 2025-05-19 | Stop reason: HOSPADM

## 2025-05-19 RX ORDER — BUPIVACAINE HYDROCHLORIDE 5 MG/ML
INJECTION, SOLUTION EPIDURAL; INTRACAUDAL; PERINEURAL
Status: COMPLETED | OUTPATIENT
Start: 2025-05-19 | End: 2025-05-19

## 2025-05-19 RX ORDER — HYDRALAZINE HYDROCHLORIDE 20 MG/ML
2.5-5 INJECTION INTRAMUSCULAR; INTRAVENOUS EVERY 10 MIN PRN
Status: DISCONTINUED | OUTPATIENT
Start: 2025-05-19 | End: 2025-05-19 | Stop reason: HOSPADM

## 2025-05-19 RX ADMIN — Medication 100 MG: at 17:33

## 2025-05-19 RX ADMIN — ESMOLOL HYDROCHLORIDE 40 MG: 10 INJECTION, SOLUTION INTRAVENOUS at 16:10

## 2025-05-19 RX ADMIN — PROPOFOL 40 MG: 10 INJECTION, EMULSION INTRAVENOUS at 16:56

## 2025-05-19 RX ADMIN — DEXAMETHASONE SODIUM PHOSPHATE 6 MG: 4 INJECTION, SOLUTION INTRAMUSCULAR; INTRAVENOUS at 16:11

## 2025-05-19 RX ADMIN — Medication 2 G: at 15:56

## 2025-05-19 RX ADMIN — FENTANYL CITRATE 50 MCG: 50 INJECTION INTRAMUSCULAR; INTRAVENOUS at 17:34

## 2025-05-19 RX ADMIN — PROPOFOL 40 MG: 10 INJECTION, EMULSION INTRAVENOUS at 16:50

## 2025-05-19 RX ADMIN — FENTANYL CITRATE 100 MCG: 50 INJECTION INTRAMUSCULAR; INTRAVENOUS at 16:03

## 2025-05-19 RX ADMIN — FENTANYL CITRATE 50 MCG: 50 INJECTION, SOLUTION INTRAMUSCULAR; INTRAVENOUS at 12:53

## 2025-05-19 RX ADMIN — SODIUM CHLORIDE, SODIUM LACTATE, POTASSIUM CHLORIDE, AND CALCIUM CHLORIDE: .6; .31; .03; .02 INJECTION, SOLUTION INTRAVENOUS at 15:56

## 2025-05-19 RX ADMIN — PROPOFOL 125 MCG/KG/MIN: 10 INJECTION, EMULSION INTRAVENOUS at 16:05

## 2025-05-19 RX ADMIN — LIDOCAINE HYDROCHLORIDE 100 MG: 20 INJECTION, SOLUTION INFILTRATION; PERINEURAL at 16:03

## 2025-05-19 RX ADMIN — MIDAZOLAM 2 MG: 1 INJECTION INTRAMUSCULAR; INTRAVENOUS at 15:56

## 2025-05-19 RX ADMIN — PROPOFOL 160 MG: 10 INJECTION, EMULSION INTRAVENOUS at 16:03

## 2025-05-19 RX ADMIN — Medication 50 MG: at 16:04

## 2025-05-19 RX ADMIN — BUPIVACAINE HYDROCHLORIDE 20 ML: 5 INJECTION, SOLUTION EPIDURAL; INTRACAUDAL at 12:51

## 2025-05-19 RX ADMIN — FENTANYL CITRATE 50 MCG: 50 INJECTION INTRAMUSCULAR; INTRAVENOUS at 16:57

## 2025-05-19 RX ADMIN — ONDANSETRON 4 MG: 2 INJECTION INTRAMUSCULAR; INTRAVENOUS at 17:33

## 2025-05-19 RX ADMIN — MIDAZOLAM 1 MG: 1 INJECTION INTRAMUSCULAR; INTRAVENOUS at 12:53

## 2025-05-19 RX ADMIN — PROPOFOL 40 MG: 10 INJECTION, EMULSION INTRAVENOUS at 16:10

## 2025-05-19 ASSESSMENT — SLIT LAMP EXAM - LIDS
COMMENTS: NORMAL
COMMENTS: NORMAL

## 2025-05-19 ASSESSMENT — EXTERNAL EXAM - RIGHT EYE: OD_EXAM: NORMAL

## 2025-05-19 ASSESSMENT — VISUAL ACUITY
METHOD: SNELLEN - LINEAR
OD_CC: 20/30-
OS_CC: 20/30-

## 2025-05-19 ASSESSMENT — ACTIVITIES OF DAILY LIVING (ADL)
ADLS_ACUITY_SCORE: 35

## 2025-05-19 ASSESSMENT — REFRACTION_WEARINGRX
OD_CYLINDER: +1.25
OS_CYLINDER: +1.25
OD_SPHERE: -1.50
OD_AXIS: 090
OS_SPHERE: -2.00
OS_AXIS: 080
SPECS_TYPE: SVL

## 2025-05-19 ASSESSMENT — TONOMETRY
IOP_METHOD: ICARE
OD_IOP_MMHG: 18
OS_IOP_MMHG: 14

## 2025-05-19 ASSESSMENT — CONF VISUAL FIELD: COMMENTS: VF TODAY

## 2025-05-19 ASSESSMENT — EXTERNAL EXAM - LEFT EYE: OS_EXAM: NORMAL

## 2025-05-19 NOTE — BRIEF OP NOTE
[facility]    Brief Operative Note    Pre-operative diagnosis: Trigger thumb of both thumbs [M65.311, M65.312]  Bilateral carpal tunnel syndrome [G56.03]  Ulnar neuropathy of both upper extremities [G56.23]  Tenosynovitis, de Quervain [M65.4]  Post-operative diagnosis Same as pre-operative diagnosis    Procedure: Left trigger thumb release, Left - Finger  Left open carpal tunnel release, Left - Wrist  Left first dorsal compartment release, Left - Wrist  Left cubital tunnel release, Left - Arm    Surgeon: Surgeons and Role:     * Vic Roberts MD - Primary     * Lei Monreal MD - Resident - Assisting  Anesthesia: General with Block   Estimated Blood Loss: 10cc    Drains: None  Specimens: * No specimens in log *  Findings:   L CTR, CuTR, Thumb A1 pulley, 1st dorsal compartment.  Complications: None.  Implants: * No implants in log *    Plan  - Home when meets criteria for discharge     Lei Monreal MD PGY-3  Plastic and Reconstructive Surgery Resident  Text page on call resident via [facility] Paging/Directory

## 2025-05-19 NOTE — ANESTHESIA CARE TRANSFER NOTE
Patient: Anh Flores    Procedure: Procedure(s):  Left trigger thumb release  Left open carpal tunnel release  Left first dorsal compartment release  Left cubital tunnel release       Diagnosis: Trigger thumb of both thumbs [M65.311, M65.312]  Bilateral carpal tunnel syndrome [G56.03]  Ulnar neuropathy of both upper extremities [G56.23]  Tenosynovitis, de Quervain [M65.4]  Diagnosis Additional Information: No value filed.    Anesthesia Type:   General     Note:    Oropharynx: oropharynx clear of all foreign objects and spontaneously breathing  Level of Consciousness: drowsy  Oxygen Supplementation: face mask (non rb)  Level of Supplemental Oxygen (L/min / FiO2): 6  Independent Airway: airway patency satisfactory and stable  Dentition: dentition unchanged  Vital Signs Stable: post-procedure vital signs reviewed and stable  Report to RN Given: handoff report given  Patient transferred to: PACU    Handoff Report: Identifed the Patient, Identified the Reponsible Provider, Reviewed the pertinent medical history, Discussed the surgical course, Reviewed Intra-OP anesthesia mangement and issues during anesthesia, Set expectations for post-procedure period and Allowed opportunity for questions and acknowledgement of understanding      Vitals:  Vitals Value Taken Time   /100 05/19/25 17:45   Temp     Pulse 98 05/19/25 17:47   Resp 23 05/19/25 17:47   SpO2 99 % 05/19/25 17:47   Vitals shown include unfiled device data.    Electronically Signed By: NARCISA Medina CRNA  May 19, 2025  5:47 PM

## 2025-05-19 NOTE — PROGRESS NOTES
Assessment & Plan       Anh Flores is a 30 year old female with the following diagnoses:   1. Visual field defect    2. Type 2 diabetes mellitus without complication, without long-term current use of insulin (H) - Both Eyes    3. Pain in and around eye, left - Left Eye    4. Problem focusing eyes - Both Eyes    5. Dry eyes, bilateral - Both Eyes        VF defect each eye   Eyes feel better using drops  Problems focusing     Recommended artificial tears 2-4 X times daily for dry eyes.  Drink plenty of water  Avoid lots of caffiene  Take fish oil vitamin 2000 mg daily   Still superior field loss each eye on VF    Consult neuro ophthal     Patient disposition:   No follow-ups on file.          Complete documentation of historical and exam elements from today's encounter can be found in the full encounter summary report (not reduplicated in this progress note). I personally obtained the chief complaint(s) and history of present illness.  I confirmed and edited as necessary the review of systems, past medical/surgical history, family history, social history, and examination findings as documented by others; and I examined the patient myself. I personally reviewed the relevant tests, images, and reports as documented above. I formulated and edited as necessary the assessment and plan and discussed the findings and management plan with the patient and family.  Dr. Dameon Perry

## 2025-05-19 NOTE — ANESTHESIA PREPROCEDURE EVALUATION
Anesthesia Pre-Procedure Evaluation    Patient: Anh Flores   MRN: [id number] : 1995          Procedure : Procedure(s):  Left trigger thumb release  Left open carpal tunnel release  Left first dorsal compartment release  Left cubital tunnel release with possible ulnar nerve transposition         Past Medical History:   Diagnosis Date    CAH (congenital adrenal hyperplasia) 2010    Followed by Dr. Brewer; next follow up .  Metformin increased to 850 mg twice a day.     Diabetes mellitus, type 2 (H) 10/19/2020    Vitamin D deficiency 2010      Past Surgical History:   Procedure Laterality Date    CHOLECYSTECTOMY      She was in 8th grade     COLONOSCOPY N/A 2024    Procedure: COLONOSCOPY, WITH BIOPSY;  Surgeon: Bacilio Yancey MD;  Location: [facility]    ESOPHAGOSCOPY, GASTROSCOPY, DUODENOSCOPY (EGD), COMBINED N/A 2024    Procedure: ESOPHAGOGASTRODUODENOSCOPY, WITH BIOPSY;  Surgeon: Bacilio Yancey MD;  Location: [facility]    INJECT STEROID (LOCATION) N/A 2025    Procedure: Sacrococcygeal ligament/coccyx steroid injection;  Surgeon: Edwina Fitzpatrick MD;  Location: [facility] OR      Allergies   Allergen Reactions    Adhesive Tape Itching    Emgality [Galcanezumab-Gnlm] Itching     Itchiness, bumps    Other Environmental Allergy     Victoza [Liraglutide] Headache, Hives and Itching      Social History     Tobacco Use    Smoking status: Never     Passive exposure: Never    Smokeless tobacco: Never   Substance Use Topics    Alcohol use: No      Wt Readings from Last 1 Encounters:   25 110.4 kg (243 lb 6.2 oz)        Anesthesia Evaluation   Pt has had prior anesthetic.     History of anesthetic complications       ROS/MED HX  ENT/Pulmonary:     (+) sleep apnea, moderate, doesn't use CPAP,                                      Neurologic:  - neg neurologic ROS     Cardiovascular:       METS/Exercise Tolerance: 4 - Raking leaves, gardening    Hematologic:  - neg  hematologic  ROS     Musculoskeletal:  - neg musculoskeletal ROS     GI/Hepatic:       Renal/Genitourinary:       Endo:     (+) type I DM, type II DM, Last HgA1c: 5.4, date: 3/2025, Not using insulin, - not using insulin pump.         Obesity,       Psychiatric/Substance Use:  - neg psychiatric ROS     Infectious Disease:       Malignancy:       Other:              Physical Exam  Airway  Mallampati: II  TM distance: >3 FB  Neck ROM: full  Upper bite lip test: II    Cardiovascular   Rhythm: regular  Rate: normal rate     Dental   (+) Multiple crowns, permanent bridges      Pulmonary Breath sounds clear to auscultation        Neurological   She appears awake and alert.    Other Findings       OUTSIDE LABS:  CBC:   Lab Results   Component Value Date    WBC 7.6 12/18/2024    WBC 9.2 11/09/2024    HGB 13.9 05/02/2025    HGB 13.5 12/18/2024    HCT 42.2 03/07/2025    HCT 40.4 12/18/2024     12/18/2024     11/09/2024     BMP:   Lab Results   Component Value Date     03/07/2025     11/09/2024    POTASSIUM 4.2 03/07/2025    POTASSIUM 4.0 11/09/2024    CHLORIDE 104 03/07/2025    CHLORIDE 106 11/09/2024    CO2 22 03/07/2025    CO2 21 (L) 11/09/2024    BUN 9.4 03/07/2025    BUN 12.3 11/09/2024    CR 0.79 05/02/2025    CR 0.7 05/02/2025    GLC 82 05/19/2025    GLC 80 03/07/2025     COAGS:   Lab Results   Component Value Date    INR 0.9 03/24/2025     POC:   Lab Results   Component Value Date    HCGS Negative 11/09/2024     HEPATIC:   Lab Results   Component Value Date    ALBUMIN 4.4 03/07/2025    PROTTOTAL 7.9 03/07/2025    ALT 20 03/07/2025    AST 18 03/07/2025    GGT 51 (H) 12/18/2024    ALKPHOS 53 03/07/2025    BILITOTAL 0.2 03/07/2025     OTHER:   Lab Results   Component Value Date    LACT 0.9 01/07/2011    A1C 5.4 03/07/2025    DANIEL 9.8 03/07/2025    PHOS 4.1 01/15/2010    MAG 2.1 01/15/2018    LIPASE 30 11/09/2024    AMYLASE 70 02/21/2011    TSH 1.08 08/23/2024    T4 1.10 08/23/2024    CRP <5.0  02/21/2011    SED 9 09/11/2014       Anesthesia Plan    ASA Status:  3      NPO Status: NPO Appropriate   Anesthesia Type: General.  Airway: supraglottic airway.  Induction: intravenous.  Maintenance: TIVA.   Techniques and Equipment:     - Airway:  Planned airway equipment includes supraglottic airway.     - Monitoring Plan: standard ASA monitoring     Consents    Anesthesia Plan(s) and associated risks, benefits, and realistic alternatives discussed. Questions answered and patient/representative(s) expressed understanding.     - Discussed: anesthesiologist     - Discussed with:  Patient               Postoperative Care    Pain management: non-narcotic analgesics, peripheral nerve block, multimodal analgesia.     Comments:                   Glynn Alexander MD, MD    I have reviewed the pertinent notes and labs in the chart from the past 30 days and (re)examined the patient.  Any updates or changes from those notes are reflected in this note.    Clinically Significant Risk Factors Present on Admission                             # Morbid Obesity: Estimated body mass index is 47.53 kg/m  as calculated from the following:    Height as of this encounter: 1.524 m (5').    Weight as of this encounter: 110.4 kg (243 lb 6.2 oz).

## 2025-05-19 NOTE — ANESTHESIA PROCEDURE NOTES
Airway       Patient location during procedure: OR       Procedure Start/Stop Times: 5/19/2025 4:07 PM  Staff -        CRNA: Lavonne Villa       Performed By: CRNA  Consent for Airway        Urgency: elective  Indications and Patient Condition       Indications for airway management: frances-procedural       Induction type:intravenous       Mask difficulty assessment: 1 - vent by mask    Final Airway Details       Final airway type: endotracheal airway       Successful airway: ETT - single  Endotracheal Airway Details        ETT size (mm): 7.0       Cuffed: yes       Successful intubation technique: direct laryngoscopy       DL Blade Type: Arias 2       Grade View of Cords: 1       Adjucts: stylet       Position: Right       Measured from: lips       Secured at (cm): 22       Bite block used: None    Post intubation assessment        Placement verified by: capnometry, equal breath sounds and chest rise        Number of attempts at approach: 1       Number of other approaches attempted: 0       Secured with: tape       Ease of procedure: easy       Dentition: Intact and Unchanged    Medication(s) Administered   Medication Administration Time: 5/19/2025 4:07 PM

## 2025-05-19 NOTE — DISCHARGE INSTRUCTIONS
Contacting your Doctor -   To contact a doctor, call   [phone] and ask for the resident on call for Orthopedics  (answered 24 hours a day)   Emergency Department:  [facility]: [phone]    912 if you are in need of immediate or emergent help   Hand Surgery Discharge Instructions    Patient has been treated for left trigger thumb, carpal tunnel syndrome, DeQuervain's tenosynovitis, and cubital tunnel syndrome with Dr. Roberts on 5/19/2025.     Care: Please keep the splint/cast/dressing clean and dry at all times. Should it get wet, please call the clinic to schedule an appointment - as it may need to be replaced. Do not hesitate to use the sling to help protect the affected extremity and in particular in the setting of a nerve block.     Medications: You can take Ibuprofen 400-800 mg and Tylenol 650 mg for pain relief. Please take each every 6 hours, and for optimal pain relief - please stagger the medications so that you are taking one or the other every 3 hours. If you had a nerve block, the effects may last 8-12 hours. If you have been prescribed additional pain medications, please take as instructed and as needed. If you are taking additional pain medications, please do not exceed 4000 mg of Tylenol daily from all sources. Also, if you are taking narcotic medications, please do not operate heavy machinery or drive. If you have been prescribed antibiotics, please also take as instructed.     Diet: Start with clear liquids and slow down if nauseated. Advance the diet as tolerated.    Weight-bearing/Activities: Move your fingers to prevent stiffness. Elevate the affected extremity to improve throbbing sensation or pain. No strenuous activities and do not raise your heart rate above 100 bpm for the first few weeks. If you had a fracture fixed, your extremity is non-weight-bearing. Otherwise, you can limit your weight-bearing with the affected extremity to 2-3 pounds unless otherwise specified.    ER  Instructions: Please call the office and/or consider return to the ER if you experience worsening pain not relieved by medications, increased swelling, redness or high fevers >101F or if there are unexpected problems like shortness of breath.    Post-op follow-up: Clinic in 10-14 days with Dr. Roberts or his PA at the [facility] or [facility]. Please call our Ortho triage line if you have any questions or concerns before your clinic visit: [phone].    Vic Roberts MD, PhD, FACS

## 2025-05-19 NOTE — ANESTHESIA POSTPROCEDURE EVALUATION
Patient: Anh Flores    Procedure: Procedure(s):  Left trigger thumb release  Left open carpal tunnel release  Left first dorsal compartment release  Left cubital tunnel release       Anesthesia Type:  General    Note:  Disposition: Outpatient   Postop Pain Control: Uneventful            Sign Out: Well controlled pain   PONV: No   Neuro/Psych: Uneventful            Sign Out: Acceptable/Baseline neuro status   Airway/Respiratory: Uneventful            Sign Out: Acceptable/Baseline resp. status   CV/Hemodynamics: Uneventful            Sign Out: Acceptable CV status; No obvious hypovolemia; No obvious fluid overload   Other NRE: NONE   DID A NON-ROUTINE EVENT OCCUR?            Last vitals:  Vitals Value Taken Time   /79 05/19/25 18:00   Temp 36.7  C (98.1  F) 05/19/25 17:45   Pulse 78 05/19/25 18:02   Resp 22 05/19/25 18:02   SpO2 98 % 05/19/25 18:02   Vitals shown include unfiled device data.    Electronically Signed By: Garrett Spencer MD  May 19, 2025  6:03 PM

## 2025-05-19 NOTE — OP NOTE
HAND SURGERY OPERATIVE REPORT     Date of Surgery: 5/19/2025  Surgical Service: Ortho Hand     Preoperative Diagnosis:   1. 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 Left carpal tunnel syndrome  3. Left thumb triggering  4. Left first extensor compartment tendinitis     Postoperative Diagnosis: Same as preoperative diagnoses     Procedures Performed:  Left ulnar nerve decompression at the elbow with no anterior transposition  2. Left open carpal tunnel release  3. Left thumb A1 pulley release  4. Left first extensor compartment release     Attending:  BECKY Roberts MD, PhD, FACS  Assistant: BREONNA Monreal MD      Complications: None apparent  Specimens: None  Implants: None  Estimated blood loss: < 5 mL  Tourniquet time: 44 minutes  Wound classification: Clean  Anesthesia: MAC with right supraclavicular block     Indications for Procedure: 30 year-old right-hand-dominant non-smoker presenting with left ulnar neuropathy and carpal tunnel syndrome, left thumb triggering and DeQuervain's release. After failing conservative management, patient elects to undergo left ulnar nerve decompression at the elbow and open carpal tunnel release, left thumb trigger release and left first extensor compartment release. Discussed the risks of surgery, including but not limited to: infection, bleeding, pain, poor scarring, need for revision surgery, incomplete release, injury to the nerves or tendons, neuroma formation, complex regional pain syndrome. Despite these risks, patient consents to surgery.      Intraoperative findings: Compression underneath Wilson's ligament as well as at the ligament of Slick. No subluxation of the ulnar nerve with passive extreme elbow flexion. MABC branches preserved. The transverse carpal ligament (TCL) was fully released with the median nerve visualized and protected throughout the case. The distal deep palmar fat pad was visualized on the distal release. The proximal extent of the carpal tunnel was  decompressed including division of the distal antebrachial fascia under direct visualization. The TCL was thickened. The left thumb A1 pulley was fully released and traction tenolysis was done. Left thumb radial digital nerve was visualized and protected. Left first extensor compartment was fully released with extensor pollicis brevis subsheath additionally decompressed. Radial sensory nerve branches were visualized and protected.      Description of Procedure: Patient was seen in the preoperative holding area.  Consent was verified.  The left elbow, wrist and palm was marked.  All additional questions were answered.  The risks of the surgery were reiterated, including (but not limited to): infection, bleeding, scarring, pain, injury to surrounding structures including nerves and tendons, need for additional revision surgery, neuroma formation, complex regional pain syndrome, stiffness.  Following discussion of all these risks, the patient again agreed to proceed with surgery.  The left upper extremity was blocked. Patient was then transferred to the operating room and placed supine on the operating table.  All pressure points were padded.  Sequential compression devices were placed on bilateral lower extremities and verified to be operational.  IV antibiotic prophylaxis was given.  Preinduction timeout was performed.  Monitored anesthesia care was commenced.       At the start of the case, the left upper extremity was prepped and draped in usual sterile fashion. A sterile tourniquet was placed on the left arm.The extremity was exsanguinated and the tourniquet was inflated to 250 mm Hg. The cubital tunnel release was done first. A 6-7 cm incision was made, centered over the interval between the medial humeral epicondyle and olecranon, extending proximally and distally. Once through skin, a MABC branches were identified over the deep fascia, dissected with tenotomies and protected. Next, the ulnar nerve was found  proximally between the triceps and medial brachial septum. The deep fascia was unroofed over the nerve and dissection carried proximally until a thickened [place] of [place] was encountered, and which was divided. Next, the dissection was carried distally and the thickened Wilson's ligament was divided. Next, the flexor-pronator fascia was sharply divided - at the interval between the two heads of the FCU. Once done, a gentle spread was done to expose the ulnar nerve. The ulnar nerve was fully decompressed without over-doing a neurolysis. Next, the elbow was passively flexed to reveal no subluxation of the nerve about the medial humeral epicondyle.      Attention was then given the carpal tunnel release. The longitudinally-oriented incision marked 2 mm from the distal-most wrist crease to Workman's cardinal line, along the imaginary line along the radial side of the fourth ray, was made with a #15 blade. Once through skin, the palmar fascia was identified and longitudinally divided. A self-retaining retractor was place and the transverse carpal ligament fibers were identified. Of note, I identified the ulnar-most aspect of the ligament and there was no identified trans-ligamentous recurrent motor branch seen. Next, the TCL was divided longitudinally under direct visualization. It was further divided until the distal-most extent using a #15 blade. Once the deep palmar fat was seen, a South's tenotomy was used to spread to ensure there was no incomplete release distally. Next, the proximal TCL as well as the distal antebrachial fascia was divided under direct visualization with the wrist slight flexed and the nerve protected. Next, my index finger swept under the leaflets of the TCL to ensure once more that it was fully released.     Attention was then given the left thumb trigger release. A transversely oriented incision was made directly over the A1 pulley. Once through skin, gentle longitudinal spreading was  "done over the A1 alondra. Ragnell retractors were used to protect the neurovascular bundles. Under direct visualization, the A1 pulley was longitudinally divided with a #15 blade. Next, the flexor pollicis longus was externalized to complete a traction tenolysis and the thumb triggering was corrected.     Attention was then given the first extensor compartment release. A longitudinally-oriented incision was made over the first extensor compartment at the radial styloid. Next, gentle spreading was done to identify the radial sensory nerve branches and theses were gently elevated and retracted. A Raytec was used to sweep the fat off the first extensor compartment. Next, a #15 blade was used to longitudinally divide the compartment along its dorsal margin over the extensor pollicis brevis. The extensor pollicis brevis subsheath was divided longitudinally as well. Once fully released, the abductor pollicis longus tendons were freed and mobile without tethering.      Next, the tourniquet was deflated. Any bleeding was bipolar cauterized or controlled with gentle pressure. The elbow closure was done with 3-0 Vicryl and 3-0 Monocryl deep dermal suture and 3-0 Monocryl running intracuticular suture for the skin. The palm and dorsoradial wrist skin was closed with 4-0 Nylon suture in interrupted simple and horizontal mattress fashion. The elbow incision was dressed with Steri-strips, skin adhesive, 4 x 4\" gauze and an ABD pad. The palm and wrist incisions were dressed with bacitracin and Xeroform, 4 x 4\" gauze. The entire extremity was wrapped with cast padding and an ACE bandage was placed with the elbow flexed during soft dressing application. Patient was woken up and transferred to the postanesthesia recovery area without any events and no pain.      Postoperative plan: Clinic in 10-14 days.     Vic Roberts MD, PhD, FACS  "

## 2025-05-19 NOTE — PROGRESS NOTES
Ortho Hand    No changes in history or examination. Medically optimized. HgA1C < 6. Still with tenderness over the left 1st extensor compartment with positive Finkelstein's test, left thumb grade III triggering, positive left Durkan's test and elbow flexion tests.     OR today for LEFT trigger thumb release, LEFT 1st extensor compartment release, LEFT open carpal tunnel release, LEFT ulnar nerve decompression at the elbow with possible anterior transposition.     Discussed the risks of surgery, including but not limited to: infection, bleeding, pain, poor scarring, wound healing issues, injury to nerves and/or tendons, neuroma formation, recurrence of the condition including pain, need for revision or additional surgery, incomplete release, complex regional pain syndrome, anesthesia-related complication, DVT/PE, death. Despite these risks, patient consents to LEFT trigger thumb release, LEFT 1st extensor compartment release, LEFT open carpal tunnel release, LEFT ulnar nerve decompression at the elbow with possible anterior transposition. All questions answered.     Vic Roberts MD, PhD, FACS

## 2025-05-19 NOTE — ANESTHESIA PROCEDURE NOTES
"Brachial plexus Procedure Note    Pre-Procedure   Staff -        Anesthesiologist:  Roni Turner MD       Resident/Fellow: Antonino Connors MD       Performed By: resident and with residents       Procedure performed by resident/fellow/CRNA in presence of a teaching physician.         Location: pre-op       Procedure Start/Stop Times: 5/19/2025 12:49 PM and 5/19/2025 1:00 PM       Pre-Anesthestic Checklist: patient identified, IV checked, site marked, risks and benefits discussed, informed consent, monitors and equipment checked, pre-op evaluation, at physician/surgeon's request and post-op pain management  Timeout:       Correct Patient: Yes        Correct Procedure: Yes        Correct Site: Yes        Correct Position: Yes        Correct Laterality: Yes        Site Marked: Yes  Procedure Documentation  Procedure: Brachial plexus         Laterality: left       Patient Position: sitting       Skin prep: Chloraprep (supraclavicular approach).       Needle Gauge: 21.        Needle Length (Inches): 3.13        Ultrasound guided       1. Ultrasound was used to identify targeted nerve, plexus, vascular marker, or fascial plane and place a needle adjacent to it in real-time.       2. Ultrasound was used to visualize the spread of anesthetic in close proximity to the above referenced structure.  Medication(s) Administered   Bupivacaine 0.5% PF (Infiltration) - Infiltration   20 mL - 5/19/2025 12:51:00 PM  Medication Administration Time: 5/19/2025 12:49 PM      FOR [facility] ([facility]) ONLY:   Pain Team Contact information: please page the Pain Team Via [employer]. Search \"Pain\". During daytime hours, please page the attending first. At night please page the resident first.      "

## 2025-05-19 NOTE — PATIENT INSTRUCTIONS
VF defect each eye   Eyes feel better using drops  Problems focusing     Recommended artificial tears 2-4 X times daily for dry eyes.  Drink plenty of water  Avoid lots of caffiene  Take fish oil vitamin 2000 mg daily   Still superior field loss each eye on VF    Consult neuro ophthal     Patient disposition:

## 2025-05-19 NOTE — OR NURSING
Neuraxial or Regional Block, Upper Extremity Block    block performed without complications.  VSS.  Pt tolerated well.  Will continue to monitor.

## 2025-05-20 ENCOUNTER — TELEPHONE (OUTPATIENT)
Dept: ORTHOPEDICS | Facility: CLINIC | Age: 30
End: 2025-05-20
Payer: COMMERCIAL

## 2025-05-20 NOTE — TELEPHONE ENCOUNTER
Other: Patient's mother, Evelyn, is requesting a call back to reschedule appt that was changed on Fri, 5/30.     Evelyn needs something even 1 hour earlier as she is patient's ride.    Please call Evelyn to discuss but please do NOT cancel current appt on Fri, 5/30.

## 2025-05-22 ENCOUNTER — VIRTUAL VISIT (OUTPATIENT)
Dept: OBGYN | Facility: CLINIC | Age: 30
End: 2025-05-22
Attending: OBSTETRICS & GYNECOLOGY
Payer: COMMERCIAL

## 2025-05-22 ENCOUNTER — TELEPHONE (OUTPATIENT)
Dept: ORTHOPEDICS | Facility: CLINIC | Age: 30
End: 2025-05-22

## 2025-05-22 DIAGNOSIS — E25.9 CAH 21-OH (CONGENITAL ADRENAL HYPERPLASIA), LATE ONSET: Primary | ICD-10-CM

## 2025-05-22 NOTE — LETTER
"5/22/2025       RE: Anh Flores  [address] [facility] [address]     Dear Colleague,    Thank you for referring your patient, Anh Flores, to the [facility] [place] [facility]. Please see a copy of my visit note below.    31 yo P0 with hx of CAH, migraine with aura, hirsutism and irregular periods. Patient was discontinued off GANGA due to migraines and started POP in 2/2025. Restarted spironolactone at this time. In 4/2025, she was discontinued off POP due to mood and energy disturbances. A case referral was placed for IUD (Mirena) under sedation.      Patient reports she stopped taking the POP for approximately 10 days but restarted due to acne, hidradenitis suppurativa (HS) flare ups and mood disorder. She endorses \"horrible\" breakouts on face that weren't as bad while on birth control pills but then had to start using heavier body wash that she \"hasn't needed for several years.\" States she has been working out and her HS has worsened since off birth control as well. States this presents under folds is super painful and itchy. Patient feels her birth control helped combat HS flare ups. Noted she does not see dermatology until 6/2025.      She repeatedly denies suicidal ideation and intrusive thoughts but voices it is harder to get out of bed and do everyday things. States she wants to stay in bed, cry, and eat and did not feel like this while on birth control. Notes that she is more unhappy and is \"normally a very happy person.\" Her dog and job as a  helps her stay busy and distracted. Voices that she does not want to take antidepressants but is nervous about proceeding with IUD and needing these eventually.      Patient notes her last cycle was 10 days long (normally 4-5 days) and ended 4/28. States she was bleeding through a pad every 1.5 hours. Reports feelings of dizziness, lightheadedness during her period that " "just subsided yesterday. Wondering if this occurred from stopping birth control and restarting. However, notes that her periods have been more \"clotty\" the last few months notably heavier with switch in birth controls.      HS and acne is improved on spironolactone.      She also noted an increase and worsening of her migraines off of her estrogen containing OCPs.      We discussed Mirena IUD and it will not have a systemic effect (ie will not help HS, acne, or migraines) but will benefit and protect the uterus from hyperplasia and cancer.      Since our last visit she had her surgery last week:   Left ulnar nerve decompression at the elbow with no anterior transposition  2. Left open carpal tunnel release  3. Left thumb A1 pulley release  4. Left first extensor compartment release  Her post op course has been complicated by pain and low blood sugars. She is still taking narcotics.     So far her HS and acne    Dog is in [place] for her recovery. He's having fun but she misses him.    Desires to plan to proceed with IUD insertion.     Desires to start mood stabilizer for depression as recommended by her therapist.   She has never taken an antidepressant before (altho did take zoloft for migraines and gained weight and didn't react well to that).  Will plan to start one after her IUD insertion when we anticipate she will be off narcotics. Consider buproprion. Will review with Kamilah and patient will consider.       Telemedicine Visit: The patient's condition can be safely assessed and treated in telemedicine encounter.      Reason for Telemedicine Visit: Patient has requested telehealth visit COVID 19    Originating Site (Patient Location): Patient's home    Distant Site (Provider Location): [facility]: [facility]    Consent:  The patient/guardian has verbally consented to: the potential risks and benefits of telemedicine versus in person care; bill my insurance or make self-payment for services provided; and " responsibility for payment of non-covered services.     Mode of Communication:  Phone    As the provider I attest to compliance with applicable laws and regulations related to telemedicine.  Time spent in discussion is 10 mins.    Jami Gutierrez MD          Again, thank you for allowing me to participate in the care of your patient.      Sincerely,    Jami Gutierrez MD

## 2025-05-22 NOTE — TELEPHONE ENCOUNTER
Other: mom was contacted to rescheduled Ruth Roberts appt to [facility] on 5/30.  Mom took the monring off work and 11:20 is too late in the day with all of hte driving.  She is asking instead to reschedule to 6/2 when they have a few other appts in town.   Please dont change things before taking to her.would the 3 something appt work?    Could we send this information to you in [facility] or would you prefer to receive a phone call?:  Please call and leave detailed message at [phone] and/or message Anh on her [employer].

## 2025-05-22 NOTE — TELEPHONE ENCOUNTER
Talked with the patient to reschedule their appointment to 6/2/2025 with [facility] at 3:30pm instead. They were fine with the switch.    Day CARUSO

## 2025-05-22 NOTE — PROGRESS NOTES
"29 yo P0 with hx of CAH, migraine with aura, hirsutism and irregular periods. Patient was discontinued off GANGA due to migraines and started POP in 2/2025. Restarted spironolactone at this time. In 4/2025, she was discontinued off POP due to mood and energy disturbances. A case referral was placed for IUD (Mirena) under sedation.      Patient reports she stopped taking the POP for approximately 10 days but restarted due to acne, hidradenitis suppurativa (HS) flare ups and mood disorder. She endorses \"horrible\" breakouts on face that weren't as bad while on birth control pills but then had to start using heavier body wash that she \"hasn't needed for several years.\" States she has been working out and her HS has worsened since off birth control as well. States this presents under folds is super painful and itchy. Patient feels her birth control helped combat HS flare ups. Noted she does not see dermatology until 6/2025.      She repeatedly denies suicidal ideation and intrusive thoughts but voices it is harder to get out of bed and do everyday things. States she wants to stay in bed, cry, and eat and did not feel like this while on birth control. Notes that she is more unhappy and is \"normally a very happy person.\" Her dog and job as a  helps her stay busy and distracted. Voices that she does not want to take antidepressants but is nervous about proceeding with IUD and needing these eventually.      Patient notes her last cycle was 10 days long (normally 4-5 days) and ended 4/28. States she was bleeding through a pad every 1.5 hours. Reports feelings of dizziness, lightheadedness during her period that just subsided yesterday. Wondering if this occurred from stopping birth control and restarting. However, notes that her periods have been more \"clotty\" the last few months notably heavier with switch in birth controls.      HS and acne is improved on spironolactone.      She also noted an increase and " worsening of her migraines off of her estrogen containing OCPs.      We discussed Mirena IUD and it will not have a systemic effect (ie will not help HS, acne, or migraines) but will benefit and protect the uterus from hyperplasia and cancer.      Since our last visit she had her surgery last week:   Left ulnar nerve decompression at the elbow with no anterior transposition  2. Left open carpal tunnel release  3. Left thumb A1 pulley release  4. Left first extensor compartment release  Her post op course has been complicated by pain and low blood sugars. She is still taking narcotics.     So far her HS and acne    Dog is in [place] for her recovery. He's having fun but she misses him.    Desires to plan to proceed with IUD insertion.     Desires to start mood stabilizer for depression as recommended by her therapist.   She has never taken an antidepressant before (altho did take zoloft for migraines and gained weight and didn't react well to that).  Will plan to start one after her IUD insertion when we anticipate she will be off narcotics. Consider buproprion. Will review with Kamilah and patient will consider.       Telemedicine Visit: The patient's condition can be safely assessed and treated in telemedicine encounter.      Reason for Telemedicine Visit: Patient has requested telehealth visit COVID 19    Originating Site (Patient Location): Patient's home    Distant Site (Provider Location): [facility]: [facility]    Consent:  The patient/guardian has verbally consented to: the potential risks and benefits of telemedicine versus in person care; bill my insurance or make self-payment for services provided; and responsibility for payment of non-covered services.     Mode of Communication:  Phone    As the provider I attest to compliance with applicable laws and regulations related to telemedicine.  Time spent in discussion is 10 mins.    Jami Gutierrez MD

## 2025-05-23 ENCOUNTER — HOSPITAL ENCOUNTER (EMERGENCY)
Facility: CLINIC | Age: 30
Discharge: HOME | End: 2025-05-23
Attending: EMERGENCY MEDICINE | Admitting: EMERGENCY MEDICINE
Payer: COMMERCIAL

## 2025-05-23 ENCOUNTER — APPOINTMENT (OUTPATIENT)
Dept: ULTRASOUND IMAGING | Facility: CLINIC | Age: 30
End: 2025-05-23
Attending: EMERGENCY MEDICINE
Payer: COMMERCIAL

## 2025-05-23 VITALS
SYSTOLIC BLOOD PRESSURE: 122 MMHG | OXYGEN SATURATION: 98 % | TEMPERATURE: 98.1 F | HEART RATE: 72 BPM | RESPIRATION RATE: 18 BRPM | DIASTOLIC BLOOD PRESSURE: 70 MMHG

## 2025-05-23 DIAGNOSIS — G89.18 ACUTE POST-OPERATIVE PAIN: ICD-10-CM

## 2025-05-23 DIAGNOSIS — G56.03 BILATERAL CARPAL TUNNEL SYNDROME: ICD-10-CM

## 2025-05-23 DIAGNOSIS — F51.04 PSYCHOPHYSIOLOGICAL INSOMNIA: ICD-10-CM

## 2025-05-23 DIAGNOSIS — G47.8 UARS (UPPER AIRWAY RESISTANCE SYNDROME): ICD-10-CM

## 2025-05-23 DIAGNOSIS — G56.23 ULNAR NEUROPATHY OF BOTH UPPER EXTREMITIES: ICD-10-CM

## 2025-05-23 PROCEDURE — 99284 EMERGENCY DEPT VISIT MOD MDM: CPT | Mod: 25 | Performed by: EMERGENCY MEDICINE

## 2025-05-23 PROCEDURE — 250N000013 HC RX MED GY IP 250 OP 250 PS 637: Performed by: EMERGENCY MEDICINE

## 2025-05-23 PROCEDURE — 93971 EXTREMITY STUDY: CPT | Mod: LT

## 2025-05-23 PROCEDURE — 93971 EXTREMITY STUDY: CPT | Mod: 26 | Performed by: RADIOLOGY

## 2025-05-23 PROCEDURE — 99284 EMERGENCY DEPT VISIT MOD MDM: CPT | Performed by: EMERGENCY MEDICINE

## 2025-05-23 RX ORDER — GABAPENTIN 300 MG/1
300 CAPSULE ORAL 3 TIMES DAILY
Qty: 90 CAPSULE | Refills: 0 | Status: SHIPPED | OUTPATIENT
Start: 2025-05-23 | End: 2025-06-22

## 2025-05-23 RX ORDER — HYDROXYZINE HYDROCHLORIDE 25 MG/1
25 TABLET, FILM COATED ORAL 3 TIMES DAILY PRN
Qty: 21 TABLET | Refills: 0 | Status: SHIPPED | OUTPATIENT
Start: 2025-05-23

## 2025-05-23 RX ORDER — OXYCODONE HYDROCHLORIDE 5 MG/1
5-10 TABLET ORAL EVERY 6 HOURS PRN
Qty: 16 TABLET | Refills: 0 | Status: SHIPPED | OUTPATIENT
Start: 2025-05-23

## 2025-05-23 RX ORDER — METHOCARBAMOL 500 MG/1
1000 TABLET, FILM COATED ORAL 3 TIMES DAILY
Qty: 30 TABLET | Refills: 0 | Status: SHIPPED | OUTPATIENT
Start: 2025-05-23 | End: 2025-05-28

## 2025-05-23 RX ORDER — OXYCODONE HYDROCHLORIDE 10 MG/1
10 TABLET ORAL ONCE
Refills: 0 | Status: COMPLETED | OUTPATIENT
Start: 2025-05-23 | End: 2025-05-23

## 2025-05-23 RX ADMIN — OXYCODONE HYDROCHLORIDE 10 MG: 10 TABLET ORAL at 22:09

## 2025-05-23 ASSESSMENT — COLUMBIA-SUICIDE SEVERITY RATING SCALE - C-SSRS
1. IN THE PAST MONTH, HAVE YOU WISHED YOU WERE DEAD OR WISHED YOU COULD GO TO SLEEP AND NOT WAKE UP?: NO
6. HAVE YOU EVER DONE ANYTHING, STARTED TO DO ANYTHING, OR PREPARED TO DO ANYTHING TO END YOUR LIFE?: NO
2. HAVE YOU ACTUALLY HAD ANY THOUGHTS OF KILLING YOURSELF IN THE PAST MONTH?: NO

## 2025-05-23 ASSESSMENT — ACTIVITIES OF DAILY LIVING (ADL)
ADLS_ACUITY_SCORE: 41
ADLS_ACUITY_SCORE: 41

## 2025-05-24 NOTE — ED PROVIDER NOTES
ED PROVIDER NOTE  [facility]  [place]      History     Chief Complaint   Patient presents with    Post-op Problem     HPI  Anh Flores is a 30 year old female who recently underwent surgery of her left upper extremity which included a ulnar nerve release from the elbow for entrapment as well as carpal tunnel surgery on the left wrist and left trigger thumb release.  The patient was sent home on oxycodone, Keflex, Colace, and Neurontin.  The patient presents here for throbbing and increased swelling of her left upper extremity.  The date of surgery was 4 days ago.    I have reviewed the Medications, Allergies, Past Medical and Surgical History, and Social History in the [employer] system.    Past Medical History:   Diagnosis Date    CAH (congenital adrenal hyperplasia) 2/9/2010    Followed by Dr. Brewer; next follow up 1.2011.  Metformin increased to 850 mg twice a day.     Diabetes mellitus, type 2 (H) 10/19/2020    Vitamin D deficiency 2/9/2010       Past Surgical History:   Procedure Laterality Date    CHOLECYSTECTOMY      She was in 8th grade     COLONOSCOPY N/A 9/16/2024    Procedure: COLONOSCOPY, WITH BIOPSY;  Surgeon: Bacilio Yancey MD;  Location:  GI    ESOPHAGOSCOPY, GASTROSCOPY, DUODENOSCOPY (EGD), COMBINED N/A 9/16/2024    Procedure: ESOPHAGOGASTRODUODENOSCOPY, WITH BIOPSY;  Surgeon: Bacilio Yancey MD;  [facility]    INJECT STEROID (LOCATION) N/A 4/9/2025    Procedure: Sacrococcygeal ligament/coccyx steroid injection;  Surgeon: Edwina Fitzpatrick MD;  Location: [facility] OR    RELEASE CARPAL TUNNEL Left 5/19/2025    Procedure: Left open carpal tunnel release;  Surgeon: Vic Roberts MD;  Location: [facility] OR    RELEASE DEQUERVAINS WRIST Left 5/19/2025    Procedure: Left first dorsal compartment release;  Surgeon: Vic Roberts MD;  Location: [facility]    RELEASE TRIGGER FINGER Left 5/19/2025    Procedure: Left trigger thumb release;  Surgeon:  Vic Roberts MD;  Location: [facility]    TRANSPOSITION ULNAR NERVE (ELBOW) Left 5/19/2025    Procedure: Left cubital tunnel release;  Surgeon: Vic Roberts MD;  Location: [facility]         Dose / Directions   Ajovy 225 MG/1.5ML Soaj  Generic drug: Fremanezumab-vfrm      Dose: 225 mg  Inject 225 mg subcutaneously every 30 days. Last dose 4/26  Refills: 0     BOTOX IJ      Inject as directed every 3 months. Last dose 4/23/25  Refills: 0     cephALEXin 500 MG capsule  Commonly known as: KEFLEX  Used for: Ulnar neuropathy of both upper extremities      Dose: 500 mg  Take 1 capsule (500 mg) by mouth 4 times daily.  Quantity: 12 capsule  Refills: 0     colestipol 1 g tablet  Commonly known as: Colestid  Used for: Chronic diarrhea      Dose: 1 g  Take 1 tablet (1 g) by mouth 2 times daily.  Quantity: 60 tablet  Refills: 2     cyclobenzaprine 5 MG tablet  Commonly known as: FLEXERIL  Used for: Numbness and tingling, Coccydynia, Chronic bilateral low back pain, unspecified whether sciatica present, Chronic bilateral thoracic back pain      Dose: 5 mg  Take 1 tablet (5 mg) by mouth 3 times daily as needed for muscle spasms.  Quantity: 30 tablet  Refills: 0     docusate sodium 100 MG capsule  Commonly known as: COLACE  Used for: Ulnar neuropathy of both upper extremities      Dose: 100 mg  Take 1 capsule (100 mg) by mouth 2 times daily.  Quantity: 12 capsule  Refills: 0     gabapentin 300 MG capsule  Commonly known as: NEURONTIN  Used for: UARS (upper airway resistance syndrome), Psychophysiological insomnia, Bilateral carpal tunnel syndrome      Dose: 300 mg  Take 1 capsule (300 mg) by mouth 3 times daily. 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  Quantity: 360 tablet  Refills: 3     methocarbamol 500 MG tablet  Commonly known as: ROBAXIN  Used for: Ulnar neuropathy of both upper extremities      Dose: 500 mg  Take 1 tablet (500 mg) by mouth 4 times daily as needed for muscle spasms.  Quantity: 12 tablet  Refills: 0     Nurtec 75 MG ODT tablet  Generic drug: rimegepant      Dose: 75 mg  Place 75 mg under the tongue daily as needed for migraine.  Refills: 0     ondansetron 4 MG ODT tab  Commonly known as: ZOFRAN ODT  Used for: CAH (congenital adrenal hyperplasia)      DISSOLVE ONE TABLET ON TONGUE EVERY 8 HOURS AS NEEDED FOR NAUSEA  Quantity: 30 tablet  Refills: 1     ondansetron 4 MG tablet  Commonly known as: ZOFRAN  Used for: Ulnar neuropathy of both upper extremities      Dose: 4 mg  Take 1 tablet (4 mg) by mouth every 6 hours as needed for nausea.  Quantity: 12 tablet  Refills: 0     oxyCODONE 5 MG tablet  Commonly known as: ROXICODONE  Used for: Ulnar neuropathy of both upper extremities  Ask about: Should I take this medication?      Dose: 5 mg  Take 1 tablet (5 mg) by mouth every 6 hours as needed for severe pain or breakthrough pain.  Quantity: 12 tablet  Refills: 0     Qulipta 60 MG tablet  Generic drug: Atogepant      Dose: 60 mg  Take 60 mg by mouth as needed.  Refills: 0     rizatriptan 10 MG tablet  Commonly known as: MAXALT      Dose: 10 mg  Take 10 mg by mouth at onset of headache.  Refills: 0     study - chlorhexidine (IDS# 6242) 4% soap  Used for: Preventive measure      Rinse the body with water. Apply soap from the neck to the toes and avoid contact with the eyes, ears, nose, mouth, and genitals. Leave for 1 minute then rinse body again. Use once the night before surgery and once the morning of surgery.  Quantity: 118 mL  Refills: 0     study - povidone idoine (IDS# 6242) 5% nasal solution  Used for: Preventive measure      Clean nostrils  using tissue. Open the cap, dip one swab into the bottle and stir the solution. Withdraw the swab slowly. Insert the swab into the nostril and rotate 360 degrees covering all surfaces. Repeat in the other nostril using the new swab. Repeat the application in both nostrils using a fresh swab each time. Do not blow the nose.  Quantity: 4 mL  Refills: 0     SUMAtriptan 50 MG tablet  Commonly known as: IMITREX  Used for: Intractable migraine with status migrainosus, unspecified migraine type      Dose: 100 mg  Take 2 tablets (100 mg) by mouth at onset of headache for migraine. May repeat in 2 hours. Max 4 tablets/24 hours.  Refills: 0     topiramate 25 MG tablet  Commonly known as: TOPAMAX      Dose: 25 mg  Take 25 mg by mouth 2 times daily.  Refills: 0     tranexamic acid 650 MG tablet  Commonly known as: Lysteda  Used for: Menorrhagia with regular cycle      Dose: 1,300 mg  Take 2 tablets (1,300 mg) by mouth 2 times daily as needed (menorrhagia).  Quantity: 30 tablet  Refills: 1     Ubrelvy 100 MG tablet  Generic drug: ubrogepant      Dose: 100 mg  Take 100 mg by mouth at onset of headache.  Refills: 0            CONTINUE these medicines which have NOT CHANGED        Dose / Directions   alcohol swab prep pads  Used for: Type 2 diabetes mellitus with hyperglycemia, without long-term current use of insulin (H)      Use to swab area of injection/anthony as directed.  Quantity: 100 each  Refills: 3     blood glucose monitoring meter device kit  Commonly known as: NO BRAND SPECIFIED  Used for: Type 2 diabetes mellitus with hyperglycemia, without long-term current use of insulin (H)      Use to test blood sugar three times daily or as directed. Preferred blood glucose meter OR supplies to accompany: Blood Glucose Monitor Brands: per insurance.  Quantity: 1 kit  Refills: 0     blood glucose test strip  Commonly known as: NO BRAND SPECIFIED  Used for: Type 2 diabetes mellitus with hyperglycemia, without long-term current use of  insulin (H)      Use to test blood sugar three times daily or as directed. Preferred blood glucose meter OR supplies to accompany: Blood Glucose Monitor Brands: per insurance.  Quantity: 100 strip  Refills: 6     * FreeStyle Jm 3 Sensor Misc  Used for: Type 2 diabetes mellitus with hyperglycemia, without long-term current use of insulin (H)      Dose: 1 each  1 each by Other route every 14 days.  Quantity: 6 each  Refills: 3     * FreeStyle Jm 3 Plus Sensor Misc  Used for: Type 2 diabetes mellitus with hyperglycemia, without long-term current use of insulin (H)      Use 1 sensor every 15 days. Use to read blood sugars per 's instructions.  Quantity: 6 each  Refills: 3     thin lancets  Commonly known as: NO BRAND SPECIFIED  Used for: Type 2 diabetes mellitus with hyperglycemia, without long-term current use of insulin (H)      Use with lanceting device. To accompany: Blood Glucose Monitor Brands: per insurance.  Quantity: 100 each  Refills: 6     ULTICARE MICRO 32G X 4 MM insulin pen needle  Used for: Type 2 diabetes mellitus with hyperglycemia, without long-term current use of insulin (H)  Generic drug: insulin pen needle      Use 1 pen needles daily or as directed.  Quantity: 100 each  Refills: 3           Past medical history, past surgical history, medications, and allergies were reviewed with the patient. Additional pertinent items: None    Family History   Problem Relation Age of Onset    Neurologic Disorder Sister 16        migraines    Cerebrovascular Disease No family hx of     Alzheimer Disease No family hx of     Glaucoma No family hx of     Macular Degeneration No family hx of        Social History     Tobacco Use    Smoking status: Never     Passive exposure: Never    Smokeless tobacco: Never   Substance Use Topics    Alcohol use: No     Social history was reviewed with the patient. Additional pertinent items: None    Allergies   Allergen Reactions    Adhesive Tape Itching    Emgality  [Galcanezumab-Gnlm] Itching     Itchiness, bumps    Other Environmental Allergy     Victoza [Liraglutide] Headache, Hives and Itching       Review of Systems  A medically appropriate review of systems was performed with pertinent positives and negatives noted in the HPI, and all other systems negative.    Physical Exam   BP: 130/81  Pulse: 78  Temp: 98.3  F (36.8  C)  Resp: 16  SpO2: 99 %      Physical Exam  Vitals and nursing note reviewed.   Constitutional:       General: She is not in acute distress.  HENT:      Head: Atraumatic.   Eyes:      Extraocular Movements: Extraocular movements intact.      Pupils: Pupils are equal, round, and reactive to light.   Pulmonary:      Effort: No respiratory distress.   Musculoskeletal:      Comments: Patient's left arm was unwrapped undressed and found to show no evidence of infection no significant redness or warmth no significant swelling   Neurological:      Mental Status: She is alert.         ED Course     Orders Placed This Encounter   Procedures    US Upper Extremity Venous Duplex Left          Procedures           Results for orders placed or performed during the hospital encounter of 05/23/25 (from the past 24 hours)   US Upper Extremity Venous Duplex Left    Narrative    EXAM: US UPPER EXTREMITY VENOUS DUPLEX LEFT  LOCATION: [facility]  DATE: 5/23/2025    INDICATION: s p surgery and immob of LUE r o DVT  COMPARISON: None.  TECHNIQUE: Venous Duplex ultrasound of the left upper extremity with (when possible) and without compression, augmentation, and duplex. Color flow and spectral Doppler with waveform analysis performed.    FINDINGS: Ultrasound includes evaluation of the internal jugular vein, innominate vein, subclavian vein, axillary vein, and brachial vein. The superficial cephalic and basilic veins were also evaluated where seen.     LEFT: No deep venous thrombosis. No superficial thrombophlebitis.       Impression     IMPRESSION:   1.  No deep venous thrombosis in the left upper extremity.       Medications   oxyCODONE IR (ROXICODONE) tablet 10 mg (10 mg Oral $Given 5/23/25 6745)         Assessments & Plan (with Medical Decision Making)     I have reviewed the nursing notes.    I have reviewed the findings, diagnosis, plan and need for follow up with the patient.         Review of your medicines        UNREVIEWED medicines. Ask your doctor about these medicines        Dose / Directions   Ajovy 225 MG/1.5ML Soaj  Generic drug: Fremanezumab-vfrm      Dose: 225 mg  Inject 225 mg subcutaneously every 30 days. Last dose 4/26  Refills: 0     BOTOX IJ      Inject as directed every 3 months. Last dose 4/23/25  Refills: 0     cephALEXin 500 MG capsule  Commonly known as: KEFLEX  Used for: Ulnar neuropathy of both upper extremities      Dose: 500 mg  Take 1 capsule (500 mg) by mouth 4 times daily.  Quantity: 12 capsule  Refills: 0     colestipol 1 g tablet  Commonly known as: Colestid  Used for: Chronic diarrhea      Dose: 1 g  Take 1 tablet (1 g) by mouth 2 times daily.  Quantity: 60 tablet  Refills: 2     cyclobenzaprine 5 MG tablet  Commonly known as: FLEXERIL  Used for: Numbness and tingling, Coccydynia, Chronic bilateral low back pain, unspecified whether sciatica present, Chronic bilateral thoracic back pain      Dose: 5 mg  Take 1 tablet (5 mg) by mouth 3 times daily as needed for muscle spasms.  Quantity: 30 tablet  Refills: 0     docusate sodium 100 MG capsule  Commonly known as: COLACE  Used for: Ulnar neuropathy of both upper extremities      Dose: 100 mg  Take 1 capsule (100 mg) by mouth 2 times daily.  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  Quantity: 360 tablet  Refills: 3     Nurtec 75 MG ODT tablet  Generic drug: rimegepant      Dose: 75 mg  Place 75 mg under the tongue daily as needed for migraine.  Refills: 0     ondansetron 4 MG ODT tab  Commonly known as: ZOFRAN ODT  Used for: CAH (congenital adrenal hyperplasia)      DISSOLVE ONE TABLET ON TONGUE EVERY 8 HOURS AS NEEDED FOR NAUSEA  Quantity: 30 tablet  Refills: 1     ondansetron 4 MG tablet  Commonly known as: ZOFRAN  Used for: Ulnar neuropathy of both upper extremities      Dose: 4 mg  Take 1 tablet (4 mg) by mouth every 6 hours as needed for nausea.  Quantity: 12 tablet  Refills: 0     Qulipta 60 MG tablet  Generic drug: Atogepant      Dose: 60 mg  Take 60 mg by mouth as needed.  Refills: 0     rizatriptan 10 MG tablet  Commonly known as: MAXALT      Dose: 10 mg  Take 10 mg by mouth at onset of headache.  Refills: 0     study - chlorhexidine (IDS# 6242) 4% soap  Used for: Preventive measure      Rinse the body with water. Apply soap from the neck to the toes and avoid contact with the eyes, ears, nose, mouth, and genitals. Leave for 1 minute then rinse body again. Use once the night before surgery and once the morning of surgery.  Quantity: 118 mL  Refills: 0     study - povidone idoine (IDS# 6242) 5% nasal solution  Used for: Preventive measure      Clean nostrils using tissue. Open the cap, dip one swab into the bottle and stir the solution. Withdraw the swab slowly. Insert the swab into the nostril and rotate 360 degrees covering all surfaces. Repeat in the other nostril using the new swab. Repeat the application in both nostrils using a fresh swab each time. Do not blow the nose.  Quantity: 4 mL  Refills: 0     SUMAtriptan 50 MG tablet  Commonly known as: IMITREX  Used for: Intractable migraine with status migrainosus, unspecified migraine type      Dose: 100 mg  Take 2 tablets (100 mg) by mouth at onset of headache for migraine. May repeat in 2  hours. Max 4 tablets/24 hours.  Refills: 0     topiramate 25 MG tablet  Commonly known as: TOPAMAX      Dose: 25 mg  Take 25 mg by mouth 2 times daily.  Refills: 0     tranexamic acid 650 MG tablet  Commonly known as: Lysteda  Used for: Menorrhagia with regular cycle      Dose: 1,300 mg  Take 2 tablets (1,300 mg) by mouth 2 times daily as needed (menorrhagia).  Quantity: 30 tablet  Refills: 1     Ubrelvy 100 MG tablet  Generic drug: ubrogepant      Dose: 100 mg  Take 100 mg by mouth at onset of headache.  Refills: 0            START taking        Dose / Directions   hydrOXYzine HCl 25 MG tablet  Commonly known as: ATARAX      Dose: 25 mg  Take 1 tablet (25 mg) by mouth 3 times daily as needed for itching.  Quantity: 21 tablet  Refills: 0            CONTINUE these medicines which may have CHANGED, or have new prescriptions. If we are uncertain of the size of tablets/capsules you have at home, strength may be listed as something that might have changed.        Dose / Directions   gabapentin 300 MG capsule  Commonly known as: NEURONTIN  This may have changed: additional instructions  Used for: UARS (upper airway resistance syndrome), Psychophysiological insomnia, Bilateral carpal tunnel syndrome      Dose: 300 mg  Take 1 capsule (300 mg) by mouth 3 times daily.  Quantity: 90 capsule  Refills: 0     methocarbamol 500 MG tablet  Commonly known as: ROBAXIN  This may have changed:   how much to take  when to take this  reasons to take this  Used for: Ulnar neuropathy of both upper extremities      Dose: 1,000 mg  Take 2 tablets (1,000 mg) by mouth 3 times daily for 5 days.  Quantity: 30 tablet  Refills: 0     oxyCODONE 5 MG tablet  Commonly known as: ROXICODONE  This may have changed:   how much to take  reasons to take this  Used for: Ulnar neuropathy of both upper extremities      Dose: 5-10 mg  Take 1-2 tablets (5-10 mg) by mouth every 6 hours as needed for moderate to severe pain.  Quantity: 16 tablet  Refills: 0             CONTINUE these medicines which have NOT CHANGED        Dose / Directions   alcohol swab prep pads  Used for: Type 2 diabetes mellitus with hyperglycemia, without long-term current use of insulin (H)      Use to swab area of injection/anthony as directed.  Quantity: 100 each  Refills: 3     blood glucose monitoring meter device kit  Commonly known as: NO BRAND SPECIFIED  Used for: Type 2 diabetes mellitus with hyperglycemia, without long-term current use of insulin (H)      Use to test blood sugar three times daily or as directed. Preferred blood glucose meter OR supplies to accompany: Blood Glucose Monitor Brands: per insurance.  Quantity: 1 kit  Refills: 0     blood glucose test strip  Commonly known as: NO BRAND SPECIFIED  Used for: Type 2 diabetes mellitus with hyperglycemia, without long-term current use of insulin (H)      Use to test blood sugar three times daily or as directed. Preferred blood glucose meter OR supplies to accompany: Blood Glucose Monitor Brands: per insurance.  Quantity: 100 strip  Refills: 6     * FreeStyle Jm 3 Sensor Misc  Used for: Type 2 diabetes mellitus with hyperglycemia, without long-term current use of insulin (H)      Dose: 1 each  1 each by Other route every 14 days.  Quantity: 6 each  Refills: 3     * FreeStyle Jm 3 Plus Sensor Misc  Used for: Type 2 diabetes mellitus with hyperglycemia, without long-term current use of insulin (H)      Use 1 sensor every 15 days. Use to read blood sugars per 's instructions.  Quantity: 6 each  Refills: 3     thin lancets  Commonly known as: NO BRAND SPECIFIED  Used for: Type 2 diabetes mellitus with hyperglycemia, without long-term current use of insulin (H)      Use with lanceting device. To accompany: Blood Glucose Monitor Brands: per insurance.  Quantity: 100 each  Refills: 6     ULTICARE MICRO 32G X 4 MM insulin pen needle  Used for: Type 2 diabetes mellitus with hyperglycemia, without long-term current use of  insulin (H)  Generic drug: insulin pen needle      Use 1 pen needles daily or as directed.  Quantity: 100 each  Refills: 3           * This list has 2 medication(s) that are the same as other medications prescribed for you. Read the directions carefully, and ask your doctor or other care provider to review them with you.                   Where to get your medicines        Some of these will need a paper prescription and others can be bought over the counter. Ask your nurse if you have questions.    Bring a paper prescription for each of these medications  gabapentin 300 MG capsule  hydrOXYzine HCl 25 MG tablet  methocarbamol 500 MG tablet  oxyCODONE 5 MG tablet           New Prescriptions    HYDROXYZINE HCL (ATARAX) 25 MG TABLET    Take 1 tablet (25 mg) by mouth 3 times daily as needed for itching.       Final diagnoses:   Acute post-operative pain     Patient's dressing was placed back on the left arm and back in the sling.    Home tonight to rest    Please follow-up with your surgeon as instructed.      CLARKE PEREZ MD    5/23/2025   [facility] EMERGENCY DEPARTMENT       Clarke Perez MD  05/23/25 5002

## 2025-05-24 NOTE — ED TRIAGE NOTES
Patient presents to ED with CC of post op problem to left arm. Patient had procedure done on Monday and states she feels as though  banadage is wrapped too tight. Symptoms started yesterday. Patient states ortho had it wrapped but states she is unable to bend elbow. Able to move all fingers.

## 2025-05-27 ENCOUNTER — TELEPHONE (OUTPATIENT)
Dept: ORTHOPEDICS | Facility: CLINIC | Age: 30
End: 2025-05-27
Payer: COMMERCIAL

## 2025-05-27 NOTE — TELEPHONE ENCOUNTER
"Other: Bandage issues and pain in left arm.     Surgery with Alejandra on May 19    Due to pain in arm,Anh went to ER on 5/23--On [facility].  Her pain meds were not helping.  While at the ER the Ortho provider there rewrapped the dressing on the arm, but left part of the wrapping off so is now having a rubbing take place on surgical site.   Arm is kept raised but she feels her arm has a feeling of deflating and inflating \"almost like a blood pressure cuff\" Arm is painful.   States part of bandaging area smells.Nothing has gotten wet.     Anh would like a call back asap to discuss. She is having a great deal of pain. Feels this needs to be fixed right away.       Could we send this information to you in [employer] or would you prefer to receive a phone call?:   Patient would prefer a phone call   Okay to leave a detailed message?: Yes at Cell number on file:    Telephone Information:   Mobile [phone]       "

## 2025-05-27 NOTE — TELEPHONE ENCOUNTER
"S/p Left trigger thumb release, Left open carpal tunnel release, Left first dorsal compartment release, Left cubital tunnel release, DOS: 5/19/25    Called patient to discuss.  She says that when she went to the emergency room on 5/23 they unwrapped her postop splint and removed some excess padding and/or extra dressing and then rewrapped.  She is not confident in how it was rewrapped and is still having discomfort in her hand and wrist.  She is not able to pinpoint the location of discomfort but says that it also feels loose.  When asked about her elbow incision she advised that it \"smells funny\", but otherwise is not bothering her.  I advised that the elbow dressing can be removed in her elbow can be washed with soap and water.  I recommended keeping it covered to help prevent infection.  I do not feel that she is exhibiting any other signs of infection and I believe that the majority of her discomfort is from the splint either not fitting well or her discomfort with the fluctuations in swelling after surgery.     Because she is not able to make it in today  (needs rides), the plan is to have her come in tomorrow morning for a nurse only visit.  We will unwrap her postop splint, assess her incisions, and replace with a new thumb spica forearm-based splint to protect her until her postop visit next week with DEVON Conde.  Patient was agreeable with plan and will send in a [email or web address] message with the time that she is able to make it in tomorrow morning.  A nurse visit can then be made for this time (between 8:30a-12p).     Ruben Phillips, RNCC   "

## 2025-05-28 ENCOUNTER — OFFICE VISIT (OUTPATIENT)
Dept: ORTHOPEDICS | Facility: CLINIC | Age: 30
End: 2025-05-28
Payer: COMMERCIAL

## 2025-05-28 DIAGNOSIS — Z98.890 POST-OPERATIVE STATE: Primary | ICD-10-CM

## 2025-05-28 NOTE — PROGRESS NOTES
Cast/splint application    Date/Time: 5/28/2025 8:15 AM    Performed by: Siddharth Kidd ATC  Authorized by: Vic Roberts MD    Consent:     Consent obtained:  Verbal    Consent given by:  Patient    Risks discussed:  Discoloration, numbness, pain and swelling  Pre-procedure details:     Sensation:  Normal  Procedure details:     Laterality:  Left    Location:  Finger    Finger:  L thumb    Strapping: no      Splint type:  Finger (static) (Thumb spica)    Supplies:  Plaster  Post-procedure details:     Pain:  Improved    Sensation:  Normal    Patient tolerance of procedure:  Tolerated well, no immediate complications    Patient provided with cast or splint care instructions: Yes

## 2025-05-28 NOTE — NURSING NOTE
Reason For Visit:   Chief Complaint   Patient presents with    Cast Change     Post op 1.Left ulnar nerve decompression at the elbow with no anterior transposition 2. Left open carpal tunnel release  3. Left thumb A1 pulley release 4. Left first extensor compartment release DOS: 5/19/25       Primary MD: Maria Guadalupe Triana  Ref. MD: Mayte    Age: 30 year old    ?  No      LMP 04/19/2025 (Exact Date)       Pain Assessment  Patient Currently in Pain: Yes  Primary Pain Location: Arm (Left arm into hand)  Pain Descriptors: Intermittent, Aching, Pressure, Discomfort  Alleviating Factors: Pain medication    Hand Dominance Evaluation  Hand Dominance: Right          QuickDASH Assessment      3/10/2025    10:46 AM   QuickDASH Main   1. Open a tight or new jar Unable   2. Do heavy household chores (e.g., wash walls, floors) Moderate difficulty   3. Carry a shopping bag or briefcase Moderate difficulty   4. Wash your back No difficulty   5. Use a knife to cut food Moderate difficulty   6. Recreational activities in which you take some force or impact through your arm, shoulder or hand (e.g., golf, hammering, tennis, etc.) Unable to answer   7. During the past week, to what extent has your arm, shoulder or hand problem interfered with your normal social activities with family, friends, neighbours or groups Unable to answer   8. During the past week, were you limited in your work or other regular daily activities as a result of your arm, shoulder or hand problem Very limited   9. Arm, shoulder or hand pain Moderate   10.Tingling (pins and needles) in your arm,shoulder or hand Severe   11. During the past week, how much difficulty have you had sleeping because of the pain in your arm, shoulder or hand Moderate difficulty   Quickdash Ability Score 40.91          Current Outpatient Medications   Medication Sig Dispense Refill    AJOVY 225 MG/1.5ML SOAJ Inject 225 mg subcutaneously every 30 days. Last dose 4/26       alcohol swab prep pads Use to swab area of injection/anthony as directed. 100 each 3    Atogepant (QULIPTA) 60 MG TABS Take 60 mg by mouth as needed.      blood glucose (NO BRAND SPECIFIED) test strip Use to test blood sugar three times daily or as directed. Preferred blood glucose meter OR supplies to accompany: Blood Glucose Monitor Brands: per insurance. 100 strip 6    blood glucose monitoring (NO BRAND SPECIFIED) meter device kit Use to test blood sugar three times daily or as directed. Preferred blood glucose meter OR supplies to accompany: Blood Glucose Monitor Brands: per insurance. 1 kit 0    cephALEXin (KEFLEX) 500 MG capsule Take 1 capsule (500 mg) by mouth 4 times daily. 12 capsule 0    colestipol (COLESTID) 1 g tablet Take 1 tablet (1 g) by mouth 2 times daily. 60 tablet 2    Continuous Glucose Sensor (FREESTYLE AYSE 3 PLUS SENSOR) MISC Use 1 sensor every 15 days. Use to read blood sugars per 's instructions. 6 each 3    Continuous Glucose Sensor (FREESTYLE AYSE 3 SENSOR) MISC 1 each by Other route every 14 days. 6 each 3    cyclobenzaprine (FLEXERIL) 5 MG tablet Take 1 tablet (5 mg) by mouth 3 times daily as needed for muscle spasms. 30 tablet 0    docusate sodium (COLACE) 100 MG capsule Take 1 capsule (100 mg) by mouth 2 times daily. 12 capsule 0    gabapentin (NEURONTIN) 300 MG capsule Take 1 capsule (300 mg) by mouth 3 times daily. 90 capsule 0    hydrOXYzine HCl (ATARAX) 25 MG tablet Take 1 tablet (25 mg) by mouth 3 times daily as needed for itching. 21 tablet 0    insulin pen needle (ULTICARE MICRO) 32G X 4 MM miscellaneous Use 1 pen needles daily or as directed. 100 each 3    metFORMIN (GLUCOPHAGE XR) 500 MG 24 hr tablet Take 4 tablets (2,000 mg) by mouth daily (with dinner). 360 tablet 3    methocarbamol (ROBAXIN) 500 MG tablet Take 2 tablets (1,000 mg) by mouth 3 times daily for 5 days. 30 tablet 0    OnabotulinumtoxinA (BOTOX IJ) Inject as directed every 3 months. Last dose 4/23/25       ondansetron (ZOFRAN ODT) 4 MG ODT tab DISSOLVE ONE TABLET ON TONGUE EVERY 8 HOURS AS NEEDED FOR NAUSEA 30 tablet 1    ondansetron (ZOFRAN) 4 MG tablet Take 1 tablet (4 mg) by mouth every 6 hours as needed for nausea. 12 tablet 0    oxyCODONE (ROXICODONE) 5 MG tablet Take 1-2 tablets (5-10 mg) by mouth every 6 hours as needed for moderate to severe pain. 16 tablet 0    rimegepant (NURTEC) 75 MG ODT tablet Place 75 mg under the tongue daily as needed for migraine.      rizatriptan (MAXALT) 10 MG tablet Take 10 mg by mouth at onset of headache.      study - chlorhexidine, IDS# 6242, 4% soap Rinse the body with water. Apply soap from the neck to the toes and avoid contact with the eyes, ears, nose, mouth, and genitals. Leave for 1 minute then rinse body again. Use once the night before surgery and once the morning of surgery. 118 mL 0    study - povidone idoine, IDS# 6242, 5% nasal solution Clean nostrils using tissue. Open the cap, dip one swab into the bottle and stir the solution. Withdraw the swab slowly. Insert the swab into the nostril and rotate 360 degrees covering all surfaces. Repeat in the other nostril using the new swab. Repeat the application in both nostrils using a fresh swab each time. Do not blow the nose. 4 mL 0    SUMAtriptan (IMITREX) 50 MG tablet Take 2 tablets (100 mg) by mouth at onset of headache for migraine. May repeat in 2 hours. Max 4 tablets/24 hours.      thin (NO BRAND SPECIFIED) lancets Use with lanceting device. To accompany: Blood Glucose Monitor Brands: per insurance. 100 each 6    topiramate (TOPAMAX) 25 MG tablet Take 25 mg by mouth 2 times daily.      tranexamic acid (LYSTEDA) 650 MG tablet Take 2 tablets (1,300 mg) by mouth 2 times daily as needed (menorrhagia). 30 tablet 1    UBRELVY 100 MG tablet Take 100 mg by mouth at onset of headache.         Allergies   Allergen Reactions    Adhesive Tape Itching    Emgality [Galcanezumab-Gnlm] Itching     Itchiness, bumps    Other  Environmental Allergy     Victoza [Liraglutide] Headache, Hives and Itching       ANAMARIA,SHIRA, ATC

## 2025-05-28 NOTE — PROGRESS NOTES
Patient came into clinic today for Post op 1.Left ulnar nerve decompression at the elbow with no anterior transposition 2. Left open carpal tunnel release  3. Left thumb A1 pulley release 4. Left first extensor compartment release DOS: 5/19/25  Splint change.    Patient states post op dressing had come undone and she had gone into ED over the Holiday weekend to have it redone, but still is very uncomfortable.    Post op dressing was taken down from left arm.  Incision sites were inspected an no signs of infection were noted.  A new dressing was applied to left arm including a plaster thumb spica splint with stockinet and cast padding.  Entire left arm was wrapped in ace wrap from thumb up to mid humerus.    Splint cares were reviewed and all questions answered.  Patient will be in clinic next week for her first post op visit with Amaya Barfield PA-C.    Dr Smith was the onsite provider during this nurse visit.      SHIRA CONRAD, ATC

## 2025-06-02 ENCOUNTER — OFFICE VISIT (OUTPATIENT)
Dept: PHYSICAL MEDICINE AND REHAB | Facility: CLINIC | Age: 30
End: 2025-06-02
Payer: COMMERCIAL

## 2025-06-02 ENCOUNTER — OFFICE VISIT (OUTPATIENT)
Dept: INTERNAL MEDICINE | Facility: CLINIC | Age: 30
End: 2025-06-02
Payer: COMMERCIAL

## 2025-06-02 ENCOUNTER — OFFICE VISIT (OUTPATIENT)
Dept: ORTHOPEDICS | Facility: CLINIC | Age: 30
End: 2025-06-02
Payer: COMMERCIAL

## 2025-06-02 ENCOUNTER — OFFICE VISIT (OUTPATIENT)
Dept: DERMATOLOGY | Facility: CLINIC | Age: 30
End: 2025-06-02
Payer: COMMERCIAL

## 2025-06-02 VITALS
RESPIRATION RATE: 18 BRPM | DIASTOLIC BLOOD PRESSURE: 77 MMHG | HEIGHT: 60 IN | TEMPERATURE: 98 F | BODY MASS INDEX: 48.24 KG/M2 | OXYGEN SATURATION: 95 % | SYSTOLIC BLOOD PRESSURE: 112 MMHG | HEART RATE: 75 BPM | WEIGHT: 245.7 LBS

## 2025-06-02 VITALS — SYSTOLIC BLOOD PRESSURE: 127 MMHG | OXYGEN SATURATION: 99 % | HEART RATE: 80 BPM | DIASTOLIC BLOOD PRESSURE: 84 MMHG

## 2025-06-02 DIAGNOSIS — Q82.9 KERATOSIS PILARIS, CONGENITAL: Primary | ICD-10-CM

## 2025-06-02 DIAGNOSIS — M25.551 BILATERAL HIP PAIN: ICD-10-CM

## 2025-06-02 DIAGNOSIS — M54.16 LUMBAR RADICULOPATHY: ICD-10-CM

## 2025-06-02 DIAGNOSIS — G89.29 CHRONIC BILATERAL THORACIC BACK PAIN: ICD-10-CM

## 2025-06-02 DIAGNOSIS — E11.65 TYPE 2 DIABETES MELLITUS WITH HYPERGLYCEMIA, WITHOUT LONG-TERM CURRENT USE OF INSULIN (H): ICD-10-CM

## 2025-06-02 DIAGNOSIS — G89.29 CHRONIC BILATERAL LOW BACK PAIN, UNSPECIFIED WHETHER SCIATICA PRESENT: Primary | ICD-10-CM

## 2025-06-02 DIAGNOSIS — M25.552 BILATERAL HIP PAIN: ICD-10-CM

## 2025-06-02 DIAGNOSIS — G47.33 OBSTRUCTIVE SLEEP APNEA: ICD-10-CM

## 2025-06-02 DIAGNOSIS — G56.03 BILATERAL CARPAL TUNNEL SYNDROME: ICD-10-CM

## 2025-06-02 DIAGNOSIS — M79.642 HAND PAIN, LEFT: Primary | ICD-10-CM

## 2025-06-02 DIAGNOSIS — M65.311 TRIGGER THUMB OF BOTH THUMBS: ICD-10-CM

## 2025-06-02 DIAGNOSIS — M54.50 CHRONIC BILATERAL LOW BACK PAIN, UNSPECIFIED WHETHER SCIATICA PRESENT: Primary | ICD-10-CM

## 2025-06-02 DIAGNOSIS — L50.3 DERMATOGRAPHISM: ICD-10-CM

## 2025-06-02 DIAGNOSIS — M65.4 TENOSYNOVITIS, DE QUERVAIN: ICD-10-CM

## 2025-06-02 DIAGNOSIS — G56.23 ULNAR NEUROPATHY OF BOTH UPPER EXTREMITIES: ICD-10-CM

## 2025-06-02 DIAGNOSIS — M54.6 CHRONIC BILATERAL THORACIC BACK PAIN: ICD-10-CM

## 2025-06-02 DIAGNOSIS — E25.9: Primary | ICD-10-CM

## 2025-06-02 DIAGNOSIS — M53.3 COCCYDYNIA: ICD-10-CM

## 2025-06-02 DIAGNOSIS — M65.312 TRIGGER THUMB OF BOTH THUMBS: ICD-10-CM

## 2025-06-02 PROCEDURE — 99214 OFFICE O/P EST MOD 30 MIN: CPT | Mod: 24 | Performed by: INTERNAL MEDICINE

## 2025-06-02 PROCEDURE — 99215 OFFICE O/P EST HI 40 MIN: CPT | Performed by: NURSE PRACTITIONER

## 2025-06-02 RX ORDER — HYDROXYZINE HYDROCHLORIDE 25 MG/1
TABLET, FILM COATED ORAL
Qty: 30 TABLET | Refills: 4 | Status: SHIPPED | OUTPATIENT
Start: 2025-06-02

## 2025-06-02 RX ORDER — SPIRONOLACTONE 50 MG/1
50 TABLET, FILM COATED ORAL DAILY
Qty: 30 TABLET | Refills: 4 | Status: SHIPPED | OUTPATIENT
Start: 2025-06-02

## 2025-06-02 ASSESSMENT — PAIN SCALES - GENERAL: PAINLEVEL_OUTOF10: MODERATE PAIN (6)

## 2025-06-02 NOTE — PROGRESS NOTES
Preoperative Evaluation  [facility] INTERNAL MEDICINE [address]  [address] [facility] [address]  Phone: [phone]  Fax: [phone]  Primary Provider: Maria Guadalupe Triaan MD  Pre-op Performing Provider: Maria Guadalupe Triana MD  Jun 2, 2025 6/2/2025   Surgical Information   What procedure is being done? INSERTION, INTRAUTERINE DEVICE; Pap, EUA   Facility or Hospital where procedure/surgery will be performed:  [facility]   Who is doing the procedure / surgery? Jami Gutierrez   Date of surgery / procedure: 06/06/2025   Time of surgery / procedure: 10:05AM    Where do you plan to recover after surgery? at home with family     Fax number for surgical facility: Note does not need to be faxed, will be available electronically in Epic.    Assessment & Plan     The proposed surgical procedure is considered LOW risk.    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  Recommend implementation of usual stress steroid protocol.    Type 2 diabetes mellitus with hyperglycemia, without long-term current use of insulin (H)  Patient is already holding semaglutide injections.  Recommend holding it this week and restarting following procedure.  She is in agreement with the plan.      No LOS data to display   Time spent by me today doing chart review, history and exam, documentation and further activities per the note         Risks and Recommendations  The patient has the following additional risks and recommendations for perioperative complications:  Diabetes:  - Patient is not on insulin therapy: regular NPO guidelines can be followed.     Antiplatelet or Anticoagulation Medication Instructions   - We reviewed the medication list and the patient is not on an antiplatelet or anticoagulation medications.    Additional Medication Instructions   - metformin: DO NOT  TAKE day of surgery.   - GLP-1 Injectable (exenitide, liraglutide, semaglutide, dulaglutide, etc.): DO NOT TAKE 7 days before surgery     Recommendation  Approval given to proceed with proposed procedure, without further diagnostic evaluation.      I spent a total of 30 minutes on the day of the visit.   Time spent by me today doing chart review, history and exam, documentation and further activities per the note    Tanika Kamara is a 30 year old, presenting for the following:  Pre-Op Exam (INSERTION, INTRAUTERINE DEVICE; Pap, EUA on 6/6/2025) and Refill Request (Ozempic and spironalactone)          6/2/2025    12:46 PM   Additional Questions   Roomed by Keri BOWEN   Accompanied by N/A     HPI:   Patient reports that she is scheduled for IUD placement later this month. The procedure will be done in the OR. She recently had surgery for carpal tunnel syndrome and ulnar nerve entrapment on the left. Her recovery has been difficult, however, she has follow up with Orthopedics later today.            6/2/2025   Pre-Op Questionnaire   Have you ever had a heart attack or stroke? No   Have you ever had surgery on your heart or blood vessels, such as a stent placement, a coronary artery bypass, or surgery on an artery in your head, neck, heart, or legs? No   Do you have chest pain with activity? (!) YES    Do you have a history of heart failure? No   Do you currently have a cold, bronchitis or symptoms of other infection? No   Do you have a cough, shortness of breath, or wheezing? No   Do you or anyone in your family have previous history of blood clots? No   Do you or does anyone in your family have a serious bleeding problem such as prolonged bleeding following surgeries or cuts? No   Have you ever had problems with anemia or been told to take iron pills? No   Have you had any abnormal blood loss such as black, tarry or bloody stools, or abnormal vaginal bleeding? No   Have you ever had a blood transfusion? No    Are you willing to have a blood transfusion if it is medically needed before, during, or after your surgery? (!) NO    Have you or any of your relatives ever had problems with anesthesia? No   Do you have sleep apnea, excessive snoring or daytime drowsiness? No   Do you have any artifical heart valves or other implanted medical devices like a pacemaker, defibrillator, or continuous glucose monitor? No   Do you have artificial joints? No   Are you allergic to latex? No     Advance Care Planning    Document on file is a Health Care Directive or POLST.    Preoperative Review of    reviewed - no record of controlled substances prescribed.      Status of Chronic Conditions:  See problem list for active medical problems.  Problems all longstanding and stable, except as noted/documented.  See ROS for pertinent symptoms related to these conditions.    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     Priority: Medium    Abnormal finding on imaging of liver 10/18/2024     Priority: Medium    Chronic diarrhea 10/18/2024     Priority: Medium    Lactose intolerance 09/10/2024     Priority: Medium    Obstructive sleep apnea 07/05/2024     Priority: Medium    Insomnia, unspecified type 07/05/2024     Priority: Medium    Bilateral carpal tunnel syndrome 06/11/2024     Priority: Medium    Diabetes mellitus, type 2 (H) 10/19/2020     Priority: Medium    Hidradenitis suppurativa 01/23/2019     Priority: Medium    Morbid obesity with body mass index of 60.0-69.9 in adult (H) 01/23/2019     Priority:  Medium    Elevated BP without diagnosis of hypertension 01/23/2019     Priority: Medium    Morbid obesity (H) 10/17/2014     Priority: Medium    Chronic headaches 10/13/2014     Priority: Medium    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 Brewer; next follow up 1.2011.    Metformin increased to 850 mg twice a day.      Chronic abdominal pain 02/09/2010     Priority: Medium    Vitamin D deficiency 02/09/2010     Priority: Medium      Past Medical History:   Diagnosis Date    CAH (congenital adrenal hyperplasia) 2/9/2010    Followed by Dr. Brewer; next follow up 1.2011.  Metformin increased to 850 mg twice a day.     Diabetes mellitus, type 2 (H) 10/19/2020    Vitamin D deficiency 2/9/2010     Past Surgical History:   Procedure Laterality Date    CHOLECYSTECTOMY      She was in 8th grade     COLONOSCOPY N/A 9/16/2024    Procedure: COLONOSCOPY, WITH BIOPSY;  Surgeon: Bacilio Yancey MD;  Location: [facility]    ESOPHAGOSCOPY, GASTROSCOPY, DUODENOSCOPY (EGD), COMBINED N/A 9/16/2024    Procedure: ESOPHAGOGASTRODUODENOSCOPY, WITH BIOPSY;  Surgeon: Bacilio Yancey MD;  Location: [facility]    INJECT STEROID (LOCATION) N/A 4/9/2025    Procedure: Sacrococcygeal ligament/coccyx steroid injection;  Surgeon: Edwina Fitzpatrick MD;  Location: [facility] OR    RELEASE CARPAL TUNNEL Left 5/19/2025    Procedure: Left open carpal tunnel release;  Surgeon: Vic Roberts MD;  Location: [facility]    RELEASE DEQUERVAINS WRIST Left 5/19/2025    Procedure: Left first dorsal compartment release;  Surgeon: Vic Roberts MD;  Location: [facility]    RELEASE TRIGGER FINGER Left 5/19/2025    Procedure: Left trigger thumb release;  Surgeon: Vic Roberts MD;  Location: [facility]    TRANSPOSITION ULNAR NERVE (ELBOW) Left 5/19/2025    Procedure: Left cubital tunnel release;  Surgeon: Vic Roberts MD;  Location: [facility]      Current Outpatient Medications   Medication Sig Dispense Refill    AJOVY 225 MG/1.5ML SOAJ Inject 225 mg subcutaneously every 30 days. Last dose 4/26      Atogepant (QULIPTA) 60 MG TABS Take 60 mg by mouth as needed.      cyclobenzaprine (FLEXERIL) 5 MG tablet Take 1 tablet (5 mg) by mouth 3 times daily as needed for muscle spasms. 30 tablet 0    gabapentin (NEURONTIN) 300 MG capsule Take 1 capsule (300 mg) by mouth 3 times daily. 90 capsule 0    hydrOXYzine HCl (ATARAX) 25 MG tablet Take 1 tablet (25 mg) by mouth 3 times daily as needed for itching. 21 tablet 0    metFORMIN (GLUCOPHAGE XR) 500 MG 24 hr tablet Take 4 tablets (2,000 mg) by mouth daily (with dinner). 360 tablet 3    OnabotulinumtoxinA (BOTOX IJ) Inject as directed every 3 months. Last dose 4/23/25      ondansetron (ZOFRAN ODT) 4 MG ODT tab DISSOLVE ONE TABLET ON TONGUE EVERY 8 HOURS AS NEEDED FOR NAUSEA 30 tablet 1    oxyCODONE (ROXICODONE) 5 MG tablet Take 1-2 tablets (5-10 mg) by mouth every 6 hours as needed for moderate to severe pain. 16 tablet 0    rimegepant (NURTEC) 75 MG ODT tablet Place 75 mg under the tongue daily as needed for migraine.      rizatriptan (MAXALT) 10 MG tablet Take 10 mg by mouth at onset of headache.      Semaglutide, 1 MG/DOSE, (OZEMPIC) 4 MG/3ML pen Inject 1 mg subcutaneously every 7 days.      SPIRONOLACTONE PO Take by mouth.      SUMAtriptan (IMITREX) 50 MG tablet Take 2 tablets (100 mg) by mouth at onset of headache for migraine. May repeat in 2 hours. Max 4 tablets/24 hours.      topiramate (TOPAMAX) 25 MG tablet Take 25 mg by mouth 2 times daily.      tranexamic acid (LYSTEDA) 650 MG tablet Take 2 tablets (1,300 mg) by mouth 2 times daily as needed (menorrhagia). 30 tablet 1    UBRELVY 100 MG tablet Take 100 mg by mouth at onset of headache.      alcohol swab prep pads Use to swab area of injection/anthony as directed. 100 each 3    blood glucose (NO BRAND SPECIFIED) test strip Use to test blood sugar three times  daily or as directed. Preferred blood glucose meter OR supplies to accompany: Blood Glucose Monitor Brands: per insurance. 100 strip 6    blood glucose monitoring (NO BRAND SPECIFIED) meter device kit Use to test blood sugar three times daily or as directed. Preferred blood glucose meter OR supplies to accompany: Blood Glucose Monitor Brands: per insurance. 1 kit 0    cephALEXin (KEFLEX) 500 MG capsule Take 1 capsule (500 mg) by mouth 4 times daily. (Patient not taking: Reported on 6/2/2025) 12 capsule 0    colestipol (COLESTID) 1 g tablet Take 1 tablet (1 g) by mouth 2 times daily. (Patient not taking: Reported on 6/2/2025) 60 tablet 2    Continuous Glucose Sensor (FREESTYLE AYSE 3 PLUS SENSOR) MISC Use 1 sensor every 15 days. Use to read blood sugars per 's instructions. 6 each 3    Continuous Glucose Sensor (FREESTYLE AYSE 3 SENSOR) MISC 1 each by Other route every 14 days. 6 each 3    docusate sodium (COLACE) 100 MG capsule Take 1 capsule (100 mg) by mouth 2 times daily. (Patient not taking: Reported on 6/2/2025) 12 capsule 0    insulin pen needle (ULTICARE MICRO) 32G X 4 MM miscellaneous Use 1 pen needles daily or as directed. 100 each 3    ondansetron (ZOFRAN) 4 MG tablet Take 1 tablet (4 mg) by mouth every 6 hours as needed for nausea. (Patient not taking: Reported on 6/2/2025) 12 tablet 0    study - chlorhexidine, IDS# 6242, 4% soap Rinse the body with water. Apply soap from the neck to the toes and avoid contact with the eyes, ears, nose, mouth, and genitals. Leave for 1 minute then rinse body again. Use once the night before surgery and once the morning of surgery. 118 mL 0    study - povidone idoine, IDS# 6242, 5% nasal solution Clean nostrils using tissue. Open the cap, dip one swab into the bottle and stir the solution. Withdraw the swab slowly. Insert the swab into the nostril and rotate 360 degrees covering all surfaces. Repeat in the other nostril using the new swab. Repeat the  application in both nostrils using a fresh swab each time. Do not blow the nose. 4 mL 0    thin (NO BRAND SPECIFIED) lancets Use with lanceting device. To accompany: Blood Glucose Monitor Brands: per insurance. 100 each 6       Allergies   Allergen Reactions    Adhesive Tape Itching    Emgality [Galcanezumab-Gnlm] Itching     Itchiness, bumps    Other Environmental Allergy     Victoza [Liraglutide] Headache, Hives and Itching        Social History     Tobacco Use    Smoking status: Never     Passive exposure: Never    Smokeless tobacco: Never   Substance Use Topics    Alcohol use: No       History   Drug Use No           Objective    /77 (BP Location: Right arm, Patient Position: Sitting, Cuff Size: Adult Large)   Pulse 75   Temp 98  F (36.7  C)   Resp 18   Ht 1.524 m (5')   Wt 111.4 kg (245 lb 11.2 oz)   LMP 04/19/2025 (Exact Date)   SpO2 95%   BMI 47.98 kg/m     Estimated body mass index is 47.98 kg/m  as calculated from the following:    Height as of this encounter: 1.524 m (5').    Weight as of this encounter: 111.4 kg (245 lb 11.2 oz).    Physical Exam  Vitals reviewed.   Constitutional:       Appearance: Normal appearance.   HENT:      Head: Normocephalic.      Nose: Nose normal.   Eyes:      Pupils: Pupils are equal, round, and reactive to light.   Cardiovascular:      Rate and Rhythm: Normal rate and regular rhythm.   Pulmonary:      Effort: Pulmonary effort is normal.      Breath sounds: Normal breath sounds.   Abdominal:      General: Bowel sounds are normal.      Palpations: Abdomen is soft.   Musculoskeletal:      Cervical back: Normal range of motion.   Neurological:      General: No focal deficit present.      Mental Status: She is alert and oriented to person, place, and time.   Psychiatric:         Mood and Affect: Mood normal.         Behavior: Behavior normal.         Thought Content: Thought content normal.         Judgment: Judgment normal.

## 2025-06-02 NOTE — LETTER
6/2/2025      Anh Flores  [address] [facility] [address]      Dear Colleague,    Thank you for referring your patient, Anh Flores, to the [facility] [place]. Please see a copy of my visit note below.    Date of Service: Jun 2, 2025    Chief Complaint: Post operative follow up.     Date of Surgery: 5/19/2025    Procedure Performed:   Left ulnar nerve decompression at the elbow with no anterior transposition  Left open carpal tunnel release  Left thumb A1 pulley release  Left first extensor compartment release     Surgeon: Dr. Romel Roberts    Interval events: Anh Flores is a 30 year old female who presents today for a postoperative follow up.  Patient was seen in the emergency department 4 days postop for pain, splint was readjusted.  She was seen again for nurse only visit on 5/28 where a new splint was applied.  Since then she has been doing ok.  Since recovering, her right hand has been doing more and she has had worsening symptoms.  She has not noticed a significant improvement in her nerve symptoms quite yet.  She would like to schedule the same procedures on the right hand.    The past medical history was reviewed updated in the EMR. This includes medications, surgeries, social history, and review of systems.    Physical examination:  Well-developed, well-nourished and in no acute distress.  Alert and oriented to surroundings.  On examination of the  left upper extremity, incisions to the thumb, palm, wrist, and elbow are well-healed.  Sutures in place to the hand and wrist incisions. There is no erythema, drainage, or dehiscence. Swelling is Mild. Sensation is intact in median, radial and ulnar nerve distributions. Patient can actively flex and extend all digits and thumb. The patient is able to make a full composite fist. Fingers are warm and well-perfused.     On evaluation of the right upper extremity a palpable tender nodule to the A1 pulley of the thumb  with palpable clicking, no locking on exam today.  Tenderness to palpation to the first dorsal compartment, positive Finkelstein's.    Assessment: 30 year old female s/p left cubital tunnel release, carpal tunnel release, trigger thumb release, first dorsal compartment release, progressing appropriately.     Plan:    - Patient can shower and get the incisions wet.  No submerging in any standing water until the wounds are fully healed.  - He was offered an off-the-shelf removable thumb spica wrist brace.  She can wear this as needed for comfort, she should start to wean out of this as tolerated.  - Counseled on finger, elbow, wrist range of motion.  - No heavy lifting until the wounds are fully healed.  - Patient's right de Quervain's is new since last evaluation.  She states this has started to worsen as she has been recovering from her left hand surgery.  We did discuss treatment options including trialing of a corticosteroid injection versus releasing at the time of surgery.  Patient declines steroid injection and would like to proceed with surgery.    Patient wound like to schedule the right hand with a trigger thumb release, carpal tunnel release, and cubital tunnel release, as well as a first dorsal compartment release.  Case request will be placed.    BRITTANIE SLAUGHTER PA-C  Orthopaedic Surgery     DME FITTING    Relevant Diagnosis: Hand pain left, post operative state  Wrist/thumb orthosis, LT, universal w/spica brace was fit on patient's Left wrist.     Person(s) involved in teaching:   Patient    Brace was applied in standard Manner:  Yes  Brace fit well:  Yes  Patient reports brace to fit comfortably:  Yes    Education:   Patient shown self application and removal of brace: Yes  Patient shown how to adjust brace fit, if necessary: Yes  Patient educated on billing and return policy: Yes  Patient confirmed understanding when and how to contact clinic with concerns: Yes      Again, thank you for allowing me to  participate in the care of your patient.        Sincerely,        Amaya Barfield PA-C    Electronically signed

## 2025-06-02 NOTE — PROGRESS NOTES
"[facility] Dermatology Note  Encounter Date: Jun 2, 2025  Office Visit     Dermatology Problem List:  1. Hidradenitis suppurativa, axillae and inframammary skin  - current tx: BPO wash, clindamycin 1% lotion  - s/p ILK 10mg/cc injection 2/19/19 to L axilla  2. Dermatographism/Pruritus  - current tx: OTC antihistamines, Hydroxyzine 25 mg    ____________________________________________    Assessment & Plan:    # Mild Dermatographism/pruritus. Improved on oral antihistamines but they are sedating so recommend zyrtec 10 mg every day in the morning, or up to BID, and hydroxyzine 25 mg as needed  - check IGE and Tryptase  Reviewed previously labs( [id number])    # Hx of potential contact dermatitis after surgical procedures  - patient already referred to Dr. Herrera for patch testing      Procedures Performed:   None      Follow-up: 6 month(s) in-person, or earlier for new or changing lesions    Staff and Scribe:   Scribe Disclosure:   By signing my name below, I, Jossy Armendariz, attest that this documentation has been prepared under the direction and in the presence of Leanne Fallon PA-C.  - Electronically Signed: Jossy Armendariz 06/02/25     ***      ____________________________________________    CC: Derm Problem (Itching all over body, symptoms were improving, but in the last few weeks has gotten worse. \"Deep itching\". Went to the ER to have arm checked and ER doc prescribed something, feels like it got worse after surgery recently. )    HPI:  Ms. Anh Flores is a(n) 30 year old female who presents today as a return patient for itching.    Patient reports the itching has been chronic and is more than surgical itching post-op. She notes the itching is coming from deep below her skin surface which she cannot seem to relieve. She reports she has itched to the point of tearing her skin open, so has had to keep her nails short. Patient will regularly experience itching in the sites of her " varying medication injections. She reports the itching will cause swelling, and after tearing will cause bleeding. She reports the itching has been present for approximately one year. Patient has been experiencing ongoing diarrhea that her doctors cannot seem to treat, and will alternate with severe constipation.    Patient is otherwise feeling well, without additional skin concerns.    Labs Reviewed:  None reviewed.    Physical exam:  Vitals: LMP 04/19/2025 (Exact Date)   GEN: This is a well developed, well-nourished female in no acute distress, in a pleasant mood.    SKIN: Focused examination of the face and R arm (L arm in cast) was performed.  - very faint positive dermatographism on R upper arm  - spiny perifollicular papules on R upper arm  - No other lesions of concern on areas examined.     Medications:  Current Outpatient Medications   Medication Sig Dispense Refill    AJOVY 225 MG/1.5ML SOAJ Inject 225 mg subcutaneously every 30 days. Last dose 4/26      alcohol swab prep pads Use to swab area of injection/anthony as directed. 100 each 3    Atogepant (QULIPTA) 60 MG TABS Take 60 mg by mouth as needed.      blood glucose (NO BRAND SPECIFIED) test strip Use to test blood sugar three times daily or as directed. Preferred blood glucose meter OR supplies to accompany: Blood Glucose Monitor Brands: per insurance. 100 strip 6    blood glucose monitoring (NO BRAND SPECIFIED) meter device kit Use to test blood sugar three times daily or as directed. Preferred blood glucose meter OR supplies to accompany: Blood Glucose Monitor Brands: per insurance. 1 kit 0    Continuous Glucose Sensor (FREESTYLE AYSE 3 PLUS SENSOR) MISC Use 1 sensor every 15 days. Use to read blood sugars per 's instructions. 6 each 3    Continuous Glucose Sensor (FREESTYLE AYSE 3 SENSOR) [facility] 1 each by Other route every 14 days. 6 each 3    cyclobenzaprine (FLEXERIL) 5 MG tablet Take 1 tablet (5 mg) by mouth 3 times daily as needed for  muscle spasms. 30 tablet 0    gabapentin (NEURONTIN) 300 MG capsule Take 1 capsule (300 mg) by mouth 3 times daily. 90 capsule 0    hydrOXYzine HCl (ATARAX) 25 MG tablet Take 1 tablet (25 mg) by mouth 3 times daily as needed for itching. 21 tablet 0    insulin pen needle (ULTICARE MICRO) 32G X 4 MM miscellaneous Use 1 pen needles daily or as directed. 100 each 3    metFORMIN (GLUCOPHAGE XR) 500 MG 24 hr tablet Take 4 tablets (2,000 mg) by mouth daily (with dinner). 360 tablet 3    OnabotulinumtoxinA (BOTOX IJ) Inject as directed every 3 months. Last dose 4/23/25      ondansetron (ZOFRAN ODT) 4 MG ODT tab DISSOLVE ONE TABLET ON TONGUE EVERY 8 HOURS AS NEEDED FOR NAUSEA 30 tablet 1    oxyCODONE (ROXICODONE) 5 MG tablet Take 1-2 tablets (5-10 mg) by mouth every 6 hours as needed for moderate to severe pain. 16 tablet 0    rimegepant (NURTEC) 75 MG ODT tablet Place 75 mg under the tongue daily as needed for migraine.      rizatriptan (MAXALT) 10 MG tablet Take 10 mg by mouth at onset of headache.      Semaglutide, 1 MG/DOSE, (OZEMPIC) 4 MG/3ML pen Inject 1 mg subcutaneously every 7 days.      spironolactone (ALDACTONE) 50 MG tablet Take 1 tablet (50 mg) by mouth daily. 30 tablet 4    study - chlorhexidine, IDS# 6242, 4% soap Rinse the body with water. Apply soap from the neck to the toes and avoid contact with the eyes, ears, nose, mouth, and genitals. Leave for 1 minute then rinse body again. Use once the night before surgery and once the morning of surgery. 118 mL 0    study - povidone idoine, IDS# 6242, 5% nasal solution Clean nostrils using tissue. Open the cap, dip one swab into the bottle and stir the solution. Withdraw the swab slowly. Insert the swab into the nostril and rotate 360 degrees covering all surfaces. Repeat in the other nostril using the new swab. Repeat the application in both nostrils using a fresh swab each time. Do not blow the nose. 4 mL 0    SUMAtriptan (IMITREX) 50 MG tablet Take 2 tablets  (100 mg) by mouth at onset of headache for migraine. May repeat in 2 hours. Max 4 tablets/24 hours.      thin (NO BRAND SPECIFIED) lancets Use with lanceting device. To accompany: Blood Glucose Monitor Brands: per insurance. 100 each 6    topiramate (TOPAMAX) 25 MG tablet Take 25 mg by mouth 2 times daily.      tranexamic acid (LYSTEDA) 650 MG tablet Take 2 tablets (1,300 mg) by mouth 2 times daily as needed (menorrhagia). 30 tablet 1    UBRELVY 100 MG tablet Take 100 mg by mouth at onset of headache.      cephALEXin (KEFLEX) 500 MG capsule Take 1 capsule (500 mg) by mouth 4 times daily. (Patient not taking: Reported on 6/2/2025) 12 capsule 0    colestipol (COLESTID) 1 g tablet Take 1 tablet (1 g) by mouth 2 times daily. (Patient not taking: Reported on 6/2/2025) 60 tablet 2    docusate sodium (COLACE) 100 MG capsule Take 1 capsule (100 mg) by mouth 2 times daily. (Patient not taking: Reported on 6/2/2025) 12 capsule 0    ondansetron (ZOFRAN) 4 MG tablet Take 1 tablet (4 mg) by mouth every 6 hours as needed for nausea. (Patient not taking: Reported on 6/2/2025) 12 tablet 0     No current facility-administered medications for this visit.      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), late onset    Chronic abdominal pain    Vitamin D deficiency    Chronic headaches    Morbid obesity (H)    Hidradenitis suppurativa    Morbid obesity with body mass index of 60.0-69.9 in adult (H)    Elevated BP without diagnosis of hypertension    Diabetes mellitus, type 2 (H)    Bilateral carpal tunnel syndrome    Obstructive sleep apnea    Insomnia, unspecified type    Lactose intolerance    Abnormal finding on imaging of liver    Chronic diarrhea    Carrier of hemochromatosis HFE gene mutation    Cryptosporidiasis (H)    Elevated ferritin    Ulnar neuropathy of both upper extremities    Trigger thumb of both thumbs    Lateral  epicondylitis of both elbows    Coccydynia     Past Medical History:   Diagnosis Date    CAH (congenital adrenal hyperplasia) 2/9/2010    Followed by Dr. Brewer; next follow up 1.2011.  Metformin increased to 850 mg twice a day.     Diabetes mellitus, type 2 (H) 10/19/2020    Vitamin D deficiency 2/9/2010        CC Alejandra Cronin [place]  [id number] [place] [address] on close of this encounter.

## 2025-06-02 NOTE — PATIENT INSTRUCTIONS
Preparing for Spine Injection Therapy              Why Do I Need Spine Injection Therapy?  Your Health Care Provider is recommending spine injection therapy to  help relieve your back and neck pain. This will be in addition to other therapies such as medications and physical therapy. The purpose of these injections is to reduce the amount of inflammation (swelling, pain, heat, redness, loss of body function) around the nerves thus reducing the amount of pain.     The medications you will receive with the injections will include:   Anesthetic - medication to numb the painful area.    Steroid - medication that prevents or reduces swelling and pain (anti-inflammatory).   To reduce your discomfort during the injection procedure, you will receive a numbing medication injection prior to the placement of the needles. You will be lying on your stomach during the injection procedure. We will use a low-dose x-ray (fluoroscopy) to help guide needle placement.  You must have a  for this procedure. We will need to reschedule your injection if you do not have a  with you.      What are the different types of injections and procedure?  Below, is a brief description of the different types of injections we use to deliver pain medication as close as possible to the nerves in the painful area:    Epidural injection: (picture on the right) Epidural injections place 2 medications in your epidural space.  This is the space alongside your spinal canal (not inside of it). Nerves from your spinal cord pass through this space. The medications will bathe those nerves.       Facet joint injection: These injections place 2 medications into the joints of your neck or spine.   SI joint injection: (picture on the left) deliver pain medications into the Sacroiliac joint that connects the hip bones.   Hip joint injection: deliver pain medication to the joint that connect your hip and femur bones (the femur is the bone in the center of  the leg that extends from the knee with the hip).  You will be lying on your side with your affected side up. For example, if we are injecting your left hip, then you will be lying on your right side.   Medial Branch Block injections: This is a test to see if your pain is coming from a specific nerve. This injection is similar to a facet joint injection but contains only the numbing medication.  You will keep a pain score diary for the rest of the day and the following morning after receiving the injection.    Radiofrequency ablation: This is a procedure that uses radio waves and numbing medication to block the nerves that feel pain at the joint. The pain relief effects can last for a long time, but are not permanent. The procedure is similar to the Medial Branch Block but requires additional testing to ensure that the needle is near the nerve before numbing and ablating it.  Doctors often order sedation for this procedure.  Please see the sections on sedation below.      What Should I Expect if I Receive Sedation? THIS IS ORDERED BY THE PHYSICIAN  This is conscious sedation.  The medications we give to you will help you relax and reduce your anxiety.  You will still be awake for the procedure so we can ask you questions and hear your answers.     What Are My Responsibilities With Sedation?  You must stop eating and drinking 8 (eight) hours before your procedure. You can have clear fluids (water, coffee/tea without milk) up to 2 hours prior to the injections. Nothing by mouth for 2 hours prior to injection.  This includes gum, mints, or chew.  Take your morning medications with a small sip of water.    You must have a  who will check-in with you, and stay in the building while the procedure is underway.  If you do not have a  your sedation will be cancelled.  We will monitor you for at least 30 minutes after the procedure before being discharged home.      What Are the Risks and Complications For This  Procedure?  Risks and complication are rare, but can still occur.  You should understand, discuss, and accept these risks before agreeing to the procedure. They include, but are not limited to:  infection  nerve damage   paralysis  injection failure or a need for further injections or additional procedures  continued or worsening of symptoms/pain,   medication reaction,  dural leak (into the hole covering around the spinal cord. This may cause a a spinal headache)  leak of the medication into the spinal canal, nerves, or blood vessel.  death       What do I need to do before the procedure?   If you do not follow these instructions your procedure may be cancelled.  Tell us if you are on major blood thinners such as Coumadin, Xarelto , Plavix, Eliquis , Pradaxa , or others.    Contact the doctor who prescribed your blood thinner to ask for permission to stop taking it before you have the injection.    Schedule your pain injection procedure after your doctor gave their permission.   We will notify you when to stop and re-start your blood thinner.  Tell us if you have any allergies to contrast dye.  If you do, we may give additional medications to take before the procedure.  Tell us if you are pregnant, or possibly pregnant.  If so, you cannot receive steroid medications or be exposed to fluoroscopic X-rays.  Tell us if you have been sick during the 10 days before the procedure. This includes:   colds   gastrointestinal illness or discomfort  dental sores,   skin infection, or any other type of infection.  Tell us if you have taken antibiotics during the 10 days prior to the procedure.  Do not drink alcohol the night before or on the day of the procedure.  You must shower the night before and on the day of your procedure.  Wear comfortable, clean clothing.  If you have an outside MRI (Magnetic Resonance Imaging photo), please bring it with you.    What Will Happen After the Procedure?  If you did not receive sedation we  will monitor you for 15 minutes after the procedure. If you received sedation, we will monitor you for at least 30 minutes after the procedure     How soon can I expect pain relief?  You have received 2 types of medications with your injection:    Anesthetic - numbing medication which only acts for a few hours    Steroid which may take 3-14 days to be effective.   You can expect to feel your normal pain after the anesthetic wears off, until the steroid becomes effective.    How should I care for myself at home?  Get plenty of rest and avoid twisting, bending movements, heavy lifting, or strenuous activity for the first 24 hours. This will help the steroid be more effective. Medial Branch Block injections will have different discharge instructions.  Those will be discussed at that injection appointment.  Resume your pain medications  Apply ice packs (on for 20 minutes at a time), every 2-3 hours to your injection area for the first 2-3 days to help with pain control.    Avoid heat (pads or water bottles), which can cause the veins to open up, making the steroid less effective. You can use heat after 48 hours.  Take showers only for the first 48 hours.  No baths, hot tubs, swimming, or soaking for 48 hours to reduce the risk of infection.     When should I call the doctor?  Call us if you have any of the following:   Fever more than 100.5 degrees Fahrenheit   Signs of infection   Severe headache   Severe back pain   Increased numbness or weakness in your legs or arms   Loss of bladder or bowel control  Nausea   Other concerns    What is the contact information?  During business hours Monday-Friday 8a-5p call [phone].   After business hours, on weekends and holidays call [phone] and ask for the PM&R doctor on call

## 2025-06-02 NOTE — PROGRESS NOTES
DME FITTING    Relevant Diagnosis: Hand pain left, post operative state  Wrist/thumb orthosis, LT, universal w/spica brace was fit on patient's Left wrist.     Person(s) involved in teaching:   Patient    Brace was applied in standard Manner:  Yes  Brace fit well:  Yes  Patient reports brace to fit comfortably:  Yes    Education:   Patient shown self application and removal of brace: Yes  Patient shown how to adjust brace fit, if necessary: Yes  Patient educated on billing and return policy: Yes  Patient confirmed understanding when and how to contact clinic with concerns: Yes

## 2025-06-02 NOTE — NURSING NOTE
"Dermatology Rooming Note    Anh Flores's goals for this visit include:   Chief Complaint   Patient presents with    Derm Problem     Itching all over body, symptoms were improving, but in the last few weeks has gotten worse. \"Deep itching\". Went to the ER to have arm checked and ER doc prescribed something, feels like it got worse after surgery recently.      BRIANA Chisholm    "

## 2025-06-02 NOTE — PROGRESS NOTES
Date of Service: Jun 2, 2025    Chief Complaint: Post operative follow up.     Date of Surgery: 5/19/2025    Procedure Performed:   Left ulnar nerve decompression at the elbow with no anterior transposition  Left open carpal tunnel release  Left thumb A1 pulley release  Left first extensor compartment release     Surgeon: Dr. Romel Roberts    Interval events: Anh Flores is a 30 year old female who presents today for a postoperative follow up.  Patient was seen in the emergency department 4 days postop for pain, splint was readjusted.  She was seen again for nurse only visit on 5/28 where a new splint was applied.  Since then she has been doing ok.  Since recovering, her right hand has been doing more and she has had worsening symptoms.  She has not noticed a significant improvement in her nerve symptoms quite yet.  She would like to schedule the same procedures on the right hand.    The past medical history was reviewed updated in the EMR. This includes medications, surgeries, social history, and review of systems.    Physical examination:  Well-developed, well-nourished and in no acute distress.  Alert and oriented to surroundings.  On examination of the  left upper extremity, incisions to the thumb, palm, wrist, and elbow are well-healed.  Sutures in place to the hand and wrist incisions. There is no erythema, drainage, or dehiscence. Swelling is Mild. Sensation is intact in median, radial and ulnar nerve distributions. Patient can actively flex and extend all digits and thumb. The patient is able to make a full composite fist. Fingers are warm and well-perfused.     On evaluation of the right upper extremity a palpable tender nodule to the A1 pulley of the thumb with palpable clicking, no locking on exam today.  Tenderness to palpation to the first dorsal compartment, positive Finkelstein's.    Assessment: 30 year old female s/p left cubital tunnel release, carpal tunnel release, trigger thumb release,  first dorsal compartment release, progressing appropriately.     Plan:    - Patient can shower and get the incisions wet.  No submerging in any standing water until the wounds are fully healed.  - She was offered an off-the-shelf removable thumb spica wrist brace.  She can wear this as needed for comfort, she should start to wean out of this as tolerated.  - Counseled on finger, elbow, wrist range of motion.  - No heavy lifting until the wounds are fully healed.  - Patient's right de Quervain's is new since last evaluation.  She states this has started to worsen as she has been recovering from her left hand surgery.  We did discuss treatment options including trialing of a corticosteroid injection versus releasing at the time of surgery.  Patient declines steroid injection and would like to proceed with surgery.    Patient wound like to schedule the right hand with a trigger thumb release, carpal tunnel release, and cubital tunnel release, as well as a first dorsal compartment release.  Case request will be placed.    BRITTANIE SLAUGHTER PA-C  Orthopaedic Surgery

## 2025-06-02 NOTE — NURSING NOTE
Reason For Visit:   Chief Complaint   Patient presents with    Surgical Followup     Post op Left trigger thumb release - Left   Left open carpal tunnel release - Left   Left first dorsal compartment release - Left   Left cubital tunnel release - Left   DOS: 5/19/25           Primary MD: Maria Guadalupe Triana  Ref. MD: Mayte    Age: 30 year old    ?  No      LMP 04/19/2025 (Exact Date)       Pain Assessment  Patient Currently in Pain: Yes  Primary Pain Location: Elbow (Left elbow/hand)  Pain Descriptors: Sore, Intermittent    Hand Dominance Evaluation  Hand Dominance: Right          QuickDASH Assessment      3/10/2025    10:46 AM   QuickDASH Main   1. Open a tight or new jar Unable   2. Do heavy household chores (e.g., wash walls, floors) Moderate difficulty   3. Carry a shopping bag or briefcase Moderate difficulty   4. Wash your back No difficulty   5. Use a knife to cut food Moderate difficulty   6. Recreational activities in which you take some force or impact through your arm, shoulder or hand (e.g., golf, hammering, tennis, etc.) Unable to answer   7. During the past week, to what extent has your arm, shoulder or hand problem interfered with your normal social activities with family, friends, neighbours or groups Unable to answer   8. During the past week, were you limited in your work or other regular daily activities as a result of your arm, shoulder or hand problem Very limited   9. Arm, shoulder or hand pain Moderate   10.Tingling (pins and needles) in your arm,shoulder or hand Severe   11. During the past week, how much difficulty have you had sleeping because of the pain in your arm, shoulder or hand Moderate difficulty   Quickdash Ability Score 40.91          Current Outpatient Medications   Medication Sig Dispense Refill    AJOVY 225 MG/1.5ML SOAJ Inject 225 mg subcutaneously every 30 days. Last dose 4/26      alcohol swab prep pads Use to swab area of injection/anthony as directed. 100 each 3     Atogepant (QULIPTA) 60 MG TABS Take 60 mg by mouth as needed.      blood glucose (NO BRAND SPECIFIED) test strip Use to test blood sugar three times daily or as directed. Preferred blood glucose meter OR supplies to accompany: Blood Glucose Monitor Brands: per insurance. 100 strip 6    blood glucose monitoring (NO BRAND SPECIFIED) meter device kit Use to test blood sugar three times daily or as directed. Preferred blood glucose meter OR supplies to accompany: Blood Glucose Monitor Brands: per insurance. 1 kit 0    cephALEXin (KEFLEX) 500 MG capsule Take 1 capsule (500 mg) by mouth 4 times daily. (Patient not taking: Reported on 6/2/2025) 12 capsule 0    colestipol (COLESTID) 1 g tablet Take 1 tablet (1 g) by mouth 2 times daily. (Patient not taking: Reported on 6/2/2025) 60 tablet 2    Continuous Glucose Sensor (FREESTYLE AYSE 3 PLUS SENSOR) MISC Use 1 sensor every 15 days. Use to read blood sugars per 's instructions. 6 each 3    Continuous Glucose Sensor (FREESTYLE AYSE 3 SENSOR) MISC 1 each by Other route every 14 days. 6 each 3    cyclobenzaprine (FLEXERIL) 5 MG tablet Take 1 tablet (5 mg) by mouth 3 times daily as needed for muscle spasms. 30 tablet 0    docusate sodium (COLACE) 100 MG capsule Take 1 capsule (100 mg) by mouth 2 times daily. (Patient not taking: Reported on 6/2/2025) 12 capsule 0    gabapentin (NEURONTIN) 300 MG capsule Take 1 capsule (300 mg) by mouth 3 times daily. 90 capsule 0    hydrOXYzine HCl (ATARAX) 25 MG tablet Take at bedtime as needed for itching. 30 tablet 4    insulin pen needle (ULTICARE MICRO) 32G X 4 MM miscellaneous Use 1 pen needles daily or as directed. 100 each 3    metFORMIN (GLUCOPHAGE XR) 500 MG 24 hr tablet Take 4 tablets (2,000 mg) by mouth daily (with dinner). 360 tablet 3    OnabotulinumtoxinA (BOTOX IJ) Inject as directed every 3 months. Last dose 4/23/25      ondansetron (ZOFRAN ODT) 4 MG ODT tab DISSOLVE ONE TABLET ON TONGUE EVERY 8 HOURS AS NEEDED FOR  NAUSEA 30 tablet 1    ondansetron (ZOFRAN) 4 MG tablet Take 1 tablet (4 mg) by mouth every 6 hours as needed for nausea. (Patient not taking: Reported on 6/2/2025) 12 tablet 0    oxyCODONE (ROXICODONE) 5 MG tablet Take 1-2 tablets (5-10 mg) by mouth every 6 hours as needed for moderate to severe pain. 16 tablet 0    rimegepant (NURTEC) 75 MG ODT tablet Place 75 mg under the tongue daily as needed for migraine.      rizatriptan (MAXALT) 10 MG tablet Take 10 mg by mouth at onset of headache.      Semaglutide, 1 MG/DOSE, (OZEMPIC) 4 MG/3ML pen Inject 1 mg subcutaneously every 7 days.      spironolactone (ALDACTONE) 50 MG tablet Take 1 tablet (50 mg) by mouth daily. 30 tablet 4    study - chlorhexidine, IDS# 6242, 4% soap Rinse the body with water. Apply soap from the neck to the toes and avoid contact with the eyes, ears, nose, mouth, and genitals. Leave for 1 minute then rinse body again. Use once the night before surgery and once the morning of surgery. 118 mL 0    study - povidone idoine, IDS# 6242, 5% nasal solution Clean nostrils using tissue. Open the cap, dip one swab into the bottle and stir the solution. Withdraw the swab slowly. Insert the swab into the nostril and rotate 360 degrees covering all surfaces. Repeat in the other nostril using the new swab. Repeat the application in both nostrils using a fresh swab each time. Do not blow the nose. 4 mL 0    SUMAtriptan (IMITREX) 50 MG tablet Take 2 tablets (100 mg) by mouth at onset of headache for migraine. May repeat in 2 hours. Max 4 tablets/24 hours.      thin (NO BRAND SPECIFIED) lancets Use with lanceting device. To accompany: Blood Glucose Monitor Brands: per insurance. 100 each 6    topiramate (TOPAMAX) 25 MG tablet Take 25 mg by mouth 2 times daily.      tranexamic acid (LYSTEDA) 650 MG tablet Take 2 tablets (1,300 mg) by mouth 2 times daily as needed (menorrhagia). 30 tablet 1    UBRELVY 100 MG tablet Take 100 mg by mouth at onset of headache.          Allergies   Allergen Reactions    Adhesive Tape Itching    Emgality [Galcanezumab-Gnlm] Itching     Itchiness, bumps    Other Environmental Allergy     Victoza [Liraglutide] Headache, Hives and Itching       Gwendolyn Ballesteros, ATC

## 2025-06-02 NOTE — LETTER
"6/2/2025       RE: Anh Flores  [address] [facility] [address]     Dear Colleague,    Thank you for referring your patient, Anh Flores, to the [facility] PHYSICAL MEDICINE AND REHABILITATION CLINIC [place] at [facility]. Please see a copy of my visit note below.    PM&R Follow-Up Visit -       Date of Initial Visit: 2/17/2025  LOV: 4/25/2025  TD: 6/2/2025     Recall: Anh Flores is a 30 year old female who presents with a chief complaint of chronic pain.     INTERVAL HISTORY:  Patient was last seen in clinic 4/25/2025. At that visit, the plan was to resume physical therapy, continue with plan for EMG, trial cyclobenzaprine, and follow-up in June.      She was seen today in the clinic for follow up.     Since last time she was seen, she completed an EMG 5/2/2025 with finding \"Needle EMG abnormalities are too limited to be diagnostic, but the most likely explanation for the positive sharp waves / fibrillations present at the left medial gastrocnemius is active left S1 radiculopathy.\"      Of note since last time she underwent LUE surgery 5/19/2025 with Dr Roberts, which included  Left ulnar nerve decompression at the elbow with no anterior transposition  Left open carpal tunnel release  Left thumb A1 pulley release  Left first extensor compartment release   She was seen in the ED 5/23/2025 regarding postoperative pain and the splint was readjusted.  She has a postop follow-up in the orthopedic hand surgery clinic later today.  She mentioned that she is also interested in possibly pursuing surgical procedures for the right hand/wrist pain/paresthesias in the near future.    Previously, she has described some neck, thoracic, and bilateral low back pain with unclear distribution of radiating pain and paresthesias into her arms and legs.     Today, she has some difficulty differentiating location of pain and notes varying locations of pain.  " "However, she says the low back pain is the most persistent and bothersome for her.  She feels the effect of the sacrococcygeal ligament/coccyx steroid injection 4/9/2025 is \"wearing off\".  The low back pain has been especially bothersome since she has been sitting more than usual after her recent left upper extremity surgery.  She describes midline sacral pain which radiates to the left gluteus and lateral hip.  She tried using a pillow to offload her tailbone, but it made the pain worse.  She feels that this area of pain can be traced to multiple falls over the years, including a fall in a pool deck, fall taking out the trash, and a fall out of a van.    She has been using oxycodone for postoperative pain.  She continues to use gabapentin.  She tried cyclobenzaprine.  2.5 mg of cyclobenzaprine was ineffective, but 5 mg of cyclobenzaprine was sedating.  She said that she had an episode of taking gabapentin and cyclobenzaprine together while traveling recently and felt that the combination was very sedating.  She is no longer taking cyclobenzaprine for that reason.            RECALL HISTORY OF PRESENT ILLNESS: 2/17/2025   Anh Flores is a 30 year old right-hand-dominant female who presents with a chief complaint of chronic pain.      She was seen today in the clinic. She describes chronic years long pain affecting her whole back.  She also reports \"tailbone pain\" which started 3 years ago after a slip and fall.    She follows with the [facility] regarding migraines.  She did physical therapy for migraines.  She said the therapist also tried to help her with back pain.  However, the PT exercises made the back pain worse and so she stopped doing PT.      Today, she describes:  -neck, thoracic, and bilateral low back pain  -Intermittent \"shooting\" pain to both arms and legs in a nondermatomal distribution  -Intermittent episodes of her mid to low back feeling \"numb\" or itchy\"  -Subjective " "cold sensation of bilateral hands up to elbows & bilateral feet to the knees  -Numbness/tingling affecting all 10 fingers    Aggravating factors include: Lying down, prolonged sitting, riding in a car for 20 minutes or more, traveling sitting in an airplane seat, sitting on the toilet can bring on numbness in both legs  Relieving factors include: Standing, exercise, medication      Today, she rates the pain 8/10.    She endorses  problems.  She is currently seeing OT regarding carpal tunnel.  She endorses tennis elbow.    She says her arms and legs can feel \"heavy\" at times.    She has a history of chronic diarrhea.  She sees gastroenterology in this regard.  She endorses urinary and bowel urgency but no overt loss of bowel or bladder control. She denies saddle anesthesia.    She reports low-grade temperatures of 102 degrees 2-3 days a week with off-and-on fevers and chills.    She states that in 2023 she lost 75 pounds.  She thought this occurred prior to starting Ozempic.      3/11/2025:  Patient was last seen in clinic 2/17/2025. At that visit, the plan was to obtain X rays of the spine and sacrum/coccyx. As she was unable to participate with PT due to pain, MRIs of the cervical, thoracic, and lumbar spinal were also added.    She was seen today in the clinic for follow-up and review of the imaging.      At the last visit she described:  -neck, thoracic, and bilateral low back pain  -Intermittent \"shooting\" pain to both arms and legs in a nondermatomal distribution  -Intermittent episodes of her mid to low back feeling \"numb\" or itchy\"  -Subjective cold sensation of bilateral hands up to elbows & bilateral feet to the knees  -Numbness/tingling affecting all 10 fingers  -Chronic migraines, follows with [facility]   -Carpal tunnel, current OT    Today, she reports ongoing pain and paresthesias as described above.    She continues to have neck pain and migraines.  She has appointment with " the [facility] regarding her migraine regimen tomorrow.  She has been using Ajovy, but has not found it helpful.  She continues to have daily migraines.  She says she had to go to urgent care due to uncontrolled migraine pain and received a Toradol injection.  She also recently prior changed her birth control medication in an effort to decrease migraines.  However, she says that her neck pain is somewhat reduced compared to last time.    She was not able to previously participate with PT due to aggravated/worsening pain and migraine.    Today, she states that her most bothersome area of pain is at the low back specifically at the tailbone area.  The tailbone pain started when she landed on her buttocks after a fall down carpeted stairs 3 years ago.  She has pain with riding in the car.  When she is sitting, she leans to 1 side to offload the area.  She also has tailbone pain with walking or laying supine.  Given the ongoing significant tailbone pain, she would like to pursue an interventional procedure for pain.    She does endorse ongoing thoracic level pain just below the bra line.  She states that this happened after she slipped and fell over a year ago and hit her back at this same area.    Since last time, she has also been following with orthopedics regarding:  -Bilateral carpal tunnel syndrome, left worse than right.  -Bilateral ulnar neuropathy, L>R  -Bilateral Trigger thumbs.   - Bilateral Tennis Elbow  -Left DeQuervain Tenosynovitis.  She is planning for upcoming left trigger thumb release, left open carpal tunnel release, left first dorsal compartment release, & left cubital tunnel release with possible ulnar nerve transposition with Dr. Romel Roberts.         4/25/2025:  Patient was last seen in clinic 4/9/2025 for coccyx joint and ligament steroid injection with Dr. Fitzpatrick.     She is seen today in the clinic for follow-up after the procedure and reports 40-45% pain  relief.    She says that after the steroid injection she is now able to sit for 20 or 30 minutes whereas before she was only sitting for 10 to 15 minutes before having pain.  She also reports improved range of motion after the steroid injection.  She does have some upcoming travel planned.  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  She is concerned about prolonged sitting during the trip which tends to aggravate the pain.    She has an upcoming carpal tunnel surgery scheduled for May 19.  She would like to consider another steroid injection, possibly targeting the low back or hips, potentially in June.    She is possibly interested in increasing the dose or frequency of gabapentin to help with pain management.  Currently she is using 300 mg of gabapentin in the morning and 600 mg at bedtime.  She prefers to follow up with the provider who previously prescribed this rather than making any adjustments to the medication today.    She has an EMG of the bilateral lower extremities coming up 5/2/2025.          PRIOR INJURIES/TREATMENT:   Ice/Heat: uses a heated blanket  Brace: none  Physical Therapy:   Recent OT for bilateral carpal tunnel, ulnar neuropathy of both upper extremities, trigger thumb of both thumbs, lateral epicondylitis of both elbows  PT for neck pain in 2024, but stopped the program due to worsening back pain       - Current Pain Medications -   Hydroxyzine  Oxycodone-postop pain  Methocarbamol  Gabapentin 300 mg in the morning and 600 mg at bedtime  Sumatriptan PRN for migraine  Ubrelvy PRN for migraine  Topiramate for migraine  Ajovy injections for migraine        - Prior/Trialed Pain Medications -   Emgality  Zoloft for migraine, overly sedating  Meloxicam -per pt was discontinued by her PCP due to liver issues   Ibuprofen  Tylenol  Advil  Motrin  Cyclobenzaprine-ineffective at 2.5 mg, sedating at 5 mg (but was used in combination with  gabapentin)      Prior Procedures:  Date    Procedure   Improvement (%)  4/9/2025  coccyx joint and ligament steroid injection 40-45%  Jan 2025  bilateral trigger thumb injections (ortho)            Prior Related Surgery:   5/19/2025 - left trigger thumb release, left open carpal tunnel release, left first dorsal compartment release, left cubital tunnel release with Dr Roberts         Other (acupuncture, OMT, CMM, TENS, DME, etc.): none    Specialists Seen - (with most recent, available notes and clinic visits reviewed)   1. Sports medicine  2. Orthopedic hand surgery clinic  3. [facility] - migraine  4.  [facility] pain management clinic-2007, record not available for review today      IMAGING - reviewed   EMG 5/2/25  Interpretation:  Slightly abnormal study   --Needle EMG abnormalities are too limited to be diagnostic, but the most likely explanation for the positive sharp waves / fibrillations present at the left medial gastrocnemius is active left S1 radiculopathy.  --There is no electrodiagnostic evidence of diffuse peripheral neuropathy or focal mononeuropathy in the lower extremities.  --There is no electrodiagnostic evidence of right lower extremity radiculopathy.         Complete spine MR without contrast 3/10/2025  Findings:   Cervical spine:  The cervical vertebrae appear normally aligned.   There is no significant disc space narrowing at any level.  There is  no definite abnormal signal within the cervical spinal cord at any  level.  The findings on a level by level basis are as follows:  C2-3:  There is no focal abnormality.  C3-4:  There is no focal abnormality.  C4-5:  There is no focal abnormality.  C5-6:  There is no focal abnormality.  C6-7:  There is no focal abnormality.  C7-T1: There is no focal abnormality.     Thoracic spine: Counting is based on the localizer function utilizing  the  image. The tip of the conus medullaris is at approximately  L1.  There is  no definite abnormal signal in the thoracic spinal cord at  any level. Alignment of the thoracic vertebra appears within normal  limits.  At the level of T7-8 there is a right central disc extrusion  which minimally flattens the right ventral aspect of the cord without  myelopathic signal and contributes to mild spinal canal stenosis. At  T8-9 there is a moderate broad-based disc extrusion which minimally  narrows the spinal canal. The remainder of the thoracic spinal canal  is patent. The neural foramen are patent throughout.     Lumbar spine:  There are 5 lumbar-type vertebrae assumed for the  purposes of this dictation.  The tip of the conus medullaris is at  approximately L1.  The lumbar vertebral column appears normally  aligned.  There is no significant disc height narrowing at any level.  On a level by level basis:  L2-3: No significant spinal canal or foraminal narrowing.  L3-4: No significant spinal canal or foraminal narrowing.  L4-5: Central disc extrusion with annular fissure without spinal canal  stenosis or neural impingement. The neural foramen are patent.  L5-S1: Mild central disc protrusion without spinal canal stenosis or  neural impingement. The neural foramen are patent.                                                  Impression:    1. Normal cervical spine MRI.  2. Mild thoracic spondylosis at T7-8 and T8-9 with mild narrowing of  the spinal canal. No high-grade spinal canal stenosis or neural  foraminal narrowing throughout the thoracic spine.  3. Lumbar spondylosis at L4-5 and L5-S1 without spinal canal stenosis  or neural foraminal narrowing.       2 views sacrococcygeal radiograph(s) 3/10/2025   Findings:     AP and lateral views of the sacrum and coccyx were obtained. No acute  osseous abnormality.     Moderate relatively symmetric degenerative change of the hips, with  mild superior joint space narrowing and subchondral sclerosis. Bulky  osteophyte and enthesophyte formation at the  bilateral acetabula,  which is progressed when correlated with CT examination from 9/3/2024.  Unremarkable pubic symphysis.     Sacroiliac joints are unremarkable.     No abnormal angulation of the coccyx or sacrococcygeal junction.      Sacrum and innominate bones are partially obscured by overlying bowel  gas/fecal content.     Pelvic phlebolith.     Trace retrolisthesis of L5 over S1. The visualized lumbar spine is  otherwise unremarkable, noting the presence of a variant posterior  process at the S4 level.                                         Impression:  1. No acute osseous abnormality.  2. Moderate degenerative changes of bilateral femoral acetabular  joints. Significant anterior subhepatic changes.      Full body radiographs using EOS   XR SPINE COMPLETE SCOLIOSIS 2 VIEWS, 3/10/2025   Findings:     12 rib bearing vertebral bodies and 5 lumbar type vertebral bodies are  identified.     Coronal Deformity:     There is no substantial coronal curvature of the spine.      No substantial global coronal imbalance.     Sagittal Vertical Axis (A vertical line drawn from the center of C7  (julius line) to the posterosuperior aspect of the S1 on sagittal  plane):  less than 4 cm      Additional Findings:     Normal heart size. Lungs are relatively clear.     There is a nonobstructive bowel gas pattern. Cholecystectomy clips. No  substantial degenerative changes of the spine, shoulders, hips, knees  or ankles. No acute osseous abnormality.                                                     Impression:  1. No substantial coronal curvature of the spine.    2. No  global sagittal or coronal imbalance.         [facility] Ultrasound 3/7/2025:  Impression:   1. Mild enlargement of the left ulnar nerve within the cubital tunnel.  No substantial right ulnar nerve enlargement.  2. Partial ventral subluxation of the bilateral ulnar nerves at the  level of the cubital tunnel with flexion.      XR PELVIS G/E 3 VIEWS  LOCATION: [facility]  DATE: 12/30/2024     INDICATION:  Chronic bilateral low back pain without sciatica, Chronic bilateral low back pain without sciatica  COMPARISON: CT abdomen pelvis 9/3/2024       IMPRESSION: Mild degenerative arthrosis of both hips. No definite fracture. SI joint spaces are preserved. No discrete osseous erosions or periarticular sclerosis.     EMG 3/13/2024  Interpretation:  This is an abnormal EMG.  Findings are consistent with bilateral median neuropathies at the wrist.  The left side would be considered mild in severity and the right side would be considered moderate in severity.  Clinically this can correlate with carpal tunnel syndrome.     Review Of Systems:  I am responding to those symptoms which are directly relevant to the specific indication for my consultation. I recommend that the patient follow up with their primary or referring provider to pursue any other symptoms which may be of concern.       Medical History:  She  has a past medical history of CAH (congenital adrenal hyperplasia) (2/9/2010), Diabetes mellitus, type 2 (H) (10/19/2020), and Vitamin D deficiency (2/9/2010).     She  has a past surgical history that includes Cholecystectomy; Esophagoscopy, gastroscopy, duodenoscopy (EGD), combined (N/A, 9/16/2024); and Colonoscopy (N/A, 9/16/2024).    Family History  Her family history includes Neurologic Disorder (age of onset: 16) in her sister.     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 Performs ADLs/IADLs independently.  She  reports that she has never smoked. She has never been exposed to tobacco smoke. She has never used smokeless tobacco. She reports that she does not drink alcohol and does not use drugs.        Current Medications:   She has a current medication list which includes the following prescription(s):  alcohol swab, qulipta, atorvastatin, blood glucose, blood glucose monitoring, freestyle jada 3 plus sensor, freestyle jada 3 sensor, fluticasone, gabapentin, gabapentin, ulticare micro, metformin, mometasone, norgestimate-ethinyl estradiol, ondansetron, pantoprazole, rimegepant, semaglutide, sumatriptan, thin, topiramate, and ubrelvy.     Allergies:    -- Adhesive Tape -- Itching   -- Emgality [Galcanezumab-Gnlm] -- Itching    --  Itchiness, bumps   -- Other Environmental Allergy    -- Victoza [Liraglutide] -- Headache, Hives and Itching    PHYSICAL EXAMINATION:  /84 (BP Location: Right arm, Patient Position: Sitting, Cuff Size: Adult Regular)   Pulse 80   LMP 04/19/2025 (Exact Date)   SpO2 99%    --CONSTITUTIONAL: Vital signs as above. No acute distress. The patient is well nourished and well groomed.  --PSYCHIATRIC: The patient is awake, alert, oriented to person, place, time and answering questions appropriately with clear speech. Appropriate mood and affect   --SKIN:  Posterior torso is clean, dry, intact without rashes.   --RESPIRATORY: Normal rhythm and effort. No abnormal accessory muscle breathing patterns noted.   --GROSS MOTOR: Easily arises from a seated position.   --STANDING EXAMINATION: Gait is non-antalgic. Normal lumbar lordosis noted, no lateral shift.  --UPPER EXTREMITY: LUE in splint / Ace bandage with sling. Left fingers normal coloration, appear well perfused  --LOWER EXTREMITY MOTOR TESTING:  Plantar flexion left 5/5, right 5/5   Dorsiflexion left 5/5, right 5/5   Great toe MTP extension left 5/5, right 5/5  Knee flexion left 5/5, right 5/5  Knee extension left 5/5, right 5/5   Hip flexion left 5/5, right 5/5  --MUSCULOSKELETAL: Lumbar spine inspection reveals no evidence of deformity. Range of motion is limited in lumbar flexion, extension, lateral rotation but also notable limited by LUE dressing/sling. + tenderness to palpation lumbar spine. Straight leg raise is positive on the  "left and negative on R. Sciatic notch non-tender. Facet loading maneuvers are pos.  --SACROILIAC JOINT: limited ability to perform SI joint maneuvers due to LUE splint/sling  --HIPS: Slightly limited range of motion bilaterally, and elicits pain L>R. Pos FABERs on the left. No pain with log rolling. No pain with palpation of the greater trochanter.  --NEUROLOGIC: 2/4 patellar and achilles reflexes bilaterally.  Sensation to light touch is intact in the bilateral L4, L5, and S1 dermatomes. No clonus.    --VASCULAR:  Warm lower limbs bilaterally. There is no pitting edema of the bilateral lower extremities.        ASSESSMENT:  Anh Flores is a pleasant 30 year old female who has previously reported    -Chronic neck pain  -Chronic mid thoracic pain, since hitting her back with a fall 1 year ago  -chronic bilateral low back pain   -Chronic coccydynia since a fall 3 years ago  -Intermittent \"shooting\" pain to both arms and legs in a nondermatomal distribution  -Intermittent episodes of her mid to low back feeling \"numb\" or itchy\"  -Subjective cold sensation of bilateral hands up to elbows & bilateral feet to the knees  -Numbness/tingling affecting all 10 fingers      She is following with orthopedics regarding:  -Bilateral carpal tunnel syndrome, left worse than right.  -Bilateral ulnar neuropathy, L>R  -Bilateral Trigger thumbs.   - Bilateral Tennis Elbow  -Left DeQuervain Tenosynovitis  Recently underwent 5/19/2025 - left trigger thumb release, left open carpal tunnel release, left first dorsal compartment release, left cubital tunnel release with Dr Roberts.   Has a postop follow-up today 6/3/2025 and indicates she would like to discuss RUE surgical options as well     Today, the most bothersome source of pain for her is low back pain radiating more to the left side, though some variability in history over time. EMG reviewed in detail with Dr Fitzpatrick in clinic today, which appears nondiagnostic especially in " setting of lack of correlating findings on MRI.         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 Recommend healthy lifestyle modifications through diet and exercise.   # Chronic Migraines, follows with neurology  # chronic resp infections/ cryptosporidium infection          PLAN:  -MRI reviewed  -Discussed patient's presentation and procedure options with Dr Fitzpatrick in clinic today and would suggest caudal JHON   -EMG reviewed with the patient today  - If she continues to have persistent pain despite caudal JHON, could consider intra-articular hip steroid injections.  - Would encourage to resume PT once cleared by her surgeon  -Has a follow-up in the hand surgery clinic later today  -discontinue cyclobenzaprine (was ineffective at 2.5 mg and sedating at 5 mg)  - RTC for procedure        Ready to learn, no apparent learning barriers.  Education provided on treatment plan according to patient's preferred learning style.  Patient verbalizes understanding.   __________________________________  Edyta Corley NP  Physical Medicine & Rehabilitation        50 minutes spent by me on the date of the encounter doing chart review, history and exam, documentation and further activities per the note       Again, thank you for allowing me to participate in the care of your patient.      Sincerely,    NARCISA Cruz CNP

## 2025-06-02 NOTE — PROGRESS NOTES
"PM&R Follow-Up Visit -       Date of Initial Visit: 2/17/2025  LOV: 4/25/2025  TD: 6/2/2025     Recall: Anh Flores is a 30 year old female who presents with a chief complaint of chronic pain.     INTERVAL HISTORY:  Patient was last seen in clinic 4/25/2025. At that visit, the plan was to resume physical therapy, continue with plan for EMG, trial cyclobenzaprine, and follow-up in June.      She was seen today in the clinic for follow up.     Since last time she was seen, she completed an EMG 5/2/2025 with finding \"Needle EMG abnormalities are too limited to be diagnostic, but the most likely explanation for the positive sharp waves / fibrillations present at the left medial gastrocnemius is active left S1 radiculopathy.\"      Of note since last time she underwent LUE surgery 5/19/2025 with Dr Roberts, which included  Left ulnar nerve decompression at the elbow with no anterior transposition  Left open carpal tunnel release  Left thumb A1 pulley release  Left first extensor compartment release   She was seen in the ED 5/23/2025 regarding postoperative pain and the splint was readjusted.  She has a postop follow-up in the orthopedic hand surgery clinic later today.  She mentioned that she is also interested in possibly pursuing surgical procedures for the right hand/wrist pain/paresthesias in the near future.    Previously, she has described some neck, thoracic, and bilateral low back pain with unclear distribution of radiating pain and paresthesias into her arms and legs.     Today, she has some difficulty differentiating location of pain and notes varying locations of pain.  However, she says the low back pain is the most persistent and bothersome for her.  She feels the effect of the sacrococcygeal ligament/coccyx steroid injection 4/9/2025 is \"wearing off\".  The low back pain has been especially bothersome since she has been sitting more than usual after her recent left upper extremity surgery.  She " "describes midline sacral pain which radiates to the left gluteus and lateral hip.  She tried using a pillow to offload her tailbone, but it made the pain worse.  She feels that this area of pain can be traced to multiple falls over the years, including a fall in a pool deck, fall taking out the trash, and a fall out of a van.    She has been using oxycodone for postoperative pain.  She continues to use gabapentin.  She tried cyclobenzaprine.  2.5 mg of cyclobenzaprine was ineffective, but 5 mg of cyclobenzaprine was sedating.  She said that she had an episode of taking gabapentin and cyclobenzaprine together while traveling recently and felt that the combination was very sedating.  She is no longer taking cyclobenzaprine for that reason.            RECALL HISTORY OF PRESENT ILLNESS: 2/17/2025   Anh Flores is a 30 year old right-hand-dominant female who presents with a chief complaint of chronic pain.      She was seen today in the clinic. She describes chronic years long pain affecting her whole back.  She also reports \"tailbone pain\" which started 3 years ago after a slip and fall.    She follows with the [facility] regarding migraines.  She did physical therapy for migraines.  She said the therapist also tried to help her with back pain.  However, the PT exercises made the back pain worse and so she stopped doing PT.      Today, she describes:  -neck, thoracic, and bilateral low back pain  -Intermittent \"shooting\" pain to both arms and legs in a nondermatomal distribution  -Intermittent episodes of her mid to low back feeling \"numb\" or itchy\"  -Subjective cold sensation of bilateral hands up to elbows & bilateral feet to the knees  -Numbness/tingling affecting all 10 fingers    Aggravating factors include: Lying down, prolonged sitting, riding in a car for 20 minutes or more, traveling sitting in an airplane seat, sitting on the toilet can bring on numbness in both legs  Relieving " "factors include: Standing, exercise, medication      Today, she rates the pain 8/10.    She endorses  problems.  She is currently seeing OT regarding carpal tunnel.  She endorses tennis elbow.    She says her arms and legs can feel \"heavy\" at times.    She has a history of chronic diarrhea.  She sees gastroenterology in this regard.  She endorses urinary and bowel urgency but no overt loss of bowel or bladder control. She denies saddle anesthesia.    She reports low-grade temperatures of 102 degrees 2-3 days a week with off-and-on fevers and chills.    She states that in 2023 she lost 75 pounds.  She thought this occurred prior to starting Ozempic.      3/11/2025:  Patient was last seen in clinic 2/17/2025. At that visit, the plan was to obtain X rays of the spine and sacrum/coccyx. As she was unable to participate with PT due to pain, MRIs of the cervical, thoracic, and lumbar spinal were also added.    She was seen today in the clinic for follow-up and review of the imaging.      At the last visit she described:  -neck, thoracic, and bilateral low back pain  -Intermittent \"shooting\" pain to both arms and legs in a nondermatomal distribution  -Intermittent episodes of her mid to low back feeling \"numb\" or itchy\"  -Subjective cold sensation of bilateral hands up to elbows & bilateral feet to the knees  -Numbness/tingling affecting all 10 fingers  -Chronic migraines, follows with [facility]   -Carpal tunnel, current OT    Today, she reports ongoing pain and paresthesias as described above.    She continues to have neck pain and migraines.  She has appointment with the [facility] regarding her migraine regimen tomorrow.  She has been using Ajovy, but has not found it helpful.  She continues to have daily migraines.  She says she had to go to urgent care due to uncontrolled migraine pain and received a Toradol injection.  She also recently prior changed her birth " control medication in an effort to decrease migraines.  However, she says that her neck pain is somewhat reduced compared to last time.    She was not able to previously participate with PT due to aggravated/worsening pain and migraine.    Today, she states that her most bothersome area of pain is at the low back specifically at the tailbone area.  The tailbone pain started when she landed on her buttocks after a fall down carpeted stairs 3 years ago.  She has pain with riding in the car.  When she is sitting, she leans to 1 side to offload the area.  She also has tailbone pain with walking or laying supine.  Given the ongoing significant tailbone pain, she would like to pursue an interventional procedure for pain.    She does endorse ongoing thoracic level pain just below the bra line.  She states that this happened after she slipped and fell over a year ago and hit her back at this same area.    Since last time, she has also been following with orthopedics regarding:  -Bilateral carpal tunnel syndrome, left worse than right.  -Bilateral ulnar neuropathy, L>R  -Bilateral Trigger thumbs.   - Bilateral Tennis Elbow  -Left DeQuervain Tenosynovitis.  She is planning for upcoming left trigger thumb release, left open carpal tunnel release, left first dorsal compartment release, & left cubital tunnel release with possible ulnar nerve transposition with Dr. Romel Roberts.         4/25/2025:  Patient was last seen in clinic 4/9/2025 for coccyx joint and ligament steroid injection with Dr. Fitzpatrick.     She is seen today in the clinic for follow-up after the procedure and reports 40-45% pain relief.    She says that after the steroid injection she is now able to sit for 20 or 30 minutes whereas before she was only sitting for 10 to 15 minutes before having pain.  She also reports improved range of motion after the steroid injection.  She does have some upcoming travel planned.  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  She is concerned about prolonged sitting during the trip which tends to aggravate the pain.    She has an upcoming carpal tunnel surgery scheduled for May 19.  She would like to consider another steroid injection, possibly targeting the low back or hips, potentially in June.    She is possibly interested in increasing the dose or frequency of gabapentin to help with pain management.  Currently she is using 300 mg of gabapentin in the morning and 600 mg at bedtime.  She prefers to follow up with the provider who previously prescribed this rather than making any adjustments to the medication today.    She has an EMG of the bilateral lower extremities coming up 5/2/2025.          PRIOR INJURIES/TREATMENT:   Ice/Heat: uses a heated blanket  Brace: none  Physical Therapy:   Recent OT for bilateral carpal tunnel, ulnar neuropathy of both upper extremities, trigger thumb of both thumbs, lateral epicondylitis of both elbows  PT for neck pain in 2024, but stopped the program due to worsening back pain       - Current Pain Medications -   Hydroxyzine  Oxycodone-postop pain  Methocarbamol  Gabapentin 300 mg in the morning and 600 mg at bedtime  Sumatriptan PRN for migraine  Ubrelvy PRN for migraine  Topiramate for migraine  Ajovy injections for migraine        - Prior/Trialed Pain Medications -   Emgality  Zoloft for migraine, overly sedating  Meloxicam -per pt was discontinued by her PCP due to liver issues   Ibuprofen  Tylenol  Advil  Motrin  Cyclobenzaprine-ineffective at 2.5 mg, sedating at 5 mg (but was used in combination with gabapentin)      Prior Procedures:  Date    Procedure   Improvement (%)  4/9/2025  coccyx joint and ligament steroid injection 40-45%  Jan 2025  bilateral trigger thumb injections (ortho)            Prior Related Surgery:   5/19/2025 - left trigger thumb release, left open carpal tunnel release, left first dorsal compartment release, left cubital tunnel  release with Dr Roberts         Other (acupuncture, OMT, CMM, TENS, DME, etc.): none    Specialists Seen - (with most recent, available notes and clinic visits reviewed)   1. Sports medicine  2. Orthopedic hand surgery clinic  3. [facility] - migraine  4.  [facility] pain management clinic-2007, record not available for review today      IMAGING - reviewed   EMG 5/2/25  Interpretation:  Slightly abnormal study   --Needle EMG abnormalities are too limited to be diagnostic, but the most likely explanation for the positive sharp waves / fibrillations present at the left medial gastrocnemius is active left S1 radiculopathy.  --There is no electrodiagnostic evidence of diffuse peripheral neuropathy or focal mononeuropathy in the lower extremities.  --There is no electrodiagnostic evidence of right lower extremity radiculopathy.         Complete spine MR without contrast 3/10/2025  Findings:   Cervical spine:  The cervical vertebrae appear normally aligned.   There is no significant disc space narrowing at any level.  There is  no definite abnormal signal within the cervical spinal cord at any  level.  The findings on a level by level basis are as follows:  C2-3:  There is no focal abnormality.  C3-4:  There is no focal abnormality.  C4-5:  There is no focal abnormality.  C5-6:  There is no focal abnormality.  C6-7:  There is no focal abnormality.  C7-T1: There is no focal abnormality.     Thoracic spine: Counting is based on the localizer function utilizing  the  image. The tip of the conus medullaris is at approximately  L1.  There is no definite abnormal signal in the thoracic spinal cord at  any level. Alignment of the thoracic vertebra appears within normal  limits.  At the level of T7-8 there is a right central disc extrusion  which minimally flattens the right ventral aspect of the cord without  myelopathic signal and contributes to mild spinal canal stenosis. At  T8-9 there is  a moderate broad-based disc extrusion which minimally  narrows the spinal canal. The remainder of the thoracic spinal canal  is patent. The neural foramen are patent throughout.     Lumbar spine:  There are 5 lumbar-type vertebrae assumed for the  purposes of this dictation.  The tip of the conus medullaris is at  approximately L1.  The lumbar vertebral column appears normally  aligned.  There is no significant disc height narrowing at any level.  On a level by level basis:  L2-3: No significant spinal canal or foraminal narrowing.  L3-4: No significant spinal canal or foraminal narrowing.  L4-5: Central disc extrusion with annular fissure without spinal canal  stenosis or neural impingement. The neural foramen are patent.  L5-S1: Mild central disc protrusion without spinal canal stenosis or  neural impingement. The neural foramen are patent.                                                  Impression:    1. Normal cervical spine MRI.  2. Mild thoracic spondylosis at T7-8 and T8-9 with mild narrowing of  the spinal canal. No high-grade spinal canal stenosis or neural  foraminal narrowing throughout the thoracic spine.  3. Lumbar spondylosis at L4-5 and L5-S1 without spinal canal stenosis  or neural foraminal narrowing.       2 views sacrococcygeal radiograph(s) 3/10/2025   Findings:     AP and lateral views of the sacrum and coccyx were obtained. No acute  osseous abnormality.     Moderate relatively symmetric degenerative change of the hips, with  mild superior joint space narrowing and subchondral sclerosis. Bulky  osteophyte and enthesophyte formation at the bilateral acetabula,  which is progressed when correlated with CT examination from 9/3/2024.  Unremarkable pubic symphysis.     Sacroiliac joints are unremarkable.     No abnormal angulation of the coccyx or sacrococcygeal junction.      Sacrum and innominate bones are partially obscured by overlying bowel  gas/fecal content.     Pelvic phlebolith.     Trace  retrolisthesis of L5 over S1. The visualized lumbar spine is  otherwise unremarkable, noting the presence of a variant posterior  process at the S4 level.                                         Impression:  1. No acute osseous abnormality.  2. Moderate degenerative changes of bilateral femoral acetabular  joints. Significant anterior subhepatic changes.      Full body radiographs using EOS   XR SPINE COMPLETE SCOLIOSIS 2 VIEWS, 3/10/2025   Findings:     12 rib bearing vertebral bodies and 5 lumbar type vertebral bodies are  identified.     Coronal Deformity:     There is no substantial coronal curvature of the spine.      No substantial global coronal imbalance.     Sagittal Vertical Axis (A vertical line drawn from the center of C7  (julius line) to the posterosuperior aspect of the S1 on sagittal  plane):  less than 4 cm      Additional Findings:     Normal heart size. Lungs are relatively clear.     There is a nonobstructive bowel gas pattern. Cholecystectomy clips. No  substantial degenerative changes of the spine, shoulders, hips, knees  or ankles. No acute osseous abnormality.                                                     Impression:  1. No substantial coronal curvature of the spine.    2. No  global sagittal or coronal imbalance.         [facility] Ultrasound 3/7/2025:  Impression:   1. Mild enlargement of the left ulnar nerve within the cubital tunnel.  No substantial right ulnar nerve enlargement.  2. Partial ventral subluxation of the bilateral ulnar nerves at the  level of the cubital tunnel with flexion.      XR PELVIS G/E 3 VIEWS  LOCATION: [facility]  DATE: 12/30/2024     INDICATION:  Chronic bilateral low back pain without sciatica, Chronic bilateral low back pain without sciatica  COMPARISON: CT abdomen pelvis 9/3/2024       IMPRESSION: Mild degenerative arthrosis of both hips. No definite fracture. SI joint spaces are preserved. No discrete osseous  erosions or periarticular sclerosis.     EMG 3/13/2024  Interpretation:  This is an abnormal EMG.  Findings are consistent with bilateral median neuropathies at the wrist.  The left side would be considered mild in severity and the right side would be considered moderate in severity.  Clinically this can correlate with carpal tunnel syndrome.     Review Of Systems:  I am responding to those symptoms which are directly relevant to the specific indication for my consultation. I recommend that the patient follow up with their primary or referring provider to pursue any other symptoms which may be of concern.       Medical History:  She  has a past medical history of CAH (congenital adrenal hyperplasia) (2/9/2010), Diabetes mellitus, type 2 (H) (10/19/2020), and Vitamin D deficiency (2/9/2010).     She  has a past surgical history that includes Cholecystectomy; Esophagoscopy, gastroscopy, duodenoscopy (EGD), combined (N/A, 9/16/2024); and Colonoscopy (N/A, 9/16/2024).    Family History  Her family history includes Neurologic Disorder (age of onset: 16) in her sister.     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 Performs ADLs/IADLs independently.  She  reports that she has never smoked. She has never been exposed to tobacco smoke. She has never used smokeless tobacco. She reports that she does not drink alcohol and does not use drugs.        Current Medications:   She has a current medication list which includes the following prescription(s): alcohol swab, qulipta, atorvastatin, blood glucose, blood glucose monitoring, freestyle jada 3 plus sensor, freestyle jada 3 sensor, fluticasone, gabapentin, gabapentin, ulticare micro, metformin, mometasone, norgestimate-ethinyl estradiol, ondansetron, pantoprazole, rimegepant, semaglutide, sumatriptan, thin, topiramate, and ubrelvy.     Allergies:    --  Adhesive Tape -- Itching   -- Emgality [Galcanezumab-Gnlm] -- Itching    --  Itchiness, bumps   -- Other Environmental Allergy    -- Victoza [Liraglutide] -- Headache, Hives and Itching    PHYSICAL EXAMINATION:  /84 (BP Location: Right arm, Patient Position: Sitting, Cuff Size: Adult Regular)   Pulse 80   LMP 04/19/2025 (Exact Date)   SpO2 99%    --CONSTITUTIONAL: Vital signs as above. No acute distress. The patient is well nourished and well groomed.  --PSYCHIATRIC: The patient is awake, alert, oriented to person, place, time and answering questions appropriately with clear speech. Appropriate mood and affect   --SKIN:  Posterior torso is clean, dry, intact without rashes.   --RESPIRATORY: Normal rhythm and effort. No abnormal accessory muscle breathing patterns noted.   --GROSS MOTOR: Easily arises from a seated position.   --STANDING EXAMINATION: Gait is non-antalgic. Normal lumbar lordosis noted, no lateral shift.  --UPPER EXTREMITY: LUE in splint / Ace bandage with sling. Left fingers normal coloration, appear well perfused  --LOWER EXTREMITY MOTOR TESTING:  Plantar flexion left 5/5, right 5/5   Dorsiflexion left 5/5, right 5/5   Great toe MTP extension left 5/5, right 5/5  Knee flexion left 5/5, right 5/5  Knee extension left 5/5, right 5/5   Hip flexion left 5/5, right 5/5  --MUSCULOSKELETAL: Lumbar spine inspection reveals no evidence of deformity. Range of motion is limited in lumbar flexion, extension, lateral rotation but also notable limited by LUE dressing/sling. + tenderness to palpation lumbar spine. Straight leg raise is positive on the left and negative on R. Sciatic notch non-tender. Facet loading maneuvers are pos.  --SACROILIAC JOINT: limited ability to perform SI joint maneuvers due to LUE splint/sling  --HIPS: Slightly limited range of motion bilaterally, and elicits pain L>R. Pos FABERs on the left. No pain with log rolling. No pain with palpation of the greater  "trochanter.  --NEUROLOGIC: 2/4 patellar and achilles reflexes bilaterally.  Sensation to light touch is intact in the bilateral L4, L5, and S1 dermatomes. No clonus.    --VASCULAR:  Warm lower limbs bilaterally. There is no pitting edema of the bilateral lower extremities.        ASSESSMENT:  Anh Flores is a pleasant 30 year old female who has previously reported    -Chronic neck pain  -Chronic mid thoracic pain, since hitting her back with a fall 1 year ago  -chronic bilateral low back pain   -Chronic coccydynia since a fall 3 years ago  -Intermittent \"shooting\" pain to both arms and legs in a nondermatomal distribution  -Intermittent episodes of her mid to low back feeling \"numb\" or itchy\"  -Subjective cold sensation of bilateral hands up to elbows & bilateral feet to the knees  -Numbness/tingling affecting all 10 fingers      She is following with orthopedics regarding:  -Bilateral carpal tunnel syndrome, left worse than right.  -Bilateral ulnar neuropathy, L>R  -Bilateral Trigger thumbs.   - Bilateral Tennis Elbow  -Left DeQuervain Tenosynovitis  Recently underwent 5/19/2025 - left trigger thumb release, left open carpal tunnel release, left first dorsal compartment release, left cubital tunnel release with Dr Roberts.   Has a postop follow-up today 6/3/2025 and indicates she would like to discuss RUE surgical options as well     Today, the most bothersome source of pain for her is low back pain radiating more to the left side, though some variability in history over time. EMG reviewed in detail with Dr Fitzpatrick in clinic today, which appears nondiagnostic especially in setting of lack of correlating findings on MRI.         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 Recommend healthy lifestyle modifications through diet and exercise.   # Chronic Migraines, follows with neurology  # chronic " resp infections/ cryptosporidium infection          PLAN:  -MRI & EMG reviewed with the patient today.  She is interested in other interventional procedures for pain.  I let her know that I would like to discuss her presentation and procedure options with Dr. Fitzpatrick in light of the EMG  - Would encourage to resume PT once cleared by her surgeon  -Has a follow-up in the hand surgery clinic later today  -discontinue cyclobenzaprine (was ineffective at 2.5 mg and sedating at 5 mg)  - RTC for procedure      ADDENDUM:  -spoke with Anh by phone, 6/3/2025 and let her know that I discussed her symptoms, diagnostic testing results, and procedure options with Dr Fitzpatrick in clinic today.  Would suggest caudal JHON.  She was in agreement and the case request was added.  Will plan to follow-up in the clinic 2 weeks after the epidural steroid injection. If she continues to have persistent pain despite caudal JHON, could consider intra-articular hip steroid injections.        Ready to learn, no apparent learning barriers.  Education provided on treatment plan according to patient's preferred learning style.  Patient verbalizes understanding.   __________________________________  Edyta Corley NP  Physical Medicine & Rehabilitation        50 minutes spent by me on the date of the encounter doing chart review, history and exam, documentation and further activities per the note

## 2025-06-03 ENCOUNTER — DOCUMENTATION ONLY (OUTPATIENT)
Dept: LAB | Facility: CLINIC | Age: 30
End: 2025-06-03
Payer: COMMERCIAL

## 2025-06-03 ENCOUNTER — TELEPHONE (OUTPATIENT)
Dept: ENDOCRINOLOGY | Facility: CLINIC | Age: 30
End: 2025-06-03
Payer: COMMERCIAL

## 2025-06-03 ENCOUNTER — VIRTUAL VISIT (OUTPATIENT)
Dept: PSYCHOLOGY | Facility: CLINIC | Age: 30
End: 2025-06-03
Payer: COMMERCIAL

## 2025-06-03 DIAGNOSIS — F32.1 CURRENT MODERATE EPISODE OF MAJOR DEPRESSIVE DISORDER, UNSPECIFIED WHETHER RECURRENT (H): Primary | ICD-10-CM

## 2025-06-03 DIAGNOSIS — E11.65 TYPE 2 DIABETES MELLITUS WITH HYPERGLYCEMIA, WITHOUT LONG-TERM CURRENT USE OF INSULIN (H): Primary | ICD-10-CM

## 2025-06-03 DIAGNOSIS — K76.0 FATTY LIVER: Primary | ICD-10-CM

## 2025-06-03 DIAGNOSIS — R79.89 ELEVATED FERRITIN: ICD-10-CM

## 2025-06-03 RX ORDER — PANTOPRAZOLE SODIUM 40 MG/1
40 TABLET, DELAYED RELEASE ORAL DAILY
COMMUNITY
End: 2025-06-11

## 2025-06-03 RX ORDER — ATORVASTATIN CALCIUM 10 MG/1
10 TABLET, FILM COATED ORAL DAILY
COMMUNITY

## 2025-06-03 RX ORDER — CYCLOBENZAPRINE HCL 5 MG
TABLET ORAL 3 TIMES DAILY PRN
COMMUNITY
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
## 2025-06-03 NOTE — TELEPHONE ENCOUNTER
[facility] Call Center    Phone Message    May a detailed message be left on voicemail: yes     Reason for Call: Medication Question or concern regarding medication   Prescription Clarification  Name of Medication: (OZEMPIC) 1 MG  Prescribing Provider: Alejandra Cronin MD       Pharmacy:   [place], [place] - [address] [place] [address] [phone]      What on the order needs clarification?   .  The patient said this medication needs a new Prior Authorization for this medication, please review and follow up thank you.      Action Taken: Message routed to:  [facility] ([facility]  Endo    Travel Screening: Not Applicable     Date of Service:

## 2025-06-03 NOTE — TELEPHONE ENCOUNTER
PA NOT NEEDED, TEST CLAIM PAYS    Prior Authorization Not Needed per Insurance    Medication: OZEMPIC (1 MG/DOSE) 4 MG/3ML SC SOPN  Insurance Company: [employer] - Phone [phone] Fax [phone]  Expected CoPay: $    Pharmacy Filling the Rx:    Pharmacy Notified: no  Patient Notified: no

## 2025-06-03 NOTE — PROGRESS NOTES
Anhbabar Flores has an upcoming lab appointment:    Future Appointments   Date Time Provider Department Center   6/3/2025 10:00 AM Nany Mitchell, PhD [facility]   6/9/2025  7:00 AM Maria Guadalupe Triana MD [facility]   6/9/2025  7:45 AM Yanely Hansen PA-C [facility]   6/9/2025  9:15 AM [facility]   6/10/2025  9:30 AM [facility]   6/11/2025  3:00 PM Cheyenne Overton MD [facility]   6/16/2025  4:00 PM Taylor Delong, OTR [facility]   6/27/2025  2:00 PM Taylor Delong, KATIANAR [facility]   7/2/2025  9:00 AM Canelo Herrera MD [facility]   7/29/2025  9:40 AM Bob Reyes MD [facility]   8/25/2025  8:30 AM Jessie Coulter RD [facility]   9/9/2025  1:30 PM Alejandra Cronin MD [facility]     Patient is scheduled for the following lab(s):     Labs for Dr. Overton       There is no order available. Please review and place future orders as appropriate.    Thank you,  Hannah Smith

## 2025-06-03 NOTE — TELEPHONE ENCOUNTER
Please let patient know.      Misty Easton RN  Endocrine Care Coordinator  [facility]

## 2025-06-03 NOTE — TELEPHONE ENCOUNTER
06/03/25 Writer called and spoke to pt per RN note. Pt stated she needs a new Refill per Pharmacy, Pt has enough for this week but needs the 1MG Ozempic for  next week.     Please advise.    Carli Hansen -General Care Navigator      Brandy Easton, RN      6/3/25  3:28 PM  Note  Please let patient know.        Misty Easton, RN  Endocrine Care Coordinator  [facility]

## 2025-06-04 NOTE — TELEPHONE ENCOUNTER
06/01/25 Writer called pt. Per provider's note:    Carli Hansen -General Care Navigator      Disp Refills Start End ROSA   Semaglutide, 1 MG/DOSE, (OZEMPIC) 4 MG/3ML pen 6 mL 1 6/4/2025 -- No   Sig - Route: Inject 1 mg subcutaneously every 7 days. - Subcutaneous   Sent to pharmacy as: Semaglutide (1 MG/DOSE) 4 MG/3ML Subcutaneous Solution Pen-injector (OZEMPIC)   Class: E-Prescribe   Order: [id number]   E-Prescribing Status: Receipt confirmed by pharmacy (6/4/2025 11:27 AM CDT)   Pharmacy    [facility] [place], [place] - [address] [place] [address] [phone]

## 2025-06-04 NOTE — TELEPHONE ENCOUNTER
"Ozempic currently entered as \"patient reported\" on medication list. Per Dr. Cronin's last progress note from 3/11/2025:  \"Class III obesity, associated with severe insulin resistance.   Patient has had positive response to semaglutide with 50 lbs weight loss since 03/2024 and reduction of HgbA1c to 5.6%. She tolerated medication well with no reported side effects.  Recommended to increase semaglutide from 0.5 mg weekly to 1 mg weekly.  Plan:  -Increase semaglutide from 0.5 mg weekly to 1 mg weekly\"          Dr. Cronin is out of the office until end of June, will send request to covering provider.      Misty Easton RN  Endocrine Care Coordinator  [facility]    "

## 2025-06-05 ENCOUNTER — TELEPHONE (OUTPATIENT)
Dept: PHYSICAL MEDICINE AND REHAB | Facility: CLINIC | Age: 30
End: 2025-06-05
Payer: COMMERCIAL

## 2025-06-05 ENCOUNTER — TELEPHONE (OUTPATIENT)
Dept: DERMATOLOGY | Facility: CLINIC | Age: 30
End: 2025-06-05
Payer: COMMERCIAL

## 2025-06-05 PROBLEM — M54.16 LUMBAR RADICULOPATHY: Status: ACTIVE | Noted: 2025-06-02

## 2025-06-05 PROBLEM — M54.50 CHRONIC BILATERAL LOW BACK PAIN, UNSPECIFIED WHETHER SCIATICA PRESENT: Status: ACTIVE | Noted: 2025-06-02

## 2025-06-05 PROBLEM — G89.29 CHRONIC BILATERAL LOW BACK PAIN, UNSPECIFIED WHETHER SCIATICA PRESENT: Status: ACTIVE | Noted: 2025-06-02

## 2025-06-05 NOTE — TELEPHONE ENCOUNTER
Called patient to schedule procedure with Dr. Fitzpatrick    Date of Procedure: 7/9/25    Arrival time given: Yes: Arrival Time 7:30am      Procedure Location: [facility]     Verified Location with Patient:  Yes  Address provided to the patient     Pre-op H&P Required:  No: Local anesthesia       Post-Op/Follow Up Appt:  Not Indicated in Request      Informed patient they will need a  to drive them home:  Yes    Patients : Spouse     Patient is aware that pre-op RN from the procedure center will call 2-3 days prior to scheduled procedure to confirm arrival time and review any instructions:  Yes       Additional Comments: N/A        Shala Luque MA on 6/5/2025 at 11:57 AM      P: [phone]

## 2025-06-05 NOTE — TELEPHONE ENCOUNTER
Patient confirmed scheduled appointment:  Date: 3/9/26  Time: 8:45 AM  Visit type: Return Dermatology  Provider: Vasyl  Location: [facility]  Testing/imaging: na  Additional notes: na

## 2025-06-06 ENCOUNTER — HOSPITAL ENCOUNTER (OUTPATIENT)
Facility: CLINIC | Age: 30
Discharge: HOME | End: 2025-06-06
Attending: OBSTETRICS & GYNECOLOGY | Admitting: OBSTETRICS & GYNECOLOGY
Payer: COMMERCIAL

## 2025-06-06 VITALS
SYSTOLIC BLOOD PRESSURE: 120 MMHG | HEART RATE: 80 BPM | RESPIRATION RATE: 18 BRPM | TEMPERATURE: 96.8 F | OXYGEN SATURATION: 98 % | DIASTOLIC BLOOD PRESSURE: 81 MMHG | WEIGHT: 246.69 LBS | BODY MASS INDEX: 48.18 KG/M2

## 2025-06-06 DIAGNOSIS — E25.0 CAH 21OH (CONGENITAL ADRENAL HYPERPLASIA DUE TO 21-HYDROXYLASE DEFICIENCY), SIMPLE VIRILIZING: ICD-10-CM

## 2025-06-06 LAB
GLUCOSE BLDC GLUCOMTR-MCNC: 80 MG/DL (ref 70–99)
GLUCOSE BLDC GLUCOMTR-MCNC: 80 MG/DL (ref 70–99)
GLUCOSE BLDC GLUCOMTR-MCNC: 84 MG/DL (ref 70–99)
HCG UR QL: NEGATIVE

## 2025-06-06 PROCEDURE — 250N000013 HC RX MED GY IP 250 OP 250 PS 637: Performed by: OBSTETRICS & GYNECOLOGY

## 2025-06-06 PROCEDURE — 58300 INSERT INTRAUTERINE DEVICE: CPT | Performed by: OBSTETRICS & GYNECOLOGY

## 2025-06-06 PROCEDURE — 82962 GLUCOSE BLOOD TEST: CPT

## 2025-06-06 PROCEDURE — 87624 HPV HI-RISK TYP POOLED RSLT: CPT | Performed by: OBSTETRICS & GYNECOLOGY

## 2025-06-06 PROCEDURE — G0145 SCR C/V CYTO,THINLAYER,RESCR: HCPCS | Performed by: OBSTETRICS & GYNECOLOGY

## 2025-06-06 PROCEDURE — 81025 URINE PREGNANCY TEST: CPT | Performed by: OBSTETRICS & GYNECOLOGY

## 2025-06-06 PROCEDURE — 370N000017 HC ANESTHESIA TECHNICAL FEE, PER MIN: Performed by: OBSTETRICS & GYNECOLOGY

## 2025-06-06 PROCEDURE — 250N000011 HC RX IP 250 OP 636: Performed by: OBSTETRICS & GYNECOLOGY

## 2025-06-06 PROCEDURE — 999N000141 HC STATISTIC PRE-PROCEDURE NURSING ASSESSMENT: Performed by: OBSTETRICS & GYNECOLOGY

## 2025-06-06 PROCEDURE — 250N000009 HC RX 250: Performed by: OBSTETRICS & GYNECOLOGY

## 2025-06-06 PROCEDURE — 710N000012 HC RECOVERY PHASE 2, PER MINUTE: Performed by: OBSTETRICS & GYNECOLOGY

## 2025-06-06 PROCEDURE — 360N000075 HC SURGERY LEVEL 2, PER MIN: Performed by: OBSTETRICS & GYNECOLOGY

## 2025-06-06 DEVICE — IUD CONTRACEPTIVE DEVICE MIRENA 50419-4230-01: Type: IMPLANTABLE DEVICE | Site: UTERUS | Status: FUNCTIONAL

## 2025-06-06 RX ORDER — ACETAMINOPHEN 325 MG/1
975 TABLET ORAL ONCE
Status: DISCONTINUED | OUTPATIENT
Start: 2025-06-06 | End: 2025-06-06 | Stop reason: HOSPADM

## 2025-06-06 RX ORDER — LIDOCAINE HYDROCHLORIDE 10 MG/ML
INJECTION, SOLUTION INFILTRATION; PERINEURAL PRN
Status: DISCONTINUED | OUTPATIENT
Start: 2025-06-06 | End: 2025-06-06 | Stop reason: HOSPADM

## 2025-06-06 RX ORDER — OXYCODONE HYDROCHLORIDE 5 MG/1
5 TABLET ORAL
Status: COMPLETED | OUTPATIENT
Start: 2025-06-06 | End: 2025-06-06

## 2025-06-06 RX ORDER — ACETAMINOPHEN 325 MG/1
975 TABLET ORAL ONCE
Status: COMPLETED | OUTPATIENT
Start: 2025-06-06 | End: 2025-06-06

## 2025-06-06 RX ORDER — LIDOCAINE 40 MG/G
CREAM TOPICAL
Status: DISCONTINUED | OUTPATIENT
Start: 2025-06-06 | End: 2025-06-06 | Stop reason: HOSPADM

## 2025-06-06 RX ORDER — SODIUM CHLORIDE, SODIUM LACTATE, POTASSIUM CHLORIDE, CALCIUM CHLORIDE 600; 310; 30; 20 MG/100ML; MG/100ML; MG/100ML; MG/100ML
INJECTION, SOLUTION INTRAVENOUS CONTINUOUS
Status: DISCONTINUED | OUTPATIENT
Start: 2025-06-06 | End: 2025-06-06 | Stop reason: HOSPADM

## 2025-06-06 RX ADMIN — OXYCODONE 5 MG: 5 TABLET ORAL at 11:13

## 2025-06-06 RX ADMIN — ACETAMINOPHEN 975 MG: 325 TABLET ORAL at 08:39

## 2025-06-06 ASSESSMENT — ACTIVITIES OF DAILY LIVING (ADL)
ADLS_ACUITY_SCORE: 39
ADLS_ACUITY_SCORE: 39
ADLS_ACUITY_SCORE: 35
ADLS_ACUITY_SCORE: 39
ADLS_ACUITY_SCORE: 42

## 2025-06-06 NOTE — OP NOTE
PreOp Dx: Congenital adrenal hyperplasia and migraines, desires IUD insertion which cannot be tolerated in clinic  PostOp Dx: same  Procedure: EUA, pap smear, Mirena IUD insertion  Surgeon: Jami Gutierrez MD  EBL: 5 ml  Anesthesia: MAC  Complications: none  Findings: large pannus and obese abdomen, normal external genitalia, normal vagina with menstrual blood in vault, nulliparous cervix. Uterus sounds to 7cm.  Indications: Patient is followed for CAH and multiple other medical co-morbidities and has been experiencing breakthrough bleeding on OCPs as well as progesterone only approaches. She desires trial of progesterone IUD and requested cervical cancer screening concurrently under anesthesia as she does not tolerate pelvic exams.   Detail: After the Brief, the patient was brought to the OR and placed under MAC. She was positioned into dorsal lithotomy and Time Out was performed. Bimanual exam was limited to body habitus but no masses were palpated. A medium Graves speculum was placed and menstrual blood cleared with sponge stick. The pap was obtained. The cervix was prepped with betadine and the  cervix was infiltrated with 17 ml of 1% lidocaine in an intra and para cervical fashion. A single tooth tenaculum was placed on the anterior lip and the uterus was easily sounded to 7cm. The Mirena IUD was placed and strings trimmed to 3cm from cervical os. The tenaculum was removed and no bleeding was noted. The speculum was removed and counts were correct. Patient went to PACU in excellent condition.     Jami Gutierrez MD

## 2025-06-09 ENCOUNTER — OFFICE VISIT (OUTPATIENT)
Dept: GASTROENTEROLOGY | Facility: CLINIC | Age: 30
End: 2025-06-09
Attending: PHYSICIAN ASSISTANT
Payer: COMMERCIAL

## 2025-06-09 ENCOUNTER — ANCILLARY PROCEDURE (OUTPATIENT)
Dept: GENERAL RADIOLOGY | Facility: CLINIC | Age: 30
End: 2025-06-09
Attending: PHYSICIAN ASSISTANT
Payer: COMMERCIAL

## 2025-06-09 ENCOUNTER — LAB (OUTPATIENT)
Dept: LAB | Facility: CLINIC | Age: 30
End: 2025-06-09
Payer: COMMERCIAL

## 2025-06-09 ENCOUNTER — OFFICE VISIT (OUTPATIENT)
Dept: INTERNAL MEDICINE | Facility: CLINIC | Age: 30
End: 2025-06-09
Payer: COMMERCIAL

## 2025-06-09 VITALS
WEIGHT: 245.9 LBS | BODY MASS INDEX: 48.28 KG/M2 | DIASTOLIC BLOOD PRESSURE: 82 MMHG | SYSTOLIC BLOOD PRESSURE: 116 MMHG | OXYGEN SATURATION: 95 % | HEART RATE: 68 BPM | HEIGHT: 60 IN

## 2025-06-09 VITALS
TEMPERATURE: 97.4 F | BODY MASS INDEX: 48.16 KG/M2 | OXYGEN SATURATION: 98 % | HEIGHT: 60 IN | WEIGHT: 245.3 LBS | HEART RATE: 70 BPM | DIASTOLIC BLOOD PRESSURE: 79 MMHG | RESPIRATION RATE: 18 BRPM | SYSTOLIC BLOOD PRESSURE: 114 MMHG

## 2025-06-09 DIAGNOSIS — R14.0 BLOATING: ICD-10-CM

## 2025-06-09 DIAGNOSIS — Z23 NEED FOR COVID-19 VACCINE: ICD-10-CM

## 2025-06-09 DIAGNOSIS — R10.32 ABDOMINAL PAIN, LEFT LOWER QUADRANT: ICD-10-CM

## 2025-06-09 DIAGNOSIS — K76.0 FATTY LIVER: ICD-10-CM

## 2025-06-09 DIAGNOSIS — L50.3 DERMATOGRAPHISM: ICD-10-CM

## 2025-06-09 DIAGNOSIS — R79.89 ELEVATED FERRITIN: ICD-10-CM

## 2025-06-09 DIAGNOSIS — R19.8 IRREGULAR BOWEL HABITS: ICD-10-CM

## 2025-06-09 DIAGNOSIS — G47.33 OBSTRUCTIVE SLEEP APNEA: ICD-10-CM

## 2025-06-09 DIAGNOSIS — R10.31 RIGHT LOWER QUADRANT PAIN: ICD-10-CM

## 2025-06-09 DIAGNOSIS — R19.8 IRREGULAR BOWEL HABITS: Primary | ICD-10-CM

## 2025-06-09 DIAGNOSIS — R12 HEARTBURN: ICD-10-CM

## 2025-06-09 DIAGNOSIS — F33.1 MODERATE EPISODE OF RECURRENT MAJOR DEPRESSIVE DISORDER (H): Primary | ICD-10-CM

## 2025-06-09 LAB
ALBUMIN SERPL BCG-MCNC: 4.2 G/DL (ref 3.5–5.2)
ALP SERPL-CCNC: 48 U/L (ref 40–150)
ALT SERPL W P-5'-P-CCNC: 35 U/L (ref 0–50)
AST SERPL W P-5'-P-CCNC: 18 U/L (ref 0–45)
BILIRUB SERPL-MCNC: 0.3 MG/DL
BILIRUBIN DIRECT (ROCHE PRO & PURE): 0.14 MG/DL (ref 0–0.45)
ERYTHROCYTE [DISTWIDTH] IN BLOOD BY AUTOMATED COUNT: 13.6 % (ref 10–15)
HCT VFR BLD AUTO: 40.1 % (ref 35–47)
HGB BLD-MCNC: 13.6 G/DL (ref 11.7–15.7)
LAB DIRECTOR COMMENTS: NORMAL
LAB DIRECTOR DISCLAIMER: NORMAL
LAB DIRECTOR INTERPRETATION: NORMAL
LAB DIRECTOR METHODOLOGY: NORMAL
LAB DIRECTOR RESULTS: NORMAL
MCH RBC QN AUTO: 31.9 PG (ref 26.5–33)
MCHC RBC AUTO-ENTMCNC: 33.9 G/DL (ref 31.5–36.5)
MCV RBC AUTO: 94 FL (ref 78–100)
PLATELET # BLD AUTO: 310 10E3/UL (ref 150–450)
PROT SERPL-MCNC: 7.6 G/DL (ref 6.4–8.3)
RBC # BLD AUTO: 4.27 10E6/UL (ref 3.8–5.2)
SPECIMEN TYPE: NORMAL
WBC # BLD AUTO: 9.2 10E3/UL (ref 4–11)

## 2025-06-09 PROCEDURE — 99000 SPECIMEN HANDLING OFFICE-LAB: CPT | Performed by: PATHOLOGY

## 2025-06-09 PROCEDURE — 91320 SARSCV2 VAC 30MCG TRS-SUC IM: CPT | Performed by: INTERNAL MEDICINE

## 2025-06-09 PROCEDURE — 82785 ASSAY OF IGE: CPT | Performed by: PHYSICIAN ASSISTANT

## 2025-06-09 PROCEDURE — 85027 COMPLETE CBC AUTOMATED: CPT | Performed by: PATHOLOGY

## 2025-06-09 PROCEDURE — 90480 ADMN SARSCOV2 VAC 1/ONLY CMP: CPT | Performed by: INTERNAL MEDICINE

## 2025-06-09 PROCEDURE — 99214 OFFICE O/P EST MOD 30 MIN: CPT | Mod: 24 | Performed by: INTERNAL MEDICINE

## 2025-06-09 PROCEDURE — 74018 RADEX ABDOMEN 1 VIEW: CPT | Performed by: RADIOLOGY

## 2025-06-09 PROCEDURE — 99215 OFFICE O/P EST HI 40 MIN: CPT | Mod: 24 | Performed by: PHYSICIAN ASSISTANT

## 2025-06-09 PROCEDURE — 80076 HEPATIC FUNCTION PANEL: CPT | Performed by: PATHOLOGY

## 2025-06-09 PROCEDURE — 81256 HFE GENE: CPT | Performed by: STUDENT IN AN ORGANIZED HEALTH CARE EDUCATION/TRAINING PROGRAM

## 2025-06-09 PROCEDURE — 83520 IMMUNOASSAY QUANT NOS NONAB: CPT | Performed by: PHYSICIAN ASSISTANT

## 2025-06-09 PROCEDURE — 36415 COLL VENOUS BLD VENIPUNCTURE: CPT | Performed by: PATHOLOGY

## 2025-06-09 RX ORDER — FLUOXETINE 10 MG/1
10 CAPSULE ORAL DAILY
Qty: 30 CAPSULE | Refills: 4 | Status: SHIPPED | OUTPATIENT
Start: 2025-06-09

## 2025-06-09 RX ORDER — PANTOPRAZOLE SODIUM 20 MG/1
20 TABLET, DELAYED RELEASE ORAL DAILY
Qty: 30 TABLET | Refills: 1 | Status: SHIPPED | OUTPATIENT
Start: 2025-06-09

## 2025-06-09 ASSESSMENT — ANXIETY QUESTIONNAIRES
IF YOU CHECKED OFF ANY PROBLEMS ON THIS QUESTIONNAIRE, HOW DIFFICULT HAVE THESE PROBLEMS MADE IT FOR YOU TO DO YOUR WORK, TAKE CARE OF THINGS AT HOME, OR GET ALONG WITH OTHER PEOPLE: SOMEWHAT DIFFICULT
2. NOT BEING ABLE TO STOP OR CONTROL WORRYING: NEARLY EVERY DAY
1. FEELING NERVOUS, ANXIOUS, OR ON EDGE: SEVERAL DAYS
3. WORRYING TOO MUCH ABOUT DIFFERENT THINGS: NEARLY EVERY DAY
5. BEING SO RESTLESS THAT IT IS HARD TO SIT STILL: MORE THAN HALF THE DAYS
GAD7 TOTAL SCORE: 13
6. BECOMING EASILY ANNOYED OR IRRITABLE: SEVERAL DAYS
GAD7 TOTAL SCORE: 13
7. FEELING AFRAID AS IF SOMETHING AWFUL MIGHT HAPPEN: SEVERAL DAYS

## 2025-06-09 ASSESSMENT — PAIN SCALES - GENERAL: PAINLEVEL_OUTOF10: SEVERE PAIN (7)

## 2025-06-09 ASSESSMENT — PATIENT HEALTH QUESTIONNAIRE - PHQ9
5. POOR APPETITE OR OVEREATING: MORE THAN HALF THE DAYS
SUM OF ALL RESPONSES TO PHQ QUESTIONS 1-9: 21

## 2025-06-09 NOTE — PROGRESS NOTES
Anh Flores received the COVID-19 mRNA Bivalent vaccine today in clinic at the request of Dr. Maria Guadalupe Triana. The immunization was given under the supervision of Dr. Maria Guadalupe Triana if assistance was needed. The patient does not report a history of adverse reactions associated with vaccine administration. I provided the Vaccine Information Statement. The immunization site was cleaned with an alcohol prep wipe. The immunization was given without incident--see immunization list for administration details. No swelling or redness was observed at the site of injection after the immunization was given. The patient was advised to remain in fourth floor lobby of the [facility] for fifteen minutes after the injection in case of an adverse reaction.     Contreras Aden, EMT at 7:18 AM on 6/9/2025

## 2025-06-09 NOTE — PROGRESS NOTES
GI CLINIC VISIT    ASSESSMENT/PLAN:  Anh Flores is a 30 year old year old female with PMHx significant for history of CAH, obesity, type 2 diabetes, PCOS who presents seeing the  [facility] for diarrhea.  Tangential historian and report is variable.     #Chronic diarrhea  #IBS  #Bile acid malabsorption  Onset of baseline diarrhea age 10 after CCY with sx compounded in 2023 after cryptosporidium infection. Celiac serologies NEG 2024 and recently had bidirectional endoscopies with Dr Yancey 9/2024 - TI normal and random colon biopsies negative for microscopic colitis. Duodenal biopsies were negative for celiac disease but did show some mild duodenitis. Gastric biopsy with mild chronic inactive gastritis w/o h.pylori. MRE 4/2025 unremarkable of small bowel disease, associated fecal calpro NEG (12). Fecal elastase normal. Enteric panel cancelled due to collection, repeat order is pending collection. She went on cholestyramine with fair relief though use was irregular which further compounded the diarrhea/irregular stooling cycle (at times passing firm/hard small stools).     These symptoms are consistent with IBS (meeting [place]-IV criteria) with flares compounded by bile acid malabsorption (s/p CCY). I am also highly suspicious of an underlying pelvic floor dysfunction and constipation with over flow diarrhea (component of slow gut transit with DM status and use of GLP-1 is also suspected). Ddx includes constipation with overflow, other malabsorption, less likely underlying inflammation (fecal calpro 12, CTAP W contrast 2024 without bowel inflammatory changes, no obstruction either; 2024 colonoscopy without IBD) vs other. Maintaining oral intake and hydration; never required IVF/admission.     Today - she felt cleaned out after our last visit with the bowel clean out but states it was traumatic as it took 3 days for the regiment to work (2 days of mag citrate w/ glycerin supp, then a daily of miralax  "clean out) and she then had significant diarrhea and stool output for a week. The day prior to surgery 5/19 , she had a good day with improved abd pain, \"felt my intestines were cleared\" but then had surgery with anesthesia and then developed days without stooling despite stool softeners. She had twilight anesthesia last Friday 6/6 for IUD placement with last BM on 6/7/25. Today she is reporting constant mid abdominal pain, distension and bloat that get worse with any oral intake (including fluids) and improves with defecation. Last BM 6/7. States she tried mag citrate bottles but that still had constipation.     Current constipation picture I suspect is due to surgery, anesthesia, and pain medications she is on. Abd distended with some tenderness )mid abdomen, LUQ, RLQ). Vitals stable and she is nontoxic appearing. Oral intake maintained. We discussed the following plan    Plan  -continue to hold colestipol and citrucel  -KUB today, then will discuss bowel regiment; possible daily miralax vs Rx low dose linzess  -pelvic floor dysfunction eval  -Limit Zofran  -ER precautions reviewed  -1 mo RTC     Future consideration  1.  Given predominant abdominal pain component, she would be a good candidate for a neuromodulator such as a tricyclic antidepressant. EKG 1/22/2025 - 453 m/s.  We can consider this in the future  2.  Breath test for small intestinal bacterial overgrowth, fructose intolerance, carbohydrate malabsorption  3. If the diarrhea continues despite our management options, we can consider evaluation for rare causes of chronic diarrhea such as hormone secreting tumors (eg.,  fasting serum gastrin, serum calcitonin, somatostatin, vasoactive intestinal polypeptide, 24-hour urine 5-HIAA)  4.  Laboratory studies to assess for malabsorption can include albumin, RBC folate, serum iron, total iron-binding capacity, B12, calcium, magnesium, carotene, vitamin D    #heartburn  Been on Protonix 40 mg daily chronically, " she has no breakthrough heartburn on this but wants to get off it.   Trial 20 mg daily, OK to go back on lowest effective dose of persistent sx     Orders Placed This Encounter   Procedures    X-ray Abdomen 1 vw    Physical Therapy  Referral     Colorectal cancer screening: aged 45    RTC 1 mo or sooner PRN     Thank you for this consultation.  It was a pleasure to participate in the care of this patient; please contact me with any further questions.     Yanely Hansen PA-C    Follow up: As planned above. Today, I personally spent 50  minutes spent on the date of the encounter doing chart review, history and exam, documentation and further activities per the note.    HPI  Anh Flores is a 30 year old year old female with PMHx of history of CAH, obesity, type 2 diabetes, PCOS following with the [facility] group for diarrhea since age 12/13.  Tangential historian    3/21/25  Previously seen with [facility] for chronic diarrhea - per notes, improved with infrequent use of bile acid sequestrant     She had CCY at age 10 and 11 - then developed baseline diarrhea  She then got cryptosporidium infx in 12/2023 and had a month of N/V/D  -since then, the diarrhea has worsened    -reports flares of the diarrhea, can last 2d-3wk; consistency varies from frequent pudding consistency stools to BSC type 7 stools that are difficult to manage. She endorses frequent small volumed incomplete BM that will continue for hours until she empties  --If she eats something that irritates her stomach, usually within 20-30 min, her abd feels bubbly and she then needs to have a BM     -She can then get a week of 'normal' stooling, usually passing 2 bsc type 4 stools though shares throughout the week, the stools can get looser . Even with the normal stools, she does not endorse complete evacuation.     -she stools every day, no days without a BM     She was put on cholestyramine that she takes intermittently (when diarrhea gets very bad - she  characterizes this as stools that look like a BM after a colonoscopy prep) and it has helped at times but shares it binds to her teeth and makes it hurt . Not been on colestipol.   -states she was on all the OTC meds - imodium, pepto bismol without relief   -she is not on a daily bowel regiment     Currently she is reporting she is in day 2 of a softer stool but not actively in a flare     She is working w/ dietary and had been on a few different diets   -even if she fasts, she can get diarrhea    She endorse chronic baseline abdominal pain (since aged [age]) that can flare with her episodes of diarrhea too; pressure sensation, fullness and a pulsing sensation. She feels like her intestines are always sore   -no meds like NSAIDs, is on gabapentin   -nothing she has tried has helped this pain     She has been on ozempic for a year now, but went off it for a NMGES and endoscopy procedure. The stomach pain did not change being on or off the ozempic.     She is on metformin XR 1000 mg BID, and scales back her metformin use (from 4x500 to 2-3x500). She doesn't feel the metformin makes the diarrhea worse though later shares when diarrhea flares, she cuts down on metformin as she has limited PO intake and finds her sugars drop so she adjusts metformin accordingly     She endorses some nausea w/o emesis, poor appetite, but no consistent unintentional weight loss (can vacillate a few lb, gaining/losing)  She has gone to ER for concerns of dehydration though she was not given IVF     No black stools. Can see some blood on water/TP when she stools frequently.     Not had SB checked, not checked with breath test     May 7, 2025    4/29 - fecal elastase WNL , fecal calpro 12s  Enteric cancelled and reordered 4/29 mychart MRE WNL  Asked details of worsenign diarrhea and asked to increase colestipol    Got back from [place] 2:30 AM today, she dropped off her dog with close friends   Last BM was last Thursday, none since then   During  "the entire trip she has not had a bowel movement, last one was   Endo increased her ozempic in past 6 weeks;  held 5/17/25   She started with intermittent vomiting about 3 weeks ago. Usually she vomits a few times a week, usually after she eats; she gets abd pain and then vomits. Then she feels better; no bloody emesis (just partially digested food she eats)  She is on a soft textured diet, having small frequent meals throughout the day   She is taking Citrucel 1 tsp TID - she likes it.   She takes colestipol 2-3x a week  Finds it helps with the diarrhea. No black stools. Can see some bright red blood   Diarrhea - much improved, infrequent now. She is more blocked up now and has abd pain related to this.   She started vomiting and noticed  more abd pain when she takes the colestipol   16 oz bottle x 6-9 bottles of water a day   5/19 surgery L trigger release  Ozempic held 5/17/25     Today June 9, 2025  At our last visit, had bad diarrhea with bowel clean out - took 3 days and took a wk for her stooling to normalize . She thinks this is due to all the retained stools she had prior to the clean out   Had surgery, day prior had a day of normal GI day (still had abd pain but it was improved - felt her GI tract cleared out - \"the heaviness improved to my intestines\" - described also as a pulsation), then had surgery, then developed again constipation that was not managed on stool softener . Not been able to go for 4-5 days now, and feels constipated again   -states the bowel clean out was very difficult was her, states she does not want to do the clean out again  -states she would prefer to be constipated and not have severe diarrhea like during clean out as it was traumatic for her   When she eats, she has pain to epigastric pain/mid section - this is the same pain she had even off the ozempic.   pressure sensation, fullness,\"like I want to pop.\" Belching does not help. No vomiting as she takes zofran (1-2x a week). " "  It is constant and flares when she eats (even with soft textured foods - even her safe foods. Will have this flare immediately with oral intake). Flares also with water. No relationship of this pain to positional changes or urination.   -if she has a stool, the pain gets better  but she feels like she can't go. Will take mag citrate bottle x 1 and still no stool.   -will have the sensation to defecate but doesn't feel like she can go   -had general anesthesia for hand surgery, then recently twilight sedation for IUD placement   She stopped colestipol since our last visit  -Citrucel use is PRN   -She had stopped ozempic x2 in last month for surgery   -trying to do fruits/veggies but states hard to eat / drink   -states she is maintaining weight   Last BM was Saturday, 2 inch piece. She feels backed up on upper abdomen. Flatus is variable, a bubbly stomach sensation and \"feels like it's hitting a wall\"     Wt Readings from Last 10 Encounters:   06/09/25 111.5 kg (245 lb 14.4 oz)   06/09/25 111.3 kg (245 lb 4.8 oz)   06/06/25 111.9 kg (246 lb 11.1 oz)   06/02/25 111.4 kg (245 lb 11.2 oz)   05/19/25 110.4 kg (243 lb 6.2 oz)   05/07/25 111.1 kg (245 lb)   05/02/25 111.6 kg (246 lb)   04/25/25 112.5 kg (248 lb 1.6 oz)   04/09/25 114.3 kg (252 lb)   04/09/25 113.4 kg (250 lb)       Family Hx  Maternal Uncle - colon cancer   No other known family history or GI related malignancy (esophageal, gastric, pancreatic, liver or colon) or family history of IBD/celiac disease.     Social Hx   No use of NSAIDs  No ETOH  No tobacco products   No recreational drug use     Puppy - mariaa markham; good friends in [place] (sitting her dog)   PROBLEM LIST  Patient Active Problem List    Diagnosis Date Noted    Chronic bilateral low back pain, unspecified whether sciatica present 06/02/2025     Priority: Medium    Lumbar radiculopathy 06/02/2025     Priority: Medium    Coccydynia 03/11/2025     Priority: Medium    Ulnar neuropathy of both upper " extremities 02/21/2025     Priority: Medium    Trigger thumb of both thumbs 02/21/2025     Priority: Medium    Lateral epicondylitis of both elbows 02/21/2025     Priority: Medium    Elevated ferritin 12/04/2024     Priority: Medium    Cryptosporidiasis (H) 11/14/2024     Priority: Medium    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     Priority: Medium    Abnormal finding on imaging of liver 10/18/2024     Priority: Medium    Chronic diarrhea 10/18/2024     Priority: Medium    Lactose intolerance 09/10/2024     Priority: Medium    Obstructive sleep apnea 07/05/2024     Priority: Medium    Insomnia, unspecified type 07/05/2024     Priority: Medium    Bilateral carpal tunnel syndrome 06/11/2024     Priority: Medium    Diabetes mellitus, type 2 (H) 10/19/2020     Priority: Medium    Hidradenitis suppurativa 01/23/2019     Priority: Medium    Morbid obesity with body mass index of 60.0-69.9 in adult (H) 01/23/2019     Priority: Medium    Elevated BP without diagnosis of hypertension 01/23/2019     Priority: Medium    Morbid obesity (H) 10/17/2014     Priority: Medium    Chronic headaches 10/13/2014     Priority: Medium    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 Brewer; next follow up 1.2011.    Metformin increased to 850 mg twice a day.      Chronic abdominal pain 02/09/2010     Priority: Medium    Vitamin D deficiency 02/09/2010     Priority: Medium       PERTINENT PAST MEDICAL HISTORY:  Past Medical History:   Diagnosis Date    CAH (congenital adrenal hyperplasia) 2/9/2010    Followed by Dr. Brewer; next follow up 1.2011.  Metformin increased to 850 mg twice a day.     Diabetes mellitus, type 2 (H) 10/19/2020    Vitamin D deficiency 2/9/2010       PREVIOUS SURGERIES:  Past Surgical History:   Procedure Laterality Date    CHOLECYSTECTOMY      She was in 8th grade     COLONOSCOPY N/A 9/16/2024     Procedure: COLONOSCOPY, WITH BIOPSY;  Surgeon: Bacilio Yancey MD;  Location: [facility]    ESOPHAGOSCOPY, GASTROSCOPY, DUODENOSCOPY (EGD), COMBINED N/A 9/16/2024    Procedure: ESOPHAGOGASTRODUODENOSCOPY, WITH BIOPSY;  Surgeon: Bacilio Yancey MD;  Location: [facility]    INJECT STEROID (LOCATION) N/A 4/9/2025    Procedure: Sacrococcygeal ligament/coccyx steroid injection;  Surgeon: Edwina Fitzpatrick MD;  Location: [facility] OR    RELEASE CARPAL TUNNEL Left 5/19/2025    Procedure: Left open carpal tunnel release;  Surgeon: Vic Roberts MD;  Location: [facility]    RELEASE DEQUERVAINS WRIST Left 5/19/2025    Procedure: Left first dorsal compartment release;  Surgeon: Vic Roberts MD;  Location: [facility]    RELEASE TRIGGER FINGER Left 5/19/2025    Procedure: Left trigger thumb release;  Surgeon: Vic Roberts MD;  Location: [facility]    TRANSPOSITION ULNAR NERVE (ELBOW) Left 5/19/2025    Procedure: Left cubital tunnel release;  Surgeon: Vic Roberts MD;  Location: [facility]     PERTINENT MEDICATIONS:    Current Outpatient Medications:     AJOVY 225 MG/1.5ML SOAJ, Inject 225 mg subcutaneously every 30 days. Last dose 4/26, Disp: , Rfl:     alcohol swab prep pads, Use to swab area of injection/anthony as directed., Disp: 100 each, Rfl: 3    Atogepant (QULIPTA) 60 MG TABS, Take 60 mg by mouth as needed., Disp: , Rfl:     atorvastatin (LIPITOR) 10 MG tablet, Take 10 mg by mouth daily., Disp: , Rfl:     blood glucose (NO BRAND SPECIFIED) test strip, Use to test blood sugar three times daily or as directed. Preferred blood glucose meter OR supplies to accompany: Blood Glucose Monitor Brands: per insurance., Disp: 100 strip, Rfl: 6    blood glucose monitoring (NO BRAND SPECIFIED) meter device kit, Use to test blood sugar three times daily or as directed. Preferred blood glucose meter OR supplies to accompany: Blood Glucose Monitor Brands: per insurance., Disp: 1 kit, Rfl: 0    colestipol (COLESTID) 1 g  tablet, Take 1 tablet (1 g) by mouth 2 times daily. (Patient not taking: Reported on 6/9/2025), Disp: 60 tablet, Rfl: 2    Continuous Glucose Sensor (FREESTYLE AYSE 3 PLUS SENSOR) MISC, Use 1 sensor every 15 days. Use to read blood sugars per 's instructions., Disp: 6 each, Rfl: 3    Continuous Glucose Sensor (FREESTYLE AYSE 3 SENSOR) MISC, 1 each by Other route every 14 days., Disp: 6 each, Rfl: 3    cyclobenzaprine (FLEXERIL) 5 MG tablet, Take by mouth 3 times daily as needed for muscle spasms., Disp: , Rfl:     docusate sodium (COLACE) 100 MG capsule, Take 1 capsule (100 mg) by mouth 2 times daily. (Patient not taking: Reported on 6/9/2025), Disp: 12 capsule, Rfl: 0    gabapentin (NEURONTIN) 300 MG capsule, Take 1 capsule (300 mg) by mouth 3 times daily., Disp: 90 capsule, Rfl: 0    hydrOXYzine HCl (ATARAX) 25 MG tablet, Take at bedtime as needed for itching. (Patient taking differently: daily. Take at bedtime as needed for itching.), Disp: 30 tablet, Rfl: 4    insulin pen needle (ULTICARE MICRO) 32G X 4 MM miscellaneous, Use 1 pen needles daily or as directed., Disp: 100 each, Rfl: 3    metFORMIN (GLUCOPHAGE XR) 500 MG 24 hr tablet, Take 4 tablets (2,000 mg) by mouth daily (with dinner)., Disp: 360 tablet, Rfl: 3    OnabotulinumtoxinA (BOTOX IJ), Inject as directed every 3 months. Last dose 4/23/25, Disp: , Rfl:     ondansetron (ZOFRAN ODT) 4 MG ODT tab, DISSOLVE ONE TABLET ON TONGUE EVERY 8 HOURS AS NEEDED FOR NAUSEA, Disp: 30 tablet, Rfl: 1    ondansetron (ZOFRAN) 4 MG tablet, Take 1 tablet (4 mg) by mouth every 6 hours as needed for nausea. (Patient not taking: Reported on 6/9/2025), Disp: 12 tablet, Rfl: 0    oxyCODONE (ROXICODONE) 5 MG tablet, Take 1-2 tablets (5-10 mg) by mouth every 6 hours as needed for moderate to severe pain. (Patient not taking: Reported on 6/9/2025), Disp: 16 tablet, Rfl: 0    pantoprazole (PROTONIX) 40 MG EC tablet, Take 40 mg by mouth daily., Disp: , Rfl:      rimegepant (NURTEC) 75 MG ODT tablet, Place 75 mg under the tongue daily as needed for migraine., Disp: , Rfl:     rizatriptan (MAXALT) 10 MG tablet, Take 10 mg by mouth at onset of headache., Disp: , Rfl:     Semaglutide, 1 MG/DOSE, (OZEMPIC) 4 MG/3ML pen, Inject 1 mg subcutaneously every 7 days., Disp: 6 mL, Rfl: 1    spironolactone (ALDACTONE) 50 MG tablet, Take 1 tablet (50 mg) by mouth daily., Disp: 30 tablet, Rfl: 4    SUMAtriptan (IMITREX) 50 MG tablet, Take 2 tablets (100 mg) by mouth at onset of headache for migraine. May repeat in 2 hours. Max 4 tablets/24 hours., Disp: , Rfl:     thin (NO BRAND SPECIFIED) lancets, Use with lanceting device. To accompany: Blood Glucose Monitor Brands: per insurance., Disp: 100 each, Rfl: 6    topiramate (TOPAMAX) 25 MG tablet, Take 25 mg by mouth 2 times daily., Disp: , Rfl:     tranexamic acid (LYSTEDA) 650 MG tablet, Take 2 tablets (1,300 mg) by mouth 2 times daily as needed (menorrhagia)., Disp: 30 tablet, Rfl: 1    UBRELVY 100 MG tablet, Take 100 mg by mouth at onset of headache., Disp: , Rfl:     PHYSICAL EXAMINATION:  Vitals reviewed: LMP 05/21/2025 (Exact Date)   Constitutional: aaox3, cooperative, pleasant, not dyspneic/diaphoretic, no acute distress  Eyes: Sclera anicteric/injected  Ears/nose/mouth/throat: hearing intact  Neck: supple, active ROM w/o limitation or pain   CV: No edema  Respiratory: Unlabored breathing  Abd:  mildly distended, tenderness to mid abdomen > epigastric > RLQ > LUQ but no guarding or peritoneal signs  Skin: warm, perfused, no jaundice  Psych: Normal affect  MSK: Normal gait     PERTINENT STUDIES:    Office Visit on 03/11/2025   Component Date Value Ref Range Status    Vitamin B12 03/11/2025 301  232 - 1,245 pg/mL Final        Lab Results   Component Value Date    WBC 7.6 12/18/2024    WBC 9.2 11/09/2024    WBC 9.3 11/09/2024    HGB 13.9 05/02/2025    HGB 13.5 12/18/2024    HGB 14.4 11/09/2024     12/18/2024     11/09/2024      11/09/2024    CHOL 137 03/07/2025    CHOL 143 11/13/2024    CHOL 157 10/18/2024    TRIG 90 03/07/2025    TRIG 88 11/13/2024    TRIG 95 10/18/2024    HDL 56 03/07/2025    HDL 58 11/13/2024    HDL 60 10/18/2024    ALT 20 03/07/2025    ALT 16 12/18/2024    ALT 17 11/09/2024    AST 18 03/07/2025    AST 16 12/18/2024    AST 15 11/09/2024     03/07/2025     11/09/2024     11/09/2024    BUN 9.4 03/07/2025    BUN 12.3 11/09/2024    BUN 12.8 11/09/2024    CO2 22 03/07/2025    CO2 21 (L) 11/09/2024    CO2 16 (L) 11/09/2024    TSH 1.08 08/23/2024    TSH 0.93 04/30/2024    TSH 1.33 01/30/2024    INR 0.9 03/24/2025        Liver Function Studies -   Recent Labs   Lab Test 03/07/25  1428   PROTTOTAL 7.9   ALBUMIN 4.4   BILITOTAL 0.2   ALKPHOS 53   AST 18   ALT 20        PREVIOUS ENDOSCOPY    9/16/2024 -EGD for diarrhea, nausea with vomiting.  GE flap valve Hill grade 1.  Normal stomach, biopsied (mild chronic inactive gastritis, negative H. pylori.  No intestinal metaplasia).  Normal duodenum, biopsied (mild peptic type duodenopathy.  No evidence of celiac disease)     9/16/2024 colonoscopy for chronic diarrhea status postcholecystectomy.  Performed with Dr. Bonner.  Excellent prep, terminal ileal intubation.  Unremarkable macro exam.  Random colon biopsies unremarkable colon mucosa, no microscopic colitis or other histopathologic changes noted.    -Continue with psyllium fiber, Imodium, cholestyramine.  Consider nutrition consult for weight management in the setting of metabolic associated steatotic liver disease and diarrhea.

## 2025-06-09 NOTE — PATIENT INSTRUCTIONS
It was a pleasure taking care of you today.  I've included a brief summary of our discussion and care plan from today's visit below.  Please review this information with your primary care provider.  _______________________________________________________________________    My recommendations are summarized as follows:    Sending a bowel medicine to help with constipation   X-ray of the abdomen - I will be in touch with you afterwards about bowel regiment   Physical Therapy consult for pelvic floor dysfunction  Go to ER for bad symptoms - severe pain, nausea/vomiting , inability to keep anything down or have eat anything     Return to GI Clinic in 1 months to review your progress.     Please see below for any additional questions and scheduling guidelines.    Sign up for noodls: noodls patient portal serves as a secure platform for accessing your medical records from the [facility]. Additionally, noodls facilitates easy, timely, and secure messaging with your care team. If you have not signed up, you may do so by using the provided code or calling [phone].    Coordinating your care after your visit:  There are multiple options for scheduling your follow-up care based on your provider's recommendation.    How do I schedule a follow-up clinic appointment:   After your appointment, you may receive scheduling assistance with the Clinic Coordinators by having a seat in the waiting room and a Clinic Coordinator will call you up to schedule.  Virtual visits or after you leave the clinic:  Your provider has placed a follow-up order in the noodls portal for scheduling your return appointment. A member of the scheduling team will contact you to schedule.  noodls Scheduling: Timely scheduling through noodls is advised to ensure appointment availability.   Call to schedule: You may schedule your follow-up appointment(s) by calling [phone], option 1.    How do I schedule my endoscopy or colonoscopy  procedure:  If a procedure, such as a colonoscopy or upper endoscopy was ordered by your provider, the scheduling team will contact you to schedule this procedure. Or you may choose to call to schedule at   [phone], option 1.  Please allow 20-30 minutes when scheduling a procedure.    How do I get my blood work done? To get your blood work done, you need to schedule a lab appointment at an [facility]. There are multiple ways to schedule:   At the clinic: The Clinic Coordinator you meet after your visit can help you schedule a lab appointment.   Room 77 scheduling: Room 77 offers online lab scheduling at all [facility] laboratory locations.   Call to schedule: You can call [phone] to schedule your lab appointment.    How do I schedule my imaging study: To schedule imaging studies, such as CT scans, ultrasounds, MRIs, or X-rays, contact Imaging Services at [phone].    How do I schedule a referral to another doctor: If your provider recommended a referral to another specialist(s), the referral order was placed by your provider. You will receive a phone call to schedule this referral, or you may choose to call the number attached to the referral to self-schedule.    For Post-Visit Question(s):  For any inquiries following today's visit:  Please utilize Room 77 messaging and allow 48 hours for reply or contact the Call Center during normal business hours at [phone], option 3.  For Emergent After-hours questions, contact the On-Call GI Fellow through the [facility]  at [phone].  In addition, you may contact your Nurse directly using the provided contact information.    Test Results:  Test results will be accessible via Room 77 in compliance with the 21st Century Cures Act. This means that your results will be available to you at the same time as your provider. Often you may see your results before your provider does. Results are reviewed by staff within  two weeks with communication follow-up. Results may be released in the patient portal prior to your care team review.    Prescription Refill(s):  Medication prescribed by your provider will be addressed during your visit. For future refills, please coordinate with your pharmacy. If you have not had a recent clinic visit or routine labs, for your safety, your provider may not be able to refill your prescription.     Sincerely,    Yanely Hansen PA-C  Gastroenterology

## 2025-06-09 NOTE — NURSING NOTE
Do you have a history of colon cancer in your immediate family? No    If yes who: negative     And what age  were they diagnosed:       Chief Complaint   Patient presents with    RECHECK       Vitals:    06/09/25 0743   BP: 116/82   Pulse: 68   SpO2: 95%   Weight: 111.5 kg (245 lb 14.4 oz)   Height: 1.524 m (5')       Body mass index is 48.02 kg/m .    Sherry Bobby MA

## 2025-06-09 NOTE — PROGRESS NOTES
Assessment & Plan     Moderate episode of recurrent major depressive disorder (H)  Reviewed treatment options for depression with patient.  She was encouraged to continue with therapy.  Discussed starting fluoxetine at 10 mg.  Will follow-up in approximately 2 weeks to discuss progress.  Patient was advised on medication related side effects and lower incidence of weight gain with fluoxetine compared to other antidepressants.  - FLUoxetine (PROZAC) 10 MG capsule; Take 1 capsule (10 mg) by mouth daily.    Need for COVID-19 vaccine  Reviewed vaccinations, recommend updating COVID-vaccine.  - COVID-19 12+ (PFIZER)    Obstructive sleep apnea  Patient was encouraged to follow-up with sleep medicine to address insomnia as well as sleep apnea.      I spent a total of 30 minutes on the day of the visit.   Time spent by me today doing chart review, history and exam, documentation and further activities per the note      BMI  Estimated body mass index is 47.91 kg/m  as calculated from the following:    Height as of this encounter: 1.524 m (5').    Weight as of this encounter: 111.3 kg (245 lb 4.8 oz).             Tanika Kamara is a 30 year old, presenting for the following health issues:  Follow Up      6/9/2025     6:44 AM   Additional Questions   Roomed by Keri BOWEN   Accompanied by N/A     History of Present Illness       Reason for visit:  Follow up       Patient is here for follow up. She reports that she has been having issues with sleep. She was seen by a sleep specialist and was advised to increase the dose of gabapentin. She states that it did not help her. She decreased the dose of gabapentin at this point.   Patient continues to have pain with her left elbow. She has numbness and pain in the area of surgery. She is considering having carpal tunnel and ulnar nerve repositioning on the right side, too. She will be starting hand therapy next week.   Patient reports that she has been working with therapist  to address depression.  She was advised to discuss medications for depression with her primary care provider.  She was previously on sertraline.  However, it was started at a fairly high dose, 100 mg daily.  She was not able to tolerate the dose really well and was very sleepy when she was taking it.  She would prefer to have a medication that has lower risk of weight gain and does not make her feel sedated.  Patient is also scheduled to see GI later today for diarrhea.        Objective    /79 (BP Location: Right arm, Patient Position: Sitting, Cuff Size: Adult Large)   Pulse 70   Temp 97.4  F (36.3  C)   Resp 18   Ht 1.524 m (5')   Wt 111.3 kg (245 lb 4.8 oz)   LMP 05/21/2025 (Exact Date)   SpO2 98%   BMI 47.91 kg/m    Body mass index is 47.91 kg/m .  Physical Exam   GENERAL: alert and no distress  NECK: no adenopathy, no asymmetry, masses, or scars  RESP: normal effort, no wheezing  CV: regular rates and rhythm and no peripheral edema  MS: no gross musculoskeletal defects noted, no edema  SKIN: no suspicious lesions or rashes  PSYCH: mentation appears normal, affect normal/bright            Signed Electronically by: Maria Guadalupe Triana MD

## 2025-06-09 NOTE — LETTER
6/9/2025      Anh Flores  [address] [facility] [address]      Dear Colleague,    Thank you for referring your patient, Anh Flores, to the [facility] [place]. Please see a copy of my visit note below.      GI CLINIC VISIT    ASSESSMENT/PLAN:  Anh Flores is a 30 year old year old female with PMHx significant for history of CAH, obesity, type 2 diabetes, PCOS who presents seeing the  [facility] GI team for diarrhea.  Tangential historian and report is variable.     #Chronic diarrhea  #IBS  #Bile acid malabsorption  Onset of baseline diarrhea age 10 after CCY with sx compounded in 2023 after cryptosporidium infection. Celiac serologies NEG 2024 and recently had bidirectional endoscopies with Dr Yancey 9/2024 - TI normal and random colon biopsies negative for microscopic colitis. Duodenal biopsies were negative for celiac disease but did show some mild duodenitis. Gastric biopsy with mild chronic inactive gastritis w/o h.pylori. MRE 4/2025 unremarkable of small bowel disease, associated fecal calpro NEG (12). Fecal elastase normal. Enteric panel cancelled due to collection, repeat order is pending collection. She went on cholestyramine with fair relief though use was irregular which further compounded the diarrhea/irregular stooling cycle (at times passing firm/hard small stools).     These symptoms are consistent with IBS (meeting [place]-IV criteria) with flares compounded by bile acid malabsorption (s/p CCY). I am also highly suspicious of an underlying pelvic floor dysfunction and constipation with over flow diarrhea (component of slow gut transit with DM status and use of GLP-1 is also suspected). Ddx includes constipation with overflow, other malabsorption, less likely underlying inflammation (fecal calpro 12, CTAP W contrast 2024 without bowel inflammatory changes, no obstruction either; 2024 colonoscopy without IBD) vs other. Maintaining oral intake and  "hydration; never required IVF/admission.     Today - she felt cleaned out after our last visit with the bowel clean out but states it was traumatic as it took 3 days for the regiment to work (2 days of mag citrate w/ glycerin supp, then a daily of miralax clean out) and she then had significant diarrhea and stool output for a week. The day prior to surgery 5/19 , she had a good day with improved abd pain, \"felt my intestines were cleared\" but then had surgery with anesthesia and then developed days without stooling despite stool softeners. She had twilight anesthesia last Friday 6/6 for IUD placement with last BM on 6/7/25. Today she is reporting constant mid abdominal pain, distension and bloat that get worse with any oral intake (including fluids) and improves with defecation. Last BM 6/7. States she tried mag citrate bottles but that still had constipation.     Current constipation picture I suspect is due to surgery, anesthesia, and pain medications she is on. Abd distended with some tenderness )mid abdomen, LUQ, RLQ). Vitals stable and she is nontoxic appearing. Oral intake maintained. We discussed the following plan    Plan  -continue to hold colestipol and citrucel  -KUB today, then will discuss bowel regiment; possible daily miralax vs Rx low dose linzess  -pelvic floor dysfunction eval  -Limit Zofran  -ER precautions reviewed  -1 mo RTC     Future consideration  1.  Given predominant abdominal pain component, she would be a good candidate for a neuromodulator such as a tricyclic antidepressant. EKG 1/22/2025 - 453 m/s.  We can consider this in the future  2.  Breath test for small intestinal bacterial overgrowth, fructose intolerance, carbohydrate malabsorption  3. If the diarrhea continues despite our management options, we can consider evaluation for rare causes of chronic diarrhea such as hormone secreting tumors (eg.,  fasting serum gastrin, serum calcitonin, somatostatin, vasoactive intestinal " polypeptide, 24-hour urine 5-HIAA)  4.  Laboratory studies to assess for malabsorption can include albumin, RBC folate, serum iron, total iron-binding capacity, B12, calcium, magnesium, carotene, vitamin D    #heartburn  Been on Protonix 40 mg daily chronically, she has no breakthrough heartburn on this but wants to get off it.   Trial 20 mg daily, OK to go back on lowest effective dose of persistent sx     Orders Placed This Encounter   Procedures     X-ray Abdomen 1 vw     Physical Therapy  Referral     Colorectal cancer screening: aged 45    RTC 1 mo or sooner PRN     Thank you for this consultation.  It was a pleasure to participate in the care of this patient; please contact me with any further questions.     Yanely Hansen PA-C    Follow up: As planned above. Today, I personally spent 50  minutes spent on the date of the encounter doing chart review, history and exam, documentation and further activities per the note.    HPI  Anh Flores is a 30 year old year old female with PMHx of history of CAH, obesity, type 2 diabetes, PCOS following with the [facility] GI group for diarrhea since age 12/13.  Tangential historian    3/21/25  Previously seen with [facility] for chronic diarrhea - per notes, improved with infrequent use of bile acid sequestrant     She had CCY at age 10 and 11 - then developed baseline diarrhea  She then got cryptosporidium infx in 12/2023 and had a month of N/V/D  -since then, the diarrhea has worsened    -reports flares of the diarrhea, can last 2d-3wk; consistency varies from frequent pudding consistency stools to BSC type 7 stools that are difficult to manage. She endorses frequent small volumed incomplete BM that will continue for hours until she empties  --If she eats something that irritates her stomach, usually within 20-30 min, her abd feels bubbly and she then needs to have a BM     -She can then get a week of 'normal' stooling, usually passing 2 bsc type 4 stools though shares  throughout the week, the stools can get looser . Even with the normal stools, she does not endorse complete evacuation.     -she stools every day, no days without a BM     She was put on cholestyramine that she takes intermittently (when diarrhea gets very bad - she characterizes this as stools that look like a BM after a colonoscopy prep) and it has helped at times but shares it binds to her teeth and makes it hurt . Not been on colestipol.   -states she was on all the OTC meds - imodium, pepto bismol without relief   -she is not on a daily bowel regiment     Currently she is reporting she is in day 2 of a softer stool but not actively in a flare     She is working w/ dietary and had been on a few different diets   -even if she fasts, she can get diarrhea    She endorse chronic baseline abdominal pain (since aged [age]) that can flare with her episodes of diarrhea too; pressure sensation, fullness and a pulsing sensation. She feels like her intestines are always sore   -no meds like NSAIDs, is on gabapentin   -nothing she has tried has helped this pain     She has been on ozempic for a year now, but went off it for a NMGES and endoscopy procedure. The stomach pain did not change being on or off the ozempic.     She is on metformin XR 1000 mg BID, and scales back her metformin use (from 4x500 to 2-3x500). She doesn't feel the metformin makes the diarrhea worse though later shares when diarrhea flares, she cuts down on metformin as she has limited PO intake and finds her sugars drop so she adjusts metformin accordingly     She endorses some nausea w/o emesis, poor appetite, but no consistent unintentional weight loss (can vacillate a few lb, gaining/losing)  She has gone to ER for concerns of dehydration though she was not given IVF     No black stools. Can see some blood on water/TP when she stools frequently.     Not had SB checked, not checked with breath test     May 7, 2025    4/29 - fecal elastase WNL , fecal  "calpro 12s  Enteric cancelled and reordered 4/29 delta STOUT WNL  Asked details of worsenign diarrhea and asked to increase colestipol    Got back from [place] 2:30 AM today, she dropped off her dog with close friends   Last BM was last Thursday, none since then   During the entire trip she has not had a bowel movement, last one was   Endo increased her ozempic in past 6 weeks;  held 5/17/25   She started with intermittent vomiting about 3 weeks ago. Usually she vomits a few times a week, usually after she eats; she gets abd pain and then vomits. Then she feels better; no bloody emesis (just partially digested food she eats)  She is on a soft textured diet, having small frequent meals throughout the day   She is taking Citrucel 1 tsp TID - she likes it.   She takes colestipol 2-3x a week  Finds it helps with the diarrhea. No black stools. Can see some bright red blood   Diarrhea - much improved, infrequent now. She is more blocked up now and has abd pain related to this.   She started vomiting and noticed  more abd pain when she takes the colestipol   16 oz bottle x 6-9 bottles of water a day   5/19 surgery L trigger release  Ozempic held 5/17/25     Today June 9, 2025  At our last visit, had bad diarrhea with bowel clean out - took 3 days and took a wk for her stooling to normalize . She thinks this is due to all the retained stools she had prior to the clean out   Had surgery, day prior had a day of normal GI day (still had abd pain but it was improved - felt her GI tract cleared out - \"the heaviness improved to my intestines\" - described also as a pulsation), then had surgery, then developed again constipation that was not managed on stool softener . Not been able to go for 4-5 days now, and feels constipated again   -states the bowel clean out was very difficult was her, states she does not want to do the clean out again  -states she would prefer to be constipated and not have severe diarrhea like during clean out as " "it was traumatic for her   When she eats, she has pain to epigastric pain/mid section - this is the same pain she had even off the ozempic.   pressure sensation, fullness,\"like I want to pop.\" Belching does not help. No vomiting as she takes zofran (1-2x a week).   It is constant and flares when she eats (even with soft textured foods - even her safe foods. Will have this flare immediately with oral intake). Flares also with water. No relationship of this pain to positional changes or urination.   -if she has a stool, the pain gets better  but she feels like she can't go. Will take mag citrate bottle x 1 and still no stool.   -will have the sensation to defecate but doesn't feel like she can go   -had general anesthesia for hand surgery, then recently twilight sedation for IUD placement   She stopped colestipol since our last visit  -Citrucel use is PRN   -She had stopped ozempic x2 in last month for surgery   -trying to do fruits/veggies but states hard to eat / drink   -states she is maintaining weight   Last BM was Saturday, 2 inch piece. She feels backed up on upper abdomen. Flatus is variable, a bubbly stomach sensation and \"feels like it's hitting a wall\"     Wt Readings from Last 10 Encounters:   06/09/25 111.5 kg (245 lb 14.4 oz)   06/09/25 111.3 kg (245 lb 4.8 oz)   06/06/25 111.9 kg (246 lb 11.1 oz)   06/02/25 111.4 kg (245 lb 11.2 oz)   05/19/25 110.4 kg (243 lb 6.2 oz)   05/07/25 111.1 kg (245 lb)   05/02/25 111.6 kg (246 lb)   04/25/25 112.5 kg (248 lb 1.6 oz)   04/09/25 114.3 kg (252 lb)   04/09/25 113.4 kg (250 lb)       Family Hx  Maternal Uncle - colon cancer   No other known family history or GI related malignancy (esophageal, gastric, pancreatic, liver or colon) or family history of IBD/celiac disease.     Social Hx   No use of NSAIDs  No ETOH  No tobacco products   No recreational drug use     Puppy - shiz tzu; good friends in [place] (sitting her dog)   PROBLEM LIST  Patient Active Problem List "    Diagnosis Date Noted     Chronic bilateral low back pain, unspecified whether sciatica present 06/02/2025     Priority: Medium     Lumbar radiculopathy 06/02/2025     Priority: Medium     Coccydynia 03/11/2025     Priority: Medium     Ulnar neuropathy of both upper extremities 02/21/2025     Priority: Medium     Trigger thumb of both thumbs 02/21/2025     Priority: Medium     Lateral epicondylitis of both elbows 02/21/2025     Priority: Medium     Elevated ferritin 12/04/2024     Priority: Medium     Cryptosporidiasis (H) 11/14/2024     Priority: Medium     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     Priority: Medium     Abnormal finding on imaging of liver 10/18/2024     Priority: Medium     Chronic diarrhea 10/18/2024     Priority: Medium     Lactose intolerance 09/10/2024     Priority: Medium     Obstructive sleep apnea 07/05/2024     Priority: Medium     Insomnia, unspecified type 07/05/2024     Priority: Medium     Bilateral carpal tunnel syndrome 06/11/2024     Priority: Medium     Diabetes mellitus, type 2 (H) 10/19/2020     Priority: Medium     Hidradenitis suppurativa 01/23/2019     Priority: Medium     Morbid obesity with body mass index of 60.0-69.9 in adult (H) 01/23/2019     Priority: Medium     Elevated BP without diagnosis of hypertension 01/23/2019     Priority: Medium     Morbid obesity (H) 10/17/2014     Priority: Medium     Chronic headaches 10/13/2014     Priority: Medium     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 Brewer; next follow up 1.2011.    Metformin increased to 850 mg twice a day.       Chronic abdominal pain 02/09/2010     Priority: Medium     Vitamin D deficiency 02/09/2010     Priority: Medium       PERTINENT PAST MEDICAL HISTORY:  Past Medical History:   Diagnosis Date     CAH (congenital adrenal hyperplasia) 2/9/2010    Followed by Dr. Brewer; next follow up  1.2011.  Metformin increased to 850 mg twice a day.      Diabetes mellitus, type 2 (H) 10/19/2020     Vitamin D deficiency 2/9/2010       PREVIOUS SURGERIES:  Past Surgical History:   Procedure Laterality Date     CHOLECYSTECTOMY      She was in 8th grade      COLONOSCOPY N/A 9/16/2024    Procedure: COLONOSCOPY, WITH BIOPSY;  Surgeon: Bacilio Yancey MD;  Location:  GI     ESOPHAGOSCOPY, GASTROSCOPY, DUODENOSCOPY (EGD), COMBINED N/A 9/16/2024    Procedure: ESOPHAGOGASTRODUODENOSCOPY, WITH BIOPSY;  Surgeon: Bacilio Yancey MD;  Location:  GI     INJECT STEROID (LOCATION) N/A 4/9/2025    Procedure: Sacrococcygeal ligament/coccyx steroid injection;  Surgeon: Edwina Fitzpatrick MD;  Location: [facility] OR     RELEASE CARPAL TUNNEL Left 5/19/2025    Procedure: Left open carpal tunnel release;  Surgeon: Vic Roberts MD;  Location: [facility] OR     RELEASE DEQUERVAINS WRIST Left 5/19/2025    Procedure: Left first dorsal compartment release;  Surgeon: Vic Roberts MD;  Location: [facility] OR     RELEASE TRIGGER FINGER Left 5/19/2025    Procedure: Left trigger thumb release;  Surgeon: Vic Roberts MD;  Location: [facility] OR     TRANSPOSITION ULNAR NERVE (ELBOW) Left 5/19/2025    Procedure: Left cubital tunnel release;  Surgeon: Vic Roberts MD;  Location: [facility] OR     PERTINENT MEDICATIONS:    Current Outpatient Medications:      AJOVY 225 MG/1.5ML SOAJ, Inject 225 mg subcutaneously every 30 days. Last dose 4/26, Disp: , Rfl:      alcohol swab prep pads, Use to swab area of injection/anthony as directed., Disp: 100 each, Rfl: 3     Atogepant (QULIPTA) 60 MG TABS, Take 60 mg by mouth as needed., Disp: , Rfl:      atorvastatin (LIPITOR) 10 MG tablet, Take 10 mg by mouth daily., Disp: , Rfl:      blood glucose (NO BRAND SPECIFIED) test strip, Use to test blood sugar three times daily or as directed. Preferred blood glucose meter OR supplies to accompany: Blood Glucose Monitor Brands: per insurance.,  Disp: 100 strip, Rfl: 6     blood glucose monitoring (NO BRAND SPECIFIED) meter device kit, Use to test blood sugar three times daily or as directed. Preferred blood glucose meter OR supplies to accompany: Blood Glucose Monitor Brands: per insurance., Disp: 1 kit, Rfl: 0     colestipol (COLESTID) 1 g tablet, Take 1 tablet (1 g) by mouth 2 times daily. (Patient not taking: Reported on 6/9/2025), Disp: 60 tablet, Rfl: 2     Continuous Glucose Sensor (FREESTYLE AYSE 3 PLUS SENSOR) MISC, Use 1 sensor every 15 days. Use to read blood sugars per 's instructions., Disp: 6 each, Rfl: 3     Continuous Glucose Sensor (FREESTYLE AYSE 3 SENSOR) MISC, 1 each by Other route every 14 days., Disp: 6 each, Rfl: 3     cyclobenzaprine (FLEXERIL) 5 MG tablet, Take by mouth 3 times daily as needed for muscle spasms., Disp: , Rfl:      docusate sodium (COLACE) 100 MG capsule, Take 1 capsule (100 mg) by mouth 2 times daily. (Patient not taking: Reported on 6/9/2025), Disp: 12 capsule, Rfl: 0     gabapentin (NEURONTIN) 300 MG capsule, Take 1 capsule (300 mg) by mouth 3 times daily., Disp: 90 capsule, Rfl: 0     hydrOXYzine HCl (ATARAX) 25 MG tablet, Take at bedtime as needed for itching. (Patient taking differently: daily. Take at bedtime as needed for itching.), Disp: 30 tablet, Rfl: 4     insulin pen needle (ULTICARE MICRO) 32G X 4 MM miscellaneous, Use 1 pen needles daily or as directed., Disp: 100 each, Rfl: 3     metFORMIN (GLUCOPHAGE XR) 500 MG 24 hr tablet, Take 4 tablets (2,000 mg) by mouth daily (with dinner)., Disp: 360 tablet, Rfl: 3     OnabotulinumtoxinA (BOTOX IJ), Inject as directed every 3 months. Last dose 4/23/25, Disp: , Rfl:      ondansetron (ZOFRAN ODT) 4 MG ODT tab, DISSOLVE ONE TABLET ON TONGUE EVERY 8 HOURS AS NEEDED FOR NAUSEA, Disp: 30 tablet, Rfl: 1     ondansetron (ZOFRAN) 4 MG tablet, Take 1 tablet (4 mg) by mouth every 6 hours as needed for nausea. (Patient not taking: Reported on 6/9/2025),  Disp: 12 tablet, Rfl: 0     oxyCODONE (ROXICODONE) 5 MG tablet, Take 1-2 tablets (5-10 mg) by mouth every 6 hours as needed for moderate to severe pain. (Patient not taking: Reported on 6/9/2025), Disp: 16 tablet, Rfl: 0     pantoprazole (PROTONIX) 40 MG EC tablet, Take 40 mg by mouth daily., Disp: , Rfl:      rimegepant (NURTEC) 75 MG ODT tablet, Place 75 mg under the tongue daily as needed for migraine., Disp: , Rfl:      rizatriptan (MAXALT) 10 MG tablet, Take 10 mg by mouth at onset of headache., Disp: , Rfl:      Semaglutide, 1 MG/DOSE, (OZEMPIC) 4 MG/3ML pen, Inject 1 mg subcutaneously every 7 days., Disp: 6 mL, Rfl: 1     spironolactone (ALDACTONE) 50 MG tablet, Take 1 tablet (50 mg) by mouth daily., Disp: 30 tablet, Rfl: 4     SUMAtriptan (IMITREX) 50 MG tablet, Take 2 tablets (100 mg) by mouth at onset of headache for migraine. May repeat in 2 hours. Max 4 tablets/24 hours., Disp: , Rfl:      thin (NO BRAND SPECIFIED) lancets, Use with lanceting device. To accompany: Blood Glucose Monitor Brands: per insurance., Disp: 100 each, Rfl: 6     topiramate (TOPAMAX) 25 MG tablet, Take 25 mg by mouth 2 times daily., Disp: , Rfl:      tranexamic acid (LYSTEDA) 650 MG tablet, Take 2 tablets (1,300 mg) by mouth 2 times daily as needed (menorrhagia)., Disp: 30 tablet, Rfl: 1     UBRELVY 100 MG tablet, Take 100 mg by mouth at onset of headache., Disp: , Rfl:     PHYSICAL EXAMINATION:  Vitals reviewed: LMP 05/21/2025 (Exact Date)   Constitutional: aaox3, cooperative, pleasant, not dyspneic/diaphoretic, no acute distress  Eyes: Sclera anicteric/injected  Ears/nose/mouth/throat: hearing intact  Neck: supple, active ROM w/o limitation or pain   CV: No edema  Respiratory: Unlabored breathing  Abd:  mildly distended, tenderness to mid abdomen > epigastric > RLQ > LUQ but no guarding or peritoneal signs  Skin: warm, perfused, no jaundice  Psych: Normal affect  MSK: Normal gait     PERTINENT STUDIES:    Office Visit on  03/11/2025   Component Date Value Ref Range Status     Vitamin B12 03/11/2025 301  232 - 1,245 pg/mL Final        Lab Results   Component Value Date    WBC 7.6 12/18/2024    WBC 9.2 11/09/2024    WBC 9.3 11/09/2024    HGB 13.9 05/02/2025    HGB 13.5 12/18/2024    HGB 14.4 11/09/2024     12/18/2024     11/09/2024     11/09/2024    CHOL 137 03/07/2025    CHOL 143 11/13/2024    CHOL 157 10/18/2024    TRIG 90 03/07/2025    TRIG 88 11/13/2024    TRIG 95 10/18/2024    HDL 56 03/07/2025    HDL 58 11/13/2024    HDL 60 10/18/2024    ALT 20 03/07/2025    ALT 16 12/18/2024    ALT 17 11/09/2024    AST 18 03/07/2025    AST 16 12/18/2024    AST 15 11/09/2024     03/07/2025     11/09/2024     11/09/2024    BUN 9.4 03/07/2025    BUN 12.3 11/09/2024    BUN 12.8 11/09/2024    CO2 22 03/07/2025    CO2 21 (L) 11/09/2024    CO2 16 (L) 11/09/2024    TSH 1.08 08/23/2024    TSH 0.93 04/30/2024    TSH 1.33 01/30/2024    INR 0.9 03/24/2025        Liver Function Studies -   Recent Labs   Lab Test 03/07/25  1428   PROTTOTAL 7.9   ALBUMIN 4.4   BILITOTAL 0.2   ALKPHOS 53   AST 18   ALT 20        PREVIOUS ENDOSCOPY    9/16/2024 -EGD for diarrhea, nausea with vomiting.  GE flap valve [place] grade 1.  Normal stomach, biopsied (mild chronic inactive gastritis, negative H. pylori.  No intestinal metaplasia).  Normal duodenum, biopsied (mild peptic type duodenopathy.  No evidence of celiac disease)     9/16/2024 colonoscopy for chronic diarrhea status postcholecystectomy.  Performed with Dr. Bonner.  Excellent prep, terminal ileal intubation.  Unremarkable macro exam.  Random colon biopsies unremarkable colon mucosa, no microscopic colitis or other histopathologic changes noted.    -Continue with psyllium fiber, Imodium, cholestyramine.  Consider nutrition consult for weight management in the setting of metabolic associated steatotic liver disease and diarrhea.      Again, thank you for allowing me to participate  in the care of your patient.        Sincerely,        Yanely Hansen PA-C    Electronically signed

## 2025-06-10 ENCOUNTER — RESULTS FOLLOW-UP (OUTPATIENT)
Dept: GASTROENTEROLOGY | Facility: CLINIC | Age: 30
End: 2025-06-10

## 2025-06-10 ENCOUNTER — VIRTUAL VISIT (OUTPATIENT)
Dept: PSYCHOLOGY | Facility: CLINIC | Age: 30
End: 2025-06-10
Payer: COMMERCIAL

## 2025-06-10 ENCOUNTER — RESULTS FOLLOW-UP (OUTPATIENT)
Dept: DERMATOLOGY | Facility: CLINIC | Age: 30
End: 2025-06-10

## 2025-06-10 ENCOUNTER — RESULTS FOLLOW-UP (OUTPATIENT)
Dept: OBGYN | Facility: CLINIC | Age: 30
End: 2025-06-10

## 2025-06-10 ENCOUNTER — OFFICE VISIT (OUTPATIENT)
Dept: OPHTHALMOLOGY | Facility: CLINIC | Age: 30
End: 2025-06-10
Attending: OPHTHALMOLOGY
Payer: COMMERCIAL

## 2025-06-10 DIAGNOSIS — H53.483 GENERALIZED CONTRACTION OF VISUAL FIELD OF BOTH EYES: Primary | ICD-10-CM

## 2025-06-10 DIAGNOSIS — F32.1 CURRENT MODERATE EPISODE OF MAJOR DEPRESSIVE DISORDER, UNSPECIFIED WHETHER RECURRENT (H): Primary | ICD-10-CM

## 2025-06-10 DIAGNOSIS — H04.123 DRY EYE SYNDROME OF BOTH EYES: ICD-10-CM

## 2025-06-10 LAB
BKR LAB AP GYN ADEQUACY: NORMAL
BKR LAB AP GYN INTERPRETATION: NORMAL
BKR LAB AP LMP: NORMAL
HPV HR 12 DNA CVX QL NAA+PROBE: NEGATIVE
HPV16 DNA CVX QL NAA+PROBE: NEGATIVE
HPV18 DNA CVX QL NAA+PROBE: NEGATIVE
HUMAN PAPILLOMA VIRUS FINAL DIAGNOSIS: NORMAL
IGE SERPL-ACNC: 290 KU/L (ref 0–114)
PATH REPORT.COMMENTS IMP SPEC: NORMAL
PATH REPORT.COMMENTS IMP SPEC: NORMAL
TRYPTASE SERPL-MCNC: 3.3 UG/L

## 2025-06-10 PROCEDURE — G0463 HOSPITAL OUTPT CLINIC VISIT: HCPCS | Performed by: OPHTHALMOLOGY

## 2025-06-10 PROCEDURE — 90837 PSYTX W PT 60 MINUTES: CPT | Mod: 93

## 2025-06-10 PROCEDURE — 92133 CPTRZD OPH DX IMG PST SGM ON: CPT | Performed by: OPHTHALMOLOGY

## 2025-06-10 PROCEDURE — 92083 EXTENDED VISUAL FIELD XM: CPT | Performed by: OPHTHALMOLOGY

## 2025-06-10 PROCEDURE — 99244 OFF/OP CNSLTJ NEW/EST MOD 40: CPT | Mod: GC | Performed by: OPHTHALMOLOGY

## 2025-06-10 RX ORDER — PREDNISOLONE ACETATE 10 MG/ML
SUSPENSION/ DROPS OPHTHALMIC
Qty: 10 ML | Refills: 0 | Status: SHIPPED | OUTPATIENT
Start: 2025-06-10 | End: 2025-07-01

## 2025-06-10 ASSESSMENT — CONF VISUAL FIELD
OS_INFERIOR_NASAL_RESTRICTION: 0
METHOD: COUNTING FINGERS
OD_SUPERIOR_TEMPORAL_RESTRICTION: 0
OS_SUPERIOR_TEMPORAL_RESTRICTION: 0
OS_SUPERIOR_NASAL_RESTRICTION: 0
OS_NORMAL: 1
OD_INFERIOR_TEMPORAL_RESTRICTION: 0
OS_INFERIOR_TEMPORAL_RESTRICTION: 0
OD_SUPERIOR_NASAL_RESTRICTION: 0
OD_INFERIOR_NASAL_RESTRICTION: 0
OD_NORMAL: 1

## 2025-06-10 ASSESSMENT — REFRACTION_WEARINGRX
SPECS_TYPE: SVL
OD_SPHERE: -1.50
OS_SPHERE: -2.00
OD_CYLINDER: +1.25
OS_AXIS: 080
OD_AXIS: 090
OS_CYLINDER: +1.25

## 2025-06-10 ASSESSMENT — CUP TO DISC RATIO
OS_RATIO: 0.45
OD_RATIO: 0.45

## 2025-06-10 ASSESSMENT — SLIT LAMP EXAM - LIDS
COMMENTS: NORMAL
COMMENTS: NORMAL

## 2025-06-10 ASSESSMENT — VISUAL ACUITY
OD_CC+: +2
METHOD: SNELLEN - LINEAR
OS_CC: 20/25
OD_CC: 20/25
CORRECTION_TYPE: GLASSES
OS_CC+: +1

## 2025-06-10 ASSESSMENT — TONOMETRY
OD_IOP_MMHG: 15
OS_IOP_MMHG: 14
IOP_METHOD: ICARE

## 2025-06-10 ASSESSMENT — EXTERNAL EXAM - LEFT EYE: OS_EXAM: NORMAL

## 2025-06-10 ASSESSMENT — EXTERNAL EXAM - RIGHT EYE: OD_EXAM: NORMAL
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
## 2025-06-10 NOTE — LETTER
"Holly 10, 2025     RE:  :  MRN: Anh Flores  1995  [id number]     Dear Dr. Perry    Thank you for asking me to see your very pleasant patient,Anh Flores, in neuro-ophthalmic consultation.  I would like to thank you for sending your records and I have summarized them in the history of present illness.   My assessment and plan are below.  For further details, please see my attached clinic note.      Anh Flores is a 30 year old female with the following diagnoses:   1. Generalized contraction of visual field of both eyes    2. Dry eye syndrome of both eyes       Patient was sent for consultation by Dr. Perry for new visual field defect.    HPI:  30 F with T2DM (on Ozempic), BMI 48, and intractable migraine with aura on botox injections (scalp) presenting for evaluation of visual field defect. Seen by Dr. Perry 25 and found to have worsened visual field changes compared to prior visit in April, which also showed visual field defects though in a cloverleaf shape suggesting inattention. Pt states that her color vision has changed over the past 1 year, and that she is no longer able to drive due to her vision changes (\"fuzzy vision\") for 5 years. This has worsened over the past 6 months or so, as she was unable to see the road. Eyes hurt all the time, describes as a baseline 5-6/10 up to 10/10 sharp pain that is sometimes aching, does not worsen with eye movements. Also feels that the eyelids are twitching. Also states that she lives in a state of constant dizziness and feels this may be related to migraine medications. Additionally notes that she cannot see at night.     Endorses TVO's 3-4 weeks ago, a wooshing heartbeat sound in her ears (has seen ENT for this with normal audiology workup), double vision (describes as twisted images and intermittent and associated with migraines), and changes in color vision. She has intermittent blurring of her vision. She can read a few pages and then has " to stop and then can read again. She states that the same thing happens when she watches TV or drives.      Endorses weight loss     Pohx: Dry eyes OU    Artificial tear drops seems to help     Independent historians:  Patient    Review of outside testing:  MRI Brain w/o Contrast 3/29/24:   IMPRESSION:  1.  No acute intracranial process.  2.  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 This is most likely incidental.  3.  Scattered polypoid soft tissue in the paranasal sinuses, as described. This includes essentially complete opacification of the left frontal sinus. Correlate clinically for possible symptoms/signs of sinusitis.    My interpretation performed today of outside testing:  I have independently reviewed the MRI performed 3/29/24. No abnormal FLAIR hyperintensity or enhancement in the orbits or elsewhere along the visual pathways.    Review of outside clinical notes:    - Visit with Dr. Perry on 5/19/25:     - Visit with Dr. 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), late onset    Chronic abdominal pain    Vitamin D deficiency    Chronic headaches    Morbid obesity (H)    Hidradenitis suppurativa    Morbid obesity with body mass index of 60.0-69.9 in adult (H)    Elevated BP without diagnosis of hypertension    Diabetes mellitus, type 2 (H)    Bilateral carpal tunnel syndrome    Obstructive sleep apnea    Insomnia, unspecified type    Lactose intolerance    Abnormal finding on imaging of liver    Chronic diarrhea    Carrier of hemochromatosis HFE gene mutation    Cryptosporidiasis (H)    Elevated ferritin    Ulnar neuropathy of both upper extremities    Trigger thumb of both thumbs    Lateral epicondylitis of both elbows    Coccydynia    Chronic bilateral low back pain,  unspecified whether sciatica present    Lumbar radiculopathy     Medications:   Current Outpatient Medications   Medication Sig Dispense Refill    AJOVY 225 MG/1.5ML SOAJ Inject 225 mg subcutaneously every 30 days. Last dose 4/26      alcohol swab prep pads Use to swab area of injection/anthony as directed. 100 each 3    Atogepant (QULIPTA) 60 MG TABS Take 60 mg by mouth as needed.      atorvastatin (LIPITOR) 10 MG tablet Take 10 mg by mouth daily.      blood glucose (NO BRAND SPECIFIED) test strip Use to test blood sugar three times daily or as directed. Preferred blood glucose meter OR supplies to accompany: Blood Glucose Monitor Brands: per insurance. 100 strip 6    blood glucose monitoring (NO BRAND SPECIFIED) meter device kit Use to test blood sugar three times daily or as directed. Preferred blood glucose meter OR supplies to accompany: Blood Glucose Monitor Brands: per insurance. 1 kit 0    colestipol (COLESTID) 1 g tablet Take 1 tablet (1 g) by mouth 2 times daily. 60 tablet 2    Continuous Glucose Sensor (FREESTYLE AYSE 3 PLUS SENSOR) MISC Use 1 sensor every 15 days. Use to read blood sugars per 's instructions. 6 each 3    Continuous Glucose Sensor (FREESTYLE AYSE 3 SENSOR) MISC 1 each by Other route every 14 days. 6 each 3    cyclobenzaprine (FLEXERIL) 5 MG tablet Take by mouth 3 times daily as needed for muscle spasms.      docusate sodium (COLACE) 100 MG capsule Take 1 capsule (100 mg) by mouth 2 times daily. 12 capsule 0    FLUoxetine (PROZAC) 10 MG capsule Take 1 capsule (10 mg) by mouth daily. 30 capsule 4    gabapentin (NEURONTIN) 300 MG capsule Take 1 capsule (300 mg) by mouth 3 times daily. 90 capsule 0    hydrOXYzine HCl (ATARAX) 25 MG tablet Take at bedtime as needed for itching. (Patient taking differently: daily. Take at bedtime as needed for itching.) 30 tablet 4    insulin pen needle (ULTICARE MICRO) 32G X 4 MM miscellaneous Use 1 pen needles daily or as directed. 100 each 3     metFORMIN (GLUCOPHAGE XR) 500 MG 24 hr tablet Take 4 tablets (2,000 mg) by mouth daily (with dinner). 360 tablet 3    OnabotulinumtoxinA (BOTOX IJ) Inject as directed every 3 months. Last dose 4/23/25      ondansetron (ZOFRAN ODT) 4 MG ODT tab DISSOLVE ONE TABLET ON TONGUE EVERY 8 HOURS AS NEEDED FOR NAUSEA 30 tablet 1    ondansetron (ZOFRAN) 4 MG tablet Take 1 tablet (4 mg) by mouth every 6 hours as needed for nausea. 12 tablet 0    oxyCODONE (ROXICODONE) 5 MG tablet Take 1-2 tablets (5-10 mg) by mouth every 6 hours as needed for moderate to severe pain. 16 tablet 0    pantoprazole (PROTONIX) 20 MG EC tablet Take 1 tablet (20 mg) by mouth daily. 30 tablet 1    pantoprazole (PROTONIX) 40 MG EC tablet Take 40 mg by mouth daily.      prednisoLONE acetate (PRED FORTE) 1 % ophthalmic suspension Place 1 drop into both eyes 3 times daily for 7 days, THEN 1 drop 2 times daily for 7 days, THEN 1 drop daily for 7 days. 10 mL 0    rimegepant (NURTEC) 75 MG ODT tablet Place 75 mg under the tongue daily as needed for migraine.      rizatriptan (MAXALT) 10 MG tablet Take 10 mg by mouth at onset of headache.      Semaglutide, 1 MG/DOSE, (OZEMPIC) 4 MG/3ML pen Inject 1 mg subcutaneously every 7 days. 6 mL 1    spironolactone (ALDACTONE) 50 MG tablet Take 1 tablet (50 mg) by mouth daily. 30 tablet 4    SUMAtriptan (IMITREX) 50 MG tablet Take 2 tablets (100 mg) by mouth at onset of headache for migraine. May repeat in 2 hours. Max 4 tablets/24 hours.      thin (NO BRAND SPECIFIED) lancets Use with lanceting device. To accompany: Blood Glucose Monitor Brands: per insurance. 100 each 6    topiramate (TOPAMAX) 25 MG tablet Take 25 mg by mouth 2 times daily.      tranexamic acid (LYSTEDA) 650 MG tablet Take 2 tablets (1,300 mg) by mouth 2 times daily as needed (menorrhagia). 30 tablet 1    UBRELVY 100 MG tablet Take 100 mg by mouth at onset of headache.       Family history:  Patient's family history includes Neurologic Disorder (age of  onset: 16) in her sister.   Social history:  Patient  reports that she has never smoked. She has never been exposed to tobacco smoke. She has never used smokeless tobacco. She reports that she does not drink alcohol and does not use drugs.     Occupation: not working currently, previously a     Exam:  Visual acuity 20/25 right eye 20/25 left eye.  Color vision 11/11 right eye and 11/11 left eye.  Pupils PERRLA. Intraocular pressure 15 right eye and 14 left eye. Anterior segment exam is unremarkable.  Fundus exam is unremarkable.  Strabismus exam full / ortho / painless; no lid droop after 30 seconds in sustained upgaze. Her eye pain improved with topical anesthetic.      Remaining cranial nerve exam (V, VII-XII): V1-V3 sensation intact with greater sensation in the left than the right. Muscles of facial expression symmetric and no facial droop. Hearing intact to conversational tone but endorses greater sensitivity on the left. Palate elevates symmetrically. Tongue protrudes midline and side-to-side. Shoulder elevation and sternocleidomastoid strength intact.     Tests ordered and interpreted today:  Low Vision Central OU    Performed by: Misty   . Patient cooperation: Reliable   .   Good Fixation, Dilated After VF Blindspot Mapping fail twice right eye,.     Notes  OD: size V stim, low vision central, unreliable due to high false neg; SN defects  OS: size V stim, low vision central, unreliable due to 15/18 fixation losses; superior defects    OCT Optic Nerve RNFL Spectralis OU (both eyes)    Performed by: shonda   .   Right Eye  Reliability of the test: Good   . Test Findings Free Text: 98 (95)   . Interpretation: Normal   . Interval: Initial   .     Left Eye  Reliability of the test: Good   . Test Findings Free Text: 93 (92)   . Interpretation: Normal   . Interval: Initial   .     Notes  Normal ganglion cell layer  both eyes     Discussion of management / interpretation with another provider:    None    Assessment/Plan:   It is my impression that patient has intermittent blurred vision and eye pain. This is most consistent with Dry eye syndrome and tear film insufficiency. I asked the patient to use artificial tears as well as warm compresses to the eyelid margin  on a regular basis.  I asked the patient to take fish oil capsules 2x/day for a month and then 1x/d thereafter.   I will ask her to try prednisolone acetate 1% ophthalmic suspension three times a day x 7 days and reduce by one drop each week.  If this does not benefit her then she can follow up with Dr. Perry for dry eye treatment.      Her visual field loss is likely artifactual. Her optical coherence tomography retinal nerve fiber layer and ganglion cell layer  are normal. I would not keep testing her visual field. Her MRI was unremarkable.      Again, thank you for allowing me to participate in the care of your patient.      Sincerely,    Remington Torres MD  Professor  Director of Neuro-Ophthalmology  [facility] Endowed Chair  Departments of Ophthalmology, Neurology, and Neurosurgery  [facility] [address] [place] [address]  [id number]  T - [phone]   - [phone]  AMANDA [email or web address]      CC: Dameon Perry, SHERINE  [address] [facility] [id number]  Via In Basket    Maria Guadalupe Triana MD  [address] [facility] [address]  Via In Basket

## 2025-06-10 NOTE — PROGRESS NOTES
"     Anh Flores is a 30 year old female with the following diagnoses:   1. Generalized contraction of visual field of both eyes    2. Dry eye syndrome of both eyes         Patient was sent for consultation by Dr. Perry for new visual field defect.    HPI:    30 F with T2DM (on Ozempic), BMI 48, and intractable migraine with aura on botox injections (scalp) presenting for evaluation of visual field defect. Seen by Dr. Perry 5/19/25 and found to have worsened visual field changes compared to prior visit in April, which also showed visual field defects though in a cloverleaf shape suggesting inattention. Pt states that her color vision has changed over the past 1 year, and that she is no longer able to drive due to her vision changes (\"fuzzy vision\") for 5 years. This has worsened over the past 6 months or so, as she was unable to see the road. Eyes hurt all the time, describes as a baseline 5-6/10 up to 10/10 sharp pain that is sometimes aching, does not worsen with eye movements. Also feels that the eyelids are twitching. Also states that she lives in a state of constant dizziness and feels this may be related to migraine medications. Additionally notes that she cannot see at night.     Endorses TVO's 3-4 weeks ago, a wooshing heartbeat sound in her ears (has seen ENT for this with normal audiology workup), double vision (describes as twisted images and intermittent and associated with migraines), and changes in color vision. She has intermittent blurring of her vision. She can read a few pages and then has to stop and then can read again. She states that the same thing happens when she watches TV or drives.      Endorses weight loss     Pohx: Dry eyes OU    Artificial tear drops seems to help     Independent historians:  Patient    Review of outside testing:  MRI Brain w/o Contrast 3/29/24:   IMPRESSION:  1.  No acute intracranial process.  2.  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 This is most likely incidental.  3.  Scattered polypoid soft tissue in the paranasal sinuses, as described. This includes essentially complete opacification of the left frontal sinus. Correlate clinically for possible symptoms/signs of sinusitis.    My interpretation performed today of outside testing:  I have independently reviewed the MRI performed 3/29/24. No abnormal FLAIR hyperintensity or enhancement in the orbits or elsewhere along the visual pathways.    Review of outside clinical notes:    - Visit with Dr. Perry on 5/19/25:     - Visit with Dr. 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), late onset    Chronic abdominal pain    Vitamin D deficiency    Chronic headaches    Morbid obesity (H)    Hidradenitis suppurativa    Morbid obesity with body mass index of 60.0-69.9 in adult (H)    Elevated BP without diagnosis of hypertension    Diabetes mellitus, type 2 (H)    Bilateral carpal tunnel syndrome    Obstructive sleep apnea    Insomnia, unspecified type    Lactose intolerance    Abnormal finding on imaging of liver    Chronic diarrhea    Carrier of hemochromatosis HFE gene mutation    Cryptosporidiasis (H)    Elevated ferritin    Ulnar neuropathy of both upper extremities    Trigger thumb of both thumbs    Lateral epicondylitis of both elbows    Coccydynia    Chronic bilateral low back pain, unspecified whether sciatica present    Lumbar radiculopathy     Medications:   Current Outpatient Medications   Medication Sig Dispense Refill    AJOVY 225 MG/1.5ML SOAJ Inject 225 mg subcutaneously every 30 days. Last dose 4/26      alcohol swab prep pads Use to swab area of injection/anthony as directed. 100 each 3    Atogepant (QULIPTA) 60 MG TABS Take 60 mg by mouth as needed.      atorvastatin (LIPITOR) 10 MG  tablet Take 10 mg by mouth daily.      blood glucose (NO BRAND SPECIFIED) test strip Use to test blood sugar three times daily or as directed. Preferred blood glucose meter OR supplies to accompany: Blood Glucose Monitor Brands: per insurance. 100 strip 6    blood glucose monitoring (NO BRAND SPECIFIED) meter device kit Use to test blood sugar three times daily or as directed. Preferred blood glucose meter OR supplies to accompany: Blood Glucose Monitor Brands: per insurance. 1 kit 0    colestipol (COLESTID) 1 g tablet Take 1 tablet (1 g) by mouth 2 times daily. 60 tablet 2    Continuous Glucose Sensor (FREESTYLE AYSE 3 PLUS SENSOR) MISC Use 1 sensor every 15 days. Use to read blood sugars per 's instructions. 6 each 3    Continuous Glucose Sensor (FREESTYLE AYSE 3 SENSOR) [facility] 1 each by Other route every 14 days. 6 each 3    cyclobenzaprine (FLEXERIL) 5 MG tablet Take by mouth 3 times daily as needed for muscle spasms.      docusate sodium (COLACE) 100 MG capsule Take 1 capsule (100 mg) by mouth 2 times daily. 12 capsule 0    FLUoxetine (PROZAC) 10 MG capsule Take 1 capsule (10 mg) by mouth daily. 30 capsule 4    gabapentin (NEURONTIN) 300 MG capsule Take 1 capsule (300 mg) by mouth 3 times daily. 90 capsule 0    hydrOXYzine HCl (ATARAX) 25 MG tablet Take at bedtime as needed for itching. (Patient taking differently: daily. Take at bedtime as needed for itching.) 30 tablet 4    insulin pen needle (ULTICARE MICRO) 32G X 4 MM miscellaneous Use 1 pen needles daily or as directed. 100 each 3    metFORMIN (GLUCOPHAGE XR) 500 MG 24 hr tablet Take 4 tablets (2,000 mg) by mouth daily (with dinner). 360 tablet 3    OnabotulinumtoxinA (BOTOX IJ) Inject as directed every 3 months. Last dose 4/23/25      ondansetron (ZOFRAN ODT) 4 MG ODT tab DISSOLVE ONE TABLET ON TONGUE EVERY 8 HOURS AS NEEDED FOR NAUSEA 30 tablet 1    ondansetron (ZOFRAN) 4 MG tablet Take 1 tablet (4 mg) by mouth every 6 hours as needed for  nausea. 12 tablet 0    oxyCODONE (ROXICODONE) 5 MG tablet Take 1-2 tablets (5-10 mg) by mouth every 6 hours as needed for moderate to severe pain. 16 tablet 0    pantoprazole (PROTONIX) 20 MG EC tablet Take 1 tablet (20 mg) by mouth daily. 30 tablet 1    pantoprazole (PROTONIX) 40 MG EC tablet Take 40 mg by mouth daily.      prednisoLONE acetate (PRED FORTE) 1 % ophthalmic suspension Place 1 drop into both eyes 3 times daily for 7 days, THEN 1 drop 2 times daily for 7 days, THEN 1 drop daily for 7 days. 10 mL 0    rimegepant (NURTEC) 75 MG ODT tablet Place 75 mg under the tongue daily as needed for migraine.      rizatriptan (MAXALT) 10 MG tablet Take 10 mg by mouth at onset of headache.      Semaglutide, 1 MG/DOSE, (OZEMPIC) 4 MG/3ML pen Inject 1 mg subcutaneously every 7 days. 6 mL 1    spironolactone (ALDACTONE) 50 MG tablet Take 1 tablet (50 mg) by mouth daily. 30 tablet 4    SUMAtriptan (IMITREX) 50 MG tablet Take 2 tablets (100 mg) by mouth at onset of headache for migraine. May repeat in 2 hours. Max 4 tablets/24 hours.      thin (NO BRAND SPECIFIED) lancets Use with lanceting device. To accompany: Blood Glucose Monitor Brands: per insurance. 100 each 6    topiramate (TOPAMAX) 25 MG tablet Take 25 mg by mouth 2 times daily.      tranexamic acid (LYSTEDA) 650 MG tablet Take 2 tablets (1,300 mg) by mouth 2 times daily as needed (menorrhagia). 30 tablet 1    UBRELVY 100 MG tablet Take 100 mg by mouth at onset of headache.       Family history:  Patient's family history includes Neurologic Disorder (age of onset: 16) in her sister.     Social history:  Patient  reports that she has never smoked. She has never been exposed to tobacco smoke. She has never used smokeless tobacco. She reports that she does not drink alcohol and does not use drugs.     Occupation: not working currently, previously a     Exam:  Visual acuity 20/25 right eye 20/25 left eye.  Color vision 11/11 right eye and 11/11 left eye.   Pupils PERRLA. Intraocular pressure 15 right eye and 14 left eye. Anterior segment exam is unremarkable.  Fundus exam is unremarkable.  Strabismus exam full / ortho / painless; no lid droop after 30 seconds in sustained upgaze. Her eye pain improved with topical anesthetic.      Remaining cranial nerve exam (V, VII-XII): V1-V3 sensation intact with greater sensation in the left than the right. Muscles of facial expression symmetric and no facial droop. Hearing intact to conversational tone but endorses greater sensitivity on the left. Palate elevates symmetrically. Tongue protrudes midline and side-to-side. Shoulder elevation and sternocleidomastoid strength intact.     Tests ordered and interpreted today:  Low Vision Central OU          Performed by: Misty   . Patient cooperation: Reliable   .   Good Fixation, Dilated After VF Blindspot Mapping fail twice right eye,.     Notes  OD: size V stim, low vision central, unreliable due to high false neg; SN defects  OS: size V stim, low vision central, unreliable due to 15/18 fixation losses; superior defects         OCT Optic Nerve RNFL Spectralis OU (both eyes)          Performed by: shonda   .     Right Eye  Reliability of the test: Good   . Test Findings Free Text: 98 (95)   . Interpretation: Normal   . Interval: Initial   .     Left Eye  Reliability of the test: Good   . Test Findings Free Text: 93 (92)   . Interpretation: Normal   . Interval: Initial   .     Notes  Normal ganglion cell layer  both eyes              Discussion of management / interpretation with another provider:   None    Assessment/Plan:   It is my impression that patient has intermittent blurred vision and eye pain. This is most consistent with Dry eye syndrome and tear film insufficiency. I asked the patient to use artificial tears as well as warm compresses to the eyelid margin  on a regular basis.  I asked the patient to take fish oil capsules 2x/day for a month and then 1x/d thereafter.   I will  ask her to try prednisolone acetate 1% ophthalmic suspension three times a day x 7 days and reduce by one drop each week.  If this does not benefit her then she can follow up with Dr. Perry for dry eye treatment.      Her visual field loss is likely artifactual. Her optical coherence tomography retinal nerve fiber layer and ganglion cell layer  are normal. I would not keep testing her visual field. Her MRI was unremarkable.            Attending Physician Attestation:  Complete documentation of historical and exam elements from today's encounter can be found in the full encounter summary report (not reduplicated in this progress note).  I personally obtained the chief complaint(s) and history of present illness.  I confirmed and edited as necessary the review of systems, past medical/surgical history, family history, social history, and examination findings as documented by others; and I examined the patient myself.  I personally reviewed the relevant tests, images, and reports as documented above.  I formulated and edited as necessary the assessment and plan and discussed the findings and management plan with the patient and family. I personally reviewed the ophthalmic test(s) associated with this encounter, agree with the interpretation(s) as documented by the resident/fellow, and have edited the corresponding report(s) as necessary.  - Remington Rodriguez MD

## 2025-06-11 ENCOUNTER — OFFICE VISIT (OUTPATIENT)
Dept: GASTROENTEROLOGY | Facility: CLINIC | Age: 30
End: 2025-06-11
Payer: COMMERCIAL

## 2025-06-11 ENCOUNTER — RESULTS FOLLOW-UP (OUTPATIENT)
Dept: MULTI SPECIALTY CLINIC | Facility: CLINIC | Age: 30
End: 2025-06-11

## 2025-06-11 VITALS
DIASTOLIC BLOOD PRESSURE: 82 MMHG | HEART RATE: 76 BPM | BODY MASS INDEX: 47.42 KG/M2 | WEIGHT: 242.8 LBS | SYSTOLIC BLOOD PRESSURE: 113 MMHG

## 2025-06-11 DIAGNOSIS — K76.0 FATTY LIVER: Primary | ICD-10-CM

## 2025-06-11 NOTE — LETTER
6/11/2025       RE: Anh Flores  [address] [facility] [address]     Dear Colleague,    Thank you for referring your patient, Anh Flores, to the [facility]. Please see a copy of my visit note below.    [facility] Return Patient Visit    Date of Visit: June 11, 2025    Reason for referral: Hepatic steatosis    Subjective: Ms. Flores is a 30 year old woman with a history of CAH, obesity, type 2 diabetes, PCOS who presents for evaluation of liver disease, history of metabolic liver disease    Initial History:    She was given steroids in the past for her CAH, has not been on for some time.  She was put on Ozempic earlier this year to help with weight loss overall has had success with this but has had to be stopped recently for procedures.    She is not drinking alcohol.  Testing for hepatitis B and C is negative.  She had a CT scan this fall that showed concern for nodular appearing liver on the underside of the liver.  She did not have splenomegaly or other signs of portal hypertension.  She originally was seen at [facility] for this and also for her diarrhea.  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  She had a follow-up ultrasound elastography at [facility] that apparently showed significant liver scarring.    She she is dealing with regular itching.  She also overall feels very fatigued.  She has intermittent severe bouts of diarrhea.  Also having abdominal pain, back pain and feels like she cannot take deep breaths.    Interval Events:   - She had an MRI elastography in January that showed a normal liver without fibrosis and no steatosis.  She has lost weight since then.  Her liver enzymes remain normal.  She is following up with her other specialist for her gastrointestinal issues and diabetes.  She had been working hard to improve her blood sugar control to get carpal tunnel  surgery.    ROS: 14 point ROS negative except for positives noted in HPI.    PMHx:  Past Medical History:   Diagnosis Date     CAH (congenital adrenal hyperplasia) 2/9/2010    Followed by Dr. Brewer; next follow up 1.2011.  Metformin increased to 850 mg twice a day.      Diabetes mellitus, type 2 (H) 10/19/2020     Vitamin D deficiency 2/9/2010   Migraines  PCOS    PSHx:  Past Surgical History:   Procedure Laterality Date     CHOLECYSTECTOMY      She was in 8th grade      COLONOSCOPY N/A 9/16/2024    Procedure: COLONOSCOPY, WITH BIOPSY;  Surgeon: Bacilio Yancey MD;  Location: [facility]     ESOPHAGOSCOPY, GASTROSCOPY, DUODENOSCOPY (EGD), COMBINED N/A 9/16/2024    Procedure: ESOPHAGOGASTRODUODENOSCOPY, WITH BIOPSY;  Surgeon: Bacilio Yancey MD;  Location: [facility]     INJECT STEROID (LOCATION) N/A 4/9/2025    Procedure: Sacrococcygeal ligament/coccyx steroid injection;  Surgeon: Edwina Fitzpatrick MD;  Location: [facility]     INSERT INTRAUTERINE DEVICE N/A 6/6/2025    Procedure: INSERTION, INTRAUTERINE DEVICE; Pap, EUA;  Surgeon: Jami Gutierrez MD;  Location: [facility]     RELEASE CARPAL TUNNEL Left 5/19/2025    Procedure: Left open carpal tunnel release;  Surgeon: Vic Roberts MD;  Location: [facility]     RELEASE DEQUERVAINS WRIST Left 5/19/2025    Procedure: Left first dorsal compartment release;  Surgeon: Vic Roberts MD;  Location: [facility]     RELEASE TRIGGER FINGER Left 5/19/2025    Procedure: Left trigger thumb release;  Surgeon: Vic Roberts MD;  Location: [facility]     TRANSPOSITION ULNAR NERVE (ELBOW) Left 5/19/2025    Procedure: Left cubital tunnel release;  Surgeon: Vic Roberts MD;  Location: [facility]       FamHx:  Family History   Problem Relation Age of Onset     Neurologic Disorder Sister 16        migraines     Cerebrovascular Disease No family hx of      Alzheimer Disease No family hx of      Glaucoma No family hx of      Macular Degeneration No family hx of     No family history of liver disease, liver cancer    SocHx:  Social History     Socioeconomic History     Marital status: Single     Spouse name: Not on file     Number of children: 0     Years of education: 14+     Highest education level: Not on file   Occupational History     Employer: STUDENT     Comment: Dog grooming   Tobacco Use     Smoking status: Never     Passive exposure: Never     Smokeless tobacco: Never   Vaping Use     Vaping status: Never Used   Substance and Sexual Activity     Alcohol use: No     Drug use: No     Sexual activity: Not Currently     Birth control/protection: Pill   Other Topics Concern     Parent/sibling w/ CABG, MI or angioplasty before 65F 55M? 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    Not sexually active and never has been    Jami Gutierrez MD     Social Drivers of Health     Financial Resource Strain: Low Risk  (12/30/2024)    Financial Resource Strain      Within the past 12 months, have you or your family members you live with been unable to get utilities (heat, electricity) when it was really needed?: No   Food Insecurity: Low Risk  (12/30/2024)    Food Insecurity      Within the past 12 months, did you worry that your food would run out before you got money to buy more?: No      Within the past 12 months, did the food you bought just not last and you didn t have money to get more?: No   Transportation Needs: Low Risk  (12/30/2024)    Transportation Needs      Within the past 12 months, has lack of transportation kept you from medical appointments, getting your medicines, non-medical meetings or appointments, work, or from getting things that you need?: No   Physical Activity: Unknown (10/18/2024)    Exercise Vital Sign      Days of Exercise per Week: 2 days      Minutes of Exercise per Session: Not on file   Stress: Stress Concern Present (10/18/2024)    [place] [employer] Occup[employer] - Occupational Stress Questionnaire       Feeling of Stress : To some extent   Social Connections: Unknown (10/18/2024)    Social Connection and Isolation Panel [NHANES]      Frequency of Communication with Friends and Family: Not on file      Frequency of Social Gatherings with Friends and Family: Never      Attends Zoroastrianism Services: Not on file      Active Member of Clubs or Organizations: Not on file      Attends Club or Organization Meetings: Not on file      Marital Status: Not on file   Interpersonal Safety: Low Risk  (6/6/2025)    Interpersonal Safety      Do you feel physically and emotionally safe where you currently live?: Yes      Within the past 12 months, have you been hit, slapped, kicked or otherwise physically hurt by someone?: No      Within the past 12 months, have you been humiliated or emotionally abused in other ways by your partner or ex-partner?: No   Housing Stability: Low Risk  (12/30/2024)    Housing Stability      Do you have housing? : Yes      Are you worried about losing your housing?: No   No alcohol    Medications:  Current Outpatient Medications   Medication Sig Dispense Refill     AJOVY 225 MG/1.5ML SOAJ Inject 225 mg subcutaneously every 30 days. Last dose 4/26       alcohol swab prep pads Use to swab area of injection/anthony as directed. 100 each 3     Atogepant (QULIPTA) 60 MG TABS Take 60 mg by mouth as needed.       atorvastatin (LIPITOR) 10 MG tablet Take 10 mg by mouth daily.       blood glucose (NO BRAND SPECIFIED) test strip Use to test blood sugar three times daily or as directed. Preferred blood glucose meter OR supplies to accompany: Blood Glucose Monitor Brands: per insurance. 100 strip 6     blood glucose monitoring (NO BRAND SPECIFIED) meter device kit Use to test blood sugar three times daily or as directed. Preferred blood glucose meter OR supplies to accompany: Blood Glucose Monitor Brands: per insurance. 1 kit 0     Continuous Glucose Sensor (FREESTYLE AYSE 3 PLUS SENSOR) MISC Use 1 sensor every 15  days. Use to read blood sugars per 's instructions. 6 each 3     Continuous Glucose Sensor (FREESTYLE AYSE 3 SENSOR) MISC 1 each by Other route every 14 days. 6 each 3     cyclobenzaprine (FLEXERIL) 5 MG tablet Take by mouth 3 times daily as needed for muscle spasms.       docusate sodium (COLACE) 100 MG capsule Take 1 capsule (100 mg) by mouth 2 times daily. 12 capsule 0     FLUoxetine (PROZAC) 10 MG capsule Take 1 capsule (10 mg) by mouth daily. 30 capsule 4     gabapentin (NEURONTIN) 300 MG capsule Take 1 capsule (300 mg) by mouth 3 times daily. 90 capsule 0     hydrOXYzine HCl (ATARAX) 25 MG tablet Take at bedtime as needed for itching. 30 tablet 4     insulin pen needle (ULTICARE MICRO) 32G X 4 MM miscellaneous Use 1 pen needles daily or as directed. 100 each 3     metFORMIN (GLUCOPHAGE XR) 500 MG 24 hr tablet Take 4 tablets (2,000 mg) by mouth daily (with dinner). 360 tablet 3     OnabotulinumtoxinA (BOTOX IJ) Inject as directed every 3 months. Last dose 4/23/25       ondansetron (ZOFRAN) 4 MG tablet Take 1 tablet (4 mg) by mouth every 6 hours as needed for nausea. 12 tablet 0     pantoprazole (PROTONIX) 20 MG EC tablet Take 1 tablet (20 mg) by mouth daily. 30 tablet 1     prednisoLONE acetate (PRED FORTE) 1 % ophthalmic suspension Place 1 drop into both eyes 3 times daily for 7 days, THEN 1 drop 2 times daily for 7 days, THEN 1 drop daily for 7 days. 10 mL 0     rimegepant (NURTEC) 75 MG ODT tablet Place 75 mg under the tongue daily as needed for migraine.       rizatriptan (MAXALT) 10 MG tablet Take 10 mg by mouth at onset of headache.       Semaglutide, 1 MG/DOSE, (OZEMPIC) 4 MG/3ML pen Inject 1 mg subcutaneously every 7 days. 6 mL 1     spironolactone (ALDACTONE) 50 MG tablet Take 1 tablet (50 mg) by mouth daily. 30 tablet 4     SUMAtriptan (IMITREX) 50 MG tablet Take 2 tablets (100 mg) by mouth at onset of headache for migraine. May repeat in 2 hours. Max 4 tablets/24 hours.       thin (NO  BRAND SPECIFIED) lancets Use with lanceting device. To accompany: Blood Glucose Monitor Brands: per insurance. 100 each 6     topiramate (TOPAMAX) 25 MG tablet Take 25 mg by mouth 2 times daily.       tranexamic acid (LYSTEDA) 650 MG tablet Take 2 tablets (1,300 mg) by mouth 2 times daily as needed (menorrhagia). 30 tablet 1     UBRELVY 100 MG tablet Take 100 mg by mouth at onset of headache.       colestipol (COLESTID) 1 g tablet Take 1 tablet (1 g) by mouth 2 times daily. (Patient not taking: Reported on 6/11/2025) 60 tablet 2     oxyCODONE (ROXICODONE) 5 MG tablet Take 1-2 tablets (5-10 mg) by mouth every 6 hours as needed for moderate to severe pain. (Patient not taking: Reported on 6/11/2025) 16 tablet 0     No current facility-administered medications for this visit.     No OTCs, herbals    Allergies:  Allergies   Allergen Reactions     Adhesive Tape Itching     Emgality [Galcanezumab-Gnlm] Itching     Itchiness, bumps     Other Environmental Allergy      Victoza [Liraglutide] Headache, Hives and Itching       Objective:  /82 (BP Location: Right arm, Patient Position: Sitting)   Pulse 76   Wt 110.1 kg (242 lb 12.8 oz)   LMP 04/19/2025 (Exact Date)   BMI 47.42 kg/m    Constitutional: pleasant woman in NAD  Eyes: non icteric  Respiratory: Normal respiratory excursion   MSK: normal range of motion of visualized extremities  Abd: Non distended  Skin: No jaundice  Psychiatric: normal mood and orientation    Labs:  Last Comprehensive Metabolic Panel:  Sodium   Date Value Ref Range Status   03/07/2025 138 135 - 145 mmol/L Final   01/18/2019 136 133 - 144 mmol/L Final     Potassium   Date Value Ref Range Status   03/07/2025 4.2 3.4 - 5.3 mmol/L Final   01/18/2019 4.2 3.4 - 5.3 mmol/L Final     Chloride   Date Value Ref Range Status   03/07/2025 104 98 - 107 mmol/L Final   01/18/2019 103 94 - 109 mmol/L Final     Carbon Dioxide   Date Value Ref Range Status   01/18/2019 27 20 - 32 mmol/L Final     Carbon  Dioxide (CO2)   Date Value Ref Range Status   03/07/2025 22 22 - 29 mmol/L Final     Anion Gap   Date Value Ref Range Status   03/07/2025 12 7 - 15 mmol/L Final   01/18/2019 6 3 - 14 mmol/L Final     Glucose   Date Value Ref Range Status   01/18/2019 99 70 - 99 mg/dL Final     Comment:     Non Fasting     GLUCOSE BY METER POCT   Date Value Ref Range Status   06/06/2025 80 70 - 99 mg/dL Final     Urea Nitrogen   Date Value Ref Range Status   03/07/2025 9.4 6.0 - 20.0 mg/dL Final   01/18/2019 10 7 - 30 mg/dL Final     Creatinine   Date Value Ref Range Status   05/02/2025 0.79 0.51 - 0.95 mg/dL Final   01/18/2019 0.65 0.52 - 1.04 mg/dL Final     GFR Estimate   Date Value Ref Range Status   05/02/2025 >90 >60 mL/min/1.73m2 Final     Comment:     eGFR calculated using 2021 CKD-EPI equation.   01/18/2019 >90 >60 mL/min/[1.73_m2] Final     Comment:     Non  GFR Calc  Starting 12/18/2018, serum creatinine based estimated GFR (eGFR) will be   calculated using the Chronic Kidney Disease Epidemiology Collaboration   (CKD-EPI) equation.       GFR, ESTIMATED POCT   Date Value Ref Range Status   05/02/2025 >60 >60 mL/min/1.73m2 Final     Calcium   Date Value Ref Range Status   03/07/2025 9.8 8.8 - 10.4 mg/dL Final   01/18/2019 9.8 8.5 - 10.1 mg/dL Final     Bilirubin Total   Date Value Ref Range Status   06/09/2025 0.3 <=1.2 mg/dL Final   01/15/2018 0.3 0.2 - 1.3 mg/dL Final     Alkaline Phosphatase   Date Value Ref Range Status   06/09/2025 48 40 - 150 U/L Final   01/15/2018 66 40 - 150 U/L Final     ALT   Date Value Ref Range Status   06/09/2025 35 0 - 50 U/L Final   01/15/2018 53 (H) 0 - 50 U/L Final     AST   Date Value Ref Range Status   06/09/2025 18 0 - 45 U/L Final   01/15/2018 22 0 - 45 U/L Final       Lab Results   Component Value Date    WBC 7.6 12/18/2024    WBC 7.1 09/27/2014     Lab Results   Component Value Date    RBC 4.35 12/18/2024    RBC 4.71 09/27/2014     Lab Results   Component Value Date     HGB 13.5 12/18/2024    HGB 14.9 09/27/2014     Lab Results   Component Value Date    HCT 40.4 12/18/2024    HCT 43.8 03/15/2017     Lab Results   Component Value Date    MCV 93 12/18/2024    MCV 92 09/27/2014     Lab Results   Component Value Date    MCH 31.0 12/18/2024    MCH 31.6 09/27/2014     Lab Results   Component Value Date    MCHC 33.4 12/18/2024    MCHC 34.3 09/27/2014     Lab Results   Component Value Date    RDW 12.8 12/18/2024    RDW 13.7 09/27/2014     Lab Results   Component Value Date     12/18/2024     09/27/2014       Previous work-up:   Lab Results   Component Value Date    HCVAB Nonreactive 12/18/2024    MAGED 381 (H) 12/18/2024    IRONSAT 19 12/18/2024    TTG 0.3 12/18/2024    TTGG 0.9 01/30/2024     02/21/2011    DAHLIA Negative 09/10/2024    A1A 251 (H) 12/18/2024    TSH 1.08 08/23/2024    CHOL 137 03/07/2025    HDL 56 03/07/2025    LDL 63 03/07/2025    TRIG 90 03/07/2025    A1C 5.4 03/07/2025      Lab Results   Component Value Date    SPECDES  06/09/2025     Blood: ACD      LDRESULTS  06/09/2025     HEMOCHROMATOSIS RESULTS    HFE Gene C282Y (G845A) RESULTS:    C282Y Mutation Interpretation: HETEROZYGOTE    HFE Gene H63D (C187G) RESULTS:    H63D Mutation Interpretation: NORMAL    HFE Gene S65C (A193T) RESULTS:    S65C Mutation Interpretation: NORMAL         Imaging:    Reviewed in EHR    Independently reviewed labs and imaging.     Assessment/Plan: Ms. Flores is a 30 year old woman with a history of CAH, obesity, type 2 diabetes, PCOS who presents for evaluation of potential liver disease.    Discussed she has several risk factors for metabolic dysfunction associated steatotic liver disease.  Testing for hepatitis B, C, celiac disease is negative.  She is a carrier for hemochromatosis, as this is to a C282Y heterozygote.  She has a mild ferritin elevation.  Discussed being a carrier for this is relatively common and she does not have evidence of iron overload.  This should not  cause liver disease in the absence of other risk factors for chronic liver disease.    Even though her elastography previously showed cirrhosis, we got an MRI elastography which showed a normal liver with no steatosis and no fibrosis.  She had lost weight in the time between these test.  Discussed the MRI elastography is a more accurate test and I believe this result.  It is possible she still did have some improvement with the weight loss.  She has continued to lose weight and her liver enzymes are normal.  Her fib 4 score is low, ruling out advanced fibrosis and is consistent with MRI elastography test.  I told her that she had metabolic liver disease in the past but with weight loss her steatosis is actually improved.  Discussed continuing her current plan to maintain a healthy weight to avoid recurrent metabolic liver disease.  Would recommend that her primary care doctor check a fib 4 test annually while she has risk factors for metabolic liver disease to look for risk of progression.  I do not think a liver biopsy is warranted at this time because the MRI elastography is a very reliable test.    RTC PRN     Cheyenne Overton MD MS  Hepatology/Liver Transplant  [facility]    Again, thank you for allowing me to participate in the care of your patient.      Sincerely,    Cheyenne Overton MD

## 2025-06-11 NOTE — PROGRESS NOTES
[facility] Return Patient Visit    Date of Visit: June 11, 2025    Reason for referral: Hepatic steatosis    Subjective: Ms. Flores is a 30 year old woman with a history of CAH, obesity, type 2 diabetes, PCOS who presents for evaluation of liver disease, history of metabolic liver disease    Initial History:    She was given steroids in the past for her CAH, has not been on for some time.  She was put on Ozempic earlier this year to help with weight loss overall has had success with this but has had to be stopped recently for procedures.    She is not drinking alcohol.  Testing for hepatitis B and C is negative.  She had a CT scan this fall that showed concern for nodular appearing liver on the underside of the liver.  She did not have splenomegaly or other signs of portal hypertension.  She originally was seen at [facility] for this and also for her diarrhea.  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  She had a follow-up ultrasound elastography at [facility] that apparently showed significant liver scarring.    She she is dealing with regular itching.  She also overall feels very fatigued.  She has intermittent severe bouts of diarrhea.  Also having abdominal pain, back pain and feels like she cannot take deep breaths.    Interval Events:   - She had an MRI elastography in January that showed a normal liver without fibrosis and no steatosis.  She has lost weight since then.  Her liver enzymes remain normal.  She is following up with her other specialist for her gastrointestinal issues and diabetes.  She had been working hard to improve her blood sugar control to get carpal tunnel surgery.    ROS: 14 point ROS negative except for positives noted in HPI.    PMHx:  Past Medical History:   Diagnosis Date    CAH (congenital adrenal hyperplasia) 2/9/2010    Followed by Dr. Brewer; next follow up 1.2011.  Metformin increased to 850 mg twice a day.     Diabetes mellitus, type 2 (H)  10/19/2020    Vitamin D deficiency 2/9/2010   Migraines  PCOS    PSHx:  Past Surgical History:   Procedure Laterality Date    CHOLECYSTECTOMY      She was in 8th grade     COLONOSCOPY N/A 9/16/2024    Procedure: COLONOSCOPY, WITH BIOPSY;  Surgeon: Bacilio Yancey MD;  Location: [facility]    ESOPHAGOSCOPY, GASTROSCOPY, DUODENOSCOPY (EGD), COMBINED N/A 9/16/2024    Procedure: ESOPHAGOGASTRODUODENOSCOPY, WITH BIOPSY;  Surgeon: Bacilio Yancey MD;  Location: [facility]    INJECT STEROID (LOCATION) N/A 4/9/2025    Procedure: Sacrococcygeal ligament/coccyx steroid injection;  Surgeon: Edwina Fitzpatrick MD;  Location: [facility]    INSERT INTRAUTERINE DEVICE N/A 6/6/2025    Procedure: INSERTION, INTRAUTERINE DEVICE; Pap, EUA;  Surgeon: Jami Gutierrez MD;  Location: [facility]    RELEASE CARPAL TUNNEL Left 5/19/2025    Procedure: Left open carpal tunnel release;  Surgeon: Vic Roberts MD;  Location: [facility]    RELEASE DEQUERVAINS WRIST Left 5/19/2025    Procedure: Left first dorsal compartment release;  Surgeon: Vic Roberts MD;  Location: [facility]    RELEASE TRIGGER FINGER Left 5/19/2025    Procedure: Left trigger thumb release;  Surgeon: Vic Roberts MD;  Location: [facility]    TRANSPOSITION ULNAR NERVE (ELBOW) Left 5/19/2025    Procedure: Left cubital tunnel release;  Surgeon: Vic Roberts MD;  Location: [facility]       FamHx:  Family History   Problem Relation Age of Onset    Neurologic Disorder Sister 16        migraines    Cerebrovascular Disease No family hx of     Alzheimer Disease No family hx of     Glaucoma No family hx of     Macular Degeneration No family hx of    No family history of liver disease, liver cancer    SocHx:  Social History     Socioeconomic History    Marital status: Single     Spouse name: Not on file    Number of children: 0    Years of education: 14+    Highest education level: Not on file   Occupational History     Employer: STUDENT     Comment: Dog grooming    Tobacco Use    Smoking status: Never     Passive exposure: Never    Smokeless tobacco: Never   Vaping Use    Vaping status: Never Used   Substance and Sexual Activity    Alcohol use: No    Drug use: No    Sexual activity: Not Currently     Birth control/protection: Pill   Other Topics Concern    Parent/sibling w/ CABG, MI or angioplasty before 65F 55M? 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    Not sexually active and never has been    Jamibabar Gutierrez MD     Social Drivers of Health     Financial Resource Strain: Low Risk  (12/30/2024)    Financial Resource Strain     Within the past 12 months, have you or your family members you live with been unable to get utilities (heat, electricity) when it was really needed?: No   Food Insecurity: Low Risk  (12/30/2024)    Food Insecurity     Within the past 12 months, did you worry that your food would run out before you got money to buy more?: No     Within the past 12 months, did the food you bought just not last and you didn t have money to get more?: No   Transportation Needs: Low Risk  (12/30/2024)    Transportation Needs     Within the past 12 months, has lack of transportation kept you from medical appointments, getting your medicines, non-medical meetings or appointments, work, or from getting things that you need?: No   Physical Activity: Unknown (10/18/2024)    Exercise Vital Sign     Days of Exercise per Week: 2 days     Minutes of Exercise per Session: Not on file   Stress: Stress Concern Present (10/18/2024)    [employer] [place] [employer] - Occupational Stress Questionnaire     Feeling of Stress : To some extent   Social Connections: Unknown (10/18/2024)    Social Connection and Isolation Panel [NHANES]     Frequency of Communication with Friends and Family: Not on file     Frequency of Social Gatherings with Friends and Family: Never     Attends Islam Services: Not on file     Active Member of Clubs or  Organizations: Not on file     Attends Club or Organization Meetings: Not on file     Marital Status: Not on file   Interpersonal Safety: Low Risk  (6/6/2025)    Interpersonal Safety     Do you feel physically and emotionally safe where you currently live?: Yes     Within the past 12 months, have you been hit, slapped, kicked or otherwise physically hurt by someone?: No     Within the past 12 months, have you been humiliated or emotionally abused in other ways by your partner or ex-partner?: No   Housing Stability: Low Risk  (12/30/2024)    Housing Stability     Do you have housing? : Yes     Are you worried about losing your housing?: No   No alcohol    Medications:  Current Outpatient Medications   Medication Sig Dispense Refill    AJOVY 225 MG/1.5ML SOAJ Inject 225 mg subcutaneously every 30 days. Last dose 4/26      alcohol swab prep pads Use to swab area of injection/anthony as directed. 100 each 3    Atogepant (QULIPTA) 60 MG TABS Take 60 mg by mouth as needed.      atorvastatin (LIPITOR) 10 MG tablet Take 10 mg by mouth daily.      blood glucose (NO BRAND SPECIFIED) test strip Use to test blood sugar three times daily or as directed. Preferred blood glucose meter OR supplies to accompany: Blood Glucose Monitor Brands: per insurance. 100 strip 6    blood glucose monitoring (NO BRAND SPECIFIED) meter device kit Use to test blood sugar three times daily or as directed. Preferred blood glucose meter OR supplies to accompany: Blood Glucose Monitor Brands: per insurance. 1 kit 0    Continuous Glucose Sensor (FREESTYLE AYSE 3 PLUS SENSOR) MISC Use 1 sensor every 15 days. Use to read blood sugars per 's instructions. 6 each 3    Continuous Glucose Sensor (FREESTYLE AYSE 3 SENSOR) [facility] 1 each by Other route every 14 days. 6 each 3    cyclobenzaprine (FLEXERIL) 5 MG tablet Take by mouth 3 times daily as needed for muscle spasms.      docusate sodium (COLACE) 100 MG capsule Take 1 capsule (100 mg) by mouth 2  times daily. 12 capsule 0    FLUoxetine (PROZAC) 10 MG capsule Take 1 capsule (10 mg) by mouth daily. 30 capsule 4    gabapentin (NEURONTIN) 300 MG capsule Take 1 capsule (300 mg) by mouth 3 times daily. 90 capsule 0    hydrOXYzine HCl (ATARAX) 25 MG tablet Take at bedtime as needed for itching. 30 tablet 4    insulin pen needle (ULTICARE MICRO) 32G X 4 MM miscellaneous Use 1 pen needles daily or as directed. 100 each 3    metFORMIN (GLUCOPHAGE XR) 500 MG 24 hr tablet Take 4 tablets (2,000 mg) by mouth daily (with dinner). 360 tablet 3    OnabotulinumtoxinA (BOTOX IJ) Inject as directed every 3 months. Last dose 4/23/25      ondansetron (ZOFRAN) 4 MG tablet Take 1 tablet (4 mg) by mouth every 6 hours as needed for nausea. 12 tablet 0    pantoprazole (PROTONIX) 20 MG EC tablet Take 1 tablet (20 mg) by mouth daily. 30 tablet 1    prednisoLONE acetate (PRED FORTE) 1 % ophthalmic suspension Place 1 drop into both eyes 3 times daily for 7 days, THEN 1 drop 2 times daily for 7 days, THEN 1 drop daily for 7 days. 10 mL 0    rimegepant (NURTEC) 75 MG ODT tablet Place 75 mg under the tongue daily as needed for migraine.      rizatriptan (MAXALT) 10 MG tablet Take 10 mg by mouth at onset of headache.      Semaglutide, 1 MG/DOSE, (OZEMPIC) 4 MG/3ML pen Inject 1 mg subcutaneously every 7 days. 6 mL 1    spironolactone (ALDACTONE) 50 MG tablet Take 1 tablet (50 mg) by mouth daily. 30 tablet 4    SUMAtriptan (IMITREX) 50 MG tablet Take 2 tablets (100 mg) by mouth at onset of headache for migraine. May repeat in 2 hours. Max 4 tablets/24 hours.      thin (NO BRAND SPECIFIED) lancets Use with lanceting device. To accompany: Blood Glucose Monitor Brands: per insurance. 100 each 6    topiramate (TOPAMAX) 25 MG tablet Take 25 mg by mouth 2 times daily.      tranexamic acid (LYSTEDA) 650 MG tablet Take 2 tablets (1,300 mg) by mouth 2 times daily as needed (menorrhagia). 30 tablet 1    UBRELVY 100 MG tablet Take 100 mg by mouth at onset  of headache.      colestipol (COLESTID) 1 g tablet Take 1 tablet (1 g) by mouth 2 times daily. (Patient not taking: Reported on 6/11/2025) 60 tablet 2    oxyCODONE (ROXICODONE) 5 MG tablet Take 1-2 tablets (5-10 mg) by mouth every 6 hours as needed for moderate to severe pain. (Patient not taking: Reported on 6/11/2025) 16 tablet 0     No current facility-administered medications for this visit.     No OTCs, herbals    Allergies:  Allergies   Allergen Reactions    Adhesive Tape Itching    Emgality [Galcanezumab-Gnlm] Itching     Itchiness, bumps    Other Environmental Allergy     Victoza [Liraglutide] Headache, Hives and Itching       Objective:  /82 (BP Location: Right arm, Patient Position: Sitting)   Pulse 76   Wt 110.1 kg (242 lb 12.8 oz)   LMP 04/19/2025 (Exact Date)   BMI 47.42 kg/m    Constitutional: pleasant woman in NAD  Eyes: non icteric  Respiratory: Normal respiratory excursion   MSK: normal range of motion of visualized extremities  Abd: Non distended  Skin: No jaundice  Psychiatric: normal mood and orientation    Labs:  Last Comprehensive Metabolic Panel:  Sodium   Date Value Ref Range Status   03/07/2025 138 135 - 145 mmol/L Final   01/18/2019 136 133 - 144 mmol/L Final     Potassium   Date Value Ref Range Status   03/07/2025 4.2 3.4 - 5.3 mmol/L Final   01/18/2019 4.2 3.4 - 5.3 mmol/L Final     Chloride   Date Value Ref Range Status   03/07/2025 104 98 - 107 mmol/L Final   01/18/2019 103 94 - 109 mmol/L Final     Carbon Dioxide   Date Value Ref Range Status   01/18/2019 27 20 - 32 mmol/L Final     Carbon Dioxide (CO2)   Date Value Ref Range Status   03/07/2025 22 22 - 29 mmol/L Final     Anion Gap   Date Value Ref Range Status   03/07/2025 12 7 - 15 mmol/L Final   01/18/2019 6 3 - 14 mmol/L Final     Glucose   Date Value Ref Range Status   01/18/2019 99 70 - 99 mg/dL Final     Comment:     Non Fasting     GLUCOSE BY METER POCT   Date Value Ref Range Status   06/06/2025 80 70 - 99 mg/dL  Final     Urea Nitrogen   Date Value Ref Range Status   03/07/2025 9.4 6.0 - 20.0 mg/dL Final   01/18/2019 10 7 - 30 mg/dL Final     Creatinine   Date Value Ref Range Status   05/02/2025 0.79 0.51 - 0.95 mg/dL Final   01/18/2019 0.65 0.52 - 1.04 mg/dL Final     GFR Estimate   Date Value Ref Range Status   05/02/2025 >90 >60 mL/min/1.73m2 Final     Comment:     eGFR calculated using 2021 CKD-EPI equation.   01/18/2019 >90 >60 mL/min/[1.73_m2] Final     Comment:     Non  GFR Calc  Starting 12/18/2018, serum creatinine based estimated GFR (eGFR) will be   calculated using the Chronic Kidney Disease Epidemiology Collaboration   (CKD-EPI) equation.       GFR, ESTIMATED POCT   Date Value Ref Range Status   05/02/2025 >60 >60 mL/min/1.73m2 Final     Calcium   Date Value Ref Range Status   03/07/2025 9.8 8.8 - 10.4 mg/dL Final   01/18/2019 9.8 8.5 - 10.1 mg/dL Final     Bilirubin Total   Date Value Ref Range Status   06/09/2025 0.3 <=1.2 mg/dL Final   01/15/2018 0.3 0.2 - 1.3 mg/dL Final     Alkaline Phosphatase   Date Value Ref Range Status   06/09/2025 48 40 - 150 U/L Final   01/15/2018 66 40 - 150 U/L Final     ALT   Date Value Ref Range Status   06/09/2025 35 0 - 50 U/L Final   01/15/2018 53 (H) 0 - 50 U/L Final     AST   Date Value Ref Range Status   06/09/2025 18 0 - 45 U/L Final   01/15/2018 22 0 - 45 U/L Final       Lab Results   Component Value Date    WBC 7.6 12/18/2024    WBC 7.1 09/27/2014     Lab Results   Component Value Date    RBC 4.35 12/18/2024    RBC 4.71 09/27/2014     Lab Results   Component Value Date    HGB 13.5 12/18/2024    HGB 14.9 09/27/2014     Lab Results   Component Value Date    HCT 40.4 12/18/2024    HCT 43.8 03/15/2017     Lab Results   Component Value Date    MCV 93 12/18/2024    MCV 92 09/27/2014     Lab Results   Component Value Date    MCH 31.0 12/18/2024    MCH 31.6 09/27/2014     Lab Results   Component Value Date    MCHC 33.4 12/18/2024    MCHC 34.3 09/27/2014     Lab  Results   Component Value Date    RDW 12.8 12/18/2024    RDW 13.7 09/27/2014     Lab Results   Component Value Date     12/18/2024     09/27/2014       Previous work-up:   Lab Results   Component Value Date    HCVAB Nonreactive 12/18/2024    MAGED 381 (H) 12/18/2024    IRONSAT 19 12/18/2024    TTG 0.3 12/18/2024    TTGG 0.9 01/30/2024     02/21/2011    DAHLIA Negative 09/10/2024    A1A 251 (H) 12/18/2024    TSH 1.08 08/23/2024    CHOL 137 03/07/2025    HDL 56 03/07/2025    LDL 63 03/07/2025    TRIG 90 03/07/2025    A1C 5.4 03/07/2025      Lab Results   Component Value Date    SPECDES  06/09/2025     Blood: ACD      LDRESULTS  06/09/2025     HEMOCHROMATOSIS RESULTS    HFE Gene C282Y (G845A) RESULTS:    C282Y Mutation Interpretation: HETEROZYGOTE    HFE Gene H63D (C187G) RESULTS:    H63D Mutation Interpretation: NORMAL    HFE Gene S65C (A193T) RESULTS:    S65C Mutation Interpretation: NORMAL         Imaging:    Reviewed in EHR    Independently reviewed labs and imaging.     Assessment/Plan: Ms. Flores is a 30 year old woman with a history of CAH, obesity, type 2 diabetes, PCOS who presents for evaluation of potential liver disease.    Discussed she has several risk factors for metabolic dysfunction associated steatotic liver disease.  Testing for hepatitis B, C, celiac disease is negative.  She is a carrier for hemochromatosis, as this is to a C282Y heterozygote.  She has a mild ferritin elevation.  Discussed being a carrier for this is relatively common and she does not have evidence of iron overload.  This should not cause liver disease in the absence of other risk factors for chronic liver disease.    Even though her elastography previously showed cirrhosis, we got an MRI elastography which showed a normal liver with no steatosis and no fibrosis.  She had lost weight in the time between these test.  Discussed the MRI elastography is a more accurate test and I believe this result.  It is possible  she still did have some improvement with the weight loss.  She has continued to lose weight and her liver enzymes are normal.  Her fib 4 score is low, ruling out advanced fibrosis and is consistent with MRI elastography test.  I told her that she had metabolic liver disease in the past but with weight loss her steatosis is actually improved.  Discussed continuing her current plan to maintain a healthy weight to avoid recurrent metabolic liver disease.  Would recommend that her primary care doctor check a fib 4 test annually while she has risk factors for metabolic liver disease to look for risk of progression.  I do not think a liver biopsy is warranted at this time because the MRI elastography is a very reliable test.    RTC PRN     Cheyenne Overton MD MS  Hepatology/Liver Transplant  [facility]

## 2025-06-15 NOTE — PROGRESS NOTES
OCCUPATIONAL THERAPY EVALUATION  Type of Visit: Evaluation       Fall Risk Screen:       Subjective        Presenting condition or subjective complaint:    Date of onset: 05/19/25    Relevant medical history:     Dates & types of surgery:      Prior diagnostic imaging/testing results:       Prior therapy history for the same diagnosis, illness or injury:        Prior Level of Function  Ind with ADL, IADL, work. Lives with family, able to assist    Employment:    Dog grooming  Hobbies/Interests:      Patient goals for therapy:      Had L trigger thumb, open carpal tunnel release, 1st dorsal compartment release, and cubital tunnel release DOS 5/19/25. Also with history of R trigger thumb, carpal tunnel, DeQuervains, and ulnar nerve compression with plans for similar surgical release for right on 7/14/25     Objective   ADDITIONAL HISTORY:  Right hand dominant  Patient reports symptoms of pain, stiffness/loss of motion, weakness/loss of strength, edema, numbness, and tingling   Transportation: Does not drive at baseline  Currently not working due to present treatment problem      PAIN:  Pain Level at Rest: 10/10  Pain Level with Use: 10/10  Pain Location: elbow, wrist, and thumb  Pain Quality: Dull and Sharp  Pain Frequency: intermittent, daily, or depending on activity  Pain is Worst: daytime  Pain is Exacerbated By: motion, touch to medial elbow, use  Pain is Relieved By: otc medications and rest  Pain Progression: Improved    POSTURE: Rounded Forward Shoulders     EDEMA: L Elbow Crease 29.5 cm    SCAR/WOUND:   Medial elbow incision is closed, healing, intermittent scabs still present but no drainage  CTR incision is closed, small portion of masceration with possible light yellow drainage at middle/distal portion - patient reports she contacted the surgeon earlier today and has an appointment tomorrow and RX antibiotics ready to , plans to pick them up after this appointment  Trigger thumb incision is closed,  healing, clean without drainage with notable eschar present  1st discontinue incision is closed and  healing with small scabbing still present, no drainage    See photo in chart for CTR scar    SENSATION:   Still having some residual numbness/tingling at middle and ring finger, ongoing improvement since surgery; index numbness/tingling has resolved  Small finger continues to have numbness/tingling, improving since surgery  Reports on increased sensitivity at medial elbow about incision    ROM:   Elbow ROM  Left AROM Right AROM    Extension -25 + medial elbow     Flexion 92 + medial elbow     Supination 84 + palm soreness    Pronation 90 + palm soreness          Wrist ROM  Left AROM Right AROM    Extension 36 +++ forearm    Flexion 28 +++ CTR incision    Radial Deviation (RD) 9 + CTR incision    Ulnar Deviation (UD) 14c + CTR incision      Able to achieve full digit extension, pain with attempt at fist with digits about 6 cm from palm with pain at CTR incision  Thumb ROM  Left AROM Right AROM    MP Joint /34 +    IP Joint  /42 + IP stiffness    Radial Abduction 36    Palmar Abduction 41, tension    Kapandji Opposition Scale (0-10/10) 4/10        OBSERVATIONS/APPEARANCE: See above regarding incision and swelling, mild to moderate guarding    STRENGTH: DNT    PALPATION: Non tender to palpation at medial elbow incision but increased sensitivity, non tender to 1st dorsal compartment scar, mild tenderness to thumb trigger scar, severe tenderness to CTR incision       Assessment & Plan   CLINICAL IMPRESSIONS  Medical Diagnosis: Bilateral carpal tunnel syndrome  Ulnar neuropathy of both upper extremities  Trigger thumb of both thumbs  Lateral epicondylitis of both elbows    Treatment Diagnosis: L hand and elbow pain, post op status    Impression/Assessment: Pt is a 30 year old female presenting to Occupational Therapy due to L UE post op status, pain, stiffness.  The following significant findings have been identified:  Impaired activity tolerance, Impaired coordination, Impaired ROM, Impaired sensation, Impaired strength, Pain, and Post-surgical precautions.  These identified deficits interfere with their ability to perform self care tasks, work tasks, recreational activities, household chores, and meal planning and preparation as compared to previous level of function.     Clinical Decision Making (Complexity):  Assessment of Occupational Performance: 5 or more Performance Deficits  Occupational Performance Limitations: bathing/showering, dressing, hygiene and grooming, home establishment and management, meal preparation and cleanup, shopping, and leisure activities  Clinical Decision Making (Complexity): Low complexity    PLAN OF CARE  Treatment Interventions:  Modalities:  US, Iontophoresis, Fluidotherapy, and Paraffin  Therapeutic Exercise:  AROM, AAROM, PROM, Tendon Gliding, Blocking, Reverse Blocking, Isotonics, Isometrics, and Stabilization  Neuromuscular re-education:  Nerve Gliding, Desensitization, Kinesthetic Training, Proprioceptive Training, Kinesiotaping, Isometrics, and Stabilization  Manual Techniques:  Scar mobilization, Friction massage, Myofascial release, and Manual edema mobilization  Orthotic Fabrication:  As indicated  Self Care:  Self Care Tasks and Work Tasks    Long Term Goals   OT Goal 1  Goal Identifier: Grooming  Goal Description: Anh will re-gain full ROM in L elbow and hand to support at least one hour for dog grooming with no more than 2/10 discomfort for return to baseline engagement in work tasks  Target Date: 08/11/25      Frequency of Treatment: 2x/month  Duration of Treatment: 8 weeks     Recommended Referrals to Other Professionals:   Education Assessment: Learner/Method: Patient  Education Comments: HEP, splint weaning     Risks and benefits of evaluation/treatment have been explained.   Patient/Family/caregiver agrees with Plan of Care.     Evaluation Time:    OT Eval, Low Complexity  Minutes (35826): 20       Signing Clinician: SERGIO RIVERA [facility]                                                                                   OUTPATIENT OCCUPATIONAL THERAPY      PLAN OF TREATMENT FOR OUTPATIENT REHABILITATION   Patient's Last Name, First Name, Anh Lutz YOB: 1995   Provider's Name   [facility]   Medical Record No.  [id number]     Onset Date: 05/19/25 Start of Care Date: 06/16/25     Medical Diagnosis:  Bilateral carpal tunnel syndrome  Ulnar neuropathy of both upper extremities  Trigger thumb of both thumbs  Lateral epicondylitis of both elbows      OT Treatment Diagnosis:  L hand and elbow pain, post op status Plan of Treatment  Frequency/Duration:2x/month/8 weeks    Certification date from 06/16/25   To 08/11/25        See note for plan of treatment details and functional goals     SERGIO RIVERA                         I CERTIFY THE NEED FOR THESE SERVICES FURNISHED UNDER        THIS PLAN OF TREATMENT AND WHILE UNDER MY CARE     (Physician attestation of this document indicates review and certification of the therapy plan).              Referring Provider:  Amaya Barfield PA-C    Initial Assessment  See Epic Evaluation- 06/16/25

## 2025-06-16 ENCOUNTER — THERAPY VISIT (OUTPATIENT)
Dept: OCCUPATIONAL THERAPY | Facility: CLINIC | Age: 30
End: 2025-06-16
Payer: COMMERCIAL

## 2025-06-16 DIAGNOSIS — M65.311 TRIGGER THUMB OF BOTH THUMBS: ICD-10-CM

## 2025-06-16 DIAGNOSIS — M77.12 LATERAL EPICONDYLITIS OF BOTH ELBOWS: ICD-10-CM

## 2025-06-16 DIAGNOSIS — M65.312 TRIGGER THUMB OF BOTH THUMBS: ICD-10-CM

## 2025-06-16 DIAGNOSIS — M77.11 LATERAL EPICONDYLITIS OF BOTH ELBOWS: ICD-10-CM

## 2025-06-16 DIAGNOSIS — G56.03 BILATERAL CARPAL TUNNEL SYNDROME: Primary | ICD-10-CM

## 2025-06-16 DIAGNOSIS — G56.23 ULNAR NEUROPATHY OF BOTH UPPER EXTREMITIES: ICD-10-CM

## 2025-06-16 PROCEDURE — 97165 OT EVAL LOW COMPLEX 30 MIN: CPT | Mod: GO | Performed by: SPECIALIST/TECHNOLOGIST

## 2025-06-16 PROCEDURE — 97110 THERAPEUTIC EXERCISES: CPT | Mod: GO | Performed by: SPECIALIST/TECHNOLOGIST

## 2025-06-17 ENCOUNTER — OFFICE VISIT (OUTPATIENT)
Dept: ORTHOPEDICS | Facility: CLINIC | Age: 30
End: 2025-06-17
Payer: COMMERCIAL

## 2025-06-17 DIAGNOSIS — M79.642 HAND PAIN, LEFT: Primary | ICD-10-CM

## 2025-06-17 PROCEDURE — 99024 POSTOP FOLLOW-UP VISIT: CPT | Performed by: STUDENT IN AN ORGANIZED HEALTH CARE EDUCATION/TRAINING PROGRAM

## 2025-06-17 NOTE — PROGRESS NOTES
Ortho Hand    Doing well overall.  Making slow progress.  Still with some intermittent small finger tingling and some pain with difficulty making a full fist without some discomfort.  She just started therapy.    On exam, left thumb base, left palm, left dorsoradial wrist and left elbow incisions are intact with no wounds or signs of infection.  No recurrent left thumb triggering.  Left median and ulnar distributed sensation is intact.  Mild tenderness over the left first extensor compartment.  Expected swelling.    A/P: 30-year-old female 4 weeks status post left trigger thumb, open carpal tunnel and first extensor compartment releases, left ulnar nerve decompression at the elbow    - Patient was instructed on limiting lifting to approximately 10-15 pounds and to continue therapy.  Expressed that patient should focus on finger motion, hand elevation and occasional icing.  She can begin strengthening with the left hand.  It is expected that there is some soreness with recovery.  Patient is scheduled for surgery in approximately 4 weeks for the right side.  The patient is struggling with the left hand recovery, we can always postpone the right side surgery.  Patient is motivated to be ready for surgery.  All questions answered for now.    Vic Roberts MD, PhD, FACS

## 2025-06-17 NOTE — LETTER
6/17/2025      Anh Flores  [address] [facility] [address]      Dear Colleague,    Thank you for referring your patient, Anh Flores, to the [facility] [place]. Please see a copy of my visit note below.    Ortho Hand    Doing well overall.  Making slow progress.  Still with some intermittent small finger tingling and some pain with difficulty making a full fist without some discomfort.  She just started therapy.    On exam, left thumb base, left palm, left dorsoradial wrist and left elbow incisions are intact with no wounds or signs of infection.  No recurrent left thumb triggering.  Left median and ulnar distributed sensation is intact.  Mild tenderness over the left first extensor compartment.  Expected swelling.    A/P: 30-year-old female 4 weeks status post left trigger thumb, open carpal tunnel and first extensor compartment releases, left ulnar nerve decompression at the elbow    - Patient was instructed on limiting lifting to approximately 10-15 pounds and to continue therapy.  Expressed that patient should focus on finger motion, hand elevation and occasional icing.  She can begin strengthening with the left hand.  It is expected that there is some soreness with recovery.  Patient is scheduled for surgery in approximately 4 weeks for the right side.  The patient is struggling with the left hand recovery, we can always postpone the right side surgery.  Patient is motivated to be ready for surgery.  All questions answered for now.    Vic Roberts MD, PhD, FACS    Again, thank you for allowing me to participate in the care of your patient.        Sincerely,        Vic Roberts MD    Electronically signed

## 2025-06-17 NOTE — NURSING NOTE
Reason For Visit:   Chief Complaint   Patient presents with    Surgical Followup     Post op Left trigger thumb release - Left           Left open carpal tunnel release - Left               Left first dorsal compartment release - Left      Left cubital tunnel release - Left          DOS: 5/19/25    Patient presents today with some incision site drainage and pain       Primary MD: Maria Guadalupe Triana  Ref. MD: Mayte    Age: 30 year old    ?  No      LMP 04/19/2025 (Exact Date)       Pain Assessment  Patient Currently in Pain: Yes  Primary Pain Location: Wrist (Left)  Pain Descriptors: Sore, Intermittent    Hand Dominance Evaluation  Hand Dominance: Right          QuickDASH Assessment      3/10/2025    10:46 AM   QuickDASH Main   1. Open a tight or new jar Unable   2. Do heavy household chores (e.g., wash walls, floors) Moderate difficulty   3. Carry a shopping bag or briefcase Moderate difficulty   4. Wash your back No difficulty   5. Use a knife to cut food Moderate difficulty   6. Recreational activities in which you take some force or impact through your arm, shoulder or hand (e.g., golf, hammering, tennis, etc.) Unable to answer   7. During the past week, to what extent has your arm, shoulder or hand problem interfered with your normal social activities with family, friends, neighbours or groups Unable to answer   8. During the past week, were you limited in your work or other regular daily activities as a result of your arm, shoulder or hand problem Very limited   9. Arm, shoulder or hand pain Moderate   10.Tingling (pins and needles) in your arm,shoulder or hand Severe   11. During the past week, how much difficulty have you had sleeping because of the pain in your arm, shoulder or hand Moderate difficulty   Quickdash Ability Score 40.91          Current Outpatient Medications   Medication Sig Dispense Refill    AJOVY 225 MG/1.5ML SOAJ Inject 225 mg subcutaneously every 30 days. Last dose 4/26       alcohol swab prep pads Use to swab area of injection/anthony as directed. 100 each 3    Atogepant (QULIPTA) 60 MG TABS Take 60 mg by mouth as needed.      atorvastatin (LIPITOR) 10 MG tablet Take 10 mg by mouth daily.      blood glucose (NO BRAND SPECIFIED) test strip Use to test blood sugar three times daily or as directed. Preferred blood glucose meter OR supplies to accompany: Blood Glucose Monitor Brands: per insurance. 100 strip 6    blood glucose monitoring (NO BRAND SPECIFIED) meter device kit Use to test blood sugar three times daily or as directed. Preferred blood glucose meter OR supplies to accompany: Blood Glucose Monitor Brands: per insurance. 1 kit 0    colestipol (COLESTID) 1 g tablet Take 1 tablet (1 g) by mouth 2 times daily. (Patient not taking: Reported on 6/11/2025) 60 tablet 2    Continuous Glucose Sensor (FREESTYLE AYSE 3 PLUS SENSOR) MISC Use 1 sensor every 15 days. Use to read blood sugars per 's instructions. 6 each 3    Continuous Glucose Sensor (FREESTYLE AYSE 3 SENSOR) [facility] 1 each by Other route every 14 days. 6 each 3    cyclobenzaprine (FLEXERIL) 5 MG tablet Take by mouth 3 times daily as needed for muscle spasms.      docusate sodium (COLACE) 100 MG capsule Take 1 capsule (100 mg) by mouth 2 times daily. 12 capsule 0    doxycycline hyclate (VIBRAMYCIN) 100 MG capsule Take 1 capsule (100 mg) by mouth 2 times daily for 7 days. 14 capsule 0    FLUoxetine (PROZAC) 10 MG capsule Take 1 capsule (10 mg) by mouth daily. 30 capsule 4    gabapentin (NEURONTIN) 300 MG capsule Take 1 capsule (300 mg) by mouth 3 times daily. 90 capsule 0    hydrOXYzine HCl (ATARAX) 25 MG tablet Take at bedtime as needed for itching. 30 tablet 4    insulin pen needle (ULTICARE MICRO) 32G X 4 MM miscellaneous Use 1 pen needles daily or as directed. 100 each 3    metFORMIN (GLUCOPHAGE XR) 500 MG 24 hr tablet Take 4 tablets (2,000 mg) by mouth daily (with dinner). 360 tablet 3    OnabotulinumtoxinA (BOTOX  IJ) Inject as directed every 3 months. Last dose 4/23/25      ondansetron (ZOFRAN) 4 MG tablet Take 1 tablet (4 mg) by mouth every 6 hours as needed for nausea. 12 tablet 0    oxyCODONE (ROXICODONE) 5 MG tablet Take 1-2 tablets (5-10 mg) by mouth every 6 hours as needed for moderate to severe pain. (Patient not taking: Reported on 6/11/2025) 16 tablet 0    pantoprazole (PROTONIX) 20 MG EC tablet Take 1 tablet (20 mg) by mouth daily. 30 tablet 1    prednisoLONE acetate (PRED FORTE) 1 % ophthalmic suspension Place 1 drop into both eyes 3 times daily for 7 days, THEN 1 drop 2 times daily for 7 days, THEN 1 drop daily for 7 days. 10 mL 0    rimegepant (NURTEC) 75 MG ODT tablet Place 75 mg under the tongue daily as needed for migraine.      rizatriptan (MAXALT) 10 MG tablet Take 10 mg by mouth at onset of headache.      Semaglutide, 1 MG/DOSE, (OZEMPIC) 4 MG/3ML pen Inject 1 mg subcutaneously every 7 days. 6 mL 1    spironolactone (ALDACTONE) 50 MG tablet Take 1 tablet (50 mg) by mouth daily. 30 tablet 4    SUMAtriptan (IMITREX) 50 MG tablet Take 2 tablets (100 mg) by mouth at onset of headache for migraine. May repeat in 2 hours. Max 4 tablets/24 hours.      thin (NO BRAND SPECIFIED) lancets Use with lanceting device. To accompany: Blood Glucose Monitor Brands: per insurance. 100 each 6    topiramate (TOPAMAX) 25 MG tablet Take 25 mg by mouth 2 times daily.      tranexamic acid (LYSTEDA) 650 MG tablet Take 2 tablets (1,300 mg) by mouth 2 times daily as needed (menorrhagia). 30 tablet 1    UBRELVY 100 MG tablet Take 100 mg by mouth at onset of headache.         Allergies   Allergen Reactions    Adhesive Tape Itching    Emgality [Galcanezumab-Gnlm] Itching     Itchiness, bumps    Other Environmental Allergy     Victoza [Liraglutide] Headache, Hives and Itching       Gwendolyn Ballesteros, ATC

## 2025-06-30 ENCOUNTER — LAB (OUTPATIENT)
Dept: LAB | Facility: CLINIC | Age: 30
End: 2025-06-30
Payer: COMMERCIAL

## 2025-06-30 ENCOUNTER — RESULTS FOLLOW-UP (OUTPATIENT)
Dept: INTERNAL MEDICINE | Facility: CLINIC | Age: 30
End: 2025-06-30

## 2025-06-30 ENCOUNTER — THERAPY VISIT (OUTPATIENT)
Dept: OCCUPATIONAL THERAPY | Facility: CLINIC | Age: 30
End: 2025-06-30
Payer: COMMERCIAL

## 2025-06-30 ENCOUNTER — OFFICE VISIT (OUTPATIENT)
Dept: INTERNAL MEDICINE | Facility: CLINIC | Age: 30
End: 2025-06-30
Payer: COMMERCIAL

## 2025-06-30 VITALS
BODY MASS INDEX: 47.41 KG/M2 | HEIGHT: 60 IN | RESPIRATION RATE: 18 BRPM | TEMPERATURE: 97.8 F | HEART RATE: 70 BPM | SYSTOLIC BLOOD PRESSURE: 119 MMHG | WEIGHT: 241.5 LBS | DIASTOLIC BLOOD PRESSURE: 83 MMHG | OXYGEN SATURATION: 96 %

## 2025-06-30 DIAGNOSIS — G56.23 ULNAR NEUROPATHY OF BOTH UPPER EXTREMITIES: ICD-10-CM

## 2025-06-30 DIAGNOSIS — N93.9 ABNORMAL UTERINE BLEEDING: ICD-10-CM

## 2025-06-30 DIAGNOSIS — M77.11 LATERAL EPICONDYLITIS OF BOTH ELBOWS: ICD-10-CM

## 2025-06-30 DIAGNOSIS — M77.12 LATERAL EPICONDYLITIS OF BOTH ELBOWS: ICD-10-CM

## 2025-06-30 DIAGNOSIS — M65.311 TRIGGER THUMB OF BOTH THUMBS: ICD-10-CM

## 2025-06-30 DIAGNOSIS — E25.9: Primary | ICD-10-CM

## 2025-06-30 DIAGNOSIS — M65.312 TRIGGER THUMB OF BOTH THUMBS: ICD-10-CM

## 2025-06-30 DIAGNOSIS — G47.33 OBSTRUCTIVE SLEEP APNEA: ICD-10-CM

## 2025-06-30 DIAGNOSIS — G56.03 BILATERAL CARPAL TUNNEL SYNDROME: Primary | ICD-10-CM

## 2025-06-30 LAB
ERYTHROCYTE [DISTWIDTH] IN BLOOD BY AUTOMATED COUNT: 13.2 % (ref 10–15)
HCT VFR BLD AUTO: 41.3 % (ref 35–47)
HGB BLD-MCNC: 13.9 G/DL (ref 11.7–15.7)
MCH RBC QN AUTO: 31.7 PG (ref 26.5–33)
MCHC RBC AUTO-ENTMCNC: 33.7 G/DL (ref 31.5–36.5)
MCV RBC AUTO: 94 FL (ref 78–100)
PLATELET # BLD AUTO: 299 10E3/UL (ref 150–450)
RBC # BLD AUTO: 4.38 10E6/UL (ref 3.8–5.2)
WBC # BLD AUTO: 8.7 10E3/UL (ref 4–11)

## 2025-06-30 PROCEDURE — 99214 OFFICE O/P EST MOD 30 MIN: CPT | Mod: 24 | Performed by: INTERNAL MEDICINE

## 2025-06-30 PROCEDURE — 97110 THERAPEUTIC EXERCISES: CPT | Mod: GO | Performed by: SPECIALIST/TECHNOLOGIST

## 2025-06-30 PROCEDURE — 36415 COLL VENOUS BLD VENIPUNCTURE: CPT | Performed by: PATHOLOGY

## 2025-06-30 PROCEDURE — 85027 COMPLETE CBC AUTOMATED: CPT | Performed by: PATHOLOGY

## 2025-06-30 PROCEDURE — 97535 SELF CARE MNGMENT TRAINING: CPT | Mod: GO | Performed by: SPECIALIST/TECHNOLOGIST

## 2025-06-30 NOTE — PROGRESS NOTES
Preoperative Evaluation  [facility] INTERNAL MEDICINE [address]  [address] [facility] [address]  Phone: [phone]  Fax: [phone]  Primary Provider: Maria Guadalupe Triana MD  Pre-op Performing Provider: Maria Guadalupe Triana MD  Jun 30, 2025 6/27/2025   Surgical Information   What procedure is being done? RIGHT TRIGGER THUMB RELEASE, RIGHT OPEN CARPAL TUNNEL RELEASE, RIGHT CUBITAL TUNNEL RELEASE WITH POSSIBLE ULNAR NERVE TRANSPOSITION, RIGHT FIRST DORSAL COMPARTMENT RELEASE   Facility or Hospital where procedure/surgery will be performed: [facility]  ( OR)   Who is doing the procedure / surgery? Vic Díazhuangmague   Date of surgery / procedure: July 14th   Time of surgery / procedure: 1:05PM   Where do you plan to recover after surgery? at home with family     Fax number for surgical facility: Note does not need to be faxed, will be available electronically in Epic.    Assessment & Plan     The proposed surgical procedure is considered LOW risk.    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  Patient will follow stress dose steroids per recommendations of her endocrinologist.    Abnormal uterine bleeding  Reviewed possible causes of abnormal uterine bleeding following IUD placement.  Patient has a prescription for tranexamic acid and can use it to help with the severity of bleeding.  She was also instructed to call OB/GYN for additional recommendations.  - CBC with platelets    Obstructive sleep apnea  Discussed obstructive sleep apnea with patient.  She currently does not have a jaw positioning device.  Close monitoring will be needed in recovery as patient is at risk for obstructive episode with sedation.          Risks and Recommendations  The patient has the following additional risks and recommendations for perioperative  complications:  Diabetes:  - Patient is not on insulin therapy: regular NPO guidelines can be followed.   Obstructive Sleep Apnea:   Patient currently does not have device or CPAP to use.  Will need close monitoring in postoperative period.    Antiplatelet or Anticoagulation Medication Instructions   - We reviewed the medication list and the patient is not on an antiplatelet or anticoagulation medications.    Additional Medication Instructions   - GLP-1 Injectable (exenitide, liraglutide, semaglutide, dulaglutide, etc.): DO NOT TAKE 7 days before surgery     Recommendation  Approval given to proceed with proposed procedure, without further diagnostic evaluation.      I spent a total of 30 minutes on the day of the visit.   Time spent by me today doing chart review, history and exam, documentation and further activities per the note    Subjective   Anh is a 30 year old, presenting for the following:  Pre-Op Exam (RIGHT TRIGGER THUMB RELEASE, RIGHT OPEN CARPAL TUNNEL RELEASE, RIGHT CUBITAL TUNNEL RELEASE WITH POSSIBLE ULNAR NERVE TRANSPOSITION, RIGHT FIRST DORSAL COMPARTMENT RELEASE with Vic Roberts MD  on 7/14/2025 at 1:05 PM at [facility] OR)          6/30/2025     7:43 AM   Additional Questions   Roomed by Keri BOWEN   Accompanied by N/A     HPI: Patient is here for preop visit for the right trigger thumb release. Patient recently had IUD insertion done under sedation and tolerated procedure well. She denies new medical issues.           6/27/2025   Pre-Op Questionnaire   Have you ever had a heart attack or stroke? No   Have you ever had surgery on your heart or blood vessels, such as a stent placement, a coronary artery bypass, or surgery on an artery in your head, neck, heart, or legs? No   Do you have chest pain with activity? (!) YES    Do you have a history of heart failure? No   Do you currently have a cold, bronchitis or symptoms of other infection? No   Do you have a cough, shortness of breath, or  wheezing? No   Do you or anyone in your family have previous history of blood clots? No   Do you or does anyone in your family have a serious bleeding problem such as prolonged bleeding following surgeries or cuts? No   Have you ever had problems with anemia or been told to take iron pills? No   Have you had any abnormal blood loss such as black, tarry or bloody stools, or abnormal vaginal bleeding? (!) UNKNOWN    Have you ever had a blood transfusion? No   Are you willing to have a blood transfusion if it is medically needed before, during, or after your surgery? Yes   Have you or any of your relatives ever had problems with anesthesia? No   Do you have sleep apnea, excessive snoring or daytime drowsiness? (!) YES   Do you have a CPAP machine? (!) NO    Do you have any artifical heart valves or other implanted medical devices like a pacemaker, defibrillator, or continuous glucose monitor? (!) YES   What type of device do you have? Freestyle Jm 3 Plus   Name of the clinic that manages your device [facility]   Do you have artificial joints? No   Are you allergic to latex? No     Advance Care Planning    Document on file is a Health Care Directive or POLST.    Preoperative Review of    reviewed - controlled substances reflected in medication list.      Status of Chronic Conditions:  See problem list for active medical problems.  Problems all longstanding and stable, except as noted/documented.  See ROS for pertinent symptoms related to these conditions.    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     Priority: Medium    Abnormal finding on imaging of liver 10/18/2024     Priority: Medium    Chronic diarrhea 10/18/2024     Priority: Medium    Lactose intolerance 09/10/2024     Priority: Medium    Obstructive sleep apnea 07/05/2024     Priority: Medium    Insomnia, unspecified type 07/05/2024     Priority: Medium    Bilateral carpal tunnel syndrome 06/11/2024     Priority: Medium    Diabetes mellitus, type 2 (H) 10/19/2020     Priority: Medium    Hidradenitis suppurativa 01/23/2019     Priority: Medium    Morbid obesity with body mass index of 60.0-69.9 in adult (H) 01/23/2019     Priority: Medium    Elevated BP without diagnosis of hypertension 01/23/2019     Priority: Medium    Morbid obesity (H) 10/17/2014     Priority: Medium    Chronic headaches 10/13/2014     Priority: Medium    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 Brewer; next follow up 1.2011.    Metformin increased to 850 mg twice a day.      Chronic abdominal pain 02/09/2010     Priority: Medium    Vitamin D deficiency 02/09/2010     Priority: Medium      Past Medical History:   Diagnosis Date    CAH (congenital adrenal hyperplasia) 2/9/2010    Followed by Dr. Brewer; next follow up 1.2011.  Metformin increased to 850 mg twice a day.     Diabetes mellitus, type 2 (H) 10/19/2020    Vitamin D deficiency 2/9/2010     Past Surgical History:   Procedure Laterality Date    CHOLECYSTECTOMY      She was in 8th grade     COLONOSCOPY N/A 9/16/2024    Procedure: COLONOSCOPY, WITH BIOPSY;  Surgeon: Bacilio Yancey MD;  Location:  GI    ESOPHAGOSCOPY, GASTROSCOPY, DUODENOSCOPY (EGD), COMBINED N/A 9/16/2024    Procedure:  ESOPHAGOGASTRODUODENOSCOPY, WITH BIOPSY;  Surgeon: Bacilio Yancey MD;  Location: [facility] GI    INJECT STEROID (LOCATION) N/A 4/9/2025    Procedure: Sacrococcygeal ligament/coccyx steroid injection;  Surgeon: Edwina Fitzpatrick MD;  Location: [facility] OR    INSERT INTRAUTERINE DEVICE N/A 6/6/2025    Procedure: INSERTION, INTRAUTERINE DEVICE; Pap, EUA;  Surgeon: Jami Gutierrez MD;  Location: [facility] OR    RELEASE CARPAL TUNNEL Left 5/19/2025    Procedure: Left open carpal tunnel release;  Surgeon: Vic Roberts MD;  Location: [facility] OR    RELEASE DEQUERVAINS WRIST Left 5/19/2025    Procedure: Left first dorsal compartment release;  Surgeon: Vic Roberts MD;  Location: [facility] OR    RELEASE TRIGGER FINGER Left 5/19/2025    Procedure: Left trigger thumb release;  Surgeon: Vic Roberts MD;  Location: [facility] OR    TRANSPOSITION ULNAR NERVE (ELBOW) Left 5/19/2025    Procedure: Left cubital tunnel release;  Surgeon: Vic Roberts MD;  Location: [facility] OR     Current Outpatient Medications   Medication Sig Dispense Refill    AJOVY 225 MG/1.5ML SOAJ Inject 225 mg subcutaneously every 30 days. Last dose 4/26      Atogepant (QULIPTA) 60 MG TABS Take 60 mg by mouth as needed.      atorvastatin (LIPITOR) 10 MG tablet Take 10 mg by mouth daily.      cyclobenzaprine (FLEXERIL) 5 MG tablet Take by mouth 3 times daily as needed for muscle spasms.      FLUoxetine (PROZAC) 10 MG capsule Take 1 capsule (10 mg) by mouth daily. 30 capsule 4    gabapentin (NEURONTIN) 300 MG capsule Take 1 capsule (300 mg) by mouth 3 times daily. (Patient taking differently: Take 300 mg by mouth 2 times daily.) 90 capsule 0    hydrOXYzine HCl (ATARAX) 25 MG tablet Take at bedtime as needed for itching. 30 tablet 4    metFORMIN (GLUCOPHAGE XR) 500 MG 24 hr tablet Take 4 tablets (2,000 mg) by mouth daily (with dinner). 360 tablet 3    OnabotulinumtoxinA (BOTOX IJ) Inject as directed every 3 months. Last dose 4/23/25      ondansetron (ZOFRAN) 4  MG tablet Take 1 tablet (4 mg) by mouth every 6 hours as needed for nausea. 12 tablet 0    pantoprazole (PROTONIX) 20 MG EC tablet Take 1 tablet (20 mg) by mouth daily. 30 tablet 1    prednisoLONE acetate (PRED FORTE) 1 % ophthalmic suspension Place 1 drop into both eyes 3 times daily for 7 days, THEN 1 drop 2 times daily for 7 days, THEN 1 drop daily for 7 days. 10 mL 0    rimegepant (NURTEC) 75 MG ODT tablet Place 75 mg under the tongue daily as needed for migraine.      rizatriptan (MAXALT) 10 MG tablet Take 10 mg by mouth at onset of headache.      Semaglutide, 1 MG/DOSE, (OZEMPIC) 4 MG/3ML pen Inject 1 mg subcutaneously every 7 days. 6 mL 1    spironolactone (ALDACTONE) 50 MG tablet Take 1 tablet (50 mg) by mouth daily. 30 tablet 4    SUMAtriptan (IMITREX) 50 MG tablet Take 2 tablets (100 mg) by mouth at onset of headache for migraine. May repeat in 2 hours. Max 4 tablets/24 hours.      topiramate (TOPAMAX) 25 MG tablet Take 25 mg by mouth 2 times daily.      tranexamic acid (LYSTEDA) 650 MG tablet Take 2 tablets (1,300 mg) by mouth 2 times daily as needed (menorrhagia). 30 tablet 1    UBRELVY 100 MG tablet Take 100 mg by mouth at onset of headache.      alcohol swab prep pads Use to swab area of injection/anthony as directed. 100 each 3    blood glucose (NO BRAND SPECIFIED) test strip Use to test blood sugar three times daily or as directed. Preferred blood glucose meter OR supplies to accompany: Blood Glucose Monitor Brands: per insurance. 100 strip 6    blood glucose monitoring (NO BRAND SPECIFIED) meter device kit Use to test blood sugar three times daily or as directed. Preferred blood glucose meter OR supplies to accompany: Blood Glucose Monitor Brands: per insurance. 1 kit 0    colestipol (COLESTID) 1 g tablet Take 1 tablet (1 g) by mouth 2 times daily. (Patient not taking: Reported on 6/30/2025) 60 tablet 2    Continuous Glucose Sensor (FREESTYLE AYSE 3 PLUS SENSOR) MISC Use 1 sensor every 15 days. Use  to read blood sugars per 's instructions. 6 each 3    Continuous Glucose Sensor (FREESTYLE AYSE 3 SENSOR) MISC 1 each by Other route every 14 days. 6 each 3    docusate sodium (COLACE) 100 MG capsule Take 1 capsule (100 mg) by mouth 2 times daily. (Patient not taking: Reported on 6/30/2025) 12 capsule 0    insulin pen needle (ULTICARE MICRO) 32G X 4 MM miscellaneous Use 1 pen needles daily or as directed. 100 each 3    oxyCODONE (ROXICODONE) 5 MG tablet Take 1-2 tablets (5-10 mg) by mouth every 6 hours as needed for moderate to severe pain. (Patient not taking: Reported on 6/30/2025) 16 tablet 0    thin (NO BRAND SPECIFIED) lancets Use with lanceting device. To accompany: Blood Glucose Monitor Brands: per insurance. 100 each 6       Allergies   Allergen Reactions    Adhesive Tape Itching    Emgality [Galcanezumab-Gnlm] Itching     Itchiness, bumps    Other Environmental Allergy     Victoza [Liraglutide] Headache, Hives and Itching        Social History     Tobacco Use    Smoking status: Never     Passive exposure: Never    Smokeless tobacco: Never   Substance Use Topics    Alcohol use: No     Family History   Problem Relation Age of Onset    Neurologic Disorder Sister 16        migraines    Cerebrovascular Disease No family hx of     Alzheimer Disease No family hx of     Glaucoma No family hx of     Macular Degeneration No family hx of      History   Drug Use No               Objective    /83 (BP Location: Right arm, Patient Position: Sitting, Cuff Size: Adult Large)   Pulse 70   Temp 97.8  F (36.6  C)   Resp 18   Ht 1.524 m (5')   Wt 109.5 kg (241 lb 8 oz)   LMP 04/19/2025 (Exact Date)   SpO2 96%   BMI 47.16 kg/m     Estimated body mass index is 47.16 kg/m  as calculated from the following:    Height as of this encounter: 1.524 m (5').    Weight as of this encounter: 109.5 kg (241 lb 8 oz).    Physical Exam  Vitals and nursing note reviewed.   Constitutional:       Appearance: Normal  appearance.   HENT:      Head: Normocephalic and atraumatic.      Mouth/Throat:      Mouth: Mucous membranes are moist.   Eyes:      Pupils: Pupils are equal, round, and reactive to light.   Cardiovascular:      Rate and Rhythm: Normal rate and regular rhythm.      Pulses: Normal pulses.      Heart sounds: Normal heart sounds.   Pulmonary:      Effort: Pulmonary effort is normal.      Breath sounds: Normal breath sounds.   Abdominal:      General: Bowel sounds are normal.      Palpations: Abdomen is soft.   Musculoskeletal:      Cervical back: Normal range of motion.   Skin:     General: Skin is warm and dry.   Neurological:      Mental Status: She is alert.   Psychiatric:         Mood and Affect: Mood normal.         Behavior: Behavior normal.         Thought Content: Thought content normal.         Judgment: Judgment normal.

## 2025-07-01 ENCOUNTER — VIRTUAL VISIT (OUTPATIENT)
Dept: PSYCHOLOGY | Facility: CLINIC | Age: 30
End: 2025-07-01
Payer: COMMERCIAL

## 2025-07-01 DIAGNOSIS — F32.9 MAJOR DEPRESSIVE DISORDER WITH CURRENT ACTIVE EPISODE, UNSPECIFIED DEPRESSION EPISODE SEVERITY, UNSPECIFIED WHETHER RECURRENT: ICD-10-CM

## 2025-07-01 DIAGNOSIS — F41.9 ANXIETY DISORDER, UNSPECIFIED TYPE: Primary | ICD-10-CM
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
## 2025-07-01 NOTE — PROGRESS NOTES
[facility] Dermato-allergology Note  Office visit  Encounter Date: Jul 2, 2025  ____________________________________________    CC: Allergy Consult (Severe tching that won't go away for over a year with sometimes associated with dry scaly bump rash. Then surgery in May '25 w/cast, prescribed hydroxyzine which helped somewhat. Breaks out with jewelry? Issues with Ivs. Has switched detergents, diet changes etc - also has digestive issues, wondering about food intolerance, works with dietician. Believes was seen by dermatology, blood work completed that indicated allergies. Itchy eyes/sinus issues fall/spring. )      HPI:  (Jul 2, 2025)  Ms. Anh Flores is a(n) 30 year old female who presents today as new patient for allergy consultation  - Referred by Dr. Bita Veronica on 10/18/2024 for chronic urticaria.   - 6/2/2025 OV visit with Leanne MONTANA (Derm)  FROM HPI:  Patient reports the itching has been chronic and is more than surgical itching post-op. She notes the itching is coming from deep below her skin surface which she cannot seem to relieve. She reports she has itched to the point of tearing her skin open, so has had to keep her nails short. Patient will regularly experience itching in the sites of her varying medication injections. She reports the itching will cause swelling, and after tearing will cause bleeding. She reports the itching has been present for approximately one year. Patient has been experiencing ongoing diarrhea that her doctors cannot seem to treat, and will alternate with severe constipation.    FROM A&P:  # Mild Dermatographism/pruritus. Improved on oral antihistamines but they are sedating so recommend zyrtec 10 mg every day in the morning, or up to BID, and hydroxyzine 25 mg as needed  - check IGE and Tryptase  Reviewed previously labs( [id number])     # Hx of potential contact dermatitis after surgical procedures  - patient already referred to Dr. Herrera  for patch testing     06/09/25 09:43   Albumin 4.2   Protein Total 7.6   Alkaline Phosphatase 48   ALT 35   AST 18   Bilirubin Direct 0.14   Bilirubin Total 0.3    (H)   Tryptase 3.3   WBC 9.2   Hemoglobin 13.6   Hematocrit 40.1   Platelet Count 310   RBC Count 4.27   MCV 94   MCH 31.9   MCHC 33.9   RDW 13.6       - Full body itching; deep itching  - Has been persisent for a year and a half  - Also has pimples that last for 2-3 weeks when she has the itching   - Washes with anaseptic soap  - Has tried hydroxyzine and some OTC medications  - No improvement with benadryl    - Chronic diarrhea since gallblader removed   - Sees GI and was initially thought to have food intolerances  - Was put on medication which caused to her to be constipated  - Has hx of diabetes, well controlled     - Hx of Hidradenitis suppurativa  - Often has inflammation  - Watching what she eats otherwise it worsens  - Worsens after eating red meat  - Not currently managed by anyone  - Was seen someone at [facility] and was getting injections    - Otherwise feeling well in usual state of health    Physical Exam:  General: In no acute distress, well-developed, well-nourished  Eyes: no conjunctivitis  ENT: no signs of rhinitis   Pulmonary: no wheezing or coughing  Skin: Focused examination of the skin on test sites was performed = see test results below  Sun-exposed skin, which includes the head/face, neck, both arms, digits, and/or nails was examined.   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), late onset    Chronic abdominal pain    Vitamin D deficiency    Chronic headaches    Morbid obesity (H)    Hidradenitis suppurativa    Morbid obesity with body mass index of 60.0-69.9 in adult (H)    Elevated BP without diagnosis of hypertension    Diabetes mellitus, type 2 (H)    Bilateral carpal tunnel syndrome     Obstructive sleep apnea    Insomnia, unspecified type    Lactose intolerance    Abnormal finding on imaging of liver    Chronic diarrhea    Carrier of hemochromatosis HFE gene mutation    Cryptosporidiasis (H)    Elevated ferritin    Ulnar neuropathy of both upper extremities    Trigger thumb of both thumbs    Lateral epicondylitis of both elbows    Coccydynia    Chronic bilateral low back pain, unspecified whether sciatica present    Lumbar radiculopathy     Past Medical History:   Diagnosis Date    CAH (congenital adrenal hyperplasia) 2/9/2010    Followed by Dr. Brewer; next follow up 1.2011.  Metformin increased to 850 mg twice a day.     Diabetes mellitus, type 2 (H) 10/19/2020    Vitamin D deficiency 2/9/2010       Allergies:  Allergies   Allergen Reactions    Adhesive Tape Itching    Emgality [Galcanezumab-Gnlm] Itching     Itchiness, bumps    Other Environmental Allergy     Victoza [Liraglutide] Headache, Hives and Itching       Medications:  Current Outpatient Medications   Medication Sig Dispense Refill    AJOVY 225 MG/1.5ML SOAJ Inject 225 mg subcutaneously every 30 days. Last dose 4/26      alcohol swab prep pads Use to swab area of injection/anthony as directed. 100 each 3    Atogepant (QULIPTA) 60 MG TABS Take 60 mg by mouth as needed.      atorvastatin (LIPITOR) 10 MG tablet Take 10 mg by mouth daily.      blood glucose (NO BRAND SPECIFIED) test strip Use to test blood sugar three times daily or as directed. Preferred blood glucose meter OR supplies to accompany: Blood Glucose Monitor Brands: per insurance. 100 strip 6    blood glucose monitoring (NO BRAND SPECIFIED) meter device kit Use to test blood sugar three times daily or as directed. Preferred blood glucose meter OR supplies to accompany: Blood Glucose Monitor Brands: per insurance. 1 kit 0    colestipol (COLESTID) 1 g tablet Take 1 tablet (1 g) by mouth 2 times daily. (Patient not taking: Reported on 6/30/2025) 60 tablet 2    Continuous  Glucose Sensor (FREESTYLE AYSE 3 PLUS SENSOR) MISC Use 1 sensor every 15 days. Use to read blood sugars per 's instructions. 6 each 3    Continuous Glucose Sensor (FREESTYLE AYSE 3 SENSOR) [facility] 1 each by Other route every 14 days. 6 each 3    cyclobenzaprine (FLEXERIL) 5 MG tablet Take by mouth 3 times daily as needed for muscle spasms.      docusate sodium (COLACE) 100 MG capsule Take 1 capsule (100 mg) by mouth 2 times daily. (Patient not taking: Reported on 6/30/2025) 12 capsule 0    FLUoxetine (PROZAC) 10 MG capsule Take 1 capsule (10 mg) by mouth daily. 30 capsule 4    gabapentin (NEURONTIN) 300 MG capsule Take 1 capsule (300 mg) by mouth 3 times daily. (Patient taking differently: Take 300 mg by mouth 2 times daily.) 90 capsule 0    hydrOXYzine HCl (ATARAX) 25 MG tablet Take at bedtime as needed for itching. 30 tablet 4    insulin pen needle (ULTICARE MICRO) 32G X 4 MM miscellaneous Use 1 pen needles daily or as directed. 100 each 3    metFORMIN (GLUCOPHAGE XR) 500 MG 24 hr tablet Take 4 tablets (2,000 mg) by mouth daily (with dinner). 360 tablet 3    OnabotulinumtoxinA (BOTOX IJ) Inject as directed every 3 months. Last dose 4/23/25      ondansetron (ZOFRAN) 4 MG tablet Take 1 tablet (4 mg) by mouth every 6 hours as needed for nausea. 12 tablet 0    oxyCODONE (ROXICODONE) 5 MG tablet Take 1-2 tablets (5-10 mg) by mouth every 6 hours as needed for moderate to severe pain. (Patient not taking: Reported on 6/30/2025) 16 tablet 0    pantoprazole (PROTONIX) 20 MG EC tablet Take 1 tablet (20 mg) by mouth daily. 30 tablet 1    rimegepant (NURTEC) 75 MG ODT tablet Place 75 mg under the tongue daily as needed for migraine.      rizatriptan (MAXALT) 10 MG tablet Take 10 mg by mouth at onset of headache.      Semaglutide, 1 MG/DOSE, (OZEMPIC) 4 MG/3ML pen Inject 1 mg subcutaneously every 7 days. 6 mL 1    spironolactone (ALDACTONE) 50 MG tablet Take 1 tablet (50 mg) by mouth daily. 30 tablet 4     SUMAtriptan (IMITREX) 50 MG tablet Take 2 tablets (100 mg) by mouth at onset of headache for migraine. May repeat in 2 hours. Max 4 tablets/24 hours.      thin (NO BRAND SPECIFIED) lancets Use with lanceting device. To accompany: Blood Glucose Monitor Brands: per insurance. 100 each 6    topiramate (TOPAMAX) 25 MG tablet Take 25 mg by mouth 2 times daily.      tranexamic acid (LYSTEDA) 650 MG tablet Take 2 tablets (1,300 mg) by mouth 2 times daily as needed (menorrhagia). 30 tablet 1    UBRELVY 100 MG tablet Take 100 mg by mouth at onset of headache.       No current facility-administered medications for this visit.       Previous Labs, Allergy Tests, Dermatopathology, Imaging:  N/a    Referred By: Bita Veronica MD  [id number] [place] [address]     Allergy Tests:  Past Allergy Test    Family History:  Family History   Problem Relation Age of Onset    Neurologic Disorder Sister 16        migraines    Cerebrovascular Disease No family hx of     Alzheimer Disease No family hx of     Glaucoma No family hx of     Macular Degeneration No family hx of        Social History:  The patient works as a  and handler.   Patient has the following hobbies or non-occupational exposure: n/a.    Order for Future Allergy Testing:    [x] Outpatient  [] Inpatient: [facility]..../ Bed ...    Skin Atopy (atopic dermatitis)? [x] Yes     [] No  Comments:   - dry skin if she does not moisturize     Childhood eczema?   [] Yes     [x] No  Comments:   Hand eczema?   [] Yes     [x] No  If yes, leading hand? [] Right     [] Left     [] Ambidextrous  Comments:     Contact allergies?     Comments:   Including adhesives/bandages? [x] Yes     [] No  Comments:   - significant itching with band aids  - also has redness that lasts for 1-2 weeks    Including metals?   [x] Yes     [] No  Comments:   - avoids wearing jewelry due to infection     Other substances?   [] Yes     [x] No  Comments:   - migraines with fragrance   - vomiting  with smell       Drug allergies?   [x] Yes     [] No  Comments:   - hives on injection sites with Emgality and victoza, local reaction only   - within an hour, also became itchy  - victoza lasted for weeks  - maybe some swelling with victoza  - now taking ozempic with itching, but no hives    Angioedema?   [] Yes     [x] No  Comments:     Urticaria?   [] Yes     [x] No  Comments:     Food allergies?   [] Yes     [x] No  Comments:     Pet allergies?   [x] Yes     [] No  Comments:   - has horse, cats, and dogs  - nose gets itchy with the cat    Environmental allergy symptoms?  [x] Conjunctivitis  [] Otitis  [] Pharyngitis  [] Polyposis  [x] Postnasal Drip  [x] Rhinitis  [x] Sinusitis  [] None  Comments:   - only has one sinus on left that developed  - worse in winter and when its blooming outside (spring/fall)  - on antibiotics sporadically throughout the year    HENT Operations?  [] Adenoids [] Septum [] Sinus  [] Tonsils        [] Other:   [] None  Comments:     Pulmonary symptoms (from birth to present)?  [x] Asthma bronchiale  Inhaler(s)?:   [] Coughing  [] Other  [] None  Comments:   - as a child; not diagnosed  - never prescribed inhaler   - still has some exercised induced    Environmental and pulmonary symptoms aggravated by?  Season: [] None     [] I     [] II     [] III     [] IV     [] V     [] VI          [] VII     [] VIII     [] IX     [] X     [x] XI     [x] XII     [x] Perennial  Time of Day: [] None     [] Morning     [] Noon     [] Evening     [] Night     [] Whole Day  Location/Changes: [] None     [] Inside     [] Outside     [] Mountain     [] Sea     [] Other:   Triggers (specific): [] None     [] Animals     [] Dust     [] Mold     [] Plants     [] Other:   Triggers (other): [] None     [] Psyche     [] Sport     [] Work     [] Other:   Irritant: [] None     [] Cold     [] Heat     [] Odors     [] Physical Efforts     [] Smoke     [] Other:     Order for PATCH TESTS  Reason for tests (suspected  allergy): suspect contact allergy  Known previous allergies: none confirmed  Standardized panels  [x] Standard panel (40 tests)  [x] Preservatives & Antimicrobials (31 tests)  [x] Emulsifiers & Additives (25 tests)   [x] Perfumes/Flavours & Plants (25 tests)  [] Hairdresser panel (12 tests)  [] Rubber Chemicals (22 tests)  [x] Plastics (26 tests)  [] Colorants/Dyes/Food additives (20 tests)  [] Metals (implants/dental) (24 tests)  [] Local anaesthetics/NSAIDs (13 tests)  [x] Antibiotics & Antimycotics (14 tests)   [x] Corticosteroids (15 tests)   [] Photopatch test (62 tests)   [] Others:     [] Patient's Own Products:   DO NOT test if chemical or biological identity is unknown!   always ask from patient the product information and safety sheets (MSDS)       Order for PRICK TESTS    Reason for tests (suspected allergy): atopy?  Known previous allergies: none confirmed     Standardized prick panels  [x] Atopic panel (20 tests)  [] Pediatric Panel (12 tests)  [] Milk, Meat, Eggs, Grains (20 tests)   [] Dust, Epithelia, Feathers (10 tests)  [] Fish, Seafood, Shellfish (17 tests)  [] Nuts, Beans (14 tests)  [] Spice, Vegetable, Fruit (17 tests)  [] Pollen Panel = Tree, Grass, Weed (24 tests)  [] Others:     [] Patient's Own Products:   DO NOT test if chemical or biological identity is unknown!   always ask from patient the product information and safety sheets (MSDS)     Standardized intradermal tests  [] Alternaria alternata  [] Aspergillus fumigatus  [] Cladosporium herbarum   [] Penicillium notatum  [] Dermatophagoides farinae  [] Dermatophagoides pteronyssinus  [] Dog Epithelium  [] Cat Epithelium  [] Others:     [] Bee venom   [] Wasp venom  !!Specific protocol with dilutions!!       Order for Drug allergy tests (prick & intradermal & patch tests)    [] Penicillin G     [] Ampicillin   [] Cefazolin (1st gen)     [] Cefuroxime (2nd gen)     [] Ceftriaxone (3rd gen)     [] Ceftazidime (3rd gen)     [] Cefepime (4th  gen)       [] Bactrim  [] Iodixanol (Visipaque)     [] Iopamidol (Isovue)       [] Iohexol (Omnipaque)  [] Others:   [] Patient's Own:   Order for ... as test date      Atopy Screen (Placed Jul 2, 2025)  No Substance Readings (15 min) Evaluation   POS Histamine 1mg/ml +/++    NEG NaCl 0.9% -      No Substance Readings (15 min) Evaluation   1 Alternaria alternata (tenuis)  -    2 Cladosporium herbarum -    3 Aspergillus fumigatus -    4 Penicillium notatum -    5 Dermatophagoides pteronyssinus -    6 Dermatophagoides farinae -    7 Dog epithelium (canis spp) -    8 Cat hair (jhoana catus) -    9 Cockroach   (Blatella americana & germanica) -    10 Grass mix midwest   (Holly, Orchard, Redtop, Aayush) -    11 Nikos grass (sorghum halepense) -    12 Weed mix   (common Cocklebur, Lamb s quarters, rough redroot Pigweed, Dock/Sorrel) -    13 Mug wort (artemisia vulgare) -    14 Ragweed giant/short (ambrosia spp) -    15 White birch (Betula papyrifera) -    16 Tree mix 1 (Pecan, Maple BHR, Oak RVW, american [place], black [place]) (+)    17 Red cedar (juniperus virginia) -    18 Tree mix 2   (white Chuck, river/red Birch, black [place], common [place], american Elm) -    19 Box elder/Maple mix (acer spp) -    20 Stephenson shagbark (carya ovata) -           Conclusion      Intradermal Testing (Placed Jul 2, 2025)  No Substance Conc.  Reading (15min)  immediate Papule [mm] / Erythema [mm] Reading   (... days)  delayed Papule [mm] / Erythema [mm] Remarks   DF Standard Dust Mite - D. Farinae 1:10 -   -    DP Standard Dust Mite - D. Pteronyssinus 1:10 -   -    A Aspergillus fumigatus  1:10 -   -    P Penicillium notatum 1:10 -   -     Dog epithelium 1:10 -   -     Cat epithelium 1:10 -   -    Conclusions: No signs for immediate-type reaction. Patient will provide feedback if delayed-type reaction is experienced. .     ________________________________    Assessment & Plan:    ==> Final Diagnosis:     # Chronic and recurrent  generalized pruritus   DDx: Eczematous? Allergic contact dermatitis and/or atopic dermatitis?  DDx: Diabetic? (HgA1c 3 months ago normal) Metabolic?  DDx: Urticarial?   DDx: Folliculitis   * chronic illness with exacerbation, progression, side effects from treatment    # Suspicion for atopic predisposition? with:   Perennial RS and PND (allergic? Prick and intradermal test without immediate type reaction)  Aggravation in spring and fall   Some RC to cats less to dogs  Positive family history   Dry hypersensitive hyperirritable skin with keratosis pilaris on arms  * chronic illness with exacerbation, progression, side effects from treatment    # Hidradenitis suppurativa  * chronic illness with exacerbation, progression, side effects from treatment       These conclusions are made at the best of one's knowledge and belief based on the provided evidence such as patient's history and allergy test results and they can change over time or can be incomplete because of missing information.    ==> Treatment Plan:    >> Plan for patch testing to rule out contact allergies.   - If itching/skin improves can cancel appointments    >> For IDT test sites from today, patient will monitor sites for the next 2 and 4 days. If there are any delayed reactions, patient will take photos with the ruler provided and send to clinic via [employer] message for review. Be sure to specify RIGHT or LEFT arm.  - Do not apply topical steroids to sites until after 4 total days.    >> For folliculitis,   - Prescribed Betadine 7.5% scrub, can also order OTC if necessary; apply topically twice a week.     >> For pruritus,  - Prescribed allegra 180 mg in the morning  - Prescribed Doxepin 10-20 mg at bedtime, starting with 10 mg. If 10 mg not effective after 5 days, increase to 20 mg and 5 days later increase to 30 mg.     >> Referred to Dermatology for further evaluation and treatment of hidradenitis suppurativa.    Procedures Performed: Allergy tests,  including prick and intradermal     Staff Involved: Provider, Staff, Resident, and Scribe    Scribe Disclosure:   I, Katerina Chavez, am serving as a scribe to document services personally performed by Canelo Herrera MD based on data collection and the provider's statements to me.     Staff Physician Comments:  I was present with the scribe who participated in the documentation of the note. I have verified the history and personally performed the physical exam and medical decision making. I agree with the assessment and plan as documented in the note. I have reviewed and if necessary amended the note.      Canelo Herrera MD  Professor  Head of Dermato-Allergy Division  Department of Dermatology  [facility]      Follow-up in Derm-Allergy clinic for patch testing as scheduled on 11/24/2025  ____________________________________________    I spent a total of 40 minutes with Anh Flores. This time was spent counseling the patient and/or coordinating care, explaining the allergy tests, performing allergy tests and assessing the clinical relevance.

## 2025-07-02 ENCOUNTER — PRE VISIT (OUTPATIENT)
Dept: ALLERGY | Facility: CLINIC | Age: 30
End: 2025-07-02

## 2025-07-02 ENCOUNTER — OFFICE VISIT (OUTPATIENT)
Dept: ALLERGY | Facility: CLINIC | Age: 30
End: 2025-07-02
Payer: COMMERCIAL

## 2025-07-02 DIAGNOSIS — Z88.9 ATOPY: ICD-10-CM

## 2025-07-02 DIAGNOSIS — L29.9 ITCHING: ICD-10-CM

## 2025-07-02 DIAGNOSIS — L73.2 HIDRADENITIS SUPPURATIVA: ICD-10-CM

## 2025-07-02 DIAGNOSIS — R09.82 ALLERGIC RHINITIS WITH POSTNASAL DRIP: ICD-10-CM

## 2025-07-02 DIAGNOSIS — L85.8 KERATOSIS PILARIS: ICD-10-CM

## 2025-07-02 DIAGNOSIS — J30.9 ALLERGIC RHINITIS WITH POSTNASAL DRIP: ICD-10-CM

## 2025-07-02 DIAGNOSIS — L50.8 CHRONIC URTICARIA: ICD-10-CM

## 2025-07-02 DIAGNOSIS — L73.9 FOLLICULITIS: Primary | ICD-10-CM

## 2025-07-02 DIAGNOSIS — J32.9 CHRONIC RHINOSINUSITIS: ICD-10-CM

## 2025-07-02 RX ORDER — DOXEPIN HYDROCHLORIDE 10 MG/1
10 CAPSULE ORAL AT BEDTIME
Qty: 60 CAPSULE | Refills: 4 | Status: SHIPPED | OUTPATIENT
Start: 2025-07-02

## 2025-07-02 RX ORDER — FEXOFENADINE HCL 180 MG/1
180 TABLET ORAL EVERY MORNING
Qty: 30 TABLET | Refills: 4 | Status: SHIPPED | OUTPATIENT
Start: 2025-07-02

## 2025-07-02 RX ORDER — POVIDONE-IODINE 0.07 MG/ML
1 SOLUTION TOPICAL
Qty: 473 ML | Refills: 4 | Status: SHIPPED | OUTPATIENT
Start: 2025-07-03

## 2025-07-02 NOTE — PATIENT INSTRUCTIONS
You can always access your most recent office visit note with this allergy clinic via [facility] [employer], which contains all of your results from testing performed by Dr. Herrera along with the details of the discussed treatment plan.  If you do not have access to [employer], please discuss with a [facility] staff member. Additionally, if your provider is within [facility] or their EMR participates in the [employer]) network, they can also access this information.     ____________________________________________     >> For IDT test sites from today, patient will monitor sites for the next 2 and 4 days. If there are any delayed reactions, patient will take photos with the ruler provided and send to clinic via [employer] message for review. Be sure to specify RIGHT or LEFT arm.  - Do not apply topical steroids to sites until after 4 total days.         _____________________________    PATCH TESTING    WHAT IS A PATCH TEST ?    A patch test is a way of identifying whether a substance has caused a delayed reaction with skin inflammation, such as contact eczema or delayed (after days) reactions to drugs. We will use various different types of test compounds, which may include common allergens in occupation and daily life such as  preservatives, fragrances or even drugs. Most of the time we will use standardized, prefabricated test solutions and the choice of the substances and series tested will depend on the history of the allergic reactions. Sometimes we will test your own products you are exposed at home or at work. In order to avoid severe toxic reactions we need detailed information s or safety sheets about each of these test compounds.    HOW IS A PATCH TEST PERFORMED ?    You will be given three appointments to complete this test. On the first appointment the nurse will apply 100-180 small test chambers each one of them containing a different allergen on your back and/or on your arms. We will use hypoallergenic  [FreeTextEntry1] : RITU LOYA is a 32 year F, 28 wks gestation, anemia (on iron pills). She is here for cardiac evaluation of atypical chest pain and a syncopal episode, as described above. ECG is ok. Cardiac exam is normal.   - I reviewed ECG --> ok - I reviewed labs - Schedule an echo to evaluate for structural heart disease - stay hydrated - avoid prolonged standing  - Heart healthy diet low in sodium, sugars and saturated fats and high in fruits, vegetables and whole grains.     Patient advised to go to the nearest ED whenever any symptoms persist and/or worsens.  Patient/Family/Caregiver verbalized complete understanding of these instructions.  I spent a total of 45 minutes with more than 50% spent on counseling and coordinating care.    RTO after test results are available.    and somehow waterproof adhesive tape. Afterwards the different sites will be marked with a waterproof marker. These patches must stay in place for 2 days. On the second appointment the patches will be removed and the allergy doctor/nurse will evaluate the skin reactions and maybe re-apply the marks. On the third appointment the allergy doctor will re-evaluate possible allergic reactions and discuss with you the nature of the allergens you react to and how to avoid them.    AVOID THE FOLLOWING:    DO NOT wash your back and other test areas during the entire test period (3-5 days), NO bathing or swimming  AVOID strenuous exercise with sweating  DO NOT scratch the test area  AVOID exposure to UV irradiation (sun, therapy)    Patch tests should be performed when the allergy is resolved  Remove patch tests only if severe reaction (itch, pain, blistering) and report to your doctor immediately. Charles then the locations of each test field  If patch tests peel off, then try to fix them and record time and charles test field  No oral steroids (e.g. Prednisone) 1 month prior to tests    WHAT ARE THE POSSIBLE RISKS OF PATCH TESTS ?    If you are allergic to a compound tested you will develop after a few days localized skin reactions similar to your previous allergic reaction. This includes formation of red, itchy skin lesions of few mm to cm with small vesicles and even blisters. The lesions will fade off over days and weeks and might sometimes leave for a few months some skin pigmentations. In rare cases a localized reaction to patch tests can become generalized. The tests with your own products might have some risks because they are not standardized and unanticipated reactions can occur. We need as much information as possible to evaluate if your own products are safe to test and under what conditions. This has to be evaluated for each individual product.        ? WHAT ARE THE PREPARATIONS NEEDED FOR THESE VARIOUS ALLERGY TESTS  ?    Some medications can affect the reactions to allergens during the tests. Therefore reveal all the medications you take to the allergy team and the doctor will discuss with you the medications before/during the tests. Normally, you have to respect following rules (unless otherwise instructed by doctor):  For prick, intradermal and provocation tests stop antihistamines (e.g. loratadine (Claritin), cetirizine (Zyrtec), fexofenadine (Allegra) etc 1 week prior to the appointment   For patch tests and provocation tests, stop systemic corticosteroids 1 month and topical steroids 1 week prior to tests  Eat normally and take a shower before testing begins (do not put anything, including lotion, on the back after showering)    _____________________________    SKIN PRICK TESTING    WHAT IS A SKIN PRICK TEST (SPT) ?    A skin prick test (SPT) is a simple and reliable diagnostic test used to identify substances ( allergens ) responsible for triggering the symptoms of allergy. The basic SPT panel includes common allergens, such as house dust mites, molds, dog and cat allergens and grass/tree pollen allergens. We have other more specialized series for various food allergens and sometimes we test your own food directly on your skin. Tests will be usually performed on the skin of the forearm (rarely on back). The skin may develop a red and itchy reaction which can be an indication of an allergy to to tested substance, but will be confirmed by discussion with the allergy specialist    HOW IS A SPT PERFORMED ?    The skin will be disinfected with 70% Isopropanol alcohol, then marked and numbered with a pen to identify the areas where the specific allergens will be tested. Afterwards a drop of the allergen solution will be placed on the skin and then the skin gently pricked using the tip of a specially designed prick needle. With this procedure the most superficial part of the skin will be pierced to allow the test solution to  diffuse into the skin and make contact with the reactive immune cells. After 15-30min the area will be examined and evaluated for possible allergic reactions.        WHAT ARE THE POSSIBLE RISKS OF SPT ?    If the skin reacts it will develop red, itchy wheals of 5-30mm diameter. The wheal and itch will usually disappear spontaneously after 1-2 hours. Sometimes there might be a delayed reactions after days and this has to be reported to the treating allergy specialist (could be another kind of reaction pattern and important piece of information). Rarely there are more serious generalized allergic reactions observed and therefore you will be under observation of the allergy team during the entire procedure. There is a small risk for some bleeding and skin infection by the SPT.    _____________________________    INTRADERMAL TESTS      WHAT IS AN INTRADERMAL TEST (IDT) ?    An intradermal test (IDT) is basically a continuation of the SPT and is sometimes proposed if the skin prick test is negative and the person is strongly suspected to have an allergy to a particular substance. IDT is particularly used for diagnosis of drug allergies and only sterile solutions will be tested by IDT. Because more allergen is delivered to the skin than in SPT the IDT can add more sensitivity to the allergy tests, but with a higher potential risk.     HOW IS AN INTRADERMAL TEST (IDT) PERFORMED ?    A small amount (~ 0.05ml) of the suspected allergen is injected with a very fine insulin needle just beneath the skin. The injections are made at spaced intervals after disinfection and marking of the skin areas. The tests are usually performed on the forearm (rarely back). The initial test will be started with a very diluted solution and if the results are negatives the procedure might be repeated with serial dilutions of higher concentrations. Therefore, the estimated duration of this test is about 45-60 min. Sometimes we observe delayed type  reactions to the intradermal tests after 1-4 days and if this is the case take a photo and inform our staff and load the photo into [employer]. Best would be to take the photos with a ruler that we know at which position the positive test was.          WHAT ARE THE POSSIBLE RISKS OF IDT ?    If the skin reacts it will develop red, itchy wheals of 5-30mm diameter. The wheal and itch will usually disappear spontaneously after 1-2 hours. Sometimes there might be a delayed reactions after days and this has to be reported to the treating allergy specialist (could be another kind of reaction pattern and important piece of information). Rarely there are more serious generalized allergic reactions observed and therefore you will be under observation of the allergy team during the entire procedure. Only sterile solutions will be used for injections, but there is still a small risk for infections or unpredictable local toxic reactions by the allergens. Some transient bleeding might occur.     Useful Contact Information    Change of appointments or allergy-related [phone]  Email: [facility] [email or web address]  Location/address: [address] [place] [address]    If you develop any serious or adverse allergic reaction after office hours please seek immediate medical assistance at the nearest clinic or emergency room.     If you have questions or concerns regarding your Allergy Clinic bill or cost of care estimates, please reach out to our financial services at [email or web address]    CPT code for patch testing: [id number]-98960 (Contact your insurance provider to ensure coverage)    Customer Service    Ph: [phone] or  [phone]    Hours of operation:  M -Th 8:00am - 5:30pm  F 9:00am - 4:30pm    Cost of Care/Estimates Team    Ph: [phone]    Hours of operation:  M -Th 8:00am - 3:00pm  F 9:00am - 3:00pm

## 2025-07-02 NOTE — NURSING NOTE
Chief Complaint   Patient presents with    Allergy Consult     Severe tching that won't go away for over a year with sometimes associated with dry scaly bump rash. Then surgery in May '25 w/cast, prescribed hydroxyzine which helped somewhat. Breaks out with jewelry? Issues with [employer]. Has switched detergents, diet changes etc - also has digestive issues, wondering about food intolerance, works with dietician. Believes was seen by dermatology, blood work completed that indicated allergies. Itchy eyes/sinus issues fall/spring.      Jay Arnold RN

## 2025-07-02 NOTE — LETTER
7/2/2025      Anh Flores  [address] [facility] [address]      Dear Colleague,    Thank you for referring your patient, Anh Flores, to the [facility] [place]. Please see a copy of my visit note below.    [facility] D[facility]ato-[facility]ergology Note  Office visit  Encounter Date: Jul 2, 2025  ____________________________________________    CC: Allergy Consult (Severe tching that won't go away for over a year with sometimes associated with dry scaly bump rash. Then surgery in May '25 w/cast, prescribed hydroxyzine which helped somewhat. Breaks out with jewelry? Issues with Ivs. Has switched detergents, diet changes etc - also has digestive issues, wondering about food intolerance, works with dietician. Believes was seen by dermatology, blood work completed that indicated allergies. Itchy eyes/sinus issues fall/spring. )      HPI:  (Jul 2, 2025)  Ms. Anh Flores is a(n) 30 year old female who presents today as new patient for allergy consultation  - Referred by Dr. Bita Veronica on 10/18/2024 for chronic urticaria.   - 6/2/2025 OV visit with Leanne MONTANA (Derm)  FROM HPI:  Patient reports the itching has been chronic and is more than surgical itching post-op. She notes the itching is coming from deep below her skin surface which she cannot seem to relieve. She reports she has itched to the point of tearing her skin open, so has had to keep her nails short. Patient will regularly experience itching in the sites of her varying medication injections. She reports the itching will cause swelling, and after tearing will cause bleeding. She reports the itching has been present for approximately one year. Patient has been experiencing ongoing diarrhea that her doctors cannot seem to treat, and will alternate with severe constipation.    FROM A&P:  # Mild Dermatographism/pruritus. Improved on oral antihistamines but they are sedating so recommend zyrtec 10 mg  every day in the morning, or up to BID, and hydroxyzine 25 mg as needed  - check IGE and Tryptase  Reviewed previously labs( [id number])     # Hx of potential contact dermatitis after surgical procedures  - patient already referred to Dr. Herrera for patch testing     06/09/25 09:43   Albumin 4.2   Protein Total 7.6   Alkaline Phosphatase 48   ALT 35   AST 18   Bilirubin Direct 0.14   Bilirubin Total 0.3    (H)   Tryptase 3.3   WBC 9.2   Hemoglobin 13.6   Hematocrit 40.1   Platelet Count 310   RBC Count 4.27   MCV 94   MCH 31.9   MCHC 33.9   RDW 13.6       - Full body itching; deep itching  - Has been persisent for a year and a half  - Also has pimples that last for 2-3 weeks when she has the itching   - Washes with anaseptic soap  - Has tried hydroxyzine and some OTC medications  - No improvement with benadryl    - Chronic diarrhea since gallblader removed   - Sees GI and was initially thought to have food intolerances  - Was put on medication which caused to her to be constipated  - Has hx of diabetes, well controlled     - Hx of Hidradenitis suppurativa  - Often has inflammation  - Watching what she eats otherwise it worsens  - Worsens after eating red meat  - Not currently managed by anyone  - Was seen someone at [place] and was getting injections    - Otherwise feeling well in usual state of health    Physical Exam:  General: In no acute distress, well-developed, well-nourished  Eyes: no conjunctivitis  ENT: no signs of rhinitis   Pulmonary: no wheezing or coughing  Skin: Focused examination of the skin on test sites was performed = see test results below  Sun-exposed skin, which includes the head/face, neck, both arms, digits, and/or nails was examined.   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), late onset     Chronic abdominal pain     Vitamin D deficiency     Chronic  headaches     Morbid obesity (H)     Hidradenitis suppurativa     Morbid obesity with body mass index of 60.0-69.9 in adult (H)     Elevated BP without diagnosis of hypertension     Diabetes mellitus, type 2 (H)     Bilateral carpal tunnel syndrome     Obstructive sleep apnea     Insomnia, unspecified type     Lactose intolerance     Abnormal finding on imaging of liver     Chronic diarrhea     Carrier of hemochromatosis HFE gene mutation     Cryptosporidiasis (H)     Elevated ferritin     Ulnar neuropathy of both upper extremities     Trigger thumb of both thumbs     Lateral epicondylitis of both elbows     Coccydynia     Chronic bilateral low back pain, unspecified whether sciatica present     Lumbar radiculopathy     Past Medical History:   Diagnosis Date     CAH (congenital adrenal hyperplasia) 2/9/2010    Followed by Dr. Brewer; next follow up 1.2011.  Metformin increased to 850 mg twice a day.      Diabetes mellitus, type 2 (H) 10/19/2020     Vitamin D deficiency 2/9/2010       Allergies:  Allergies   Allergen Reactions     Adhesive Tape Itching     Emgality [Galcanezumab-Gnlm] Itching     Itchiness, bumps     Other Environmental Allergy      Victoza [Liraglutide] Headache, Hives and Itching       Medications:  Current Outpatient Medications   Medication Sig Dispense Refill     AJOVY 225 MG/1.5ML SOAJ Inject 225 mg subcutaneously every 30 days. Last dose 4/26       alcohol swab prep pads Use to swab area of injection/anthony as directed. 100 each 3     Atogepant (QULIPTA) 60 MG TABS Take 60 mg by mouth as needed.       atorvastatin (LIPITOR) 10 MG tablet Take 10 mg by mouth daily.       blood glucose (NO BRAND SPECIFIED) test strip Use to test blood sugar three times daily or as directed. Preferred blood glucose meter OR supplies to accompany: Blood Glucose Monitor Brands: per insurance. 100 strip 6     blood glucose monitoring (NO BRAND SPECIFIED) meter device kit Use to test blood sugar three times daily  or as directed. Preferred blood glucose meter OR supplies to accompany: Blood Glucose Monitor Brands: per insurance. 1 kit 0     colestipol (COLESTID) 1 g tablet Take 1 tablet (1 g) by mouth 2 times daily. (Patient not taking: Reported on 6/30/2025) 60 tablet 2     Continuous Glucose Sensor (FREESTYLE AYSE 3 PLUS SENSOR) MISC Use 1 sensor every 15 days. Use to read blood sugars per 's instructions. 6 each 3     Continuous Glucose Sensor (FREESTYLE AYSE 3 SENSOR) MISC 1 each by Other route every 14 days. 6 each 3     cyclobenzaprine (FLEXERIL) 5 MG tablet Take by mouth 3 times daily as needed for muscle spasms.       docusate sodium (COLACE) 100 MG capsule Take 1 capsule (100 mg) by mouth 2 times daily. (Patient not taking: Reported on 6/30/2025) 12 capsule 0     FLUoxetine (PROZAC) 10 MG capsule Take 1 capsule (10 mg) by mouth daily. 30 capsule 4     gabapentin (NEURONTIN) 300 MG capsule Take 1 capsule (300 mg) by mouth 3 times daily. (Patient taking differently: Take 300 mg by mouth 2 times daily.) 90 capsule 0     hydrOXYzine HCl (ATARAX) 25 MG tablet Take at bedtime as needed for itching. 30 tablet 4     insulin pen needle (ULTICARE MICRO) 32G X 4 MM miscellaneous Use 1 pen needles daily or as directed. 100 each 3     metFORMIN (GLUCOPHAGE XR) 500 MG 24 hr tablet Take 4 tablets (2,000 mg) by mouth daily (with dinner). 360 tablet 3     OnabotulinumtoxinA (BOTOX IJ) Inject as directed every 3 months. Last dose 4/23/25       ondansetron (ZOFRAN) 4 MG tablet Take 1 tablet (4 mg) by mouth every 6 hours as needed for nausea. 12 tablet 0     oxyCODONE (ROXICODONE) 5 MG tablet Take 1-2 tablets (5-10 mg) by mouth every 6 hours as needed for moderate to severe pain. (Patient not taking: Reported on 6/30/2025) 16 tablet 0     pantoprazole (PROTONIX) 20 MG EC tablet Take 1 tablet (20 mg) by mouth daily. 30 tablet 1     rimegepant (NURTEC) 75 MG ODT tablet Place 75 mg under the tongue daily as needed for  migraine.       rizatriptan (MAXALT) 10 MG tablet Take 10 mg by mouth at onset of headache.       Semaglutide, 1 MG/DOSE, (OZEMPIC) 4 MG/3ML pen Inject 1 mg subcutaneously every 7 days. 6 mL 1     spironolactone (ALDACTONE) 50 MG tablet Take 1 tablet (50 mg) by mouth daily. 30 tablet 4     SUMAtriptan (IMITREX) 50 MG tablet Take 2 tablets (100 mg) by mouth at onset of headache for migraine. May repeat in 2 hours. Max 4 tablets/24 hours.       thin (NO BRAND SPECIFIED) lancets Use with lanceting device. To accompany: Blood Glucose Monitor Brands: per insurance. 100 each 6     topiramate (TOPAMAX) 25 MG tablet Take 25 mg by mouth 2 times daily.       tranexamic acid (LYSTEDA) 650 MG tablet Take 2 tablets (1,300 mg) by mouth 2 times daily as needed (menorrhagia). 30 tablet 1     UBRELVY 100 MG tablet Take 100 mg by mouth at onset of headache.       No current facility-administered medications for this visit.       Previous Labs, Allergy Tests, Dermatopathology, Imaging:  N/a    Referred By: Bita Veronica MD  [id number] [place] [address]     Allergy Tests:  Past Allergy Test    Family History:  Family History   Problem Relation Age of Onset     Neurologic Disorder Sister 16        migraines     Cerebrovascular Disease No family hx of      Alzheimer Disease No family hx of      Glaucoma No family hx of      Macular Degeneration No family hx of        Social History:  The patient works as a  and handler.   Patient has the following hobbies or non-occupational exposure: n/a.    Order for Future Allergy Testing:    [x] Outpatient  [] Inpatient: [place]..../ Bed ...    Skin Atopy (atopic dermatitis)? [x] Yes     [] No  Comments:   - dry skin if she does not moisturize     Childhood eczema?   [] Yes     [x] No  Comments:   Hand eczema?   [] Yes     [x] No  If yes, leading hand? [] Right     [] Left     [] Ambidextrous  Comments:     Contact allergies?     Comments:   Including adhesives/bandages? [x]  Yes     [] No  Comments:   - significant itching with band aids  - also has redness that lasts for 1-2 weeks    Including metals?   [x] Yes     [] No  Comments:   - avoids wearing jewelry due to infection     Other substances?   [] Yes     [x] No  Comments:   - migraines with fragrance   - vomiting with smell       Drug allergies?   [x] Yes     [] No  Comments:   - hives on injection sites with Emgality and victoza, local reaction only   - within an hour, also became itchy  - victoza lasted for weeks  - maybe some swelling with victoza  - now taking ozempic with itching, but no hives    Angioedema?   [] Yes     [x] No  Comments:     Urticaria?   [] Yes     [x] No  Comments:     Food allergies?   [] Yes     [x] No  Comments:     Pet allergies?   [x] Yes     [] No  Comments:   - has horse, cats, and dogs  - nose gets itchy with the cat    Environmental allergy symptoms?  [x] Conjunctivitis  [] Otitis  [] Pharyngitis  [] Polyposis  [x] Postnasal Drip  [x] Rhinitis  [x] Sinusitis  [] None  Comments:   - only has one sinus on left that developed  - worse in winter and when its blooming outside (spring/fall)  - on antibiotics sporadically throughout the year    HENT Operations?  [] Adenoids [] Septum [] Sinus  [] Tonsils        [] Other:   [] None  Comments:     Pulmonary symptoms (from birth to present)?  [x] Asthma bronchiale  Inhaler(s)?:   [] Coughing  [] Other  [] None  Comments:   - as a child; not diagnosed  - never prescribed inhaler   - still has some exercised induced    Environmental and pulmonary symptoms aggravated by?  Season: [] None     [] I     [] II     [] III     [] IV     [] V     [] VI          [] VII     [] VIII     [] IX     [] X     [x] XI     [x] XII     [x] Perennial  Time of Day: [] None     [] Morning     [] Noon     [] Evening     [] Night     [] Whole Day  Location/Changes: [] None     [] Inside     [] Outside     [] Mountain     [] Sea     [] Other:   Triggers (specific): [] None     []  Animals     [] Dust     [] Mold     [] Plants     [] Other:   Triggers (other): [] None     [] Psyche     [] Sport     [] Work     [] Other:   Irritant: [] None     [] Cold     [] Heat     [] Odors     [] Physical Efforts     [] Smoke     [] Other:     Order for PATCH TESTS  Reason for tests (suspected allergy): suspect contact allergy  Known previous allergies: none confirmed  Standardized panels  [x] Standard panel (40 tests)  [x] Preservatives & Antimicrobials (31 tests)  [x] Emulsifiers & Additives (25 tests)   [x] Perfumes/Flavours & Plants (25 tests)  [] Hairdresser panel (12 tests)  [] Rubber Chemicals (22 tests)  [x] Plastics (26 tests)  [] Colorants/Dyes/Food additives (20 tests)  [] Metals (implants/dental) (24 tests)  [] Local anaesthetics/NSAIDs (13 tests)  [x] Antibiotics & Antimycotics (14 tests)   [x] Corticosteroids (15 tests)   [] Photopatch test (62 tests)   [] Others:     [] Patient's Own Products:   DO NOT test if chemical or biological identity is unknown!   always ask from patient the product information and safety sheets (MSDS)       Order for PRICK TESTS    Reason for tests (suspected allergy): atopy?  Known previous allergies: none confirmed     Standardized prick panels  [x] Atopic panel (20 tests)  [] Pediatric Panel (12 tests)  [] Milk, Meat, Eggs, Grains (20 tests)   [] Dust, Epithelia, Feathers (10 tests)  [] Fish, Seafood, Shellfish (17 tests)  [] Nuts, Beans (14 tests)  [] Spice, Vegetable, Fruit (17 tests)  [] Pollen Panel = Tree, Grass, Weed (24 tests)  [] Others:     [] Patient's Own Products:   DO NOT test if chemical or biological identity is unknown!   always ask from patient the product information and safety sheets (MSDS)     Standardized intradermal tests  [] Alternaria alternata  [] Aspergillus fumigatus  [] Cladosporium herbarum   [] Penicillium notatum  [] Dermatophagoides farinae  [] Dermatophagoides pteronyssinus  [] Dog Epithelium  [] Cat Epithelium  [] Others:     []  Bee venom   [] Wasp venom  !!Specific protocol with dilutions!!       Order for Drug allergy tests (prick & intradermal & patch tests)    [] Penicillin G     [] Ampicillin   [] Cefazolin (1st gen)     [] Cefuroxime (2nd gen)     [] Ceftriaxone (3rd gen)     [] Ceftazidime (3rd gen)     [] Cefepime (4th gen)       [] Bactrim  [] Iodixanol (Visipaque)     [] Iopamidol (Isovue)       [] Iohexol (Omnipaque)  [] Others:   [] Patient's Own:   Order for ... as test date      Atopy Screen (Placed Jul 2, 2025)  No Substance Readings (15 min) Evaluation   POS Histamine 1mg/ml +/++    NEG NaCl 0.9% -      No Substance Readings (15 min) Evaluation   1 Alternaria alternata (tenuis)  -    2 Cladosporium herbarum -    3 Aspergillus fumigatus -    4 Penicillium notatum -    5 Dermatophagoides pteronyssinus -    6 Dermatophagoides farinae -    7 Dog epithelium (canis spp) -    8 Cat hair (jhoana catus) -    9 Cockroach   (Blatella americana & germanica) -    10 Grass mix midwest   (Holly, Orchard, Redtop, Aayush) -    11 Nikos grass (sorghum halepense) -    12 Weed mix   (common Cocklebur, Lamb s quarters, rough redroot Pigweed, Dock/Sorrel) -    13 Mug wort (artemisia vulgare) -    14 Ragweed giant/short (ambrosia spp) -    15 White birch (Betula papyrifera) -    16 Tree mix 1 (Pecan, Maple BHR, Oak RVW, american [place], black [place]man) (+)    17 Red cedar (juniperus virginia) -    18 Tree mix 2   (white Chuck, river/red Birch, black [place], common [place], american Elm) -    19 Box elder/Maple mix (acer spp) -    20 [place] shagbark (carya ovata) -           Conclusion      Intradermal Testing (Placed Jul 2, 2025)  No Substance Conc.  Reading (15min)  immediate Papule [mm] / Erythema [mm] Reading   (... days)  delayed Papule [mm] / Erythema [mm] Remarks   DF Standard Dust Mite - D. Farinae 1:10 -   -    DP Standard Dust Mite - D. Pteronyssinus 1:10 -   -    A Aspergillus fumigatus  1:10 -   -    P Penicillium notatum 1:10 -   -      Dog epithelium 1:10 -   -     Cat epithelium 1:10 -   -    Conclusions: No signs for immediate-type reaction. Patient will provide feedback if delayed-type reaction is experienced. .     ________________________________    Assessment & Plan:    ==> Final Diagnosis:     # Chronic and recurrent generalized pruritus   DDx: Eczematous? Allergic contact dermatitis and/or atopic dermatitis?  DDx: Diabetic? (HgA1c 3 months ago normal) Metabolic?  DDx: Urticarial?   DDx: Folliculitis   * chronic illness with exacerbation, progression, side effects from treatment    # Suspicion for atopic predisposition? with:   Perennial RS and PND (allergic? Prick and intradermal test without immediate type reaction)  Aggravation in spring and fall   Some RC to cats less to dogs  Positive family history   Dry hypersensitive hyperirritable skin with keratosis pilaris on arms  * chronic illness with exacerbation, progression, side effects from treatment    # Hidradenitis suppurativa  * chronic illness with exacerbation, progression, side effects from treatment       These conclusions are made at the best of one's knowledge and belief based on the provided evidence such as patient's history and allergy test results and they can change over time or can be incomplete because of missing information.    ==> Treatment Plan:    >> Plan for patch testing to rule out contact allergies.   - If itching/skin improves can cancel appointments    >> For IDT test sites from today, patient will monitor sites for the next 2 and 4 days. If there are any delayed reactions, patient will take photos with the ruler provided and send to clinic via [employer] message for review. Be sure to specify RIGHT or LEFT arm.  - Do not apply topical steroids to sites until after 4 total days.    >> For folliculitis,   - Prescribed Betadine 7.5% scrub, can also order OTC if necessary; apply topically twice a week.     >> For pruritus,  - Prescribed allegra 180 mg in the  morning  - Prescribed Doxepin 10-20 mg at bedtime, starting with 10 mg. If 10 mg not effective after 5 days, increase to 20 mg and 5 days later increase to 30 mg.     >> Referred to Dermatology for further evaluation and treatment of hidradenitis suppurativa.    Procedures Performed: Allergy tests, including prick and intradermal     Staff Involved: Provider, Staff, Resident, and Scribe    Scribe Disclosure:   I, Katerina Chavez, am serving as a scribe to document services personally performed by Canelo Herrera MD based on data collection and the provider's statements to me.     Staff Physician Comments:  I was present with the scribe who participated in the documentation of the note. I have verified the history and personally performed the physical exam and medical decision making. I agree with the assessment and plan as documented in the note. I have reviewed and if necessary amended the note.      Canelo Herrera MD  Professor  Head of Dermato-Allergy Division  Department of Dermatology  [facility]      Follow-up in Derm-Allergy clinic for patch testing as scheduled on 11/24/2025  ____________________________________________    I spent a total of 40 minutes with Anh Flores. This time was spent counseling the patient and/or coordinating care, explaining the allergy tests, performing allergy tests and assessing the clinical relevance.     Again, thank you for allowing me to participate in the care of your patient.        Sincerely,        Canelo Herrera MD    Electronically signed

## 2025-07-03 ENCOUNTER — PATIENT OUTREACH (OUTPATIENT)
Dept: CARE COORDINATION | Facility: CLINIC | Age: 30
End: 2025-07-03
Payer: COMMERCIAL

## 2025-07-07 ENCOUNTER — PATIENT OUTREACH (OUTPATIENT)
Dept: CARE COORDINATION | Facility: CLINIC | Age: 30
End: 2025-07-07
Payer: COMMERCIAL

## 2025-07-09 ENCOUNTER — ANCILLARY PROCEDURE (OUTPATIENT)
Dept: RADIOLOGY | Facility: AMBULATORY SURGERY CENTER | Age: 30
End: 2025-07-09
Attending: PHYSICAL MEDICINE & REHABILITATION
Payer: COMMERCIAL

## 2025-07-09 ENCOUNTER — HOSPITAL ENCOUNTER (OUTPATIENT)
Facility: AMBULATORY SURGERY CENTER | Age: 30
Discharge: HOME | End: 2025-07-09
Attending: PHYSICAL MEDICINE & REHABILITATION | Admitting: PHYSICAL MEDICINE & REHABILITATION
Payer: COMMERCIAL

## 2025-07-09 VITALS
TEMPERATURE: 95.9 F | RESPIRATION RATE: 17 BRPM | SYSTOLIC BLOOD PRESSURE: 131 MMHG | BODY MASS INDEX: 47.32 KG/M2 | HEART RATE: 76 BPM | DIASTOLIC BLOOD PRESSURE: 95 MMHG | WEIGHT: 241 LBS | OXYGEN SATURATION: 97 % | HEIGHT: 60 IN

## 2025-07-09 DIAGNOSIS — G89.29 CHRONIC BILATERAL LOW BACK PAIN, UNSPECIFIED WHETHER SCIATICA PRESENT: ICD-10-CM

## 2025-07-09 DIAGNOSIS — M54.50 CHRONIC BILATERAL LOW BACK PAIN, UNSPECIFIED WHETHER SCIATICA PRESENT: ICD-10-CM

## 2025-07-09 PROCEDURE — 62323 NJX INTERLAMINAR LMBR/SAC: CPT

## 2025-07-09 PROCEDURE — 62323 NJX INTERLAMINAR LMBR/SAC: CPT | Performed by: PHYSICAL MEDICINE & REHABILITATION

## 2025-07-09 RX ORDER — PREDNISONE 10 MG/1
TABLET ORAL
COMMUNITY
Start: 2025-03-12

## 2025-07-09 RX ORDER — KETOROLAC TROMETHAMINE 30 MG/ML
INJECTION, SOLUTION INTRAMUSCULAR; INTRAVENOUS
COMMUNITY
Start: 2024-12-17

## 2025-07-09 RX ORDER — SERTRALINE HYDROCHLORIDE 100 MG/1
TABLET, FILM COATED ORAL
COMMUNITY
Start: 2025-01-23

## 2025-07-09 RX ORDER — SERTRALINE HYDROCHLORIDE 100 MG/1
TABLET, FILM COATED ORAL
COMMUNITY
Start: 2024-12-29

## 2025-07-09 RX ORDER — LIDOCAINE HYDROCHLORIDE 10 MG/ML
INJECTION, SOLUTION EPIDURAL; INFILTRATION; INTRACAUDAL; PERINEURAL PRN
Status: DISCONTINUED | OUTPATIENT
Start: 2025-07-09 | End: 2025-07-09 | Stop reason: HOSPADM

## 2025-07-09 RX ORDER — TRIAMCINOLONE ACETONIDE 40 MG/ML
INJECTION, SUSPENSION INTRA-ARTICULAR; INTRAMUSCULAR PRN
Status: DISCONTINUED | OUTPATIENT
Start: 2025-07-09 | End: 2025-07-09 | Stop reason: HOSPADM

## 2025-07-09 RX ORDER — IOPAMIDOL 408 MG/ML
INJECTION, SOLUTION INTRATHECAL PRN
Status: DISCONTINUED | OUTPATIENT
Start: 2025-07-09 | End: 2025-07-09 | Stop reason: HOSPADM

## 2025-07-09 NOTE — DISCHARGE INSTRUCTIONS
Caudal Epidural Pain Injection  This procedure can be used to treat a variety of conditions that result in lower back and lower extremity pain.    Activity  -You may resume most normal activity levels with the exception of strenuous activity. It is important for us to know if your pain with normal activity is relieved after this injection.  -DO NOT shower for 24 hours  -DO NOT remove bandaid for 24 hours    Pain  -You may experience soreness at the injection site for one or two days  -You may use an ice pack for 20 minutes every 2 hours for the first 24 hours  -You may use a heating pad after the first 24 hours  -You may use Tylenol (acetaminophen) every 4 hours or other pain medicines as     directed by your physician    You may experience numbness radiating into your legs or arms (depending on the procedure location). This numbness may last several hours. Until sensation returns to normal; please use caution in walking, climbing stairs, and stepping out of your vehicle, etc.    DID YOU RECEIVE STEROIDS TODAY?  Yes    Common side effects of steroids:  Not everyone will experience corticosteroid side effects. If side effects are experienced, they will gradually subside in the 7-10 day period following an injection. Most common side effects include:  -Flushed face and/or chest  -Feeling of warmth, particularly in the face but could be an overall feeling of warmth  -Increased blood sugar in diabetic patients  -Menstrual irregularities my occur. If taking hormone-based birth control an alternate method of birth control is recommended  -Sleep disturbances and/or mood swings are possible  -Leg cramps      PLEASE KEEP TRACK OF YOUR SYMPTOMS AND NOTE YOUR IMPROVEMENT FOR YOUR DOCTOR.     Please contact us if you have:  -Severe pain  -Fever more than 101.5 degrees Fahrenheit  -Signs of infection at the injection site (redness, swelling, or drainage)      FOR PM&R PATIENTS:  For patients seen by the PM and R service, please  call [phone]. (Monday through Friday 8a-5p.  After business hours and weekends call [phone] and ask for the PM and R doctor on call). If you need to fax a pain diary to PM&R the fax number is [phone]. If you are unable to fax, uploading to [employer] is encouraged, then send to provider. If you have procedure scheduling questions please call [phone] Option #2.

## 2025-07-09 NOTE — OP NOTE
PROCEDURE NOTE: Caudal Epidural Steroid Injection    PROCEDURE DATE: 7/9/2025    NAME: Anh Flores  YOB: 1995    ATTENDING PHYSICIAN: Edwina Fitzpatrick MD  RESIDENT/FELLOW PHYSICIAN:     PREPROCEDURE DIAGNOSIS:   Chronic bilateral low back pain, unspecified whether sciatica is present  POSTPROCEDURE DIAGNOSIS: Same    PROCEDURE PERFORMED: Caudal Epidural Steroid Injection    FLUOROSCOPY WAS USED.    INDICATIONS FOR PROCEDURE:   Anh Flores is a 30 year old female with a clinical picture consistent with the above-mentioned diagnosis, consistent with low back pain with sacral radicular pain and chornic pelvic pain    PROCEDURE AND FINDINGS:    Anh was greeted in the pre-procedure holding area. The risk, benefits and alternatives to the procedure were again reviewed with the patient and written informed consent was placed in the chart. An IV line was not placed.  A 500 mL bag of NS was not connected to the patient. She was taken to the procedure room and positioned prone on the fluoroscopy table.  Routine monitors were applied including blood pressure cuff, and pulse oximetry. Prior to the procedure a time out was completed, verifying correct patient, procedure, site, positioning, and implants and/or special equipment.     The skin over the caudal area was prepped with chlorhexidine and draped in the usual sterile fashion. The sacral cornua and sacral hiatus were identified with manual palpation. This location was then confirmed on fluoroscopy with AP and lateral views. The skin and subcutaneous tissue overlying this level was anesthetized using a 27-gauge 1-1/4-inch needle with 1% preservative-free lidocaine for a total volume of 3ml. Under fluoroscopic guidance, using an AP and lateral views, a 22 G 3-1/2 inch Quinke spinal needle was inserted into the sacral hiatus and into the caudal epidural space, to the level of S3.     After negative aspiration, 1.5mls of Isovue-M contrast was  injected under AP view with live fluoroscopy and confirmed adequate spread along the nerve root and in the epidural space.  There was no evidence of intravascular uptake or intrathecal spread on imaging. A lateral view was also taken confirming adequate epidural spread.       At this point, after negative aspiration, a total 8mL volume of treatment injectate, consisting of 1mL of 40mg/mL Triamcinolone, 3mL of PFNS, and 4mL of 1% Lidocaine was injected easily.  The needle was then flushed with 0.5 ml of local anesthetic, restyletted and removed. The needle insertion site was dressed appropriately.     She was taken to the recovery room where she was monitored for a brief period of time. She tolerated the procedure well and was discharged home in stable condition with post procedural instructions.    Before the procedure, she reported a pain score of 8/10.   After the procedure, she reported a pain score of 6/10.      Follow-up will be a clinic visit     COMPLICATIONS: None    COMMENTS: Sacral hiatus seem to deviate toward the right with difficulty redirecting to the left/midline despite several readjustments.

## 2025-07-11 ENCOUNTER — ANESTHESIA EVENT (OUTPATIENT)
Dept: SURGERY | Facility: CLINIC | Age: 30
End: 2025-07-11
Payer: COMMERCIAL

## 2025-07-14 ENCOUNTER — ANESTHESIA (OUTPATIENT)
Dept: SURGERY | Facility: CLINIC | Age: 30
End: 2025-07-14
Payer: COMMERCIAL

## 2025-07-14 ENCOUNTER — HOSPITAL ENCOUNTER (OUTPATIENT)
Facility: CLINIC | Age: 30
Discharge: HOME | End: 2025-07-14
Attending: STUDENT IN AN ORGANIZED HEALTH CARE EDUCATION/TRAINING PROGRAM | Admitting: STUDENT IN AN ORGANIZED HEALTH CARE EDUCATION/TRAINING PROGRAM
Payer: COMMERCIAL

## 2025-07-14 VITALS
RESPIRATION RATE: 18 BRPM | BODY MASS INDEX: 47.44 KG/M2 | SYSTOLIC BLOOD PRESSURE: 137 MMHG | WEIGHT: 241.62 LBS | TEMPERATURE: 97.6 F | HEIGHT: 60 IN | OXYGEN SATURATION: 98 % | HEART RATE: 79 BPM | DIASTOLIC BLOOD PRESSURE: 76 MMHG

## 2025-07-14 DIAGNOSIS — G56.23 ULNAR NEUROPATHY OF BOTH UPPER EXTREMITIES: Primary | ICD-10-CM

## 2025-07-14 LAB
GLUCOSE BLDC GLUCOMTR-MCNC: 81 MG/DL (ref 70–99)
GLUCOSE BLDC GLUCOMTR-MCNC: 88 MG/DL (ref 70–99)

## 2025-07-14 PROCEDURE — 258N000003 HC RX IP 258 OP 636

## 2025-07-14 PROCEDURE — 360N000076 HC SURGERY LEVEL 3, PER MIN: Performed by: STUDENT IN AN ORGANIZED HEALTH CARE EDUCATION/TRAINING PROGRAM

## 2025-07-14 PROCEDURE — 250N000011 HC RX IP 250 OP 636

## 2025-07-14 PROCEDURE — 370N000017 HC ANESTHESIA TECHNICAL FEE, PER MIN: Performed by: STUDENT IN AN ORGANIZED HEALTH CARE EDUCATION/TRAINING PROGRAM

## 2025-07-14 PROCEDURE — 64721 CARPAL TUNNEL SURGERY: CPT | Mod: 79 | Performed by: STUDENT IN AN ORGANIZED HEALTH CARE EDUCATION/TRAINING PROGRAM

## 2025-07-14 PROCEDURE — 82962 GLUCOSE BLOOD TEST: CPT

## 2025-07-14 PROCEDURE — 250N000013 HC RX MED GY IP 250 OP 250 PS 637

## 2025-07-14 PROCEDURE — 250N000009 HC RX 250

## 2025-07-14 PROCEDURE — 999N000141 HC STATISTIC PRE-PROCEDURE NURSING ASSESSMENT: Performed by: STUDENT IN AN ORGANIZED HEALTH CARE EDUCATION/TRAINING PROGRAM

## 2025-07-14 PROCEDURE — 710N000012 HC RECOVERY PHASE 2, PER MINUTE: Performed by: STUDENT IN AN ORGANIZED HEALTH CARE EDUCATION/TRAINING PROGRAM

## 2025-07-14 PROCEDURE — 272N000001 HC OR GENERAL SUPPLY STERILE: Performed by: STUDENT IN AN ORGANIZED HEALTH CARE EDUCATION/TRAINING PROGRAM

## 2025-07-14 PROCEDURE — 26055 INCISE FINGER TENDON SHEATH: CPT | Mod: 79 | Performed by: STUDENT IN AN ORGANIZED HEALTH CARE EDUCATION/TRAINING PROGRAM

## 2025-07-14 PROCEDURE — 25000 INCISION OF TENDON SHEATH: CPT | Mod: 79 | Performed by: STUDENT IN AN ORGANIZED HEALTH CARE EDUCATION/TRAINING PROGRAM

## 2025-07-14 PROCEDURE — 250N000011 HC RX IP 250 OP 636: Performed by: ANESTHESIOLOGY

## 2025-07-14 PROCEDURE — 64718 REVISE ULNAR NERVE AT ELBOW: CPT | Mod: 79 | Performed by: STUDENT IN AN ORGANIZED HEALTH CARE EDUCATION/TRAINING PROGRAM

## 2025-07-14 RX ORDER — DIMENHYDRINATE 50 MG/ML
25 INJECTION, SOLUTION INTRAMUSCULAR; INTRAVENOUS
Status: DISCONTINUED | OUTPATIENT
Start: 2025-07-14 | End: 2025-07-14 | Stop reason: HOSPADM

## 2025-07-14 RX ORDER — NALOXONE HYDROCHLORIDE 0.4 MG/ML
0.1 INJECTION, SOLUTION INTRAMUSCULAR; INTRAVENOUS; SUBCUTANEOUS
Status: DISCONTINUED | OUTPATIENT
Start: 2025-07-14 | End: 2025-07-14 | Stop reason: HOSPADM

## 2025-07-14 RX ORDER — ONDANSETRON 4 MG/1
4 TABLET, FILM COATED ORAL EVERY 6 HOURS PRN
Qty: 12 TABLET | Refills: 0 | Status: SHIPPED | OUTPATIENT
Start: 2025-07-14

## 2025-07-14 RX ORDER — DEXAMETHASONE SODIUM PHOSPHATE 4 MG/ML
4 INJECTION, SOLUTION INTRA-ARTICULAR; INTRALESIONAL; INTRAMUSCULAR; INTRAVENOUS; SOFT TISSUE
Status: DISCONTINUED | OUTPATIENT
Start: 2025-07-14 | End: 2025-07-14 | Stop reason: HOSPADM

## 2025-07-14 RX ORDER — OXYCODONE HYDROCHLORIDE 5 MG/1
5 TABLET ORAL
Status: COMPLETED | OUTPATIENT
Start: 2025-07-14 | End: 2025-07-14

## 2025-07-14 RX ORDER — FENTANYL CITRATE 50 UG/ML
25 INJECTION, SOLUTION INTRAMUSCULAR; INTRAVENOUS EVERY 5 MIN PRN
Status: DISCONTINUED | OUTPATIENT
Start: 2025-07-14 | End: 2025-07-14 | Stop reason: HOSPADM

## 2025-07-14 RX ORDER — NALOXONE HYDROCHLORIDE 0.4 MG/ML
0.2 INJECTION, SOLUTION INTRAMUSCULAR; INTRAVENOUS; SUBCUTANEOUS
Status: DISCONTINUED | OUTPATIENT
Start: 2025-07-14 | End: 2025-07-14 | Stop reason: HOSPADM

## 2025-07-14 RX ORDER — HALOPERIDOL 5 MG/ML
1 INJECTION INTRAMUSCULAR
Status: DISCONTINUED | OUTPATIENT
Start: 2025-07-14 | End: 2025-07-14 | Stop reason: HOSPADM

## 2025-07-14 RX ORDER — ONDANSETRON 2 MG/ML
4 INJECTION INTRAMUSCULAR; INTRAVENOUS EVERY 30 MIN PRN
Status: DISCONTINUED | OUTPATIENT
Start: 2025-07-14 | End: 2025-07-14 | Stop reason: HOSPADM

## 2025-07-14 RX ORDER — PROPOFOL 10 MG/ML
INJECTION, EMULSION INTRAVENOUS PRN
Status: DISCONTINUED | OUTPATIENT
Start: 2025-07-14 | End: 2025-07-14

## 2025-07-14 RX ORDER — IBUPROFEN 600 MG/1
600 TABLET, FILM COATED ORAL EVERY 6 HOURS PRN
Qty: 28 TABLET | Refills: 0 | Status: SHIPPED | OUTPATIENT
Start: 2025-07-14 | End: 2025-07-21

## 2025-07-14 RX ORDER — SODIUM CHLORIDE, SODIUM LACTATE, POTASSIUM CHLORIDE, CALCIUM CHLORIDE 600; 310; 30; 20 MG/100ML; MG/100ML; MG/100ML; MG/100ML
INJECTION, SOLUTION INTRAVENOUS CONTINUOUS PRN
Status: DISCONTINUED | OUTPATIENT
Start: 2025-07-14 | End: 2025-07-14

## 2025-07-14 RX ORDER — FLUMAZENIL 0.1 MG/ML
0.2 INJECTION, SOLUTION INTRAVENOUS
Status: DISCONTINUED | OUTPATIENT
Start: 2025-07-14 | End: 2025-07-14 | Stop reason: HOSPADM

## 2025-07-14 RX ORDER — HYDROXYZINE HYDROCHLORIDE 25 MG/1
25 TABLET, FILM COATED ORAL EVERY 6 HOURS PRN
Status: DISCONTINUED | OUTPATIENT
Start: 2025-07-14 | End: 2025-07-14 | Stop reason: HOSPADM

## 2025-07-14 RX ORDER — PROPOFOL 10 MG/ML
INJECTION, EMULSION INTRAVENOUS CONTINUOUS PRN
Status: DISCONTINUED | OUTPATIENT
Start: 2025-07-14 | End: 2025-07-14

## 2025-07-14 RX ORDER — FENTANYL CITRATE 50 UG/ML
50 INJECTION, SOLUTION INTRAMUSCULAR; INTRAVENOUS EVERY 5 MIN PRN
Status: DISCONTINUED | OUTPATIENT
Start: 2025-07-14 | End: 2025-07-14 | Stop reason: HOSPADM

## 2025-07-14 RX ORDER — ONDANSETRON 4 MG/1
4 TABLET, ORALLY DISINTEGRATING ORAL EVERY 30 MIN PRN
Status: DISCONTINUED | OUTPATIENT
Start: 2025-07-14 | End: 2025-07-14 | Stop reason: HOSPADM

## 2025-07-14 RX ORDER — ACETAMINOPHEN 325 MG/1
650 TABLET ORAL EVERY 6 HOURS PRN
Qty: 54 TABLET | Refills: 0 | Status: SHIPPED | OUTPATIENT
Start: 2025-07-14 | End: 2025-07-21

## 2025-07-14 RX ORDER — DOCUSATE SODIUM 100 MG/1
100 CAPSULE, LIQUID FILLED ORAL 2 TIMES DAILY
Qty: 12 CAPSULE | Refills: 0 | Status: SHIPPED | OUTPATIENT
Start: 2025-07-14

## 2025-07-14 RX ORDER — ONDANSETRON 4 MG/1
4 TABLET, FILM COATED ORAL EVERY 8 HOURS PRN
Qty: 12 TABLET | Refills: 0 | Status: SHIPPED | OUTPATIENT
Start: 2025-07-14

## 2025-07-14 RX ORDER — FENTANYL CITRATE 50 UG/ML
25-50 INJECTION, SOLUTION INTRAMUSCULAR; INTRAVENOUS
Status: DISCONTINUED | OUTPATIENT
Start: 2025-07-14 | End: 2025-07-14 | Stop reason: HOSPADM

## 2025-07-14 RX ORDER — CEPHALEXIN 500 MG/1
500 CAPSULE ORAL 4 TIMES DAILY
Qty: 12 CAPSULE | Refills: 0 | Status: SHIPPED | OUTPATIENT
Start: 2025-07-14

## 2025-07-14 RX ORDER — CEFAZOLIN SODIUM 1 G/3ML
INJECTION, POWDER, FOR SOLUTION INTRAMUSCULAR; INTRAVENOUS PRN
Status: DISCONTINUED | OUTPATIENT
Start: 2025-07-14 | End: 2025-07-14

## 2025-07-14 RX ORDER — SODIUM CHLORIDE, SODIUM LACTATE, POTASSIUM CHLORIDE, CALCIUM CHLORIDE 600; 310; 30; 20 MG/100ML; MG/100ML; MG/100ML; MG/100ML
INJECTION, SOLUTION INTRAVENOUS CONTINUOUS
Status: DISCONTINUED | OUTPATIENT
Start: 2025-07-14 | End: 2025-07-14 | Stop reason: HOSPADM

## 2025-07-14 RX ORDER — NALOXONE HYDROCHLORIDE 0.4 MG/ML
0.4 INJECTION, SOLUTION INTRAMUSCULAR; INTRAVENOUS; SUBCUTANEOUS
Status: DISCONTINUED | OUTPATIENT
Start: 2025-07-14 | End: 2025-07-14 | Stop reason: HOSPADM

## 2025-07-14 RX ORDER — ACETAMINOPHEN 325 MG/1
975 TABLET ORAL ONCE
Status: DISCONTINUED | OUTPATIENT
Start: 2025-07-14 | End: 2025-07-14 | Stop reason: HOSPADM

## 2025-07-14 RX ORDER — LIDOCAINE HYDROCHLORIDE 20 MG/ML
INJECTION, SOLUTION INFILTRATION; PERINEURAL PRN
Status: DISCONTINUED | OUTPATIENT
Start: 2025-07-14 | End: 2025-07-14

## 2025-07-14 RX ORDER — HYDROMORPHONE HCL IN WATER/PF 6 MG/30 ML
0.2 PATIENT CONTROLLED ANALGESIA SYRINGE INTRAVENOUS EVERY 5 MIN PRN
Status: DISCONTINUED | OUTPATIENT
Start: 2025-07-14 | End: 2025-07-14 | Stop reason: HOSPADM

## 2025-07-14 RX ORDER — OXYCODONE HYDROCHLORIDE 10 MG/1
10 TABLET ORAL
Status: DISCONTINUED | OUTPATIENT
Start: 2025-07-14 | End: 2025-07-14 | Stop reason: HOSPADM

## 2025-07-14 RX ORDER — ACETAMINOPHEN 325 MG/1
975 TABLET ORAL ONCE
Status: COMPLETED | OUTPATIENT
Start: 2025-07-14 | End: 2025-07-14

## 2025-07-14 RX ORDER — FENTANYL CITRATE 50 UG/ML
25 INJECTION, SOLUTION INTRAMUSCULAR; INTRAVENOUS
Status: DISCONTINUED | OUTPATIENT
Start: 2025-07-14 | End: 2025-07-14 | Stop reason: HOSPADM

## 2025-07-14 RX ORDER — TRAMADOL HYDROCHLORIDE 50 MG/1
50 TABLET ORAL EVERY 6 HOURS PRN
Qty: 10 TABLET | Refills: 0 | Status: SHIPPED | OUTPATIENT
Start: 2025-07-14 | End: 2025-07-17

## 2025-07-14 RX ORDER — BUPIVACAINE HYDROCHLORIDE 5 MG/ML
INJECTION, SOLUTION EPIDURAL; INTRACAUDAL; PERINEURAL
Status: COMPLETED | OUTPATIENT
Start: 2025-07-14 | End: 2025-07-14

## 2025-07-14 RX ORDER — HYDROMORPHONE HCL IN WATER/PF 6 MG/30 ML
0.4 PATIENT CONTROLLED ANALGESIA SYRINGE INTRAVENOUS EVERY 5 MIN PRN
Status: DISCONTINUED | OUTPATIENT
Start: 2025-07-14 | End: 2025-07-14 | Stop reason: HOSPADM

## 2025-07-14 RX ADMIN — MIDAZOLAM 1 MG: 1 INJECTION INTRAMUSCULAR; INTRAVENOUS at 11:34

## 2025-07-14 RX ADMIN — SODIUM CHLORIDE, SODIUM LACTATE, POTASSIUM CHLORIDE, AND CALCIUM CHLORIDE: .6; .31; .03; .02 INJECTION, SOLUTION INTRAVENOUS at 12:01

## 2025-07-14 RX ADMIN — OXYCODONE HYDROCHLORIDE 5 MG: 5 TABLET ORAL at 14:29

## 2025-07-14 RX ADMIN — BUPIVACAINE HYDROCHLORIDE 20 ML: 5 INJECTION, SOLUTION EPIDURAL; INTRACAUDAL at 11:40

## 2025-07-14 RX ADMIN — CEFAZOLIN 2 G: 1 INJECTION, POWDER, FOR SOLUTION INTRAMUSCULAR; INTRAVENOUS at 12:12

## 2025-07-14 RX ADMIN — FENTANYL CITRATE 50 MCG: 50 INJECTION, SOLUTION INTRAMUSCULAR; INTRAVENOUS at 11:34

## 2025-07-14 RX ADMIN — DEXMEDETOMIDINE HYDROCHLORIDE 8 MCG: 100 INJECTION, SOLUTION INTRAVENOUS at 12:21

## 2025-07-14 RX ADMIN — DEXMEDETOMIDINE HYDROCHLORIDE 12 MCG: 100 INJECTION, SOLUTION INTRAVENOUS at 12:06

## 2025-07-14 RX ADMIN — ACETAMINOPHEN 975 MG: 325 TABLET ORAL at 14:13

## 2025-07-14 RX ADMIN — ONDANSETRON 4 MG: 2 INJECTION INTRAMUSCULAR; INTRAVENOUS at 14:13

## 2025-07-14 RX ADMIN — PROPOFOL 20 MG: 10 INJECTION, EMULSION INTRAVENOUS at 12:07

## 2025-07-14 RX ADMIN — LIDOCAINE HYDROCHLORIDE 40 MG: 20 INJECTION, SOLUTION INFILTRATION; PERINEURAL at 12:02

## 2025-07-14 RX ADMIN — PROPOFOL 150 MCG/KG/MIN: 10 INJECTION, EMULSION INTRAVENOUS at 12:06

## 2025-07-14 ASSESSMENT — ACTIVITIES OF DAILY LIVING (ADL)
ADLS_ACUITY_SCORE: 37
ADLS_ACUITY_SCORE: 35
ADLS_ACUITY_SCORE: 37
ADLS_ACUITY_SCORE: 37
ADLS_ACUITY_SCORE: 35

## 2025-07-14 NOTE — ANESTHESIA CARE TRANSFER NOTE
Patient: Anh Flores    Procedure: Procedure(s):  RIGHT TRIGGER THUMB RELEASE  RIGHT OPEN CARPAL TUNNEL RELEASE  RIGHT CUBITAL TUNNEL RELEASE  RIGHT FIRST DORSAL COMPARTMENT RELEASE       Diagnosis: Bilateral carpal tunnel syndrome [G56.03]  Ulnar neuropathy of both upper extremities [G56.23]  Trigger thumb of both thumbs [M65.311, M65.312]  Tenosynovitis, de Quervain [M65.4]  Diagnosis Additional Information: No value filed.    Anesthesia Type:   MAC     Note:    Oropharynx: oropharynx clear of all foreign objects and spontaneously breathing  Level of Consciousness: awake  Oxygen Supplementation: room air    Independent Airway: airway patency satisfactory and stable  Dentition: dentition unchanged  Vital Signs Stable: post-procedure vital signs reviewed and stable  Report to RN Given: handoff report given  Patient transferred to: Phase II    Handoff Report: Identifed the Patient, Identified the Reponsible Provider, Reviewed the pertinent medical history, Discussed the surgical course, Reviewed Intra-OP anesthesia mangement and issues during anesthesia, Set expectations for post-procedure period and Allowed opportunity for questions and acknowledgement of understanding      Vitals:  Vitals Value Taken Time   /75    Temp     Pulse 78    Resp 15    SpO2 96        Electronically Signed By: NARCISA Gutierrez CRNA  July 14, 2025  1:45 PM

## 2025-07-14 NOTE — DISCHARGE INSTRUCTIONS
Hand Surgery Discharge Instructions    Patient has been treated for   RIGHT TRIGGER THUMB RELEASE (Right: Wrist)   RIGHT OPEN CARPAL TUNNEL RELEASE (Right: Wrist)   RIGHT CUBITAL TUNNEL RELEASE WITH POSSIBLE ULNAR NERVE TRANSPOSITION (Right: Arm)   RIGHT FIRST DORSAL COMPARTMENT RELEASE (Right: Wrist)  with Dr. Roberts on 7/14/2025.     Care: Please keep the splint/cast/dressing clean and dry at all times. Should it get wet, please call the clinic to schedule an appointment - as it may need to be replaced. Do not hesitate to use the sling to help protect the affected extremity and in particular in the setting of a nerve block.     Medications: You can take Ibuprofen 400-800 mg and Tylenol 650 mg for pain relief. Please take each every 6 hours, and for optimal pain relief - please stagger the medications so that you are taking one or the other every 3 hours. If you had a nerve block, the effects may last 8-12 hours. If you have been prescribed additional pain medications, please take as instructed and as needed. If you are taking additional pain medications, please do not exceed 4000 mg of Tylenol daily from all sources. Also, if you are taking narcotic medications, please do not operate heavy machinery or drive. If you have been prescribed antibiotics, please also take as instructed.     Diet: Start with clear liquids and slow down if nauseated. Advance the diet as tolerated.    Weight-bearing/Activities: Move your fingers to prevent stiffness. Elevate the affected extremity to improve throbbing sensation or pain. No strenuous activities and do not raise your heart rate above 100 bpm for the first few weeks. If you had a fracture fixed, your extremity is non-weight-bearing. Otherwise, you can limit your weight-bearing with the affected extremity to 2-3 pounds unless otherwise specified.    ER Instructions: Please call the office and/or consider return to the ER if you experience worsening pain not relieved by  medications, increased swelling, redness or high fevers >101F or if there are unexpected problems like shortness of breath.    Post-op follow-up: Clinic in 1-2 weeks with Dr. Roberts or his PA at the [facility] or [facility]. Please call our Ortho triage line if you have any questions or concerns before your clinic visit: [phone].    Vic Roberts MD, PhD, FACS

## 2025-07-14 NOTE — ANESTHESIA POSTPROCEDURE EVALUATION
Patient: Anh Flores    Procedure: Procedure(s):  RIGHT TRIGGER THUMB RELEASE  RIGHT OPEN CARPAL TUNNEL RELEASE  RIGHT CUBITAL TUNNEL RELEASE  RIGHT FIRST DORSAL COMPARTMENT RELEASE       Anesthesia Type:  MAC    Note:  Disposition: Outpatient   Postop Pain Control: Uneventful            Sign Out: Well controlled pain   PONV: No   Neuro/Psych: Uneventful            Sign Out: Acceptable/Baseline neuro status   Airway/Respiratory: Uneventful            Sign Out: Acceptable/Baseline resp. status   CV/Hemodynamics: Uneventful            Sign Out: Acceptable CV status; No obvious hypovolemia; No obvious fluid overload   Other NRE: NONE   DID A NON-ROUTINE EVENT OCCUR? No       Last vitals:  Vitals Value Taken Time   /81 07/14/25 14:30   Temp 36.9  C (98.4  F) 07/14/25 13:46   Pulse 71 07/14/25 13:46   Resp 16 07/14/25 13:46   SpO2 97 % 07/14/25 14:34   Vitals shown include unfiled device data.    Electronically Signed By: Justo Schofield MD  July 14, 2025  2:38 PM

## 2025-07-14 NOTE — OP NOTE
HAND SURGERY OPERATIVE REPORT     Date of Surgery: 7/14/2025  Surgical Service: Ortho Hand     Preoperative Diagnosis:   1. Right ulnar neuropathy at the elbow  2. Right carpal tunnel syndrome  3. Right thumb triggering  4. Right first extensor compartment tendinitis     Postoperative Diagnosis: Same as preoperative diagnoses     Procedures Performed:  Right ulnar nerve decompression at the elbow with no anterior transposition  2. Right open carpal tunnel release  3. Right thumb A1 pulley release  4. Right first extensor compartment release     Attending:  BECKY Roberts MD, PhD, FACS  Assistant: CESAR Rollins MD;  PAULO Ayala PA-C      Complications: None apparent  Specimens: None  Implants: None  Estimated blood loss: < 5 mL  Tourniquet time: 34 minutes  Wound classification: Clean  Anesthesia: MAC with right supraclavicular block     Indications for Procedure: 30 year-old non-smoker presenting with right ulnar neuropathy, carpal tunnel syndrome, thumb triggering and first extensor compartment tendinitis. After failing conservative management, patient elects to undergo right ulnar nerve decompression at the elbow and open carpal tunnel release as well as thumb A1 pulley release and first extensor compartment release. Discussed the risks of surgery, including but not limited to: infection, bleeding, pain, poor scarring, need for revision surgery, incomplete release, injury to the nerves or tendons, neuroma formation, complex regional pain syndrome. Despite these risks, patient consents to surgery.      Intraoperative findings: Compression underneath Wilson's ligament as well as at the ligament of Davison. No subluxation of the ulnar nerve with passive extreme elbow flexion. MABC branches preserved. The transverse carpal ligament (TCL) was fully released with the median nerve visualized and protected throughout the case. The distal deep palmar fat pad was visualized on the distal release. The proximal extent of the carpal  tunnel was decompressed including division of the distal antebrachial fascia under direct visualization. The TCL was thickened. The thumb A1 pulley was fully released with no injury to the digital nerves. The first extensor compartment was release including the extensor pollicis brevis subsheath. The radial sensory nerves were protected.      Description of Procedure: Patient was seen in the preoperative holding area.  Consent was verified.  The right elbow, wrist and palm was marked.  All additional questions were answered.  The risks of the surgery were reiterated, including (but not limited to): infection, bleeding, scarring, pain, injury to surrounding structures including nerves and tendons, need for additional revision surgery, neuroma formation, complex regional pain syndrome, stiffness.  Following discussion of all these risks, the patient again agreed to proceed with surgery.  The right upper extremity was blocked. Patient was then transferred to the operating room and placed supine on the operating table.  All pressure points were padded.  Sequential compression devices were placed on bilateral lower extremities and verified to be operational.  IV antibiotic prophylaxis was given.  Preinduction timeout was performed.  Monitored anesthesia care was commenced.       At the start of the case, the right upper extremity was prepped and draped in usual sterile fashion. A sterile tourniquet was placed on the right arm.The extremity was exsanguinated and the tourniquet was inflated to 250 mm Hg. The cubital tunnel release was done first. A 6-7 cm incision was made, centered over the interval between the medial humeral epicondyle and olecranon, extending proximally and distally. Once through skin, a MABC branches were identified over the deep fascia, dissected with tenotomies and protected. Next, the ulnar nerve was found proximally between the triceps and medial brachial septum. The deep fascia was unroofed over  the nerve and dissection carried proximally until a thickened [place] of [place] was encountered, and which was divided. Next, the dissection was carried distally and the thickened Wilson's ligament was divided. Next, the flexor-pronator fascia was sharply divided - at the interval between the two heads of the FCU. Once done, a gentle spread was done to expose the ulnar nerve. The ulnar nerve was fully decompressed without over-doing a neurolysis. Next, the elbow was passively flexed to reveal no subluxation of the nerve about the medial humeral epicondyle.      Attention was then given the carpal tunnel release. The longitudinally-oriented incision marked 2 mm from the distal-most wrist crease to Workman's cardinal line, along the imaginary line along the radial side of the fourth ray, was made with a #15 blade. Once through skin, the palmar fascia was identified and longitudinally divided. A self-retaining retractor was place and the transverse carpal ligament fibers were identified. Of note, I identified the ulnar-most aspect of the ligament and there was no identified trans-ligamentous recurrent motor branch seen. Next, the TCL was divided longitudinally under direct visualization. It was further divided until the distal-most extent using a #15 blade. Once the deep palmar fat was seen, a South's tenotomy was used to spread to ensure there was no incomplete release distally. Next, the proximal TCL as well as the distal antebrachial fascia was divided under direct visualization with the wrist slight flexed and the nerve protected. Next, my index finger swept under the leaflets of the TCL to ensure once more that it was fully released.     Attention was given the thumb A1 pulley release. A transversely-oriented incision was made through the digital palmar crease at the base of the thumb. Once through skin with a #15 blade, longitudinal spreading was done with tenotomy scissors to expose the A1 pulley. Floyd was  "used to sweep the fat off the A1 pulley. With gentle retraction of the digital nerves, the A1 pulley was longitudinally divided with a #15 blade and fully released. The flexor pollicis longus tendon was pulled out of the wound to complete a traction tenolysis. There was a small intra-substance retinacular cyst that was debrided sharply with a #15 blade. Once fully released, the thumb was passively extended fully with no catching.    Attention was then given the Dequervain's release. A longitudinally oriented incision along the dorsoradial aspect of the wrist from the radial styloid to the base of the first metacarpal. Once through skin, gentle longitudinal spreading was done over the first extensor compartment to expose the extensor pollicis brevis and abductor pollicis longus tendons. Care was taken to spread over the compartment, to identify the radial sensory branches and to protect them throughout the case. Next, the first extensor compartment was longitudinally divided with a #15 blade over the extensor pollicis brevis tendon including dividing the subsheath. The abductor pollicis longus was fully decompressed at the compartment.      Next, the tourniquet was deflated. Any bleeding from the fat was bipolar cauterized. The elbow closure was done with 3-0 Vicryl and 3-0 Monocryl deep dermal suture and 3-0 Monocryl running intracuticular suture for the skin. The palm and wrist skin incisions were closed with 4-0 Nylon suture in interrupted simple and horizontal mattress fashion. The elbow incision was dressed with Steri-strips, skin adhesive, 4 x 4\" gauze and an ABD pad. The palm and wrist incisions were dressed with bacitracin and Xeroform, 4 x 4\" gauze. The entire extremity was wrapped with cast padding and an ACE bandage was placed with the elbow flexed during soft dressing application. Patient was woken up and transferred to the postanesthesia recovery area without any events and no pain.      Postoperative " plan: Clinic in 10-14 days    Vic Roberts MD, PhD, FACS

## 2025-07-14 NOTE — ANESTHESIA PROCEDURE NOTES
Brachial plexus Procedure Note    Pre-Procedure   Staff -        Anesthesiologist:  Daniel Chaudhari MD       Resident/Fellow: Telma Kessler MD       Performed By: resident       Location: pre-op       Pre-Anesthestic Checklist: patient identified, IV checked, site marked, risks and benefits discussed, informed consent, monitors and equipment checked, pre-op evaluation, at physician/surgeon's request and post-op pain management  Timeout:       Correct Patient: Yes        Correct Procedure: Yes        Correct Site: Yes        Correct Position: Yes        Correct Laterality: Yes        Site Marked: Yes  Procedure Documentation  Procedure: Brachial plexus         Diagnosis: INTRA-OPERATIVE AND POST-OPERATIVE ANALGESIA       Laterality: right       Patient Position: sitting       Patient Prep/Sterile Barriers: sterile gloves, mask       Skin prep: Chloraprep (supraclavicular approach).       Needle Type: short bevel       Needle Gauge: 21.        Needle Length (millimeters): 110        Ultrasound guided       1. Ultrasound was used to identify targeted nerve, plexus, vascular marker, or fascial plane and place a needle adjacent to it in real-time.       2. Ultrasound was used to visualize the spread of anesthetic in close proximity to the above referenced structure.       3. A permanent image is entered into the patient's record.       4. The visualized anatomic structures appeared normal.       5. There were no apparent abnormal pathologic findings.    Assessment/Narrative         The placement was negative for: blood aspirated, painful injection and site bleeding       Paresthesias: No.       Bolus given via needle. no blood aspirated via catheter.        Secured via.        Insertion/Infusion Method: Single Shot       Complications: none    Medication(s) Administered   Bupivacaine 0.5% PF (Infiltration) - Infiltration   20 mL - 7/14/2025 11:40:00 AM    FOR [facility] ([facility]) ONLY:   Pain Team Contact  "information: please page the Pain Team Via [facility]. Search \"Pain\". During daytime hours, please page the attending first. At night please page the resident first.      "

## 2025-07-14 NOTE — OR NURSING
R.Upper Extremity block performed without complications.  VSS.  Pt tolerated well.  Will continue to monitor.

## 2025-07-14 NOTE — PROGRESS NOTES
Ortho Hand    No changes in history or examination. Medically optimized. Recovered enough on the left hand.     OR today for RIGHT trigger thumb release, RIGHT open carpal tunnel release, RIGHT first dorsal compartment release, RIGHT ulnar nerve decompression at the elbow with possible anterior transposition.     Discussed risks of surgery, including but not limited to: infection, bleeding, pain, poor scarring, wound healing issues, injury to nerves and/or tendons, neuroma formation, stiffness, incomplete release, recurrence, complex regional pain syndrome, anesthesia-related complication, DVT/PE, death. Despite these risks, patient consents to RIGHT trigger thumb release, RIGHT open carpal tunnel release, RIGHT first dorsal compartment release, RIGHT ulnar nerve decompression at the elbow with possible anterior transposition. All questions answered.     Vic Roberts MD, PhD, FACS

## 2025-07-14 NOTE — BRIEF OP NOTE
[facility]    Brief Operative Note    Pre-operative diagnosis: Bilateral carpal tunnel syndrome [G56.03]  Ulnar neuropathy of both upper extremities [G56.23]  Trigger thumb of both thumbs [M65.311, M65.312]  Tenosynovitis, de Quervain [M65.4]  Post-operative diagnosis Same as pre-operative diagnosis    Procedure: RIGHT TRIGGER THUMB RELEASE, Right - Wrist  RIGHT OPEN CARPAL TUNNEL RELEASE, Right - Wrist  RIGHT CUBITAL TUNNEL RELEASE, Right - Arm  RIGHT FIRST DORSAL COMPARTMENT RELEASE, Right - Wrist    Surgeon: Surgeons and Role:     * Vic Roberts MD - Primary     * Holly Ayala PA-C - Assisting     * Michael Rollins MD - Fellow - Assisting  Anesthesia: Choice with Block   Estimated Blood Loss: 5cc    Drains: None  Specimens: * No specimens in log *  Findings:   Ulnar nerve decompressed at elbow, no subluxation.  Complications: None.  Implants: * No implants in log *      Plan:  -Right arm dressings stay in place for 48 hours  -Sling until block wears off, ok to continue if comfortable   -Elevated right upper extremity above the level of the heart to reduce pain and swelling   -Multimodal pain control       Michael Rollins MD   Plastic and Reconstructive Surgery PGY3

## 2025-07-14 NOTE — ANESTHESIA PREPROCEDURE EVALUATION
Anesthesia Pre-Procedure Evaluation    Patient: Anh Flores   MRN: [id number] : 1995          Procedure : Procedure(s):  RIGHT TRIGGER THUMB RELEASE  RIGHT OPEN CARPAL TUNNEL RELEASE  RIGHT CUBITAL TUNNEL RELEASE WITH POSSIBLE ULNAR NERVE TRANSPOSITION  RIGHT FIRST DORSAL COMPARTMENT RELEASE         Past Medical History:   Diagnosis Date    CAH (congenital adrenal hyperplasia) 2010    Followed by Dr. Brewer; next follow up .  Metformin increased to 850 mg twice a day.     Diabetes mellitus, type 2 (H) 10/19/2020    Vitamin D deficiency 2010      Past Surgical History:   Procedure Laterality Date    CHOLECYSTECTOMY      She was in 8th grade     COLONOSCOPY N/A 2024    Procedure: COLONOSCOPY, WITH BIOPSY;  Surgeon: Bacilio Yancey MD;  Location: [facility]    ESOPHAGOSCOPY, GASTROSCOPY, DUODENOSCOPY (EGD), COMBINED N/A 2024    Procedure: ESOPHAGOGASTRODUODENOSCOPY, WITH BIOPSY;  Surgeon: Bacilio Yancey MD;  Location: [facility]    INJECT EPIDURAL CAUDAL N/A 2025    Procedure: INJECTION, EPIDURAL, CAUDAL;  Surgeon: Edwina Fitzpatrick MD;  Location: [facility]    INJECT STEROID (LOCATION) N/A 2025    Procedure: Sacrococcygeal ligament/coccyx steroid injection;  Surgeon: Edwina Fitzpatrick MD;  Location: [facility]    INSERT INTRAUTERINE DEVICE N/A 2025    Procedure: INSERTION, INTRAUTERINE DEVICE; Pap, EUA;  Surgeon: Jami Gutierrez MD;  [facility]    RELEASE CARPAL TUNNEL Left 2025    Procedure: Left open carpal tunnel release;  Surgeon: Vic Roberts MD;  Location: [facility]    RELEASE DEQUERVAINS WRIST Left 2025    Procedure: Left first dorsal compartment release;  Surgeon: Vic Roberts MD;  [facility]    RELEASE TRIGGER FINGER Left 2025    Procedure: Left trigger thumb release;  Surgeon: Vic Roberts MD;  [facility]    TRANSPOSITION ULNAR NERVE (ELBOW) Left 2025    Procedure: Left cubital tunnel  release;  Surgeon: Vic Roberts MD;  Location: [facility]      Allergies   Allergen Reactions    Adhesive Tape Itching    Emgality [Galcanezumab-Gnlm] Itching     Itchiness, bumps on injection site    Other Environmental Allergy     Victoza [Liraglutide] Headache, Hives and Itching     On injection site for weeks       Social History     Tobacco Use    Smoking status: Never     Passive exposure: Never    Smokeless tobacco: Never   Substance Use Topics    Alcohol use: No      Wt Readings from Last 1 Encounters:   07/14/25 109.6 kg (241 lb 10 oz)        Anesthesia Evaluation   Pt has had prior anesthetic. Type: General and MAC.    No history of anesthetic complications       ROS/MED HX  ENT/Pulmonary:     (+) sleep apnea, mild, doesn't use CPAP,                                      Neurologic:     (+)      migraines,                          Cardiovascular:     (+)  - -   -  - -                                 Previous cardiac testing   Echo: Date: 1/29/2025 Results:  EF= 55%, RV is Normal, Valves are Normal  Stress Test:  Date: Results:    ECG Reviewed:  Date: Results:    Cath:  Date: Results:      METS/Exercise Tolerance: >4 METS    Hematologic:       Musculoskeletal:  - neg musculoskeletal ROS     GI/Hepatic:     (+) GERD, Asymptomatic on medication,                  Renal/Genitourinary:  - neg Renal ROS     Endo:     (+)  type II DM,   Using insulin, - not using insulin pump. Normal glucose range: 80 to 100, not previously admitted for DM/DKA.       Obesity,       Psychiatric/Substance Use:     (+) psychiatric history depression       Infectious Disease:  - neg infectious disease ROS     Malignancy:  - neg malignancy ROS     Other:              Physical Exam  Airway  Mallampati: II  TM distance: >3 FB  Neck ROM: full  Upper bite lip test: I  Mouth opening: >= 4 cm    Cardiovascular - normal exam  Rhythm: regular  Rate: normal rate     Dental   (+) Minor Abnormalities - some fillings, tiny chips      Pulmonary -  normal examBreath sounds clear to auscultation        Neurological   She appears awake, alert and oriented x3.    Other Findings       OUTSIDE LABS:  CBC:   Lab Results   Component Value Date    WBC 8.7 06/30/2025    WBC 9.2 06/09/2025    HGB 13.9 06/30/2025    HGB 13.6 06/09/2025    HCT 41.3 06/30/2025    HCT 40.1 06/09/2025     06/30/2025     06/09/2025     BMP:   Lab Results   Component Value Date     03/07/2025     11/09/2024    POTASSIUM 4.2 03/07/2025    POTASSIUM 4.0 11/09/2024    CHLORIDE 104 03/07/2025    CHLORIDE 106 11/09/2024    CO2 22 03/07/2025    CO2 21 (L) 11/09/2024    BUN 9.4 03/07/2025    BUN 12.3 11/09/2024    CR 0.79 05/02/2025    CR 0.7 05/02/2025    GLC 88 07/14/2025    GLC 80 06/06/2025     COAGS:   Lab Results   Component Value Date    INR 0.9 03/24/2025     POC:   Lab Results   Component Value Date    HCG Negative 06/06/2025    HCGS Negative 11/09/2024     HEPATIC:   Lab Results   Component Value Date    ALBUMIN 4.2 06/09/2025    PROTTOTAL 7.6 06/09/2025    ALT 35 06/09/2025    AST 18 06/09/2025    GGT 51 (H) 12/18/2024    ALKPHOS 48 06/09/2025    BILITOTAL 0.3 06/09/2025     OTHER:   Lab Results   Component Value Date    LACT 0.9 01/07/2011    A1C 5.4 03/07/2025    DANIEL 9.8 03/07/2025    PHOS 4.1 01/15/2010    MAG 2.1 01/15/2018    LIPASE 30 11/09/2024    AMYLASE 70 02/21/2011    TSH 1.08 08/23/2024    T4 1.10 08/23/2024    CRP <5.0 02/21/2011    SED 9 09/11/2014       Anesthesia Plan    ASA Status:  3      NPO Status: NPO Appropriate   Anesthesia Type: General.  Airway: supraglottic airway.  Induction: intravenous.  Maintenance: Balanced.   Techniques and Equipment:     - Airway:  Planned airway equipment includes supraglottic airway.     - Monitoring Plan: standard ASA monitoring, processed EEG monitor     Consents    Anesthesia Plan(s) and associated risks, benefits, and realistic alternatives discussed. Questions answered and patient/representative(s) expressed  understanding.     - Discussed: anesthesiologist     - Discussed with:  Patient        - Pt is DNR/DNI Status: no DNR     Blood Consent:      - Discussed with: not discussed.     Postoperative Care    Pain management: non-narcotic analgesics, peripheral nerve block.     Comments:                   Justo Schofield MD    I have reviewed the pertinent notes and labs in the chart from the past 30 days and (re)examined the patient.  Any updates or changes from those notes are reflected in this note.    Clinically Significant Risk Factors Present on Admission                             # Morbid Obesity: Estimated body mass index is 47.19 kg/m  as calculated from the following:    Height as of this encounter: 1.524 m (5').    Weight as of this encounter: 109.6 kg (241 lb 10 oz).

## 2025-07-14 NOTE — OR NURSING
Pagevivienne Rollins MD from ENT surgery team regarding need for antibiotic prescription for discharge. Patient previously on antibiotic with surgery to other limb.     Patient meeting discharge criteria and ready to discharge home pending need for prescription order.

## 2025-07-14 NOTE — OR NURSING
Paged Dr. Roberts regarding antibiotic order/prescription for discharge. Pt ready to discharge home and waiting on this prescription.

## 2025-07-15 ENCOUNTER — TELEPHONE (OUTPATIENT)
Dept: OBGYN | Facility: CLINIC | Age: 30
End: 2025-07-15

## 2025-07-15 ENCOUNTER — OFFICE VISIT (OUTPATIENT)
Dept: OBGYN | Facility: CLINIC | Age: 30
End: 2025-07-15
Attending: OBSTETRICS & GYNECOLOGY
Payer: COMMERCIAL

## 2025-07-15 VITALS
DIASTOLIC BLOOD PRESSURE: 79 MMHG | WEIGHT: 242.8 LBS | HEART RATE: 80 BPM | HEIGHT: 60 IN | SYSTOLIC BLOOD PRESSURE: 112 MMHG | BODY MASS INDEX: 47.67 KG/M2

## 2025-07-15 DIAGNOSIS — Z97.5 BREAKTHROUGH BLEEDING ASSOCIATED WITH INTRAUTERINE DEVICE (IUD): Primary | ICD-10-CM

## 2025-07-15 DIAGNOSIS — N92.1 BREAKTHROUGH BLEEDING ASSOCIATED WITH INTRAUTERINE DEVICE (IUD): Primary | ICD-10-CM

## 2025-07-15 PROCEDURE — 99213 OFFICE O/P EST LOW 20 MIN: CPT | Mod: 24 | Performed by: OBSTETRICS & GYNECOLOGY

## 2025-07-15 PROCEDURE — G0463 HOSPITAL OUTPT CLINIC VISIT: HCPCS | Performed by: OBSTETRICS & GYNECOLOGY

## 2025-07-15 RX ORDER — GABAPENTIN 300 MG/1
300 CAPSULE ORAL 3 TIMES DAILY
COMMUNITY

## 2025-07-15 RX ORDER — NORGESTIMATE AND ETHINYL ESTRADIOL 0.25-0.035
1 KIT ORAL DAILY
COMMUNITY

## 2025-07-15 RX ORDER — PANTOPRAZOLE SODIUM 40 MG/1
40 TABLET, DELAYED RELEASE ORAL DAILY
COMMUNITY

## 2025-07-15 NOTE — TELEPHONE ENCOUNTER
LVM informing patient that Dr. Carrasco is advising her to get an ultrasound (orders are in) before meeting with her or another provider.    Patient should reschedule 7/15 appointment, due to needing this ultrasound prior

## 2025-07-15 NOTE — PATIENT INSTRUCTIONS
Thank you for trusting us with your care!   Please be aware, if you are on Mychart, you may see your results prior to your providers' review. If labs are abnormal, we will call or message you on [email or web address] with a follow up plan.    If you need to contact us for questions about:  Symptoms, Scheduling & Medical Questions: Non-urgent (2-3 day response), send [email or web address] message. For urgent (needing response today), call [phone].   Prescriptions: Please call your Pharmacy   Billing: [facility] [phone] or  Physicians: [phone]

## 2025-07-15 NOTE — NURSING NOTE
Chief Complaint   Patient presents with    Follow Up     Abdominal pain since IUD insertion  DOS: 06/06/2025 EUA, pap smear, Mirena IUD insertion

## 2025-07-15 NOTE — PROGRESS NOTES
Gynecology Visit Note  7/15/25    Reason for visit: Bleeding s/p Mirena IUD placement    S: Patient is a 29 yo P0 with hx of CAH, migraine with aura, hirsutism and irregular periods who recently underwent Pelvic EUA and Mirena IUD insertion on 6/6/25.  Since procedure patient has noted initially she had mild spotting post-procedure for 3-4 days.  However, after those days she started to have heavier bleeding with associated clots that were the size of a quarter.  She then also noticed she was passing tissue.  She did take the Lysteda she was given and that has improved the bleeding and she now has none.  That being said, patient is concerned about pain she is now experiencing that is relentless.  Notes that the pain is midline in location, but notes more pain on left side compared to right.  She states the pain is consistent, does not flare and despite use of tylenol and ibuprofen she still notes the pain.  At times she feels a pinching or zinging sensation into the vagina.  Patient is concerned there is an issue with the IUD but really wants everything to be ok as she wants to try using this as overall was reassured about side effect profile.        O:  /79   Pulse 80   Ht 1.524 m (5')   Wt 110.1 kg (242 lb 12.8 oz)   LMP  (LMP Unknown)   BMI 47.42 kg/m       General: NAD, A&Ox3  Resp: Nonlabored breathing    A/P: 29 yo P0 presents with concerns of bleeding s/p Mirena IUD insertion on 6/6/25  1) Breakthrough bleeding with IUD: Discussed with patient that bleeding post IUD insertion can vary especially in the first 3 months as body is adjusting to this new hormone being there.  Her bleeding has resolved s/p use of Lysteda.  That being said, pain is having pain that is our of proportion to what I would expect 6 weeks out from placement.  Did recommend having Pelvic US to assess location and she is open to this.  We discussed if in correct positions we can look for other potential causes of pain and try  expectant management.  If IUD is malpositioned did discuss consideration for removal and replacement which patient is open to but would want under anesthesia as has difficulty with pain and exams.  Discussed if needed we can absolutely coordinate that with her.  Will reach out to patient once we have results and proceed appropriately from there based on results.'    Amaya Carrasco MD

## 2025-07-15 NOTE — LETTER
7/15/2025       RE: Anh Flores  [address] [facility] [address]     Dear Colleague,    Thank you for referring your patient, Anh Flores, to the [facility] [place] at [facility]. Please see a copy of my visit note below.    Gynecology Visit Note  7/15/25    Reason for visit: Bleeding s/p Mirena IUD placement    S: Patient is a 29 yo P0 with hx of CAH, migraine with aura, hirsutism and irregular periods who recently underwent Pelvic EUA and Mirena IUD insertion on 6/6/25.  Since procedure patient has noted initially she had mild spotting post-procedure for 3-4 days.  However, after those days she started to have heavier bleeding with associated clots that were the size of a quarter.  She then also noticed she was passing tissue.  She did take the Lysteda she was given and that has improved the bleeding and she now has none.  That being said, patient is concerned about pain she is now experiencing that is relentless.  Notes that the pain is midline in location, but notes more pain on left side compared to right.  She states the pain is consistent, does not flare and despite use of tylenol and ibuprofen she still notes the pain.  At times she feels a pinching or zinging sensation into the vagina.  Patient is concerned there is an issue with the IUD but really wants everything to be ok as she wants to try using this as overall was reassured about side effect profile.        O:  /79   Pulse 80   Ht 1.524 m (5')   Wt 110.1 kg (242 lb 12.8 oz)   LMP  (LMP Unknown)   BMI 47.42 kg/m       General: NAD, A&Ox3  Resp: Nonlabored breathing    A/P: 29 yo P0 presents with concerns of bleeding s/p Mirena IUD insertion on 6/6/25  1) Breakthrough bleeding with IUD: Discussed with patient that bleeding post IUD insertion can vary especially in the first 3 months as body is adjusting to this new hormone being there.  Her bleeding has  resolved s/p use of Lysteda.  That being said, pain is having pain that is our of proportion to what I would expect 6 weeks out from placement.  Did recommend having Pelvic US to assess location and she is open to this.  We discussed if in correct positions we can look for other potential causes of pain and try expectant management.  If IUD is malpositioned did discuss consideration for removal and replacement which patient is open to but would want under anesthesia as has difficulty with pain and exams.  Discussed if needed we can absolutely coordinate that with her.  Will reach out to patient once we have results and proceed appropriately from there based on results.'    Amaya Carrasco MD    Again, thank you for allowing me to participate in the care of your patient.      Sincerely,    Amaya Carrasco MD

## 2025-07-15 NOTE — TELEPHONE ENCOUNTER
----- Message from Ambreen RAVI sent at 7/15/2025  8:07 AM CDT -----  Patient MyChart Scheduled for Dr. Carrasco this morning.   Dr. Carrasco called and let me know that to get any idea of what is going on, patient will need an ultrasound. Order should be in

## 2025-07-16 DIAGNOSIS — G56.23 ULNAR NEUROPATHY OF BOTH UPPER EXTREMITIES: ICD-10-CM

## 2025-07-16 NOTE — TELEPHONE ENCOUNTER
POST OP    Pt's mother called, mentioning the pt's pain medication is not covering pt's POST OP pain.    Please CALL pt to discuss. Thank you.

## 2025-07-17 ENCOUNTER — ANCILLARY PROCEDURE (OUTPATIENT)
Dept: ULTRASOUND IMAGING | Facility: CLINIC | Age: 30
End: 2025-07-17
Attending: OBSTETRICS & GYNECOLOGY
Payer: COMMERCIAL

## 2025-07-17 DIAGNOSIS — N92.1 BREAKTHROUGH BLEEDING ASSOCIATED WITH INTRAUTERINE DEVICE (IUD): ICD-10-CM

## 2025-07-17 DIAGNOSIS — Z97.5 BREAKTHROUGH BLEEDING ASSOCIATED WITH INTRAUTERINE DEVICE (IUD): ICD-10-CM

## 2025-07-17 PROCEDURE — 76377 3D RENDER W/INTRP POSTPROCES: CPT

## 2025-07-17 PROCEDURE — 76856 US EXAM PELVIC COMPLETE: CPT | Mod: 26 | Performed by: STUDENT IN AN ORGANIZED HEALTH CARE EDUCATION/TRAINING PROGRAM

## 2025-07-17 PROCEDURE — 76830 TRANSVAGINAL US NON-OB: CPT | Mod: 26 | Performed by: STUDENT IN AN ORGANIZED HEALTH CARE EDUCATION/TRAINING PROGRAM

## 2025-07-17 RX ORDER — OXYCODONE HYDROCHLORIDE 5 MG/1
5 TABLET ORAL EVERY 6 HOURS PRN
Qty: 12 TABLET | Refills: 0 | Status: SHIPPED | OUTPATIENT
Start: 2025-07-17 | End: 2025-07-25

## 2025-07-17 NOTE — TELEPHONE ENCOUNTER
Called Pt back to relay oxycodone rx and also recommend OTC antihistamine like diphenhydramine. Pt agreeable with plan. She advised that loosening the bandages has already helped a little so she will continue to elevate.    Ruben Phillips, RNCC

## 2025-07-17 NOTE — TELEPHONE ENCOUNTER
S/p Right open carpal tunnel release, cubital tunnel release, first dorsal compartment release, trigger thumb release, DOS: 7/14/25    Pt was prescribed tramadol 50 mg q6hr PRN for post op pain as well as ibuprofen and Tylenol. She has been taking medication as prescribed as well as elevating, but she is not getting good pain relief and she has not been able to sleep well. We discuss loosening the ACE wraps on both her hand/wrist and elbow to alleviate pressure from fluid restriction. Will send a message to Dr. Roberts to discuss 1-2 days of increase pain medication (change to oxycodone) to get her past this initial post op period.     Refill to go to [facility] pharmacy.     Ruben Phillips RNCC

## 2025-07-18 ENCOUNTER — TELEPHONE (OUTPATIENT)
Dept: GASTROENTEROLOGY | Facility: CLINIC | Age: 30
End: 2025-07-18

## 2025-07-18 ENCOUNTER — OFFICE VISIT (OUTPATIENT)
Dept: PSYCHOLOGY | Facility: CLINIC | Age: 30
End: 2025-07-18
Payer: COMMERCIAL

## 2025-07-18 DIAGNOSIS — F32.1 CURRENT MODERATE EPISODE OF MAJOR DEPRESSIVE DISORDER, UNSPECIFIED WHETHER RECURRENT (H): ICD-10-CM

## 2025-07-18 DIAGNOSIS — F41.9 ANXIETY DISORDER, UNSPECIFIED TYPE: Primary | ICD-10-CM

## 2025-07-18 NOTE — TELEPHONE ENCOUNTER
[facility] Prior Authorization Team Request    Medication:  Linzess  DosinMG  NDC (required for Medicaid members): [id number]  Insurance Company: DEANNA MEDEIROS PMAP  BIN: [id number]  PCN: [id number]  Grp: [id number]  ID: [id number]  CoverMyMeds Key (if applicable):   Additional documentation:   Pharmacy Filling the Rx:  [facility]  Filling Pharmacy Phone:  [phone]  Contact:  [facility] ([phone]) please send all responses to this pool.  Pharmacy NPI (required for Medicaid members): [id number]
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
## 2025-07-21 ENCOUNTER — OFFICE VISIT (OUTPATIENT)
Dept: ORTHOPEDICS | Facility: CLINIC | Age: 30
End: 2025-07-21
Payer: COMMERCIAL

## 2025-07-21 DIAGNOSIS — M65.4 TENOSYNOVITIS, DE QUERVAIN: ICD-10-CM

## 2025-07-21 DIAGNOSIS — Z98.890 POST-OPERATIVE STATE: Primary | ICD-10-CM

## 2025-07-21 DIAGNOSIS — G56.03 BILATERAL CARPAL TUNNEL SYNDROME: ICD-10-CM

## 2025-07-21 DIAGNOSIS — M65.311 TRIGGER THUMB OF BOTH THUMBS: ICD-10-CM

## 2025-07-21 DIAGNOSIS — M65.312 TRIGGER THUMB OF BOTH THUMBS: ICD-10-CM

## 2025-07-21 DIAGNOSIS — G56.23 ULNAR NEUROPATHY OF BOTH UPPER EXTREMITIES: ICD-10-CM

## 2025-07-21 PROCEDURE — 99024 POSTOP FOLLOW-UP VISIT: CPT | Performed by: PHYSICIAN ASSISTANT

## 2025-07-21 NOTE — LETTER
7/21/2025      Anh Flores  [address] [facility] [address]      Dear Colleague,    Thank you for referring your patient, Anh Flores, to the [facility] [place]. Please see a copy of my visit note below.    Date of Service: Jul 21, 2025    Chief Complaint: Post operative follow up.     Date of Surgery: 7/14/2025    Procedure Performed:   Right ulnar nerve decompression at the elbow with no anterior transposition  2. Right open carpal tunnel release  3. Right thumb A1 pulley release  4. Right first extensor compartment release     Surgeon: Dr. Romel Roberts    Interval events: Anh Flores is a 30 year old female who presents today for a postoperative follow up.  She is doing fairly well.  Pain controlled.  No concerns today.    The past medical history was reviewed updated in the EMR. This includes medications, surgeries, social history, and review of systems.    Physical examination:  Well-developed, well-nourished and in no acute distress.  Alert and oriented to surroundings.  On examination of the  right upper extremity, incisions are well-healed. There is no erythema, drainage, or dehiscence. Swelling is Mild.  Moderate ecchymosis throughout the wrist, palm, and volar forearm.  Mildly decreased sensation to light touch throughout the ulnar nerve distribution, otherwise intact light touch throughout the hand patient can actively flex and extend all digits and thumb. The patient is not able to make a full composite fist with tip to palm distance of 2 finger-widths. Fingers are warm and well-perfused.  No further clicking to the thumb    Assessment: 30 year old female s/p right ulnar nerve decompression at the elbow, open carpal tunnel release, trigger thumb release, first extensor compartment release, progressing appropriately.     Plan: Sutures removed today. Patient should continue to wash wound with soap and water daily and pat dry. Steri-Strips will fall off on  their own. No immersing the wound in water for prolonged periods such as tub baths or dishwashing without gloves until wound has healed. Do not lift anything heavier than a coffee cup or do forceful gripping with the operative hand until the wound has healed as this may split the wound open. This will typically take 1-2 more weeks. May use the hand for light activities like eating, shaving, typing, and brushing your teeth. After the wound has completley healed, may progress activity gradually according to comfort level, but heavy lifting (> 10 pounds),  forceful gripping, and weight bearing directly on the palm (such as pushups) are discouraged for the first 6 weeks after surgery to give the area time to heal. Painful activities should be avoided. The patient will follow-up at 6 weeks postop with Dr. Roberts for recheck.     AMAYA SLAUGHTER PA-C  Orthopaedic Surgery     DME FITTING    Relevant Diagnosis: Post op Right wrist and thumb  Wrist/Thumb Orthosis, Rt, [place] w/Spica brace was fit on patient's Right wrist.     Person(s) involved in teaching:   Patient    Brace was applied in standard Manner:  Yes  Brace fit well:  Yes  Patient reports brace to fit comfortably:  Yes    Education:   Patient shown self application and removal of brace: Yes  Patient shown how to adjust brace fit, if necessary: Yes  Patient educated on billing and return policy: Yes  Patient confirmed understanding when and how to contact clinic with concerns: Yes      Again, thank you for allowing me to participate in the care of your patient.        Sincerely,        Amaya Slaughter PA-C    Electronically signed

## 2025-07-21 NOTE — PROGRESS NOTES
DME FITTING    Relevant Diagnosis: Post op Right wrist and thumb  Wrist/Thumb Orthosis, Rt, [place] w/Spica brace was fit on patient's Right wrist.     Person(s) involved in teaching:   Patient    Brace was applied in standard Manner:  Yes  Brace fit well:  Yes  Patient reports brace to fit comfortably:  Yes    Education:   Patient shown self application and removal of brace: Yes  Patient shown how to adjust brace fit, if necessary: Yes  Patient educated on billing and return policy: Yes  Patient confirmed understanding when and how to contact clinic with concerns: Yes

## 2025-07-21 NOTE — PROGRESS NOTES
Date of Service: Jul 21, 2025    Chief Complaint: Post operative follow up.     Date of Surgery: 7/14/2025    Procedure Performed:   Right ulnar nerve decompression at the elbow with no anterior transposition  2. Right open carpal tunnel release  3. Right thumb A1 pulley release  4. Right first extensor compartment release     Surgeon: Dr. Romel Roberts    Interval events: Anh Flores is a 30 year old female who presents today for a postoperative follow up.  She is doing fairly well.  Pain controlled.  No concerns today.    The past medical history was reviewed updated in the EMR. This includes medications, surgeries, social history, and review of systems.    Physical examination:  Well-developed, well-nourished and in no acute distress.  Alert and oriented to surroundings.  On examination of the  right upper extremity, incisions are well-healed. There is no erythema, drainage, or dehiscence. Swelling is Mild.  Moderate ecchymosis throughout the wrist, palm, and volar forearm.  Mildly decreased sensation to light touch throughout the ulnar nerve distribution, otherwise intact light touch throughout the hand patient can actively flex and extend all digits and thumb. The patient is not able to make a full composite fist with tip to palm distance of 2 finger-widths. Fingers are warm and well-perfused.  No further clicking to the thumb    Assessment: 30 year old female s/p right ulnar nerve decompression at the elbow, open carpal tunnel release, trigger thumb release, first extensor compartment release, progressing appropriately.     Plan: Sutures removed today. Patient should continue to wash wound with soap and water daily and pat dry. Steri-Strips will fall off on their own. No immersing the wound in water for prolonged periods such as tub baths or dishwashing without gloves until wound has healed. Do not lift anything heavier than a coffee cup or do forceful gripping with the operative hand until the wound has  healed as this may split the wound open. This will typically take 1-2 more weeks. May use the hand for light activities like eating, shaving, typing, and brushing your teeth. After the wound has completley healed, may progress activity gradually according to comfort level, but heavy lifting (> 10 pounds),  forceful gripping, and weight bearing directly on the palm (such as pushups) are discouraged for the first 6 weeks after surgery to give the area time to heal. Painful activities should be avoided. The patient will follow-up at 6 weeks postop with Dr. Roberts for recheck.     BRITTANIE SLAUGHTER PA-C  Orthopaedic Surgery

## 2025-07-21 NOTE — NURSING NOTE
Reason For Visit:   Chief Complaint   Patient presents with    Surgical Followup     Post op 1.Right ulnar nerve decompression at the elbow with no anterior transposition 2. Right open carpal tunnel release 3. Right thumb A1 pulley release 4. Right first extensor compartment release    DOS: 7/14/25       Primary MD: Maria Guadalupe Triana  Ref. MD: Mayte    Age: 30 year old    ?  No      LMP  (LMP Unknown)       Pain Assessment  Patient Currently in Pain: Yes  Patient's Stated Pain Goal: 8  Primary Pain Location: Elbow (Right elbow and wrist)  Pain Descriptors: Intermittent, Itching, Aching, Throbbing, Discomfort, Tingling, Constant  Alleviating Factors: Pain medication, NSAIDS    Hand Dominance Evaluation  Hand Dominance: Right          QuickDASH Assessment      3/10/2025    10:46 AM   QuickDASH Main   1. Open a tight or new jar Unable   2. Do heavy household chores (e.g., wash walls, floors) Moderate difficulty   3. Carry a shopping bag or briefcase Moderate difficulty   4. Wash your back No difficulty   5. Use a knife to cut food Moderate difficulty   6. Recreational activities in which you take some force or impact through your arm, shoulder or hand (e.g., golf, hammering, tennis, etc.) Unable to answer   7. During the past week, to what extent has your arm, shoulder or hand problem interfered with your normal social activities with family, friends, neighbours or groups Unable to answer   8. During the past week, were you limited in your work or other regular daily activities as a result of your arm, shoulder or hand problem Very limited   9. Arm, shoulder or hand pain Moderate   10.Tingling (pins and needles) in your arm,shoulder or hand Severe   11. During the past week, how much difficulty have you had sleeping because of the pain in your arm, shoulder or hand Moderate difficulty   Quickdash Ability Score 40.91          Current Outpatient Medications   Medication Sig Dispense Refill    acetaminophen  (TYLENOL) 325 MG tablet Take 2 tablets (650 mg) by mouth every 6 hours as needed for mild pain. 54 tablet 0    AJOVY 225 MG/1.5ML SOAJ Inject 225 mg subcutaneously every 30 days. Last dose 4/26      alcohol swab prep pads Use to swab area of injection/anthony as directed. 100 each 3    Atogepant (QULIPTA) 60 MG TABS Take 60 mg by mouth as needed.      atorvastatin (LIPITOR) 10 MG tablet Take 10 mg by mouth daily.      blood glucose (NO BRAND SPECIFIED) test strip Use to test blood sugar three times daily or as directed. Preferred blood glucose meter OR supplies to accompany: Blood Glucose Monitor Brands: per insurance. 100 strip 6    blood glucose monitoring (NO BRAND SPECIFIED) meter device kit Use to test blood sugar three times daily or as directed. Preferred blood glucose meter OR supplies to accompany: Blood Glucose Monitor Brands: per insurance. 1 kit 0    cephALEXin (KEFLEX) 500 MG capsule Take 1 capsule (500 mg) by mouth 4 times daily. 12 capsule 0    Continuous Glucose  (FREESTYLE YASE 3 READER) ROSHAN       Continuous Glucose Sensor (FREESTYLE AYSE 3 PLUS SENSOR) MISC Use 1 sensor every 15 days. Use to read blood sugars per 's instructions. 6 each 3    Continuous Glucose Sensor (FREESTYLE AYSE 3 SENSOR) MISC 1 each by Other route every 14 days. 6 each 3    cyclobenzaprine (FLEXERIL) 5 MG tablet Take by mouth 3 times daily as needed for muscle spasms.      docusate sodium (COLACE) 100 MG capsule Take 1 capsule (100 mg) by mouth 2 times daily. 12 capsule 0    doxepin (SINEQUAN) 10 MG capsule Take 1 capsule (10 mg) by mouth at bedtime. 60 capsule 4    fexofenadine (ALLEGRA) 180 MG tablet Take 1 tablet (180 mg) by mouth every morning. 30 tablet 4    FLUoxetine (PROZAC) 10 MG capsule Take 1 capsule (10 mg) by mouth daily. 30 capsule 4    gabapentin (NEURONTIN) 300 MG capsule Take 300 mg by mouth 3 times daily.      hydrOXYzine HCl (ATARAX) 25 MG tablet Take at bedtime as needed for itching.  30 tablet 4    ibuprofen (ADVIL/MOTRIN) 600 MG tablet Take 1 tablet (600 mg) by mouth every 6 hours as needed for moderate pain. 28 tablet 0    insulin pen needle (ULTICARE MICRO) 32G X 4 MM miscellaneous Use 1 pen needles daily or as directed. 100 each 3    linaclotide (LINZESS) 72 MCG capsule Take 1 capsule (72 mcg) by mouth every morning (before breakfast). 30 capsule 2    metFORMIN (GLUCOPHAGE XR) 500 MG 24 hr tablet Take 4 tablets (2,000 mg) by mouth daily (with dinner). 360 tablet 3    norgestimate-ethinyl estradiol (ORTHO-CYCLEN) 0.25-35 MG-MCG tablet Take 1 tablet by mouth daily.      OnabotulinumtoxinA (BOTOX IJ) Inject as directed every 3 months. Last dose 4/23/25      ondansetron (ZOFRAN) 4 MG tablet Take 1 tablet (4 mg) by mouth every 8 hours as needed for nausea. 12 tablet 0    ondansetron (ZOFRAN) 4 MG tablet Take 1 tablet (4 mg) by mouth every 6 hours as needed for nausea. 12 tablet 0    ondansetron (ZOFRAN) 4 MG tablet Take 1 tablet (4 mg) by mouth every 6 hours as needed for nausea. 12 tablet 0    oxyCODONE (ROXICODONE) 5 MG tablet Take 1 tablet (5 mg) by mouth every 6 hours as needed for severe pain. 12 tablet 0    oxyCODONE (ROXICODONE) 5 MG tablet Take 1-2 tablets (5-10 mg) by mouth every 6 hours as needed for moderate to severe pain. 16 tablet 0    pantoprazole (PROTONIX) 20 MG EC tablet Take 1 tablet (20 mg) by mouth daily. 30 tablet 1    pantoprazole (PROTONIX) 40 MG EC tablet Take 40 mg by mouth daily.      povidone-iodine (BETADINE SURGICAL SCRUB) 7.5 % SOLN topical solution Apply 1 mL topically twice a week. 473 mL 4    rimegepant (NURTEC) 75 MG ODT tablet Place 75 mg under the tongue daily as needed for migraine.      rizatriptan (MAXALT) 10 MG tablet Take 10 mg by mouth at onset of headache.      Semaglutide, 1 MG/DOSE, (OZEMPIC) 4 MG/3ML pen Inject 1 mg subcutaneously every 7 days. 6 mL 1    sertraline (ZOLOFT) 100 MG tablet  (Patient not taking: Reported on 7/15/2025)       spironolactone (ALDACTONE) 50 MG tablet Take 1 tablet (50 mg) by mouth daily. 30 tablet 4    SUMAtriptan (IMITREX) 50 MG tablet Take 2 tablets (100 mg) by mouth at onset of headache for migraine. May repeat in 2 hours. Max 4 tablets/24 hours.      thin (NO BRAND SPECIFIED) lancets Use with lanceting device. To accompany: Blood Glucose Monitor Brands: per insurance. 100 each 6    topiramate (TOPAMAX) 25 MG tablet Take 25 mg by mouth 2 times daily.      tranexamic acid (LYSTEDA) 650 MG tablet Take 2 tablets (1,300 mg) by mouth 2 times daily as needed (menorrhagia). 30 tablet 1    UBRELVY 100 MG tablet Take 100 mg by mouth at onset of headache.         Allergies   Allergen Reactions    Adhesive Tape Itching    Emgality [Galcanezumab-Gnlm] Itching     Itchiness, bumps on injection site    Other Environmental Allergy     Victoza [Liraglutide] Headache, Hives and Itching     On injection site for weeks        SHIRA CONRAD, ATC

## 2025-07-23 NOTE — TELEPHONE ENCOUNTER
PA Initiation    Medication: LINZESS 72 MCG PO CAPS  Insurance Company: [employer] - Phone [phone] Fax [phone]  Pharmacy Filling the Rx: [facility] [place], [place] - [address] [place] [address] [phone]  Filling Pharmacy Phone:    Filling Pharmacy Fax:    Start Date: 7/23/2025          Thank you,    Nevin Weiss  Oncology Pharmacy Liaison KELLY [email or web address]  Phone: [phone]  Fax: [phone]

## 2025-07-23 NOTE — TELEPHONE ENCOUNTER
Prior Authorization Approval    Medication: LINZESS 72 MCG PO CAPS  Authorization Effective Date: 4/24/2025  Authorization Expiration Date: 7/23/2026  Approved Dose/Quantity: 30 per 30 DS  Reference #: [id number]   Insurance Company: [employer] Plus P[employer] - Phone [phone] Fax [phone]  Expected CoPay: $    CoPay Card Available:      Financial Assistance Needed:   Which Pharmacy is filling the prescription: [facility] [address]  Pharmacy Notified: yes  Patient Notified: yes          Thank you,    Nevin Weiss  Oncology Pharmacy [email or web address]  Phone: [phone]  Fax: [phone]

## 2025-07-28 ENCOUNTER — RESULTS FOLLOW-UP (OUTPATIENT)
Dept: OBGYN | Facility: CLINIC | Age: 30
End: 2025-07-28
Payer: COMMERCIAL

## 2025-07-28 DIAGNOSIS — Z97.5 BREAKTHROUGH BLEEDING ASSOCIATED WITH INTRAUTERINE DEVICE (IUD): Primary | ICD-10-CM

## 2025-07-28 DIAGNOSIS — N92.1 BREAKTHROUGH BLEEDING ASSOCIATED WITH INTRAUTERINE DEVICE (IUD): Primary | ICD-10-CM

## 2025-07-28 DIAGNOSIS — B37.9 CANDIDA GLABRATA INFECTION: Primary | ICD-10-CM

## 2025-07-29 ENCOUNTER — OFFICE VISIT (OUTPATIENT)
Dept: OTOLARYNGOLOGY | Facility: CLINIC | Age: 30
End: 2025-07-29
Payer: COMMERCIAL

## 2025-07-29 ENCOUNTER — OFFICE VISIT (OUTPATIENT)
Dept: ORTHOPEDICS | Facility: CLINIC | Age: 30
End: 2025-07-29
Payer: COMMERCIAL

## 2025-07-29 DIAGNOSIS — M79.642 BILATERAL HAND PAIN: Primary | ICD-10-CM

## 2025-07-29 DIAGNOSIS — J34.2 DEVIATED NASAL SEPTUM: Primary | ICD-10-CM

## 2025-07-29 DIAGNOSIS — J30.1 SEASONAL ALLERGIC RHINITIS DUE TO POLLEN: ICD-10-CM

## 2025-07-29 DIAGNOSIS — J32.1 CHRONIC FRONTAL SINUSITIS: ICD-10-CM

## 2025-07-29 DIAGNOSIS — M79.641 BILATERAL HAND PAIN: Primary | ICD-10-CM

## 2025-07-29 DIAGNOSIS — J34.3 NASAL TURBINATE HYPERTROPHY: ICD-10-CM

## 2025-07-29 PROCEDURE — 99213 OFFICE O/P EST LOW 20 MIN: CPT | Mod: 25 | Performed by: OTOLARYNGOLOGY

## 2025-07-29 PROCEDURE — 99024 POSTOP FOLLOW-UP VISIT: CPT | Performed by: STUDENT IN AN ORGANIZED HEALTH CARE EDUCATION/TRAINING PROGRAM

## 2025-07-29 PROCEDURE — 92504 EAR MICROSCOPY EXAMINATION: CPT | Performed by: OTOLARYNGOLOGY

## 2025-07-29 RX ORDER — BUDESONIDE 0.5 MG/2ML
0.5 INHALANT ORAL DAILY
Qty: 60 ML | Refills: 0 | Status: SHIPPED | OUTPATIENT
Start: 2025-07-29

## 2025-07-29 ASSESSMENT — ENCOUNTER SYMPTOMS
EYES NEGATIVE: 1
RESPIRATORY NEGATIVE: 1
CONSTITUTIONAL NEGATIVE: 1
GASTROINTESTINAL NEGATIVE: 1

## 2025-07-29 NOTE — PROGRESS NOTES
Chief Complaint   Patient presents with    Follow Up     Chronic frontal sinusitis, same. Continual use of saline irrigations. Seen allergist with skin patch testing on arm, positive for a few things unable to recall, follow up in November with further testings.      PCP: Maria Guadalupe Triana     Referring Provider: No ref. 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), late onset    Chronic abdominal pain    Vitamin D deficiency    Chronic headaches    Morbid obesity (H)    Hidradenitis suppurativa    Morbid obesity with body mass index of 60.0-69.9 in adult (H)    Elevated BP without diagnosis of hypertension    Diabetes mellitus, type 2 (H)    Bilateral carpal tunnel syndrome    Obstructive sleep apnea    Insomnia, unspecified type    Lactose intolerance    Abnormal finding on imaging of liver    Chronic diarrhea    Carrier of hemochromatosis HFE gene mutation    Cryptosporidiasis (H)    Elevated ferritin    Ulnar neuropathy of both upper extremities    Trigger thumb of both thumbs    Lateral epicondylitis of both elbows    Coccydynia    Chronic bilateral low back pain, unspecified whether sciatica present    Lumbar radiculopathy      Current Medications:  Current Outpatient Medications   Medication Sig Dispense Refill    AJOVY 225 MG/1.5ML SOAJ Inject 225 mg subcutaneously every 30 days. Last dose 4/26      alcohol swab prep pads Use to swab area of injection/anthony as directed. 100 each 3    Atogepant (QULIPTA) 60 MG TABS Take 60 mg by mouth as needed.      atorvastatin (LIPITOR) 10 MG tablet Take 10 mg by mouth daily.      blood glucose (NO BRAND SPECIFIED) test strip Use to test blood sugar three times daily or as directed. Preferred blood glucose meter OR supplies to accompany: Blood Glucose Monitor Brands: per insurance. 100 strip 6    blood glucose monitoring (NO BRAND  SPECIFIED) meter device kit Use to test blood sugar three times daily or as directed. Preferred blood glucose meter OR supplies to accompany: Blood Glucose Monitor Brands: per insurance. 1 kit 0    Boric Acid 600 MG SUPP Place 1 suppository vaginally daily. 21 suppository 0    cephALEXin (KEFLEX) 500 MG capsule Take 1 capsule (500 mg) by mouth 4 times daily. 12 capsule 0    Continuous Glucose  (FREESTYLE AYSE 3 READER) ROSHAN       Continuous Glucose Sensor (FREESTYLE AYSE 3 PLUS SENSOR) MISC Use 1 sensor every 15 days. Use to read blood sugars per 's instructions. 6 each 3    Continuous Glucose Sensor (FREESTYLE AYSE 3 SENSOR) MISC 1 each by Other route every 14 days. 6 each 3    cyclobenzaprine (FLEXERIL) 5 MG tablet Take by mouth 3 times daily as needed for muscle spasms.      docusate sodium (COLACE) 100 MG capsule Take 1 capsule (100 mg) by mouth 2 times daily. 12 capsule 0    doxepin (SINEQUAN) 10 MG capsule Take 1 capsule (10 mg) by mouth at bedtime. 60 capsule 4    fexofenadine (ALLEGRA) 180 MG tablet Take 1 tablet (180 mg) by mouth every morning. 30 tablet 4    FLUoxetine (PROZAC) 10 MG capsule Take 1 capsule (10 mg) by mouth daily. 30 capsule 4    gabapentin (NEURONTIN) 300 MG capsule Take 300 mg by mouth 3 times daily.      hydrOXYzine HCl (ATARAX) 25 MG tablet Take at bedtime as needed for itching. 30 tablet 4    insulin pen needle (ULTICARE MICRO) 32G X 4 MM miscellaneous Use 1 pen needles daily or as directed. 100 each 3    linaclotide (LINZESS) 72 MCG capsule Take 1 capsule (72 mcg) by mouth every morning (before breakfast). 30 capsule 2    metFORMIN (GLUCOPHAGE XR) 500 MG 24 hr tablet Take 4 tablets (2,000 mg) by mouth daily (with dinner). 360 tablet 3    norgestimate-ethinyl estradiol (ORTHO-CYCLEN) 0.25-35 MG-MCG tablet Take 1 tablet by mouth daily.      OnabotulinumtoxinA (BOTOX IJ) Inject as directed every 3 months. Last dose 4/23/25      ondansetron (ZOFRAN) 4 MG tablet Take  1 tablet (4 mg) by mouth every 8 hours as needed for nausea. 12 tablet 0    ondansetron (ZOFRAN) 4 MG tablet Take 1 tablet (4 mg) by mouth every 6 hours as needed for nausea. 12 tablet 0    ondansetron (ZOFRAN) 4 MG tablet Take 1 tablet (4 mg) by mouth every 6 hours as needed for nausea. 12 tablet 0    oxyCODONE (ROXICODONE) 5 MG tablet Take 1-2 tablets (5-10 mg) by mouth every 6 hours as needed for moderate to severe pain. 16 tablet 0    pantoprazole (PROTONIX) 20 MG EC tablet Take 1 tablet (20 mg) by mouth daily. 30 tablet 1    pantoprazole (PROTONIX) 40 MG EC tablet Take 40 mg by mouth daily.      povidone-iodine (BETADINE SURGICAL SCRUB) 7.5 % SOLN topical solution Apply 1 mL topically twice a week. 473 mL 4    rimegepant (NURTEC) 75 MG ODT tablet Place 75 mg under the tongue daily as needed for migraine.      rizatriptan (MAXALT) 10 MG tablet Take 10 mg by mouth at onset of headache.      Semaglutide, 1 MG/DOSE, (OZEMPIC) 4 MG/3ML pen Inject 1 mg subcutaneously every 7 days. 6 mL 1    sertraline (ZOLOFT) 100 MG tablet       spironolactone (ALDACTONE) 50 MG tablet Take 1 tablet (50 mg) by mouth daily. 30 tablet 4    SUMAtriptan (IMITREX) 50 MG tablet Take 2 tablets (100 mg) by mouth at onset of headache for migraine. May repeat in 2 hours. Max 4 tablets/24 hours.      thin (NO BRAND SPECIFIED) lancets Use with lanceting device. To accompany: Blood Glucose Monitor Brands: per insurance. 100 each 6    topiramate (TOPAMAX) 25 MG tablet Take 25 mg by mouth 2 times daily.      tranexamic acid (LYSTEDA) 650 MG tablet Take 2 tablets (1,300 mg) by mouth 2 times daily as needed (menorrhagia). 30 tablet 1    UBRELVY 100 MG tablet Take 100 mg by mouth at onset of headache.       No current facility-administered medications for this visit.     Surgical History:  Past Surgical History:   Procedure Laterality Date    CHOLECYSTECTOMY      She was in 8th grade     COLONOSCOPY N/A 9/16/2024    Procedure: COLONOSCOPY, WITH  BIOPSY;  Surgeon: Bacilio Yancey MD;  Location: [facility]    DECOMPRESSION CUBITAL TUNNEL Right 7/14/2025    Procedure: RIGHT CUBITAL TUNNEL RELEASE;  Surgeon: Vic Roberts MD;  Location: [facility]    ESOPHAGOSCOPY, GASTROSCOPY, DUODENOSCOPY (EGD), COMBINED N/A 9/16/2024    Procedure: ESOPHAGOGASTRODUODENOSCOPY, WITH BIOPSY;  Surgeon: Bacilio Yancey MD;  Location: [facility]    INJECT EPIDURAL CAUDAL N/A 7/9/2025    Procedure: INJECTION, EPIDURAL, CAUDAL;  Surgeon: Edwina Fitzpatrick MD;  Location: [facility]    INJECT STEROID (LOCATION) N/A 4/9/2025    Procedure: Sacrococcygeal ligament/coccyx steroid injection;  Surgeon: Edwina Fitzpatrick MD;  Location: [facility]    INSERT INTRAUTERINE DEVICE N/A 6/6/2025    Procedure: INSERTION, INTRAUTERINE DEVICE; Pap, EUA;  Surgeon: Jami Gutierrez MD;  Location: [facility]    RELEASE CARPAL TUNNEL Left 5/19/2025    Procedure: Left open carpal tunnel release;  Surgeon: Vic Roberts MD;  Location: [facility]    RELEASE CARPAL TUNNEL Right 7/14/2025    Procedure: RIGHT OPEN CARPAL TUNNEL RELEASE;  Surgeon: Vic Roberts MD;  Location: [facility]    RELEASE DEQUERVAINS WRIST Left 5/19/2025    Procedure: Left first dorsal compartment release;  Surgeon: Vic Roberts MD;  Location: [facility]    RELEASE DEQUERVAINS WRIST Right 7/14/2025    Procedure: RIGHT FIRST DORSAL COMPARTMENT RELEASE;  Surgeon: Vic Roberts MD;  Location: [facility]    RELEASE TRIGGER FINGER Left 5/19/2025    Procedure: Left trigger thumb release;  Surgeon: iVc Roberts MD;  Location: [facility]    RELEASE TRIGGER FINGER Right 7/14/2025    Procedure: RIGHT TRIGGER THUMB RELEASE;  Surgeon: Vic Roberts MD;  Location: [facility]    TRANSPOSITION ULNAR NERVE (ELBOW) Left 5/19/2025    Procedure: Left cubital tunnel release;  Surgeon: Vic Roberts MD;  Location: [facility]     HPI  Pleasant 30 year old female presents today as a(n) established patient for chronic frontal sinusitis.  She was last seen on 3/11/25. She reports continued nasal congestion and sinus pressure. She cannot blow anything out of her nose and her ears pop when her nose is blown.  She had allergy testing which found environmental allergies such as mold among other things. She is having further allergy testing in November.  She states that she had two recent surgeries for carpal tunnel under [facility], where they put iodine in her nose. She states that since they did this, she has had some epistaxis in the left nostril and extensive nasal crusting. This nasal crusting has caused itchiness in her nose.  She has been using saline irrigation in her nostrils and OTC allergy medication. She states that her allergy medicine has helped with rhinorrhea.    Review of Systems   Constitutional: Negative.    HENT: Negative.     Eyes: Negative.    Respiratory: Negative.     Gastrointestinal: Negative.    Skin: Negative.    Endo/Heme/Allergies: Negative.        Physical Exam  Vitals and nursing note reviewed.   Constitutional:       Appearance: Normal appearance.   HENT:      Head: Normocephalic and atraumatic.      Jaw: There is normal jaw occlusion.      Right Ear: Hearing, tympanic membrane and ear canal normal.      Left Ear: Hearing, tympanic membrane and ear canal normal.      Nose: Septal deviation (left) and congestion present. No mucosal edema or rhinorrhea.      Right Nostril: No occlusion.      Left Nostril: Epistaxis present. No occlusion.      Right Turbinates: Swollen. Not enlarged.      Left Turbinates: Swollen. Not enlarged.      Right Sinus: No maxillary sinus tenderness or frontal sinus tenderness.      Left Sinus: No maxillary sinus tenderness or frontal sinus tenderness.      Comments: Bilateral nasal cavities were examined under the microscope     Mouth/Throat:      Mouth: Mucous membranes are moist.      Pharynx: Oropharynx is clear. Uvula midline.   Eyes:      Extraocular Movements: Extraocular movements intact.       Pupils: Pupils are equal, round, and reactive to light.   Neurological:      Mental Status: She is alert.       A/P  This pleasant patient has recurrent epistaxis in the left nostril, chronic frontal sinusitis, seasonal allergic rhinitis due to pollen, and bilateral turbinate hypertrophy with left septal deviation causing nasal congestion.     Regarding epistaxis from the left nostril, options including plugging up the bleeding nostril with a piece of cotton with Vaseline on it and pinching her nose for 10 minutes and a cauterization were discussed. She elects to follow the instructions given today if she has another episode of epistaxis, and consider cauterization for the future if the problem continues.    Regarding chronic frontal sinusitis, seasonal allergic rhinitis due to pollen, and bilateral turbinate hypertrophy with left septal deviation causing nasal congestion, she was advised to continue with budesonide nasal rinses, refilled today. If the problem continues, the option of a septoplasty with bilateral turbinoplasty and frontal recess opening will be considered for the future.     Questions and concerns were addressed.    Follow up in clinic after allergy testing in November.    Scribe/Staff:    Scribe Disclosure:   I, Rhonda Jackson, am serving as a scribe; to document services personally performed by Bob Reyes MD based on data collection and the provider's statements to me.     Insert provider disclosure and electronic signature here Provider Disclosure:  I agree with above History, Review of Systems, Physical exam and Plan.  I have reviewed the content of the documentation and have edited it as needed. I have personally performed the services documented here and the documentation accurately represents those services and the decisions I have made.    Bob Reyes MD

## 2025-07-29 NOTE — NURSING NOTE
Anh Flores's goals for this visit include:   Chief Complaint   Patient presents with    Follow Up     Chronic frontal sinusitis, same. Continual use of saline irrigations. Seen allergist with skin patch testing on arm, positive for a few things unable to recall, follow up in November with further testings.     She requests these members of her care team be copied on today's visit information: yes    PCP: Maria Guadalupe Triana    Referring Provider:  Referred Self, MD  No address on file    LMP  (LMP Unknown)     Do you need any medication refills at today's visit? jimbo Demarco, [facility]

## 2025-07-29 NOTE — LETTER
7/29/2025      Anh Flores  [address] [facility] [address]      Dear Colleague,    Thank you for referring your patient, Anh Flores, to the [facility]. Please see a copy of my visit note below.    Chief Complaint   Patient presents with     Follow Up     Chronic frontal sinusitis, same. Continual use of saline irrigations. Seen allergist with skin patch testing on arm, positive for a few things unable to recall, follow up in November with further testings.      PCP: Maria Guadalupe Triana     Referring Provider: No ref. 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), late onset     Chronic abdominal pain     Vitamin D deficiency     Chronic headaches     Morbid obesity (H)     Hidradenitis suppurativa     Morbid obesity with body mass index of 60.0-69.9 in adult (H)     Elevated BP without diagnosis of hypertension     Diabetes mellitus, type 2 (H)     Bilateral carpal tunnel syndrome     Obstructive sleep apnea     Insomnia, unspecified type     Lactose intolerance     Abnormal finding on imaging of liver     Chronic diarrhea     Carrier of hemochromatosis HFE gene mutation     Cryptosporidiasis (H)     Elevated ferritin     Ulnar neuropathy of both upper extremities     Trigger thumb of both thumbs     Lateral epicondylitis of both elbows     Coccydynia     Chronic bilateral low back pain, unspecified whether sciatica present     Lumbar radiculopathy      Current Medications:  Current Outpatient Medications   Medication Sig Dispense Refill     AJOVY 225 MG/1.5ML SOAJ Inject 225 mg subcutaneously every 30 days. Last dose 4/26       alcohol swab prep pads Use to swab area of injection/anthony as directed. 100 each 3     Atogepant (QULIPTA) 60 MG TABS Take 60 mg by mouth as needed.       atorvastatin (LIPITOR) 10 MG tablet Take 10 mg by mouth daily.        blood glucose (NO BRAND SPECIFIED) test strip Use to test blood sugar three times daily or as directed. Preferred blood glucose meter OR supplies to accompany: Blood Glucose Monitor Brands: per insurance. 100 strip 6     blood glucose monitoring (NO BRAND SPECIFIED) meter device kit Use to test blood sugar three times daily or as directed. Preferred blood glucose meter OR supplies to accompany: Blood Glucose Monitor Brands: per insurance. 1 kit 0     Boric Acid 600 MG SUPP Place 1 suppository vaginally daily. 21 suppository 0     cephALEXin (KEFLEX) 500 MG capsule Take 1 capsule (500 mg) by mouth 4 times daily. 12 capsule 0     Continuous Glucose  (FREESTYLE AYSE 3 READER) ROSHAN        Continuous Glucose Sensor (FREESTYLE AYSE 3 PLUS SENSOR) MISC Use 1 sensor every 15 days. Use to read blood sugars per 's instructions. 6 each 3     Continuous Glucose Sensor (FREESTYLE AYSE 3 SENSOR) MISC 1 each by Other route every 14 days. 6 each 3     cyclobenzaprine (FLEXERIL) 5 MG tablet Take by mouth 3 times daily as needed for muscle spasms.       docusate sodium (COLACE) 100 MG capsule Take 1 capsule (100 mg) by mouth 2 times daily. 12 capsule 0     doxepin (SINEQUAN) 10 MG capsule Take 1 capsule (10 mg) by mouth at bedtime. 60 capsule 4     fexofenadine (ALLEGRA) 180 MG tablet Take 1 tablet (180 mg) by mouth every morning. 30 tablet 4     FLUoxetine (PROZAC) 10 MG capsule Take 1 capsule (10 mg) by mouth daily. 30 capsule 4     gabapentin (NEURONTIN) 300 MG capsule Take 300 mg by mouth 3 times daily.       hydrOXYzine HCl (ATARAX) 25 MG tablet Take at bedtime as needed for itching. 30 tablet 4     insulin pen needle (ULTICARE MICRO) 32G X 4 MM miscellaneous Use 1 pen needles daily or as directed. 100 each 3     linaclotide (LINZESS) 72 MCG capsule Take 1 capsule (72 mcg) by mouth every morning (before breakfast). 30 capsule 2     metFORMIN (GLUCOPHAGE XR) 500 MG 24 hr tablet Take 4 tablets (2,000 mg)  by mouth daily (with dinner). 360 tablet 3     norgestimate-ethinyl estradiol (ORTHO-CYCLEN) 0.25-35 MG-MCG tablet Take 1 tablet by mouth daily.       OnabotulinumtoxinA (BOTOX IJ) Inject as directed every 3 months. Last dose 4/23/25       ondansetron (ZOFRAN) 4 MG tablet Take 1 tablet (4 mg) by mouth every 8 hours as needed for nausea. 12 tablet 0     ondansetron (ZOFRAN) 4 MG tablet Take 1 tablet (4 mg) by mouth every 6 hours as needed for nausea. 12 tablet 0     ondansetron (ZOFRAN) 4 MG tablet Take 1 tablet (4 mg) by mouth every 6 hours as needed for nausea. 12 tablet 0     oxyCODONE (ROXICODONE) 5 MG tablet Take 1-2 tablets (5-10 mg) by mouth every 6 hours as needed for moderate to severe pain. 16 tablet 0     pantoprazole (PROTONIX) 20 MG EC tablet Take 1 tablet (20 mg) by mouth daily. 30 tablet 1     pantoprazole (PROTONIX) 40 MG EC tablet Take 40 mg by mouth daily.       povidone-iodine (BETADINE SURGICAL SCRUB) 7.5 % SOLN topical solution Apply 1 mL topically twice a week. 473 mL 4     rimegepant (NURTEC) 75 MG ODT tablet Place 75 mg under the tongue daily as needed for migraine.       rizatriptan (MAXALT) 10 MG tablet Take 10 mg by mouth at onset of headache.       Semaglutide, 1 MG/DOSE, (OZEMPIC) 4 MG/3ML pen Inject 1 mg subcutaneously every 7 days. 6 mL 1     sertraline (ZOLOFT) 100 MG tablet        spironolactone (ALDACTONE) 50 MG tablet Take 1 tablet (50 mg) by mouth daily. 30 tablet 4     SUMAtriptan (IMITREX) 50 MG tablet Take 2 tablets (100 mg) by mouth at onset of headache for migraine. May repeat in 2 hours. Max 4 tablets/24 hours.       thin (NO BRAND SPECIFIED) lancets Use with lanceting device. To accompany: Blood Glucose Monitor Brands: per insurance. 100 each 6     topiramate (TOPAMAX) 25 MG tablet Take 25 mg by mouth 2 times daily.       tranexamic acid (LYSTEDA) 650 MG tablet Take 2 tablets (1,300 mg) by mouth 2 times daily as needed (menorrhagia). 30 tablet 1     UBRELVY 100 MG tablet  Take 100 mg by mouth at onset of headache.       No current facility-administered medications for this visit.     Surgical History:  Past Surgical History:   Procedure Laterality Date     CHOLECYSTECTOMY      She was in 8th grade      COLONOSCOPY N/A 9/16/2024    Procedure: COLONOSCOPY, WITH BIOPSY;  Surgeon: Bacilio Yancey MD;  Location: [facility]     DECOMPRESSION CUBITAL TUNNEL Right 7/14/2025    Procedure: RIGHT CUBITAL TUNNEL RELEASE;  Surgeon: Vic Roberts MD;  Location: [facility]     ESOPHAGOSCOPY, GASTROSCOPY, DUODENOSCOPY (EGD), COMBINED N/A 9/16/2024    Procedure: ESOPHAGOGASTRODUODENOSCOPY, WITH BIOPSY;  Surgeon: Bacilio Yancey MD;  Location: [facility]     INJECT EPIDURAL CAUDAL N/A 7/9/2025    Procedure: INJECTION, EPIDURAL, CAUDAL;  Surgeon: Edwina Fitzpatrick MD;  Location: [facility]     INJECT STEROID (LOCATION) N/A 4/9/2025    Procedure: Sacrococcygeal ligament/coccyx steroid injection;  Surgeon: Edwina Fitzpatrick MD;  Location: [facility] OR     INSERT INTRAUTERINE DEVICE N/A 6/6/2025    Procedure: INSERTION, INTRAUTERINE DEVICE; Pap, EUA;  Surgeon: Jami Gutierrez MD;  Location: [facility]     RELEASE CARPAL TUNNEL Left 5/19/2025    Procedure: Left open carpal tunnel release;  Surgeon: Vic Roberts MD;  Location: [facility]     RELEASE CARPAL TUNNEL Right 7/14/2025    Procedure: RIGHT OPEN CARPAL TUNNEL RELEASE;  Surgeon: Vic Roberts MD;  Location: [facility]     RELEASE DEQUERVAINS WRIST Left 5/19/2025    Procedure: Left first dorsal compartment release;  Surgeon: Vic Roberts MD;  Location: [facility]     RELEASE DEQUERVAINS WRIST Right 7/14/2025    Procedure: RIGHT FIRST DORSAL COMPARTMENT RELEASE;  Surgeon: Vic Roberts MD;  Location: [facility]     RELEASE TRIGGER FINGER Left 5/19/2025    Procedure: Left trigger thumb release;  Surgeon: Vic Roberts MD;  Location: [facility]     RELEASE TRIGGER FINGER Right 7/14/2025    Procedure: RIGHT TRIGGER THUMB RELEASE;   Surgeon: Vic Roberts MD;  Location: [facility]     TRANSPOSITION ULNAR NERVE (ELBOW) Left 5/19/2025    Procedure: Left cubital tunnel release;  Surgeon: Vic Roberts MD;  Location: [facility]     HPI  Pleasant 30 year old female presents today as a(n) established patient for chronic frontal sinusitis. She was last seen on 3/11/25. She reports continued nasal congestion and sinus pressure. She cannot blow anything out of her nose and her ears pop when her nose is blown.  She had allergy testing which found environmental allergies such as mold among other things. She is having further allergy testing in November.  She states that she had two recent surgeries for carpal tunnel under general, where they put iodine in her nose. She states that since they did this, she has had some epistaxis in the left nostril and extensive nasal crusting. This nasal crusting has caused itchiness in her nose.  She has been using saline irrigation in her nostrils and OTC allergy medication. She states that her allergy medicine has helped with rhinorrhea.    Review of Systems   Constitutional: Negative.    HENT: Negative.     Eyes: Negative.    Respiratory: Negative.     Gastrointestinal: Negative.    Skin: Negative.    Endo/Heme/Allergies: Negative.        Physical Exam  Vitals and nursing note reviewed.   Constitutional:       Appearance: Normal appearance.   HENT:      Head: Normocephalic and atraumatic.      Jaw: There is normal jaw occlusion.      Right Ear: Hearing, tympanic membrane and ear canal normal.      Left Ear: Hearing, tympanic membrane and ear canal normal.      Nose: Septal deviation (left) and congestion present. No mucosal edema or rhinorrhea.      Right Nostril: No occlusion.      Left Nostril: Epistaxis present. No occlusion.      Right Turbinates: Swollen. Not enlarged.      Left Turbinates: Swollen. Not enlarged.      Right Sinus: No maxillary sinus tenderness or frontal sinus tenderness.      Left Sinus: No  maxillary sinus tenderness or frontal sinus tenderness.      Comments: Bilateral nasal cavities were examined under the microscope     Mouth/Throat:      Mouth: Mucous membranes are moist.      Pharynx: Oropharynx is clear. Uvula midline.   Eyes:      Extraocular Movements: Extraocular movements intact.      Pupils: Pupils are equal, round, and reactive to light.   Neurological:      Mental Status: She is alert.       A/P  This pleasant patient has recurrent epistaxis in the left nostril, chronic frontal sinusitis, seasonal allergic rhinitis due to pollen, and bilateral turbinate hypertrophy with left septal deviation causing nasal congestion.     Regarding epistaxis from the left nostril, options including plugging up the bleeding nostril with a piece of cotton with Vaseline on it and pinching her nose for 10 minutes and a cauterization were discussed. She elects to follow the instructions given today if she has another episode of epistaxis, and consider cauterization for the future if the problem continues.    Regarding chronic frontal sinusitis, seasonal allergic rhinitis due to pollen, and bilateral turbinate hypertrophy with left septal deviation causing nasal congestion, she was advised to continue with budesonide nasal rinses, refilled today. If the problem continues, the option of a septoplasty with bilateral turbinoplasty and frontal recess opening will be considered for the future.     Questions and concerns were addressed.    Follow up in clinic after allergy testing in November.    Scribe/Staff:    Scribe Disclosure:   I, Rhonda Jackson, am serving as a scribe; to document services personally performed by Bob Reyes MD based on data collection and the provider's statements to me.     Insert provider disclosure and electronic signature here Provider Disclosure:  I agree with above History, Review of Systems, Physical exam and Plan.  I have reviewed the content of the documentation and have edited  it as needed. I have personally performed the services documented here and the documentation accurately represents those services and the decisions I have made.    Bob Reyes MD       Again, thank you for allowing me to participate in the care of your patient.        Sincerely,        Bob Reyes MD    Electronically signed

## 2025-07-29 NOTE — PROGRESS NOTES
Ortho Hand    Doing well, but patient did have foul smell from the elbow incision that made her concerned.  No issues.  Healing better than on the left side.    On exam, right hand, wrist and elbow incisions are intact with no wounds or signs of infection.  Can make a full active right composite fist with no recurrent triggering and intact median and ulnar distributed motor and sensation.    A/P: 30-year-old female 2 weeks status post right trigger thumb release, right open carpal tunnel release, right first extensor compartment release, right cubital tunnel release    -Reassured the patient that there is no concern for infection or wound healing issue.  We cleansed the right elbow incision with alcohol swab, and applied a protective Band-Aid.  -Sutures out  -Patient can initiate scar treatment with silicone-based ointment or Bio-oil next week.    -Limit lifting to 7-10 pounds for the next 2 weeks, followed by 15-20 pounds for postoperative weeks 5-6  -Instructed patient to use the fingers to prevent stiffness  -Counseled patient to understand that the baseline tingling and numbness will improve over the next several months  -Return to clinic in 4 weeks for reevaluation    Vic Roberts MD, PhD, FACS

## 2025-07-29 NOTE — LETTER
7/29/2025      Anh Flores  [address] [facility] [address]      Dear Colleague,    Thank you for referring your patient, Anh Flores, to the [facility] [place]. Please see a copy of my visit note below.    Ortho Hand    Doing well, but patient did have foul smell from the elbow incision that made her concerned.  No issues.  Healing better than on the left side.    On exam, right hand, wrist and elbow incisions are intact with no wounds or signs of infection.  Can make a full active right composite fist with no recurrent triggering and intact median and ulnar distributed motor and sensation.    A/P: 30-year-old female 2 weeks status post right trigger thumb release, right open carpal tunnel release, right first extensor compartment release, right cubital tunnel release    -Reassured the patient that there is no concern for infection or wound healing issue.  We cleansed the right elbow incision with alcohol swab, and applied a protective Band-Aid.  -Sutures out  -Patient can initiate scar treatment with silicone-based ointment or Bio-oil next week.    -Limit lifting to 7-10 pounds for the next 2 weeks, followed by 15-20 pounds for postoperative weeks 5-6  -Instructed patient to use the fingers to prevent stiffness  -Counseled patient to understand that the baseline tingling and numbness will improve over the next several months  -Return to clinic in 4 weeks for reevaluation    Vic Roberts MD, PhD, FACS      Again, thank you for allowing me to participate in the care of your patient.        Sincerely,        Vic Roberts MD    Electronically signed

## 2025-07-29 NOTE — NURSING NOTE
Reason For Visit:   Chief Complaint   Patient presents with    Surgical Followup     Post op Right Trigger Thumb Release - Right   Right Open Carpal Tunnel Release - Right   Right Cubital Tunnel Release - Right   Right First Dorsal Compartment Release - Right  DOS: 7/14/25            Primary MD: Maria Guadalupe Triana  Ref. MD: Mayte    Age: 30 year old    ?  No      LMP  (LMP Unknown)       Pain Assessment  Patient Currently in Pain: Yes  Primary Pain Location: Elbow (Right)  Pain Descriptors: Other (comment) (stinging)    Hand Dominance Evaluation  Hand Dominance: Right          QuickDASH Assessment      3/10/2025    10:46 AM   QuickDASH Main   1. Open a tight or new jar Unable   2. Do heavy household chores (e.g., wash walls, floors) Moderate difficulty   3. Carry a shopping bag or briefcase Moderate difficulty   4. Wash your back No difficulty   5. Use a knife to cut food Moderate difficulty   6. Recreational activities in which you take some force or impact through your arm, shoulder or hand (e.g., golf, hammering, tennis, etc.) Unable to answer   7. During the past week, to what extent has your arm, shoulder or hand problem interfered with your normal social activities with family, friends, neighbours or groups Unable to answer   8. During the past week, were you limited in your work or other regular daily activities as a result of your arm, shoulder or hand problem Very limited   9. Arm, shoulder or hand pain Moderate   10.Tingling (pins and needles) in your arm,shoulder or hand Severe   11. During the past week, how much difficulty have you had sleeping because of the pain in your arm, shoulder or hand Moderate difficulty   Quickdash Ability Score 40.91          Current Outpatient Medications   Medication Sig Dispense Refill    AJOVY 225 MG/1.5ML SOAJ Inject 225 mg subcutaneously every 30 days. Last dose 4/26      alcohol swab prep pads Use to swab area of injection/anthony as directed. 100 each 3     Atogepant (QULIPTA) 60 MG TABS Take 60 mg by mouth as needed.      atorvastatin (LIPITOR) 10 MG tablet Take 10 mg by mouth daily.      blood glucose (NO BRAND SPECIFIED) test strip Use to test blood sugar three times daily or as directed. Preferred blood glucose meter OR supplies to accompany: Blood Glucose Monitor Brands: per insurance. 100 strip 6    blood glucose monitoring (NO BRAND SPECIFIED) meter device kit Use to test blood sugar three times daily or as directed. Preferred blood glucose meter OR supplies to accompany: Blood Glucose Monitor Brands: per insurance. 1 kit 0    Boric Acid 600 MG SUPP Place 1 suppository vaginally daily. 21 suppository 0    budesonide (PULMICORT) 0.5 MG/2ML neb solution Take 2 mLs (0.5 mg) by nebulization daily. 60 mL 0    cephALEXin (KEFLEX) 500 MG capsule Take 1 capsule (500 mg) by mouth 4 times daily. 12 capsule 0    Continuous Glucose  (FREESTYLE AYSE 3 READER) ROSHAN       Continuous Glucose Sensor (FREESTYLE AYSE 3 PLUS SENSOR) MISC Use 1 sensor every 15 days. Use to read blood sugars per 's instructions. 6 each 3    Continuous Glucose Sensor (FREESTYLE AYSE 3 SENSOR) MISC 1 each by Other route every 14 days. 6 each 3    cyclobenzaprine (FLEXERIL) 5 MG tablet Take by mouth 3 times daily as needed for muscle spasms.      docusate sodium (COLACE) 100 MG capsule Take 1 capsule (100 mg) by mouth 2 times daily. 12 capsule 0    doxepin (SINEQUAN) 10 MG capsule Take 1 capsule (10 mg) by mouth at bedtime. 60 capsule 4    fexofenadine (ALLEGRA) 180 MG tablet Take 1 tablet (180 mg) by mouth every morning. 30 tablet 4    FLUoxetine (PROZAC) 10 MG capsule Take 1 capsule (10 mg) by mouth daily. 30 capsule 4    gabapentin (NEURONTIN) 300 MG capsule Take 300 mg by mouth 3 times daily.      hydrOXYzine HCl (ATARAX) 25 MG tablet Take at bedtime as needed for itching. 30 tablet 4    insulin pen needle (ULTICARE MICRO) 32G X 4 MM miscellaneous Use 1 pen needles daily or as  directed. 100 each 3    linaclotide (LINZESS) 72 MCG capsule Take 1 capsule (72 mcg) by mouth every morning (before breakfast). 30 capsule 2    metFORMIN (GLUCOPHAGE XR) 500 MG 24 hr tablet Take 4 tablets (2,000 mg) by mouth daily (with dinner). 360 tablet 3    norgestimate-ethinyl estradiol (ORTHO-CYCLEN) 0.25-35 MG-MCG tablet Take 1 tablet by mouth daily.      OnabotulinumtoxinA (BOTOX IJ) Inject as directed every 3 months. Last dose 4/23/25      ondansetron (ZOFRAN) 4 MG tablet Take 1 tablet (4 mg) by mouth every 8 hours as needed for nausea. 12 tablet 0    ondansetron (ZOFRAN) 4 MG tablet Take 1 tablet (4 mg) by mouth every 6 hours as needed for nausea. 12 tablet 0    ondansetron (ZOFRAN) 4 MG tablet Take 1 tablet (4 mg) by mouth every 6 hours as needed for nausea. 12 tablet 0    oxyCODONE (ROXICODONE) 5 MG tablet Take 1-2 tablets (5-10 mg) by mouth every 6 hours as needed for moderate to severe pain. 16 tablet 0    pantoprazole (PROTONIX) 20 MG EC tablet Take 1 tablet (20 mg) by mouth daily. 30 tablet 1    pantoprazole (PROTONIX) 40 MG EC tablet Take 40 mg by mouth daily.      povidone-iodine (BETADINE SURGICAL SCRUB) 7.5 % SOLN topical solution Apply 1 mL topically twice a week. 473 mL 4    rimegepant (NURTEC) 75 MG ODT tablet Place 75 mg under the tongue daily as needed for migraine.      rizatriptan (MAXALT) 10 MG tablet Take 10 mg by mouth at onset of headache.      Semaglutide, 1 MG/DOSE, (OZEMPIC) 4 MG/3ML pen Inject 1 mg subcutaneously every 7 days. 6 mL 1    sertraline (ZOLOFT) 100 MG tablet       spironolactone (ALDACTONE) 50 MG tablet Take 1 tablet (50 mg) by mouth daily. 30 tablet 4    SUMAtriptan (IMITREX) 50 MG tablet Take 2 tablets (100 mg) by mouth at onset of headache for migraine. May repeat in 2 hours. Max 4 tablets/24 hours.      thin (NO BRAND SPECIFIED) lancets Use with lanceting device. To accompany: Blood Glucose Monitor Brands: per insurance. 100 each 6    topiramate (TOPAMAX) 25 MG  tablet Take 25 mg by mouth 2 times daily.      tranexamic acid (LYSTEDA) 650 MG tablet Take 2 tablets (1,300 mg) by mouth 2 times daily as needed (menorrhagia). 30 tablet 1    UBRELVY 100 MG tablet Take 100 mg by mouth at onset of headache.         Allergies   Allergen Reactions    Adhesive Tape Itching    Emgality [Galcanezumab-Gnlm] Itching     Itchiness, bumps on injection site    Other Environmental Allergy     Victoza [Liraglutide] Headache, Hives and Itching     On injection site for weeks        Gwendolyn Ballesteros, ATC

## 2025-07-30 NOTE — TELEPHONE ENCOUNTER
FUTURE VISIT INFORMATION      SURGERY INFORMATION:  Date: 25  Location: [facility]  Surgeon:  Cheyenne Lopez MD   Anesthesia Type:  Choice  Procedure: REMOVAL, INTRAUTERINE DEVICE; PEVIC EXAM UNDER ANESTHESIA   Consult: 7/15/25    RECORDS REQUESTED FROM:       Primary Care Provider: Maria Guadalupe Triana MD    Pertinent Medical History: CAH, Diabetes, TOMMY, Obesity, Elevated BP without diagnosis of hypertension     Most recent EKG+ Tracin25, internal    Most recent ECHO: 25, internal    Most recent Coronary Angiogram: 25, internal    Most recent PFT's: 25, internal    Most recent Sleep Study: 11/15/24, internal

## 2025-07-31 ENCOUNTER — OFFICE VISIT (OUTPATIENT)
Dept: SURGERY | Facility: CLINIC | Age: 30
End: 2025-07-31
Payer: COMMERCIAL

## 2025-07-31 ENCOUNTER — ANESTHESIA EVENT (OUTPATIENT)
Dept: SURGERY | Facility: CLINIC | Age: 30
End: 2025-07-31
Payer: COMMERCIAL

## 2025-07-31 ENCOUNTER — TELEPHONE (OUTPATIENT)
Dept: OBGYN | Facility: CLINIC | Age: 30
End: 2025-07-31

## 2025-07-31 ENCOUNTER — PRE VISIT (OUTPATIENT)
Dept: SURGERY | Facility: CLINIC | Age: 30
End: 2025-07-31

## 2025-07-31 VITALS
OXYGEN SATURATION: 98 % | HEIGHT: 60 IN | WEIGHT: 242 LBS | TEMPERATURE: 97.8 F | BODY MASS INDEX: 47.51 KG/M2 | HEART RATE: 79 BPM | DIASTOLIC BLOOD PRESSURE: 85 MMHG | SYSTOLIC BLOOD PRESSURE: 124 MMHG | RESPIRATION RATE: 16 BRPM

## 2025-07-31 DIAGNOSIS — N92.1 BREAKTHROUGH BLEEDING ASSOCIATED WITH INTRAUTERINE DEVICE (IUD): ICD-10-CM

## 2025-07-31 DIAGNOSIS — Z97.5 BREAKTHROUGH BLEEDING ASSOCIATED WITH INTRAUTERINE DEVICE (IUD): Primary | ICD-10-CM

## 2025-07-31 DIAGNOSIS — Z01.818 PREOP EXAMINATION: Primary | ICD-10-CM

## 2025-07-31 DIAGNOSIS — N92.1 BREAKTHROUGH BLEEDING ASSOCIATED WITH INTRAUTERINE DEVICE (IUD): Primary | ICD-10-CM

## 2025-07-31 DIAGNOSIS — Z97.5 BREAKTHROUGH BLEEDING ASSOCIATED WITH INTRAUTERINE DEVICE (IUD): ICD-10-CM

## 2025-07-31 RX ORDER — NORGESTIMATE AND ETHINYL ESTRADIOL 0.25-0.035
1 KIT ORAL AT BEDTIME
Qty: 84 TABLET | Refills: 4 | Status: SHIPPED | OUTPATIENT
Start: 2025-07-31

## 2025-07-31 ASSESSMENT — PAIN SCALES - GENERAL: PAINLEVEL_OUTOF10: SEVERE PAIN (7)

## 2025-07-31 ASSESSMENT — ENCOUNTER SYMPTOMS: SEIZURES: 0

## 2025-07-31 ASSESSMENT — LIFESTYLE VARIABLES: TOBACCO_USE: 0

## 2025-07-31 NOTE — PROGRESS NOTES
Asked by RN to fill OCP to help with BTB on Mirena IUD.  Patient had ultrasound to confirm correct placement (confirmed), she has no contraindication to OCP. Was planning to have IUD removed under sedation in OR, now thinking she may keep the IUD.  Ask RN to clarify the plan with patient.  Cheyenne Lopez MD

## 2025-07-31 NOTE — H&P
Pre-Operative H & P     CC:  Preoperative exam to assess for increased cardiopulmonary risk while undergoing surgery and anesthesia.    Date of Encounter: 7/31/2025  Primary Care Physician:  Maria Guadalupe Triana     Reason for visit:   Encounter Diagnoses   Name Primary?    Breakthrough bleeding associated with intrauterine device (IUD) Yes    Preop examination        HPI  Anh Flores is a 30 year old female who presents for pre-operative H & P in preparation for  Procedure Information       Case: [id number] Date/Time: 08/05/25 1405    Procedure: REMOVAL, INTRAUTERINE DEVICE; PEVIC EXAM UNDER ANESTHESIA (Uterus)    Anesthesia type: Choice    Diagnosis: Breakthrough bleeding associated with intrauterine device (IUD) [N92.1, Z97.5]    Pre-op diagnosis: Breakthrough bleeding associated with intrauterine device (IUD) [N92.1, Z97.5]    Location:  OR  / [facility]    Providers: Cheyenne Lopez MD            Patient is being evaluated for comorbid conditions of TOMMY, chronic sinusitis, morbid obesity, migraines, insomnia, DM2, congenital adrenal hyperplasia, GERD, IBS, PCOS, hidradenitis.    Ms. Flores underwent Pelvic EUA and Mirena IUD insertion on 6/6/25.   She started to have heavier bleeding with associated clots and tissue.  Lysteda has improved the bleeding.  Currently, she is concerned about consistent pain she is now experiencing. She has been counseled by Dr. Carrasco and now presents for the above procedure.       History is obtained from the patient and chart review    Hx of abnormal bleeding or anti-platelet use: denies    Menstrual history: No LMP recorded (lmp unknown). (Menstrual status: Birth Control).:       Past Medical History  Past Medical History:   Diagnosis Date    CAH (congenital adrenal hyperplasia) 02/09/2010    Followed by Dr. Brewer    Carpal tunnel syndrome of right wrist     Chronic sinusitis, unspecified location     Cubital tunnel syndrome on right     Diabetes mellitus, type 2 (H)  10/19/2020    GERD (gastroesophageal reflux disease)     Irritable bowel syndrome     Migraines     Morbid obesity (H)     PCOS (polycystic ovarian syndrome)     Vitamin D deficiency 02/09/2010       Past Surgical History  Past Surgical History:   Procedure Laterality Date    CHOLECYSTECTOMY      She was in 8th grade     COLONOSCOPY N/A 9/16/2024    Procedure: COLONOSCOPY, WITH BIOPSY;  Surgeon: Bacilio Yancey MD;  Location: [facility]    DECOMPRESSION CUBITAL TUNNEL Right 7/14/2025    Procedure: RIGHT CUBITAL TUNNEL RELEASE;  Surgeon: Vic Roberts MD;  [facility]    ESOPHAGOSCOPY, GASTROSCOPY, DUODENOSCOPY (EGD), COMBINED N/A 9/16/2024    Procedure: ESOPHAGOGASTRODUODENOSCOPY, WITH BIOPSY;  Surgeon: Bacilio Yancey MD;  Location: [facility]    INJECT EPIDURAL CAUDAL N/A 7/9/2025    Procedure: INJECTION, EPIDURAL, CAUDAL;  Surgeon: Edwina Fitzpatrick MD;  Location: [facility]    INJECT STEROID (LOCATION) N/A 4/9/2025    Procedure: Sacrococcygeal ligament/coccyx steroid injection;  Surgeon: Edwina Fitzpatrick MD;  Location: [facility]    INSERT INTRAUTERINE DEVICE N/A 6/6/2025    Procedure: INSERTION, INTRAUTERINE DEVICE; Pap, EUA;  Surgeon: Jami Gutierrez MD;  [facility]    RELEASE CARPAL TUNNEL Left 5/19/2025    Procedure: Left open carpal tunnel release;  Surgeon: Vic Roberts MD;  [facility]    RELEASE CARPAL TUNNEL Right 7/14/2025    Procedure: RIGHT OPEN CARPAL TUNNEL RELEASE;  Surgeon: Vic Roberts MD;  [facility]    RELEASE DEQUERVAINS WRIST Left 5/19/2025    Procedure: Left first dorsal compartment release;  Surgeon: Vic Roberts MD;  [facility]    RELEASE DEQUERVAINS WRIST Right 7/14/2025    Procedure: RIGHT FIRST DORSAL COMPARTMENT RELEASE;  Surgeon: Vic Roberts MD;  [facility]    RELEASE TRIGGER FINGER Left 5/19/2025    Procedure: Left trigger thumb release;  Surgeon: Vic Roberts MD;  [facility]    RELEASE  TRIGGER FINGER Right 7/14/2025    Procedure: RIGHT TRIGGER THUMB RELEASE;  Surgeon: Vic Roberts MD;  [facility]    TRANSPOSITION ULNAR NERVE (ELBOW) Left 5/19/2025    Procedure: Left cubital tunnel release;  Surgeon: Vic Roberts MD;  Location: [facility]       Prior to Admission Medications  Current Outpatient Medications   Medication Sig Dispense Refill    AJOVY 225 MG/1.5ML SOAJ Inject 225 mg subcutaneously every 30 days. Last dose 4/26      Atogepant (QULIPTA) 60 MG TABS Take 60 mg by mouth as needed.      atorvastatin (LIPITOR) 10 MG tablet Take 10 mg by mouth every morning.      budesonide (PULMICORT) 0.5 MG/2ML neb solution Take 2 mLs (0.5 mg) by nebulization daily. 60 mL 0    cyclobenzaprine (FLEXERIL) 5 MG tablet Take by mouth 3 times daily as needed for muscle spasms.      doxepin (SINEQUAN) 10 MG capsule Take 1 capsule (10 mg) by mouth at bedtime. 60 capsule 4    fexofenadine (ALLEGRA) 180 MG tablet Take 1 tablet (180 mg) by mouth every morning. 30 tablet 4    FLUoxetine (PROZAC) 10 MG capsule Take 1 capsule (10 mg) by mouth daily. (Patient taking differently: Take 10 mg by mouth every morning.) 30 capsule 4    gabapentin (NEURONTIN) 300 MG capsule Take 300 mg by mouth 2 times daily.      hydrOXYzine HCl (ATARAX) 25 MG tablet Take at bedtime as needed for itching. (Patient taking differently: Take 25 mg by mouth at bedtime. Take at bedtime as needed for itching.) 30 tablet 4    linaclotide (LINZESS) 72 MCG capsule Take 1 capsule (72 mcg) by mouth every morning (before breakfast). 30 capsule 2    metFORMIN (GLUCOPHAGE XR) 500 MG 24 hr tablet Take 4 tablets (2,000 mg) by mouth daily (with dinner). 360 tablet 3    norgestimate-ethinyl estradiol (ORTHO-CYCLEN) 0.25-35 MG-MCG tablet Take 1 tablet by mouth at bedtime.      OnabotulinumtoxinA (BOTOX IJ) Inject as directed every 3 months.      ondansetron (ZOFRAN) 4 MG tablet Take 1 tablet (4 mg) by mouth every 6 hours as needed for nausea. 12  tablet 0    pantoprazole (PROTONIX) 40 MG EC tablet Take 40 mg by mouth every morning.      rimegepant (NURTEC) 75 MG ODT tablet Place 75 mg under the tongue daily as needed for migraine.      rizatriptan (MAXALT) 10 MG tablet Take 10 mg by mouth at onset of headache.      Semaglutide, 1 MG/DOSE, (OZEMPIC) 4 MG/3ML pen Inject 1 mg subcutaneously every 7 days. (Patient taking differently: Inject 1 mg subcutaneously every 7 days. Monday's) 6 mL 1    sertraline (ZOLOFT) 100 MG tablet Take 100 mg by mouth at bedtime.      spironolactone (ALDACTONE) 50 MG tablet Take 1 tablet (50 mg) by mouth daily. (Patient taking differently: Take 50 mg by mouth at bedtime.) 30 tablet 4    SUMAtriptan (IMITREX) 50 MG tablet Take 2 tablets (100 mg) by mouth at onset of headache for migraine. May repeat in 2 hours. Max 4 tablets/24 hours.      topiramate (TOPAMAX) 25 MG tablet Take 25 mg by mouth 2 times daily.      tranexamic acid (LYSTEDA) 650 MG tablet Take 2 tablets (1,300 mg) by mouth 2 times daily as needed (menorrhagia). 30 tablet 1    UBRELVY 100 MG tablet Take 100 mg by mouth at onset of headache.      alcohol swab prep pads Use to swab area of injection/anthony as directed. 100 each 3    blood glucose (NO BRAND SPECIFIED) test strip Use to test blood sugar three times daily or as directed. Preferred blood glucose meter OR supplies to accompany: Blood Glucose Monitor Brands: per insurance. 100 strip 6    blood glucose monitoring (NO BRAND SPECIFIED) meter device kit Use to test blood sugar three times daily or as directed. Preferred blood glucose meter OR supplies to accompany: Blood Glucose Monitor Brands: per insurance. 1 kit 0    Boric Acid 600 MG SUPP Place 1 suppository vaginally daily. 21 suppository 0    Continuous Glucose  (FREESTYLE AYSE 3 READER) ROSHAN       Continuous Glucose Sensor (FREESTYLE AYSE 3 PLUS SENSOR) MISC Use 1 sensor every 15 days. Use to read blood sugars per 's instructions. 6 each 3     Continuous Glucose Sensor (FREESTYLE AYSE 3 SENSOR) MISC 1 each by Other route every 14 days. 6 each 3    docusate sodium (COLACE) 100 MG capsule Take 1 capsule (100 mg) by mouth 2 times daily. 12 capsule 0    insulin pen needle (ULTICARE MICRO) 32G X 4 MM miscellaneous Use 1 pen needles daily or as directed. 100 each 3    povidone-iodine (BETADINE SURGICAL SCRUB) 7.5 % SOLN topical solution Apply 1 mL topically twice a week. (Patient not taking: Reported on 7/31/2025) 473 mL 4    thin (NO BRAND SPECIFIED) lancets Use with lanceting device. To accompany: Blood Glucose Monitor Brands: per insurance. 100 each 6       Allergies  Allergies   Allergen Reactions    Adhesive Tape Itching    Emgality [Galcanezumab-Gnlm] Itching     Itchiness, bumps on injection site    Other Environmental Allergy     Victoza [Liraglutide] Headache, Hives and Itching     On injection site for weeks        Social History  Social History     Socioeconomic History    Marital status: Single     Spouse name: Not on file    Number of children: 0    Years of education: 14+    Highest education level: Not on file   Occupational History     Employer: [employer]UDENT     Comment: Dog grooming   Tobacco Use    Smoking status: Never     Passive exposure: Never    Smokeless tobacco: Never   Vaping Use    Vaping status: Never Used   Substance and Sexual Activity    Alcohol use: Never    Drug use: Never    Sexual activity: Not Currently     Birth control/protection: I.U.D., Pill   Other Topics Concern    Parent/sibling w/ CABG, MI or angioplasty before 65F 55M? 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    Not sexually active and never has been    Jami Gutierrez MD     Social Drivers of Health     Financial Resource Strain: Low Risk  (12/30/2024)    Financial Resource Strain     Within the past 12 months, have you or your family members you live with been unable to get utilities (heat, electricity) when it was really needed?: No    Food Insecurity: Low Risk  (12/30/2024)    Food Insecurity     Within the past 12 months, did you worry that your food would run out before you got money to buy more?: No     Within the past 12 months, did the food you bought just not last and you didn t have money to get more?: No   Transportation Needs: Low Risk  (12/30/2024)    Transportation Needs     Within the past 12 months, has lack of transportation kept you from medical appointments, getting your medicines, non-medical meetings or appointments, work, or from getting things that you need?: No   Physical Activity: Unknown (10/18/2024)    Exercise Vital Sign     Days of Exercise per Week: 2 days     Minutes of Exercise per Session: Not on file   Stress: Stress Concern Present (10/18/2024)    [employer] - Occupational Stress Questionnaire     Feeling of Stress : To some extent   Social Connections: Unknown (10/18/2024)    Social Connection and Isolation Panel [NHANES]     Frequency of Communication with Friends and Family: Not on file     Frequency of Social Gatherings with Friends and Family: Never     Attends Congregation Services: Not on file     Active Member of Clubs or Organizations: Not on file     Attends Club or Organization Meetings: Not on file     Marital Status: Not on file   Interpersonal Safety: Low Risk  (7/14/2025)    Interpersonal Safety     Do you feel physically and emotionally safe where you currently live?: Yes     Within the past 12 months, have you been hit, slapped, kicked or otherwise physically hurt by someone?: No     Within the past 12 months, have you been humiliated or emotionally abused in other ways by your partner or ex-partner?: No   Housing Stability: Low Risk  (12/30/2024)    Housing Stability     Do you have housing? : Yes     Are you worried about losing your housing?: No       Family History  Family History   Problem Relation Age of Onset    Neurologic Disorder Sister 16        migraines     Cerebrovascular Disease No family hx of     Alzheimer Disease No family hx of     Glaucoma No family hx of     Macular Degeneration No family hx of     Anesthesia Reaction No family hx of     Deep Vein Thrombosis (DVT) No family hx of        Review of Systems  The complete review of systems is negative other than noted in the HPI or here.     Anesthesia Evaluation   Pt has had prior anesthetic.     History of anesthetic complications  - PONV.      ROS/MED HX  ENT/Pulmonary: Comment: Denies h/o asthma but describes symptoms consistent with reactive airway with triggers being upper respiratory tract infections or if she does significant exercise.     Recent sleep study revealed upper airway resistance syndrome.  Oral appliance has been prescribed.     Chronic sinusitis    (+) sleep apnea, mild,                                    (-) tobacco use, asthma and recent URI   Neurologic:     (+)      migraines (Follows with neurology),                       (-) no seizures and no CVA   Cardiovascular:     (+)  - -   -  - -           MCCORMACK (Reports MCCORMACK when she goes up a flight of stairs and dizzy since July 2024.).                      Previous cardiac testing   Echo: Date: 1/29/25 Results:  See H&P  Stress Test:  Date: Results:    ECG Reviewed:  Date: 2/17/25 Results:  Holter - see H&P  Cath:  Date: Results:   (-) hypertension, taking anticoagulants/antiplatelets and dyslipidemia   METS/Exercise Tolerance: >4 METS Comment: [place]   and .  Does not do dedicated exercise, but stays active.    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    PCOS    (+)  type II DM, Last HgA1c: 5.4, date: 3/2025, Not using insulin,          Obesity,       Psychiatric/Substance Use: Comment: Insomnia        Infectious Disease:  - neg infectious disease ROS     Malignancy:  - neg malignancy ROS     Other: Comment: Hidradenitis       (+)  , H/O Chronic Pain,         /85 (BP Location: Right arm, Patient Position: Sitting, Cuff Size: Adult Large)   Pulse 79   Temp 97.8  F (36.6  C) (Oral)   Resp 16   Ht 1.524 m (5')   Wt 109.8 kg (242 lb)   LMP  (LMP Unknown)   SpO2 98%   Breastfeeding No   BMI 47.26 kg/m      Physical Exam  Constitutional: Awake, alert, cooperative, no apparent distress, and appears stated age.  Eyes: Pupils equal, round and reactive to light, extra ocular muscles intact, sclera clear, conjunctiva normal.  HENT: Normocephalic, oral pharynx with moist mucus membranes, good dentition. No goiter appreciated. No removable dental hardware.  Respiratory: Clear to auscultation bilaterally, no crackles or wheezing. No SOB when supine.  Cardiovascular: Regular rate and rhythm, normal S1 and S2, and no murmur noted.  Carotids +2, no bruits. No edema. Trace palpable pulses to left radial (right wrist in splint), B/L DP arteries.   GI: Hypoactive bowel sounds, soft, obese, mild tenderness, no masses palpated.    Lymph/Hematologic: No cervical lymphadenopathy and no supraclavicular lymphadenopathy.  Genitourinary:  deferred  Skin: Warm and dry.  No rashes.   Musculoskeletal: full ROM of neck. There is no redness, warmth, or swelling of the joints. Gross motor strength is normal.    Neurologic: Awake, alert, oriented to name, place and time. Cranial nerves II-XII are grossly intact. Gait is normal. Ambulates from chair to exam table, seats self, lies supine and sits back up w/o assistance.  Neuropsychiatric: Calm, cooperative. Normal affect. Pleasant. Answers questions  appropriately, follows commands w/o difficulty.        PRIOR LABS/DIAGNOSTIC STUDIES:    The patient's records and results pertinent to the visit personally reviewed by this provider.     Holter Monitor 2/17/25  **There are 9 hours and 40 minutes of data out of the 24 hour holter.**     1. The rhythm was sinus throughout.  2. The maxiumum heart rate was 111 bpm, the minimum heart rate was 67 bpm and the average heart rate was 84 bpm.  3. There was one isolated supraventricular ectopic beat seen.  4. There was no ventricular ectopy seen.  5. There was no ST-T wave changes seen.  6. There were 4 diary events saved (no symptom indicated with the events) that showed sinus rhythm at rates between  bpm.      1/29/25 Echo result w/o MOPS: Interpretation SummaryLeft ventricular size, wall motion and function are normal. The ejectionfraction is 55-60%. Global right ventricular function is normal. The right ventricle is normalsize. The inferior vena cava is normal. No pericardial effusion is present. There is no prior study for direct comparison.          Latest Ref Rng & Units 2/17/2025     9:38 AM   PFT   FVC L 2.93    FEV1 L 2.59    FVC% % 97    FEV1% % 100          Assessment    Anh Flores is a 30 year old female seen as a PAC referral for risk assessment and optimization for anesthesia.    Plan/Recommendations  Pt will be optimized for the proposed procedure.  See below for details on the assessment, risk, and preoperative recommendations    NEUROLOGY  - No history of TIA, CVA or seizure  - Migraines  -Post Op delirium risk factors:  No risk identified    ENT  - No current airway concerns.  Will need to be reassessed day of surgery.  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  Does not do dedicated exercise, but stays active.   Patient performs 4 or more METS exercise without symptoms             Total Score: 0      RCRI-Very low risk: Class 1 0.4% complication rate             Total Score: 0        PULMONARY  - Mild TOMMY. Oral appliance has been prescribed.   - Denies h/o asthma but describes symptoms consistent with reactive airway with triggers being upper respiratory tract infections or if she does significant exercise.     TOMMY Low Risk             Total Score: 2    TOMMY: Snores loudly    TOMMY: BMI over 35 kg/m2        - Tobacco History    History   Smoking Status    Never   Smokeless Tobacco    Never       GI  - GERD.  Controlled on medications: Proton Pump Inhibitor    PONV High Risk  Total Score: 4           1 AN PONV: Pt is Female    1 AN PONV: Patient is not a current smoker    1 AN PONV: Patient has history of PONV    1 AN PONV: Intended Post Op Opioids        /RENAL  - Baseline Creatinine  0.7    ENDOCRINE    - BMI: Estimated body mass index is 47.26 kg/m  as calculated from the following:    Height as of this encounter: 1.524 m (5').    Weight as of this encounter: 109.8 kg (242 lb).  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        MSK  Patient is NOT Frail             Total Score: 0      - S/p R carpal tunnel & cubital tunnel release 7/14/25  - LBP    MISC  - Hidradenitis      The patient is aware that the final anesthesia plan will be decided by the assigned anesthesia provider on the date of service.      The patient is optimized for their procedure. AVS with information on surgery time/arrival time, meds and NPO status given by nursing staff. No further diagnostic testing  indicated.        56 minutes were spent on the date of the encounter performing chart review, history and exam, documentation and/or discussion with other providers about the issues documented above.    Eden Lin PA-C  Preoperative Assessment Center  [facility]  Phone: [phone]  Fax: [phone]

## 2025-07-31 NOTE — TELEPHONE ENCOUNTER
Message sent to Dr Carrasco in regard to patient concerns and medication request. LM on VM for patient to give Pharmacy information for medication request.

## 2025-07-31 NOTE — PATIENT INSTRUCTIONS
Preparing for Your Surgery      Name:  Anh Flores   MRN:  [id number]   :  1995   Today's Date:  2025     The [place] [employer] [address] Construction Project                                Timeline 2025 -2025    This project will affect travel to the [facility], as well as the [facility].      Please check the [facility] [address] project website for the most up to date information and give yourself additional time to reach your destination.        Arriving for surgery:  Surgery date:  2025  Arrival time:  12:00 pm     Please come to:       [facility] [address]   [address] [place]  [address]     The Green Ramp for patients and visitors is beneath the [facility]. The parking facility entrance is at the intersection of [address] [place] [address] and [address]. Patients and visitors who self-park will receive the reduced hospital parking rate (no ticket validation needed).     [employer] parking, located at the [facility] main entrance on [address] [place] [address], is available Monday - Friday from 7 am to 3:30 pm.     Discounted parking pass options can be purchased from  attendants during business hours.     -Check in at the security desk in the [facility]   Lobby. They will direct you to the correct elevators.   -Proceed to the 3rd floor, Adult Surgery Waiting Lounge. [phone]     If you need directions, a wheelchair or escort please stop at the Information Desk in the lobby.  Inform the information person you are here for surgery; a wheelchair and escort to [address] will be provided.   An escort to the Adult Surgery Waiting Lounge will be provided. .    What can I eat or drink?  -  You may eat and drink normally up to 8 hours prior to arrival time. (Until 4 am)  -  You  may have clear liquids until 2 hours prior to arrival time. (Until 10 am)    Examples of clear liquids:  Water  Clear broth  Juices (apple, white grape, white cranberry  and cider) without pulp  Noncarbonated, powder based beverages  (lemonade and Jaxon-Aid)  Sodas (Sprite, 7-Up, ginger ale and seltzer)  Coffee or tea (without milk or cream)  Gatorade    -  No Alcohol or cannabis products for at least 24 hours before surgery.     Which medicines can I take?    Hold Aspirin for 7 days before surgery.   Hold Multivitamins for 7 days before surgery.  Hold Supplements for 7 days before surgery.  Hold Ibuprofen (Advil, Motrin) for 1 day(s) before surgery--unless otherwise directed by surgeon.  Hold Naproxen (Aleve) for 4 days before surgery.    Semaglutide (Ozempic) must be held at least 7 days before surgery   No doses after 7/29/25      -  DO NOT take these medications the day of surgery:  Boric acid  Atogepant (Qulipta)  Docusate  Fexofenadine   Hydroxyzine  Rimegepant (Nurtec)  Rizaptriptan (Maxalt)  Sumatriptan  Tranexamic acid (Lysteda)  Ubrelvy       -  PLEASE TAKE these medications the day of surgery:  Atorvastatin  Nebulizer per your routine   Cyclobenzaprine if needed  Fluoxetine  Gabapentin  Linaclotide (Linzess)  Ondansetron if needed  Pantoprazole       How do I prepare myself?  - Please take 2 showers (one the night prior to surgery and one the morning of surgery) using Scrubcare or Hibiclens soap.    Use this soap only from the neck to your toes. Avoid genital area      Leave the soap on your skin for one minute--then rinse thoroughly.      You may use your own shampoo and conditioner. No other hair products.   - Please remove all jewelry and body piercings.  - No lotions, deodorants or fragrance.  - No makeup or fingernail polish.   - Bring your ID and insurance card.    -For patients being admitted to the [facility]  Family members are to take the patient belongings with them and place them in the  lockers provided in the [facility]folio Lounge.  Please limit the items you bring to 1 bag as the lockers are small.      -If you use a CPAP machine, please bring the CPAP machine, tubing, and mask to hospital.    -If you have a Deep Brain Stimulator, Spinal Cord Stimulator, or any Neuro Stimulator device---you must bring the remote control to the hospital.      ALL PATIENTS GOING HOME THE SAME DAY OF SURGERY ARE REQUIRED TO HAVE A RESPONSIBLE ADULT TO DRIVE AND BE IN ATTENDANCE WITH THEM FOR 24 HOURS FOLLOWING SURGERY.    Covid testing policy as of 12/06/2022  Your surgeon will notify and schedule you for a COVID test if one is needed before surgery--please direct any questions or COVID symptoms to your surgeon      Questions or Concerns:    - For any questions regarding the day of surgery or your hospital stay, please contact the Pre Admission Nursing Office at [phone].       - If you have health changes between today and your surgery, please call your surgeon.       - For questions after surgery, please call your surgeons office.           Current Visitor Guidelines    2 adult visitors for adult patients in the pre op area    If additional visitors come (beyond a patient care attendant or a group home caregiver), the additional visitors will be asked to wait in the main lobby of the hospital    Visiting hours: 8 a.m. to 8:30 p.m.    Patients confirmed or suspected to have symptoms of COVID 19 or flu:     No visitors allowed for adult patients.   Children (under age 18) can have 1 named visitor.     People who are sick or showing symptoms of COVID 19 or flu:    Are not allowed to visit patients--we can only make exceptions in special situations.       Please follow these guidelines for your visit:          Please maintain social distance          Masking is optional--however at times you may be asked to wear a mask for the safety of yourself and others     Clean your hands with alcohol hand . Do this when you  arrive at and leave the building and patient room,    And again after you touch your mask or anything in the room.     Go directly to and from the room you are visiting.     Stay in the patient s room during your visit. Limit going to other places in the hospital as much as possible     Leave bags and jackets at home or in the car.     For everyone s health, please don t come and go during your visit. That includes for smoking   during your visit.

## 2025-07-31 NOTE — TELEPHONE ENCOUNTER
Ok to send in OCP for her as requested and put under my name now that Matt is not there.    I am fine with her trying to treat the candida infection and hold on the IUD removal, but I am also not sure that this infection is causing her pain which is hard.  I think her thoughts of treating the infection and if still present then moving to IUD removal in a step wise fashion makes sense if she is comfortable with that, but given her pain I want her to know if she wants to get it removed now I also support that.  Thanks, Dr. Carrasco

## 2025-07-31 NOTE — TELEPHONE ENCOUNTER
Spoke with patient. She requested OCP to be sent in ASAP as she is in town from [place], [place] to get her medication filled. Dr. Lopez filled her OCP and sent it to [facility]. LM on  for patient that medication was sent. Will also notify in [employer] message. Also notified patient that her Boric Acid Suppositories are not covered by Insurance but are fairly reasonable OTC and where she could purchase them. Sent [employer] message on this as well.

## 2025-08-01 ENCOUNTER — ANCILLARY PROCEDURE (OUTPATIENT)
Dept: GENERAL RADIOLOGY | Facility: CLINIC | Age: 30
End: 2025-08-01
Attending: NURSE PRACTITIONER
Payer: COMMERCIAL

## 2025-08-01 DIAGNOSIS — M53.3 COCCYDYNIA: ICD-10-CM

## 2025-08-01 PROCEDURE — 72220 X-RAY EXAM SACRUM TAILBONE: CPT | Performed by: RADIOLOGY

## 2025-08-05 ENCOUNTER — ANESTHESIA (OUTPATIENT)
Dept: SURGERY | Facility: CLINIC | Age: 30
End: 2025-08-05
Payer: COMMERCIAL

## 2025-08-05 ENCOUNTER — HOSPITAL ENCOUNTER (OUTPATIENT)
Facility: CLINIC | Age: 30
Discharge: HOME | End: 2025-08-05
Attending: OBSTETRICS & GYNECOLOGY | Admitting: OBSTETRICS & GYNECOLOGY
Payer: COMMERCIAL

## 2025-08-05 VITALS
HEART RATE: 66 BPM | SYSTOLIC BLOOD PRESSURE: 125 MMHG | BODY MASS INDEX: 47.83 KG/M2 | HEIGHT: 60 IN | OXYGEN SATURATION: 99 % | DIASTOLIC BLOOD PRESSURE: 86 MMHG | TEMPERATURE: 98.2 F | RESPIRATION RATE: 22 BRPM | WEIGHT: 243.61 LBS

## 2025-08-05 LAB
ABO + RH BLD: NORMAL
BASOPHILS # BLD AUTO: 0 10E3/UL (ref 0–0.2)
BASOPHILS NFR BLD AUTO: 0 %
BLD GP AB SCN SERPL QL: NEGATIVE
EOSINOPHIL # BLD AUTO: 0.1 10E3/UL (ref 0–0.7)
EOSINOPHIL NFR BLD AUTO: 2 %
ERYTHROCYTE [DISTWIDTH] IN BLOOD BY AUTOMATED COUNT: 12.6 % (ref 10–15)
GLUCOSE BLDC GLUCOMTR-MCNC: 76 MG/DL (ref 70–99)
GLUCOSE BLDC GLUCOMTR-MCNC: 83 MG/DL (ref 70–99)
HCG UR QL: NEGATIVE
HCT VFR BLD AUTO: 40.4 % (ref 35–47)
HGB BLD-MCNC: 14 G/DL (ref 11.7–15.7)
IMM GRANULOCYTES # BLD: 0 10E3/UL
IMM GRANULOCYTES NFR BLD: 0 %
LYMPHOCYTES # BLD AUTO: 2.5 10E3/UL (ref 0.8–5.3)
LYMPHOCYTES NFR BLD AUTO: 28 %
MCH RBC QN AUTO: 31.7 PG (ref 26.5–33)
MCHC RBC AUTO-ENTMCNC: 34.7 G/DL (ref 31.5–36.5)
MCV RBC AUTO: 91 FL (ref 78–100)
MONOCYTES # BLD AUTO: 0.5 10E3/UL (ref 0–1.3)
MONOCYTES NFR BLD AUTO: 5 %
NEUTROPHILS # BLD AUTO: 5.7 10E3/UL (ref 1.6–8.3)
NEUTROPHILS NFR BLD AUTO: 65 %
NRBC # BLD AUTO: 0 10E3/UL
NRBC BLD AUTO-RTO: 0 /100
PLATELET # BLD AUTO: 313 10E3/UL (ref 150–450)
RBC # BLD AUTO: 4.42 10E6/UL (ref 3.8–5.2)
SPECIMEN EXP DATE BLD: NORMAL
WBC # BLD AUTO: 8.8 10E3/UL (ref 4–11)

## 2025-08-05 PROCEDURE — 999N000127 HC STATISTIC PERIPHERAL IV START W US GUIDANCE

## 2025-08-05 PROCEDURE — 250N000011 HC RX IP 250 OP 636: Performed by: ANESTHESIOLOGY

## 2025-08-05 PROCEDURE — 250N000009 HC RX 250: Performed by: NURSE ANESTHETIST, CERTIFIED REGISTERED

## 2025-08-05 PROCEDURE — 250N000013 HC RX MED GY IP 250 OP 250 PS 637: Performed by: STUDENT IN AN ORGANIZED HEALTH CARE EDUCATION/TRAINING PROGRAM

## 2025-08-05 PROCEDURE — 999N000285 HC STATISTIC VASC ACCESS LAB DRAW WITH PIV START

## 2025-08-05 PROCEDURE — 360N000075 HC SURGERY LEVEL 2, PER MIN: Performed by: OBSTETRICS & GYNECOLOGY

## 2025-08-05 PROCEDURE — 710N000012 HC RECOVERY PHASE 2, PER MINUTE: Performed by: OBSTETRICS & GYNECOLOGY

## 2025-08-05 PROCEDURE — 82962 GLUCOSE BLOOD TEST: CPT

## 2025-08-05 PROCEDURE — 258N000003 HC RX IP 258 OP 636: Performed by: NURSE ANESTHETIST, CERTIFIED REGISTERED

## 2025-08-05 PROCEDURE — 85004 AUTOMATED DIFF WBC COUNT: CPT | Performed by: STUDENT IN AN ORGANIZED HEALTH CARE EDUCATION/TRAINING PROGRAM

## 2025-08-05 PROCEDURE — 999N000141 HC STATISTIC PRE-PROCEDURE NURSING ASSESSMENT: Performed by: OBSTETRICS & GYNECOLOGY

## 2025-08-05 PROCEDURE — 250N000011 HC RX IP 250 OP 636: Performed by: NURSE ANESTHETIST, CERTIFIED REGISTERED

## 2025-08-05 PROCEDURE — 370N000017 HC ANESTHESIA TECHNICAL FEE, PER MIN: Performed by: OBSTETRICS & GYNECOLOGY

## 2025-08-05 PROCEDURE — 258N000003 HC RX IP 258 OP 636: Performed by: ANESTHESIOLOGY

## 2025-08-05 PROCEDURE — 58301 REMOVE INTRAUTERINE DEVICE: CPT | Performed by: OBSTETRICS & GYNECOLOGY

## 2025-08-05 PROCEDURE — 999N000040 HC STATISTIC CONSULT NO CHARGE VASC ACCESS

## 2025-08-05 PROCEDURE — 250N000013 HC RX MED GY IP 250 OP 250 PS 637: Performed by: OBSTETRICS & GYNECOLOGY

## 2025-08-05 PROCEDURE — 86900 BLOOD TYPING SEROLOGIC ABO: CPT | Performed by: STUDENT IN AN ORGANIZED HEALTH CARE EDUCATION/TRAINING PROGRAM

## 2025-08-05 PROCEDURE — 81025 URINE PREGNANCY TEST: CPT | Performed by: STUDENT IN AN ORGANIZED HEALTH CARE EDUCATION/TRAINING PROGRAM

## 2025-08-05 RX ORDER — ONDANSETRON 4 MG/1
4 TABLET, ORALLY DISINTEGRATING ORAL EVERY 30 MIN PRN
Status: DISCONTINUED | OUTPATIENT
Start: 2025-08-05 | End: 2025-08-05 | Stop reason: HOSPADM

## 2025-08-05 RX ORDER — OXYCODONE HYDROCHLORIDE 10 MG/1
10 TABLET ORAL
Status: DISCONTINUED | OUTPATIENT
Start: 2025-08-05 | End: 2025-08-05 | Stop reason: HOSPADM

## 2025-08-05 RX ORDER — DEXAMETHASONE SODIUM PHOSPHATE 4 MG/ML
4 INJECTION, SOLUTION INTRA-ARTICULAR; INTRALESIONAL; INTRAMUSCULAR; INTRAVENOUS; SOFT TISSUE
Status: DISCONTINUED | OUTPATIENT
Start: 2025-08-05 | End: 2025-08-05 | Stop reason: HOSPADM

## 2025-08-05 RX ORDER — NALOXONE HYDROCHLORIDE 0.4 MG/ML
0.1 INJECTION, SOLUTION INTRAMUSCULAR; INTRAVENOUS; SUBCUTANEOUS
Status: DISCONTINUED | OUTPATIENT
Start: 2025-08-05 | End: 2025-08-05 | Stop reason: HOSPADM

## 2025-08-05 RX ORDER — ONDANSETRON 2 MG/ML
INJECTION INTRAMUSCULAR; INTRAVENOUS PRN
Status: DISCONTINUED | OUTPATIENT
Start: 2025-08-05 | End: 2025-08-05

## 2025-08-05 RX ORDER — LABETALOL HYDROCHLORIDE 5 MG/ML
10 INJECTION, SOLUTION INTRAVENOUS
Status: DISCONTINUED | OUTPATIENT
Start: 2025-08-05 | End: 2025-08-05 | Stop reason: HOSPADM

## 2025-08-05 RX ORDER — ACETAMINOPHEN 325 MG/1
975 TABLET ORAL ONCE
Status: DISCONTINUED | OUTPATIENT
Start: 2025-08-05 | End: 2025-08-05 | Stop reason: HOSPADM

## 2025-08-05 RX ORDER — ONDANSETRON 2 MG/ML
4 INJECTION INTRAMUSCULAR; INTRAVENOUS ONCE
Status: COMPLETED | OUTPATIENT
Start: 2025-08-05 | End: 2025-08-05

## 2025-08-05 RX ORDER — HYDROMORPHONE HYDROCHLORIDE 1 MG/ML
0.2 INJECTION, SOLUTION INTRAMUSCULAR; INTRAVENOUS; SUBCUTANEOUS EVERY 5 MIN PRN
Status: DISCONTINUED | OUTPATIENT
Start: 2025-08-05 | End: 2025-08-05 | Stop reason: HOSPADM

## 2025-08-05 RX ORDER — FENTANYL CITRATE 50 UG/ML
50 INJECTION, SOLUTION INTRAMUSCULAR; INTRAVENOUS EVERY 5 MIN PRN
Status: DISCONTINUED | OUTPATIENT
Start: 2025-08-05 | End: 2025-08-05 | Stop reason: HOSPADM

## 2025-08-05 RX ORDER — SODIUM CHLORIDE, SODIUM LACTATE, POTASSIUM CHLORIDE, CALCIUM CHLORIDE 600; 310; 30; 20 MG/100ML; MG/100ML; MG/100ML; MG/100ML
INJECTION, SOLUTION INTRAVENOUS CONTINUOUS PRN
Status: DISCONTINUED | OUTPATIENT
Start: 2025-08-05 | End: 2025-08-05

## 2025-08-05 RX ORDER — ONDANSETRON 2 MG/ML
4 INJECTION INTRAMUSCULAR; INTRAVENOUS EVERY 30 MIN PRN
Status: DISCONTINUED | OUTPATIENT
Start: 2025-08-05 | End: 2025-08-05 | Stop reason: HOSPADM

## 2025-08-05 RX ORDER — LIDOCAINE HYDROCHLORIDE 20 MG/ML
INJECTION, SOLUTION INFILTRATION; PERINEURAL PRN
Status: DISCONTINUED | OUTPATIENT
Start: 2025-08-05 | End: 2025-08-05

## 2025-08-05 RX ORDER — OXYCODONE HYDROCHLORIDE 5 MG/1
5 TABLET ORAL
Status: DISCONTINUED | OUTPATIENT
Start: 2025-08-05 | End: 2025-08-05 | Stop reason: HOSPADM

## 2025-08-05 RX ORDER — IBUPROFEN 800 MG/1
800 TABLET, FILM COATED ORAL ONCE
Status: COMPLETED | OUTPATIENT
Start: 2025-08-05 | End: 2025-08-05

## 2025-08-05 RX ORDER — SODIUM CHLORIDE, SODIUM LACTATE, POTASSIUM CHLORIDE, CALCIUM CHLORIDE 600; 310; 30; 20 MG/100ML; MG/100ML; MG/100ML; MG/100ML
INJECTION, SOLUTION INTRAVENOUS CONTINUOUS
Status: DISCONTINUED | OUTPATIENT
Start: 2025-08-05 | End: 2025-08-05 | Stop reason: HOSPADM

## 2025-08-05 RX ORDER — APREPITANT 40 MG/1
40 CAPSULE ORAL ONCE
Status: COMPLETED | OUTPATIENT
Start: 2025-08-05 | End: 2025-08-05

## 2025-08-05 RX ORDER — HYDROMORPHONE HYDROCHLORIDE 1 MG/ML
0.4 INJECTION, SOLUTION INTRAMUSCULAR; INTRAVENOUS; SUBCUTANEOUS EVERY 5 MIN PRN
Status: DISCONTINUED | OUTPATIENT
Start: 2025-08-05 | End: 2025-08-05 | Stop reason: HOSPADM

## 2025-08-05 RX ORDER — ACETAMINOPHEN 325 MG/1
975 TABLET ORAL ONCE
Status: COMPLETED | OUTPATIENT
Start: 2025-08-05 | End: 2025-08-05

## 2025-08-05 RX ORDER — FENTANYL CITRATE 50 UG/ML
25 INJECTION, SOLUTION INTRAMUSCULAR; INTRAVENOUS EVERY 5 MIN PRN
Status: DISCONTINUED | OUTPATIENT
Start: 2025-08-05 | End: 2025-08-05 | Stop reason: HOSPADM

## 2025-08-05 RX ORDER — PROPOFOL 10 MG/ML
INJECTION, EMULSION INTRAVENOUS CONTINUOUS PRN
Status: DISCONTINUED | OUTPATIENT
Start: 2025-08-05 | End: 2025-08-05

## 2025-08-05 RX ORDER — FENTANYL CITRATE 50 UG/ML
INJECTION, SOLUTION INTRAMUSCULAR; INTRAVENOUS PRN
Status: DISCONTINUED | OUTPATIENT
Start: 2025-08-05 | End: 2025-08-05

## 2025-08-05 RX ADMIN — IBUPROFEN 800 MG: 400 TABLET, FILM COATED ORAL at 15:29

## 2025-08-05 RX ADMIN — DEXMEDETOMIDINE HYDROCHLORIDE 10 MCG: 100 INJECTION, SOLUTION INTRAVENOUS at 13:52

## 2025-08-05 RX ADMIN — ONDANSETRON 4 MG: 2 INJECTION INTRAMUSCULAR; INTRAVENOUS at 14:03

## 2025-08-05 RX ADMIN — MIDAZOLAM 1 MG: 1 INJECTION INTRAMUSCULAR; INTRAVENOUS at 13:52

## 2025-08-05 RX ADMIN — PROPOFOL 150 MCG/KG/MIN: 10 INJECTION, EMULSION INTRAVENOUS at 13:54

## 2025-08-05 RX ADMIN — ACETAMINOPHEN 975 MG: 325 TABLET ORAL at 13:39

## 2025-08-05 RX ADMIN — SODIUM CHLORIDE, SODIUM LACTATE, POTASSIUM CHLORIDE, AND CALCIUM CHLORIDE: .6; .31; .03; .02 INJECTION, SOLUTION INTRAVENOUS at 14:26

## 2025-08-05 RX ADMIN — APREPITANT 40 MG: 40 CAPSULE ORAL at 13:40

## 2025-08-05 RX ADMIN — LIDOCAINE HYDROCHLORIDE 100 MG: 20 INJECTION, SOLUTION INFILTRATION; PERINEURAL at 13:54

## 2025-08-05 RX ADMIN — FENTANYL CITRATE 25 MCG: 50 INJECTION INTRAMUSCULAR; INTRAVENOUS at 13:55

## 2025-08-05 RX ADMIN — MIDAZOLAM 1 MG: 1 INJECTION INTRAMUSCULAR; INTRAVENOUS at 13:57

## 2025-08-05 RX ADMIN — SODIUM CHLORIDE, SODIUM LACTATE, POTASSIUM CHLORIDE, AND CALCIUM CHLORIDE: .6; .31; .03; .02 INJECTION, SOLUTION INTRAVENOUS at 13:45

## 2025-08-05 RX ADMIN — ONDANSETRON 4 MG: 2 INJECTION INTRAMUSCULAR; INTRAVENOUS at 13:36

## 2025-08-05 ASSESSMENT — ACTIVITIES OF DAILY LIVING (ADL)
ADLS_ACUITY_SCORE: 32

## 2025-08-05 ASSESSMENT — LIFESTYLE VARIABLES: TOBACCO_USE: 0

## 2025-08-05 ASSESSMENT — ENCOUNTER SYMPTOMS: SEIZURES: 0

## 2025-08-08 ENCOUNTER — RESULTS FOLLOW-UP (OUTPATIENT)
Dept: NEUROSURGERY | Facility: CLINIC | Age: 30
End: 2025-08-08
Payer: COMMERCIAL

## 2025-08-11 ENCOUNTER — THERAPY VISIT (OUTPATIENT)
Dept: PHYSICAL THERAPY | Facility: CLINIC | Age: 30
End: 2025-08-11
Attending: PHYSICIAN ASSISTANT
Payer: COMMERCIAL

## 2025-08-11 ENCOUNTER — OFFICE VISIT (OUTPATIENT)
Dept: URGENT CARE | Facility: URGENT CARE | Age: 30
End: 2025-08-11
Payer: COMMERCIAL

## 2025-08-11 VITALS
HEART RATE: 78 BPM | DIASTOLIC BLOOD PRESSURE: 76 MMHG | OXYGEN SATURATION: 97 % | RESPIRATION RATE: 24 BRPM | SYSTOLIC BLOOD PRESSURE: 110 MMHG | BODY MASS INDEX: 48.55 KG/M2 | WEIGHT: 248.6 LBS | TEMPERATURE: 97.9 F

## 2025-08-11 DIAGNOSIS — R10.31 RIGHT LOWER QUADRANT PAIN: ICD-10-CM

## 2025-08-11 DIAGNOSIS — R19.8 IRREGULAR BOWEL HABITS: ICD-10-CM

## 2025-08-11 DIAGNOSIS — R10.32 ABDOMINAL PAIN, LEFT LOWER QUADRANT: ICD-10-CM

## 2025-08-11 DIAGNOSIS — L76.82 INCISIONAL PAIN: Primary | ICD-10-CM

## 2025-08-11 DIAGNOSIS — R14.0 BLOATING: ICD-10-CM

## 2025-08-11 PROCEDURE — 99024 POSTOP FOLLOW-UP VISIT: CPT | Performed by: FAMILY MEDICINE

## 2025-08-11 PROCEDURE — 97530 THERAPEUTIC ACTIVITIES: CPT | Mod: GP | Performed by: PHYSICAL THERAPIST

## 2025-08-11 PROCEDURE — 97110 THERAPEUTIC EXERCISES: CPT | Mod: GP | Performed by: PHYSICAL THERAPIST

## 2025-08-11 PROCEDURE — 87070 CULTURE OTHR SPECIMN AEROBIC: CPT | Performed by: FAMILY MEDICINE

## 2025-08-11 PROCEDURE — 97161 PT EVAL LOW COMPLEX 20 MIN: CPT | Mod: GP | Performed by: PHYSICAL THERAPIST

## 2025-08-11 PROCEDURE — 87186 SC STD MICRODIL/AGAR DIL: CPT | Performed by: FAMILY MEDICINE

## 2025-08-11 RX ORDER — CEPHALEXIN 500 MG/1
500 CAPSULE ORAL 2 TIMES DAILY
Qty: 10 CAPSULE | Refills: 0 | Status: SHIPPED | OUTPATIENT
Start: 2025-08-11 | End: 2025-08-16

## 2025-08-14 LAB
BACTERIA WND CULT: ABNORMAL
BACTERIA WND CULT: ABNORMAL

## 2025-08-19 ENCOUNTER — OFFICE VISIT (OUTPATIENT)
Dept: ORTHOPEDICS | Facility: CLINIC | Age: 30
End: 2025-08-19
Payer: COMMERCIAL

## 2025-08-19 DIAGNOSIS — G56.03 BILATERAL CARPAL TUNNEL SYNDROME: Primary | ICD-10-CM

## 2025-08-19 PROCEDURE — 99024 POSTOP FOLLOW-UP VISIT: CPT | Performed by: STUDENT IN AN ORGANIZED HEALTH CARE EDUCATION/TRAINING PROGRAM

## 2025-08-22 ENCOUNTER — OFFICE VISIT (OUTPATIENT)
Dept: PSYCHOLOGY | Facility: CLINIC | Age: 30
End: 2025-08-22
Payer: COMMERCIAL

## 2025-08-22 DIAGNOSIS — F41.9 ANXIETY DISORDER, UNSPECIFIED TYPE: Primary | ICD-10-CM

## 2025-08-22 DIAGNOSIS — F32.1 CURRENT MODERATE EPISODE OF MAJOR DEPRESSIVE DISORDER, UNSPECIFIED WHETHER RECURRENT (H): ICD-10-CM

## 2025-08-23 ENCOUNTER — HOSPITAL ENCOUNTER (OUTPATIENT)
Dept: MRI IMAGING | Facility: CLINIC | Age: 30
Discharge: HOME | End: 2025-08-23
Attending: NURSE PRACTITIONER | Admitting: NURSE PRACTITIONER
Payer: COMMERCIAL

## 2025-08-23 DIAGNOSIS — M53.3 COCCYDYNIA: ICD-10-CM

## 2025-08-23 DIAGNOSIS — M54.50 CHRONIC BILATERAL LOW BACK PAIN, UNSPECIFIED WHETHER SCIATICA PRESENT: ICD-10-CM

## 2025-08-23 DIAGNOSIS — G89.29 CHRONIC BILATERAL LOW BACK PAIN, UNSPECIFIED WHETHER SCIATICA PRESENT: ICD-10-CM

## 2025-08-23 PROCEDURE — 72195 MRI PELVIS W/O DYE: CPT

## 2025-08-23 PROCEDURE — 72195 MRI PELVIS W/O DYE: CPT | Mod: 26 | Performed by: RADIOLOGY

## 2025-08-25 ENCOUNTER — OFFICE VISIT (OUTPATIENT)
Dept: GASTROENTEROLOGY | Facility: CLINIC | Age: 30
End: 2025-08-25
Payer: COMMERCIAL

## 2025-08-25 VITALS — BODY MASS INDEX: 48.43 KG/M2 | WEIGHT: 248 LBS

## 2025-08-25 DIAGNOSIS — R10.9 CHRONIC ABDOMINAL PAIN: ICD-10-CM

## 2025-08-25 DIAGNOSIS — E11.9 DIABETES MELLITUS, TYPE 2 (H): ICD-10-CM

## 2025-08-25 DIAGNOSIS — K76.0 FATTY LIVER: ICD-10-CM

## 2025-08-25 DIAGNOSIS — K74.00 FIBROSIS OF LIVER: ICD-10-CM

## 2025-08-25 DIAGNOSIS — G89.29 CHRONIC ABDOMINAL PAIN: ICD-10-CM

## 2025-08-25 DIAGNOSIS — E73.9 LACTOSE INTOLERANCE: ICD-10-CM

## 2025-08-25 DIAGNOSIS — R19.8 IRREGULAR BOWEL HABITS: ICD-10-CM

## 2025-08-25 DIAGNOSIS — K52.9 CHRONIC DIARRHEA: ICD-10-CM

## (undated) DEVICE — SPECIMEN CONTAINER 5OZ STERILE 2600SA

## (undated) DEVICE — DRAPE MAYO STAND 23X54 8337

## (undated) DEVICE — DRSG GAUZE 4X4" TRAY 6939

## (undated) DEVICE — BNDG ELASTIC 4" DBL LENGTH UNSTERILE 6611-14

## (undated) DEVICE — SYR 10ML FINGER CONTROL W/O NDL 309695

## (undated) DEVICE — DRSG XEROFORM 5X9" CUR253590W

## (undated) DEVICE — GLOVE BIOGEL PI MICRO SZ 7.5 48575

## (undated) DEVICE — Device

## (undated) DEVICE — ESU CORD BIPOLAR GREEN 10-4000

## (undated) DEVICE — OINTMENT ANTIBIOTIC BACITRACIN ZINC .9 G 1171

## (undated) DEVICE — SLING ARM LG 79-99157

## (undated) DEVICE — SYRINGE EAR/ULCER STERILE 2 OZ BULB 4172

## (undated) DEVICE — LINEN TOWEL PACK X5 5464

## (undated) DEVICE — ESU GROUND PAD ADULT W/CORD E7507

## (undated) DEVICE — LINEN TOWEL PACK X6 WHITE 5487

## (undated) DEVICE — SU ETHILON 4-0 PC-3 18" 1864G

## (undated) DEVICE — BNDG ELASTIC 2"X5YDS UNSTERILE 6611-20

## (undated) DEVICE — SOLUTION IRRIGATION 0.9% NACL 1000ML BOTTLE R5200-01

## (undated) DEVICE — PREP CHLORAPREP 26ML TINTED HI-LITE ORANGE 930815

## (undated) DEVICE — PREP POVIDONE IODINE SWABSTICKS TRIPLE PACK MDS093902A

## (undated) DEVICE — SOL WATER IRRIG 1000ML BOTTLE 2F7114

## (undated) DEVICE — SOL NACL 0.9% IRRIG 1000ML BOTTLE 2F7124

## (undated) DEVICE — DRAPE STERI TOWEL SM 1000

## (undated) DEVICE — TRAY PAIN INJECTION 97A 640

## (undated) DEVICE — PACK NEURO MINOR UMMC SNE32MNMU4

## (undated) DEVICE — TOURNIQUET CUFF 24" YELLOW LF 5921-024-135

## (undated) DEVICE — SU VICRYL+ 3-0 27IN SH UND VCP416H

## (undated) DEVICE — NDL SPINAL 22GA 3.5" QUINCKE 405181

## (undated) DEVICE — DRSG STERI STRIP 1/2X4" R1547

## (undated) DEVICE — SUCTION MANIFOLD NEPTUNE 2 SYS 4 PORT 0702-020-000

## (undated) DEVICE — PACK EXTREMITY UMMC SOP32EXFC9

## (undated) DEVICE — SPECIMEN CONTAINER URINE 90ML STERILE 75.1435.002

## (undated) DEVICE — NEEDLE SPINAL DISP 22GA X 3.5" QUINCKE 333320

## (undated) DEVICE — PREP CHLORAPREP W/ORANGE TINT 10.5ML 260715

## (undated) DEVICE — EYE SPONGE SPEAR WECK CEL 0008685

## (undated) DEVICE — SU MONOCRYL 4-0 PS-2 27" UND Y426H

## (undated) DEVICE — UNDERPAD 36X30 PREMIERPRO MAX ABS NS LF 676111

## (undated) DEVICE — GLOVE PROTEXIS MICRO 7.5 LT BLUE 2D73PM75

## (undated) DEVICE — GLOVE PROTEXIS BLUE W/NEU-THERA 7.5  2D73EB75

## (undated) DEVICE — DRAPE SHEET REV FOLD 3/4 9349

## (undated) DEVICE — SYR 05ML LL W/O NDL

## (undated) DEVICE — GLOVE BIOGEL PI ULTRATOUCH SZ 7.0 41170

## (undated) DEVICE — BNDG ESMARK 4" STERILE LF 820-3412

## (undated) DEVICE — SU MONOCRYL 3-0 SH 27" UND Y416H

## (undated) DEVICE — GLOVE PROTEXIS MICRO 7.0 LT BLUE 2D73PM70

## (undated) DEVICE — SU DERMABOND ADVANCED .7ML DNX12

## (undated) DEVICE — ABDOMINAL PAD

## (undated) DEVICE — LINEN GOWN X4 5410

## (undated) DEVICE — CAST PADDING 4" STERILE 9044S

## (undated) DEVICE — PAD CHUX UNDERPAD 23X36" 676105

## (undated) DEVICE — BLADE KNIFE SURG 15 371115

## (undated) DEVICE — DRSG ABDOMINAL 07 1/2X8" 7197D

## (undated) DEVICE — DRAPE SHEET MED 44X70" 9355

## (undated) DEVICE — PEN MARKING W/RULER DYNJSM04

## (undated) DEVICE — GLOVE BIOGEL PI MICRO SZ 7.0 48570

## (undated) DEVICE — SET IV EXT 8IN MICRO POWER INJ 7N8300

## (undated) DEVICE — SU MONOCRYL 3-0 RB-1 27" UND Y215H

## (undated) DEVICE — DRSG GAUZE 4X8" NON21842

## (undated) DEVICE — GLOVE BIOGEL PI MICRO INDICATOR UNDERGLOVE SZ 7.5 48975

## (undated) DEVICE — STRAP POSITIONING 60X31" BODY KNEE KBS 01

## (undated) DEVICE — SLING ARM XLG 79-99158

## (undated) RX ORDER — PROPOFOL 10 MG/ML
INJECTION, EMULSION INTRAVENOUS
Status: DISPENSED
Start: 2025-05-19

## (undated) RX ORDER — FENTANYL CITRATE 50 UG/ML
INJECTION, SOLUTION INTRAMUSCULAR; INTRAVENOUS
Status: DISPENSED
Start: 2024-09-16

## (undated) RX ORDER — FENTANYL CITRATE 50 UG/ML
INJECTION, SOLUTION INTRAMUSCULAR; INTRAVENOUS
Status: DISPENSED
Start: 2025-05-19

## (undated) RX ORDER — METOPROLOL TARTRATE 1 MG/ML
INJECTION, SOLUTION INTRAVENOUS
Status: DISPENSED
Start: 2025-05-02

## (undated) RX ORDER — PROPOFOL 10 MG/ML
INJECTION, EMULSION INTRAVENOUS
Status: DISPENSED
Start: 2025-08-05

## (undated) RX ORDER — OXYCODONE HYDROCHLORIDE 5 MG/1
TABLET ORAL
Status: DISPENSED
Start: 2025-07-14

## (undated) RX ORDER — LIDOCAINE HYDROCHLORIDE 10 MG/ML
INJECTION, SOLUTION EPIDURAL; INFILTRATION; INTRACAUDAL; PERINEURAL
Status: DISPENSED
Start: 2025-01-17

## (undated) RX ORDER — CEFAZOLIN SODIUM/WATER 2 G/20 ML
SYRINGE (ML) INTRAVENOUS
Status: DISPENSED
Start: 2025-07-14

## (undated) RX ORDER — ONDANSETRON 2 MG/ML
INJECTION INTRAMUSCULAR; INTRAVENOUS
Status: DISPENSED
Start: 2025-05-19

## (undated) RX ORDER — DEXAMETHASONE SODIUM PHOSPHATE 4 MG/ML
INJECTION, SOLUTION INTRA-ARTICULAR; INTRALESIONAL; INTRAMUSCULAR; INTRAVENOUS; SOFT TISSUE
Status: DISPENSED
Start: 2025-05-19

## (undated) RX ORDER — LIDOCAINE HYDROCHLORIDE 10 MG/ML
INJECTION, SOLUTION EPIDURAL; INFILTRATION; INTRACAUDAL; PERINEURAL
Status: DISPENSED
Start: 2025-06-06

## (undated) RX ORDER — NITROGLYCERIN 0.4 MG/1
TABLET SUBLINGUAL
Status: DISPENSED
Start: 2025-05-02

## (undated) RX ORDER — ONDANSETRON 2 MG/ML
INJECTION INTRAMUSCULAR; INTRAVENOUS
Status: DISPENSED
Start: 2025-08-05

## (undated) RX ORDER — FENTANYL CITRATE 50 UG/ML
INJECTION, SOLUTION INTRAMUSCULAR; INTRAVENOUS
Status: DISPENSED
Start: 2025-07-14

## (undated) RX ORDER — DEXAMETHASONE SODIUM PHOSPHATE 4 MG/ML
INJECTION, SOLUTION INTRA-ARTICULAR; INTRALESIONAL; INTRAMUSCULAR; INTRAVENOUS; SOFT TISSUE
Status: DISPENSED
Start: 2025-01-22

## (undated) RX ORDER — MANNITOL 20 G/100ML
INJECTION, SOLUTION INTRAVENOUS
Status: DISPENSED
Start: 2025-07-14

## (undated) RX ORDER — PROPOFOL 10 MG/ML
INJECTION, EMULSION INTRAVENOUS
Status: DISPENSED
Start: 2025-07-14

## (undated) RX ORDER — OXYCODONE HYDROCHLORIDE 5 MG/1
TABLET ORAL
Status: DISPENSED
Start: 2025-06-06

## (undated) RX ORDER — KETOROLAC TROMETHAMINE 30 MG/ML
INJECTION, SOLUTION INTRAMUSCULAR; INTRAVENOUS
Status: DISPENSED
Start: 2025-06-06

## (undated) RX ORDER — ACETAMINOPHEN 325 MG/1
TABLET ORAL
Status: DISPENSED
Start: 2025-06-06

## (undated) RX ORDER — SIMETHICONE 40MG/0.6ML
SUSPENSION, DROPS(FINAL DOSAGE FORM)(ML) ORAL
Status: DISPENSED
Start: 2024-09-16

## (undated) RX ORDER — APREPITANT 40 MG/1
CAPSULE ORAL
Status: DISPENSED
Start: 2025-08-05

## (undated) RX ORDER — FENTANYL CITRATE 50 UG/ML
INJECTION, SOLUTION INTRAMUSCULAR; INTRAVENOUS
Status: DISPENSED
Start: 2025-06-06

## (undated) RX ORDER — ONDANSETRON 2 MG/ML
INJECTION INTRAMUSCULAR; INTRAVENOUS
Status: DISPENSED
Start: 2025-06-06

## (undated) RX ORDER — CEFAZOLIN SODIUM/WATER 2 G/20 ML
SYRINGE (ML) INTRAVENOUS
Status: DISPENSED
Start: 2025-05-19

## (undated) RX ORDER — FENTANYL CITRATE 50 UG/ML
INJECTION, SOLUTION INTRAMUSCULAR; INTRAVENOUS
Status: DISPENSED
Start: 2025-08-05

## (undated) RX ORDER — METOPROLOL TARTRATE 50 MG
TABLET ORAL
Status: DISPENSED
Start: 2025-05-02

## (undated) RX ORDER — ACETAMINOPHEN 325 MG/1
TABLET ORAL
Status: DISPENSED
Start: 2025-08-05

## (undated) RX ORDER — ONDANSETRON 2 MG/ML
INJECTION INTRAMUSCULAR; INTRAVENOUS
Status: DISPENSED
Start: 2025-07-14

## (undated) RX ORDER — DEXAMETHASONE SODIUM PHOSPHATE 4 MG/ML
INJECTION, SOLUTION INTRA-ARTICULAR; INTRALESIONAL; INTRAMUSCULAR; INTRAVENOUS; SOFT TISSUE
Status: DISPENSED
Start: 2025-01-17

## (undated) RX ORDER — LIDOCAINE HYDROCHLORIDE 10 MG/ML
INJECTION, SOLUTION EPIDURAL; INFILTRATION; INTRACAUDAL; PERINEURAL
Status: DISPENSED
Start: 2025-01-22

## (undated) RX ORDER — ACETAMINOPHEN 325 MG/1
TABLET ORAL
Status: DISPENSED
Start: 2025-07-14

## (undated) RX ORDER — IBUPROFEN 400 MG/1
TABLET, FILM COATED ORAL
Status: DISPENSED
Start: 2025-08-05

## (undated) RX ORDER — IVABRADINE 5 MG/1
TABLET, FILM COATED ORAL
Status: DISPENSED
Start: 2025-05-02